# Patient Record
Sex: FEMALE | Race: WHITE | NOT HISPANIC OR LATINO | Employment: FULL TIME | URBAN - METROPOLITAN AREA
[De-identification: names, ages, dates, MRNs, and addresses within clinical notes are randomized per-mention and may not be internally consistent; named-entity substitution may affect disease eponyms.]

---

## 2017-01-26 ENCOUNTER — HOSPITAL ENCOUNTER (OUTPATIENT)
Dept: ORTHOPEDIC SURGERY | Facility: CLINIC | Age: 57
Discharge: HOME | End: 2017-01-26
Attending: ORTHOPAEDIC SURGERY | Admitting: ORTHOPAEDIC SURGERY

## 2017-02-23 ENCOUNTER — HOSPITAL ENCOUNTER (OUTPATIENT)
Dept: ORTHOPEDIC SURGERY | Facility: CLINIC | Age: 57
Discharge: HOME | End: 2017-02-23
Attending: PHYSICIAN ASSISTANT | Admitting: PHYSICIAN ASSISTANT

## 2017-03-23 ENCOUNTER — HOSPITAL ENCOUNTER (OUTPATIENT)
Dept: ORTHOPEDIC SURGERY | Facility: CLINIC | Age: 57
Discharge: HOME | End: 2017-03-23
Attending: PHYSICIAN ASSISTANT | Admitting: PHYSICIAN ASSISTANT

## 2017-06-29 ENCOUNTER — HOSPITAL ENCOUNTER (OUTPATIENT)
Dept: LAB | Facility: HOSPITAL | Age: 57
Discharge: HOME | End: 2017-06-29
Attending: NURSE PRACTITIONER | Admitting: NURSE PRACTITIONER

## 2017-06-29 LAB
ALBUMIN SERPL-MCNC: 3.9 G/DL (ref 3.5–4.8)
ALBUMIN/GLOB SERPL: 0.9 {RATIO} (ref 1–1.7)
ALP SERPL-CCNC: 85 IU/L (ref 32–91)
ALT SERPL-CCNC: 15 IU/L (ref 14–54)
ANION GAP SERPL CALC-SCNC: 14.3 MMOL/L (ref 10–20)
AST SERPL-CCNC: 21 IU/L (ref 15–41)
BASOPHILS # BLD AUTO: 0.1 10*3/UL (ref 0–0.2)
BASOPHILS NFR BLD AUTO: 1 % (ref 0–2)
BILIRUB SERPL-MCNC: 0.7 MG/DL (ref 0.3–1.2)
BUN SERPL-MCNC: 13 MG/DL (ref 8–20)
BUN/CREAT SERPL: 14.4 (ref 5.4–26.2)
CALCIUM SERPL-MCNC: 9.5 MG/DL (ref 8.9–10.3)
CHLORIDE SERPL-SCNC: 100 MMOL/L (ref 101–111)
CONV CO2: 29 MMOL/L (ref 22–32)
CONV TOTAL PROTEIN: 8.3 G/DL (ref 6.1–7.9)
CREAT UR-MCNC: 0.9 MG/DL (ref 0.4–1)
CRP SERPL-MCNC: 0.55 MG/DL (ref 0–0.7)
DIFFERENTIAL METHOD BLD: (no result)
EOSINOPHIL # BLD AUTO: 0.1 10*3/UL (ref 0–0.3)
EOSINOPHIL # BLD AUTO: 3 % (ref 0–3)
ERYTHROCYTE [DISTWIDTH] IN BLOOD BY AUTOMATED COUNT: 16 % (ref 11.5–14.5)
ERYTHROCYTE [SEDIMENTATION RATE] IN BLOOD BY WESTERGREN METHOD: 36 MM/HR (ref 0–30)
GLOBULIN UR ELPH-MCNC: 4.4 G/DL (ref 2.5–3.8)
GLUCOSE SERPL-MCNC: 103 MG/DL (ref 65–99)
HCT VFR BLD AUTO: 42.2 % (ref 35–49)
HGB BLD-MCNC: 13.6 G/DL (ref 12–15)
LYMPHOCYTES # BLD AUTO: 1.6 10*3/UL (ref 0.8–4.8)
LYMPHOCYTES NFR BLD AUTO: 36 % (ref 18–42)
MCH RBC QN AUTO: 27.4 PG (ref 26–32)
MCHC RBC AUTO-ENTMCNC: 32.3 G/DL (ref 32–36)
MCV RBC AUTO: 85.1 FL (ref 80–94)
MONOCYTES # BLD AUTO: 0.4 10*3/UL (ref 0.1–1.3)
MONOCYTES NFR BLD AUTO: 9 % (ref 2–11)
NEUTROPHILS # BLD AUTO: 2.3 10*3/UL (ref 2.3–8.6)
NEUTROPHILS NFR BLD AUTO: 51 % (ref 50–75)
NRBC BLD AUTO-RTO: 0 /100{WBCS}
NRBC/RBC NFR BLD MANUAL: 0 10*3/UL
PLATELET # BLD AUTO: 243 10*3/UL (ref 150–450)
PMV BLD AUTO: 8.7 FL (ref 7.4–10.4)
POTASSIUM SERPL-SCNC: 4.3 MMOL/L (ref 3.6–5.1)
RBC # BLD AUTO: 4.96 10*6/UL (ref 4–5.4)
SODIUM SERPL-SCNC: 139 MMOL/L (ref 136–144)
WBC # BLD AUTO: 4.6 10*3/UL (ref 4.5–11.5)

## 2017-06-30 LAB — CHROMATIN AB SERPL-ACNC: <20 [IU]/ML (ref 0–20)

## 2017-10-27 ENCOUNTER — HOSPITAL ENCOUNTER (OUTPATIENT)
Dept: RHEUMATOLOGY | Facility: CLINIC | Age: 57
Discharge: HOME | End: 2017-10-27
Attending: INTERNAL MEDICINE | Admitting: INTERNAL MEDICINE

## 2017-11-14 ENCOUNTER — HOSPITAL ENCOUNTER (OUTPATIENT)
Dept: OTHER | Facility: HOSPITAL | Age: 57
Discharge: HOME | End: 2017-11-14
Attending: GENERAL PRACTICE | Admitting: GENERAL PRACTICE

## 2017-11-14 LAB
ALBUMIN SERPL-MCNC: 3.7 G/DL (ref 3.5–4.8)
ALBUMIN/GLOB SERPL: 0.9 {RATIO} (ref 1–1.7)
ALP SERPL-CCNC: 67 IU/L (ref 32–91)
ALT SERPL-CCNC: 15 IU/L (ref 14–54)
ANION GAP SERPL CALC-SCNC: 11 MMOL/L (ref 10–20)
AST SERPL-CCNC: 20 IU/L (ref 15–41)
BASOPHILS # BLD AUTO: 0.1 10*3/UL (ref 0–0.2)
BASOPHILS NFR BLD AUTO: 1 % (ref 0–2)
BILIRUB SERPL-MCNC: 0.8 MG/DL (ref 0.3–1.2)
BUN SERPL-MCNC: 11 MG/DL (ref 8–20)
BUN/CREAT SERPL: 11 (ref 5.4–26.2)
CALCIUM SERPL-MCNC: 9.2 MG/DL (ref 8.9–10.3)
CHLORIDE SERPL-SCNC: 98 MMOL/L (ref 101–111)
CHOLEST SERPL-MCNC: 171 MG/DL
CHOLEST/HDLC SERPL: 2.4 {RATIO}
CONV CO2: 32 MMOL/L (ref 22–32)
CONV LDL CHOLESTEROL DIRECT: 84 MG/DL (ref 0–100)
CONV TOTAL PROTEIN: 8 G/DL (ref 6.1–7.9)
CREAT UR-MCNC: 1 MG/DL (ref 0.4–1)
DIFFERENTIAL METHOD BLD: (no result)
EOSINOPHIL # BLD AUTO: 0.1 10*3/UL (ref 0–0.3)
EOSINOPHIL # BLD AUTO: 2 % (ref 0–3)
ERYTHROCYTE [DISTWIDTH] IN BLOOD BY AUTOMATED COUNT: 14.5 % (ref 11.5–14.5)
GLOBULIN UR ELPH-MCNC: 4.3 G/DL (ref 2.5–3.8)
GLUCOSE SERPL-MCNC: 103 MG/DL (ref 65–99)
HCT VFR BLD AUTO: 44.4 % (ref 35–49)
HDLC SERPL-MCNC: 73 MG/DL
HGB BLD-MCNC: 14.7 G/DL (ref 12–15)
LDLC/HDLC SERPL: 1.2 {RATIO}
LIPID INTERPRETATION: NORMAL
LYMPHOCYTES # BLD AUTO: 1.9 10*3/UL (ref 0.8–4.8)
LYMPHOCYTES NFR BLD AUTO: 27 % (ref 18–42)
MCH RBC QN AUTO: 28.2 PG (ref 26–32)
MCHC RBC AUTO-ENTMCNC: 33.2 G/DL (ref 32–36)
MCV RBC AUTO: 85.1 FL (ref 80–94)
MONOCYTES # BLD AUTO: 0.6 10*3/UL (ref 0.1–1.3)
MONOCYTES NFR BLD AUTO: 9 % (ref 2–11)
NEUTROPHILS # BLD AUTO: 4.2 10*3/UL (ref 2.3–8.6)
NEUTROPHILS NFR BLD AUTO: 61 % (ref 50–75)
NRBC BLD AUTO-RTO: 0 /100{WBCS}
NRBC/RBC NFR BLD MANUAL: 0 10*3/UL
PLATELET # BLD AUTO: 236 10*3/UL (ref 150–450)
PMV BLD AUTO: 8.4 FL (ref 7.4–10.4)
POTASSIUM SERPL-SCNC: 3 MMOL/L (ref 3.6–5.1)
RBC # BLD AUTO: 5.21 10*6/UL (ref 4–5.4)
SODIUM SERPL-SCNC: 138 MMOL/L (ref 136–144)
TRIGL SERPL-MCNC: 74 MG/DL
TSH SERPL-ACNC: 1.9 UIU/ML (ref 0.34–5.6)
VLDLC SERPL CALC-MCNC: 14.1 MG/DL
WBC # BLD AUTO: 6.9 10*3/UL (ref 4.5–11.5)

## 2018-02-19 ENCOUNTER — HOSPITAL ENCOUNTER (OUTPATIENT)
Dept: LAB | Facility: HOSPITAL | Age: 58
Discharge: HOME | End: 2018-02-19
Attending: NURSE PRACTITIONER | Admitting: NURSE PRACTITIONER

## 2018-02-19 LAB
ALBUMIN SERPL-MCNC: 3.6 G/DL (ref 3.5–4.8)
ALBUMIN/GLOB SERPL: 1 {RATIO} (ref 1–1.7)
ALP SERPL-CCNC: 65 IU/L (ref 32–91)
ALT SERPL-CCNC: 16 IU/L (ref 14–54)
ANION GAP SERPL CALC-SCNC: 10.9 MMOL/L (ref 10–20)
AST SERPL-CCNC: 22 IU/L (ref 15–41)
BILIRUB SERPL-MCNC: 0.7 MG/DL (ref 0.3–1.2)
BUN SERPL-MCNC: 17 MG/DL (ref 8–20)
BUN/CREAT SERPL: 17 (ref 5.4–26.2)
CALCIUM SERPL-MCNC: 8.9 MG/DL (ref 8.9–10.3)
CHLORIDE SERPL-SCNC: 101 MMOL/L (ref 101–111)
CONV CO2: 29 MMOL/L (ref 22–32)
CONV TOTAL PROTEIN: 7.2 G/DL (ref 6.1–7.9)
CREAT UR-MCNC: 1 MG/DL (ref 0.4–1)
CRP SERPL-MCNC: 0.5 MG/DL (ref 0–0.7)
ERYTHROCYTE [DISTWIDTH] IN BLOOD BY AUTOMATED COUNT: 14 % (ref 11.5–14.5)
ERYTHROCYTE [SEDIMENTATION RATE] IN BLOOD BY WESTERGREN METHOD: 30 MM/HR (ref 0–30)
GLOBULIN UR ELPH-MCNC: 3.6 G/DL (ref 2.5–3.8)
GLUCOSE SERPL-MCNC: 104 MG/DL (ref 65–99)
HCT VFR BLD AUTO: 43 % (ref 35–49)
HGB BLD-MCNC: 14.3 G/DL (ref 12–15)
MCH RBC QN AUTO: 28.1 PG (ref 26–32)
MCHC RBC AUTO-ENTMCNC: 33.1 G/DL (ref 32–36)
MCV RBC AUTO: 84.9 FL (ref 80–94)
PLATELET # BLD AUTO: 226 10*3/UL (ref 150–450)
PMV BLD AUTO: 8.4 FL (ref 7.4–10.4)
POTASSIUM SERPL-SCNC: 3.9 MMOL/L (ref 3.6–5.1)
RBC # BLD AUTO: 5.07 10*6/UL (ref 4–5.4)
SODIUM SERPL-SCNC: 137 MMOL/L (ref 136–144)
WBC # BLD AUTO: 4.1 10*3/UL (ref 4.5–11.5)

## 2018-11-21 ENCOUNTER — HOSPITAL ENCOUNTER (OUTPATIENT)
Dept: RHEUMATOLOGY | Facility: CLINIC | Age: 58
Discharge: HOME | End: 2018-11-21
Attending: NURSE PRACTITIONER | Admitting: NURSE PRACTITIONER

## 2019-01-05 ENCOUNTER — HOSPITAL ENCOUNTER (OUTPATIENT)
Dept: LAB | Facility: HOSPITAL | Age: 59
Discharge: HOME | End: 2019-01-05
Attending: NURSE PRACTITIONER | Admitting: NURSE PRACTITIONER

## 2019-01-05 LAB
ALBUMIN SERPL-MCNC: 3.3 G/DL (ref 3.5–4.8)
ALBUMIN/GLOB SERPL: 1 {RATIO} (ref 1–1.7)
ALP SERPL-CCNC: 67 IU/L (ref 32–91)
ALT SERPL-CCNC: 16 IU/L (ref 14–54)
ANION GAP SERPL CALC-SCNC: 10.4 MMOL/L (ref 10–20)
AST SERPL-CCNC: 20 IU/L (ref 15–41)
BILIRUB SERPL-MCNC: 0.4 MG/DL (ref 0.3–1.2)
BUN SERPL-MCNC: 21 MG/DL (ref 8–20)
BUN/CREAT SERPL: 26.3 (ref 5.4–26.2)
CALCIUM SERPL-MCNC: 8.6 MG/DL (ref 8.9–10.3)
CHLORIDE SERPL-SCNC: 104 MMOL/L (ref 101–111)
CONV CO2: 27 MMOL/L (ref 22–32)
CONV TOTAL PROTEIN: 6.6 G/DL (ref 6.1–7.9)
CREAT UR-MCNC: 0.8 MG/DL (ref 0.4–1)
CRP SERPL-MCNC: 0.44 MG/DL (ref 0–0.7)
ERYTHROCYTE [DISTWIDTH] IN BLOOD BY AUTOMATED COUNT: 15.2 % (ref 11.5–14.5)
GLOBULIN UR ELPH-MCNC: 3.3 G/DL (ref 2.5–3.8)
GLUCOSE SERPL-MCNC: 88 MG/DL (ref 65–99)
HCT VFR BLD AUTO: 40.8 % (ref 35–49)
HGB BLD-MCNC: 13.4 G/DL (ref 12–15)
MCH RBC QN AUTO: 29.1 PG (ref 26–32)
MCHC RBC AUTO-ENTMCNC: 32.8 G/DL (ref 32–36)
MCV RBC AUTO: 88.6 FL (ref 80–94)
PLATELET # BLD AUTO: 221 10*3/UL (ref 150–450)
PMV BLD AUTO: 8.1 FL (ref 7.4–10.4)
POTASSIUM SERPL-SCNC: 4.4 MMOL/L (ref 3.6–5.1)
RBC # BLD AUTO: 4.6 10*6/UL (ref 4–5.4)
SODIUM SERPL-SCNC: 137 MMOL/L (ref 136–144)
WBC # BLD AUTO: 5.5 10*3/UL (ref 4.5–11.5)

## 2019-05-08 ENCOUNTER — HOSPITAL ENCOUNTER (OUTPATIENT)
Dept: LAB | Facility: HOSPITAL | Age: 59
Discharge: HOME | End: 2019-05-08
Attending: NURSE PRACTITIONER | Admitting: NURSE PRACTITIONER

## 2019-05-08 LAB
ALBUMIN SERPL-MCNC: 3.7 G/DL (ref 3.5–4.8)
ALBUMIN/GLOB SERPL: 1 {RATIO} (ref 1–1.7)
ALP SERPL-CCNC: 60 IU/L (ref 32–91)
ALT SERPL-CCNC: 19 IU/L (ref 14–54)
ANION GAP SERPL CALC-SCNC: 16 MMOL/L (ref 10–20)
AST SERPL-CCNC: 19 IU/L (ref 15–41)
BASOPHILS # BLD AUTO: 0.1 10*3/UL (ref 0–0.2)
BASOPHILS NFR BLD AUTO: 1 % (ref 0–2)
BILIRUB SERPL-MCNC: 0.7 MG/DL (ref 0.3–1.2)
BUN SERPL-MCNC: 13 MG/DL (ref 8–20)
BUN/CREAT SERPL: 14.4 (ref 5.4–26.2)
CALCIUM SERPL-MCNC: 9.1 MG/DL (ref 8.9–10.3)
CHLORIDE SERPL-SCNC: 103 MMOL/L (ref 101–111)
CONV CO2: 25 MMOL/L (ref 22–32)
CONV TOTAL PROTEIN: 7.3 G/DL (ref 6.1–7.9)
CREAT UR-MCNC: 0.9 MG/DL (ref 0.4–1)
CRP SERPL-MCNC: 0.45 MG/DL (ref 0–0.7)
DIFFERENTIAL METHOD BLD: (no result)
EOSINOPHIL # BLD AUTO: 0.2 10*3/UL (ref 0–0.3)
EOSINOPHIL # BLD AUTO: 4 % (ref 0–3)
ERYTHROCYTE [DISTWIDTH] IN BLOOD BY AUTOMATED COUNT: 14.9 % (ref 11.5–14.5)
GLOBULIN UR ELPH-MCNC: 3.6 G/DL (ref 2.5–3.8)
GLUCOSE SERPL-MCNC: 107 MG/DL (ref 65–99)
HCT VFR BLD AUTO: 41.6 % (ref 35–49)
HGB BLD-MCNC: 13.6 G/DL (ref 12–15)
LYMPHOCYTES # BLD AUTO: 1.6 10*3/UL (ref 0.8–4.8)
LYMPHOCYTES NFR BLD AUTO: 32 % (ref 18–42)
MCH RBC QN AUTO: 29.6 PG (ref 26–32)
MCHC RBC AUTO-ENTMCNC: 32.6 G/DL (ref 32–36)
MCV RBC AUTO: 90.6 FL (ref 80–94)
MONOCYTES # BLD AUTO: 0.5 10*3/UL (ref 0.1–1.3)
MONOCYTES NFR BLD AUTO: 10 % (ref 2–11)
NEUTROPHILS # BLD AUTO: 2.6 10*3/UL (ref 2.3–8.6)
NEUTROPHILS NFR BLD AUTO: 53 % (ref 50–75)
NRBC BLD AUTO-RTO: 0 /100{WBCS}
NRBC/RBC NFR BLD MANUAL: 0 10*3/UL
PLATELET # BLD AUTO: 218 10*3/UL (ref 150–450)
PMV BLD AUTO: 9 FL (ref 7.4–10.4)
POTASSIUM SERPL-SCNC: 4 MMOL/L (ref 3.6–5.1)
RBC # BLD AUTO: 4.59 10*6/UL (ref 4–5.4)
SODIUM SERPL-SCNC: 140 MMOL/L (ref 136–144)
WBC # BLD AUTO: 4.9 10*3/UL (ref 4.5–11.5)

## 2019-05-09 LAB — 25(OH)D3 SERPL-MCNC: 30 NG/ML (ref 30–100)

## 2019-06-18 RX ORDER — HYDROXYCHLOROQUINE SULFATE 200 MG/1
TABLET, FILM COATED ORAL
Qty: 60 TABLET | Refills: 2 | Status: SHIPPED | OUTPATIENT
Start: 2019-06-18 | End: 2019-09-15 | Stop reason: SDUPTHER

## 2019-08-13 ENCOUNTER — CONVERSION ENCOUNTER (OUTPATIENT)
Dept: RHEUMATOLOGY | Facility: CLINIC | Age: 59
End: 2019-08-13

## 2019-08-13 LAB
ALBUMIN SERPL-MCNC: 3.8 G/DL (ref 3.6–5.1)
ALBUMIN/GLOB SERPL: ABNORMAL {RATIO} (ref 1–2.5)
ALP SERPL-CCNC: 77 UNITS/L (ref 33–130)
ALT SERPL-CCNC: 14 UNITS/L (ref 6–29)
AST SERPL-CCNC: 16 UNITS/L (ref 10–35)
BASOPHILS # BLD AUTO: ABNORMAL 10*3/MM3 (ref 0–200)
BASOPHILS NFR BLD AUTO: 1.3 %
BILIRUB SERPL-MCNC: 0.5 MG/DL (ref 0.2–1.2)
BUN SERPL-MCNC: 24 MG/DL (ref 7–25)
BUN/CREAT SERPL: ABNORMAL (ref 6–22)
CALCIUM SERPL-MCNC: 9 MG/DL (ref 8.6–10.4)
CHLORIDE SERPL-SCNC: 106 MMOL/L (ref 98–110)
CO2 CONTENT VENOUS: 28 MMOL/L (ref 20–32)
CONV NEUTROPHILS/100 LEUKOCYTES IN BODY FLUID BY MANUAL COUNT: 61.3 %
CONV RF TITER: 19
CONV TOTAL PROTEIN: 6.9 G/DL (ref 6.1–8.1)
CREAT UR-MCNC: 0.83 MG/DL (ref 0.5–0.99)
CRP SERPL-MCNC: 0.61 MG/DL
CYCLIC CITRULLINATED PEPTIDE ANTIBODY: ABNORMAL
EOSINOPHIL # BLD AUTO: 4.5 %
EOSINOPHIL # BLD AUTO: ABNORMAL 10*3/MM3 (ref 15–500)
ERYTHROCYTE [DISTWIDTH] IN BLOOD BY AUTOMATED COUNT: 16.2 % (ref 11–15)
ERYTHROCYTE [SEDIMENTATION RATE] IN BLOOD BY WESTERGREN METHOD: 14 MM/HR
GLOBULIN UR ELPH-MCNC: ABNORMAL G/DL (ref 1.9–3.7)
GLUCOSE SERPL-MCNC: 102 MG/DL (ref 65–99)
HCT VFR BLD AUTO: 40.4 % (ref 35–45)
HGB BLD-MCNC: 13.1 G/DL (ref 11.7–15.5)
LYMPHOCYTES # BLD AUTO: ABNORMAL 10*3/MM3 (ref 850–3900)
LYMPHOCYTES NFR BLD AUTO: 24.8 %
MCH RBC QN AUTO: 28.6 PG (ref 27–33)
MCHC RBC AUTO-ENTMCNC: ABNORMAL % (ref 32–36)
MCV RBC AUTO: 88.2 FL (ref 80–100)
MONOCYTES # BLD AUTO: ABNORMAL 10*3/MICROLITER (ref 200–950)
MONOCYTES NFR BLD AUTO: 8.1 %
NEUTROPHILS # BLD AUTO: ABNORMAL 10*3/MM3 (ref 1500–7800)
PLATELET # BLD AUTO: ABNORMAL 10*3/MM3 (ref 140–400)
PMV BLD AUTO: 10 FL (ref 7.5–12.5)
POTASSIUM SERPL-SCNC: 4.9 MMOL/L (ref 3.5–5.3)
RBC # BLD AUTO: ABNORMAL 10*6/MM3 (ref 3.8–5.1)
SODIUM SERPL-SCNC: 140 MMOL/L (ref 135–146)
WBC # BLD AUTO: ABNORMAL K/UL (ref 3.8–10.8)

## 2019-08-21 ENCOUNTER — OFFICE VISIT (OUTPATIENT)
Dept: RHEUMATOLOGY | Facility: CLINIC | Age: 59
End: 2019-08-21

## 2019-08-21 VITALS
WEIGHT: 236 LBS | HEIGHT: 65 IN | SYSTOLIC BLOOD PRESSURE: 130 MMHG | BODY MASS INDEX: 39.32 KG/M2 | HEART RATE: 61 BPM | DIASTOLIC BLOOD PRESSURE: 76 MMHG

## 2019-08-21 DIAGNOSIS — M19.90 INFLAMMATORY ARTHRITIS: ICD-10-CM

## 2019-08-21 DIAGNOSIS — M35.01 SJOGREN'S SYNDROME WITH KERATOCONJUNCTIVITIS SICCA (HCC): Primary | ICD-10-CM

## 2019-08-21 DIAGNOSIS — M17.0 PRIMARY OSTEOARTHRITIS OF BOTH KNEES: ICD-10-CM

## 2019-08-21 PROBLEM — M81.0 OSTEOPOROSIS: Status: ACTIVE | Noted: 2019-08-21

## 2019-08-21 PROBLEM — M79.643 PAIN OF HAND: Status: ACTIVE | Noted: 2017-10-27

## 2019-08-21 PROBLEM — M85.80 OSTEOPENIA: Status: ACTIVE | Noted: 2018-06-20

## 2019-08-21 PROBLEM — Z98.890 HISTORY OF OPERATIVE PROCEDURE ON HIP: Status: ACTIVE | Noted: 2017-01-26

## 2019-08-21 PROBLEM — Z82.3 FAMILY HISTORY OF STROKE: Status: ACTIVE | Noted: 2019-08-21

## 2019-08-21 PROBLEM — E07.9 THYROID DISORDER: Status: ACTIVE | Noted: 2019-08-21

## 2019-08-21 PROBLEM — M17.9 OSTEOARTHRITIS OF KNEE: Status: ACTIVE | Noted: 2017-06-29

## 2019-08-21 PROCEDURE — 99214 OFFICE O/P EST MOD 30 MIN: CPT | Performed by: NURSE PRACTITIONER

## 2019-08-21 PROCEDURE — 20610 DRAIN/INJ JOINT/BURSA W/O US: CPT | Performed by: NURSE PRACTITIONER

## 2019-08-21 RX ORDER — FOLIC ACID 1 MG/1
TABLET ORAL
Refills: 0 | COMMUNITY
Start: 2019-08-19 | End: 2019-09-15 | Stop reason: SDUPTHER

## 2019-08-21 RX ORDER — NICOTINE POLACRILEX 4 MG/1
GUM, CHEWING ORAL
COMMUNITY
Start: 2016-10-12 | End: 2020-07-23

## 2019-08-21 RX ORDER — DICLOFENAC SODIUM 75 MG/1
75 TABLET, DELAYED RELEASE ORAL 2 TIMES DAILY PRN
Qty: 60 TABLET | Refills: 1 | Status: SHIPPED | OUTPATIENT
Start: 2019-08-21 | End: 2019-10-14 | Stop reason: SDUPTHER

## 2019-08-21 RX ORDER — ALENDRONATE SODIUM 70 MG/1
TABLET ORAL
COMMUNITY
Start: 2018-06-20 | End: 2020-05-19 | Stop reason: SDUPTHER

## 2019-08-21 RX ORDER — LEVOTHYROXINE SODIUM 0.1 MG/1
TABLET ORAL
Refills: 0 | COMMUNITY
Start: 2019-06-20 | End: 2020-07-23 | Stop reason: SDUPTHER

## 2019-08-21 RX ORDER — METHYLPREDNISOLONE ACETATE 80 MG/ML
80 INJECTION, SUSPENSION INTRA-ARTICULAR; INTRALESIONAL; INTRAMUSCULAR; SOFT TISSUE ONCE
Status: COMPLETED | OUTPATIENT
Start: 2019-08-21 | End: 2019-08-21

## 2019-08-21 RX ORDER — MELOXICAM 15 MG/1
15 TABLET ORAL DAILY
Refills: 0 | COMMUNITY
Start: 2019-05-15 | End: 2019-08-21

## 2019-08-21 RX ADMIN — METHYLPREDNISOLONE ACETATE 80 MG: 80 INJECTION, SUSPENSION INTRA-ARTICULAR; INTRALESIONAL; INTRAMUSCULAR; SOFT TISSUE at 11:09

## 2019-08-21 NOTE — PROGRESS NOTES
Subjective   Lucy Mahan is a 58 y.o. female.     History of Present Illness       Pt is a 58 y.o. female w/ history of rheumatoid arthritis (seropositive) and OA of the antione knees. She was last seen in May 2019. She takes plaquenil 200mg 2/d, MTX 4 tabs/week (on Saturday), folic acid 1mg/d, meloxicam 15 mg/d, alendronate 70mg/week. Compliant w/ meds, denies any side effects. Up to date on her eye examination for plaquenil- it was last done in November 2018 & was ok for plaquenil.      AM sitffness may last 5-10 minutes, her knees and hands will ache intermittently but overall is much better than it was prior to starting MTX.  Right knee is really bothering her. She uses conservative measures for her knees such as support, ice...does not seem to be helping. Hurts w/ ambulation and stairs bother her. Ankles will swell, worse at the end of the day.  Feels like joint pain is worse in the evenings. Been swimming more lately and joint pain seems exacerbated by this.     She is trying to drink more water & elevated her LEs when she can and this does help. Right knee will swell at times.    Labs on 5-8-19 were unremarkable with normal inflammatory markers and normal vitamin D level.     ROS:Denies fever/chillls. No nasal or oral lesions.Dry eyes and uses gtts. No CP or SOA. NO QUETA, jaw pain or blurred vision. No skin rashes. Energy is up and down. A lot of stress at times  The review of systems reviewed and are (-). PCP manages hypothyroidism.     She had dexa scan June 19th, 2018 that showed osteopenia.  Strong fam hx of osteoporosis. She takes the alendronate well. No falls or fractures. takes daily calcium and vitamin D    After risks and benefits explained to the patient, consent obtained. The right knee  was cleaned and prepped in sterile fashion with betadine and alcohol. The area was numbed with topical lidocaine spray and then injected with 80 mg of depo medrol and 1 cc of lidocaine. Pt tolerated well. Band aid  applied. Injection given at 1106 by FORTUNATO Warren        The following portions of the patient's history were reviewed and updated as appropriate: allergies, current medications, past family history, past medical history, past social history, past surgical history and problem list.    Review of Systems   Constitutional:        See HPI       Objective   Physical Exam   Constitutional: She is oriented to person, place, and time. She appears well-developed and well-nourished.   HENT:   Head: Normocephalic and atraumatic.   Eyes: EOM are normal. Pupils are equal, round, and reactive to light.   Cardiovascular: Normal rate and regular rhythm.   Pulmonary/Chest: Effort normal and breath sounds normal.   Musculoskeletal:   Mild swelling is noted over antione knees  Tenderness is noted over right knee w/palpation  Decreased ROM antione shoulders, bilateral knees, pain with ROM.   Neurological: She is alert and oriented to person, place, and time.   Skin: Skin is warm and dry.   Psychiatric: She has a normal mood and affect.         Assessment/Plan   Lucy was seen today for rheumatoid arthritis.    Diagnoses and all orders for this visit:    Sjogren's syndrome with keratoconjunctivitis sicca (CMS/HCC)  -     Cancel: CBC With Manual Differential; Future  -     Cancel: Comprehensive Metabolic Panel; Future  -     Cancel: C-reactive Protein; Future  -     Cancel: Sedimentation Rate; Future  -     Cancel: Cyclic Citrul Peptide Antibody, IgG / IgA; Future  -     Cancel: Vitamin D 25 Hydroxy; Future  -     Cancel: Uric Acid; Future  -     CBC With Manual Differential; Future  -     Comprehensive Metabolic Panel; Future  -     C-reactive Protein; Future  -     Vitamin D 25 Hydroxy; Future    Inflammatory arthritis  -     Cancel: CBC With Manual Differential; Future  -     Cancel: Comprehensive Metabolic Panel; Future  -     Cancel: C-reactive Protein; Future  -     Cancel: Sedimentation Rate; Future  -     Cancel: Cyclic Citrul  Peptide Antibody, IgG / IgA; Future  -     Cancel: Vitamin D 25 Hydroxy; Future  -     Cancel: Uric Acid; Future  -     CBC With Manual Differential; Future  -     Comprehensive Metabolic Panel; Future  -     C-reactive Protein; Future  -     Vitamin D 25 Hydroxy; Future    Primary osteoarthritis of both knees  -     Cancel: CBC With Manual Differential; Future  -     Cancel: Comprehensive Metabolic Panel; Future  -     Cancel: C-reactive Protein; Future  -     Cancel: Sedimentation Rate; Future  -     Cancel: Cyclic Citrul Peptide Antibody, IgG / IgA; Future  -     Cancel: Vitamin D 25 Hydroxy; Future  -     Cancel: Uric Acid; Future  -     methylPREDNISolone acetate (DEPO-medrol) injection 80 mg    Other orders  -     diclofenac (VOLTAREN) 75 MG EC tablet; Take 1 tablet by mouth 2 (Two) Times a Day As Needed (Pain).  -     methotrexate 2.5 MG tablet; Take 5 tablets by mouth 1 (One) Time Per Week.        Patient Instructions   increasd pain Increase MTX 5 tab/week  Stop meloxcam  Start diclofeanc 75 mg po BID  Labs reviewed & future orders given  Right knee inejction given

## 2019-08-21 NOTE — PATIENT INSTRUCTIONS
increasd pain Increase MTX 5 tab/week  Stop meloxcam  Start diclofeanc 75 mg po BID  Labs reviewed & future orders given  Right knee injection given---> see note.

## 2019-09-17 RX ORDER — HYDROXYCHLOROQUINE SULFATE 200 MG/1
TABLET, FILM COATED ORAL
Qty: 60 TABLET | Refills: 2 | Status: SHIPPED | OUTPATIENT
Start: 2019-09-17 | End: 2020-03-30

## 2019-09-17 RX ORDER — FOLIC ACID 1 MG/1
TABLET ORAL
Qty: 30 TABLET | Refills: 3 | Status: SHIPPED | OUTPATIENT
Start: 2019-09-17 | End: 2020-01-10

## 2019-10-14 RX ORDER — DICLOFENAC SODIUM 75 MG/1
TABLET, DELAYED RELEASE ORAL
Qty: 60 TABLET | Refills: 1 | Status: SHIPPED | OUTPATIENT
Start: 2019-10-14 | End: 2019-12-10 | Stop reason: SDUPTHER

## 2019-12-11 ENCOUNTER — TELEPHONE (OUTPATIENT)
Dept: RHEUMATOLOGY | Facility: CLINIC | Age: 59
End: 2019-12-11

## 2019-12-11 RX ORDER — DICLOFENAC SODIUM 75 MG/1
TABLET, DELAYED RELEASE ORAL
Qty: 60 TABLET | Refills: 1 | Status: SHIPPED | OUTPATIENT
Start: 2019-12-11 | End: 2020-01-29

## 2019-12-12 NOTE — TELEPHONE ENCOUNTER
Left patient a message to call so we can get her in sooner. It looks like she no showed 11/25/19 and I sent her a letter asking her to call so we can reschedule.

## 2019-12-16 RX ORDER — HYDROXYCHLOROQUINE SULFATE 200 MG/1
TABLET, FILM COATED ORAL
Qty: 180 TABLET | Refills: 0 | OUTPATIENT
Start: 2019-12-16

## 2020-01-10 RX ORDER — FOLIC ACID 1 MG/1
TABLET ORAL
Qty: 90 TABLET | Refills: 1 | Status: SHIPPED | OUTPATIENT
Start: 2020-01-10 | End: 2022-06-22

## 2020-01-21 ENCOUNTER — CONVERSION ENCOUNTER (OUTPATIENT)
Dept: RHEUMATOLOGY | Facility: CLINIC | Age: 60
End: 2020-01-21

## 2020-01-21 LAB
ALBUMIN SERPL-MCNC: 3.9 G/DL (ref 3.6–5.1)
ALBUMIN/GLOB SERPL: ABNORMAL {RATIO} (ref 1–2.5)
ALP SERPL-CCNC: 88 UNITS/L (ref 33–130)
ALT SERPL-CCNC: 13 UNITS/L (ref 6–29)
AST SERPL-CCNC: 15 UNITS/L (ref 10–35)
BASOPHILS # BLD AUTO: ABNORMAL 10*3/MM3 (ref 0–200)
BASOPHILS NFR BLD AUTO: 1.6 %
BILIRUB SERPL-MCNC: 0.4 MG/DL (ref 0.2–1.2)
BUN SERPL-MCNC: 22 MG/DL (ref 7–25)
BUN/CREAT SERPL: ABNORMAL (ref 6–22)
CALCIUM SERPL-MCNC: 8.8 MG/DL (ref 8.6–10.4)
CHLORIDE SERPL-SCNC: 105 MMOL/L (ref 98–110)
CO2 CONTENT VENOUS: 28 MMOL/L (ref 20–32)
CONV NEUTROPHILS/100 LEUKOCYTES IN BODY FLUID BY MANUAL COUNT: 64.4 %
CONV TOTAL PROTEIN: 7.2 G/DL (ref 6.1–8.1)
CREAT UR-MCNC: 1.31 MG/DL (ref 0.5–0.99)
CRP SERPL-MCNC: 0.71 MG/DL
EOSINOPHIL # BLD AUTO: 3.5 %
EOSINOPHIL # BLD AUTO: ABNORMAL 10*3/MM3 (ref 15–500)
ERYTHROCYTE [DISTWIDTH] IN BLOOD BY AUTOMATED COUNT: 14 % (ref 11–15)
GLOBULIN UR ELPH-MCNC: ABNORMAL G/DL (ref 1.9–3.7)
GLUCOSE SERPL-MCNC: 92 MG/DL (ref 65–99)
HCT VFR BLD AUTO: 38.7 % (ref 35–45)
HGB BLD-MCNC: 13.8 G/DL (ref 11.7–15.5)
LYMPHOCYTES # BLD AUTO: ABNORMAL 10*3/MM3 (ref 850–3900)
LYMPHOCYTES NFR BLD AUTO: 22.4 %
MCH RBC QN AUTO: 31.2 PG (ref 27–33)
MCHC RBC AUTO-ENTMCNC: ABNORMAL % (ref 32–36)
MCV RBC AUTO: 87.6 FL (ref 80–100)
MONOCYTES # BLD AUTO: ABNORMAL 10*3/MICROLITER (ref 200–950)
MONOCYTES NFR BLD AUTO: 8.1 %
NEUTROPHILS # BLD AUTO: ABNORMAL 10*3/MM3 (ref 1500–7800)
PLATELET # BLD AUTO: ABNORMAL 10*3/MM3 (ref 140–400)
PMV BLD AUTO: 10.6 FL (ref 7.5–12.5)
POTASSIUM SERPL-SCNC: 4.2 MMOL/L (ref 3.5–5.3)
RBC # BLD AUTO: ABNORMAL 10*6/MM3 (ref 3.8–5.1)
SODIUM SERPL-SCNC: 141 MMOL/L (ref 135–146)
WBC # BLD AUTO: ABNORMAL K/UL (ref 3.8–10.8)

## 2020-01-29 ENCOUNTER — OFFICE VISIT (OUTPATIENT)
Dept: RHEUMATOLOGY | Facility: CLINIC | Age: 60
End: 2020-01-29

## 2020-01-29 VITALS
DIASTOLIC BLOOD PRESSURE: 84 MMHG | BODY MASS INDEX: 39.49 KG/M2 | HEART RATE: 63 BPM | WEIGHT: 237 LBS | HEIGHT: 65 IN | SYSTOLIC BLOOD PRESSURE: 120 MMHG

## 2020-01-29 DIAGNOSIS — M05.79 RHEUMATOID ARTHRITIS INVOLVING MULTIPLE SITES WITH POSITIVE RHEUMATOID FACTOR (HCC): Primary | ICD-10-CM

## 2020-01-29 DIAGNOSIS — M35.01 SJOGREN'S SYNDROME WITH KERATOCONJUNCTIVITIS SICCA (HCC): ICD-10-CM

## 2020-01-29 DIAGNOSIS — M17.0 PRIMARY OSTEOARTHRITIS OF BOTH KNEES: ICD-10-CM

## 2020-01-29 PROCEDURE — 99213 OFFICE O/P EST LOW 20 MIN: CPT | Performed by: NURSE PRACTITIONER

## 2020-01-29 RX ORDER — PREDNISONE 1 MG/1
TABLET ORAL
Qty: 26 TABLET | Refills: 0 | Status: SHIPPED | OUTPATIENT
Start: 2020-01-29 | End: 2020-05-01

## 2020-01-30 NOTE — PROGRESS NOTES
Subjective   Lucy Mahan is a 59 y.o. female.     History of Present Illness     Pt is a 59 y.o. Female here for the follow up on the management of her rheumatoid arthritis (seropositive) and OA of the antione knees. She was last seen in September 2019. She did have labs done last week- labs w/ [facility]. At the time of her last office visit she had an injection to her Rt knee. Injection did help.    She takes plaquenil 200mg 2/d, MTX 4 tabs/week (on Saturday), folic acid 1mg/d, meloxicam 15 mg/d, alendronate 70mg/week. Compliant w/ meds, denies any side effects. Up to date on her eye examination for plaquenil- it was last done in November 2018 & was ok for plaquenil.        AM sitffness may last 10-15 minutes, her knees and hands will ache intermittently but overall is much better than it was prior to starting MTX.  She was feeling good up until recently and Sunday started to feel like she was having a flare.  was in the hospital last week. Has been very stressed. Denies joint swelling. Just feel still and achy. +fatigued.        ROS:Denies fever/chillls. No nasal or oral lesions.Dry eyes and uses gtts. No CP or SOA. NO QUETA, jaw pain or blurred vision. No skin rashes. Low energy. A lot of stress latelyThe review of systems reviewed and are (-). PCP manages hypothyroidism.      She had dexa scan June 19th, 2018 that showed osteopenia.  Strong fam hx of osteoporosis. She takes the alendronate well. No falls or fractures. takes daily calcium and vitamin D      Lucy Mahan reports a pain score of 5.  Given her pain assessment as noted, treatment options were discussed and the following options were decided upon as a follow-up plan to address the patient's pain: continuation of current treatment plan for pain.      The following portions of the patient's history were reviewed and updated as appropriate: allergies, current medications, past family history, past medical history, past social history, past surgical  history and problem list.    Review of Systems  See HPI    Objective   Physical Exam   Constitutional: She is oriented to person, place, and time. She appears well-developed and well-nourished.   HENT:   Head: Normocephalic.   Eyes: Pupils are equal, round, and reactive to light. Conjunctivae are normal.   Cardiovascular: Normal rate and regular rhythm.   Pulmonary/Chest: Effort normal and breath sounds normal.   Musculoskeletal:   She has mild decreased ROM over the lumbar spine  She has tenderness over the right knee, rt shoulder, rt wrist on examination. There is no swelling or synovitis on exam today.   Neurological: She is alert and oriented to person, place, and time.   Skin: Skin is warm and dry.   Psychiatric: She has a normal mood and affect.   Vitals reviewed.        Assessment/Plan   Lucy was seen today for joint pain.    Diagnoses and all orders for this visit:    Rheumatoid arthritis involving multiple sites with positive rheumatoid factor (CMS/[facility])  Comments:  Increased joint pain; no swelling on examination.  Will obtain labs.   Orders:  -     CBC & Differential; Future  -     Comprehensive Metabolic Panel; Future  -     C-reactive Protein; Future  -     Rheumatoid Factor; Future  -     Vitamin D 25 Hydroxy; Future  -     Sedimentation Rate; Future  -     Cyclic Citrul Peptide Antibody, IgG / IgA; Future    Sjogren's syndrome with keratoconjunctivitis sicca (CMS/[facility])  Comments:  May continue to use eye gtts      Primary osteoarthritis of both knees  Comments:  Improved after injection; Stable at this time.    Other orders  -     methotrexate 2.5 MG tablet; Take 4 tablets by mouth 1 (One) Time Per Week.  -     predniSONE (DELTASONE) 5 MG tablet; Take 3 po qd x4 days, then 2 po qd x4 days, 1 po qd x4 days 1/2 po qd x4 days        Patient Instructions   Get labs from [facility]  Continue therapy  Prednisone taper  RTO 3 months or sooner if needed

## 2020-02-10 RX ORDER — DICLOFENAC SODIUM 75 MG/1
TABLET, DELAYED RELEASE ORAL
Qty: 60 TABLET | Refills: 1 | OUTPATIENT
Start: 2020-02-10

## 2020-03-30 RX ORDER — HYDROXYCHLOROQUINE SULFATE 200 MG/1
TABLET, FILM COATED ORAL
Qty: 180 TABLET | Refills: 0 | Status: SHIPPED | OUTPATIENT
Start: 2020-03-30 | End: 2022-06-22

## 2020-05-04 ENCOUNTER — OFFICE VISIT (OUTPATIENT)
Dept: RHEUMATOLOGY | Facility: CLINIC | Age: 60
End: 2020-05-04

## 2020-05-04 DIAGNOSIS — M17.4 OTHER SECONDARY OSTEOARTHRITIS OF BOTH KNEES: ICD-10-CM

## 2020-05-04 DIAGNOSIS — M85.80 OSTEOPENIA, UNSPECIFIED LOCATION: ICD-10-CM

## 2020-05-04 DIAGNOSIS — M05.79 RHEUMATOID ARTHRITIS INVOLVING MULTIPLE SITES WITH POSITIVE RHEUMATOID FACTOR (HCC): Primary | ICD-10-CM

## 2020-05-04 PROCEDURE — 99442 PR PHYS/QHP TELEPHONE EVALUATION 11-20 MIN: CPT | Performed by: NURSE PRACTITIONER

## 2020-05-04 NOTE — PATIENT INSTRUCTIONS
Labs due- pt has orders and will have them done.  Continue therapy.  Follow up in 3 months with Rheumatologist

## 2020-05-05 NOTE — PROGRESS NOTES
Subjective   Lucy Mahan is a 59 y.o. female.     History of Present Illness     TELEPHONE VISIT    This visit has been rescheduled as a phone visit to comply with patient safety concerns in accordance with CDC recommendations. Total time of discussion was 15 minutes.      You have chosen to receive care through a telephone visit. Do you consent to use a telephone visit for your medical care today? YES    Pt is a 59 y.o. female here for the follow up on the management of her rheumatoid arthritis (seropositive) and OA of the antione knees. She was last seen in January 2020. She had labs in January: creatnine was elevated at 1.31 (0.5-0.99), normal CRP, CBC was unremarkable.     She takes plaquenil 200mg 2/d, MTX 4 tabs/week (on Saturday), folic acid 1mg/d,alendronate 70mg/week. Compliant w/ meds, denies any side effects. Up to date on her eye examination for plaquenil.         AM sitffness may last 15-20 minutes, her knees and hands will ache intermittently but overall is much better than it was prior to starting MTX.  Rt shoulder & rt knee will ache w/ activity. She has been doing a lot lately and feels like the increased activity exacerbated her pain. Denies joint swelling.  Received injection in the Rt knee in the past and tolerated this well. Injection did help. Doesn't feel like she needs an injection at this point in time.        ROS:Denies fever/chillls. No nasal or oral lesions.Dry eyes and uses gtts. No CP or SOA. NO QUETA, jaw pain or blurred vision. No skin rashes. Low energy. A lot of stress latelyThe review of systems reviewed and are (-). PCP manages hypothyroidism.      She had dexa scan 6-19- 2018 that showed osteopenia.  Strong fam hx of osteoporosis. She takes the alendronate well. No falls or fractures. takes daily calcium and vitamin D              Review of Systems  See HPI    Objective   Physical Exam   Constitutional:   Telephone visit         Assessment/Plan   Lucy was seen today for  rheumatoid arthritis.    Diagnoses and all orders for this visit:    Rheumatoid arthritis involving multiple sites with positive rheumatoid factor (CMS/[facility])  Comments:  Pt has lab orders and plans to have them done.  Continue therapy.    Other secondary osteoarthritis of both knees  Comments:  Discussed measures to help manage OA: PADMINIILAKEISHA. Pt has taken an injection in the past & doesn't feel she needs  one at this time.     Osteopenia, unspecified location  Comments:  Continue alendronate 70 mg/wk, calcium & vitamin D  Due for dexa scan in June 2020.        Patient Instructions   Labs due- pt has orders and will have them done.  Continue therapy.  Follow up in 3 months with Rheumatologist

## 2020-05-06 RX ORDER — DICLOFENAC SODIUM 75 MG/1
TABLET, DELAYED RELEASE ORAL
Qty: 60 TABLET | OUTPATIENT
Start: 2020-05-06

## 2020-05-13 RX ORDER — DICLOFENAC SODIUM 75 MG/1
TABLET, DELAYED RELEASE ORAL
Qty: 60 TABLET | OUTPATIENT
Start: 2020-05-13

## 2020-05-19 RX ORDER — ALENDRONATE SODIUM 70 MG/1
70 TABLET ORAL
Qty: 12 TABLET | Refills: 0 | Status: SHIPPED | OUTPATIENT
Start: 2020-05-19 | End: 2021-10-19

## 2020-07-23 ENCOUNTER — OFFICE VISIT (OUTPATIENT)
Dept: FAMILY MEDICINE CLINIC | Facility: CLINIC | Age: 60
End: 2020-07-23

## 2020-07-23 VITALS
WEIGHT: 233.2 LBS | HEART RATE: 56 BPM | DIASTOLIC BLOOD PRESSURE: 86 MMHG | TEMPERATURE: 97.7 F | HEIGHT: 65 IN | OXYGEN SATURATION: 99 % | SYSTOLIC BLOOD PRESSURE: 176 MMHG | BODY MASS INDEX: 38.85 KG/M2

## 2020-07-23 DIAGNOSIS — M81.0 AGE-RELATED OSTEOPOROSIS WITHOUT CURRENT PATHOLOGICAL FRACTURE: ICD-10-CM

## 2020-07-23 DIAGNOSIS — Z12.31 BREAST CANCER SCREENING BY MAMMOGRAM: ICD-10-CM

## 2020-07-23 DIAGNOSIS — M05.79 RHEUMATOID ARTHRITIS INVOLVING MULTIPLE SITES WITH POSITIVE RHEUMATOID FACTOR (HCC): ICD-10-CM

## 2020-07-23 DIAGNOSIS — E03.9 ACQUIRED HYPOTHYROIDISM: ICD-10-CM

## 2020-07-23 DIAGNOSIS — I10 ESSENTIAL HYPERTENSION: Primary | ICD-10-CM

## 2020-07-23 DIAGNOSIS — Z12.11 COLON CANCER SCREENING: ICD-10-CM

## 2020-07-23 PROCEDURE — 99204 OFFICE O/P NEW MOD 45 MIN: CPT | Performed by: NURSE PRACTITIONER

## 2020-07-23 RX ORDER — LISINOPRIL 10 MG/1
10 TABLET ORAL DAILY
Qty: 30 TABLET | Refills: 2 | Status: SHIPPED | OUTPATIENT
Start: 2020-07-23 | End: 2020-08-17

## 2020-07-23 RX ORDER — LEVOTHYROXINE SODIUM 0.1 MG/1
100 TABLET ORAL DAILY
Qty: 30 TABLET | Refills: 2 | Status: SHIPPED | OUTPATIENT
Start: 2020-07-23 | End: 2020-10-14

## 2020-07-23 NOTE — PROGRESS NOTES
Chief Complaint   Patient presents with   • Annual Exam   • Hypertension     states her bp has been high.   • Hypothyroidism       HPI     New patient presents today to establish care, for annual exam and to follow-up on osteoporosis, rheumatoid arthritis, HTN and hypothyroidism, needs medication refills. The patient denies dysuria, constipation, GERD, chest pain, shortness of air, palpitations and swelling of the extremities    HTN, stable on meds and takes as directed, denies chest pain, headache, shortness of air, palpitations and swelling of extremities.     Hypothyroidism, stable on medication, however, she ran denies symptoms of constipation, weight gain/loss, hot or cold intolerance, hair loss, abnormal heart rate and fatigue. Ran out of meds and had labs at [facility] recently.       The following portions of the patient's history were reviewed and updated as appropriate: allergies, current medications, past family history, past medical history, past social history, past surgical history and problem list.    History reviewed. No pertinent past medical history.  Past Surgical History:   Procedure Laterality Date   •  SECTION      x2   • HIP SURGERY Left    • THYROIDECTOMY, PARTIAL  2017     Family History   Problem Relation Age of Onset   • Dementia Mother    • Heart disease Father    • Hypertension Father      Social History     Tobacco Use   • Smoking status: Never Smoker   • Smokeless tobacco: Never Used   Substance Use Topics   • Alcohol use: Yes         Current Outpatient Medications:   •  alendronate (FOSAMAX) 70 MG tablet, Take 1 tablet by mouth Every 7 (Seven) Days., Disp: 12 tablet, Rfl: 0  •  Calcium Carbonate-Vitamin D 600-400 MG-UNIT chewable tablet, CALTRATE 600+D PLUS MINERALS CHEW, Disp: , Rfl:   •  folic acid (FOLVITE) 1 MG tablet, take 1 tablet by mouth once daily, Disp: 90 tablet, Rfl: 1  •  hydroxychloroquine (PLAQUENIL) 200 MG tablet, TAKE 1 TABLET BY MOUTH TWICE A DAY, Disp: 180  "tablet, Rfl: 0  •  methotrexate 2.5 MG tablet, TAKE 4 TABLETS BY MOUTH EVERY WEEK, Disp: 52 tablet, Rfl: 0  •  levothyroxine (SYNTHROID, LEVOTHROID) 100 MCG tablet, Take 1 tablet by mouth Daily., Disp: 30 tablet, Rfl: 2  •  lisinopril (PRINIVIL,ZESTRIL) 10 MG tablet, Take 1 tablet by mouth Daily., Disp: 30 tablet, Rfl: 2      Review of Systems     A full 12 point review of systems has been obtained as mentioned in HPI, otherwise negative      Vitals:    07/23/20 1124   BP: 176/86   BP Location: Left arm   Patient Position: Sitting   Cuff Size: Large Adult   Pulse: 56   Temp: 97.7 °F (36.5 °C)   TempSrc: Skin   SpO2: 99%   Weight: 106 kg (233 lb 3.2 oz)   Height: 165.1 cm (65\")     Body mass index is 38.81 kg/m².    Physical Exam   Constitutional: She is oriented to person, place, and time. She appears well-developed and well-nourished. No distress.   Eyes: Pupils are equal, round, and reactive to light.   Neck: Normal range of motion. Neck supple. No JVD present. No thyromegaly present.   Cardiovascular: Normal rate, regular rhythm, normal heart sounds and intact distal pulses.   No murmur heard.  Pulmonary/Chest: Effort normal and breath sounds normal. No respiratory distress.   Abdominal: Soft. Bowel sounds are normal. She exhibits no distension. There is no tenderness.   Musculoskeletal: Normal range of motion. She exhibits no tenderness.   Neurological: She is alert and oriented to person, place, and time. No sensory deficit.   Skin: Skin is warm and dry. She is not diaphoretic.   Psychiatric: She has a normal mood and affect. Her behavior is normal. Judgment and thought content normal.   Nursing note and vitals reviewed.      No visits with results within 7 Day(s) from this visit.   Latest known visit with results is:   Conversion Encounter on 01/21/2020   Component Date Value Ref Range Status   • Albumin 01/21/2020 3.9  3.6 - 5.1 g/dL Final   • Alkaline Phosphatase 01/21/2020 88  33 - 130 units/L Final   • " Basophils Absolute 01/21/2020 91 CELLS/UL  0 - 200 10*3/mm3 Final   • Basophil Rel % 01/21/2020 1.6  % Final   • Total Bilirubin 01/21/2020 0.4  0.2 - 1.2 mg/dL Final   • BUN 01/21/2020 22  7 - 25 mg/dL Final   • BUN/Creatinine Ratio 01/21/2020 17 (calc)  6 - 22 Final   • Calcium 01/21/2020 8.8  8.6 - 10.4 mg/dL Final   • Chloride 01/21/2020 105  98 - 110 mmol/L Final   • CO2 CONTENT  01/21/2020 28  20 - 32 mmol/L Final   • Creatinine 01/21/2020 1.31* 0.50 - 0.99 mg/dL Final   • C-Reactive Protein 01/21/2020 0.71  Units converted. See lab report for original value. mg/dL Final   • eGFR African Am 01/21/2020 44* > OR = 60 mL/min/1.73m2 Final   • eGFR Non African Am 01/21/2020 51* > OR = 60 mL/min/1.73m2 Final   • Eosinophils Absolute 01/21/2020 200 CELLS/UL  15 - 500 10*3/mm3 Final   • Eosinophil Rel % 01/21/2020 3.5  % Final   • Globulin 01/21/2020 3.3 G/DL (CALC)  1.9 - 3.7 g/dL Final   • Glucose 01/21/2020 92  65 - 99 mg/dL Final   • Hematocrit 01/21/2020 38.7  35.0 - 45.0 % Final   • Hemoglobin 01/21/2020 13.8  11.7 - 15.5 g/dL Final   • Lymphocytes Absolute 01/21/2020 1277 CELLS/UL  850 - 3900 10*3/mm3 Final   • Lymphocyte Rel % 01/21/2020 22.4  % Final   • MCH 01/21/2020 31.2  27.0 - 33.0 pg Final   • MCHC 01/21/2020 35.7 G/DL  32.0 - 36.0 % Final   • MCV 01/21/2020 87.6  80.0 - 100.0 fL Final   • Monocyte Rel % 01/21/2020 8.1  % Final   • Monocytes Absolute 01/21/2020 462 CELLS/UL  200 - 950 10*3/microliter Final   • MPV 01/21/2020 10.6  7.5 - 12.5 fL Final   • Neutrophils Absolute 01/21/2020 3671 CELLS/UL  1500 - 7800 10*3/mm3 Final   • Platelets 01/21/2020 230 THOUSAND/UL  140 - 400 10*3/mm3 Final   • Neutrophils, Fluid 01/21/2020 64.4  % Final   • Potassium 01/21/2020 4.2  3.5 - 5.3 mmol/L Final   • Total Protein 01/21/2020 7.2  6.1 - 8.1 g/dL Final   • RBC 01/21/2020 4.42 MILLION/UL  3.80 - 5.10 10*6/mm3 Final   • RDW 01/21/2020 14.0  11.0 - 15.0 % Final   • AST (SGOT) 01/21/2020 15  10 - 35 units/L Final    • ALT (SGPT) 01/21/2020 13  6 - 29 units/L Final   • Sodium 01/21/2020 141  135 - 146 mmol/L Final   • WBC 01/21/2020 5.7 THOUSAND/UL  3.8 - 10.8 K/uL Final   • A/G Ratio 01/21/2020 1.2 (calc)  1.0 - 2.5 Final       Diagnoses and all orders for this visit:    1. Essential hypertension (Primary)    2. Acquired hypothyroidism    3. Breast cancer screening by mammogram  -     Mammo Screening Digital Tomosynthesis Bilateral With CAD; Future    4. Colon cancer screening  -     Cologuard - Stool, Per Rectum; Future    5. Rheumatoid arthritis involving multiple sites with positive rheumatoid factor (CMS/HCC)    6. Age-related osteoporosis without current pathological fracture    Other orders  -     levothyroxine (SYNTHROID, LEVOTHROID) 100 MCG tablet; Take 1 tablet by mouth Daily.  Dispense: 30 tablet; Refill: 2  -     lisinopril (PRINIVIL,ZESTRIL) 10 MG tablet; Take 1 tablet by mouth Daily.  Dispense: 30 tablet; Refill: 2    bp elev, start lisinopril daily, keep log, call for sbp >150 consistently  Restart levothyroxine, check TSH in 6 weeks, will add cmp, cbc, lipids if not done recently per rheum  Ordered mammo, cologuard  rtc in 3mo for pap and f/u, earlier prn.

## 2020-07-24 ENCOUNTER — RESULTS ENCOUNTER (OUTPATIENT)
Dept: FAMILY MEDICINE CLINIC | Facility: CLINIC | Age: 60
End: 2020-07-24

## 2020-07-24 DIAGNOSIS — Z12.11 COLON CANCER SCREENING: ICD-10-CM

## 2020-08-17 RX ORDER — LISINOPRIL 10 MG/1
10 TABLET ORAL DAILY
Qty: 90 TABLET | Refills: 0 | Status: SHIPPED | OUTPATIENT
Start: 2020-08-17 | End: 2020-11-02 | Stop reason: SDUPTHER

## 2020-09-17 ENCOUNTER — LAB (OUTPATIENT)
Dept: FAMILY MEDICINE CLINIC | Facility: CLINIC | Age: 60
End: 2020-09-17

## 2020-09-17 DIAGNOSIS — I10 ESSENTIAL HYPERTENSION: ICD-10-CM

## 2020-09-17 DIAGNOSIS — E03.9 ACQUIRED HYPOTHYROIDISM: Primary | ICD-10-CM

## 2020-09-17 PROCEDURE — 80061 LIPID PANEL: CPT | Performed by: NURSE PRACTITIONER

## 2020-09-17 PROCEDURE — 36415 COLL VENOUS BLD VENIPUNCTURE: CPT | Performed by: NURSE PRACTITIONER

## 2020-09-17 PROCEDURE — 84443 ASSAY THYROID STIM HORMONE: CPT | Performed by: NURSE PRACTITIONER

## 2020-09-17 PROCEDURE — 85027 COMPLETE CBC AUTOMATED: CPT | Performed by: NURSE PRACTITIONER

## 2020-09-17 PROCEDURE — 80053 COMPREHEN METABOLIC PANEL: CPT | Performed by: NURSE PRACTITIONER

## 2020-09-18 LAB
ALBUMIN SERPL-MCNC: 3.9 G/DL (ref 3.5–5.2)
ALBUMIN/GLOB SERPL: 1.1 G/DL
ALP SERPL-CCNC: 74 U/L (ref 39–117)
ALT SERPL W P-5'-P-CCNC: 14 U/L (ref 1–33)
ANION GAP SERPL CALCULATED.3IONS-SCNC: 9 MMOL/L (ref 5–15)
AST SERPL-CCNC: 16 U/L (ref 1–32)
BILIRUB SERPL-MCNC: 0.6 MG/DL (ref 0–1.2)
BUN SERPL-MCNC: 14 MG/DL (ref 8–23)
BUN/CREAT SERPL: 14.7 (ref 7–25)
CALCIUM SPEC-SCNC: 9.3 MG/DL (ref 8.6–10.5)
CHLORIDE SERPL-SCNC: 101 MMOL/L (ref 98–107)
CHOLEST SERPL-MCNC: 155 MG/DL (ref 0–200)
CO2 SERPL-SCNC: 27 MMOL/L (ref 22–29)
CREAT SERPL-MCNC: 0.95 MG/DL (ref 0.57–1)
DEPRECATED RDW RBC AUTO: 46.4 FL (ref 37–54)
ERYTHROCYTE [DISTWIDTH] IN BLOOD BY AUTOMATED COUNT: 14.4 % (ref 12.3–15.4)
GFR SERPL CREATININE-BSD FRML MDRD: 60 ML/MIN/1.73
GLOBULIN UR ELPH-MCNC: 3.4 GM/DL
GLUCOSE SERPL-MCNC: 90 MG/DL (ref 65–99)
HCT VFR BLD AUTO: 40.4 % (ref 34–46.6)
HDLC SERPL-MCNC: 60 MG/DL (ref 40–60)
HGB BLD-MCNC: 13.5 G/DL (ref 12–15.9)
LDLC SERPL CALC-MCNC: 73 MG/DL (ref 0–100)
LDLC/HDLC SERPL: 1.22 {RATIO}
MCH RBC QN AUTO: 29.6 PG (ref 26.6–33)
MCHC RBC AUTO-ENTMCNC: 33.4 G/DL (ref 31.5–35.7)
MCV RBC AUTO: 88.6 FL (ref 79–97)
PLATELET # BLD AUTO: 225 10*3/MM3 (ref 140–450)
PMV BLD AUTO: 11.6 FL (ref 6–12)
POTASSIUM SERPL-SCNC: 4.2 MMOL/L (ref 3.5–5.2)
PROT SERPL-MCNC: 7.3 G/DL (ref 6–8.5)
RBC # BLD AUTO: 4.56 10*6/MM3 (ref 3.77–5.28)
SODIUM SERPL-SCNC: 137 MMOL/L (ref 136–145)
TRIGL SERPL-MCNC: 110 MG/DL (ref 0–150)
TSH SERPL DL<=0.05 MIU/L-ACNC: 2.51 UIU/ML (ref 0.27–4.2)
VLDLC SERPL-MCNC: 22 MG/DL (ref 5–40)
WBC # BLD AUTO: 6.91 10*3/MM3 (ref 3.4–10.8)

## 2020-10-02 ENCOUNTER — TELEPHONE (OUTPATIENT)
Dept: FAMILY MEDICINE CLINIC | Facility: CLINIC | Age: 60
End: 2020-10-02

## 2020-10-02 NOTE — TELEPHONE ENCOUNTER
[facility] called to ask for an alternative number for the patient regarding their sample.  Information is missing from her sample label and they cannot process the sample until they receive the information.  I tried to contact the patient with no answer, so I left  asking that she return 's call as there is not an alternative phone number on file for her.

## 2020-10-14 RX ORDER — LEVOTHYROXINE SODIUM 0.1 MG/1
100 TABLET ORAL DAILY
Qty: 30 TABLET | Refills: 2 | Status: SHIPPED | OUTPATIENT
Start: 2020-10-14 | End: 2020-11-02 | Stop reason: SDUPTHER

## 2020-11-02 ENCOUNTER — OFFICE VISIT (OUTPATIENT)
Dept: FAMILY MEDICINE CLINIC | Facility: CLINIC | Age: 60
End: 2020-11-02

## 2020-11-02 VITALS
WEIGHT: 233 LBS | SYSTOLIC BLOOD PRESSURE: 186 MMHG | DIASTOLIC BLOOD PRESSURE: 101 MMHG | HEIGHT: 65 IN | HEART RATE: 65 BPM | TEMPERATURE: 97.3 F | BODY MASS INDEX: 38.82 KG/M2 | OXYGEN SATURATION: 99 %

## 2020-11-02 DIAGNOSIS — I10 ESSENTIAL HYPERTENSION: ICD-10-CM

## 2020-11-02 DIAGNOSIS — G47.09 OTHER INSOMNIA: ICD-10-CM

## 2020-11-02 DIAGNOSIS — F41.9 ANXIETY: ICD-10-CM

## 2020-11-02 DIAGNOSIS — Z01.419 ENCOUNTER FOR GYNECOLOGICAL EXAMINATION WITH PAPANICOLAOU SMEAR OF CERVIX: Primary | ICD-10-CM

## 2020-11-02 DIAGNOSIS — Z12.31 BREAST CANCER SCREENING BY MAMMOGRAM: ICD-10-CM

## 2020-11-02 DIAGNOSIS — Z23 INFLUENZA VACCINE ADMINISTERED: ICD-10-CM

## 2020-11-02 DIAGNOSIS — E03.9 ACQUIRED HYPOTHYROIDISM: ICD-10-CM

## 2020-11-02 PROCEDURE — 90471 IMMUNIZATION ADMIN: CPT | Performed by: NURSE PRACTITIONER

## 2020-11-02 PROCEDURE — 99396 PREV VISIT EST AGE 40-64: CPT | Performed by: NURSE PRACTITIONER

## 2020-11-02 PROCEDURE — 90686 IIV4 VACC NO PRSV 0.5 ML IM: CPT | Performed by: NURSE PRACTITIONER

## 2020-11-02 RX ORDER — LEVOTHYROXINE SODIUM 0.1 MG/1
100 TABLET ORAL DAILY
Qty: 90 TABLET | Refills: 1 | Status: SHIPPED | OUTPATIENT
Start: 2020-11-02 | End: 2021-02-12 | Stop reason: SDUPTHER

## 2020-11-02 RX ORDER — LISINOPRIL 10 MG/1
10 TABLET ORAL DAILY
Qty: 90 TABLET | Refills: 1 | Status: SHIPPED | OUTPATIENT
Start: 2020-11-02 | End: 2020-11-02

## 2020-11-02 RX ORDER — HYDROXYZINE HYDROCHLORIDE 10 MG/1
10 TABLET, FILM COATED ORAL 2 TIMES DAILY PRN
Qty: 40 TABLET | Refills: 0 | Status: SHIPPED | OUTPATIENT
Start: 2020-11-02 | End: 2021-01-18

## 2020-11-02 RX ORDER — LISINOPRIL 20 MG/1
20 TABLET ORAL DAILY
Qty: 90 TABLET | Refills: 1 | Status: SHIPPED | OUTPATIENT
Start: 2020-11-02 | End: 2020-12-03

## 2020-11-05 LAB
AGE GDLN ACOG TESTING: NORMAL
CYTOLOGIST CVX/VAG CYTO: NORMAL
CYTOLOGY CVX/VAG DOC CYTO: NORMAL
CYTOLOGY CVX/VAG DOC THIN PREP: NORMAL
DX ICD CODE: NORMAL
HIV 1 & 2 AB SER-IMP: NORMAL
HPV I/H RISK 4 DNA CVX QL PROBE+SIG AMP: NEGATIVE
OTHER STN SPEC: NORMAL
STAT OF ADQ CVX/VAG CYTO-IMP: NORMAL

## 2020-12-01 DIAGNOSIS — R92.8 ABNORMAL MAMMOGRAM OF LEFT BREAST: Primary | ICD-10-CM

## 2020-12-03 ENCOUNTER — OFFICE VISIT (OUTPATIENT)
Dept: FAMILY MEDICINE CLINIC | Facility: CLINIC | Age: 60
End: 2020-12-03

## 2020-12-03 VITALS
WEIGHT: 232.8 LBS | HEIGHT: 65 IN | OXYGEN SATURATION: 98 % | BODY MASS INDEX: 38.79 KG/M2 | SYSTOLIC BLOOD PRESSURE: 180 MMHG | HEART RATE: 60 BPM | DIASTOLIC BLOOD PRESSURE: 91 MMHG | TEMPERATURE: 97.1 F

## 2020-12-03 DIAGNOSIS — R92.8 ABNORMAL MAMMOGRAM OF LEFT BREAST: ICD-10-CM

## 2020-12-03 DIAGNOSIS — I10 ESSENTIAL HYPERTENSION: Primary | ICD-10-CM

## 2020-12-03 DIAGNOSIS — G47.09 OTHER INSOMNIA: ICD-10-CM

## 2020-12-03 DIAGNOSIS — F41.9 ANXIETY: ICD-10-CM

## 2020-12-03 PROCEDURE — 99213 OFFICE O/P EST LOW 20 MIN: CPT | Performed by: NURSE PRACTITIONER

## 2020-12-03 RX ORDER — LISINOPRIL 40 MG/1
40 TABLET ORAL DAILY
Qty: 90 TABLET | Refills: 1 | Status: SHIPPED | OUTPATIENT
Start: 2020-12-03 | End: 2021-04-12 | Stop reason: SDUPTHER

## 2020-12-03 RX ORDER — HYDROCHLOROTHIAZIDE 25 MG/1
25 TABLET ORAL DAILY
Qty: 90 TABLET | Refills: 1 | Status: SHIPPED | OUTPATIENT
Start: 2020-12-03 | End: 2021-04-12 | Stop reason: SDUPTHER

## 2020-12-03 NOTE — PROGRESS NOTES
Chief Complaint   Patient presents with   • Anxiety   • Insomnia   • Follow-up     1 mo       HPI    Pt presents to follow up on anxiety/insomnia, hypertension.      HTN, BP elevated, lisinopril increased 1mo ago to 20mg, taking on a daily basis as directed, patient reports bp has been running 173-190 systolic outside of the office, denies chest pain, headache, shortness of air, palpitations and swelling of extremities.      Insomnia/anxiety, stable on medications. The patient denies abdominal pain, chills, coughing, fever, nausea, neck pain, rash, sore throat, vomiting and weakness, pt reports daytime fatigue.  Patient reports insomnia symptoms are aggravated by stress and anxiety.       The following portions of the patient's history were reviewed and updated as appropriate: allergies, current medications, past family history, past medical history, past social history, past surgical history and problem list.    History reviewed. No pertinent past medical history.  Past Surgical History:   Procedure Laterality Date   •  SECTION      x2   • HIP SURGERY Left    • THYROIDECTOMY, PARTIAL  2017     Family History   Problem Relation Age of Onset   • Dementia Mother    • Heart disease Father    • Hypertension Father      Social History     Tobacco Use   • Smoking status: Never Smoker   • Smokeless tobacco: Never Used   Substance Use Topics   • Alcohol use: Yes         Current Outpatient Medications:   •  alendronate (FOSAMAX) 70 MG tablet, Take 1 tablet by mouth Every 7 (Seven) Days., Disp: 12 tablet, Rfl: 0  •  Calcium Carbonate-Vitamin D 600-400 MG-UNIT chewable tablet, CALTRATE 600+D PLUS MINERALS CHEW, Disp: , Rfl:   •  folic acid (FOLVITE) 1 MG tablet, take 1 tablet by mouth once daily, Disp: 90 tablet, Rfl: 1  •  hydroxychloroquine (PLAQUENIL) 200 MG tablet, TAKE 1 TABLET BY MOUTH TWICE A DAY, Disp: 180 tablet, Rfl: 0  •  hydrOXYzine (ATARAX) 10 MG tablet, Take 1 tablet by mouth 2 (Two) Times a Day As Needed  "for Anxiety., Disp: 40 tablet, Rfl: 0  •  levothyroxine (SYNTHROID, LEVOTHROID) 100 MCG tablet, Take 1 tablet by mouth Daily., Disp: 90 tablet, Rfl: 1  •  methotrexate 2.5 MG tablet, TAKE 4 TABLETS BY MOUTH EVERY WEEK, Disp: 52 tablet, Rfl: 0  •  hydroCHLOROthiazide (HYDRODIURIL) 25 MG tablet, Take 1 tablet by mouth Daily., Disp: 90 tablet, Rfl: 1  •  lisinopril (PRINIVIL,ZESTRIL) 40 MG tablet, Take 1 tablet by mouth Daily., Disp: 90 tablet, Rfl: 1      Review of Systems       Obtained as mentioned in HPI, otherwise negative.       Vitals:    12/03/20 0943   BP: 180/91   BP Location: Left arm   Patient Position: Sitting   Cuff Size: Adult   Pulse: 60   Temp: 97.1 °F (36.2 °C)   TempSrc: Skin   SpO2: 98%   Weight: 106 kg (232 lb 12.8 oz)   Height: 165.1 cm (65\")     Body mass index is 38.74 kg/m².    Physical Exam  Vitals signs and nursing note reviewed.   Constitutional:       General: She is not in acute distress.     Appearance: She is well-developed. She is not diaphoretic.   Eyes:      Pupils: Pupils are equal, round, and reactive to light.   Neck:      Musculoskeletal: Normal range of motion and neck supple.      Thyroid: No thyromegaly.      Vascular: No JVD.   Cardiovascular:      Rate and Rhythm: Normal rate and regular rhythm.      Heart sounds: Normal heart sounds. No murmur.   Pulmonary:      Effort: Pulmonary effort is normal. No respiratory distress.      Breath sounds: Normal breath sounds.   Abdominal:      General: Bowel sounds are normal. There is no distension.      Palpations: Abdomen is soft.      Tenderness: There is no abdominal tenderness.   Musculoskeletal: Normal range of motion.         General: No tenderness.   Skin:     General: Skin is warm and dry.   Neurological:      Mental Status: She is alert and oriented to person, place, and time.      Sensory: No sensory deficit.   Psychiatric:         Behavior: Behavior normal.         Thought Content: Thought content normal.         Judgment: " Judgment normal.         No visits with results within 7 Day(s) from this visit.   Latest known visit with results is:   Office Visit on 11/02/2020   Component Date Value Ref Range Status   • Age Gdln ACOG Testing 11/02/2020 30-65   Final   • Diagnosis 11/02/2020 Comment   Final    NEGATIVE FOR INTRAEPITHELIAL LESION OR MALIGNANCY.   • Specimen adequacy: 11/02/2020 Comment   Final    Satisfactory for evaluation.  Endocervical and/or squamous metaplastic  cells (endocervical component) are present.   • Clinician Provided ICD-10: 11/02/2020 Comment   Final    Z01.419   • Performed by: 11/02/2020 Comment   Final    Apple Niño, Cytotechnologist   • . 11/02/2020 .   Final   • Note: 11/02/2020 Comment   Final    The Pap smear is a screening test designed to aid in the detection of  premalignant and malignant conditions of the uterine cervix.  It is not a  diagnostic procedure and should not be used as the sole means of detecting  cervical cancer.  Both false-positive and false-negative reports do occur.   • Method: 11/02/2020 Comment   Final    This liquid based ThinPrep(R) pap test was screened with the  use of an image guided system.   • HPV Aptima 11/02/2020 Negative  Negative Final    This nucleic acid amplification test detects fourteen high-risk  HPV types (16,18,31,33,35,39,45,51,52,56,58,59,66,68) without  differentiation.       Diagnoses and all orders for this visit:    1. Essential hypertension (Primary)  Comments:  Elevation likely stress/anxiety related, increase lisinopril to 40mg and add HCTZ. d/w pt to notify me if consistently >150 sbp outside of the office.     2. Abnormal mammogram of left breast  Comments:  Discussed abnormal mammogram with patient, diagnostic mammogram and ultrasound have been ordered.  Patient to contact [facility] to schedule    3. Anxiety  Comments:  Stable, improved using hydroxyzine as needed    4. Other insomnia  Comments:  Stable    Other orders  -     lisinopril  (PRINIVIL,ZESTRIL) 40 MG tablet; Take 1 tablet by mouth Daily.  Dispense: 90 tablet; Refill: 1  -     hydroCHLOROthiazide (HYDRODIURIL) 25 MG tablet; Take 1 tablet by mouth Daily.  Dispense: 90 tablet; Refill: 1       Return in about 4 weeks (around 12/31/2020).  For BP check, once stable will resume every 6 month visits

## 2020-12-04 RX ORDER — CLONIDINE HYDROCHLORIDE 0.1 MG/1
0.1 TABLET ORAL EVERY 8 HOURS PRN
Qty: 30 TABLET | Refills: 0 | Status: SHIPPED | OUTPATIENT
Start: 2020-12-04 | End: 2021-11-05

## 2021-01-18 RX ORDER — HYDROXYZINE HYDROCHLORIDE 10 MG/1
TABLET, FILM COATED ORAL
Qty: 40 TABLET | Refills: 0 | Status: SHIPPED | OUTPATIENT
Start: 2021-01-18 | End: 2021-03-08

## 2021-02-12 RX ORDER — LEVOTHYROXINE SODIUM 0.1 MG/1
100 TABLET ORAL DAILY
Qty: 90 TABLET | Refills: 0 | Status: SHIPPED | OUTPATIENT
Start: 2021-02-12 | End: 2021-04-12 | Stop reason: SDUPTHER

## 2021-03-08 RX ORDER — HYDROXYZINE HYDROCHLORIDE 10 MG/1
TABLET, FILM COATED ORAL
Qty: 40 TABLET | Refills: 0 | Status: SHIPPED | OUTPATIENT
Start: 2021-03-08 | End: 2021-04-12 | Stop reason: SDUPTHER

## 2021-04-12 ENCOUNTER — OFFICE VISIT (OUTPATIENT)
Dept: FAMILY MEDICINE CLINIC | Facility: CLINIC | Age: 61
End: 2021-04-12

## 2021-04-12 VITALS
SYSTOLIC BLOOD PRESSURE: 143 MMHG | DIASTOLIC BLOOD PRESSURE: 76 MMHG | OXYGEN SATURATION: 100 % | TEMPERATURE: 97.7 F | WEIGHT: 228 LBS | HEIGHT: 65 IN | HEART RATE: 59 BPM | BODY MASS INDEX: 37.99 KG/M2

## 2021-04-12 DIAGNOSIS — R92.8 ABNORMAL MAMMOGRAM OF LEFT BREAST: ICD-10-CM

## 2021-04-12 DIAGNOSIS — F41.9 ANXIETY: ICD-10-CM

## 2021-04-12 DIAGNOSIS — M05.79 RHEUMATOID ARTHRITIS INVOLVING MULTIPLE SITES WITH POSITIVE RHEUMATOID FACTOR (HCC): ICD-10-CM

## 2021-04-12 DIAGNOSIS — E03.9 ACQUIRED HYPOTHYROIDISM: ICD-10-CM

## 2021-04-12 DIAGNOSIS — M81.0 AGE-RELATED OSTEOPOROSIS WITHOUT CURRENT PATHOLOGICAL FRACTURE: ICD-10-CM

## 2021-04-12 DIAGNOSIS — I10 ESSENTIAL HYPERTENSION: Primary | ICD-10-CM

## 2021-04-12 DIAGNOSIS — G47.09 OTHER INSOMNIA: ICD-10-CM

## 2021-04-12 PROCEDURE — 99214 OFFICE O/P EST MOD 30 MIN: CPT | Performed by: NURSE PRACTITIONER

## 2021-04-12 RX ORDER — HYDROCHLOROTHIAZIDE 25 MG/1
25 TABLET ORAL DAILY
Qty: 90 TABLET | Refills: 1 | Status: SHIPPED | OUTPATIENT
Start: 2021-04-12 | End: 2021-08-16

## 2021-04-12 RX ORDER — LISINOPRIL 40 MG/1
40 TABLET ORAL DAILY
Qty: 90 TABLET | Refills: 1 | Status: SHIPPED | OUTPATIENT
Start: 2021-04-12 | End: 2021-06-01

## 2021-04-12 RX ORDER — LEVOTHYROXINE SODIUM 0.1 MG/1
100 TABLET ORAL DAILY
Qty: 90 TABLET | Refills: 1 | Status: SHIPPED | OUTPATIENT
Start: 2021-04-12 | End: 2021-10-13 | Stop reason: SDUPTHER

## 2021-04-12 RX ORDER — HYDROXYZINE HYDROCHLORIDE 10 MG/1
10 TABLET, FILM COATED ORAL 2 TIMES DAILY PRN
Qty: 40 TABLET | Refills: 2 | Status: SHIPPED | OUTPATIENT
Start: 2021-04-12 | End: 2021-09-10

## 2021-04-12 NOTE — PROGRESS NOTES
"Chief Complaint  Follow-up (6 month follow up HTN)    Subjective          Lucy Mahan presents to [facility]  History of Present Illness     HTN, BP has improved on lisinopril 40 mg and HCTZ, she is taking as directed, she checks her blood pressure frequently at home mostly 120-150 systolic.  Denies chest pain, headache, shortness of air, palpitations and swelling of extremities.     Insomnia, This is a chronic problem, started more than 1 year ago, and intermittent.  Anxiety aggravates the symptoms. Treatments tried: OTC sleep aids ineffective, hydroxyzine as needed has been effective for staying asleep and provides significant relief    Anxiety, patient denies significant weight loss/gain, hypersomnia, psychomotor agitation, psychomotor retardation, fatigue (loss of energy), feelings of worthlessness (guilt), impaired concentration (indecisiveness), thoughts of death or suicide.       Rheumatoid arthritis/Osteoporosis, Follows with Rheumatology, appears DEXA is overdue but managed by rheumatology, she is on fosamax      Objective   Vital Signs:   /76 (BP Location: Left arm, Patient Position: Sitting, Cuff Size: Adult)   Pulse 59   Temp 97.7 °F (36.5 °C) (Skin)   Ht 165.1 cm (65\")   Wt 103 kg (228 lb)   SpO2 100%   BMI 37.94 kg/m²       Physical Exam  Vitals and nursing note reviewed.   Constitutional:       General: She is not in acute distress.     Appearance: She is well-developed. She is not diaphoretic.   Eyes:      Pupils: Pupils are equal, round, and reactive to light.   Neck:      Thyroid: No thyromegaly.      Vascular: No JVD.   Cardiovascular:      Rate and Rhythm: Normal rate and regular rhythm.      Heart sounds: Normal heart sounds. No murmur heard.     Pulmonary:      Effort: Pulmonary effort is normal. No respiratory distress.      Breath sounds: Normal breath sounds.   Abdominal:      General: Bowel sounds are normal. There is no distension.      " Palpations: Abdomen is soft.      Tenderness: There is no abdominal tenderness.   Musculoskeletal:         General: No tenderness. Normal range of motion.      Cervical back: Normal range of motion and neck supple.   Skin:     General: Skin is warm and dry.   Neurological:      Mental Status: She is alert and oriented to person, place, and time.      Sensory: No sensory deficit.   Psychiatric:         Behavior: Behavior normal.         Thought Content: Thought content normal.         Judgment: Judgment normal.          Result Review :                 Assessment and Plan    Diagnoses and all orders for this visit:    1. Essential hypertension (Primary)  Comments:  BP is much improved, continue lisinopril and HCTZ, continue checking at home.  Notify if consistently greater than 160/90  Orders:  -     hydroCHLOROthiazide (HYDRODIURIL) 25 MG tablet; Take 1 tablet by mouth Daily.  Dispense: 90 tablet; Refill: 1  -     lisinopril (PRINIVIL,ZESTRIL) 40 MG tablet; Take 1 tablet by mouth Daily.  Dispense: 90 tablet; Refill: 1    2. Other insomnia  Comments:  Much improved and using hydroxyzine only as needed for anxiety induced insomnia    3. Anxiety  Comments:  Stable with hydroxyzine as needed  Orders:  -     hydrOXYzine (ATARAX) 10 MG tablet; Take 1 tablet by mouth 2 (Two) Times a Day As Needed for Anxiety. for anxiety  Dispense: 40 tablet; Refill: 2    4. Abnormal mammogram of left breast  Comments:  Reordered left 6-month follow-up diagnostic mammogram and ultrasound for [facility], will notify results  Orders:  -     Mammo Diagnostic Digital Tomosynthesis Left With CAD; Future  -     US Breast Left Limited; Future    5. Rheumatoid arthritis involving multiple sites with positive rheumatoid factor (CMS/[facility])  Comments:  Continue following with sanjay Byrd currently stable    6. Acquired hypothyroidism  Comments:  Stable, refill levothyroxine, will plan to recheck labs including TSH prior to the follow-up  visit in 6 months  Orders:  -     levothyroxine (SYNTHROID, LEVOTHROID) 100 MCG tablet; Take 1 tablet by mouth Daily.  Dispense: 90 tablet; Refill: 1    7. Age-related osteoporosis without current pathological fracture  Comments:  Follows with rheumatology on Fosamax, DEXA scan is due.  Patient will discuss with Yoko De La Garza at visit next month      I spent 25 minutes caring for Lucy on this date of service. This time includes time spent by me in the following activities:preparing for the visit, reviewing tests, performing a medically appropriate examination and/or evaluation , counseling and educating the patient/family/caregiver, ordering medications, tests, or procedures and documenting information in the medical record   Follow Up   Return in about 6 months (around 10/12/2021) for HTN, lipids, insomnia. HTN panel and hep c ab 1 week prior to appt, Annual physical.  Patient was given instructions and counseling regarding her condition or for health maintenance advice. Please see specific information pulled into the AVS if appropriate.

## 2021-05-24 DIAGNOSIS — I10 ESSENTIAL HYPERTENSION: ICD-10-CM

## 2021-05-24 RX ORDER — HYDROCHLOROTHIAZIDE 25 MG/1
25 TABLET ORAL DAILY
Qty: 90 TABLET | Refills: 1 | OUTPATIENT
Start: 2021-05-24

## 2021-06-01 DIAGNOSIS — I10 ESSENTIAL HYPERTENSION: ICD-10-CM

## 2021-06-01 RX ORDER — LISINOPRIL 40 MG/1
40 TABLET ORAL DAILY
Qty: 90 TABLET | Refills: 1 | Status: SHIPPED | OUTPATIENT
Start: 2021-06-01 | End: 2022-01-27 | Stop reason: SDUPTHER

## 2021-08-14 DIAGNOSIS — I10 ESSENTIAL HYPERTENSION: ICD-10-CM

## 2021-08-16 RX ORDER — HYDROCHLOROTHIAZIDE 25 MG/1
25 TABLET ORAL DAILY
Qty: 90 TABLET | Refills: 1 | Status: SHIPPED | OUTPATIENT
Start: 2021-08-16 | End: 2021-12-16

## 2021-09-10 DIAGNOSIS — F41.9 ANXIETY: ICD-10-CM

## 2021-09-10 RX ORDER — HYDROXYZINE HYDROCHLORIDE 10 MG/1
TABLET, FILM COATED ORAL
Qty: 40 TABLET | Refills: 2 | Status: SHIPPED | OUTPATIENT
Start: 2021-09-10 | End: 2022-01-19

## 2021-10-06 ENCOUNTER — LAB (OUTPATIENT)
Dept: FAMILY MEDICINE CLINIC | Facility: CLINIC | Age: 61
End: 2021-10-06

## 2021-10-06 DIAGNOSIS — I10 PRIMARY HYPERTENSION: Primary | ICD-10-CM

## 2021-10-06 DIAGNOSIS — E07.9 THYROID DISORDER: ICD-10-CM

## 2021-10-06 DIAGNOSIS — Z11.59 NEED FOR HEPATITIS C SCREENING TEST: ICD-10-CM

## 2021-10-06 PROCEDURE — 84443 ASSAY THYROID STIM HORMONE: CPT | Performed by: NURSE PRACTITIONER

## 2021-10-06 PROCEDURE — 36415 COLL VENOUS BLD VENIPUNCTURE: CPT | Performed by: NURSE PRACTITIONER

## 2021-10-06 PROCEDURE — 80053 COMPREHEN METABOLIC PANEL: CPT | Performed by: NURSE PRACTITIONER

## 2021-10-06 PROCEDURE — 86803 HEPATITIS C AB TEST: CPT | Performed by: NURSE PRACTITIONER

## 2021-10-06 PROCEDURE — 80061 LIPID PANEL: CPT | Performed by: NURSE PRACTITIONER

## 2021-10-06 PROCEDURE — 85027 COMPLETE CBC AUTOMATED: CPT | Performed by: NURSE PRACTITIONER

## 2021-10-07 LAB
ALBUMIN SERPL-MCNC: 4 G/DL (ref 3.5–5.2)
ALBUMIN/GLOB SERPL: 1.3 G/DL
ALP SERPL-CCNC: 58 U/L (ref 39–117)
ALT SERPL W P-5'-P-CCNC: 6 U/L (ref 1–33)
ANION GAP SERPL CALCULATED.3IONS-SCNC: 10.2 MMOL/L (ref 5–15)
AST SERPL-CCNC: 13 U/L (ref 1–32)
BILIRUB SERPL-MCNC: 0.4 MG/DL (ref 0–1.2)
BUN SERPL-MCNC: 23 MG/DL (ref 8–23)
BUN/CREAT SERPL: 13.4 (ref 7–25)
CALCIUM SPEC-SCNC: 8.9 MG/DL (ref 8.6–10.5)
CHLORIDE SERPL-SCNC: 101 MMOL/L (ref 98–107)
CHOLEST SERPL-MCNC: 159 MG/DL (ref 0–200)
CO2 SERPL-SCNC: 25.8 MMOL/L (ref 22–29)
CREAT SERPL-MCNC: 1.72 MG/DL (ref 0.57–1)
DEPRECATED RDW RBC AUTO: 39.9 FL (ref 37–54)
ERYTHROCYTE [DISTWIDTH] IN BLOOD BY AUTOMATED COUNT: 12.2 % (ref 12.3–15.4)
GFR SERPL CREATININE-BSD FRML MDRD: 30 ML/MIN/1.73
GLOBULIN UR ELPH-MCNC: 3.2 GM/DL
GLUCOSE SERPL-MCNC: 83 MG/DL (ref 65–99)
HCT VFR BLD AUTO: 39.3 % (ref 34–46.6)
HCV AB SER DONR QL: NORMAL
HDLC SERPL-MCNC: 58 MG/DL (ref 40–60)
HGB BLD-MCNC: 12.6 G/DL (ref 12–15.9)
LDLC SERPL CALC-MCNC: 88 MG/DL (ref 0–100)
LDLC/HDLC SERPL: 1.51 {RATIO}
MCH RBC QN AUTO: 28.6 PG (ref 26.6–33)
MCHC RBC AUTO-ENTMCNC: 32.1 G/DL (ref 31.5–35.7)
MCV RBC AUTO: 89.3 FL (ref 79–97)
PLATELET # BLD AUTO: 293 10*3/MM3 (ref 140–450)
PMV BLD AUTO: 10.9 FL (ref 6–12)
POTASSIUM SERPL-SCNC: 4.3 MMOL/L (ref 3.5–5.2)
PROT SERPL-MCNC: 7.2 G/DL (ref 6–8.5)
RBC # BLD AUTO: 4.4 10*6/MM3 (ref 3.77–5.28)
SODIUM SERPL-SCNC: 137 MMOL/L (ref 136–145)
TRIGL SERPL-MCNC: 68 MG/DL (ref 0–150)
TSH SERPL DL<=0.05 MIU/L-ACNC: 11.4 UIU/ML (ref 0.27–4.2)
VLDLC SERPL-MCNC: 13 MG/DL (ref 5–40)
WBC # BLD AUTO: 5.63 10*3/MM3 (ref 3.4–10.8)

## 2021-10-13 ENCOUNTER — HOSPITAL ENCOUNTER (OUTPATIENT)
Dept: ULTRASOUND IMAGING | Facility: HOSPITAL | Age: 61
Discharge: HOME | End: 2021-10-13

## 2021-10-13 ENCOUNTER — HOSPITAL ENCOUNTER (OUTPATIENT)
Dept: GENERAL RADIOLOGY | Facility: HOSPITAL | Age: 61
Discharge: HOME | End: 2021-10-13

## 2021-10-13 ENCOUNTER — OFFICE VISIT (OUTPATIENT)
Dept: FAMILY MEDICINE CLINIC | Facility: CLINIC | Age: 61
End: 2021-10-13

## 2021-10-13 VITALS
WEIGHT: 209.6 LBS | OXYGEN SATURATION: 100 % | HEART RATE: 62 BPM | TEMPERATURE: 98.2 F | BODY MASS INDEX: 34.92 KG/M2 | DIASTOLIC BLOOD PRESSURE: 67 MMHG | SYSTOLIC BLOOD PRESSURE: 105 MMHG | HEIGHT: 65 IN

## 2021-10-13 DIAGNOSIS — K80.21 CALCULUS OF GALLBLADDER WITH BILIARY OBSTRUCTION BUT WITHOUT CHOLECYSTITIS: Primary | ICD-10-CM

## 2021-10-13 DIAGNOSIS — R10.84 GENERALIZED ABDOMINAL PAIN: ICD-10-CM

## 2021-10-13 DIAGNOSIS — R14.0 ABDOMINAL BLOATING: ICD-10-CM

## 2021-10-13 DIAGNOSIS — E03.9 ACQUIRED HYPOTHYROIDISM: ICD-10-CM

## 2021-10-13 DIAGNOSIS — F41.9 ANXIETY: ICD-10-CM

## 2021-10-13 DIAGNOSIS — N28.9 ABNORMAL RENAL FUNCTION: Primary | ICD-10-CM

## 2021-10-13 DIAGNOSIS — Z23 NEED FOR INFLUENZA VACCINATION: ICD-10-CM

## 2021-10-13 DIAGNOSIS — I10 PRIMARY HYPERTENSION: ICD-10-CM

## 2021-10-13 DIAGNOSIS — K59.04 CHRONIC IDIOPATHIC CONSTIPATION: ICD-10-CM

## 2021-10-13 PROCEDURE — 90686 IIV4 VACC NO PRSV 0.5 ML IM: CPT | Performed by: NURSE PRACTITIONER

## 2021-10-13 PROCEDURE — 76705 ECHO EXAM OF ABDOMEN: CPT

## 2021-10-13 PROCEDURE — 99214 OFFICE O/P EST MOD 30 MIN: CPT | Performed by: NURSE PRACTITIONER

## 2021-10-13 PROCEDURE — 74018 RADEX ABDOMEN 1 VIEW: CPT

## 2021-10-13 PROCEDURE — 90471 IMMUNIZATION ADMIN: CPT | Performed by: NURSE PRACTITIONER

## 2021-10-13 RX ORDER — DICYCLOMINE HYDROCHLORIDE 10 MG/1
10 CAPSULE ORAL 3 TIMES DAILY
Qty: 90 CAPSULE | Refills: 0 | Status: SHIPPED | OUTPATIENT
Start: 2021-10-13 | End: 2021-11-05

## 2021-10-13 RX ORDER — LEVOTHYROXINE SODIUM 0.15 MG/1
150 TABLET ORAL DAILY
Qty: 90 TABLET | Refills: 0 | Status: SHIPPED | OUTPATIENT
Start: 2021-10-13 | End: 2021-11-17 | Stop reason: SDUPTHER

## 2021-10-13 NOTE — PROGRESS NOTES
Chief Complaint  Hypertension, Hyperlipidemia, Results (10/6), Follow-up (6 mo), Abdominal Pain (extends to back, expc cramps, started about 2 weeks ago.  pt has been taking a probiotic that isn't helping much.  pt reports when she tries to have a bm, just pushes but nothing comes out.  pt reports that the last few BMs she has had has been soft.  pt reports that it bounces back from constipation and diarrhea.  pt reports lower back pain with stomach pain as well as right side pain d/t a tight feeling. ), Shortness of Breath, and Stress    Subjective          Lucy Mahan presents to [facility] for   History of Present Illness       HTN, BP stable on lisinopril 40 mg and HCTZ, she is taking as directed, she checks her blood pressure frequently at home mostly one 110-140 systolic.  Denies chest pain, headache, shortness of air, palpitations and swelling of extremities.      Insomnia, This is a chronic problem, started more than 1 year ago, and intermittent.  Anxiety aggravates the symptoms. Treatments tried: OTC sleep aids ineffective.she reports hydroxyzine is effective for staying asleep and provides significant relief      Rheumatoid arthritis/Osteoporosis, Follows with Rheumatology, DEXA managed by rheumatology, she is on fosamax    Hypothyroidism, on levothyroxine and takes daily as directed, she reports symptoms of alternating constipation/diarrhea, denies weight gain/loss, hot or cold intolerance, hair loss, abnormal heart rate and fatigue.     Generalized abdominal pain worse in the right upper quadrant and midepigastric, associated with bloating/cramping, belching and also as mentioned above in CC, had very small soft BM this am, has been having alternating constipation/loose stool.  Patient is on OTC omeprazole daily    Anxiety, worse lately with ill . Patient denies significant weight loss/gain, hypersomnia, psychomotor agitation, psychomotor retardation, fatigue  (loss of energy), feelings of worthlessness (guilt), impaired concentration (indecisiveness), thoughts of death or suicide.       Patient is here to review labs      The following portions of the patient's history were reviewed and updated as appropriate: allergies, current medications, past family history, past medical history, past social history, past surgical history and problem list.    History reviewed. No pertinent past medical history.  Past Surgical History:   Procedure Laterality Date   •  SECTION      x2   • HIP SURGERY Left    • THYROIDECTOMY, PARTIAL  2017     Family History   Problem Relation Age of Onset   • Dementia Mother    • Heart disease Father    • Hypertension Father      Social History     Tobacco Use   • Smoking status: Never Smoker   • Smokeless tobacco: Never Used   Substance Use Topics   • Alcohol use: Yes       Current Outpatient Medications:   •  alendronate (FOSAMAX) 70 MG tablet, Take 1 tablet by mouth Every 7 (Seven) Days., Disp: 12 tablet, Rfl: 0  •  Calcium Carbonate-Vitamin D 600-400 MG-UNIT chewable tablet, CALTRATE 600+D PLUS MINERALS CHEW, Disp: , Rfl:   •  cloNIDine (Catapres) 0.1 MG tablet, Take 1 tablet by mouth Every 8 (Eight) Hours As Needed for High Blood Pressure (systolic >160)., Disp: 30 tablet, Rfl: 0  •  folic acid (FOLVITE) 1 MG tablet, take 1 tablet by mouth once daily, Disp: 90 tablet, Rfl: 1  •  hydroCHLOROthiazide (HYDRODIURIL) 25 MG tablet, TAKE 1 TABLET BY MOUTH DAILY, Disp: 90 tablet, Rfl: 1  •  hydroxychloroquine (PLAQUENIL) 200 MG tablet, TAKE 1 TABLET BY MOUTH TWICE A DAY, Disp: 180 tablet, Rfl: 0  •  hydrOXYzine (ATARAX) 10 MG tablet, TAKE 1 TABLET BY MOUTH TWICE DAILY AS NEEDED FOR ANXIETY, Disp: 40 tablet, Rfl: 2  •  levothyroxine (SYNTHROID, LEVOTHROID) 150 MCG tablet, Take 1 tablet by mouth Daily., Disp: 90 tablet, Rfl: 0  •  lisinopril (PRINIVIL,ZESTRIL) 40 MG tablet, TAKE 1 TABLET BY MOUTH DAILY, Disp: 90 tablet, Rfl: 1  •  methotrexate 2.5  "MG tablet, TAKE 4 TABLETS BY MOUTH EVERY WEEK, Disp: 52 tablet, Rfl: 0  •  dicyclomine (Bentyl) 10 MG capsule, Take 1 capsule by mouth 3 (Three) Times a Day., Disp: 90 capsule, Rfl: 0    Objective   Vital Signs:   /67 (BP Location: Left arm, Patient Position: Sitting, Cuff Size: Large Adult)   Pulse 62   Temp 98.2 °F (36.8 °C) (Infrared)   Ht 165.1 cm (65\")   Wt 95.1 kg (209 lb 9.6 oz)   SpO2 100%   BMI 34.88 kg/m²       Physical Exam  Vitals and nursing note reviewed.   Constitutional:       General: She is not in acute distress.     Appearance: She is well-developed. She is not diaphoretic.   Eyes:      Pupils: Pupils are equal, round, and reactive to light.   Neck:      Thyroid: No thyromegaly.      Vascular: No JVD.   Cardiovascular:      Rate and Rhythm: Normal rate and regular rhythm.      Heart sounds: Normal heart sounds. No murmur heard.      Pulmonary:      Effort: Pulmonary effort is normal. No respiratory distress.      Breath sounds: Normal breath sounds.   Abdominal:      General: Bowel sounds are normal. There is distension.      Palpations: Abdomen is soft.      Tenderness: There is abdominal tenderness (generalized, worse mid and RUQ with firmness to palpation). There is guarding.   Musculoskeletal:         General: No tenderness. Normal range of motion.      Cervical back: Normal range of motion and neck supple.   Skin:     General: Skin is warm and dry.   Neurological:      Mental Status: She is alert and oriented to person, place, and time.      Sensory: No sensory deficit.   Psychiatric:         Behavior: Behavior normal.         Thought Content: Thought content normal.         Judgment: Judgment normal.          Result Review :     No visits with results within 7 Day(s) from this visit.   Latest known visit with results is:   Lab on 10/06/2021   Component Date Value Ref Range Status   • WBC 10/06/2021 5.63  3.40 - 10.80 10*3/mm3 Final   • RBC 10/06/2021 4.40  3.77 - 5.28 10*6/mm3 " Final   • Hemoglobin 10/06/2021 12.6  12.0 - 15.9 g/dL Final   • Hematocrit 10/06/2021 39.3  34.0 - 46.6 % Final   • MCV 10/06/2021 89.3  79.0 - 97.0 fL Final   • MCH 10/06/2021 28.6  26.6 - 33.0 pg Final   • MCHC 10/06/2021 32.1  31.5 - 35.7 g/dL Final   • RDW 10/06/2021 12.2* 12.3 - 15.4 % Final   • RDW-SD 10/06/2021 39.9  37.0 - 54.0 fl Final   • MPV 10/06/2021 10.9  6.0 - 12.0 fL Final   • Platelets 10/06/2021 293  140 - 450 10*3/mm3 Final   • Glucose 10/06/2021 83  65 - 99 mg/dL Final   • BUN 10/06/2021 23  8 - 23 mg/dL Final   • Creatinine 10/06/2021 1.72* 0.57 - 1.00 mg/dL Final   • Sodium 10/06/2021 137  136 - 145 mmol/L Final   • Potassium 10/06/2021 4.3  3.5 - 5.2 mmol/L Final   • Chloride 10/06/2021 101  98 - 107 mmol/L Final   • CO2 10/06/2021 25.8  22.0 - 29.0 mmol/L Final   • Calcium 10/06/2021 8.9  8.6 - 10.5 mg/dL Final   • Total Protein 10/06/2021 7.2  6.0 - 8.5 g/dL Final   • Albumin 10/06/2021 4.00  3.50 - 5.20 g/dL Final   • ALT (SGPT) 10/06/2021 6  1 - 33 U/L Final   • AST (SGOT) 10/06/2021 13  1 - 32 U/L Final   • Alkaline Phosphatase 10/06/2021 58  39 - 117 U/L Final   • Total Bilirubin 10/06/2021 0.4  0.0 - 1.2 mg/dL Final   • eGFR Non African Amer 10/06/2021 30* >60 mL/min/1.73 Final   • Globulin 10/06/2021 3.2  gm/dL Final   • A/G Ratio 10/06/2021 1.3  g/dL Final   • BUN/Creatinine Ratio 10/06/2021 13.4  7.0 - 25.0 Final   • Anion Gap 10/06/2021 10.2  5.0 - 15.0 mmol/L Final   • Total Cholesterol 10/06/2021 159  0 - 200 mg/dL Final   • Triglycerides 10/06/2021 68  0 - 150 mg/dL Final   • HDL Cholesterol 10/06/2021 58  40 - 60 mg/dL Final   • LDL Cholesterol  10/06/2021 88  0 - 100 mg/dL Final   • VLDL Cholesterol 10/06/2021 13  5 - 40 mg/dL Final   • LDL/HDL Ratio 10/06/2021 1.51   Final   • TSH 10/06/2021 11.400* 0.270 - 4.200 uIU/mL Final   • Hepatitis C Ab 10/06/2021 Non-Reactive  Non-Reactive Final                       Assessment and Plan    Diagnoses and all orders for this  visit:    1. Abnormal renal function (Primary)  Comments:  GFR down, rec cont no nsaids and cont primarily water and bp control. recheck in 4 weeks.     2. Acquired hypothyroidism  Comments:  TSH hypo, inc levo to 150, recheck in 4 weeks.   Orders:  -     levothyroxine (SYNTHROID, LEVOTHROID) 150 MCG tablet; Take 1 tablet by mouth Daily.  Dispense: 90 tablet; Refill: 0    3. Primary hypertension    4. Generalized abdominal pain  -     XR Abdomen KUB; Future  -     US Abdomen Limited; Future    5. Abdominal bloating  -     XR Abdomen KUB; Future  -     US Abdomen Limited; Future    6. Need for influenza vaccination  -     FluLaval/Fluarix/Fluzone >6 Months    Other orders  -     dicyclomine (Bentyl) 10 MG capsule; Take 1 capsule by mouth 3 (Three) Times a Day.  Dispense: 90 capsule; Refill: 0        -Will check KUB and abdominal ultrasound stat today, consider CT abdomen pelvis with oral contrast if indicated.  Will notify patient results  -Try Bentyl three times daily before meals  -If KUB shows increased stool burden, consider Linzess  -Reviewed labs all essentially stable except TSH and abnormal renal function  -Patient does not use ibuprofen, recommend increase water intake to 2 L/day, will recheck BMP and TSH in 4 weeks  -Flu shot today  -Keep follow-ups with rheumatology  -BP stable, continue medications, no refill needed today  -continue hydroxyzine prn and for sleep  -Follow-up in the office to reevaluate in 4 weeks        I spent 35 minutes caring for Lucy Mahan on this date of service. This time includes time spent by me in the following activities: preparing for the visit, reviewing tests, performing a medically appropriate examination and/or evaluation , counseling and educating the patient/family/caregiver, ordering medications, tests, or procedures and documenting information in the medical record        Follow Up     Return in about 4 weeks (around 11/10/2021), or if symptoms worsen or fail to  improve, for abdominal pain, will need TSH and BMP at this appt.  Patient was given instructions and counseling regarding her condition or for health maintenance advice. Please see specific information pulled into the AVS if appropriate.      EMR Dragon transcription disclaimer:  Some of this encounter note is an electronic transcription translation of spoken language to printed text. The electronic translation of spoken language may permit erroneous, or at times, nonsensical words or phrases to be inadvertently transcribed; Although I have reviewed the note for such errors some may still exist.

## 2021-10-19 ENCOUNTER — OFFICE VISIT (OUTPATIENT)
Dept: SURGERY | Facility: CLINIC | Age: 61
End: 2021-10-19

## 2021-10-19 VITALS
BODY MASS INDEX: 34.69 KG/M2 | TEMPERATURE: 98.4 F | HEIGHT: 65 IN | RESPIRATION RATE: 18 BRPM | OXYGEN SATURATION: 100 % | DIASTOLIC BLOOD PRESSURE: 66 MMHG | WEIGHT: 208.2 LBS | HEART RATE: 57 BPM | SYSTOLIC BLOOD PRESSURE: 105 MMHG

## 2021-10-19 DIAGNOSIS — K80.20 SYMPTOMATIC CHOLELITHIASIS: Primary | ICD-10-CM

## 2021-10-19 PROCEDURE — 99204 OFFICE O/P NEW MOD 45 MIN: CPT | Performed by: SURGERY

## 2021-10-19 RX ORDER — SODIUM CHLORIDE 9 MG/ML
100 INJECTION, SOLUTION INTRAVENOUS CONTINUOUS
Status: CANCELLED | OUTPATIENT
Start: 2021-10-19

## 2021-10-19 NOTE — PROGRESS NOTES
"Subjective   Lucy Mahan is a 60 y.o. female.   Chief Complaint   Patient presents with   • New Patient     Ref Argentina Avalos NP   • Abdominal Pain     Gallbladder     /66 (BP Location: Right arm, Patient Position: Sitting, Cuff Size: Adult)   Pulse 57   Temp 98.4 °F (36.9 °C) (Infrared)   Resp 18   Ht 165.1 cm (65\")   Wt 94.4 kg (208 lb 3.2 oz)   SpO2 100%   BMI 34.65 kg/m²     HISTORY OF PRESENT ILLNESS:  60-year-old lady referred to me from primary care for evaluation of abdominal pain.  She says over the last several weeks she has had abdominal discomfort.  It has been a bandlike pain around her abdomen starting around her umbilicus.  It has been associated with some chills some nausea tingling discomfort and occasionally some right upper quadrant abdominal pain that wraps around to her back.  He had some imaging and was found to be constipated and started on Linzess which did help some of her symptoms but not all.  She had a right upper quadrant ultrasound which showed a large gallstone within the neck of the gallbladder without signs of Sharee cystitis.  Her LFTs were also normal.  She is referred to me for consideration for cholecystectomy.      Outpatient Encounter Medications as of 10/19/2021   Medication Sig Dispense Refill   • Calcium Carbonate-Vitamin D 600-400 MG-UNIT chewable tablet CALTRATE 600+D PLUS MINERALS CHEW     • cloNIDine (Catapres) 0.1 MG tablet Take 1 tablet by mouth Every 8 (Eight) Hours As Needed for High Blood Pressure (systolic >160). 30 tablet 0   • dicyclomine (Bentyl) 10 MG capsule Take 1 capsule by mouth 3 (Three) Times a Day. 90 capsule 0   • folic acid (FOLVITE) 1 MG tablet take 1 tablet by mouth once daily 90 tablet 1   • hydroCHLOROthiazide (HYDRODIURIL) 25 MG tablet TAKE 1 TABLET BY MOUTH DAILY 90 tablet 1   • hydroxychloroquine (PLAQUENIL) 200 MG tablet TAKE 1 TABLET BY MOUTH TWICE A  tablet 0   • hydrOXYzine (ATARAX) 10 MG tablet TAKE 1 TABLET BY MOUTH " TWICE DAILY AS NEEDED FOR ANXIETY 40 tablet 2   • levothyroxine (SYNTHROID, LEVOTHROID) 150 MCG tablet Take 1 tablet by mouth Daily. 90 tablet 0   • linaclotide (Linzess) 72 MCG capsule capsule Take 1 capsule by mouth Every Morning Before Breakfast. 30 capsule 2   • lisinopril (PRINIVIL,ZESTRIL) 40 MG tablet TAKE 1 TABLET BY MOUTH DAILY 90 tablet 1   • [DISCONTINUED] alendronate (FOSAMAX) 70 MG tablet Take 1 tablet by mouth Every 7 (Seven) Days. 12 tablet 0   • [DISCONTINUED] methotrexate 2.5 MG tablet TAKE 4 TABLETS BY MOUTH EVERY WEEK 52 tablet 0     No facility-administered encounter medications on file as of 10/19/2021.         The following portions of the patient's history were reviewed and updated as appropriate: allergies, current medications, past family history, past medical history, past social history, past surgical history and problem list.    Review of Systems  A complete review of systems was obtained positive for activity change appetite change abdominal pain constipation diarrhea back pain the urgency to urinate and the remainder is negative  Objective   Physical Exam  Constitutional:       Appearance: Normal appearance. She is well-developed.   HENT:      Head: Normocephalic and atraumatic.   Eyes:      General: No scleral icterus.     Conjunctiva/sclera: Conjunctivae normal.   Neck:      Trachea: No tracheal deviation.   Cardiovascular:      Rate and Rhythm: Normal rate.      Pulses: Normal pulses.   Pulmonary:      Effort: Pulmonary effort is normal. No respiratory distress.   Abdominal:      General: There is no distension.      Palpations: Abdomen is soft.      Comments: Mild tenderness to palpation diffusely   Musculoskeletal:         General: No swelling or tenderness.      Cervical back: Neck supple. No tenderness. No muscular tenderness.   Skin:     General: Skin is warm and dry.   Neurological:      General: No focal deficit present.      Mental Status: She is alert. Mental status is at  baseline.   Psychiatric:         Mood and Affect: Mood normal.         Behavior: Behavior normal.           Assessment/Plan   Diagnoses and all orders for this visit:    1. Symptomatic cholelithiasis (Primary)  -     Case Request; Standing  -     COVID PRE-OP / PRE-PROCEDURE SCREENING ORDER (NO ISOLATION) - Swab, Nasopharynx; Future  -     Case Request    Other orders  -     Follow Anesthesia Guidelines / Standing Orders; Future  -     Provide NPO Instructions to Patient; Future  -     Chlorhexidine Skin Prep; Future    I counseled her about the anticipated diagnosis of symptomatic cholelithiasis versus cholecystitis.  We talked about the natural history of gallstones the treatment options surgical and nonsurgical.  Talked with the steps of the operation anticipated recovery the possible complications taking time to discuss the risk of bile duct injury and the possibility for open surgery.  After the risk and benefits have been discussed she understands and wishes to proceed with laparoscopic cholecystectomy.    This is a new problem with this is an acute illness with some systemic signs of illness including the chills and counseling about major abdominal surgery without significant risk factors for morbidity.    Krunal Badillo MD  10/19/2021  9:56 AM EDT    This note was created using Dragon Voice Recognition software.

## 2021-11-02 ENCOUNTER — LAB (OUTPATIENT)
Dept: LAB | Facility: HOSPITAL | Age: 61
End: 2021-11-02

## 2021-11-02 ENCOUNTER — HOSPITAL ENCOUNTER (OUTPATIENT)
Dept: CARDIOLOGY | Facility: HOSPITAL | Age: 61
Discharge: HOME | End: 2021-11-02

## 2021-11-02 ENCOUNTER — TRANSCRIBE ORDERS (OUTPATIENT)
Dept: ADMINISTRATIVE | Facility: HOSPITAL | Age: 61
End: 2021-11-02

## 2021-11-02 DIAGNOSIS — Z79.899 ENCOUNTER FOR LONG-TERM (CURRENT) USE OF OTHER MEDICATIONS: ICD-10-CM

## 2021-11-02 DIAGNOSIS — M06.9 RHEUMATOID ARTHRITIS, INVOLVING UNSPECIFIED SITE, UNSPECIFIED WHETHER RHEUMATOID FACTOR PRESENT (HCC): Primary | ICD-10-CM

## 2021-11-02 DIAGNOSIS — M06.9 RHEUMATOID ARTHRITIS, INVOLVING UNSPECIFIED SITE, UNSPECIFIED WHETHER RHEUMATOID FACTOR PRESENT (HCC): ICD-10-CM

## 2021-11-02 LAB
ALBUMIN SERPL-MCNC: 4 G/DL (ref 3.5–5.2)
ALBUMIN/GLOB SERPL: 1.2 G/DL
ALP SERPL-CCNC: 68 U/L (ref 39–117)
ALT SERPL W P-5'-P-CCNC: 6 U/L (ref 1–33)
ANION GAP SERPL CALCULATED.3IONS-SCNC: 9.2 MMOL/L (ref 5–15)
AST SERPL-CCNC: 14 U/L (ref 1–32)
BILIRUB SERPL-MCNC: 0.4 MG/DL (ref 0–1.2)
BUN SERPL-MCNC: 28 MG/DL (ref 8–23)
BUN/CREAT SERPL: 18.1 (ref 7–25)
CALCIUM SPEC-SCNC: 9.4 MG/DL (ref 8.6–10.5)
CHLORIDE SERPL-SCNC: 99 MMOL/L (ref 98–107)
CO2 SERPL-SCNC: 24.8 MMOL/L (ref 22–29)
CREAT SERPL-MCNC: 1.55 MG/DL (ref 0.57–1)
DEPRECATED RDW RBC AUTO: 39 FL (ref 37–54)
ERYTHROCYTE [DISTWIDTH] IN BLOOD BY AUTOMATED COUNT: 12.2 % (ref 12.3–15.4)
GFR SERPL CREATININE-BSD FRML MDRD: 34 ML/MIN/1.73
GLOBULIN UR ELPH-MCNC: 3.4 GM/DL
GLUCOSE SERPL-MCNC: 99 MG/DL (ref 65–99)
HCT VFR BLD AUTO: 39.6 % (ref 34–46.6)
HGB BLD-MCNC: 12.5 G/DL (ref 12–15.9)
MCH RBC QN AUTO: 27.4 PG (ref 26.6–33)
MCHC RBC AUTO-ENTMCNC: 31.6 G/DL (ref 31.5–35.7)
MCV RBC AUTO: 86.8 FL (ref 79–97)
PLATELET # BLD AUTO: 280 10*3/MM3 (ref 140–450)
PMV BLD AUTO: 10.3 FL (ref 6–12)
POTASSIUM SERPL-SCNC: 5 MMOL/L (ref 3.5–5.2)
PROT SERPL-MCNC: 7.4 G/DL (ref 6–8.5)
QT INTERVAL: 354 MS
RBC # BLD AUTO: 4.56 10*6/MM3 (ref 3.77–5.28)
SODIUM SERPL-SCNC: 133 MMOL/L (ref 136–145)
WBC # BLD AUTO: 4.8 10*3/MM3 (ref 3.4–10.8)

## 2021-11-02 PROCEDURE — 93010 ELECTROCARDIOGRAM REPORT: CPT | Performed by: INTERNAL MEDICINE

## 2021-11-02 PROCEDURE — 80053 COMPREHEN METABOLIC PANEL: CPT

## 2021-11-02 PROCEDURE — 93005 ELECTROCARDIOGRAM TRACING: CPT | Performed by: SURGERY

## 2021-11-02 PROCEDURE — 85027 COMPLETE CBC AUTOMATED: CPT

## 2021-11-02 PROCEDURE — 36415 COLL VENOUS BLD VENIPUNCTURE: CPT

## 2021-11-03 DIAGNOSIS — Z01.818 PRE-OP TESTING: Primary | ICD-10-CM

## 2021-11-05 ENCOUNTER — OFFICE VISIT (OUTPATIENT)
Dept: CARDIOLOGY | Facility: CLINIC | Age: 61
End: 2021-11-05

## 2021-11-05 VITALS
HEIGHT: 65 IN | SYSTOLIC BLOOD PRESSURE: 108 MMHG | RESPIRATION RATE: 18 BRPM | HEART RATE: 66 BPM | BODY MASS INDEX: 34.09 KG/M2 | DIASTOLIC BLOOD PRESSURE: 68 MMHG | OXYGEN SATURATION: 96 % | WEIGHT: 204.6 LBS

## 2021-11-05 DIAGNOSIS — Z01.810 PREOP CARDIOVASCULAR EXAM: Primary | ICD-10-CM

## 2021-11-05 DIAGNOSIS — R06.09 DOE (DYSPNEA ON EXERTION): ICD-10-CM

## 2021-11-05 DIAGNOSIS — K80.20 SYMPTOMATIC CHOLELITHIASIS: ICD-10-CM

## 2021-11-05 DIAGNOSIS — R94.31 ABNORMAL EKG: ICD-10-CM

## 2021-11-05 PROCEDURE — 99204 OFFICE O/P NEW MOD 45 MIN: CPT | Performed by: NURSE PRACTITIONER

## 2021-11-05 NOTE — PROGRESS NOTES
Cardiology Office New Patient Visit      Primary Care Provider:  Argentina Avalos APRN    Reason for Visit:     Preoperative cardiovascular risk assessment requested  Cholelithiasis  Dyspnea with exertion  Abnormal preop EKG      Subjective     CC: Complains of recent abdominal discomfort and change in bowel movements.  Also complains of dyspnea with exertion at times    History of Present Illness       Lucy Mahan is a 60 y.o. female.  Patient comes in today requesting preoperative cardiovascular risk assessment prior to upcoming Sharee cystectomy that is scheduled for November 10, 2021.  Preoperative EKG was obtained which showed anterior septal Q waves.  There were no ST or T wave segment abnormalities detected.    Patient denies any prior known history of CAD, MI, heart failure.  She is not diabetic.  She has a past medical history of hypertension.  She denies dyslipidemia.  She has a family history of CAD in her father.    The patient denies any exertional chest pain but does complain of exertional dyspnea at times with walking or heavier exertion.    She has had no previous cardiovascular testing.        Past Medical History:   Diagnosis Date   • Arthritis    • Gall stones    • Hypertension    • Rheumatoid aortitis    • Thyroid disease        Past Surgical History:   Procedure Laterality Date   •  SECTION      x2   • COLONOSCOPY     • HIP SURGERY Left    • THYROIDECTOMY, PARTIAL  2017         Current Outpatient Medications:   •  Calcium Carbonate-Vitamin D 600-400 MG-UNIT chewable tablet, Daily., Disp: , Rfl:   •  folic acid (FOLVITE) 1 MG tablet, take 1 tablet by mouth once daily, Disp: 90 tablet, Rfl: 1  •  hydroCHLOROthiazide (HYDRODIURIL) 25 MG tablet, TAKE 1 TABLET BY MOUTH DAILY, Disp: 90 tablet, Rfl: 1  •  hydroxychloroquine (PLAQUENIL) 200 MG tablet, TAKE 1 TABLET BY MOUTH TWICE A DAY, Disp: 180 tablet, Rfl: 0  •  hydrOXYzine (ATARAX) 10 MG tablet, TAKE 1 TABLET BY MOUTH TWICE DAILY AS  NEEDED FOR ANXIETY, Disp: 40 tablet, Rfl: 2  •  levothyroxine (SYNTHROID, LEVOTHROID) 150 MCG tablet, Take 1 tablet by mouth Daily., Disp: 90 tablet, Rfl: 0  •  linaclotide (Linzess) 72 MCG capsule capsule, Take 1 capsule by mouth Every Morning Before Breakfast., Disp: 30 capsule, Rfl: 2  •  lisinopril (PRINIVIL,ZESTRIL) 40 MG tablet, TAKE 1 TABLET BY MOUTH DAILY, Disp: 90 tablet, Rfl: 1    Social History     Socioeconomic History   • Marital status:    Tobacco Use   • Smoking status: Never Smoker   • Smokeless tobacco: Never Used   Vaping Use   • Vaping Use: Never used   Substance and Sexual Activity   • Alcohol use: Yes     Comment: less than monthly   • Drug use: No   • Sexual activity: Defer       Family History   Problem Relation Age of Onset   • Dementia Mother    • Heart disease Father    • Hypertension Father        The following portions of the patient's history were reviewed and updated as appropriate: allergies, current medications, past family history, past medical history, past social history, past surgical history and problem list.    Review of Systems   Constitutional: Negative for decreased appetite and diaphoresis.   HENT: Negative for congestion, hearing loss and nosebleeds.    Cardiovascular: Positive for dyspnea on exertion. Negative for chest pain, claudication, irregular heartbeat, leg swelling, near-syncope, orthopnea, palpitations, paroxysmal nocturnal dyspnea and syncope.   Respiratory: Negative for cough, shortness of breath and sleep disturbances due to breathing.    Endocrine: Negative for polyuria.   Hematologic/Lymphatic: Does not bruise/bleed easily.   Skin: Negative for itching and rash.   Musculoskeletal: Negative for back pain, muscle weakness and myalgias.   Gastrointestinal: Positive for abdominal pain. Negative for change in bowel habit and nausea.   Genitourinary: Negative for dysuria, flank pain, frequency and hesitancy.   Neurological: Negative for dizziness, tremors  "and weakness.   Psychiatric/Behavioral: Negative for altered mental status. The patient does not have insomnia.        /68 (BP Location: Left arm, Patient Position: Sitting)   Pulse 66   Resp 18   Ht 165.1 cm (65\")   Wt 92.8 kg (204 lb 9.6 oz)   SpO2 96%   BMI 34.05 kg/m² .    Body mass index is 34.05 kg/m².    Objective     Vitals reviewed.   Constitutional:       General: Not in acute distress.     Appearance: Normal appearance. Well-developed.   Eyes:      Pupils: Pupils are equal, round, and reactive to light.   HENT:      Head: Normocephalic and atraumatic.   Neck:      Vascular: No JVD.   Pulmonary:      Effort: Pulmonary effort is normal.      Breath sounds: Normal breath sounds.   Cardiovascular:      Normal rate. Regular rhythm.   Pulses:     Intact distal pulses.   Edema:     Peripheral edema absent.   Abdominal:      General: There is no distension.      Palpations: Abdomen is soft.      Tenderness: There is no abdominal tenderness.   Musculoskeletal: Normal range of motion.      Cervical back: Normal range of motion and neck supple. Skin:     General: Skin is warm and dry.   Neurological:      Mental Status: Alert and oriented to person, place, and time.           Procedures          Diagnoses and all orders for this visit:    1. Preop cardiovascular exam (Primary)  Comments:  Preoperative cardiovascular risk assessment requested prior to upcoming cholecystectomy scheduled 11/10/2021  Orders:  -     Stress Test With Myocardial Perfusion (1 Day); Future    2. Symptomatic cholelithiasis    3. TORREZ (dyspnea on exertion)  Comments:  Patient reports some episodes of dyspnea with exertion with walking or heavier exertion    4. Abnormal EKG  Comments:  Preoperative EKG is obtained anterior septal Q waves cannot be ruled out no ST or T wave segment abnormalities              MEDICAL DECISION MAKING:          Patient is here today requesting preoperative cardiovascular risk assessment prior to upcoming " cholecystectomy.    Surgery is scheduled on Wednesday, November 10.    Preoperative EKG showed anterior septal Q waves.  Patient has had some complaints of dyspnea with exertion.  She is not known to have CAD, MI or heart failure.    Cardiovascular risk factors include hypertension and family history of CAD.    I would like to proceed with a Lexiscan Myoview to rule out ischemic burden before clearing for surgery.    The patient has history of previous hip replacement and previous back surgery and has limiting factors to exercise from both of those as well as rheumatoid arthritis.    We will get this lexiscan Myoview scheduled on Monday to try to facilitate her surgery on Wednesday.  The patient is in agreement to this plan

## 2021-11-07 DIAGNOSIS — E03.9 ACQUIRED HYPOTHYROIDISM: ICD-10-CM

## 2021-11-08 ENCOUNTER — HOSPITAL ENCOUNTER (OUTPATIENT)
Dept: NUCLEAR MEDICINE | Facility: HOSPITAL | Age: 61
Discharge: HOME | End: 2021-11-08

## 2021-11-08 ENCOUNTER — LAB (OUTPATIENT)
Dept: LAB | Facility: HOSPITAL | Age: 61
End: 2021-11-08

## 2021-11-08 DIAGNOSIS — K80.20 SYMPTOMATIC CHOLELITHIASIS: ICD-10-CM

## 2021-11-08 DIAGNOSIS — Z01.810 PREOP CARDIOVASCULAR EXAM: ICD-10-CM

## 2021-11-08 LAB — SARS-COV-2 ORF1AB RESP QL NAA+PROBE: NOT DETECTED

## 2021-11-08 PROCEDURE — 93016 CV STRESS TEST SUPVJ ONLY: CPT | Performed by: NURSE PRACTITIONER

## 2021-11-08 PROCEDURE — 93018 CV STRESS TEST I&R ONLY: CPT | Performed by: INTERNAL MEDICINE

## 2021-11-08 PROCEDURE — U0004 COV-19 TEST NON-CDC HGH THRU: HCPCS

## 2021-11-08 PROCEDURE — 0 TECHNETIUM SESTAMIBI: Performed by: NURSE PRACTITIONER

## 2021-11-08 PROCEDURE — 78452 HT MUSCLE IMAGE SPECT MULT: CPT

## 2021-11-08 PROCEDURE — A9500 TC99M SESTAMIBI: HCPCS | Performed by: NURSE PRACTITIONER

## 2021-11-08 PROCEDURE — 93017 CV STRESS TEST TRACING ONLY: CPT

## 2021-11-08 PROCEDURE — C9803 HOPD COVID-19 SPEC COLLECT: HCPCS

## 2021-11-08 PROCEDURE — 25010000002 REGADENOSON 0.4 MG/5ML SOLUTION: Performed by: NURSE PRACTITIONER

## 2021-11-08 PROCEDURE — 78452 HT MUSCLE IMAGE SPECT MULT: CPT | Performed by: INTERNAL MEDICINE

## 2021-11-08 RX ORDER — LEVOTHYROXINE SODIUM 0.1 MG/1
100 TABLET ORAL DAILY
Qty: 90 TABLET | Refills: 1 | OUTPATIENT
Start: 2021-11-08

## 2021-11-08 RX ADMIN — TECHNETIUM TC 99M SESTAMIBI 1 DOSE: 1 INJECTION INTRAVENOUS at 09:00

## 2021-11-08 RX ADMIN — TECHNETIUM TC 99M SESTAMIBI 1 DOSE: 1 INJECTION INTRAVENOUS at 11:07

## 2021-11-08 RX ADMIN — REGADENOSON 0.4 MG: 0.08 INJECTION, SOLUTION INTRAVENOUS at 11:07

## 2021-11-09 ENCOUNTER — PATIENT ROUNDING (BHMG ONLY) (OUTPATIENT)
Dept: CARDIOLOGY | Facility: CLINIC | Age: 61
End: 2021-11-09

## 2021-11-09 ENCOUNTER — ANESTHESIA EVENT (OUTPATIENT)
Dept: PERIOP | Facility: HOSPITAL | Age: 61
End: 2021-11-09

## 2021-11-09 LAB
BH CV REST NUCLEAR ISOTOPE DOSE: 7 MCI
BH CV STRESS BP STAGE 1: NORMAL
BH CV STRESS COMMENTS STAGE 1: NORMAL
BH CV STRESS DOSE REGADENOSON STAGE 1: 0.4
BH CV STRESS DURATION MIN STAGE 1: 0
BH CV STRESS DURATION SEC STAGE 1: 10
BH CV STRESS HR STAGE 1: 92
BH CV STRESS NUCLEAR ISOTOPE DOSE: 21.2 MCI
BH CV STRESS PROTOCOL 1: NORMAL
BH CV STRESS STAGE 1: 1
LV EF NUC BP: 73 %
MAXIMAL PREDICTED HEART RATE: 158 BPM
STRESS BASELINE BP: NORMAL MMHG
STRESS BASELINE HR: 68 BPM
STRESS TARGET HR: 134 BPM

## 2021-11-09 NOTE — PROGRESS NOTES
November 9, 2021    Hello, may I speak with Lucy Mahan?    My name is Tomy.     I am  with [facility]  [address] [place] [address].    Before we get started may I verify your date of birth? 1960    I am calling to officially welcome you to our practice and ask about your recent visit. Is this a good time to talk? yes    Tell me about your visit with us. What things went well?  everything was wonderful.        We're always looking for ways to make our patients' experiences even better. Do you have recommendations on ways we may improve? no at all everyone was excellent    Overall were you satisfied with your first visit to our practice? yes       I appreciate you taking the time to speak with me today. Is there anything else I can do for you? no      Thank you, and have a great day.

## 2021-11-10 ENCOUNTER — HOSPITAL ENCOUNTER (OUTPATIENT)
Facility: HOSPITAL | Age: 61
Setting detail: HOSPITAL OUTPATIENT SURGERY
Discharge: HOME | End: 2021-11-10
Attending: SURGERY | Admitting: SURGERY

## 2021-11-10 ENCOUNTER — ANESTHESIA (OUTPATIENT)
Dept: PERIOP | Facility: HOSPITAL | Age: 61
End: 2021-11-10

## 2021-11-10 VITALS
TEMPERATURE: 96.6 F | SYSTOLIC BLOOD PRESSURE: 124 MMHG | WEIGHT: 203 LBS | BODY MASS INDEX: 33.82 KG/M2 | RESPIRATION RATE: 62 BRPM | OXYGEN SATURATION: 100 % | DIASTOLIC BLOOD PRESSURE: 57 MMHG | HEART RATE: 62 BPM | HEIGHT: 65 IN

## 2021-11-10 DIAGNOSIS — C78.6 PERITONEAL CARCINOMATOSIS (HCC): Primary | ICD-10-CM

## 2021-11-10 DIAGNOSIS — K80.20 SYMPTOMATIC CHOLELITHIASIS: ICD-10-CM

## 2021-11-10 PROCEDURE — 88360 TUMOR IMMUNOHISTOCHEM/MANUAL: CPT | Performed by: SURGERY

## 2021-11-10 PROCEDURE — 88305 TISSUE EXAM BY PATHOLOGIST: CPT | Performed by: SURGERY

## 2021-11-10 PROCEDURE — 88342 IMHCHEM/IMCYTCHM 1ST ANTB: CPT

## 2021-11-10 PROCEDURE — 49321 LAPAROSCOPY BIOPSY: CPT | Performed by: SURGERY

## 2021-11-10 PROCEDURE — 88341 IMHCHEM/IMCYTCHM EA ADD ANTB: CPT | Performed by: SURGERY

## 2021-11-10 PROCEDURE — 25010000002 ONDANSETRON PER 1 MG: Performed by: ANESTHESIOLOGIST ASSISTANT

## 2021-11-10 PROCEDURE — 88341 IMHCHEM/IMCYTCHM EA ADD ANTB: CPT

## 2021-11-10 PROCEDURE — 25010000002 FENTANYL CITRATE (PF) 50 MCG/ML SOLUTION: Performed by: ANESTHESIOLOGY

## 2021-11-10 PROCEDURE — 88342 IMHCHEM/IMCYTCHM 1ST ANTB: CPT | Performed by: SURGERY

## 2021-11-10 PROCEDURE — 88331 PATH CONSLTJ SURG 1 BLK 1SPC: CPT | Performed by: SURGERY

## 2021-11-10 PROCEDURE — 25010000002 NEOSTIGMINE 5 MG/5ML SOLUTION: Performed by: ANESTHESIOLOGIST ASSISTANT

## 2021-11-10 PROCEDURE — 25010000002 PROPOFOL 10 MG/ML EMULSION: Performed by: ANESTHESIOLOGIST ASSISTANT

## 2021-11-10 PROCEDURE — 25010000002 DEXAMETHASONE PER 1 MG: Performed by: ANESTHESIOLOGIST ASSISTANT

## 2021-11-10 RX ORDER — FLUMAZENIL 0.1 MG/ML
0.2 INJECTION INTRAVENOUS AS NEEDED
Status: DISCONTINUED | OUTPATIENT
Start: 2021-11-10 | End: 2021-11-10 | Stop reason: HOSPADM

## 2021-11-10 RX ORDER — FENTANYL CITRATE 50 UG/ML
50 INJECTION, SOLUTION INTRAMUSCULAR; INTRAVENOUS
Status: DISCONTINUED | OUTPATIENT
Start: 2021-11-10 | End: 2021-11-10 | Stop reason: HOSPADM

## 2021-11-10 RX ORDER — GLYCOPYRROLATE 1 MG/5 ML
SYRINGE (ML) INTRAVENOUS AS NEEDED
Status: DISCONTINUED | OUTPATIENT
Start: 2021-11-10 | End: 2021-11-10 | Stop reason: SURG

## 2021-11-10 RX ORDER — NEOSTIGMINE METHYLSULFATE 5 MG/5 ML
SYRINGE (ML) INTRAVENOUS AS NEEDED
Status: DISCONTINUED | OUTPATIENT
Start: 2021-11-10 | End: 2021-11-10 | Stop reason: SURG

## 2021-11-10 RX ORDER — SODIUM CHLORIDE 9 MG/ML
100 INJECTION, SOLUTION INTRAVENOUS CONTINUOUS
Status: DISCONTINUED | OUTPATIENT
Start: 2021-11-10 | End: 2021-11-10 | Stop reason: HOSPADM

## 2021-11-10 RX ORDER — FENTANYL CITRATE 50 UG/ML
INJECTION, SOLUTION INTRAMUSCULAR; INTRAVENOUS AS NEEDED
Status: DISCONTINUED | OUTPATIENT
Start: 2021-11-10 | End: 2021-11-10 | Stop reason: SURG

## 2021-11-10 RX ORDER — ONDANSETRON 2 MG/ML
INJECTION INTRAMUSCULAR; INTRAVENOUS AS NEEDED
Status: DISCONTINUED | OUTPATIENT
Start: 2021-11-10 | End: 2021-11-10 | Stop reason: SURG

## 2021-11-10 RX ORDER — MEPERIDINE HYDROCHLORIDE 25 MG/ML
12.5 INJECTION INTRAMUSCULAR; INTRAVENOUS; SUBCUTANEOUS
Status: DISCONTINUED | OUTPATIENT
Start: 2021-11-10 | End: 2021-11-10 | Stop reason: HOSPADM

## 2021-11-10 RX ORDER — BUPIVACAINE HYDROCHLORIDE 2.5 MG/ML
INJECTION, SOLUTION EPIDURAL; INFILTRATION; INTRACAUDAL AS NEEDED
Status: DISCONTINUED | OUTPATIENT
Start: 2021-11-10 | End: 2021-11-10 | Stop reason: HOSPADM

## 2021-11-10 RX ORDER — NALOXONE HCL 0.4 MG/ML
0.4 VIAL (ML) INJECTION AS NEEDED
Status: DISCONTINUED | OUTPATIENT
Start: 2021-11-10 | End: 2021-11-10 | Stop reason: HOSPADM

## 2021-11-10 RX ORDER — DEXAMETHASONE SODIUM PHOSPHATE 4 MG/ML
INJECTION, SOLUTION INTRA-ARTICULAR; INTRALESIONAL; INTRAMUSCULAR; INTRAVENOUS; SOFT TISSUE AS NEEDED
Status: DISCONTINUED | OUTPATIENT
Start: 2021-11-10 | End: 2021-11-10 | Stop reason: SURG

## 2021-11-10 RX ORDER — HYDROCODONE BITARTRATE AND ACETAMINOPHEN 5; 325 MG/1; MG/1
1 TABLET ORAL ONCE
Status: COMPLETED | OUTPATIENT
Start: 2021-11-10 | End: 2021-11-10

## 2021-11-10 RX ORDER — HYDROMORPHONE HCL 110MG/55ML
0.5 PATIENT CONTROLLED ANALGESIA SYRINGE INTRAVENOUS
Status: DISCONTINUED | OUTPATIENT
Start: 2021-11-10 | End: 2021-11-10 | Stop reason: HOSPADM

## 2021-11-10 RX ORDER — HYDROCODONE BITARTRATE AND ACETAMINOPHEN 5; 325 MG/1; MG/1
1 TABLET ORAL EVERY 4 HOURS PRN
Qty: 10 TABLET | Refills: 0 | Status: SHIPPED | OUTPATIENT
Start: 2021-11-10 | End: 2021-11-29

## 2021-11-10 RX ORDER — PHENYLEPHRINE HCL IN 0.9% NACL 1 MG/10 ML
SYRINGE (ML) INTRAVENOUS AS NEEDED
Status: DISCONTINUED | OUTPATIENT
Start: 2021-11-10 | End: 2021-11-10 | Stop reason: SURG

## 2021-11-10 RX ORDER — ONDANSETRON 2 MG/ML
4 INJECTION INTRAMUSCULAR; INTRAVENOUS ONCE AS NEEDED
Status: DISCONTINUED | OUTPATIENT
Start: 2021-11-10 | End: 2021-11-10 | Stop reason: HOSPADM

## 2021-11-10 RX ORDER — DIPHENHYDRAMINE HYDROCHLORIDE 50 MG/ML
12.5 INJECTION INTRAMUSCULAR; INTRAVENOUS
Status: DISCONTINUED | OUTPATIENT
Start: 2021-11-10 | End: 2021-11-10 | Stop reason: HOSPADM

## 2021-11-10 RX ORDER — PROPOFOL 10 MG/ML
VIAL (ML) INTRAVENOUS AS NEEDED
Status: DISCONTINUED | OUTPATIENT
Start: 2021-11-10 | End: 2021-11-10 | Stop reason: SURG

## 2021-11-10 RX ORDER — ROCURONIUM BROMIDE 10 MG/ML
INJECTION, SOLUTION INTRAVENOUS AS NEEDED
Status: DISCONTINUED | OUTPATIENT
Start: 2021-11-10 | End: 2021-11-10 | Stop reason: SURG

## 2021-11-10 RX ADMIN — FENTANYL CITRATE 50 MCG: 50 INJECTION, SOLUTION INTRAMUSCULAR; INTRAVENOUS at 09:11

## 2021-11-10 RX ADMIN — Medication 0.7 MG: at 09:06

## 2021-11-10 RX ADMIN — SODIUM CHLORIDE 100 ML/HR: 9 INJECTION, SOLUTION INTRAVENOUS at 06:56

## 2021-11-10 RX ADMIN — FENTANYL CITRATE 100 MCG: 50 INJECTION, SOLUTION INTRAMUSCULAR; INTRAVENOUS at 08:24

## 2021-11-10 RX ADMIN — HYDROCODONE BITARTRATE AND ACETAMINOPHEN 1 TABLET: 5; 325 TABLET ORAL at 10:33

## 2021-11-10 RX ADMIN — CEFAZOLIN SODIUM 2 G: 1 INJECTION, POWDER, FOR SOLUTION INTRAMUSCULAR; INTRAVENOUS at 08:32

## 2021-11-10 RX ADMIN — ONDANSETRON 4 MG: 2 INJECTION INTRAMUSCULAR; INTRAVENOUS at 09:00

## 2021-11-10 RX ADMIN — ROCURONIUM BROMIDE 40 MG: 10 INJECTION INTRAVENOUS at 08:27

## 2021-11-10 RX ADMIN — DEXAMETHASONE SODIUM PHOSPHATE 4 MG: 4 INJECTION, SOLUTION INTRAMUSCULAR; INTRAVENOUS at 08:51

## 2021-11-10 RX ADMIN — PROPOFOL 200 MG: 10 INJECTION, EMULSION INTRAVENOUS at 08:27

## 2021-11-10 RX ADMIN — Medication 3.5 MG: at 09:06

## 2021-11-10 RX ADMIN — Medication 100 MCG: at 08:35

## 2021-11-10 NOTE — ANESTHESIA POSTPROCEDURE EVALUATION
Patient: Lucy Mahan    Procedure Summary     Date: 11/10/21 Room / Location: [facility] OR 10 / [facility] MAIN OR    Anesthesia Start: 0822 Anesthesia Stop: 0930    Procedure: diagnostic laparoscopy and peritoneal biopsy (N/A Abdomen) Diagnosis:       Peritoneal carcinomatosis (HCC)      (Symptomatic cholelithiasis [K80.20])    Surgeons: Krunal Badillo MD Provider: Mesfin Nicole MD    Anesthesia Type: general ASA Status: 3          Anesthesia Type: general    Vitals  Vitals Value Taken Time   /38 11/10/21 1015   Temp 97.7 °F (36.5 °C) 11/10/21 0928   Pulse 70 11/10/21 1018   Resp 25 11/10/21 1013   SpO2 99 % 11/10/21 1018   Vitals shown include unvalidated device data.        Post Anesthesia Care and Evaluation    Patient location during evaluation: PACU  Patient participation: complete - patient participated  Level of consciousness: awake  Pain scale: See nurse's notes for pain score.  Pain management: adequate  Airway patency: patent  Anesthetic complications: No anesthetic complications  PONV Status: none  Cardiovascular status: acceptable  Respiratory status: acceptable  Hydration status: acceptable    Comments: Patient seen and examined postoperatively; vital signs stable; SpO2 greater than or equal to 90%; cardiopulmonary status stable; nausea/vomiting adequately controlled; pain adequately controlled; no apparent anesthesia complications; patient discharged from anesthesia care when discharge criteria were met

## 2021-11-10 NOTE — ANESTHESIA POSTPROCEDURE EVALUATION
Patient: Lucy Mahan    Procedure Summary     Date: 11/10/21 Room / Location: [facility] OR 10 / [facility] MAIN OR    Anesthesia Start: 0822 Anesthesia Stop: 0930    Procedure: diagnostic laparoscopy and peritoneal biopsy (N/A Abdomen) Diagnosis:       Peritoneal carcinomatosis (HCC)      (Symptomatic cholelithiasis [K80.20])    Surgeons: Krunal Badillo MD Provider: Mesfin Nicole MD    Anesthesia Type: general ASA Status: 3          Anesthesia Type: general    Vitals  Vitals Value Taken Time   /60 11/10/21 0930   Temp 97.7 °F (36.5 °C) 11/10/21 0928   Pulse 68 11/10/21 0934   Resp 27 11/10/21 0928   SpO2 99 % 11/10/21 0934   Vitals shown include unvalidated device data.        Post Anesthesia Care and Evaluation    Patient location during evaluation: PACU  Patient participation: complete - patient participated  Level of consciousness: awake  Pain management: adequate  Airway patency: patent  Anesthetic complications: No anesthetic complications  PONV Status: none  Cardiovascular status: acceptable  Respiratory status: acceptable  Hydration status: acceptable    Comments: Patient seen and examined postoperatively; vital signs stable; SpO2 greater than or equal to 90%; cardiopulmonary status stable; nausea/vomiting adequately controlled; pain adequately controlled; no apparent anesthesia complications; patient discharged from anesthesia care when discharge criteria were met

## 2021-11-10 NOTE — ANESTHESIA PROCEDURE NOTES
Airway  Urgency: elective    Date/Time: 11/10/2021 8:30 AM  Airway not difficult    General Information and Staff    Patient location during procedure: OR    Indications and Patient Condition  Indications for airway management: airway protection    Preoxygenated: yes  MILS maintained throughout  Mask difficulty assessment: 2 - vent by mask + OA or adjuvant +/- NMBA    Final Airway Details  Final airway type: endotracheal airway      Successful airway: ETT  Cuffed: yes   Successful intubation technique: direct laryngoscopy  Endotracheal tube insertion site: oral  Blade: Sabrina  Blade size: 3  ETT size (mm): 7.0  Cormack-Lehane Classification: grade I - full view of glottis  Placement verified by: chest auscultation and capnometry   Measured from: lips  Number of attempts at approach: 1  Assessment: lips, teeth, and gum same as pre-op and atraumatic intubation

## 2021-11-10 NOTE — OP NOTE
Operative Report:    Patient Name:  Lucy Mahan  YOB: 1960    Date of Surgery:  11/10/2021     Indications: 60-year-old lady referred to me for evaluation of right upper quadrant abdominal pain.  Preoperative work-up showed cholelithiasis without cholecystitis.  Counseled about the risk and benefits of cholecystectomy she understood and wished to proceed.    Pre-op Diagnosis:   Symptomatic cholelithiasis [K80.20]       Post-Op Diagnosis Codes:     * Peritoneal carcinomatosis (HCC) [C78.6]    Procedure/CPT® Codes:      Procedure(s):  diagnostic laparoscopy and peritoneal biopsy    Staff:  Surgeon(s):  Krunal Badillo MD    Circulator: Geno Chavira RN  Scrub Person: Parul Rodrigez  Assistant: Gillian Chavira RN  was responsible for performing the following activities: Closing and Held/Positioned Camera and their skilled assistance was necessary for the success of this case.        Anesthesia: General    Estimated Blood Loss: minimal    Implants:    Nothing was implanted during the procedure    Specimen:          Specimens     ID Source Type Tests Collected By Collected At Frozen?    A Peritoneum Tissue · TISSUE PATHOLOGY EXAM   Krunal Badillo MD 11/10/21 0858 Yes    Description: peritoneal biopsy. ext [id number]    B Omentum Tissue · TISSUE PATHOLOGY EXAM   Krunal Badillo MD 11/10/21 0859     Description: omentum biopsy    This specimen was not marked as sent.              Findings: About 1 L of bloody ascites.  Peritoneal studding throughout the abdominal cavity.  Frozen section sent insistent with malignancy.    Complications: None    Description of Procedure: Patient was taken the operating placed on the operating table in the supine position under general anesthesia.  Her abdomen was prepped draped normal sterile fashion.  Timeout was performed identifying correct patient procedure site of operation.  I began the operation by filtration the infraumbilical skin and subtenons tissue  with local anesthetic a curvilinear incision was made with 11 blade scalpel the umbilical stalk was grasped elevated the peritoneum was entered sharply.  With a finger sweep there was some adhesions in the supraumbilical location and in the lateral and inferior peritoneal surfaces there was notable studding.  I went ahead and placed my Liu port and inspected the entry site did not see any evidence of injury.  I then inspected the abdominal cavity there was moderate bloody ascites.  Diffuse peritoneal studding consistent with malignancy.  There were intra-abdominal adhesions related to the studding which made  complete inspection of the abdominal viscera risky.  I placed a epigastric 5 mm port and inspected the gallbladder.  It was fairly tense but was quite adhesed to the omentum.  In the right upper quadrant there was a high density of the peritoneal studding.  I went ahead and sampled the right upper quadrant peritoneum and sent for frozen.  This resulted as suspicious for malignancy.  I sent a another biopsy off of the omentum as a permanent for better evaluation.  I then evacuated ascites using suction  approximately 500 mL were removed.  I then decided to go ahead and close my umbilical incision using 0 Vicryl suture the skin with 4-0 Vicryl suture skin affix was placed on the incisions with a plan for a interval work-up prior to any further surgical intervention.  She tolerated anesthesia well was woken and transferred to recovery in satisfactory condition.      Krunal Badillo MD     Date: 11/10/2021  Time: 09:29 EST    This note was created using Dragon Voice Recognition software.

## 2021-11-10 NOTE — ANESTHESIA PREPROCEDURE EVALUATION
Anesthesia Evaluation     Patient summary reviewed and Nursing notes reviewed   NPO Solid Status: > 8 hours  NPO Liquid Status: > 8 hours           Airway   Mallampati: I  TM distance: >3 FB  Neck ROM: full  No difficulty expected  Dental - normal exam     Pulmonary - normal exam   (+) shortness of breath,   Cardiovascular - normal exam    (+) hypertension, TORREZ,       Neuro/Psych  (+) numbness,     GI/Hepatic/Renal/Endo    (+)   renal disease CRI,     Musculoskeletal (-) negative ROS    Abdominal  - normal exam    Bowel sounds: normal.   Substance History - negative use     OB/GYN negative ob/gyn ROS         Other            Phys Exam Other: Missing #7, 10. Chips poor condition none loose per patient              Anesthesia Plan    ASA 3     general     intravenous induction     Anesthetic plan, all risks, benefits, and alternatives have been provided, discussed and informed consent has been obtained with: patient.    Plan discussed with [facility].

## 2021-11-11 ENCOUNTER — TELEPHONE (OUTPATIENT)
Dept: SURGERY | Facility: CLINIC | Age: 61
End: 2021-11-11

## 2021-11-11 NOTE — TELEPHONE ENCOUNTER
PO CARLOS Call- spoke with pt. Informed pt Dr. Badillo wanted her to get CTs done before her appt on 11/16/21. Informed her F will be calling to get her gary. Asked how she was and she stated doing well but having cramps thinking poss gas pain. Informed her to walk and even try gas x to help. Stated understood. Already had 1 wk po appt gary. If any issues will call.

## 2021-11-15 ENCOUNTER — TELEPHONE (OUTPATIENT)
Dept: FAMILY MEDICINE CLINIC | Facility: CLINIC | Age: 61
End: 2021-11-15

## 2021-11-15 ENCOUNTER — OFFICE VISIT (OUTPATIENT)
Dept: FAMILY MEDICINE CLINIC | Facility: CLINIC | Age: 61
End: 2021-11-15

## 2021-11-15 VITALS
OXYGEN SATURATION: 100 % | BODY MASS INDEX: 33.82 KG/M2 | WEIGHT: 203 LBS | DIASTOLIC BLOOD PRESSURE: 76 MMHG | HEIGHT: 65 IN | SYSTOLIC BLOOD PRESSURE: 122 MMHG | HEART RATE: 77 BPM

## 2021-11-15 DIAGNOSIS — E03.9 ACQUIRED HYPOTHYROIDISM: ICD-10-CM

## 2021-11-15 DIAGNOSIS — C23 CARCINOMA OF GALLBLADDER (HCC): Primary | ICD-10-CM

## 2021-11-15 DIAGNOSIS — N28.9 ABNORMAL RENAL FUNCTION: ICD-10-CM

## 2021-11-15 DIAGNOSIS — K59.04 CHRONIC IDIOPATHIC CONSTIPATION: ICD-10-CM

## 2021-11-15 DIAGNOSIS — M54.50 LOW BACK PAIN WITHOUT SCIATICA, UNSPECIFIED BACK PAIN LATERALITY, UNSPECIFIED CHRONICITY: ICD-10-CM

## 2021-11-15 DIAGNOSIS — R92.8 ABNORMAL MAMMOGRAM OF LEFT BREAST: ICD-10-CM

## 2021-11-15 DIAGNOSIS — I10 ESSENTIAL HYPERTENSION: ICD-10-CM

## 2021-11-15 PROCEDURE — 99214 OFFICE O/P EST MOD 30 MIN: CPT | Performed by: NURSE PRACTITIONER

## 2021-11-15 NOTE — TELEPHONE ENCOUNTER
I faxed the orders and notified patient.  She said that Dr. Badillo's office has been working on the PA today, but she hasn't heard back about scheduling yet.  She was told Dr. Badillo's office would be in touch with her.

## 2021-11-15 NOTE — PROGRESS NOTES
Chief Complaint  Abdominal Pain (pt reports that she was told the gallbladder looks cancerous, has appt tomorrow.) and Follow-up (4 wks)    Subjective          Lucy Mahan presents to [facility] for   History of Present Illness     Pt underwent elective lap cholecystectomy on 11/10/2021 per Dr. Badillo. However she only underwent the diagnostic laparoscopy and biopsies.  The cholecystectomy was aborted due to intra-abdominal adhesions and the new findings. The pathology returned as metastatic carcinoma.  Patient has follow-up appointment with Dr. Badillo tomorrow.  CT chest abdomen pelvis have been ordered but not completed yet.  She is here today for follow-up    Abnormal mammogram December 2020, recommended for 6-month repeat left breast diagnostic mammo and ultrasound, which was ordered for June however not completed.    Hypothyroidism, stable on medication, denies symptoms of weight gain/loss, hot or cold intolerance, hair loss, abnormal heart rate and fatigue.  She does report constipation and minimal improvement in abdominal discomfort, bloating since cholecystectomy.     Abnormal renal function BUN/creatinine had improved during her hospitalization.  However GFR at 34 on 11/2/1    HTN, stable on meds and takes as directed, denies chest pain, headache, shortness of air, palpitations and swelling of extremities.     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  Denies radiation of pain or numbness and tingling into the lower extremities      The following portions of the patient's history were reviewed and updated as appropriate: allergies, current medications, past family history, past medical history, past social history, past surgical history and problem list.    Past Medical History:   Diagnosis Date   • Arthritis    • Gall stones    • Hypertension    • Rheumatoid aortitis    • Thyroid disease      Past Surgical History:  "  Procedure Laterality Date   •  SECTION      x2   • CHOLECYSTECTOMY N/A 11/10/2021    Procedure: diagnostic laparoscopy and peritoneal biopsy;  Surgeon: Krunal Badillo MD;  Location: [facility] MAIN OR;  Service: General;  Laterality: N/A;   • COLONOSCOPY     • HIP SURGERY Left    • THYROIDECTOMY, PARTIAL  2017     Family History   Problem Relation Age of Onset   • Dementia Mother    • Arthritis Mother    • Heart disease Father    • Hypertension Father      Social History     Tobacco Use   • Smoking status: Never Smoker   • Smokeless tobacco: Never Used   Substance Use Topics   • Alcohol use: Yes     Alcohol/week: 1.0 standard drink     Types: 1 Glasses of wine per week     Comment: less than monthly       Current Outpatient Medications:   •  Calcium Carbonate-Vitamin D 600-400 MG-UNIT chewable tablet, Daily., Disp: , Rfl:   •  folic acid (FOLVITE) 1 MG tablet, take 1 tablet by mouth once daily, Disp: 90 tablet, Rfl: 1  •  hydroCHLOROthiazide (HYDRODIURIL) 25 MG tablet, TAKE 1 TABLET BY MOUTH DAILY, Disp: 90 tablet, Rfl: 1  •  HYDROcodone-acetaminophen (Norco) 5-325 MG per tablet, Take 1 tablet by mouth Every 4 (Four) Hours As Needed for Moderate Pain  for up to 10 doses., Disp: 10 tablet, Rfl: 0  •  hydroxychloroquine (PLAQUENIL) 200 MG tablet, TAKE 1 TABLET BY MOUTH TWICE A DAY, Disp: 180 tablet, Rfl: 0  •  hydrOXYzine (ATARAX) 10 MG tablet, TAKE 1 TABLET BY MOUTH TWICE DAILY AS NEEDED FOR ANXIETY, Disp: 40 tablet, Rfl: 2  •  levothyroxine (SYNTHROID, LEVOTHROID) 150 MCG tablet, Take 1 tablet by mouth Daily., Disp: 90 tablet, Rfl: 0  •  linaclotide (Linzess) 72 MCG capsule capsule, Take 1 capsule by mouth Every Morning Before Breakfast., Disp: 30 capsule, Rfl: 2  •  lisinopril (PRINIVIL,ZESTRIL) 40 MG tablet, TAKE 1 TABLET BY MOUTH DAILY, Disp: 90 tablet, Rfl: 1    Objective   Vital Signs:   /76 (BP Location: Left arm, Patient Position: Sitting, Cuff Size: Adult)   Pulse 77   Ht 165.1 cm (65\")   Wt " 92.1 kg (203 lb)   SpO2 100%   BMI 33.78 kg/m²       Physical Exam  Vitals and nursing note reviewed.   Constitutional:       General: She is not in acute distress.     Appearance: She is well-developed. She is not diaphoretic.   Eyes:      Pupils: Pupils are equal, round, and reactive to light.   Neck:      Thyroid: No thyromegaly.      Vascular: No JVD.   Cardiovascular:      Rate and Rhythm: Normal rate and regular rhythm.      Heart sounds: Normal heart sounds. No murmur heard.      Pulmonary:      Effort: Pulmonary effort is normal. No respiratory distress.      Breath sounds: Normal breath sounds.   Abdominal:      General: Bowel sounds are normal. There is no distension.      Palpations: Abdomen is soft.      Tenderness: There is no abdominal tenderness.   Musculoskeletal:         General: Tenderness (mild l-spine ttp, mild coley rom) present. Normal range of motion.      Cervical back: Normal range of motion and neck supple.   Skin:     General: Skin is warm and dry.      Comments: X2 laparoscopic abdominal incisions above and below the umbilicus approximated, healing well, without erythema or drainage   Neurological:      Mental Status: She is alert and oriented to person, place, and time.      Sensory: No sensory deficit.   Psychiatric:         Attention and Perception: Attention normal.         Mood and Affect: Mood is anxious. Affect is tearful.         Speech: Speech normal.         Behavior: Behavior normal.         Thought Content: Thought content normal.         Judgment: Judgment normal.          Result Review :     Admission on 11/10/2021, Discharged on 11/10/2021   Component Date Value Ref Range Status   • Case Report 11/10/2021    Preliminary                    Value:Surgical Pathology Report                         Case: [id number]                                  Authorizing Provider:  Krunal Badillo MD        Collected:           11/10/2021 08:58 AM          Ordering Location:     [facility] MAIN  Received:            11/10/2021 09:05 AM                                 OR                                                                           Pathologist:           Georgi Garcia MD                                                             Intraop:               Georgi Garcia MD                                                             Specimen:    Peritoneum, peritoneal biopsy. ext [id number]                                                   • Intraoperative Consultation 11/10/2021    Preliminary                    Value:This result contains rich text formatting which cannot be displayed here.                       Assessment and Plan    Diagnoses and all orders for this visit:    1. Carcinoma of gallbladder (HCC) (Primary)  -     Ambulatory Referral to Oncology    2. Acquired hypothyroidism  -     TSH; Future    3. Abnormal renal function  -     Comprehensive Metabolic Panel; Future    4. Essential hypertension    5. Abnormal mammogram of left breast    6. Chronic idiopathic constipation    7. Low back pain without sciatica, unspecified back pain laterality, unspecified chronicity        1. I reviewed pathology + metastatic carcinoma with patient. CT abd/pelvis/chest ordered per Dr. Badillo but not completed yet.  We have contacted Dr. Badillo's office and they will be completing PA for patient to hopefully have done prior to her appointment with Dr. Badillo tomorrow.  2.  Referral to oncology  3.  I did order CMP for patient to have done prior to IV contrast at the hospital  4.  Recheck TSH, patient has been consistently taking medication without missing doses.  5.  BP stable  6.  Patient to also contact [facility] to reschedule diagnostic mammogram and ultrasound which has previously been ordered.   7. rec inc exercise, stretching for lbp, tylenol prn, heat/ice   8. Questions/concerns answered to the best of my ability.  Patient will follow up in 4 weeks for next recheck      ADDENDUM:    After further reviewing Dr. Badillo's office note I realized She only underwent the diagnostic laparoscopy and biopsies.  The cholecystectomy was aborted due to intra-abdominal adhesions and the new findings.       I spent 30 minutes caring for Lucy Mahan on this date of service. This time includes time spent by me in the following activities: preparing for the visit, reviewing tests, performing a medically appropriate examination and/or evaluation , counseling and educating the patient/family/caregiver, ordering medications, tests, or procedures and documenting information in the medical record      Follow Up     Return in about 4 weeks (around 12/13/2021) for cancer, HTN, thyroid. .  Patient was given instructions and counseling regarding her condition or for health maintenance advice. Please see specific information pulled into the AVS if appropriate.      EMR Dragon transcription disclaimer:  Some of this encounter note is an electronic transcription translation of spoken language to printed text. The electronic translation of spoken language may permit erroneous, or at times, nonsensical words or phrases to be inadvertently transcribed; Although I have reviewed the note for such errors some may still exist.

## 2021-11-15 NOTE — TELEPHONE ENCOUNTER
Caller: Lucy Mahan    Relationship: Self    Best call back number: [phone]    What orders are you requesting (i.e. lab or imaging): MAMMOGRAM  LIMITED/ DIAGNOSTIC   ORDER: [id number]  ORDER: [id number]    In what timeframe would the patient need to come in: ASAP    Where will you receive your lab/imaging services: [facility]    Additional notes: PATIENT SAID THAT THEY ARE MISSING THE ORDERS FOR THE MAMMOGRAM. SHE ASKED IF THEY CAN BE FAXED OVER SO SHE CAN SCHEDULE. PLEASE CALL PATIENT AND ADVISE

## 2021-11-16 ENCOUNTER — OFFICE VISIT (OUTPATIENT)
Dept: SURGERY | Facility: CLINIC | Age: 61
End: 2021-11-16

## 2021-11-16 ENCOUNTER — LAB (OUTPATIENT)
Dept: LAB | Facility: HOSPITAL | Age: 61
End: 2021-11-16

## 2021-11-16 ENCOUNTER — HOSPITAL ENCOUNTER (OUTPATIENT)
Dept: CT IMAGING | Facility: HOSPITAL | Age: 61
Discharge: HOME | End: 2021-11-16

## 2021-11-16 VITALS
BODY MASS INDEX: 33.76 KG/M2 | SYSTOLIC BLOOD PRESSURE: 114 MMHG | HEIGHT: 65 IN | WEIGHT: 202.6 LBS | HEART RATE: 83 BPM | DIASTOLIC BLOOD PRESSURE: 77 MMHG | TEMPERATURE: 98.2 F | OXYGEN SATURATION: 100 % | RESPIRATION RATE: 18 BRPM

## 2021-11-16 DIAGNOSIS — C78.6 PERITONEAL CARCINOMATOSIS (HCC): Primary | ICD-10-CM

## 2021-11-16 DIAGNOSIS — E03.9 ACQUIRED HYPOTHYROIDISM: ICD-10-CM

## 2021-11-16 DIAGNOSIS — C78.6 PERITONEAL CARCINOMATOSIS (HCC): ICD-10-CM

## 2021-11-16 DIAGNOSIS — K80.20 SYMPTOMATIC CHOLELITHIASIS: ICD-10-CM

## 2021-11-16 DIAGNOSIS — N28.9 ABNORMAL RENAL FUNCTION: ICD-10-CM

## 2021-11-16 LAB
ALBUMIN SERPL-MCNC: 3.7 G/DL (ref 3.5–5.2)
ALBUMIN/GLOB SERPL: 1 G/DL
ALP SERPL-CCNC: 73 U/L (ref 39–117)
ALT SERPL W P-5'-P-CCNC: 6 U/L (ref 1–33)
ANION GAP SERPL CALCULATED.3IONS-SCNC: 10.1 MMOL/L (ref 5–15)
AST SERPL-CCNC: 18 U/L (ref 1–32)
BILIRUB SERPL-MCNC: 0.4 MG/DL (ref 0–1.2)
BUN SERPL-MCNC: 20 MG/DL (ref 8–23)
BUN/CREAT SERPL: 15.3 (ref 7–25)
CALCIUM SPEC-SCNC: 9.7 MG/DL (ref 8.6–10.5)
CHLORIDE SERPL-SCNC: 101 MMOL/L (ref 98–107)
CO2 SERPL-SCNC: 23.9 MMOL/L (ref 22–29)
CREAT SERPL-MCNC: 1.31 MG/DL (ref 0.57–1)
GFR SERPL CREATININE-BSD FRML MDRD: 41 ML/MIN/1.73
GLOBULIN UR ELPH-MCNC: 3.8 GM/DL
GLUCOSE SERPL-MCNC: 94 MG/DL (ref 65–99)
POTASSIUM SERPL-SCNC: 5.1 MMOL/L (ref 3.5–5.2)
PROT SERPL-MCNC: 7.5 G/DL (ref 6–8.5)
SODIUM SERPL-SCNC: 135 MMOL/L (ref 136–145)
TSH SERPL DL<=0.05 MIU/L-ACNC: 0.07 UIU/ML (ref 0.27–4.2)

## 2021-11-16 PROCEDURE — 80053 COMPREHEN METABOLIC PANEL: CPT

## 2021-11-16 PROCEDURE — 99024 POSTOP FOLLOW-UP VISIT: CPT | Performed by: SURGERY

## 2021-11-16 PROCEDURE — 71260 CT THORAX DX C+: CPT

## 2021-11-16 PROCEDURE — 0 IOPAMIDOL PER 1 ML: Performed by: SURGERY

## 2021-11-16 PROCEDURE — 36415 COLL VENOUS BLD VENIPUNCTURE: CPT

## 2021-11-16 PROCEDURE — 74177 CT ABD & PELVIS W/CONTRAST: CPT

## 2021-11-16 PROCEDURE — 84443 ASSAY THYROID STIM HORMONE: CPT

## 2021-11-16 RX ADMIN — IOPAMIDOL 100 ML: 755 INJECTION, SOLUTION INTRAVENOUS at 15:53

## 2021-11-16 NOTE — PROGRESS NOTES
"Subjective   Lucy Mahan is a 60 y.o. female.   60-year-old lady who was scheduled for elective cholecystectomy for symptomatic cholelithiasis who was found to have peritoneal carcinomatosis who is here for postop follow-up.  She only underwent the diagnostic laparoscopy and biopsies.  The cholecystectomy was aborted due to intra-abdominal adhesions and the new findings.  In general she is having some right upper quadrant abdominal discomfort.  Tolerating a diet having some bowel function.  She has not yet been able to get her staging CT scans.  She has oncology follow-up next week.    The pathology results did come back I believe yesterday showing metastatic ovarian serous carcinoma.    Objective   /77 (BP Location: Right arm, Patient Position: Sitting, Cuff Size: Adult)   Pulse 83   Temp 98.2 °F (36.8 °C) (Infrared)   Resp 18   Ht 165.1 cm (65\")   Wt 91.9 kg (202 lb 9.6 oz)   SpO2 100%   BMI 33.71 kg/m²   Physical Exam  On exam she is no distress her abdomen is soft nondistended mild appropriate tenderness to palpation incisions are healing well without any significant erythema or drainage.  Assessment/Plan   Diagnoses and all orders for this visit:    1. Peritoneal carcinomatosis (HCC) (Primary)    2. Symptomatic cholelithiasis    Intra-abdominal carcinomatosis secondary to ovarian serous carcinoma.  She will have oncology follow-up and likely be referred to gynecologic oncology.    I do think that she has symptomatic cholelithiasis and if during the work-up she has severe exacerbation of pain will most likely proceed with a percutaneous cholecystostomy tube.  This would be used as a bridge towards a potential at an abdominal surgery directed at her ovarian cancer and could eventually have a cholecystectomy at the same time.  She understands the decision making about the gallbladder.  For now I will not have her follow-up with me at any time scheduled but will be available if the gallbladder " symptoms become worse.    If she does end up having a gynecologic oncology evaluation in [place] she will need to take some additional steps to try to coordinate cholecystectomy by surgeons there.    Krunal Badillo MD  11/16/2021  9:08 AM EST    This note was created using Dragon Voice Recognition software.

## 2021-11-17 DIAGNOSIS — E03.9 ACQUIRED HYPOTHYROIDISM: ICD-10-CM

## 2021-11-17 RX ORDER — LEVOTHYROXINE SODIUM 137 UG/1
137 TABLET ORAL DAILY
Qty: 90 TABLET | Refills: 0 | Status: SHIPPED | OUTPATIENT
Start: 2021-11-17 | End: 2022-01-27 | Stop reason: SDUPTHER

## 2021-11-18 ENCOUNTER — TELEPHONE (OUTPATIENT)
Dept: FAMILY MEDICINE CLINIC | Facility: CLINIC | Age: 61
End: 2021-11-18

## 2021-11-18 DIAGNOSIS — R92.8 ABNORMAL MAMMOGRAM OF LEFT BREAST: Primary | ICD-10-CM

## 2021-11-22 ENCOUNTER — TELEPHONE (OUTPATIENT)
Dept: ONCOLOGY | Facility: CLINIC | Age: 61
End: 2021-11-22

## 2021-11-22 NOTE — PROGRESS NOTES
Hematology/Oncology Outpatient Consultation    Patient name: Lucy Mahan  : 1960  MRN: [id number]  Primary Care Physician: Argentina Avalos APRN  Referring Physician: Argentina Avalos APRN  Reason For Consult:     Chief Complaint   Patient presents with   • Follow-up     carcinoma of gallbladder       History of Present Illness:      This is a 60-year-old female who developed abdominal pain in 2021.  Patient has also lost approximately 35 pounds during that..  She has had loss of appetite.  Due to the abdominal pain,  she had an ultrasound of the abdomen done on 10/13/2021.  This basically revealed no liver lesions.  Common bile duct is normal at 3 mm.  There is a nonmobile 3 cm shadowing gallstone within the gallbladder neck.  No gallbladder thickening or pericholecystic fluid collection.  Patient was then referred to general surgery and was seen by Dr. Badillo.  Who took her to surgery on 11/10/2021 for diagnostic laparoscopy and peritoneal biopsy.  Intra-Op , she was noted to have peritoneal studding with malignancy throughout the abdominal cavity and biopsies were completed. 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  The tumor was focally positive for progesterone receptor, positive for CK7, estrogen receptor, CA-125 and PAX 8..  The staining pattern is consistent with ovarian serous carcinoma.     She had CT scan of the chest, abdomen and pelvis and the chest is a 2 mm noncalcified nodule within the left lower lobe, noncalcified nodule in the left upper lobe measuring 4 mm and 3 mm.  In the abdomen there is a 5.1 x 5.3 cm enhancing soft tissue mass in the pelvis to the right of midline associated with the adnexal region possibly representing a primary ovarian malignancy.  No right or left ovarian tissue was seen.  There is abnormal omental thickening and nodularity consistent with carcinomatosis greatest bulk in the left mid abdomen measuring up to 10.8 cm x 2.5 cm.   There is nodular peritoneal thickening also present within the abdomen and pelvis consistent with peritoneal carcinomatosis.  The small quantity of ascitic fluid in the abdomen and pelvis.  Carcinomatosis nodular studding is seen along the inferior right hepatic lobe.    She  has been referred to us for further evaluation and management of her newly diagnosed ovarian carcinoma.    Patient is accompanied today by her daughter for this appointment.  There is  family history of precancerous cells of the  uterusin her sister.      She does not smoke and does not drink alcohol.  Patient is  and has 2 daughters.  She works for the [employer].    Past Medical History:   Diagnosis Date   • Arthritis    • Gall stones    • Hypertension    • Rheumatoid aortitis    • Thyroid disease        Past Surgical History:   Procedure Laterality Date   •  SECTION      x2   • CHOLECYSTECTOMY N/A 11/10/2021    Procedure: diagnostic laparoscopy and peritoneal biopsy;  Surgeon: Krunal Badillo MD;  Location: [facility] [place] OR;  Service: General;  Laterality: N/A;   • COLONOSCOPY     • HIP SURGERY Left    • THYROIDECTOMY, PARTIAL           Current Outpatient Medications:   •  Calcium Carbonate-Vitamin D 600-400 MG-UNIT chewable tablet, Daily., Disp: , Rfl:   •  folic acid (FOLVITE) 1 MG tablet, take 1 tablet by mouth once daily, Disp: 90 tablet, Rfl: 1  •  hydroCHLOROthiazide (HYDRODIURIL) 25 MG tablet, TAKE 1 TABLET BY MOUTH DAILY, Disp: 90 tablet, Rfl: 1  •  HYDROcodone-acetaminophen (Norco) 5-325 MG per tablet, Take 1 tablet by mouth Every 4 (Four) Hours As Needed for Moderate Pain  for up to 10 doses., Disp: 10 tablet, Rfl: 0  •  hydroxychloroquine (PLAQUENIL) 200 MG tablet, TAKE 1 TABLET BY MOUTH TWICE A DAY, Disp: 180 tablet, Rfl: 0  •  hydrOXYzine (ATARAX) 10 MG tablet, TAKE 1 TABLET BY MOUTH TWICE DAILY AS NEEDED FOR ANXIETY, Disp: 40 tablet, Rfl: 2  •  levothyroxine (SYNTHROID, LEVOTHROID) 137 MCG tablet,  Take 1 tablet by mouth Daily., Disp: 90 tablet, Rfl: 0  •  linaclotide (Linzess) 72 MCG capsule capsule, Take 1 capsule by mouth Every Morning Before Breakfast., Disp: 30 capsule, Rfl: 2  •  lisinopril (PRINIVIL,ZESTRIL) 40 MG tablet, TAKE 1 TABLET BY MOUTH DAILY, Disp: 90 tablet, Rfl: 1    No Known Allergies    Immunization History   Administered Date(s) Administered   • COVID-19 (PFIZER) 03/06/2021, 04/07/2021   • FluLaval/Fluarix/Fluzone >6 11/02/2020, 10/13/2021   • Zostavax 02/24/2016       Family History   Problem Relation Age of Onset   • Dementia Mother    • Arthritis Mother    • Heart disease Father    • Hypertension Father        Cancer-related family history is not on file.    Social History     Tobacco Use   • Smoking status: Never Smoker   • Smokeless tobacco: Never Used   Vaping Use   • Vaping Use: Never used   Substance Use Topics   • Alcohol use: Yes     Alcohol/week: 1.0 standard drink     Types: 1 Glasses of wine per week     Comment: less than monthly   • Drug use: No       ROS:    Review of Systems   Constitutional: Positive for fatigue. Negative for chills and fever.   HENT: Negative for ear pain, mouth sores, nosebleeds and sore throat.    Eyes: Negative for photophobia and visual disturbance.   Respiratory: Negative for wheezing and stridor.    Cardiovascular: Negative for chest pain and palpitations.   Gastrointestinal: Negative for abdominal pain, diarrhea, nausea and vomiting.   Endocrine: Negative for cold intolerance and heat intolerance.   Genitourinary: Negative for dysuria and hematuria.   Musculoskeletal: Negative for joint swelling and neck stiffness.   Skin: Negative for color change and rash.   Neurological: Positive for weakness. Negative for seizures and syncope.   Hematological: Negative for adenopathy.        No obvious bleeding   Psychiatric/Behavioral: Negative for agitation, confusion and hallucinations.       Objective:    Vitals:    11/23/21 0837   BP: 140/71   Pulse: 79  "  Resp: 18   Temp: 96.9 °F (36.1 °C)   TempSrc: Infrared   SpO2: 100%   Weight: 91.6 kg (202 lb)   Height: 165.1 cm (65\")   PainSc:   4   PainLoc: Back     Body mass index is 33.61 kg/m².    ECOG  (1) Restricted in physically strenuous activity, ambulatory and able to do work of light nature    Physical Exam:  Physical Exam  Vitals and nursing note reviewed.   Constitutional:       General: She is not in acute distress.     Appearance: She is not diaphoretic.   HENT:      Head: Normocephalic and atraumatic.   Eyes:      General: No scleral icterus.        Right eye: No discharge.         Left eye: No discharge.      Conjunctiva/sclera: Conjunctivae normal.   Neck:      Thyroid: No thyromegaly.   Cardiovascular:      Rate and Rhythm: Normal rate and regular rhythm.      Heart sounds: Normal heart sounds. No friction rub. No gallop.    Pulmonary:      Effort: Pulmonary effort is normal. No respiratory distress.      Breath sounds: No stridor. No wheezing.   Abdominal:      General: Bowel sounds are normal.      Palpations: Abdomen is soft. There is no mass.      Tenderness: There is no abdominal tenderness. There is no guarding or rebound.      Comments: Nodularity noted around the periumbilical area and also in the middle of the abdomen   Musculoskeletal:         General: No tenderness. Normal range of motion.      Cervical back: Normal range of motion and neck supple.   Lymphadenopathy:      Cervical: No cervical adenopathy.   Skin:     General: Skin is warm.      Findings: No erythema or rash.   Neurological:      Mental Status: She is alert and oriented to person, place, and time.      Motor: No abnormal muscle tone.   Psychiatric:         Behavior: Behavior normal.         RECENT LABS  WBC   Date Value Ref Range Status   11/02/2021 4.80 3.40 - 10.80 10*3/mm3 Final     RBC   Date Value Ref Range Status   11/02/2021 4.56 3.77 - 5.28 10*6/mm3 Final     Hemoglobin   Date Value Ref Range Status   11/02/2021 12.5 12.0 " - 15.9 g/dL Final     Hematocrit   Date Value Ref Range Status   11/02/2021 39.6 34.0 - 46.6 % Final     MCV   Date Value Ref Range Status   11/02/2021 86.8 79.0 - 97.0 fL Final     MCH   Date Value Ref Range Status   11/02/2021 27.4 26.6 - 33.0 pg Final     MCHC   Date Value Ref Range Status   11/02/2021 31.6 31.5 - 35.7 g/dL Final     RDW   Date Value Ref Range Status   11/02/2021 12.2 (L) 12.3 - 15.4 % Final     RDW-SD   Date Value Ref Range Status   11/02/2021 39.0 37.0 - 54.0 fl Final     MPV   Date Value Ref Range Status   11/02/2021 10.3 6.0 - 12.0 fL Final     Platelets   Date Value Ref Range Status   11/02/2021 280 140 - 450 10*3/mm3 Final     Neutrophil Rel %   Date Value Ref Range Status   05/08/2019 53 50 - 75 % Final     Lymphocyte Rel %   Date Value Ref Range Status   01/21/2020 22.4 % Final     Monocyte Rel %   Date Value Ref Range Status   01/21/2020 8.1 % Final     Eosinophil Rel %   Date Value Ref Range Status   01/21/2020 3.5 % Final     Basophil Rel %   Date Value Ref Range Status   01/21/2020 1.6 % Final     Neutrophils Absolute   Date Value Ref Range Status   01/21/2020 3671 CELLS/UL 1500 - 7800 10*3/mm3 Final     Lymphocytes Absolute   Date Value Ref Range Status   01/21/2020 1277 CELLS/ - 3900 10*3/mm3 Final     Monocytes Absolute   Date Value Ref Range Status   01/21/2020 462 CELLS/ - 950 10*3/microliter Final     Eosinophils Absolute   Date Value Ref Range Status   01/21/2020 200 CELLS/UL 15 - 500 10*3/mm3 Final     Basophils Absolute   Date Value Ref Range Status   01/21/2020 91 CELLS/UL 0 - 200 10*3/mm3 Final     nRBC   Date Value Ref Range Status   05/08/2019 0 0 /100[WBCs] Final       Lab Results   Component Value Date    GLUCOSE 94 11/16/2021    BUN 20 11/16/2021    CREATININE 1.31 (H) 11/16/2021    EGFRIFNONA 41 (L) 11/16/2021    EGFRIFAFRI 44 (L) 01/21/2020    BCR 15.3 11/16/2021    K 5.1 11/16/2021    CO2 23.9 11/16/2021    CALCIUM 9.7 11/16/2021    ALBUMIN 3.70  11/16/2021    LABIL2 1.2 (calc) 01/21/2020    AST 18 11/16/2021    ALT 6 11/16/2021         Assessment/Plan   There are no diagnoses linked to this encounter.    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 Pulmonary nodules of unclear etiology too small for biopsy or PET resolution  3. Family history of precancerous uterine cells in her sister.  Patient will benefit from genetic testing given her personal diagnosis.      Discussion    I have reviewed her imaging studies, pathology reports.  She has metastatic serous carcinoma of the ovary.  She has liver involvement.  She also has pulmonary nodules of unclear etiology.  These are too small for biopsy or PET resolution therefore they will be followed over time.  We discussed that ovarian cancer is a surgical disease.  We will have her seen by GYN oncologist Dr. Castro to weigh in on her surgical options.  We discussed that she may benefit from neoadjuvant chemotherapy but will await Dr. Dubois's final recommendations.    I also discussed chemotherapy regimen for ovarian cancer with patient.  I have given her information to review on carboplatinum and Taxol.    She will benefit from genetic testing as this could have some therapeutic implications for her.  Would also like to send the ovarian specimen for foundation 1 testing as well.          Plans:    · Refer to Dr. Castro with GYN oncology  · CA-125, CBC and CMP today  · Patient was given information on cancer genetics  · Follow-up with me next week to finalize her plans.  · [facility] 1 testing  · All questions answered  · We will have  see patient as well for counseling    Patient verbalized understanding and is in agreement of the above plan.          I spent 60 total minutes, face-to-face, caring for Lucy today.  80% of this time involved counseling and/or coordination of care as documented within this note.

## 2021-11-23 ENCOUNTER — CONSULT (OUTPATIENT)
Dept: ONCOLOGY | Facility: CLINIC | Age: 61
End: 2021-11-23

## 2021-11-23 ENCOUNTER — RESEARCH ENCOUNTER (OUTPATIENT)
Dept: ONCOLOGY | Facility: CLINIC | Age: 61
End: 2021-11-23

## 2021-11-23 ENCOUNTER — APPOINTMENT (OUTPATIENT)
Dept: LAB | Facility: HOSPITAL | Age: 61
End: 2021-11-23

## 2021-11-23 VITALS
HEART RATE: 79 BPM | DIASTOLIC BLOOD PRESSURE: 71 MMHG | TEMPERATURE: 96.9 F | WEIGHT: 202 LBS | OXYGEN SATURATION: 100 % | HEIGHT: 65 IN | SYSTOLIC BLOOD PRESSURE: 140 MMHG | RESPIRATION RATE: 18 BRPM | BODY MASS INDEX: 33.66 KG/M2

## 2021-11-23 DIAGNOSIS — C56.9 MALIGNANT NEOPLASM OF OVARY, UNSPECIFIED LATERALITY (HCC): Primary | ICD-10-CM

## 2021-11-23 LAB
ALBUMIN SERPL-MCNC: 3.8 G/DL (ref 3.5–5.2)
ALBUMIN/GLOB SERPL: 1.1 G/DL
ALP SERPL-CCNC: 71 U/L (ref 39–117)
ALT SERPL W P-5'-P-CCNC: 7 U/L (ref 1–33)
ANION GAP SERPL CALCULATED.3IONS-SCNC: 14 MMOL/L (ref 5–15)
AST SERPL-CCNC: 12 U/L (ref 1–32)
BASOPHILS # BLD AUTO: 0.04 10*3/MM3 (ref 0–0.2)
BASOPHILS NFR BLD AUTO: 0.6 % (ref 0–1.5)
BILIRUB SERPL-MCNC: 0.2 MG/DL (ref 0–1.2)
BUN SERPL-MCNC: 21 MG/DL (ref 8–23)
BUN/CREAT SERPL: 14 (ref 7–25)
CALCIUM SPEC-SCNC: 9 MG/DL (ref 8.6–10.5)
CANCER AG125 SERPL QL: 754.3 U/ML (ref 0–38.1)
CHLORIDE SERPL-SCNC: 102 MMOL/L (ref 98–107)
CO2 SERPL-SCNC: 25 MMOL/L (ref 22–29)
CREAT SERPL-MCNC: 1.5 MG/DL (ref 0.57–1)
DEPRECATED RDW RBC AUTO: 42.8 FL (ref 37–54)
EOSINOPHIL # BLD AUTO: 0.09 10*3/MM3 (ref 0–0.4)
EOSINOPHIL NFR BLD AUTO: 1.4 % (ref 0.3–6.2)
ERYTHROCYTE [DISTWIDTH] IN BLOOD BY AUTOMATED COUNT: 13.5 % (ref 12.3–15.4)
GFR SERPL CREATININE-BSD FRML MDRD: 35 ML/MIN/1.73
GLOBULIN UR ELPH-MCNC: 3.4 GM/DL
GLUCOSE SERPL-MCNC: 103 MG/DL (ref 65–99)
HCT VFR BLD AUTO: 36.9 % (ref 34–46.6)
HGB BLD-MCNC: 11.6 G/DL (ref 12–15.9)
LYMPHOCYTES # BLD AUTO: 1.08 10*3/MM3 (ref 0.7–3.1)
LYMPHOCYTES NFR BLD AUTO: 17 % (ref 19.6–45.3)
MCH RBC QN AUTO: 28.3 PG (ref 26.6–33)
MCHC RBC AUTO-ENTMCNC: 31.4 G/DL (ref 31.5–35.7)
MCV RBC AUTO: 90 FL (ref 79–97)
MONOCYTES # BLD AUTO: 0.61 10*3/MM3 (ref 0.1–0.9)
MONOCYTES NFR BLD AUTO: 9.6 % (ref 5–12)
NEUTROPHILS NFR BLD AUTO: 4.55 10*3/MM3 (ref 1.7–7)
NEUTROPHILS NFR BLD AUTO: 71.4 % (ref 42.7–76)
PLATELET # BLD AUTO: 319 10*3/MM3 (ref 140–450)
PMV BLD AUTO: 9.3 FL (ref 6–12)
POTASSIUM SERPL-SCNC: 4.4 MMOL/L (ref 3.5–5.2)
PROT SERPL-MCNC: 7.2 G/DL (ref 6–8.5)
RBC # BLD AUTO: 4.1 10*6/MM3 (ref 3.77–5.28)
SODIUM SERPL-SCNC: 141 MMOL/L (ref 136–145)
WBC NRBC COR # BLD: 6.37 10*3/MM3 (ref 3.4–10.8)

## 2021-11-23 PROCEDURE — 85025 COMPLETE CBC W/AUTO DIFF WBC: CPT | Performed by: INTERNAL MEDICINE

## 2021-11-23 PROCEDURE — 80053 COMPREHEN METABOLIC PANEL: CPT | Performed by: INTERNAL MEDICINE

## 2021-11-23 PROCEDURE — 86304 IMMUNOASSAY TUMOR CA 125: CPT | Performed by: INTERNAL MEDICINE

## 2021-11-23 PROCEDURE — 36415 COLL VENOUS BLD VENIPUNCTURE: CPT | Performed by: INTERNAL MEDICINE

## 2021-11-23 PROCEDURE — 99205 OFFICE O/P NEW HI 60 MIN: CPT | Performed by: INTERNAL MEDICINE

## 2021-11-23 NOTE — RESEARCH
Reviewed chart to determine eligibility for Ascend II trial. Patient appears to be eligible at this time. Dr. Hernandez would like to wait until patient's f/u visit on 12/3/21 to discuss trial with her.

## 2021-11-24 ENCOUNTER — TELEPHONE (OUTPATIENT)
Dept: ONCOLOGY | Facility: HOSPITAL | Age: 61
End: 2021-11-24

## 2021-11-24 DIAGNOSIS — C56.9 MALIGNANT NEOPLASM OF OVARY, UNSPECIFIED LATERALITY (HCC): Primary | ICD-10-CM

## 2021-11-24 NOTE — TELEPHONE ENCOUNTER
Distress Screening   Patient Name: Lucy Mahan  YOB: 1960  MRN #: [id number]    Patient completed distress screenin21  Distress Level: 10/10  Problems indicated:treatment decisions, family health problems, depression, fear, nervousness, sadness, worry, loss of interest / activities, physical complaints     OSW called Lucy who prefers to be called Ritu. She is pleasant, alert and oriented to person, place and time. She spoke about being overwhelmed when given her diagnosis, especially since she felt she was healthy. She said it has been difficult to take in. Supportive care provided. She feels she has good support with family and friends. Spoke with her about Dr. Hernandez's referral for counseling and the benefits this can provide for her. She is open to discussing this in the future.     She lives with her  who has diabetes and who has had a recent leg amputated. They are working to get him a prosthetic and they have been dealing with this too. She works full time, from home and will be able to continue to do so. She has [facility], [employer] disability, a hospital indemnity policy and critical illness policy. Luis, financial counselor aware of this. She carries medical insurance for both of them. Basic needs are met at this time.     She was told of OSW and financial counselor role and contact information given.     Referrals: Supportive care provided. Mailed welcome letter, community, transportation, oncology resources and financial assistance application.     Electronically signed by:   Bhavana Linares LCSW, OSMICHAEL-C  21, 09:43 EST

## 2021-11-29 ENCOUNTER — OFFICE VISIT (OUTPATIENT)
Dept: GYNECOLOGIC ONCOLOGY | Facility: CLINIC | Age: 61
End: 2021-11-29

## 2021-11-29 VITALS
BODY MASS INDEX: 32.38 KG/M2 | TEMPERATURE: 97.5 F | WEIGHT: 201.5 LBS | DIASTOLIC BLOOD PRESSURE: 75 MMHG | HEIGHT: 66 IN | SYSTOLIC BLOOD PRESSURE: 117 MMHG | OXYGEN SATURATION: 98 % | RESPIRATION RATE: 18 BRPM | HEART RATE: 82 BPM

## 2021-11-29 DIAGNOSIS — C56.9 MALIGNANT NEOPLASM OF OVARY, UNSPECIFIED LATERALITY (HCC): Primary | ICD-10-CM

## 2021-11-29 PROCEDURE — 99204 OFFICE O/P NEW MOD 45 MIN: CPT | Performed by: OBSTETRICS & GYNECOLOGY

## 2021-11-29 NOTE — PROGRESS NOTES
Age: 60 y.o.  Sex: female  :  1960  MRN: [id number]       REFERRING PHYSICIAN: Inga Hernandez, *  DATE OF VISIT: 2021     Lucy Mahan presents to [facility] for evaluation and management of newly diagnosed high grade serous carcinoma ovarian carcinoma via omental bx on 11/10/21. She began with bloating and early satiety. CT scan shows lesions in the right pelvis 4.3cm, omental cake with 10.8cm at left mid abdomen, carcinomatosis, loculated fluid at subcapsular margin, inferior right hepatic lobe, small INDIRA 4mm lesion. She is overall in good health and feels well. Good performance status. Has had some anxiety and shock regarding diagnosis.       Oncology/Hematology History Overview Note   Lucy Mahan is a 60 y.o. 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     • 11/10/21: Peritoneum bx-metastatic ovarian serous carcinoma, ER +  • 11/10/21: Omentum bx-metastatic ovarian serous carcinoma, ER +  • 21: CT CAP-A 2 mm noncalcified nodule is located within the left lower lobe. Noncalcified nodules in the left upper lobe measure 4 mm and 3 mm. No right lung nodules are identified. Benign calcified granuloma is incidentally noted in the left lower lobeExtensive omental and peritoneal carcinomatosis. 5.3 cm enhancing soft tissue mass in the pelvis to the right of midline. Small quantity abdominal pelvic ascites, likely malignant ascites. Indeterminate 1.7 cm left adrenal nodule. Adrenal metastasis cannot be excluded.          Date Value Ref Range Status  2021 754.3 (H) 0.0 - 38.1 U/mL         Past Medical History:  Past Medical History:   Diagnosis Date   • Arthritis    • Gall stones    • Hypertension    • Rheumatoid aortitis    • Thyroid disease        Past Surgical History:  Past Surgical History:   Procedure Laterality Date   •  SECTION      x2   • CHOLECYSTECTOMY N/A 11/10/2021    Procedure:  diagnostic laparoscopy and peritoneal biopsy;  Surgeon: Krunal Badillo MD;  Location: [facility] MAIN OR;  Service: General;  Laterality: N/A;   • COLONOSCOPY     • HIP SURGERY Left    • THYROIDECTOMY, PARTIAL  2017        MEDICATIONS:    Current Outpatient Medications:   •  Calcium Carbonate-Vitamin D 600-400 MG-UNIT chewable tablet, Daily., Disp: , Rfl:   •  folic acid (FOLVITE) 1 MG tablet, take 1 tablet by mouth once daily, Disp: 90 tablet, Rfl: 1  •  hydroCHLOROthiazide (HYDRODIURIL) 25 MG tablet, TAKE 1 TABLET BY MOUTH DAILY, Disp: 90 tablet, Rfl: 1  •  hydroxychloroquine (PLAQUENIL) 200 MG tablet, TAKE 1 TABLET BY MOUTH TWICE A DAY, Disp: 180 tablet, Rfl: 0  •  hydrOXYzine (ATARAX) 10 MG tablet, TAKE 1 TABLET BY MOUTH TWICE DAILY AS NEEDED FOR ANXIETY, Disp: 40 tablet, Rfl: 2  •  levothyroxine (SYNTHROID, LEVOTHROID) 137 MCG tablet, Take 1 tablet by mouth Daily., Disp: 90 tablet, Rfl: 0  •  linaclotide (Linzess) 72 MCG capsule capsule, Take 1 capsule by mouth Every Morning Before Breakfast., Disp: 30 capsule, Rfl: 2  •  lisinopril (PRINIVIL,ZESTRIL) 40 MG tablet, TAKE 1 TABLET BY MOUTH DAILY, Disp: 90 tablet, Rfl: 1    ALLERGIES:  No Known Allergies      ROS:  CONSTITUTIONAL:  Denies fever or chills.   NEUROLOGIC:  Denies headache, focal weakness or sensory changes.   EYES:  Denies change in visual acuity.  HEENT:  Denies nasal congestion or sore throat.   RESPIRATORY:  Denies cough or shortness of breath.   CARDIOVASCULAR:  Denies chest pain or edema.   GI:  Denies abdominal pain, nausea, vomiting, bloody stools or diarrhea.   :  Denies dysuria, leaking or incontinence.  MUSCULOSKELETAL:  Denies back pain or joint pain.   INTEGUMENT:  Denies rash.   ENDOCRINE:  Denies polyuria or polydipsia.   LYMPHATIC:  Denies swollen glands or lymphedema.   PSYCHIATRIC:  Denies depression or anxiety.      PHYSICAL EXAM:  Vitals:    11/29/21 0918   BP: 117/75   Pulse: 82   Resp: 18   Temp: 97.5 °F (36.4 °C)   TempSrc: Oral  "  SpO2: 98%   Weight: 91.4 kg (201 lb 8 oz)   Height: 167 cm (65.75\")  Comment: new patient height   PainSc:   4   PainLoc: Abdomen     Body mass index is 32.77 kg/m².  Current Status 11/29/2021   ECOG score 0     PHQ-9 Total Score:         GEN: alert and oriented x 3, normal affect, well nourished and hydrated.  CARDIO: regular rate and rhythm.  PULM: Lungs CTA b.l, no RRW   ABD: Soft, nontender, nondistended.   GYN: defer   EXT: No petechiae, bruising, rash, candida. No CCE.       Result Review :  The pertinent labs, images, and/or pathology as noted in the oncology history were reviewed independently and discussed with the patient.   Maggie Castro,    11/29/2021    [facility] LABS:   WBC   Date Value Ref Range Status   11/23/2021 6.37 3.40 - 10.80 10*3/mm3 Final     RBC   Date Value Ref Range Status   11/23/2021 4.10 3.77 - 5.28 10*6/mm3 Final     Hemoglobin   Date Value Ref Range Status   11/23/2021 11.6 (L) 12.0 - 15.9 g/dL Final     Hematocrit   Date Value Ref Range Status   11/23/2021 36.9 34.0 - 46.6 % Final     Platelets   Date Value Ref Range Status   11/23/2021 319 140 - 450 10*3/mm3 Final        Date Value Ref Range Status   11/23/2021 754.3 (H) 0.0 - 38.1 U/mL Final       [facility] IMAGING:  CT Chest With Contrast Diagnostic    Result Date: 11/16/2021  1. Extensive omental and peritoneal carcinomatosis. 2. 5.3 cm enhancing soft tissue mass in the pelvis to the right of midline. If the patient has not undergone oophorectomy, this could represent ovarian primary malignancy. If the patient has undergone oophorectomy, this may simply represent an aggregate carcinomatous metastatic deposit. 3. The gallbladder wall appears mildly thickened which could be related to extrinsic carcinomatous studding. Cholecystitis not excluded. 2.6 and a gallstone is seen. 4. Small quantity abdominal pelvic ascites, likely malignant ascites. 5. No focal bowel mass is identified. 6. No convincing evidence of osseous metastatic " disease. 7. Small noncalcified nodules are present in the left lung as described above. These are suspicious for early pulmonary metastases. Short-term CT chest surveillance imaging is advised. These are too small to resolve with PET technique and are too small for percutaneous biopsy. 8. Indeterminate 1.7 cm left adrenal nodule. Adrenal metastasis cannot be excluded.  Electronically Signed By-Ana Grullon MD On:11/16/2021 4:35 PM This report was finalized on [id number] by  Ana Grullon MD.    CT Abdomen Pelvis With Contrast    Result Date: 11/16/2021  1. Extensive omental and peritoneal carcinomatosis. 2. 5.3 cm enhancing soft tissue mass in the pelvis to the right of midline. If the patient has not undergone oophorectomy, this could represent ovarian primary malignancy. If the patient has undergone oophorectomy, this may simply represent an aggregate carcinomatous metastatic deposit. 3. The gallbladder wall appears mildly thickened which could be related to extrinsic carcinomatous studding. Cholecystitis not excluded. 2.6 and a gallstone is seen. 4. Small quantity abdominal pelvic ascites, likely malignant ascites. 5. No focal bowel mass is identified. 6. No convincing evidence of osseous metastatic disease. 7. Small noncalcified nodules are present in the left lung as described above. These are suspicious for early pulmonary metastases. Short-term CT chest surveillance imaging is advised. These are too small to resolve with PET technique and are too small for percutaneous biopsy. 8. Indeterminate 1.7 cm left adrenal nodule. Adrenal metastasis cannot be excluded.  Electronically Signed By-Ana Grullon MD On:11/16/2021 4:35 PM This report was finalized on [id number] by  Ana Grullon MD.    US Abdomen Limited    Result Date: 10/13/2021   1. Large nonmobile gallstone within the gallbladder neck. No pericholecystic fluid, gallbladder wall thickening, or sonographic Maldonado sign, to suggest acute  cholecystitis.  Electronically Signed By-Uriel Dean MD On:10/13/2021 11:27 AM This report was finalized on [id number] by  Uriel Dean MD.    XR Abdomen KUB    Result Date: 10/13/2021  1.Nonspecific bowel gas pattern without definite small bowel dilatation. 2.Moderate amount of formed stool, primarily in the right colon.  Electronically Signed By-Benson Iyer MD On:10/13/2021 11:19 AM This report was finalized on [id number] by  Benson Iyer MD.            ASSESSMENT :  • Advanced stage high grade serous ovarian caricnoma   -  754  - Lesions in the right pelvis 4.3cm, omental cake with 10.8cm at left mid abdomen, carcinomatosis, loculated fluid at subcapsular margin, 9mm lesion in right hepatic lobe, small INDIRA 4mm lesion.         PLAN :  • Had a long discussion with pt and her  regarding extent of disease. Likely stage IVB due to intrahepatic lesion, Discussed proceeding with NAC for 2-3 cycles and then interval cytoreduction to make the surgery less radical if she responds well to chemo. We discussed how chemotherapy will work beginning with the port placement, the specific cytotoxic agents, the mechanism of action, the side effects, the schedule, and monitoring parameters of the cytotoxic agents.   • Plan for Carboplatin AUC6, Taxol 175mg/m2, Avastin 15mg/kg q 28 day ([facility] trial- HOLD avastin for first few cycles due to bowel risk.   • She would like to have the port placed and the infusions with Dr Hernandez, I advised that we will work together.            Maggie Castro D.O  11/29/2021    Gynecologic Oncology   [address] [place] [address]  [phone] office

## 2021-11-30 ENCOUNTER — TELEPHONE (OUTPATIENT)
Dept: ONCOLOGY | Facility: CLINIC | Age: 61
End: 2021-11-30

## 2021-11-30 NOTE — TELEPHONE ENCOUNTER
Received a call from the pt stating that she is planning to get the COVID booster this week and that she wanted to verify that that was ok since she has an appointment with Dr. Hernandez on Friday. Patient does not have chemotherapy scheduled at this time so I told her that it should be fine for her to go ahead and get the booster. Pt verbalized understanding.

## 2021-12-01 ENCOUNTER — TELEPHONE (OUTPATIENT)
Dept: ONCOLOGY | Facility: CLINIC | Age: 61
End: 2021-12-01

## 2021-12-02 ENCOUNTER — TELEPHONE (OUTPATIENT)
Dept: ONCOLOGY | Facility: CLINIC | Age: 61
End: 2021-12-02

## 2021-12-02 ENCOUNTER — PATIENT ROUNDING (BHMG ONLY) (OUTPATIENT)
Dept: GYNECOLOGIC ONCOLOGY | Facility: CLINIC | Age: 61
End: 2021-12-02

## 2021-12-02 NOTE — PROGRESS NOTES
December 2, 2021    Hello, may I speak with Lucy Kalli?    My name is LINDSEY GALLEGOS    I am  with [facility]  [address] [address] [phone].    Before we get started may I verify your date of birth? 1960    I am calling to officially welcome you to our practice and ask about your recent visit. Is this a good time to talk? YES    Tell me about your visit with us. What things went well?  EVERYTHING WENT WELL. VERY HAPPY WITH EVERYTHING WE TALKED ABOUT AND EVERYTHING WE ARE GOING TO DO.        We're always looking for ways to make our patients' experiences even better. Do you have recommendations on ways we may improve?  NOT REALLY. EVERYONE IN THE OFFICE IS KIND AND HELPFUL. YOU ALL ARE DOING GREAT AND KEEP IT UP    Overall were you satisfied with your first visit to our practice? YES       I appreciate you taking the time to speak with me today. Is there anything else I can do for you? NO MA'AM      Thank you, and have a great day.

## 2021-12-02 NOTE — PROGRESS NOTES
Hematology/Oncology Outpatient Follow Up    PATIENT NAME:Lucy Mahan  :1960  MRN: [id number]  PRIMARY CARE PHYSICIAN: [place] Avalos APRN  REFERRING PHYSICIAN: [place] Avalos APRN    Chief Complaint   Patient presents with   • Follow-up     Ovarian Cancer        HISTORY OF PRESENT ILLNESS:     This is a 60-year-old female who developed abdominal pain in 2021.  Patient has also lost approximately 35 pounds during that..  She has had loss of appetite.  Due to the abdominal pain,  she had an ultrasound of the abdomen done on 10/13/2021.  This basically revealed no liver lesions.  Common bile duct is normal at 3 mm.  There is a nonmobile 3 cm shadowing gallstone within the gallbladder neck.  No gallbladder thickening or pericholecystic fluid collection.  Patient was then referred to general surgery and was seen by Dr. Badillo.  Who took her to surgery on 11/10/2021 for diagnostic laparoscopy and peritoneal biopsy.  Intra-Op , she was noted to have peritoneal studding with malignancy throughout the abdominal cavity and biopsies were completed. 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  The tumor was focally positive for progesterone receptor, positive for CK7, estrogen receptor, CA-125 and PAX 8..  The staining pattern is consistent with ovarian serous carcinoma.      She had CT scan of the chest, abdomen and pelvis and the chest is a 2 mm noncalcified nodule within the left lower lobe, noncalcified nodule in the left upper lobe measuring 4 mm and 3 mm.  In the abdomen there is a 5.1 x 5.3 cm enhancing soft tissue mass in the pelvis to the right of midline associated with the adnexal region possibly representing a primary ovarian malignancy.  No right or left ovarian tissue was seen.  There is abnormal omental thickening and nodularity consistent with carcinomatosis greatest bulk in the left mid abdomen measuring up to 10.8 cm x 2.5 cm.  There is nodular peritoneal  thickening also present within the abdomen and pelvis consistent with peritoneal carcinomatosis.  The small quantity of ascitic fluid in the abdomen and pelvis.  Carcinomatosis nodular studding is seen along the inferior right hepatic lobe.     She  has been referred to us for further evaluation and management of her newly diagnosed ovarian carcinoma.     Patient is accompanied today by her daughter for this appointment.  There is  family history of precancerous cells of the  uterusin her sister.        She does not smoke and does not drink alcohol.  Patient is  and has 2 daughters.  She works for the [employer].    · 2021 patient was seen by Dr. Dubois who has recommended neoadjuvant chemotherapy for 2-3 cycles and then interval cytoreduction.  She has recommended carboplatinum AUC 6, Taxol 1 7 5 mg per metered squared, Avastin 15 mg/kg as was done in the oceans trial but hold Avastin for the first few cycles due to bowel risk.  · Prechemo CA-125 was 754      Past Medical History:   Diagnosis Date   • Arthritis    • Cancer (HCC)     ovarian   • Gall stones    • Hypertension    • Rheumatoid aortitis    • Thyroid disease        Past Surgical History:   Procedure Laterality Date   •  SECTION      x2   • CHOLECYSTECTOMY N/A 11/10/2021    Procedure: diagnostic laparoscopy and peritoneal biopsy;  Surgeon: Krunal Badillo MD;  Location: [facility] MAIN OR;  Service: General;  Laterality: N/A;   • COLONOSCOPY     • HIP SURGERY Left    • THYROIDECTOMY, PARTIAL  2017         Current Outpatient Medications:   •  Calcium Carbonate-Vitamin D 600-400 MG-UNIT chewable tablet, Chew 1 tablet Daily., Disp: , Rfl:   •  folic acid (FOLVITE) 1 MG tablet, take 1 tablet by mouth once daily (Patient taking differently: Take 1 mg by mouth Daily.), Disp: 90 tablet, Rfl: 1  •  hydroCHLOROthiazide (HYDRODIURIL) 25 MG tablet, TAKE 1 TABLET BY MOUTH DAILY (Patient taking differently: Take 25 mg by mouth Daily.  Hold DOS), Disp: 90 tablet, Rfl: 1  •  hydroxychloroquine (PLAQUENIL) 200 MG tablet, TAKE 1 TABLET BY MOUTH TWICE A DAY (Patient taking differently: Take 200 mg by mouth 2 (Two) Times a Day. Takes in afternoon and evening), Disp: 180 tablet, Rfl: 0  •  hydrOXYzine (ATARAX) 10 MG tablet, TAKE 1 TABLET BY MOUTH TWICE DAILY AS NEEDED FOR ANXIETY (Patient taking differently: Take 10 mg by mouth Every 6 (Six) Hours As Needed.), Disp: 40 tablet, Rfl: 2  •  levothyroxine (SYNTHROID, LEVOTHROID) 137 MCG tablet, Take 1 tablet by mouth Daily. (Patient taking differently: Take 137 mcg by mouth Daily. Take DOS), Disp: 90 tablet, Rfl: 0  •  lidocaine-prilocaine (EMLA) 2.5-2.5 % cream, Apply 1 application topically to the appropriate area as directed Take As Directed. Apply to port 1 hour prior to port access, Disp: 30 g, Rfl: 1  •  linaclotide (Linzess) 72 MCG capsule capsule, Take 1 capsule by mouth Every Morning Before Breakfast. (Patient taking differently: Take 72 mcg by mouth Every Morning Before Breakfast. Take DOS), Disp: 30 capsule, Rfl: 2  •  lisinopril (PRINIVIL,ZESTRIL) 40 MG tablet, TAKE 1 TABLET BY MOUTH DAILY (Patient taking differently: Take 40 mg by mouth Every Evening. 12/5LD), Disp: 90 tablet, Rfl: 1  •  oxyCODONE-acetaminophen (PERCOCET) 5-325 MG per tablet, Take 1 tablet by mouth Take As Directed. Every 4-6 hours prn pain, Disp: 40 tablet, Rfl: 0  •  pantoprazole (Protonix) 40 MG EC tablet, Take 1 tablet by mouth Daily., Disp: 30 tablet, Rfl: 6    No Known Allergies    Family History   Problem Relation Age of Onset   • Dementia Mother    • Arthritis Mother    • Heart disease Father    • Hypertension Father        Cancer-related family history is not on file.    Social History     Tobacco Use   • Smoking status: Never Smoker   • Smokeless tobacco: Never Used   Vaping Use   • Vaping Use: Never used   Substance Use Topics   • Alcohol use: Yes     Alcohol/week: 1.0 standard drink     Types: 1 Glasses of wine per  "week     Comment: less than monthly   • Drug use: No       HPI, ROS and PFSH have been reviewed and confirmed on 12/3/2021.     SUBJECTIVE:  Complains of abdominal discomfort.  She is accompanied today by her daughter for this appointment.        REVIEW OF SYSTEMS:  Review of Systems  Abdominal discomfort  OBJECTIVE:    Vitals:    12/03/21 0926   BP: 116/69   Pulse: 77   Resp: 20   Temp: 97.4 °F (36.3 °C)   TempSrc: Infrared   Weight: 90.3 kg (199 lb)   Height: 165.1 cm (65\")   PainSc:   4   PainLoc: Abdomen     Body mass index is 33.12 kg/m².    ECOG  (1) Restricted in physically strenuous activity, ambulatory and able to do work of light nature    Physical Exam  Unchanged  RECENT LABS  WBC   Date Value Ref Range Status   12/03/2021 5.69 3.40 - 10.80 10*3/mm3 Final     RBC   Date Value Ref Range Status   12/03/2021 4.10 3.77 - 5.28 10*6/mm3 Final     Hemoglobin   Date Value Ref Range Status   12/03/2021 11.6 (L) 12.0 - 15.9 g/dL Final     Hematocrit   Date Value Ref Range Status   12/03/2021 36.8 34.0 - 46.6 % Final     MCV   Date Value Ref Range Status   12/03/2021 89.8 79.0 - 97.0 fL Final     MCH   Date Value Ref Range Status   12/03/2021 28.3 26.6 - 33.0 pg Final     MCHC   Date Value Ref Range Status   12/03/2021 31.5 31.5 - 35.7 g/dL Final     RDW   Date Value Ref Range Status   12/03/2021 13.6 12.3 - 15.4 % Final     RDW-SD   Date Value Ref Range Status   12/03/2021 43.6 37.0 - 54.0 fl Final     MPV   Date Value Ref Range Status   12/03/2021 9.3 6.0 - 12.0 fL Final     Platelets   Date Value Ref Range Status   12/03/2021 310 140 - 450 10*3/mm3 Final     Neutrophil %   Date Value Ref Range Status   12/03/2021 61.6 42.7 - 76.0 % Final     Lymphocyte %   Date Value Ref Range Status   12/03/2021 19.7 19.6 - 45.3 % Final     Monocyte %   Date Value Ref Range Status   12/03/2021 15.3 (H) 5.0 - 12.0 % Final     Eosinophil %   Date Value Ref Range Status   12/03/2021 2.5 0.3 - 6.2 % Final     Basophil %   Date Value " Ref Range Status   12/03/2021 0.9 0.0 - 1.5 % Final     Neutrophils, Absolute   Date Value Ref Range Status   12/03/2021 3.51 1.70 - 7.00 10*3/mm3 Final     Lymphocytes, Absolute   Date Value Ref Range Status   12/03/2021 1.12 0.70 - 3.10 10*3/mm3 Final     Monocytes, Absolute   Date Value Ref Range Status   12/03/2021 0.87 0.10 - 0.90 10*3/mm3 Final     Eosinophils, Absolute   Date Value Ref Range Status   12/03/2021 0.14 0.00 - 0.40 10*3/mm3 Final     Basophils, Absolute   Date Value Ref Range Status   12/03/2021 0.05 0.00 - 0.20 10*3/mm3 Final     nRBC   Date Value Ref Range Status   05/08/2019 0 0 /100[WBCs] Final       Lab Results   Component Value Date    GLUCOSE 103 (H) 11/23/2021    BUN 21 11/23/2021    CREATININE 1.50 (H) 11/23/2021    EGFRIFNONA 35 (L) 11/23/2021    EGFRIFAFRI 44 (L) 01/21/2020    BCR 14.0 11/23/2021    K 4.4 11/23/2021    CO2 25.0 11/23/2021    CALCIUM 9.0 11/23/2021    ALBUMIN 3.80 11/23/2021    LABIL2 1.2 (calc) 01/21/2020    AST 12 11/23/2021    ALT 7 11/23/2021         Assessment/Plan     Malignant neoplasm of ovary, unspecified laterality (HCC)  - CBC & Differential  - Ambulatory Referral to General Surgery  - oxyCODONE-acetaminophen (PERCOCET) 5-325 MG per tablet      ASSESSMENT:      1. 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  CA-125 754 November 2021  2. For neoadjuvant chemotherapy with carboplatinum AUC 6, Taxol 175 mg per metered squared, with Avastin added post operatively.  Plan to administer 2-3 cycles keith adjuvantly.  Monitor serial CA-125's  3. Pulmonary nodules of unclear etiology too small for biopsy or PET resolution  4. Family history of precancerous uterine cells in her sister.  Patient will benefit from genetic testing given her personal diagnosis.  5. Abdominal discomfort secondary to malignancy        Discussion     I have reviewed her imaging studies, pathology reports.  She has metastatic serous carcinoma of the ovary.  She has liver  involvement.  She also has pulmonary nodules of unclear etiology.  These are too small for biopsy or PET resolution therefore they will be followed over time.  We discussed that ovarian cancer is a surgical disease.  We will have her seen by GYN oncologist Dr. Castro to weigh in on her surgical options.  We discussed that she may benefit from neoadjuvant chemotherapy but will await Dr. Castro's final recommendations.     Was seen by Dr. Castro recommendation for her to have neoadjuvant chemotherapy with carboplatinum and Taxol      Cussed side effects of chemotherapy to include but not limited to:    Chemotherapy side effects include, but not limited to, nausea, vomiting, bone marrow suppression, which can result in blood, platelet transfusion. There is also risk of permanent bone marrow destruction, which can cause myelodysplastic syndrome or leukemia years down the line. There is risk of infection which can result in hospitalization and even death. There is also risk of fatigue, asthenia, alopecia which could become permanent. Chemo will help to reduce risk of relapse of cancer, but does not eliminate risk completely.    Discussed that Taxol chemotherapy can lead to hypersensitivity reaction fluid retention, peripheral neuropathy, myalgias, small risk of permanent alopecia    Carboplatinum can lead to myelosuppression socially thrombocytopenia      Avastin will be added postoperatively       She will benefit from genetic testing as this could have some therapeutic implications for her.  Would also like to send the ovarian specimen for foundation 1 testing as well.              Plans:     · Referred to general surgery for port placement  · Ordered combination chemotherapy with carboplatinum and Taxol along with Neulasta.  Order in [facility].  Plan to begin chemo next week  · CA-125 with each cycle of chemotherapy  · EMLA cream  · Percocet 5 mg p.o. every 4-6 hours as needed for pain  · Protonix 40 mg p.o.  daily  · Patient was given information on cancer genetics  · Follow-up with me next week to finalize her plans.  · Foundation 1 testing  · All questions answered  · We will have  see patient as well for counseling  · Follow-up with me 1 week post chemotherapy  · Schedule chemotherapy education with nurse practitioner     Patient verbalized understanding and is in agreement of the above plan.              I spent 40 total minutes, face-to-face, caring for Lucy today.  90% of this time involved counseling and/or coordination of care as documented within this note..

## 2021-12-03 ENCOUNTER — LAB (OUTPATIENT)
Dept: LAB | Facility: HOSPITAL | Age: 61
End: 2021-12-03

## 2021-12-03 ENCOUNTER — PREP FOR SURGERY (OUTPATIENT)
Dept: OTHER | Facility: HOSPITAL | Age: 61
End: 2021-12-03

## 2021-12-03 ENCOUNTER — TELEPHONE (OUTPATIENT)
Dept: ONCOLOGY | Facility: CLINIC | Age: 61
End: 2021-12-03

## 2021-12-03 ENCOUNTER — ANESTHESIA EVENT (OUTPATIENT)
Dept: PERIOP | Facility: HOSPITAL | Age: 61
End: 2021-12-03

## 2021-12-03 ENCOUNTER — OFFICE VISIT (OUTPATIENT)
Dept: ONCOLOGY | Facility: CLINIC | Age: 61
End: 2021-12-03

## 2021-12-03 VITALS
HEIGHT: 65 IN | WEIGHT: 199 LBS | HEART RATE: 77 BPM | BODY MASS INDEX: 33.15 KG/M2 | RESPIRATION RATE: 20 BRPM | TEMPERATURE: 97.4 F | SYSTOLIC BLOOD PRESSURE: 116 MMHG | DIASTOLIC BLOOD PRESSURE: 69 MMHG

## 2021-12-03 DIAGNOSIS — C56.9 MALIGNANT NEOPLASM OF OVARY, UNSPECIFIED LATERALITY (HCC): Primary | ICD-10-CM

## 2021-12-03 DIAGNOSIS — C56.9 MALIGNANT NEOPLASM OF OVARY, UNSPECIFIED LATERALITY (HCC): ICD-10-CM

## 2021-12-03 DIAGNOSIS — Z79.899 ENCOUNTER FOR LONG-TERM (CURRENT) USE OF OTHER MEDICATIONS: Primary | ICD-10-CM

## 2021-12-03 LAB
BASOPHILS # BLD AUTO: 0.05 10*3/MM3 (ref 0–0.2)
BASOPHILS NFR BLD AUTO: 0.9 % (ref 0–1.5)
DEPRECATED RDW RBC AUTO: 43.6 FL (ref 37–54)
EOSINOPHIL # BLD AUTO: 0.14 10*3/MM3 (ref 0–0.4)
EOSINOPHIL NFR BLD AUTO: 2.5 % (ref 0.3–6.2)
ERYTHROCYTE [DISTWIDTH] IN BLOOD BY AUTOMATED COUNT: 13.6 % (ref 12.3–15.4)
HCT VFR BLD AUTO: 36.8 % (ref 34–46.6)
HGB BLD-MCNC: 11.6 G/DL (ref 12–15.9)
HOLD SPECIMEN: NORMAL
HOLD SPECIMEN: NORMAL
LYMPHOCYTES # BLD AUTO: 1.12 10*3/MM3 (ref 0.7–3.1)
LYMPHOCYTES NFR BLD AUTO: 19.7 % (ref 19.6–45.3)
MCH RBC QN AUTO: 28.3 PG (ref 26.6–33)
MCHC RBC AUTO-ENTMCNC: 31.5 G/DL (ref 31.5–35.7)
MCV RBC AUTO: 89.8 FL (ref 79–97)
MONOCYTES # BLD AUTO: 0.87 10*3/MM3 (ref 0.1–0.9)
MONOCYTES NFR BLD AUTO: 15.3 % (ref 5–12)
NEUTROPHILS NFR BLD AUTO: 3.51 10*3/MM3 (ref 1.7–7)
NEUTROPHILS NFR BLD AUTO: 61.6 % (ref 42.7–76)
PLATELET # BLD AUTO: 310 10*3/MM3 (ref 140–450)
PMV BLD AUTO: 9.3 FL (ref 6–12)
RBC # BLD AUTO: 4.1 10*6/MM3 (ref 3.77–5.28)
SARS-COV-2 ORF1AB RESP QL NAA+PROBE: NOT DETECTED
WBC NRBC COR # BLD: 5.69 10*3/MM3 (ref 3.4–10.8)

## 2021-12-03 PROCEDURE — 85025 COMPLETE CBC W/AUTO DIFF WBC: CPT

## 2021-12-03 PROCEDURE — 99215 OFFICE O/P EST HI 40 MIN: CPT | Performed by: INTERNAL MEDICINE

## 2021-12-03 PROCEDURE — 36415 COLL VENOUS BLD VENIPUNCTURE: CPT

## 2021-12-03 PROCEDURE — C9803 HOPD COVID-19 SPEC COLLECT: HCPCS

## 2021-12-03 PROCEDURE — U0004 COV-19 TEST NON-CDC HGH THRU: HCPCS

## 2021-12-03 RX ORDER — SODIUM CHLORIDE 0.9 % (FLUSH) 0.9 %
3-10 SYRINGE (ML) INJECTION AS NEEDED
Status: CANCELLED | OUTPATIENT
Start: 2021-12-03

## 2021-12-03 RX ORDER — OXYCODONE HYDROCHLORIDE AND ACETAMINOPHEN 5; 325 MG/1; MG/1
1 TABLET ORAL TAKE AS DIRECTED
Qty: 40 TABLET | Refills: 0 | Status: SHIPPED | OUTPATIENT
Start: 2021-12-03 | End: 2021-12-20 | Stop reason: SDUPTHER

## 2021-12-03 RX ORDER — SODIUM CHLORIDE 0.9 % (FLUSH) 0.9 %
3 SYRINGE (ML) INJECTION EVERY 12 HOURS SCHEDULED
Status: CANCELLED | OUTPATIENT
Start: 2021-12-03

## 2021-12-03 RX ORDER — PANTOPRAZOLE SODIUM 40 MG/1
40 TABLET, DELAYED RELEASE ORAL DAILY
Qty: 30 TABLET | Refills: 6 | Status: SHIPPED | OUTPATIENT
Start: 2021-12-03 | End: 2022-07-28

## 2021-12-03 RX ORDER — SODIUM CHLORIDE 9 MG/ML
100 INJECTION, SOLUTION INTRAVENOUS CONTINUOUS
Status: CANCELLED | OUTPATIENT
Start: 2021-12-03

## 2021-12-03 RX ORDER — LIDOCAINE AND PRILOCAINE 25; 25 MG/G; MG/G
1 CREAM TOPICAL TAKE AS DIRECTED
Qty: 30 G | Refills: 1 | Status: SHIPPED | OUTPATIENT
Start: 2021-12-03 | End: 2022-06-09

## 2021-12-03 NOTE — TELEPHONE ENCOUNTER
Caller: Lucy Mahan    Relationship to patient: Self    Best call back number: [phone]    Chief complaint: PATIENT TO RESCHED 12/6 APPOINTMENTS DUE TO PORT PLACEMENT SCHEDULING    Type of visit: SOCIAL WORK, FU, DIABETES EDUCATION    Requested date: NEXT AVAILABLE NOT TUESDAY

## 2021-12-06 ENCOUNTER — HOSPITAL ENCOUNTER (OUTPATIENT)
Facility: HOSPITAL | Age: 61
Setting detail: HOSPITAL OUTPATIENT SURGERY
Discharge: HOME | End: 2021-12-06
Attending: SURGERY | Admitting: SURGERY

## 2021-12-06 ENCOUNTER — TELEPHONE (OUTPATIENT)
Dept: ONCOLOGY | Facility: CLINIC | Age: 61
End: 2021-12-06

## 2021-12-06 ENCOUNTER — ANESTHESIA (OUTPATIENT)
Dept: PERIOP | Facility: HOSPITAL | Age: 61
End: 2021-12-06

## 2021-12-06 ENCOUNTER — APPOINTMENT (OUTPATIENT)
Dept: GENERAL RADIOLOGY | Facility: HOSPITAL | Age: 61
End: 2021-12-06

## 2021-12-06 VITALS
WEIGHT: 197.4 LBS | SYSTOLIC BLOOD PRESSURE: 116 MMHG | DIASTOLIC BLOOD PRESSURE: 50 MMHG | BODY MASS INDEX: 32.89 KG/M2 | RESPIRATION RATE: 14 BRPM | HEIGHT: 65 IN | OXYGEN SATURATION: 96 % | HEART RATE: 69 BPM | TEMPERATURE: 96.9 F

## 2021-12-06 DIAGNOSIS — C56.9 MALIGNANT NEOPLASM OF OVARY, UNSPECIFIED LATERALITY (HCC): ICD-10-CM

## 2021-12-06 PROCEDURE — 25010000002 PROPOFOL 200 MG/20ML EMULSION

## 2021-12-06 PROCEDURE — 25010000002 HEPARIN (PORCINE) PER 1000 UNITS: Performed by: SURGERY

## 2021-12-06 PROCEDURE — 25010000002 PROPOFOL 10 MG/ML EMULSION

## 2021-12-06 PROCEDURE — 36561 INSERT TUNNELED CV CATH: CPT | Performed by: SURGERY

## 2021-12-06 PROCEDURE — 25010000002 ONDANSETRON PER 1 MG

## 2021-12-06 PROCEDURE — 77001 FLUOROGUIDE FOR VEIN DEVICE: CPT | Performed by: SURGERY

## 2021-12-06 PROCEDURE — 76000 FLUOROSCOPY <1 HR PHYS/QHP: CPT

## 2021-12-06 PROCEDURE — C1788 PORT, INDWELLING, IMP: HCPCS | Performed by: SURGERY

## 2021-12-06 PROCEDURE — 25010000002 FENTANYL CITRATE (PF) 100 MCG/2ML SOLUTION

## 2021-12-06 DEVICE — PRT INTRO VASC/INTERV VORTEX FILL/HL DETACH/POLYURET/CATH 8F: Type: IMPLANTABLE DEVICE | Site: SUBCLAVIAN | Status: FUNCTIONAL

## 2021-12-06 RX ORDER — LIDOCAINE HYDROCHLORIDE 10 MG/ML
0.5 INJECTION, SOLUTION EPIDURAL; INFILTRATION; INTRACAUDAL; PERINEURAL ONCE AS NEEDED
Status: DISCONTINUED | OUTPATIENT
Start: 2021-12-06 | End: 2021-12-06 | Stop reason: HOSPADM

## 2021-12-06 RX ORDER — PROPOFOL 10 MG/ML
INJECTION, EMULSION INTRAVENOUS AS NEEDED
Status: DISCONTINUED | OUTPATIENT
Start: 2021-12-06 | End: 2021-12-06 | Stop reason: SURG

## 2021-12-06 RX ORDER — SODIUM CHLORIDE, SODIUM LACTATE, POTASSIUM CHLORIDE, CALCIUM CHLORIDE 600; 310; 30; 20 MG/100ML; MG/100ML; MG/100ML; MG/100ML
9 INJECTION, SOLUTION INTRAVENOUS CONTINUOUS PRN
Status: DISCONTINUED | OUTPATIENT
Start: 2021-12-06 | End: 2021-12-06 | Stop reason: HOSPADM

## 2021-12-06 RX ORDER — FENTANYL CITRATE 50 UG/ML
25 INJECTION, SOLUTION INTRAMUSCULAR; INTRAVENOUS
Status: DISCONTINUED | OUTPATIENT
Start: 2021-12-06 | End: 2021-12-06 | Stop reason: HOSPADM

## 2021-12-06 RX ORDER — HYDROMORPHONE HCL 110MG/55ML
0.5 PATIENT CONTROLLED ANALGESIA SYRINGE INTRAVENOUS
Status: DISCONTINUED | OUTPATIENT
Start: 2021-12-06 | End: 2021-12-06 | Stop reason: HOSPADM

## 2021-12-06 RX ORDER — SODIUM CHLORIDE 0.9 % (FLUSH) 0.9 %
10 SYRINGE (ML) INJECTION EVERY 12 HOURS SCHEDULED
Status: DISCONTINUED | OUTPATIENT
Start: 2021-12-06 | End: 2021-12-06 | Stop reason: HOSPADM

## 2021-12-06 RX ORDER — FENTANYL CITRATE 50 UG/ML
50 INJECTION, SOLUTION INTRAMUSCULAR; INTRAVENOUS
Status: DISCONTINUED | OUTPATIENT
Start: 2021-12-06 | End: 2021-12-06 | Stop reason: HOSPADM

## 2021-12-06 RX ORDER — OXYCODONE HYDROCHLORIDE 5 MG/1
10 TABLET ORAL EVERY 4 HOURS PRN
Status: DISCONTINUED | OUTPATIENT
Start: 2021-12-06 | End: 2021-12-06 | Stop reason: HOSPADM

## 2021-12-06 RX ORDER — HYDROMORPHONE HCL 110MG/55ML
1 PATIENT CONTROLLED ANALGESIA SYRINGE INTRAVENOUS
Status: DISCONTINUED | OUTPATIENT
Start: 2021-12-06 | End: 2021-12-06 | Stop reason: HOSPADM

## 2021-12-06 RX ORDER — LIDOCAINE HYDROCHLORIDE AND EPINEPHRINE 10; 10 MG/ML; UG/ML
INJECTION, SOLUTION INFILTRATION; PERINEURAL AS NEEDED
Status: DISCONTINUED | OUTPATIENT
Start: 2021-12-06 | End: 2021-12-06 | Stop reason: HOSPADM

## 2021-12-06 RX ORDER — SODIUM CHLORIDE 0.9 % (FLUSH) 0.9 %
3 SYRINGE (ML) INJECTION EVERY 12 HOURS SCHEDULED
Status: DISCONTINUED | OUTPATIENT
Start: 2021-12-06 | End: 2021-12-06 | Stop reason: HOSPADM

## 2021-12-06 RX ORDER — ONDANSETRON 2 MG/ML
INJECTION INTRAMUSCULAR; INTRAVENOUS AS NEEDED
Status: DISCONTINUED | OUTPATIENT
Start: 2021-12-06 | End: 2021-12-06 | Stop reason: SURG

## 2021-12-06 RX ORDER — ACETAMINOPHEN 650 MG/1
650 SUPPOSITORY RECTAL ONCE AS NEEDED
Status: DISCONTINUED | OUTPATIENT
Start: 2021-12-06 | End: 2021-12-06 | Stop reason: HOSPADM

## 2021-12-06 RX ORDER — SODIUM CHLORIDE 0.9 % (FLUSH) 0.9 %
3-10 SYRINGE (ML) INJECTION AS NEEDED
Status: DISCONTINUED | OUTPATIENT
Start: 2021-12-06 | End: 2021-12-06 | Stop reason: HOSPADM

## 2021-12-06 RX ORDER — NALOXONE HCL 0.4 MG/ML
0.4 VIAL (ML) INJECTION AS NEEDED
Status: DISCONTINUED | OUTPATIENT
Start: 2021-12-06 | End: 2021-12-06 | Stop reason: HOSPADM

## 2021-12-06 RX ORDER — ALBUTEROL SULFATE 2.5 MG/3ML
2.5 SOLUTION RESPIRATORY (INHALATION) ONCE AS NEEDED
Status: DISCONTINUED | OUTPATIENT
Start: 2021-12-06 | End: 2021-12-06 | Stop reason: HOSPADM

## 2021-12-06 RX ORDER — FENTANYL CITRATE 50 UG/ML
INJECTION, SOLUTION INTRAMUSCULAR; INTRAVENOUS AS NEEDED
Status: DISCONTINUED | OUTPATIENT
Start: 2021-12-06 | End: 2021-12-06 | Stop reason: SURG

## 2021-12-06 RX ORDER — ONDANSETRON 2 MG/ML
4 INJECTION INTRAMUSCULAR; INTRAVENOUS ONCE AS NEEDED
Status: DISCONTINUED | OUTPATIENT
Start: 2021-12-06 | End: 2021-12-06 | Stop reason: HOSPADM

## 2021-12-06 RX ORDER — HYDROMORPHONE HCL 110MG/55ML
0.25 PATIENT CONTROLLED ANALGESIA SYRINGE INTRAVENOUS
Status: DISCONTINUED | OUTPATIENT
Start: 2021-12-06 | End: 2021-12-06 | Stop reason: HOSPADM

## 2021-12-06 RX ORDER — SODIUM CHLORIDE 9 MG/ML
100 INJECTION, SOLUTION INTRAVENOUS CONTINUOUS
Status: DISCONTINUED | OUTPATIENT
Start: 2021-12-06 | End: 2021-12-06 | Stop reason: HOSPADM

## 2021-12-06 RX ORDER — OXYCODONE HYDROCHLORIDE 5 MG/1
7.5 TABLET ORAL ONCE AS NEEDED
Status: DISCONTINUED | OUTPATIENT
Start: 2021-12-06 | End: 2021-12-06 | Stop reason: HOSPADM

## 2021-12-06 RX ORDER — SODIUM CHLORIDE 0.9 % (FLUSH) 0.9 %
10 SYRINGE (ML) INJECTION AS NEEDED
Status: DISCONTINUED | OUTPATIENT
Start: 2021-12-06 | End: 2021-12-06 | Stop reason: HOSPADM

## 2021-12-06 RX ORDER — ACETAMINOPHEN 325 MG/1
650 TABLET ORAL ONCE AS NEEDED
Status: DISCONTINUED | OUTPATIENT
Start: 2021-12-06 | End: 2021-12-06 | Stop reason: HOSPADM

## 2021-12-06 RX ADMIN — SODIUM CHLORIDE, POTASSIUM CHLORIDE, SODIUM LACTATE AND CALCIUM CHLORIDE 9 ML/HR: 600; 310; 30; 20 INJECTION, SOLUTION INTRAVENOUS at 06:36

## 2021-12-06 RX ADMIN — PROPOFOL 75 MCG/KG/MIN: 10 INJECTION, EMULSION INTRAVENOUS at 07:14

## 2021-12-06 RX ADMIN — CEFAZOLIN SODIUM 2 G: 1 INJECTION, POWDER, FOR SOLUTION INTRAMUSCULAR; INTRAVENOUS at 07:16

## 2021-12-06 RX ADMIN — PROPOFOL 50 MG: 10 INJECTION, EMULSION INTRAVENOUS at 07:12

## 2021-12-06 RX ADMIN — ONDANSETRON 4 MG: 2 INJECTION INTRAMUSCULAR; INTRAVENOUS at 07:46

## 2021-12-06 RX ADMIN — FENTANYL CITRATE 50 MCG: 50 INJECTION, SOLUTION INTRAMUSCULAR; INTRAVENOUS at 07:28

## 2021-12-06 NOTE — TELEPHONE ENCOUNTER
Disability form filled out and faxed back to [employer] on 12/6/21.  Form sent to scanning to scan into chart.

## 2021-12-06 NOTE — ANESTHESIA POSTPROCEDURE EVALUATION
Patient: Lucy Mahan    Procedure Summary     Date: 12/06/21 Room / [facility] OR 06 [facility]    Anesthesia Start: 0708 Anesthesia Stop: 0754    Procedure: INSERTION VENOUS ACCESS DEVICE (Left ) Diagnosis:       Malignant neoplasm of ovary, unspecified laterality (HCC)      (Malignant neoplasm of ovary, unspecified laterality (HCC) [C56.9])    Surgeons: Krunal Badillo MD Provider: Elias Barrow MD    Anesthesia Type: MAC ASA Status: 3          Anesthesia Type: MAC    Vitals  Vitals Value Taken Time   /50 12/06/21 0821   Temp 98.8 °F (37.1 °C) 12/06/21 0751   Pulse 69 12/06/21 0824   Resp 27 12/06/21 0821   SpO2 94 % 12/06/21 0824   Vitals shown include unvalidated device data.        Post Anesthesia Care and Evaluation    Patient location during evaluation: PACU  Patient participation: complete - patient participated  Level of consciousness: awake  Pain score: 0 (See nurse's notes for pain score)  Pain management: adequate  Airway patency: patent  Anesthetic complications: No anesthetic complications  PONV Status: none  Cardiovascular status: acceptable  Respiratory status: acceptable  Hydration status: acceptable    Comments: Patient seen and examined postoperatively; vital signs stable; SpO2 greater than or equal to 90%; cardiopulmonary status stable; nausea/vomiting adequately controlled; pain adequately controlled; no apparent anesthesia complications; patient discharged from anesthesia care when discharge criteria were met

## 2021-12-06 NOTE — OP NOTE
Operative Report:    Patient Name:  Lucy Mahan  YOB: 1960    Date of Surgery:  12/6/2021     Indications: 60-year-old lady who I recently met for concern for symptomatic cholelithiasis was found to have peritoneal carcinomatosis suggesting ovarian neoplasm.  She was seen by oncology and GYN up is going to be having neoadjuvant chemotherapy now is asked to put in her [id number].  After counseling her about the risk and benefits of port placement she understood and wished to proceed.    Pre-op Diagnosis:   Malignant neoplasm of ovary, unspecified laterality (HCC) [C56.9]       Post-Op Diagnosis Codes:     * Malignant neoplasm of ovary, unspecified laterality (HCC) [C56.9]    Procedure/CPT® Codes:      Procedure(s):  INSERTION VENOUS ACCESS DEVICE  Use of fluoroscopy for port placement    Staff:  Surgeon(s):  Krunal Badillo MD    Circulator: Dione Elena RN  Radiology Technologist: Danial Healy  Scrub Person: Lopez Munoz        Anesthesia: Choice    Estimated Blood Loss: minimal    Implants:    Implant Name Type Inv. Item Serial No.  Lot No. LRB No. Used Action   PRT INTRO VASC/INTERV VORTEX FILL/HL DETACH/POLYURET/CATH 8F - [id number] Implant PRT INTRO VASC/INTERV VORTEX FILL/HL DETACH/POLYURET/CATH 8F  [employer] [id number] N/A 1 Implanted       Specimen:          None        Findings: Catheter cut at 22 cm    Complications: None    Description of Procedure: Patient was taken to the operating placed on the operating table in the Trendelenburg supine position under monitored sedation.  Her left chest was prepped and draped normal sterile fashion.  Timeout performed identifying correct patient receiving site of operation.  Began the operation by a trained skin subtenons tissue with local anesthetic.  I then made a stab incision in the skin with a 15 blade scalpel and used the introducer needle to access the left subclavian vein on the second needle passed.  There was dark  red nonpulsatile backbleeding.  The wire was placed through the needle and fluoroscopy was used to confirm the tip of the wire at the right atrium.  The port pocket was then created using the Bovie.  The dilator and sheath were placed over the wire and it was advanced under fluoroscopic guidance.  The wire and the dilator were removed.  The catheter was then placed through the sheath the sheath was then removed.  The catheter was pulled back under fluoroscopy to what appeared to be the cavoatrial junction which was 22 cm.  At this location it was cut and attached to the port hub.  The port was then placed in the port pocket and attached to the chest wall using a 3-0 Prolene suture.  The port was then accessed with a Goss needle it marina blood well was flushed with 4 mL of heparinized saline.  I then closed the skin over the port using interrupted 3-0 Vicryl suture and skin affix.  She tolerated procedure well was transferred to recovery in satisfactory condition.      Krunal Badillo MD     Date: 12/6/2021  Time: 08:03 EST    This note was created using Dragon Voice Recognition software.

## 2021-12-06 NOTE — INTERVAL H&P NOTE
Back Pain: Care Instructions  Your Care Instructions    Back pain has many possible causes. It is often related to problems with muscles and ligaments of the back. It may also be related to problems with the nerves, discs, or bones of the back. Moving, lifting, standing, sitting, or sleeping in an awkward way can strain the back. Sometimes you don't notice the injury until later. Arthritis is another common cause of back pain. Although it may hurt a lot, back pain usually improves on its own within several weeks. Most people recover in 12 weeks or less. Using good home treatment and being careful not to stress your back can help you feel better sooner. Follow-up care is a key part of your treatment and safety. Be sure to make and go to all appointments, and call your doctor if you are having problems. It's also a good idea to know your test results and keep a list of the medicines you take. How can you care for yourself at home? · Sit or lie in positions that are most comfortable and reduce your pain. Try one of these positions when you lie down:  ¨ Lie on your back with your knees bent and supported by large pillows. ¨ Lie on the floor with your legs on the seat of a sofa or chair. Champ Susanna on your side with your knees and hips bent and a pillow between your legs. ¨ Lie on your stomach if it does not make pain worse. · Do not sit up in bed, and avoid soft couches and twisted positions. Bed rest can help relieve pain at first, but it delays healing. Avoid bed rest after the first day of back pain. · Change positions every 30 minutes. If you must sit for long periods of time, take breaks from sitting. Get up and walk around, or lie in a comfortable position. · Try using a heating pad on a low or medium setting for 15 to 20 minutes every 2 or 3 hours. Try a warm shower in place of one session with the heating pad. · You can also try an ice pack for 10 to 15 minutes every 2 to 3 hours.  Put a thin cloth She has now seen oncology and gynecology and is going to be undergoing chemotherapy beginning tomorrow.  The cancer center reached out to me on Friday and requested that I place an [id number].  I went ahead and schedule this operation and talk to her about the risk and benefits of the surgery this morning.  We talked about the port indications the risks of placement the benefits of placement and anticipated recovery.  She understands the risks and the alternatives and wishes to proceed with port placement.    She denies any new fevers chills chest pain shortness of breath severe nausea or vomiting since I last saw her in the office    On exam she is in no distress she is resting comfortably on room air  Her skin is normal in the left upper chest as well as the right.  No significant upper extremity swelling or edema  Alert and at baseline mentation  Normal mood and affect    We will plan to proceed to the operating room for left possible right [id number] placement   between the ice pack and your skin. · Take pain medicines exactly as directed. ¨ If the doctor gave you a prescription medicine for pain, take it as prescribed. ¨ If you are not taking a prescription pain medicine, ask your doctor if you can take an over-the-counter medicine. · Take short walks several times a day. You can start with 5 to 10 minutes, 3 or 4 times a day, and work up to longer walks. Walk on level surfaces and avoid hills and stairs until your back is better. · Return to work and other activities as soon as you can. Continued rest without activity is usually not good for your back. · To prevent future back pain, do exercises to stretch and strengthen your back and stomach. Learn how to use good posture, safe lifting techniques, and proper body mechanics. When should you call for help? Call your doctor now or seek immediate medical care if:  ? · You have new or worsening numbness in your legs. ? · You have new or worsening weakness in your legs. (This could make it hard to stand up.)   ? · You lose control of your bladder or bowels. ? Watch closely for changes in your health, and be sure to contact your doctor if:  ? · Your pain gets worse. ? · You are not getting better after 2 weeks. Where can you learn more? Go to http://wilma-elisabeth.info/. Enter E761 in the search box to learn more about \"Back Pain: Care Instructions. \"  Current as of: March 21, 2017  Content Version: 11.4  © 1386-1438 [employer]. Care instructions adapted under license by [employer] (which disclaims liability or warranty for this information). If you have questions about a medical condition or this instruction, always ask your healthcare professional. [place] [address] any warranty or liability for your use of this information. Learning About Relief for Back Pain  What is back tension and strain?     Back strain happens when you overstretch, or pull, a muscle in your back. You may hurt your back in an accident or when you exercise or lift something. Most back pain will get better with rest and time. You can take care of yourself at home to help your back heal.  What can you do first to relieve back pain? When you first feel back pain, try these steps:  · Walk. Take a short walk (10 to 20 minutes) on a level surface (no slopes, hills, or stairs) every 2 to 3 hours. Walk only distances you can manage without pain, especially leg pain. · Relax. Find a comfortable position for rest. Some people are comfortable on the floor or a medium-firm bed with a small pillow under their head and another under their knees. Some people prefer to lie on their side with a pillow between their knees. Don't stay in one position for too long. · Try heat or ice. Try using a heating pad on a low or medium setting, or take a warm shower, for 15 to 20 minutes every 2 to 3 hours. Or you can buy single-use heat wraps that last up to 8 hours. You can also try an ice pack for 10 to 15 minutes every 2 to 3 hours. You can use an ice pack or a bag of frozen vegetables wrapped in a thin towel. There is not strong evidence that either heat or ice will help, but you can try them to see if they help. You may also want to try switching between heat and cold. · Take pain medicine exactly as directed. ¨ If the doctor gave you a prescription medicine for pain, take it as prescribed. ¨ If you are not taking a prescription pain medicine, ask your doctor if you can take an over-the-counter medicine. What else can you do? · Stretch and exercise. Exercises that increase flexibility may relieve your pain and make it easier for your muscles to keep your spine in a good, neutral position. And don't forget to keep walking. · Do self-massage. You can use self-massage to unwind after work or school or to energize yourself in the morning. You can easily massage your feet, hands, or neck. Self-massage works best if you are in comfortable clothes and are sitting or lying in a comfortable position. Use oil or lotion to massage bare skin. · Reduce stress. Back pain can lead to a vicious [place]: Distress about the pain tenses the muscles in your back, which in turn causes more pain. Learn how to relax your mind and your muscles to lower your stress. Where can you learn more? Go to http://wimla-elisabeth.info/. Enter C879 in the search box to learn more about \"Learning About Relief for Back Pain. \"  Current as of: March 21, 2017  Content Version: 11.4  © 8414-6804 [employer]. Care instructions adapted under license by [employer] (which disclaims liability or warranty for this information). If you have questions about a medical condition or this instruction, always ask your healthcare professional. [address] any warranty or liability for your use of this information.

## 2021-12-06 NOTE — TELEPHONE ENCOUNTER
Hub is instructed to read the documentation below to patient  RECEIVED MESSAGE FROM PATIENT STATING SHE IS GETTING HER PORT PLACED TODAY AND NEEDS TO RESCHEDULE TREATMENT PLANNING.  ATTEMPTED TO CONTACT PATIENT.  NO ANSWER.  LMV ASKING PATIENT TO CALL BACK.

## 2021-12-06 NOTE — ANESTHESIA PREPROCEDURE EVALUATION
Anesthesia Evaluation     Patient summary reviewed and Nursing notes reviewed   no history of anesthetic complications:  NPO Solid Status: > 8 hours  NPO Liquid Status: > 2 hours           Airway   Mallampati: II  TM distance: >3 FB  Neck ROM: full  No difficulty expected  Comment: Grade 1 in Nov for jesse  Dental    (+) poor dentition    Pulmonary - normal exam   (+) shortness of breath,   Cardiovascular - normal exam    ECG reviewed    (+) hypertension, TORREZ,     ROS comment: Interpretation Summary  ·Left ventricular ejection fraction is hyperdynamic (Calculated EF > 70%). .  ·Myocardial perfusion imaging indicates a normal myocardial perfusion study with no evidence of ischemia.  ·Impressions are consistent with a low risk study.  ·There is no prior study available for comparison.  ·Findings consistent with a normal ECG stress test.  Low risk stress  No ischemic changes on ECG      Neuro/Psych  (+) numbness,     GI/Hepatic/Renal/Endo    (+) obesity,   renal disease,     Musculoskeletal     Abdominal  - normal exam   Substance History      OB/GYN negative ob/gyn ROS         Other   arthritis,    history of cancer                    Anesthesia Plan    ASA 3     MAC   total IV anesthesia  intravenous induction     Anesthetic plan, all risks, benefits, and alternatives have been provided, discussed and informed consent has been obtained with: patient.  Use of blood products discussed with patient  Consented to blood products.   Plan discussed with CAA.

## 2021-12-07 ENCOUNTER — HOSPITAL ENCOUNTER (OUTPATIENT)
Dept: ONCOLOGY | Facility: HOSPITAL | Age: 61
Setting detail: INFUSION SERIES
Discharge: HOME | End: 2021-12-07

## 2021-12-07 ENCOUNTER — OFFICE VISIT (OUTPATIENT)
Dept: ONCOLOGY | Facility: CLINIC | Age: 61
End: 2021-12-07

## 2021-12-07 ENCOUNTER — DOCUMENTATION (OUTPATIENT)
Dept: ONCOLOGY | Facility: HOSPITAL | Age: 61
End: 2021-12-07

## 2021-12-07 ENCOUNTER — TELEPHONE (OUTPATIENT)
Dept: SURGERY | Facility: CLINIC | Age: 61
End: 2021-12-07

## 2021-12-07 VITALS
DIASTOLIC BLOOD PRESSURE: 72 MMHG | SYSTOLIC BLOOD PRESSURE: 105 MMHG | BODY MASS INDEX: 33.32 KG/M2 | TEMPERATURE: 97.3 F | WEIGHT: 200.2 LBS | HEART RATE: 69 BPM

## 2021-12-07 DIAGNOSIS — C56.9 MALIGNANT NEOPLASM OF OVARY, UNSPECIFIED LATERALITY (HCC): Primary | ICD-10-CM

## 2021-12-07 DIAGNOSIS — Z71.9 ENCOUNTER FOR EDUCATION: Primary | ICD-10-CM

## 2021-12-07 LAB
ALBUMIN SERPL-MCNC: 3.3 G/DL (ref 3.5–5.2)
ALBUMIN/GLOB SERPL: 0.9 G/DL
ALP SERPL-CCNC: 63 U/L (ref 39–117)
ALT SERPL W P-5'-P-CCNC: <5 U/L (ref 1–33)
ANION GAP SERPL CALCULATED.3IONS-SCNC: 13 MMOL/L (ref 5–15)
AST SERPL-CCNC: 12 U/L (ref 1–32)
BASOPHILS # BLD AUTO: 0.03 10*3/MM3 (ref 0–0.2)
BASOPHILS NFR BLD AUTO: 0.6 % (ref 0–1.5)
BILIRUB SERPL-MCNC: 0.3 MG/DL (ref 0–1.2)
BUN SERPL-MCNC: 22 MG/DL (ref 8–23)
BUN/CREAT SERPL: 15.3 (ref 7–25)
CALCIUM SPEC-SCNC: 8.6 MG/DL (ref 8.6–10.5)
CANCER AG125 SERPL QL: 738.2 U/ML (ref 0–38.1)
CHLORIDE SERPL-SCNC: 105 MMOL/L (ref 98–107)
CO2 SERPL-SCNC: 21 MMOL/L (ref 22–29)
CREAT BLDA-MCNC: 1.7 MG/DL (ref 0.6–1.3)
CREAT SERPL-MCNC: 1.44 MG/DL (ref 0.57–1)
DEPRECATED RDW RBC AUTO: 43.4 FL (ref 37–54)
EOSINOPHIL # BLD AUTO: 0.19 10*3/MM3 (ref 0–0.4)
EOSINOPHIL NFR BLD AUTO: 3.6 % (ref 0.3–6.2)
ERYTHROCYTE [DISTWIDTH] IN BLOOD BY AUTOMATED COUNT: 13.7 % (ref 12.3–15.4)
GFR SERPL CREATININE-BSD FRML MDRD: 37 ML/MIN/1.73
GLOBULIN UR ELPH-MCNC: 3.6 GM/DL
GLUCOSE SERPL-MCNC: 100 MG/DL (ref 65–99)
HCT VFR BLD AUTO: 34.3 % (ref 34–46.6)
HGB BLD-MCNC: 10.7 G/DL (ref 12–15.9)
LYMPHOCYTES # BLD AUTO: 0.89 10*3/MM3 (ref 0.7–3.1)
LYMPHOCYTES NFR BLD AUTO: 17.1 % (ref 19.6–45.3)
MCH RBC QN AUTO: 27.8 PG (ref 26.6–33)
MCHC RBC AUTO-ENTMCNC: 31.2 G/DL (ref 31.5–35.7)
MCV RBC AUTO: 89.1 FL (ref 79–97)
MONOCYTES # BLD AUTO: 0.65 10*3/MM3 (ref 0.1–0.9)
MONOCYTES NFR BLD AUTO: 12.5 % (ref 5–12)
NEUTROPHILS NFR BLD AUTO: 3.45 10*3/MM3 (ref 1.7–7)
NEUTROPHILS NFR BLD AUTO: 66.2 % (ref 42.7–76)
PLATELET # BLD AUTO: 262 10*3/MM3 (ref 140–450)
PMV BLD AUTO: 10 FL (ref 6–12)
POTASSIUM SERPL-SCNC: 4.1 MMOL/L (ref 3.5–5.2)
PROT SERPL-MCNC: 6.9 G/DL (ref 6–8.5)
RBC # BLD AUTO: 3.85 10*6/MM3 (ref 3.77–5.28)
SODIUM SERPL-SCNC: 139 MMOL/L (ref 136–145)
WBC NRBC COR # BLD: 5.21 10*3/MM3 (ref 3.4–10.8)

## 2021-12-07 PROCEDURE — 25010000002 FOSAPREPITANT PER 1 MG: Performed by: INTERNAL MEDICINE

## 2021-12-07 PROCEDURE — 25010000002 PACLITAXEL PER 1 MG: Performed by: INTERNAL MEDICINE

## 2021-12-07 PROCEDURE — 99215 OFFICE O/P EST HI 40 MIN: CPT | Performed by: NURSE PRACTITIONER

## 2021-12-07 PROCEDURE — 85025 COMPLETE CBC W/AUTO DIFF WBC: CPT | Performed by: INTERNAL MEDICINE

## 2021-12-07 PROCEDURE — 96377 APPLICATON ON-BODY INJECTOR: CPT

## 2021-12-07 PROCEDURE — 96367 TX/PROPH/DG ADDL SEQ IV INF: CPT

## 2021-12-07 PROCEDURE — 96417 CHEMO IV INFUS EACH ADDL SEQ: CPT

## 2021-12-07 PROCEDURE — 25010000002 DEXAMETHASONE SODIUM PHOSPHATE 120 MG/30ML SOLUTION: Performed by: INTERNAL MEDICINE

## 2021-12-07 PROCEDURE — 36591 DRAW BLOOD OFF VENOUS DEVICE: CPT

## 2021-12-07 PROCEDURE — 96375 TX/PRO/DX INJ NEW DRUG ADDON: CPT

## 2021-12-07 PROCEDURE — 82565 ASSAY OF CREATININE: CPT

## 2021-12-07 PROCEDURE — 96413 CHEMO IV INFUSION 1 HR: CPT

## 2021-12-07 PROCEDURE — 80053 COMPREHEN METABOLIC PANEL: CPT | Performed by: INTERNAL MEDICINE

## 2021-12-07 PROCEDURE — 86304 IMMUNOASSAY TUMOR CA 125: CPT | Performed by: INTERNAL MEDICINE

## 2021-12-07 PROCEDURE — 25010000002 CARBOPLATIN PER 50 MG: Performed by: INTERNAL MEDICINE

## 2021-12-07 PROCEDURE — 96415 CHEMO IV INFUSION ADDL HR: CPT

## 2021-12-07 PROCEDURE — 25010000002 PEGFILGRASTIM 6 MG/0.6ML PREFILLED SYRINGE KIT: Performed by: INTERNAL MEDICINE

## 2021-12-07 PROCEDURE — 25010000002 HEPARIN LOCK FLUSH PER 10 UNITS: Performed by: INTERNAL MEDICINE

## 2021-12-07 PROCEDURE — 25010000002 DIPHENHYDRAMINE PER 50 MG: Performed by: INTERNAL MEDICINE

## 2021-12-07 PROCEDURE — 25010000002 PALONOSETRON 0.25 MG/5ML SOLUTION PREFILLED SYRINGE: Performed by: INTERNAL MEDICINE

## 2021-12-07 RX ORDER — SODIUM CHLORIDE 0.9 % (FLUSH) 0.9 %
10 SYRINGE (ML) INJECTION AS NEEDED
Status: CANCELLED | OUTPATIENT
Start: 2021-12-07

## 2021-12-07 RX ORDER — LORATADINE 10 MG/1
TABLET ORAL
Qty: 2 TABLET | Refills: 5 | Status: SHIPPED | OUTPATIENT
Start: 2021-12-07 | End: 2022-07-29

## 2021-12-07 RX ORDER — PALONOSETRON 0.05 MG/ML
0.25 INJECTION, SOLUTION INTRAVENOUS ONCE
Status: CANCELLED | OUTPATIENT
Start: 2021-12-07

## 2021-12-07 RX ORDER — HEPARIN SODIUM (PORCINE) LOCK FLUSH IV SOLN 100 UNIT/ML 100 UNIT/ML
500 SOLUTION INTRAVENOUS AS NEEDED
Status: DISCONTINUED | OUTPATIENT
Start: 2021-12-07 | End: 2021-12-08 | Stop reason: HOSPADM

## 2021-12-07 RX ORDER — SODIUM CHLORIDE 0.9 % (FLUSH) 0.9 %
10 SYRINGE (ML) INJECTION AS NEEDED
Status: DISCONTINUED | OUTPATIENT
Start: 2021-12-07 | End: 2021-12-08 | Stop reason: HOSPADM

## 2021-12-07 RX ORDER — FAMOTIDINE 10 MG/ML
20 INJECTION, SOLUTION INTRAVENOUS ONCE
Status: CANCELLED | OUTPATIENT
Start: 2021-12-07

## 2021-12-07 RX ORDER — FAMOTIDINE 10 MG/ML
20 INJECTION, SOLUTION INTRAVENOUS AS NEEDED
Status: CANCELLED | OUTPATIENT
Start: 2021-12-07

## 2021-12-07 RX ORDER — HEPARIN SODIUM (PORCINE) LOCK FLUSH IV SOLN 100 UNIT/ML 100 UNIT/ML
500 SOLUTION INTRAVENOUS AS NEEDED
Status: CANCELLED | OUTPATIENT
Start: 2021-12-07

## 2021-12-07 RX ORDER — PALONOSETRON 0.05 MG/ML
0.25 INJECTION, SOLUTION INTRAVENOUS ONCE
Status: COMPLETED | OUTPATIENT
Start: 2021-12-07 | End: 2021-12-07

## 2021-12-07 RX ORDER — DIPHENHYDRAMINE HYDROCHLORIDE 50 MG/ML
50 INJECTION INTRAMUSCULAR; INTRAVENOUS AS NEEDED
Status: CANCELLED | OUTPATIENT
Start: 2021-12-07

## 2021-12-07 RX ORDER — SODIUM CHLORIDE 9 MG/ML
250 INJECTION, SOLUTION INTRAVENOUS ONCE
Status: CANCELLED | OUTPATIENT
Start: 2021-12-07

## 2021-12-07 RX ORDER — SODIUM CHLORIDE 9 MG/ML
250 INJECTION, SOLUTION INTRAVENOUS ONCE
Status: COMPLETED | OUTPATIENT
Start: 2021-12-07 | End: 2021-12-07

## 2021-12-07 RX ORDER — OLANZAPINE 5 MG/1
5 TABLET ORAL NIGHTLY
Qty: 3 TABLET | Refills: 5 | Status: SHIPPED | OUTPATIENT
Start: 2021-12-07 | End: 2022-03-16

## 2021-12-07 RX ORDER — OLANZAPINE 5 MG/1
5 TABLET ORAL ONCE
Status: COMPLETED | OUTPATIENT
Start: 2021-12-07 | End: 2021-12-07

## 2021-12-07 RX ORDER — FAMOTIDINE 10 MG/ML
20 INJECTION, SOLUTION INTRAVENOUS ONCE
Status: COMPLETED | OUTPATIENT
Start: 2021-12-07 | End: 2021-12-07

## 2021-12-07 RX ORDER — DIPHENHYDRAMINE HYDROCHLORIDE 50 MG/ML
50 INJECTION INTRAMUSCULAR; INTRAVENOUS AS NEEDED
Status: DISCONTINUED | OUTPATIENT
Start: 2021-12-07 | End: 2021-12-08 | Stop reason: HOSPADM

## 2021-12-07 RX ORDER — FAMOTIDINE 10 MG/ML
20 INJECTION, SOLUTION INTRAVENOUS AS NEEDED
Status: DISCONTINUED | OUTPATIENT
Start: 2021-12-07 | End: 2021-12-08 | Stop reason: HOSPADM

## 2021-12-07 RX ORDER — ONDANSETRON HYDROCHLORIDE 8 MG/1
8 TABLET, FILM COATED ORAL 3 TIMES DAILY PRN
Qty: 30 TABLET | Refills: 5 | Status: SHIPPED | OUTPATIENT
Start: 2021-12-07 | End: 2022-01-18 | Stop reason: SDUPTHER

## 2021-12-07 RX ORDER — CETIRIZINE HYDROCHLORIDE 10 MG/1
10 TABLET ORAL DAILY
Status: COMPLETED | OUTPATIENT
Start: 2021-12-07 | End: 2021-12-07

## 2021-12-07 RX ORDER — OLANZAPINE 5 MG/1
5 TABLET ORAL ONCE
Status: CANCELLED | OUTPATIENT
Start: 2021-12-07 | End: 2021-12-07

## 2021-12-07 RX ORDER — DEXAMETHASONE 4 MG/1
TABLET ORAL
Qty: 5 TABLET | Refills: 5 | Status: SHIPPED | OUTPATIENT
Start: 2021-12-07 | End: 2022-03-09

## 2021-12-07 RX ADMIN — HEPARIN 500 UNITS: 100 SYRINGE at 14:00

## 2021-12-07 RX ADMIN — DEXAMETHASONE SODIUM PHOSPHATE 20 MG: 4 INJECTION, SOLUTION INTRA-ARTICULAR; INTRALESIONAL; INTRAMUSCULAR; INTRAVENOUS; SOFT TISSUE at 08:58

## 2021-12-07 RX ADMIN — CETIRIZINE HYDROCHLORIDE 10 MG: 10 TABLET, FILM COATED ORAL at 09:43

## 2021-12-07 RX ADMIN — PALONOSETRON 0.25 MG: 0.25 INJECTION, SOLUTION INTRAVENOUS at 09:44

## 2021-12-07 RX ADMIN — DIPHENHYDRAMINE HYDROCHLORIDE 50 MG: 50 INJECTION, SOLUTION INTRAMUSCULAR; INTRAVENOUS at 09:23

## 2021-12-07 RX ADMIN — Medication 10 ML: at 14:00

## 2021-12-07 RX ADMIN — PACLITAXEL 345 MG: 6 INJECTION, SOLUTION INTRAVENOUS at 10:17

## 2021-12-07 RX ADMIN — FAMOTIDINE 20 MG: 10 INJECTION, SOLUTION INTRAVENOUS at 09:20

## 2021-12-07 RX ADMIN — OLANZAPINE 5 MG: 5 TABLET, FILM COATED ORAL at 09:43

## 2021-12-07 RX ADMIN — CARBOPLATIN 450 MG: 10 INJECTION, SOLUTION INTRAVENOUS at 13:21

## 2021-12-07 RX ADMIN — SODIUM CHLORIDE 100 ML: 900 INJECTION, SOLUTION INTRAVENOUS at 09:46

## 2021-12-07 RX ADMIN — PEGFILGRASTIM 6 MG: KIT SUBCUTANEOUS at 13:59

## 2021-12-07 RX ADMIN — SODIUM CHLORIDE 250 ML: 9 INJECTION, SOLUTION INTRAVENOUS at 09:19

## 2021-12-07 NOTE — PROGRESS NOTES
Case Management/ Note    Patient Name: Lucy Mahan  YOB: 1960  MRN #: [id number]    OSW met with Lucy and her daughter. Lucy is alert and oriented to person, place and time. She is pleasant, mood and affect are congruent. She spoke about her distress and we discussed options for counseling and speaking with OSW to help with this. Supportive care provided.     She lives with her  who has health problems of his own. He is a recent amputee and may lose his other leg as well. She has two daughters who are supported and involved in both parents healthcare. Basic needs are met at this time. She was told of financial assistance application and this was given to her. We talked briefly about completing advanced directives and these forms were given to her. She asked about transportation and we talked about options for this.     She was given a blanket from [employer], port pillow, welcome letter, community, transportation and oncology resources, advanced directives and financial assistance application. She was made aware of financial counselor, dietician, massage therapy and chaplaincy services. OSW will remain available.      Electronically signed by:   Bhavana Linares LCSW, OSW-C  12/07/21, 09:55 EST

## 2021-12-07 NOTE — TELEPHONE ENCOUNTER
PO FU Call- Left message on machine to call back and let us know how she is doing after surgery. No po appt needed unless issues.

## 2021-12-07 NOTE — PROGRESS NOTES
[facility] Medical Oncology     Education for Administration of Chemotherapy and/or Biotherapy     12/07/21    Lucy Mahan  [id number]    Ms.Kathleen Mahan is here today for education on their upcoming Chemotherapy and/or Biotherapy.     I will be going over their treatment options, obtain signed consent and answer any questions that they may have in regards to the administration of Taxol, Carboplatin.     Lucy Mahan has already consulted with  for the treatment of Ovarian cancer. The provider has gone over the same treatment options with the patient and answered their question prior to today's visit.     The goal of the treatment is to:    [x] Cure my cancer - means treatment that kills cancer cells to the point my doctor                                     cannot find them in my body and they will not grow back.    [x] Control my cancer - means treatment that keeps cancer from spreading or growing.    [x] Relieve my cancer symptoms - means treatment that helps problems such as pain or pressure.     This treatment has been explained to Lucy Mahan. Alternative methods of treatment, if any, have been explained to Lucy Mahan as have the benefits and risks of each. Based on the physician's explanation of the benefits and risks of this treatment and any alternatives available, The patient agrees that the potential benefit's out weighs the risks involved. I have explained to the patient the most likely complications that might occur from this treatment. The patient understands that along with the treatment additional medications may be necessary to lesson the side effects. Possible side effect may include but are not limited to, any of the following, or a combination of the following:          [x]  Abdominal pain  [x]  Fatigue [x]  Insomnia [x]  Petechiae   [x]  Allergic Reaction [x]  Fertility effects  [x]  Itching [x]  Photosensitivity    [x]  Anemia [x]  Fevers [x]  Joint pain []   Pleural effusion    [x]  Anxiety []  Fistula formation [x] Kidney damage/toxicity []  Proteinuria    [x]  Back pain []  Flu-like symptoms [x]  LFT imbalances []  Rash   [x]  Blood clots (DVT/PE) [x]  Fluid retention [x]  Liver damage [x]  Secondary malignancies   [x]  Bleeding [x]  Forgetfulness [x]  Loss of appetite []  Sexual side effects    [x]  Bone pain [x]  Gastrointestinal perforation [x]  Low blood pressure [x]  Shortness of breath   [x]  Bruising []  Hand foot syndrome [x]  Lung damage [x]  Skin changes   [x]  Cardiovascular events  [x]  Hair loss/discoloration []  Menopausal symptoms [x]  Sore throat   [x]  Central neurotoxicity [x]  Headaches []  Menstrual irregularities [x]  Swelling   [x]  Chest pain [x] Hearing loss/change []  Mood changes [x]  Taste changes   [x]  Chills [x]  Heart damage [x]  Mouth sores [x]  Temperature sensitivity   [x]  Confusion [x]  Hematuria [x]  Muscle aches  [x]    Thrombocytopenia   [x]  Congestive heart failure [x]  Hemorrhage [x]  Nephrotic syndrome []  Thyroid changes   [x]  Constipation []  Hypertension [x]  Nail changes [x]  Tinnitus   [x]  Cough []  Hypertensive crisis [x]  Nausea  [x]  Upper respiratory tract infection    []  Depression []  Hypertriglyceridemia  []  Neck pain  [x]  Visual changes   [x]  Diarrhea [x]  Hypoglycemia [x]  Neutropenia [x]  Vomiting   [x]  Dizziness []  Immune-mediated reaction [x]  Nosebleeds [x]  Watery eyes   [x]  Dry skin [x]  Infection  [x]  Pain in arms/legs [x]  Weakness   [x]  Electrolyte imbalances [x]  Infusion reaction  []  Pericardial effusion  [x]  Weight changes   [x]  Elevated LDH [x]  Injection site reaction  [x]  Peripheral neuropathy [x]  Wound healing complication   [x]  Eye irritation [x]  Insomnia       While receiving treatment, it has been explained to the patient with regards to their blood counts. This may include but not limited to CBC, Neutropenia ,Anemia, Thrombocytopenia. This handout has been explained and  given to the patient.     It was explained to the patient about nutrition and how important it is while undergoing Chemotherapy and/or Biotherapy. Certain medications will be prescribed during the treatment which may change the way foods taste or smell. These changes may cause poor or no appetite. Food is fuel for your body, and if it does not get the fuel it needs, your body may become mal-nourished, which can lead to sever fatigue.   Information was given to Lucy Mahan in regards to some good protein sources to help maintain muscle mass and help to prevent malnutrition   We also discussed with the patient how important it is to drink/eat every 2-3 hours while awake regardless of hunger. We discussed fluid intake of at least 64 ounces liquids per day to stay hydrated.     It has been discussed with the patient the risks of becoming pregnant while receiving Chemotherapy and/or Biotherapy. We also discussed the importance of using reliable barrier methods while participating in intimate activities as this may expose their partners to a potentially harmful drug.     Further home instructions were given to the patient in regards to symptoms, treatment and how to handle those situations as well as when to contact the treatment team or the providers office.     Lucy Mahan was given handouts on:  1. Home Instructions: Instructed to flush toilet twice with each use, wash soiled linens and close separately in the washer  2. Tips from your chemotherapy nurse: Tips given on nausea control, hydration, mouth care, hair prosthesis, nutrition  3. Nutrition during cancer therapy: Advised to keep up proteins in diet for tissue rebuilding; drink boost or ensure for nutrition if not eating well  4. Cancer related fatigue: Advised to stay active with rest periods  5. Fluids/Dehydration: Drink 8-10, ounce glasses of fluids without caffeine daily  6. Hair and Prosthesis solutions: Contacts for prosthesis shops  given  7. Mouth care: Salt and Baking Soda rinse recipe given: BID for prevention and 5-6 times per day for treatment of mouth sores  8. Nausea/Appetite: Zofran TID prn for nausea; Zyprexa days 2, 3, 4 after chemotherapy  9. Constipation and Diarrhea: Use Senekot and stool softeners for constipation; Use imodium for diarrhea  10. Understanding your blood: Discussed function of WBC, HGB, Platelets in body, symptoms of low counts, when and how to call for symptoms of low counts, infections, clots  11. American Cancer Society Chemotherapy Safety at Home printed, discussed and sent with patient for reference  12. American Cancer Society Watching for and Preventing Infections printed, discussed and sent with patient for reference at home  13. Taxol, Carboplatin information from [employer] printed, discussed, and sent with patient for reference at home    I have discussed and gone over the full consent with the patient and answered all their questions regarding the medication they are to receive.  Written information has been provided and reviewed with Lucy Mahan. The patient and their family had a chance to ask any questions about the treatment medications and are satisfied with the information that was provided to them.     The patient has read and completed the consent form. They understand the possible risks and benefits of the recommended treatment plan and voluntarily agree to undergo the planned treatment. Should they change their mind and decide to stop treatment at any time, they will notify the providers office.       Ceci Mcdermott, APRN  12/07/2021  07:39 EST    I spent 60 minutes teaching about Taxol and Carboplatin chemotherapy, symptom management, hydration, nutrition, safety for body fluids exposure, preventing infections, recognizing blood clots and PE's, how and when to call the clinic and on call providers, how to monitor for infections, treatment of mouth sores with salt and baking soda  rinses, and charting.     Electronically signed by FLORENCE Francisco, 12/07/21, 2:00 PM EST.

## 2021-12-07 NOTE — PROGRESS NOTES
Pt to the clinic for C1D1 of Taxol/carbo.  Pt denies having any complaints. Port accessed and flushed with good blood return noted. 10cc of blood wasted prior to specimen collection. Blood specimen obtained and sent to lab for processing per protocol. Pt tolerated treatment well. Port flushed with saline and heparin prior to needle removal. AVS given and tolerated well.

## 2021-12-08 ENCOUNTER — TELEPHONE (OUTPATIENT)
Dept: ONCOLOGY | Facility: CLINIC | Age: 61
End: 2021-12-08

## 2021-12-08 NOTE — TELEPHONE ENCOUNTER
Discuss a [employer] Neulasta copay card with the patient.  Patient gave permission to enroll her into the copay card program.  A copay card was activated for the patient today.

## 2021-12-14 ENCOUNTER — LAB (OUTPATIENT)
Dept: LAB | Facility: HOSPITAL | Age: 61
End: 2021-12-14

## 2021-12-14 DIAGNOSIS — C56.9 MALIGNANT NEOPLASM OF OVARY, UNSPECIFIED LATERALITY (HCC): ICD-10-CM

## 2021-12-14 LAB
ALBUMIN SERPL-MCNC: 3 G/DL (ref 3.5–5.2)
ALBUMIN/GLOB SERPL: 0.9 G/DL
ALP SERPL-CCNC: 69 U/L (ref 39–117)
ALT SERPL W P-5'-P-CCNC: 7 U/L (ref 1–33)
ANION GAP SERPL CALCULATED.3IONS-SCNC: 10 MMOL/L (ref 5–15)
AST SERPL-CCNC: 16 U/L (ref 1–32)
BASOPHILS # BLD AUTO: 0.02 10*3/MM3 (ref 0–0.2)
BASOPHILS NFR BLD AUTO: 1 % (ref 0–1.5)
BILIRUB SERPL-MCNC: 0.3 MG/DL (ref 0–1.2)
BUN SERPL-MCNC: 16 MG/DL (ref 8–23)
BUN/CREAT SERPL: 16 (ref 7–25)
CALCIUM SPEC-SCNC: 8.6 MG/DL (ref 8.6–10.5)
CHLORIDE SERPL-SCNC: 104 MMOL/L (ref 98–107)
CO2 SERPL-SCNC: 23 MMOL/L (ref 22–29)
CREAT SERPL-MCNC: 1 MG/DL (ref 0.57–1)
DEPRECATED RDW RBC AUTO: 46.9 FL (ref 37–54)
EOSINOPHIL # BLD AUTO: 0.04 10*3/MM3 (ref 0–0.4)
EOSINOPHIL NFR BLD AUTO: 1.9 % (ref 0.3–6.2)
ERYTHROCYTE [DISTWIDTH] IN BLOOD BY AUTOMATED COUNT: 14.5 % (ref 12.3–15.4)
GFR SERPL CREATININE-BSD FRML MDRD: 56 ML/MIN/1.73
GLOBULIN UR ELPH-MCNC: 3.4 GM/DL
GLUCOSE SERPL-MCNC: 102 MG/DL (ref 65–99)
HCT VFR BLD AUTO: 34 % (ref 34–46.6)
HGB BLD-MCNC: 10.3 G/DL (ref 12–15.9)
LYMPHOCYTES # BLD AUTO: 0.58 10*3/MM3 (ref 0.7–3.1)
LYMPHOCYTES NFR BLD AUTO: 28.2 % (ref 19.6–45.3)
MCH RBC QN AUTO: 28 PG (ref 26.6–33)
MCHC RBC AUTO-ENTMCNC: 30.3 G/DL (ref 31.5–35.7)
MCV RBC AUTO: 92.4 FL (ref 79–97)
MONOCYTES # BLD AUTO: 0.37 10*3/MM3 (ref 0.1–0.9)
MONOCYTES NFR BLD AUTO: 18 % (ref 5–12)
NEUTROPHILS NFR BLD AUTO: 1.05 10*3/MM3 (ref 1.7–7)
NEUTROPHILS NFR BLD AUTO: 50.9 % (ref 42.7–76)
PLATELET # BLD AUTO: 105 10*3/MM3 (ref 140–450)
PMV BLD AUTO: 11.5 FL (ref 6–12)
POTASSIUM SERPL-SCNC: 4.3 MMOL/L (ref 3.5–5.2)
PROT SERPL-MCNC: 6.4 G/DL (ref 6–8.5)
RBC # BLD AUTO: 3.68 10*6/MM3 (ref 3.77–5.28)
SODIUM SERPL-SCNC: 137 MMOL/L (ref 136–145)
WBC NRBC COR # BLD: 2.06 10*3/MM3 (ref 3.4–10.8)

## 2021-12-14 PROCEDURE — 85025 COMPLETE CBC W/AUTO DIFF WBC: CPT

## 2021-12-14 PROCEDURE — 80053 COMPREHEN METABOLIC PANEL: CPT

## 2021-12-14 PROCEDURE — 36415 COLL VENOUS BLD VENIPUNCTURE: CPT

## 2021-12-16 ENCOUNTER — OFFICE VISIT (OUTPATIENT)
Dept: FAMILY MEDICINE CLINIC | Facility: CLINIC | Age: 61
End: 2021-12-16

## 2021-12-16 VITALS
DIASTOLIC BLOOD PRESSURE: 79 MMHG | HEART RATE: 86 BPM | BODY MASS INDEX: 34.19 KG/M2 | OXYGEN SATURATION: 100 % | HEIGHT: 65 IN | SYSTOLIC BLOOD PRESSURE: 122 MMHG | WEIGHT: 205.2 LBS

## 2021-12-16 DIAGNOSIS — E03.9 ACQUIRED HYPOTHYROIDISM: ICD-10-CM

## 2021-12-16 DIAGNOSIS — I10 ESSENTIAL HYPERTENSION: ICD-10-CM

## 2021-12-16 DIAGNOSIS — N28.9 ABNORMAL RENAL FUNCTION: ICD-10-CM

## 2021-12-16 DIAGNOSIS — C56.9 MALIGNANT NEOPLASM OF OVARY, UNSPECIFIED LATERALITY (HCC): Primary | ICD-10-CM

## 2021-12-16 DIAGNOSIS — R11.0 NAUSEA: ICD-10-CM

## 2021-12-16 DIAGNOSIS — K80.21 CALCULUS OF GALLBLADDER WITH BILIARY OBSTRUCTION BUT WITHOUT CHOLECYSTITIS: ICD-10-CM

## 2021-12-16 DIAGNOSIS — E88.09 HYPOALBUMINEMIA: ICD-10-CM

## 2021-12-16 DIAGNOSIS — R10.84 GENERALIZED ABDOMINAL PAIN: ICD-10-CM

## 2021-12-16 PROCEDURE — 99214 OFFICE O/P EST MOD 30 MIN: CPT | Performed by: NURSE PRACTITIONER

## 2021-12-16 NOTE — PROGRESS NOTES
Chief Complaint  Cancer, Hypertension (pt asked if she should cont hctz, she hasn't taken it in a few weeks.  reports bp has been elevated but wondering if r/t stress/pain.), Hypothyroidism, Follow-up (4 wk), and Abdominal Pain (with loose stool, denies blood or mucus)    Subjective          Lucy Mahan presents to [facility] for   History of Present Illness     Ovarian carcinoma origin found in peritoneum, gallbladder still intact with cholelithiasis.  Patient reports has completed her first chemo and now with increased loose stool, abd pain, on senokot now due to not having regular BM's. She started pantop, zofran. She reports excessive fatigue, has to nap daily. Feels burning sensation of legs/feet 4 days after 1st chemo.   - venous access placed  -first chemo   -labs  plt 105,000, h/h 10/34.0  -mammo cancelled due to having port in place    HTN, bp has been labile, stopped HCTZ approximately 2 weeks ago.  Has been taking bp prior to meds 140-145 systolic, she is taking lisinopril daily.  Denies chest pain, headache, shortness of air, palpitations and swelling of extremities.     Hypothyroidism, stable on medication, denies symptoms of constipation, weight gain/loss, hot or cold intolerance, hair loss, abnormal heart rate and fatigue.       The following portions of the patient's history were reviewed and updated as appropriate: allergies, current medications, past family history, past medical history, past social history, past surgical history and problem list.    Past Medical History:   Diagnosis Date   • Arthritis    • Cancer (HCC)    • Gall stones    • Hypertension    • Irritable bowel syndrome    • Rheumatoid aortitis    • Thyroid disease      Past Surgical History:   Procedure Laterality Date   •  SECTION      x2   • CHOLECYSTECTOMY N/A 11/10/2021    Procedure: diagnostic laparoscopy and peritoneal biopsy;  Surgeon: Krunal Badillo MD;  Location:   Needs follow up appt to address pain management and work excuse. Ok for virtual visit as she was seen in clinic 2 days ago. appt should be made with provider doing virtual visits.  Renetta Morrow, CNP     [facility];  Service: General;  Laterality: N/A;   • COLONOSCOPY     • HIP SURGERY Left    • THYROIDECTOMY, PARTIAL  2017   • VENOUS ACCESS DEVICE (PORT) INSERTION Left 12/6/2021    Procedure: INSERTION VENOUS ACCESS DEVICE;  Surgeon: Krunal Badillo MD;  Location: [facility];  Service: General;  Laterality: Left;     Family History   Problem Relation Age of Onset   • Dementia Mother    • Arthritis Mother    • Heart disease Father    • Hypertension Father      Social History     Tobacco Use   • Smoking status: Never Smoker   • Smokeless tobacco: Never Used   Substance Use Topics   • Alcohol use: Yes     Alcohol/week: 1.0 standard drink     Types: 1 Glasses of wine per week     Comment: less than monthly       Current Outpatient Medications:   •  Calcium Carbonate-Vitamin D 600-400 MG-UNIT chewable tablet, Chew 1 tablet Daily., Disp: , Rfl:   •  dexamethasone (DECADRON) 4 MG tablet, Take 5 tablets the night before chemotherapy.  Take with food., Disp: 5 tablet, Rfl: 5  •  folic acid (FOLVITE) 1 MG tablet, take 1 tablet by mouth once daily (Patient taking differently: Take 1 mg by mouth Daily.), Disp: 90 tablet, Rfl: 1  •  hydroxychloroquine (PLAQUENIL) 200 MG tablet, TAKE 1 TABLET BY MOUTH TWICE A DAY (Patient taking differently: Take 200 mg by mouth 2 (Two) Times a Day. Takes in afternoon and evening), Disp: 180 tablet, Rfl: 0  •  hydrOXYzine (ATARAX) 10 MG tablet, TAKE 1 TABLET BY MOUTH TWICE DAILY AS NEEDED FOR ANXIETY (Patient taking differently: Take 10 mg by mouth Every 6 (Six) Hours As Needed.), Disp: 40 tablet, Rfl: 2  •  levothyroxine (SYNTHROID, LEVOTHROID) 137 MCG tablet, Take 1 tablet by mouth Daily., Disp: 90 tablet, Rfl: 0  •  lidocaine-prilocaine (EMLA) 2.5-2.5 % cream, Apply 1 application topically to the appropriate area as directed Take As Directed. Apply to port 1 hour prior to port access, Disp: 30 g, Rfl: 1  •  linaclotide (Linzess) 72 MCG capsule capsule, Take 1 capsule by mouth Every  "Morning Before Breakfast., Disp: 30 capsule, Rfl: 2  •  lisinopril (PRINIVIL,ZESTRIL) 40 MG tablet, TAKE 1 TABLET BY MOUTH DAILY (Patient taking differently: Take 40 mg by mouth Every Evening. 12/5LD), Disp: 90 tablet, Rfl: 1  •  loratadine (Claritin) 10 MG tablet, Take 1 tablet night before and 1 tablet morning of chemotherapy., Disp: 2 tablet, Rfl: 5  •  OLANZapine (ZyPREXA) 5 MG tablet, Take 1 tablet by mouth Every Night. Take on days 2, 3 and 4 after chemotherapy., Disp: 3 tablet, Rfl: 5  •  ondansetron (ZOFRAN) 8 MG tablet, Take 1 tablet by mouth 3 (Three) Times a Day As Needed for Nausea or Vomiting., Disp: 30 tablet, Rfl: 5  •  oxyCODONE-acetaminophen (PERCOCET) 5-325 MG per tablet, Take 1 tablet by mouth Take As Directed. Every 4-6 hours prn pain, Disp: 40 tablet, Rfl: 0  •  pantoprazole (Protonix) 40 MG EC tablet, Take 1 tablet by mouth Daily., Disp: 30 tablet, Rfl: 6    Objective   Vital Signs:   /79 (BP Location: Left arm, Patient Position: Sitting, Cuff Size: Adult)   Pulse 86   Ht 165.1 cm (65\")   Wt 93.1 kg (205 lb 3.2 oz)   SpO2 100%   BMI 34.15 kg/m²       Physical Exam  Vitals and nursing note reviewed.   Constitutional:       General: She is not in acute distress.     Appearance: She is well-developed. She is ill-appearing. She is not diaphoretic.      Comments: Fatigue   Eyes:      Pupils: Pupils are equal, round, and reactive to light.   Neck:      Thyroid: No thyromegaly.      Vascular: No JVD.   Pulmonary:      Effort: Pulmonary effort is normal. No respiratory distress.   Abdominal:      General: Bowel sounds are normal. There is no distension.      Palpations: Abdomen is soft.      Tenderness: There is abdominal tenderness (Generalized).   Musculoskeletal:         General: No swelling or tenderness. Normal range of motion.      Cervical back: Normal range of motion and neck supple.   Skin:     General: Skin is warm and dry.   Neurological:      Mental Status: She is alert and " oriented to person, place, and time.      Sensory: No sensory deficit.   Psychiatric:         Behavior: Behavior normal.         Thought Content: Thought content normal.         Judgment: Judgment normal.          Result Review :     Lab on 12/14/2021   Component Date Value Ref Range Status   • Glucose 12/14/2021 102* 65 - 99 mg/dL Final   • BUN 12/14/2021 16  8 - 23 mg/dL Final   • Creatinine 12/14/2021 1.00  0.57 - 1.00 mg/dL Final   • Sodium 12/14/2021 137  136 - 145 mmol/L Final   • Potassium 12/14/2021 4.3  3.5 - 5.2 mmol/L Final   • Chloride 12/14/2021 104  98 - 107 mmol/L Final   • CO2 12/14/2021 23.0  22.0 - 29.0 mmol/L Final   • Calcium 12/14/2021 8.6  8.6 - 10.5 mg/dL Final   • Total Protein 12/14/2021 6.4  6.0 - 8.5 g/dL Final   • Albumin 12/14/2021 3.00* 3.50 - 5.20 g/dL Final   • ALT (SGPT) 12/14/2021 7  1 - 33 U/L Final   • AST (SGOT) 12/14/2021 16  1 - 32 U/L Final   • Alkaline Phosphatase 12/14/2021 69  39 - 117 U/L Final   • Total Bilirubin 12/14/2021 0.3  0.0 - 1.2 mg/dL Final   • eGFR Non  Amer 12/14/2021 56* >60 mL/min/1.73 Final   • Globulin 12/14/2021 3.4  gm/dL Final   • A/G Ratio 12/14/2021 0.9  g/dL Final   • BUN/Creatinine Ratio 12/14/2021 16.0  7.0 - 25.0 Final   • Anion Gap 12/14/2021 10.0  5.0 - 15.0 mmol/L Final   • WBC 12/14/2021 2.06* 3.40 - 10.80 10*3/mm3 Final   • RBC 12/14/2021 3.68* 3.77 - 5.28 10*6/mm3 Final   • Hemoglobin 12/14/2021 10.3* 12.0 - 15.9 g/dL Final   • Hematocrit 12/14/2021 34.0  34.0 - 46.6 % Final   • MCV 12/14/2021 92.4  79.0 - 97.0 fL Final   • MCH 12/14/2021 28.0  26.6 - 33.0 pg Final   • MCHC 12/14/2021 30.3* 31.5 - 35.7 g/dL Final   • RDW 12/14/2021 14.5  12.3 - 15.4 % Final   • RDW-SD 12/14/2021 46.9  37.0 - 54.0 fl Final   • MPV 12/14/2021 11.5  6.0 - 12.0 fL Final   • Platelets 12/14/2021 105* 140 - 450 10*3/mm3 Final   • Neutrophil % 12/14/2021 50.9  42.7 - 76.0 % Final   • Lymphocyte % 12/14/2021 28.2  19.6 - 45.3 % Final   • Monocyte % 12/14/2021  18.0* 5.0 - 12.0 % Final   • Eosinophil % 12/14/2021 1.9  0.3 - 6.2 % Final   • Basophil % 12/14/2021 1.0  0.0 - 1.5 % Final   • Neutrophils, Absolute 12/14/2021 1.05* 1.70 - 7.00 10*3/mm3 Final   • Lymphocytes, Absolute 12/14/2021 0.58* 0.70 - 3.10 10*3/mm3 Final   • Monocytes, Absolute 12/14/2021 0.37  0.10 - 0.90 10*3/mm3 Final   • Eosinophils, Absolute 12/14/2021 0.04  0.00 - 0.40 10*3/mm3 Final   • Basophils, Absolute 12/14/2021 0.02  0.00 - 0.20 10*3/mm3 Final                       Assessment and Plan    Diagnoses and all orders for this visit:    1. Malignant neoplasm of ovary, unspecified laterality (HCC) (Primary)    2. Abnormal renal function    3. Acquired hypothyroidism    4. Essential hypertension    5. Generalized abdominal pain    6. Calculus of gallbladder with biliary obstruction but without cholecystitis    7. Nausea    8. Hypoalbuminemia    -Continue chemotherapy per oncology  -Follow-up with gynecologic oncologist as directed  -Notify for worsening of abdominal pain/n/v or diarrhea.  -Continue pantoprazole and Zofran  -Continue all other medications as directed  -Recommend continue checking BP prior to medications and if less than 120 systolic hold lisinopril, recheck again in 2 hours.   -May need to consider decreasing lisinopril to 20 mg  -Mammogram on hold due to having venous port in place  -Patient received Covid booster  -Discussed trial of stopping instant [place] breakfast, may be exacerbating abdominal pain.  Try yogurt and/or probiotic mixed in smoothie with protein powder      I spent 30 minutes caring for Lucy Mahan on this date of service. This time includes time spent by me in the following activities: preparing for the visit, reviewing tests, performing a medically appropriate examination and/or evaluation , counseling and educating the patient/family/caregiver, ordering medications, tests, or procedures and documenting information in the medical record        Follow Up      Return in about 4 weeks (around 1/13/2022) for cancer, HTN.  Patient was given instructions and counseling regarding her condition or for health maintenance advice. Please see specific information pulled into the AVS if appropriate.      EMR Dragon transcription disclaimer:  Some of this encounter note is an electronic transcription translation of spoken language to printed text. The electronic translation of spoken language may permit erroneous, or at times, nonsensical words or phrases to be inadvertently transcribed; Although I have reviewed the note for such errors some may still exist.

## 2021-12-20 DIAGNOSIS — C56.9 MALIGNANT NEOPLASM OF OVARY, UNSPECIFIED LATERALITY (HCC): ICD-10-CM

## 2021-12-20 RX ORDER — OXYCODONE HYDROCHLORIDE AND ACETAMINOPHEN 5; 325 MG/1; MG/1
1 TABLET ORAL TAKE AS DIRECTED
Qty: 40 TABLET | Refills: 0 | Status: SHIPPED | OUTPATIENT
Start: 2021-12-20 | End: 2022-01-07 | Stop reason: SDUPTHER

## 2021-12-20 NOTE — TELEPHONE ENCOUNTER
Rx Refill Note  Requested Prescriptions     Pending Prescriptions Disp Refills   • oxyCODONE-acetaminophen (PERCOCET) 5-325 MG per tablet 40 tablet 0     Sig: Take 1 tablet by mouth Take As Directed. Every 4-6 hours prn pain      Last office visit with prescribing clinician: 12/3/2021      Next office visit with prescribing clinician: Visit date not found            Mayda Harris RN  12/20/21, 10:22 EST

## 2021-12-21 ENCOUNTER — LAB (OUTPATIENT)
Dept: LAB | Facility: HOSPITAL | Age: 61
End: 2021-12-21

## 2021-12-21 DIAGNOSIS — C56.9 MALIGNANT NEOPLASM OF OVARY, UNSPECIFIED LATERALITY (HCC): ICD-10-CM

## 2021-12-21 DIAGNOSIS — C56.9 MALIGNANT NEOPLASM OF OVARY, UNSPECIFIED LATERALITY (HCC): Primary | ICD-10-CM

## 2021-12-21 LAB
ALBUMIN SERPL-MCNC: 3.1 G/DL (ref 3.5–5.2)
ALBUMIN/GLOB SERPL: 0.9 G/DL
ALP SERPL-CCNC: 70 U/L (ref 39–117)
ALT SERPL W P-5'-P-CCNC: 5 U/L (ref 1–33)
ANION GAP SERPL CALCULATED.3IONS-SCNC: 13 MMOL/L (ref 5–15)
AST SERPL-CCNC: 12 U/L (ref 1–32)
BASOPHILS # BLD AUTO: 0.02 10*3/MM3 (ref 0–0.2)
BASOPHILS NFR BLD AUTO: 0.4 % (ref 0–1.5)
BILIRUB SERPL-MCNC: 0.3 MG/DL (ref 0–1.2)
BUN SERPL-MCNC: 13 MG/DL (ref 8–23)
BUN/CREAT SERPL: 11.7 (ref 7–25)
CALCIUM SPEC-SCNC: 8.9 MG/DL (ref 8.6–10.5)
CHLORIDE SERPL-SCNC: 106 MMOL/L (ref 98–107)
CO2 SERPL-SCNC: 23 MMOL/L (ref 22–29)
CREAT SERPL-MCNC: 1.11 MG/DL (ref 0.57–1)
DEPRECATED RDW RBC AUTO: 43.9 FL (ref 37–54)
EOSINOPHIL # BLD AUTO: 0.01 10*3/MM3 (ref 0–0.4)
EOSINOPHIL NFR BLD AUTO: 0.2 % (ref 0.3–6.2)
ERYTHROCYTE [DISTWIDTH] IN BLOOD BY AUTOMATED COUNT: 14.5 % (ref 12.3–15.4)
GFR SERPL CREATININE-BSD FRML MDRD: 50 ML/MIN/1.73
GLOBULIN UR ELPH-MCNC: 3.3 GM/DL
GLUCOSE SERPL-MCNC: 97 MG/DL (ref 65–99)
HCT VFR BLD AUTO: 32.1 % (ref 34–46.6)
HGB BLD-MCNC: 10 G/DL (ref 12–15.9)
LYMPHOCYTES # BLD AUTO: 1.04 10*3/MM3 (ref 0.7–3.1)
LYMPHOCYTES NFR BLD AUTO: 20 % (ref 19.6–45.3)
MCH RBC QN AUTO: 27.9 PG (ref 26.6–33)
MCHC RBC AUTO-ENTMCNC: 31.2 G/DL (ref 31.5–35.7)
MCV RBC AUTO: 89.7 FL (ref 79–97)
MONOCYTES # BLD AUTO: 0.56 10*3/MM3 (ref 0.1–0.9)
MONOCYTES NFR BLD AUTO: 10.8 % (ref 5–12)
NEUTROPHILS NFR BLD AUTO: 3.57 10*3/MM3 (ref 1.7–7)
NEUTROPHILS NFR BLD AUTO: 68.6 % (ref 42.7–76)
PLATELET # BLD AUTO: 111 10*3/MM3 (ref 140–450)
PMV BLD AUTO: 9.2 FL (ref 6–12)
POTASSIUM SERPL-SCNC: 4.2 MMOL/L (ref 3.5–5.2)
PROT SERPL-MCNC: 6.4 G/DL (ref 6–8.5)
RBC # BLD AUTO: 3.58 10*6/MM3 (ref 3.77–5.28)
SODIUM SERPL-SCNC: 142 MMOL/L (ref 136–145)
WBC NRBC COR # BLD: 5.2 10*3/MM3 (ref 3.4–10.8)

## 2021-12-21 PROCEDURE — 80053 COMPREHEN METABOLIC PANEL: CPT

## 2021-12-21 PROCEDURE — 36415 COLL VENOUS BLD VENIPUNCTURE: CPT

## 2021-12-21 PROCEDURE — 85025 COMPLETE CBC W/AUTO DIFF WBC: CPT

## 2021-12-22 DIAGNOSIS — C56.9 MALIGNANT NEOPLASM OF OVARY, UNSPECIFIED LATERALITY (HCC): ICD-10-CM

## 2021-12-22 RX ORDER — OXYCODONE HYDROCHLORIDE AND ACETAMINOPHEN 5; 325 MG/1; MG/1
1 TABLET ORAL TAKE AS DIRECTED
Qty: 40 TABLET | Refills: 0 | OUTPATIENT
Start: 2021-12-22

## 2021-12-22 NOTE — TELEPHONE ENCOUNTER
Caller:  LUCY MONTENEGRO    Relationship: SELF    Best call back number: [phone]    Requested Prescriptions:   Requested Prescriptions     Pending Prescriptions Disp Refills   • oxyCODONE-acetaminophen (PERCOCET) 5-325 MG per tablet 40 tablet 0     Sig: Take 1 tablet by mouth Take As Directed. Every 4-6 hours prn pain        Pharmacy where request should be sent:    [place] [id number], [facility]. [facility] - [phone]  - [phone]   [phone]      Does the patient have less than a 3 day supply:  [x] Yes  [] No    Lucy Alcantar   12/22/21 09:50 EST

## 2021-12-23 ENCOUNTER — TELEPHONE (OUTPATIENT)
Dept: ONCOLOGY | Facility: CLINIC | Age: 61
End: 2021-12-23

## 2021-12-23 DIAGNOSIS — C56.9 MALIGNANT NEOPLASM OF OVARY, UNSPECIFIED LATERALITY (HCC): Primary | ICD-10-CM

## 2021-12-23 RX ORDER — SODIUM CHLORIDE 9 MG/ML
250 INJECTION, SOLUTION INTRAVENOUS ONCE
Status: CANCELLED | OUTPATIENT
Start: 2021-12-28

## 2021-12-23 RX ORDER — FAMOTIDINE 10 MG/ML
20 INJECTION, SOLUTION INTRAVENOUS AS NEEDED
Status: CANCELLED | OUTPATIENT
Start: 2021-12-28

## 2021-12-23 RX ORDER — OLANZAPINE 5 MG/1
5 TABLET ORAL ONCE
Status: CANCELLED | OUTPATIENT
Start: 2021-12-28 | End: 2021-12-28

## 2021-12-23 RX ORDER — PALONOSETRON 0.05 MG/ML
0.25 INJECTION, SOLUTION INTRAVENOUS ONCE
Status: CANCELLED | OUTPATIENT
Start: 2021-12-28

## 2021-12-23 RX ORDER — FAMOTIDINE 10 MG/ML
20 INJECTION, SOLUTION INTRAVENOUS ONCE
Status: CANCELLED | OUTPATIENT
Start: 2021-12-28

## 2021-12-23 RX ORDER — DIPHENHYDRAMINE HYDROCHLORIDE 50 MG/ML
50 INJECTION INTRAMUSCULAR; INTRAVENOUS AS NEEDED
Status: CANCELLED | OUTPATIENT
Start: 2021-12-28

## 2021-12-28 ENCOUNTER — DOCUMENTATION (OUTPATIENT)
Dept: ONCOLOGY | Facility: HOSPITAL | Age: 61
End: 2021-12-28

## 2021-12-28 ENCOUNTER — HOSPITAL ENCOUNTER (OUTPATIENT)
Dept: ONCOLOGY | Facility: HOSPITAL | Age: 61
Setting detail: INFUSION SERIES
Discharge: HOME | End: 2021-12-28

## 2021-12-28 VITALS
BODY MASS INDEX: 33.73 KG/M2 | HEART RATE: 67 BPM | SYSTOLIC BLOOD PRESSURE: 122 MMHG | OXYGEN SATURATION: 96 % | RESPIRATION RATE: 20 BRPM | TEMPERATURE: 96.9 F | WEIGHT: 202.7 LBS | DIASTOLIC BLOOD PRESSURE: 75 MMHG

## 2021-12-28 DIAGNOSIS — C56.9 MALIGNANT NEOPLASM OF OVARY, UNSPECIFIED LATERALITY (HCC): Primary | ICD-10-CM

## 2021-12-28 LAB
ALBUMIN SERPL-MCNC: 3.3 G/DL (ref 3.5–5.2)
ALBUMIN/GLOB SERPL: 1 G/DL
ALP SERPL-CCNC: 63 U/L (ref 39–117)
ALT SERPL W P-5'-P-CCNC: 5 U/L (ref 1–33)
ANION GAP SERPL CALCULATED.3IONS-SCNC: 12 MMOL/L (ref 5–15)
AST SERPL-CCNC: 11 U/L (ref 1–32)
BASOPHILS # BLD AUTO: 0.02 10*3/MM3 (ref 0–0.2)
BASOPHILS NFR BLD AUTO: 0.2 % (ref 0–1.5)
BILIRUB SERPL-MCNC: 0.2 MG/DL (ref 0–1.2)
BUN SERPL-MCNC: 15 MG/DL (ref 8–23)
BUN/CREAT SERPL: 16.1 (ref 7–25)
CALCIUM SPEC-SCNC: 9.1 MG/DL (ref 8.6–10.5)
CANCER AG125 SERPL QL: 635.2 U/ML (ref 0–38.1)
CHLORIDE SERPL-SCNC: 104 MMOL/L (ref 98–107)
CO2 SERPL-SCNC: 23 MMOL/L (ref 22–29)
CREAT BLDA-MCNC: 1 MG/DL (ref 0.6–1.3)
CREAT SERPL-MCNC: 0.93 MG/DL (ref 0.57–1)
DEPRECATED RDW RBC AUTO: 45.7 FL (ref 37–54)
EOSINOPHIL # BLD AUTO: 0 10*3/MM3 (ref 0–0.4)
EOSINOPHIL NFR BLD AUTO: 0 % (ref 0.3–6.2)
ERYTHROCYTE [DISTWIDTH] IN BLOOD BY AUTOMATED COUNT: 15.5 % (ref 12.3–15.4)
GFR SERPL CREATININE-BSD FRML MDRD: 61 ML/MIN/1.73
GLOBULIN UR ELPH-MCNC: 3.4 GM/DL
GLUCOSE SERPL-MCNC: 153 MG/DL (ref 65–99)
HCT VFR BLD AUTO: 30.8 % (ref 34–46.6)
HGB BLD-MCNC: 9.7 G/DL (ref 12–15.9)
LYMPHOCYTES # BLD AUTO: 0.47 10*3/MM3 (ref 0.7–3.1)
LYMPHOCYTES NFR BLD AUTO: 5.7 % (ref 19.6–45.3)
MCH RBC QN AUTO: 28.4 PG (ref 26.6–33)
MCHC RBC AUTO-ENTMCNC: 31.5 G/DL (ref 31.5–35.7)
MCV RBC AUTO: 90.1 FL (ref 79–97)
MONOCYTES # BLD AUTO: 0.14 10*3/MM3 (ref 0.1–0.9)
MONOCYTES NFR BLD AUTO: 1.7 % (ref 5–12)
NEUTROPHILS NFR BLD AUTO: 7.59 10*3/MM3 (ref 1.7–7)
NEUTROPHILS NFR BLD AUTO: 92.4 % (ref 42.7–76)
PLATELET # BLD AUTO: 408 10*3/MM3 (ref 140–450)
PMV BLD AUTO: 8.6 FL (ref 6–12)
POTASSIUM SERPL-SCNC: 4 MMOL/L (ref 3.5–5.2)
PROT SERPL-MCNC: 6.7 G/DL (ref 6–8.5)
RBC # BLD AUTO: 3.42 10*6/MM3 (ref 3.77–5.28)
SODIUM SERPL-SCNC: 139 MMOL/L (ref 136–145)
WBC NRBC COR # BLD: 8.22 10*3/MM3 (ref 3.4–10.8)

## 2021-12-28 PROCEDURE — 96367 TX/PROPH/DG ADDL SEQ IV INF: CPT

## 2021-12-28 PROCEDURE — 25010000002 DEXAMETHASONE SODIUM PHOSPHATE 120 MG/30ML SOLUTION: Performed by: INTERNAL MEDICINE

## 2021-12-28 PROCEDURE — 25010000002 DIPHENHYDRAMINE PER 50 MG: Performed by: INTERNAL MEDICINE

## 2021-12-28 PROCEDURE — 85025 COMPLETE CBC W/AUTO DIFF WBC: CPT | Performed by: INTERNAL MEDICINE

## 2021-12-28 PROCEDURE — 25010000002 PALONOSETRON PER 25 MCG: Performed by: INTERNAL MEDICINE

## 2021-12-28 PROCEDURE — 96417 CHEMO IV INFUS EACH ADDL SEQ: CPT

## 2021-12-28 PROCEDURE — 25010000002 HEPARIN LOCK FLUSH PER 10 UNITS: Performed by: INTERNAL MEDICINE

## 2021-12-28 PROCEDURE — 96375 TX/PRO/DX INJ NEW DRUG ADDON: CPT

## 2021-12-28 PROCEDURE — 96415 CHEMO IV INFUSION ADDL HR: CPT

## 2021-12-28 PROCEDURE — 25010000002 FOSAPREPITANT PER 1 MG: Performed by: INTERNAL MEDICINE

## 2021-12-28 PROCEDURE — 82565 ASSAY OF CREATININE: CPT

## 2021-12-28 PROCEDURE — 25010000002 CARBOPLATIN PER 50 MG: Performed by: INTERNAL MEDICINE

## 2021-12-28 PROCEDURE — 36591 DRAW BLOOD OFF VENOUS DEVICE: CPT

## 2021-12-28 PROCEDURE — 86304 IMMUNOASSAY TUMOR CA 125: CPT | Performed by: INTERNAL MEDICINE

## 2021-12-28 PROCEDURE — 80053 COMPREHEN METABOLIC PANEL: CPT | Performed by: INTERNAL MEDICINE

## 2021-12-28 PROCEDURE — 96377 APPLICATON ON-BODY INJECTOR: CPT

## 2021-12-28 PROCEDURE — 96413 CHEMO IV INFUSION 1 HR: CPT

## 2021-12-28 PROCEDURE — 25010000002 PEGFILGRASTIM 6 MG/0.6ML PREFILLED SYRINGE KIT: Performed by: INTERNAL MEDICINE

## 2021-12-28 PROCEDURE — 25010000002 PACLITAXEL PER 1 MG: Performed by: INTERNAL MEDICINE

## 2021-12-28 RX ORDER — OLANZAPINE 5 MG/1
5 TABLET ORAL ONCE
Status: COMPLETED | OUTPATIENT
Start: 2021-12-28 | End: 2021-12-28

## 2021-12-28 RX ORDER — SODIUM CHLORIDE 9 MG/ML
250 INJECTION, SOLUTION INTRAVENOUS ONCE
Status: COMPLETED | OUTPATIENT
Start: 2021-12-28 | End: 2021-12-28

## 2021-12-28 RX ORDER — SODIUM CHLORIDE 0.9 % (FLUSH) 0.9 %
10 SYRINGE (ML) INJECTION AS NEEDED
Status: CANCELLED | OUTPATIENT
Start: 2021-12-28

## 2021-12-28 RX ORDER — HEPARIN SODIUM (PORCINE) LOCK FLUSH IV SOLN 100 UNIT/ML 100 UNIT/ML
500 SOLUTION INTRAVENOUS AS NEEDED
Status: CANCELLED | OUTPATIENT
Start: 2021-12-28

## 2021-12-28 RX ORDER — FAMOTIDINE 10 MG/ML
20 INJECTION, SOLUTION INTRAVENOUS ONCE
Status: COMPLETED | OUTPATIENT
Start: 2021-12-28 | End: 2021-12-28

## 2021-12-28 RX ORDER — PALONOSETRON 0.05 MG/ML
0.25 INJECTION, SOLUTION INTRAVENOUS ONCE
Status: COMPLETED | OUTPATIENT
Start: 2021-12-28 | End: 2021-12-28

## 2021-12-28 RX ORDER — HEPARIN SODIUM (PORCINE) LOCK FLUSH IV SOLN 100 UNIT/ML 100 UNIT/ML
500 SOLUTION INTRAVENOUS AS NEEDED
Status: DISCONTINUED | OUTPATIENT
Start: 2021-12-28 | End: 2021-12-29 | Stop reason: HOSPADM

## 2021-12-28 RX ORDER — SODIUM CHLORIDE 0.9 % (FLUSH) 0.9 %
10 SYRINGE (ML) INJECTION AS NEEDED
Status: DISCONTINUED | OUTPATIENT
Start: 2021-12-28 | End: 2021-12-29 | Stop reason: HOSPADM

## 2021-12-28 RX ADMIN — HEPARIN 500 UNITS: 100 SYRINGE at 13:55

## 2021-12-28 RX ADMIN — PEGFILGRASTIM 6 MG: KIT SUBCUTANEOUS at 13:58

## 2021-12-28 RX ADMIN — PACLITAXEL 345 MG: 6 INJECTION, SOLUTION INTRAVENOUS at 09:56

## 2021-12-28 RX ADMIN — SODIUM CHLORIDE 100 ML: 9 INJECTION, SOLUTION INTRAVENOUS at 09:18

## 2021-12-28 RX ADMIN — Medication 10 ML: at 13:55

## 2021-12-28 RX ADMIN — OLANZAPINE 5 MG: 5 TABLET, FILM COATED ORAL at 08:20

## 2021-12-28 RX ADMIN — PALONOSETRON HYDROCHLORIDE 0.25 MG: 0.25 INJECTION, SOLUTION INTRAVENOUS at 08:22

## 2021-12-28 RX ADMIN — SODIUM CHLORIDE 250 ML: 9 INJECTION, SOLUTION INTRAVENOUS at 08:20

## 2021-12-28 RX ADMIN — FAMOTIDINE 20 MG: 10 INJECTION, SOLUTION INTRAVENOUS at 08:25

## 2021-12-28 RX ADMIN — DIPHENHYDRAMINE HYDROCHLORIDE 50 MG: 50 INJECTION, SOLUTION INTRAMUSCULAR; INTRAVENOUS at 08:54

## 2021-12-28 RX ADMIN — CARBOPLATIN 650 MG: 10 INJECTION, SOLUTION INTRAVENOUS at 13:16

## 2021-12-28 RX ADMIN — DEXAMETHASONE SODIUM PHOSPHATE 20 MG: 4 INJECTION, SOLUTION INTRA-ARTICULAR; INTRALESIONAL; INTRAMUSCULAR; INTRAVENOUS; SOFT TISSUE at 08:30

## 2021-12-28 NOTE — PROGRESS NOTES
Pt. Here at clinic for C2 Carboplatin, Taxol,   Pt. Has no complaints today and all lab results within parameters for treatment.   Treatment given as ordered and pt. Tolerated treatment well.   Pt. Discharged from clinic with no complaints and AVS was given.

## 2021-12-28 NOTE — PROGRESS NOTES
Case Management/ Note    Patient Name: Lucy Mahan  YOB: 1960  MRN #: [id number]    OSW met with Lucy and her daughter Leni. She is pleasant, alert and oriented to person, place and time. Mood and affect are congruent.  She spoke about being sad and of her emotions being up and down as she is adjusting to having treatments, having side effects from treatments and trying different things to adjust to this. She said she is able to work between 4-6 hours per day. She has decided to do a couple of things to care for herself or her home each day dependent upon how she feels. She feels this will help her mood. She denies having a sad mood most of the day, or more days than not in the last two weeks. Her appetite has changed since treatment. She is able to maintain a positive attitude. She tires easily but rests when she feels she needs rest. She spoke about the dislike of the winter months as she is not able to sit outside and enjoy the sun. She admits that she has a sad mood more in the winter than in the other seasons. Suggested the use of a light therapy box used in conjunction with doing things she enjoys as a time of self-care. She gave v/u. Her positive attitude and willingness to do something for herself each day were validated and encouraged her to continue this.     She asked for a disability placard; paperwork was complete and given to SANTINO Ohara for Dr. Hernandez's signature and then to return to the patient. Her daughter, Leni was made aware of this.     Electronically signed by:   Bhavana Linares LCSW, OSW-C  12/28/21, 12:20 EST

## 2021-12-29 NOTE — PROGRESS NOTES
Hematology/Oncology Outpatient Follow Up    PATIENT NAME:Lucy Mahan  :1960  MRN: [id number]  PRIMARY CARE PHYSICIAN: [place] Avalos APRN  REFERRING PHYSICIAN: [place] Avalos APRN    Chief Complaint   Patient presents with   • Follow-up     Malignant neoplasm of ovary, unspecified laterality         HISTORY OF PRESENT ILLNESS:     This is a 60-year-old female who developed abdominal pain in 2021.  Patient has also lost approximately 35 pounds during that..  She has had loss of appetite.  Due to the abdominal pain,  she had an ultrasound of the abdomen done on 10/13/2021.  This basically revealed no liver lesions.  Common bile duct is normal at 3 mm.  There is a nonmobile 3 cm shadowing gallstone within the gallbladder neck.  No gallbladder thickening or pericholecystic fluid collection.  Patient was then referred to general surgery and was seen by Dr. Badillo.  Who took her to surgery on 11/10/2021 for diagnostic laparoscopy and peritoneal biopsy.  Intra-Op , she was noted to have peritoneal studding with malignancy throughout the abdominal cavity and biopsies were completed. 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  The tumor was focally positive for progesterone receptor, positive for CK7, estrogen receptor, CA-125 and PAX 8..  The staining pattern is consistent with ovarian serous carcinoma.      She had CT scan of the chest, abdomen and pelvis and the chest is a 2 mm noncalcified nodule within the left lower lobe, noncalcified nodule in the left upper lobe measuring 4 mm and 3 mm.  In the abdomen there is a 5.1 x 5.3 cm enhancing soft tissue mass in the pelvis to the right of midline associated with the adnexal region possibly representing a primary ovarian malignancy.  No right or left ovarian tissue was seen.  There is abnormal omental thickening and nodularity consistent with carcinomatosis greatest bulk in the left mid abdomen measuring up to 10.8 cm x 2.5 cm.   There is nodular peritoneal thickening also present within the abdomen and pelvis consistent with peritoneal carcinomatosis.  The small quantity of ascitic fluid in the abdomen and pelvis.  Carcinomatosis nodular studding is seen along the inferior right hepatic lobe.     She  has been referred to us for further evaluation and management of her newly diagnosed ovarian carcinoma.     Patient is accompanied today by her daughter for this appointment.  There is  family history of precancerous cells of the  uterusin her sister.        She does not smoke and does not drink alcohol.  Patient is  and has 2 daughters.  She works for the [email or web address].    · 2021 patient was seen by Dr. Dubois who has recommended neoadjuvant chemotherapy for 2-3 cycles and then interval cytoreduction.  She has recommended carboplatinum AUC 6, Taxol 1 7 5 mg per metered squared, Avastin 15 mg/kg as was done in the oceans trial but hold Avastin for the first few cycles due to bowel risk.  · Prechemo CA-125 was 754  · 2021 patient received first cycle of chemotherapy with carboplatinum and Taxol with Neulasta  · 2021 patient received cycle 2 of combination chemotherapy with carboplatinum and Taxol with Neulasta  · 2021 CA-125 was down to 635      Past Medical History:   Diagnosis Date   • Arthritis    • Cancer (HCC)     ovarian   • Gall stones    • Hypertension    • Irritable bowel syndrome    • Rheumatoid aortitis    • Thyroid disease        Past Surgical History:   Procedure Laterality Date   •  SECTION      x2   • CHOLECYSTECTOMY N/A 11/10/2021    Procedure: diagnostic laparoscopy and peritoneal biopsy;  Surgeon: Krunal Badillo MD;  Location: [facility] MAIN OR;  Service: General;  Laterality: N/A;   • COLONOSCOPY     • HIP SURGERY Left    • THYROIDECTOMY, PARTIAL  2017   • VENOUS ACCESS DEVICE (PORT) INSERTION Left 2021    Procedure: INSERTION VENOUS ACCESS DEVICE;  Surgeon: Krunal Badillo  MD LLUVIA;  Location: [facility] MAIN OR;  Service: General;  Laterality: Left;         Current Outpatient Medications:   •  Calcium Carbonate-Vitamin D 600-400 MG-UNIT chewable tablet, Chew 1 tablet Daily., Disp: , Rfl:   •  dexamethasone (DECADRON) 4 MG tablet, Take 5 tablets the night before chemotherapy.  Take with food., Disp: 5 tablet, Rfl: 5  •  folic acid (FOLVITE) 1 MG tablet, take 1 tablet by mouth once daily (Patient taking differently: Take 1 mg by mouth Daily.), Disp: 90 tablet, Rfl: 1  •  hydroxychloroquine (PLAQUENIL) 200 MG tablet, TAKE 1 TABLET BY MOUTH TWICE A DAY (Patient taking differently: Take 200 mg by mouth 2 (Two) Times a Day. Takes in afternoon and evening), Disp: 180 tablet, Rfl: 0  •  hydrOXYzine (ATARAX) 10 MG tablet, TAKE 1 TABLET BY MOUTH TWICE DAILY AS NEEDED FOR ANXIETY (Patient taking differently: Take 10 mg by mouth Every 6 (Six) Hours As Needed.), Disp: 40 tablet, Rfl: 2  •  levothyroxine (SYNTHROID, LEVOTHROID) 137 MCG tablet, Take 1 tablet by mouth Daily., Disp: 90 tablet, Rfl: 0  •  lidocaine-prilocaine (EMLA) 2.5-2.5 % cream, Apply 1 application topically to the appropriate area as directed Take As Directed. Apply to port 1 hour prior to port access, Disp: 30 g, Rfl: 1  •  lisinopril (PRINIVIL,ZESTRIL) 40 MG tablet, TAKE 1 TABLET BY MOUTH DAILY (Patient taking differently: Take 40 mg by mouth Every Evening. 12/5LD), Disp: 90 tablet, Rfl: 1  •  loratadine (Claritin) 10 MG tablet, Take 1 tablet night before and 1 tablet morning of chemotherapy., Disp: 2 tablet, Rfl: 5  •  OLANZapine (ZyPREXA) 5 MG tablet, Take 1 tablet by mouth Every Night. Take on days 2, 3 and 4 after chemotherapy., Disp: 3 tablet, Rfl: 5  •  ondansetron (ZOFRAN) 8 MG tablet, Take 1 tablet by mouth 3 (Three) Times a Day As Needed for Nausea or Vomiting., Disp: 30 tablet, Rfl: 5  •  oxyCODONE-acetaminophen (PERCOCET) 5-325 MG per tablet, Take 1 tablet by mouth Take As Directed. Every 4-6 hours prn pain, Disp: 40  tablet, Rfl: 0  •  pantoprazole (Protonix) 40 MG EC tablet, Take 1 tablet by mouth Daily., Disp: 30 tablet, Rfl: 6  •  omeprazole (priLOSEC) 40 MG capsule, Take 1 capsule by mouth Daily., Disp: 30 capsule, Rfl: 6  No current facility-administered medications for this visit.    Facility-Administered Medications Ordered in Other Visits:   •  heparin injection 500 Units, 500 Units, Intravenous, PRNDavid Ifeoma Roseline, MD, 500 Units at 12/30/21 1347  •  sodium chloride 0.9 % flush 10 mL, 10 mL, Intravenous, PRN, Inga Hernandez MD, 10 mL at 12/30/21 1346    No Known Allergies    Family History   Problem Relation Age of Onset   • Dementia Mother    • Arthritis Mother    • Heart disease Father    • Hypertension Father        Cancer-related family history is not on file.    Social History     Tobacco Use   • Smoking status: Never Smoker   • Smokeless tobacco: Never Used   Vaping Use   • Vaping Use: Never used   Substance Use Topics   • Alcohol use: Yes     Alcohol/week: 1.0 standard drink     Types: 1 Glasses of wine per week     Comment: less than monthly   • Drug use: No       HPI, ROS and PFSH have been reviewed and confirmed on 12/30/2021.     SUBJECTIVE:    Complains of abdominal discomfort.  She is accompanied today by her daughter for this appointment.    Has fatigue with her chemotherapy and some nausea and bone aches and pains        REVIEW OF SYSTEMS:  Review of Systems   Constitutional: Positive for fatigue. Negative for chills and fever.   HENT: Negative for ear pain, mouth sores, nosebleeds and sore throat.    Eyes: Negative for photophobia and visual disturbance.   Respiratory: Negative for wheezing and stridor.    Cardiovascular: Negative for chest pain and palpitations.   Gastrointestinal: Negative for abdominal pain, diarrhea, nausea and vomiting.   Endocrine: Negative for cold intolerance and heat intolerance.   Genitourinary: Negative for dysuria and hematuria.   Musculoskeletal: Negative  "for joint swelling and neck stiffness.   Skin: Negative for color change and rash.   Neurological: Positive for weakness. Negative for seizures and syncope.   Hematological: Negative for adenopathy.   Psychiatric/Behavioral: Negative for agitation, confusion and hallucinations.     Abdominal discomfort  OBJECTIVE:    Vitals:    12/30/21 1004   BP: 122/72   Pulse: 76   Resp: 18   Temp: 97.3 °F (36.3 °C)   Weight: 91.6 kg (202 lb)   Height: 165.1 cm (65\")   PainSc:   4   PainLoc: Abdomen     Body mass index is 33.61 kg/m².    ECOG  (1) Restricted in physically strenuous activity, ambulatory and able to do work of light nature    Physical Exam  Vitals and nursing note reviewed.   Constitutional:       General: She is not in acute distress.     Appearance: She is not diaphoretic.   HENT:      Head: Normocephalic and atraumatic.      Mouth/Throat:      Mouth: Mucous membranes are dry.   Eyes:      General: No scleral icterus.        Right eye: No discharge.         Left eye: No discharge.      Conjunctiva/sclera: Conjunctivae normal.   Neck:      Thyroid: No thyromegaly.   Cardiovascular:      Rate and Rhythm: Normal rate and regular rhythm.      Heart sounds: Normal heart sounds. No friction rub. No gallop.    Pulmonary:      Effort: Pulmonary effort is normal. No respiratory distress.      Breath sounds: No stridor. No wheezing.   Abdominal:      General: Bowel sounds are normal.      Palpations: Abdomen is soft. There is no mass.      Tenderness: There is no abdominal tenderness. There is no guarding or rebound.   Musculoskeletal:         General: No tenderness. Normal range of motion.      Cervical back: Normal range of motion and neck supple.   Lymphadenopathy:      Cervical: No cervical adenopathy.   Skin:     General: Skin is warm.      Findings: No erythema or rash.   Neurological:      Mental Status: She is alert and oriented to person, place, and time.      Motor: No abnormal muscle tone.   Psychiatric:         " Behavior: Behavior normal.       I have reexamined the patient and the results are consistent with the previously documented exam. Inga Hernandez MD   RECENT LABS  WBC   Date Value Ref Range Status   12/28/2021 8.22 3.40 - 10.80 10*3/mm3 Final     RBC   Date Value Ref Range Status   12/28/2021 3.42 (L) 3.77 - 5.28 10*6/mm3 Final     Hemoglobin   Date Value Ref Range Status   12/28/2021 9.7 (L) 12.0 - 15.9 g/dL Final     Hematocrit   Date Value Ref Range Status   12/28/2021 30.8 (L) 34.0 - 46.6 % Final     MCV   Date Value Ref Range Status   12/28/2021 90.1 79.0 - 97.0 fL Final     MCH   Date Value Ref Range Status   12/28/2021 28.4 26.6 - 33.0 pg Final     MCHC   Date Value Ref Range Status   12/28/2021 31.5 31.5 - 35.7 g/dL Final     RDW   Date Value Ref Range Status   12/28/2021 15.5 (H) 12.3 - 15.4 % Final     RDW-SD   Date Value Ref Range Status   12/28/2021 45.7 37.0 - 54.0 fl Final     MPV   Date Value Ref Range Status   12/28/2021 8.6 6.0 - 12.0 fL Final     Platelets   Date Value Ref Range Status   12/28/2021 408 140 - 450 10*3/mm3 Final     Neutrophil %   Date Value Ref Range Status   12/28/2021 92.4 (H) 42.7 - 76.0 % Final     Lymphocyte %   Date Value Ref Range Status   12/28/2021 5.7 (L) 19.6 - 45.3 % Final     Monocyte %   Date Value Ref Range Status   12/28/2021 1.7 (L) 5.0 - 12.0 % Final     Eosinophil %   Date Value Ref Range Status   12/28/2021 0.0 (L) 0.3 - 6.2 % Final     Basophil %   Date Value Ref Range Status   12/28/2021 0.2 0.0 - 1.5 % Final     Neutrophils, Absolute   Date Value Ref Range Status   12/28/2021 7.59 (H) 1.70 - 7.00 10*3/mm3 Final     Lymphocytes, Absolute   Date Value Ref Range Status   12/28/2021 0.47 (L) 0.70 - 3.10 10*3/mm3 Final     Monocytes, Absolute   Date Value Ref Range Status   12/28/2021 0.14 0.10 - 0.90 10*3/mm3 Final     Eosinophils, Absolute   Date Value Ref Range Status   12/28/2021 0.00 0.00 - 0.40 10*3/mm3 Final     Basophils, Absolute   Date Value Ref  Range Status   12/28/2021 0.02 0.00 - 0.20 10*3/mm3 Final     nRBC   Date Value Ref Range Status   05/08/2019 0 0 /100[WBCs] Final       Lab Results   Component Value Date    GLUCOSE 153 (H) 12/28/2021    BUN 15 12/28/2021    CREATININE 1.00 12/28/2021    EGFRIFNONA 61 12/28/2021    EGFRIFAFRI 44 (L) 01/21/2020    BCR 16.1 12/28/2021    K 4.0 12/28/2021    CO2 23.0 12/28/2021    CALCIUM 9.1 12/28/2021    ALBUMIN 3.30 (L) 12/28/2021    LABIL2 1.2 (calc) 01/21/2020    AST 11 12/28/2021    ALT 5 12/28/2021         Assessment/Plan     Malignant neoplasm of ovary, unspecified laterality (HCC)  - CBC & Differential  - Genetic Testing - Blood,  - Basic Metabolic Panel    Family history of cancer  - Genetic Testing - Blood,      ASSESSMENT:      1. 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  CA-125 754 November 2021  2. On neoadjuvant chemotherapy with carboplatinum AUC 6, Taxol 175 mg per metered squared, with Avastin added post operatively.  Plan to administer 2-3 cycles keith adjuvantly.  Monitor serial CA-125's  3. Pulmonary nodules of unclear etiology too small for biopsy or PET resolution  4. Chemotherapy induced nausea  5. Chemotherapy induced fatigue  6. 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  No other actionable mutations identified  7. Dehydration  8. Family history of precancerous uterine cells in her sister.  Patient will benefit from genetic testing given her personal diagnosis.  9. Abdominal discomfort secondary to malignancy  10. Assessment has been reviewed and updated        Discussion     I have reviewed her imaging studies, pathology reports.  She has metastatic serous carcinoma of the ovary.  She has liver involvement.  She also has pulmonary nodules of unclear etiology.  These are too small for biopsy or PET resolution therefore they will be followed over time.  We  discussed that ovarian cancer is a surgical disease.  We will have her seen by GYN oncologist Dr. Castro to weigh in on her surgical options.  We discussed that she may benefit from neoadjuvant chemotherapy but will await Dr. Castro's final recommendations.     She was seen by Dr. Castro recommendation for her to have neoadjuvant chemotherapy with carboplatinum and Taxol      Discussed side effects of chemotherapy to include but not limited to:    Chemotherapy side effects include, but not limited to, nausea, vomiting, bone marrow suppression, which can result in blood, platelet transfusion. There is also risk of permanent bone marrow destruction, which can cause myelodysplastic syndrome or leukemia years down the line. There is risk of infection which can result in hospitalization and even death. There is also risk of fatigue, asthenia, alopecia which could become permanent. Chemo will help to reduce risk of relapse of cancer, but does not eliminate risk completely.    Discussed that Taxol chemotherapy can lead to hypersensitivity reaction fluid retention, peripheral neuropathy, myalgias, small risk of permanent alopecia    Carboplatinum can lead to myelosuppression socially thrombocytopenia      Avastin will be added postoperatively       She will benefit from genetic testing as this could have some therapeutic implications for her.    Genetics      Today's risk assessment is based on the history the patient has provided.  We discussed that the best people to screen are the ones who have been affected by malignancy as their result is more informative.  We reviewed all the hallmarks of hereditary cancer syndrome including multiple relatives on the same side of the family being affected by malignancy, cancer diagnosis in young individuals, different cancers in one individual.      We discussed the implications of a positive deleterious mutation which can result in recommendations for prophylactic surgeries,  chemoprevention, lifestyle modifications and aggressive screening with mammogram and MRI, and also increased breast awareness and breast self exams.  Patient understands if she screens positive, then at-risk family members will need to be screened as well.  Each offspring has a 50% chance of inheriting a deleterious mutation if positive in a parent.      A negative deleterious mutation will make hereditary cancer syndrome much less likely, but does not rule out the possibility of a gene mutation that cannot be detected with the available technology.  She also understands that a negative gene test result might indicate that the cancers that occurred in her family were most likely sporadic, or even if there is a mutation the patient did not inherit it.     We discussed variant of unknown significance.  Patient understands that no medical decisions will be made based on a variant of unknown significance, but I did stress the importance of maintaining contact information with us should this be the case.      We discussed the financial implications of the above testing.  Patient understands when the right clinical criteria are met that most insurance companies will cover the cost.      We also discussed the various insurability issues with a deleterious mutation result.     Patient has give us consent to proceed with comprehensive genetic analysis.            Plans:     · Comprehensive gene analysis with cancer next technology today  · IV fluids today  · Continue combination chemotherapy with carboplatinum and Taxol along with Neulasta.  Order in beacon.  Plan to begin chemo next week  · CA-125 with each cycle of chemotherapy  · Continue EMLA cream  · Continue Percocet 5 mg p.o. every 4-6 hours as needed for pain  · Prilosec 40 mg p.o. daily  · Continue weekly labs.  BMP next week  · Follow-up with me next week to finalize her plans.  · Foundation 1 testing results reviewed  · All questions answered  · Follow-up 3  weeks  · CT scans after third cycle of chemotherapy  · Schedule chemotherapy education with nurse practitioner     Patient verbalized understanding and is in agreement of the above plan.              I spent 40 total minutes, face-to-face, caring for Lucy today.  90% of this time involved counseling and/or coordination of care as documented within this note.

## 2021-12-30 ENCOUNTER — HOSPITAL ENCOUNTER (OUTPATIENT)
Dept: ONCOLOGY | Facility: HOSPITAL | Age: 61
Setting detail: INFUSION SERIES
Discharge: HOME | End: 2021-12-30

## 2021-12-30 ENCOUNTER — OFFICE VISIT (OUTPATIENT)
Dept: ONCOLOGY | Facility: CLINIC | Age: 61
End: 2021-12-30

## 2021-12-30 ENCOUNTER — APPOINTMENT (OUTPATIENT)
Dept: LAB | Facility: HOSPITAL | Age: 61
End: 2021-12-30

## 2021-12-30 VITALS
WEIGHT: 202 LBS | RESPIRATION RATE: 18 BRPM | HEIGHT: 65 IN | DIASTOLIC BLOOD PRESSURE: 72 MMHG | HEART RATE: 76 BPM | BODY MASS INDEX: 33.66 KG/M2 | TEMPERATURE: 97.3 F | SYSTOLIC BLOOD PRESSURE: 122 MMHG

## 2021-12-30 DIAGNOSIS — Z80.9 FAMILY HISTORY OF CANCER: ICD-10-CM

## 2021-12-30 DIAGNOSIS — E86.0 DEHYDRATION: ICD-10-CM

## 2021-12-30 DIAGNOSIS — C56.9 MALIGNANT NEOPLASM OF OVARY, UNSPECIFIED LATERALITY (HCC): Primary | ICD-10-CM

## 2021-12-30 PROCEDURE — 25010000002 HEPARIN LOCK FLUSH PER 10 UNITS: Performed by: INTERNAL MEDICINE

## 2021-12-30 PROCEDURE — 36591 DRAW BLOOD OFF VENOUS DEVICE: CPT

## 2021-12-30 PROCEDURE — 99215 OFFICE O/P EST HI 40 MIN: CPT | Performed by: INTERNAL MEDICINE

## 2021-12-30 PROCEDURE — 96360 HYDRATION IV INFUSION INIT: CPT

## 2021-12-30 PROCEDURE — 96361 HYDRATE IV INFUSION ADD-ON: CPT

## 2021-12-30 RX ORDER — OMEPRAZOLE 40 MG/1
40 CAPSULE, DELAYED RELEASE ORAL DAILY
Qty: 30 CAPSULE | Refills: 6 | Status: SHIPPED | OUTPATIENT
Start: 2021-12-30 | End: 2022-08-01

## 2021-12-30 RX ORDER — HEPARIN SODIUM (PORCINE) LOCK FLUSH IV SOLN 100 UNIT/ML 100 UNIT/ML
500 SOLUTION INTRAVENOUS AS NEEDED
Status: CANCELLED | OUTPATIENT
Start: 2021-12-30

## 2021-12-30 RX ORDER — SODIUM CHLORIDE 0.9 % (FLUSH) 0.9 %
10 SYRINGE (ML) INJECTION AS NEEDED
Status: DISCONTINUED | OUTPATIENT
Start: 2021-12-30 | End: 2021-12-31 | Stop reason: HOSPADM

## 2021-12-30 RX ORDER — SODIUM CHLORIDE 0.9 % (FLUSH) 0.9 %
10 SYRINGE (ML) INJECTION AS NEEDED
Status: CANCELLED | OUTPATIENT
Start: 2021-12-30

## 2021-12-30 RX ORDER — HEPARIN SODIUM (PORCINE) LOCK FLUSH IV SOLN 100 UNIT/ML 100 UNIT/ML
500 SOLUTION INTRAVENOUS AS NEEDED
Status: DISCONTINUED | OUTPATIENT
Start: 2021-12-30 | End: 2021-12-31 | Stop reason: HOSPADM

## 2021-12-30 RX ADMIN — Medication 10 ML: at 13:46

## 2021-12-30 RX ADMIN — SODIUM CHLORIDE 1500 ML: 9 INJECTION, SOLUTION INTRAVENOUS at 11:26

## 2021-12-30 RX ADMIN — HEPARIN 500 UNITS: 100 SYRINGE at 13:47

## 2021-12-30 NOTE — PROGRESS NOTES
Pt brought back from MD side for IVF's.  Pt's port accessed and blood drawn for genetic testing.  Blood drawn from port.  10 cc blood wasted prior to specimen collection.  Specimens sent to lab for processing.  Pt given NS 1.5 liters over 2 hours per Dr Hernandez's order.

## 2022-01-01 ENCOUNTER — TELEPHONE (OUTPATIENT)
Dept: ONCOLOGY | Facility: CLINIC | Age: 62
End: 2022-01-01

## 2022-01-01 ENCOUNTER — TELEPHONE (OUTPATIENT)
Dept: FAMILY MEDICINE CLINIC | Facility: CLINIC | Age: 62
End: 2022-01-01

## 2022-01-04 ENCOUNTER — DOCUMENTATION (OUTPATIENT)
Dept: ONCOLOGY | Facility: CLINIC | Age: 62
End: 2022-01-04

## 2022-01-04 ENCOUNTER — TELEPHONE (OUTPATIENT)
Dept: ONCOLOGY | Facility: HOSPITAL | Age: 62
End: 2022-01-04

## 2022-01-04 ENCOUNTER — LAB (OUTPATIENT)
Dept: LAB | Facility: HOSPITAL | Age: 62
End: 2022-01-04

## 2022-01-04 DIAGNOSIS — C56.9 MALIGNANT NEOPLASM OF OVARY, UNSPECIFIED LATERALITY: ICD-10-CM

## 2022-01-04 LAB
ALBUMIN SERPL-MCNC: 3.6 G/DL (ref 3.5–5.2)
ALBUMIN/GLOB SERPL: 1.1 G/DL
ALP SERPL-CCNC: 71 U/L (ref 39–117)
ALT SERPL W P-5'-P-CCNC: 8 U/L (ref 1–33)
ANION GAP SERPL CALCULATED.3IONS-SCNC: 15 MMOL/L (ref 5–15)
AST SERPL-CCNC: 19 U/L (ref 1–32)
BASOPHILS # BLD AUTO: 0.02 10*3/MM3 (ref 0–0.2)
BASOPHILS NFR BLD AUTO: 1.3 % (ref 0–1.5)
BILIRUB SERPL-MCNC: 0.5 MG/DL (ref 0–1.2)
BUN SERPL-MCNC: 16 MG/DL (ref 8–23)
BUN/CREAT SERPL: 10.9 (ref 7–25)
CALCIUM SPEC-SCNC: 9.4 MG/DL (ref 8.6–10.5)
CHLORIDE SERPL-SCNC: 97 MMOL/L (ref 98–107)
CO2 SERPL-SCNC: 26 MMOL/L (ref 22–29)
CREAT SERPL-MCNC: 1.47 MG/DL (ref 0.57–1)
DEPRECATED RDW RBC AUTO: 49.3 FL (ref 37–54)
EOSINOPHIL # BLD AUTO: 0 10*3/MM3 (ref 0–0.4)
EOSINOPHIL NFR BLD AUTO: 0 % (ref 0.3–6.2)
ERYTHROCYTE [DISTWIDTH] IN BLOOD BY AUTOMATED COUNT: 15.6 % (ref 12.3–15.4)
GFR SERPL CREATININE-BSD FRML MDRD: 36 ML/MIN/1.73
GLOBULIN UR ELPH-MCNC: 3.3 GM/DL
GLUCOSE SERPL-MCNC: 112 MG/DL (ref 65–99)
HCT VFR BLD AUTO: 31.2 % (ref 34–46.6)
HGB BLD-MCNC: 9.8 G/DL (ref 12–15.9)
LYMPHOCYTES # BLD AUTO: 0.71 10*3/MM3 (ref 0.7–3.1)
LYMPHOCYTES NFR BLD AUTO: 44.7 % (ref 19.6–45.3)
MCH RBC QN AUTO: 28.3 PG (ref 26.6–33)
MCHC RBC AUTO-ENTMCNC: 31.4 G/DL (ref 31.5–35.7)
MCV RBC AUTO: 90.2 FL (ref 79–97)
MONOCYTES # BLD AUTO: 0.53 10*3/MM3 (ref 0.1–0.9)
MONOCYTES NFR BLD AUTO: 33.3 % (ref 5–12)
NEUTROPHILS NFR BLD AUTO: 0.33 10*3/MM3 (ref 1.7–7)
NEUTROPHILS NFR BLD AUTO: 20.7 % (ref 42.7–76)
PLATELET # BLD AUTO: 123 10*3/MM3 (ref 140–450)
PMV BLD AUTO: 10.9 FL (ref 6–12)
POTASSIUM SERPL-SCNC: 3 MMOL/L (ref 3.5–5.2)
PROT SERPL-MCNC: 6.9 G/DL (ref 6–8.5)
RBC # BLD AUTO: 3.46 10*6/MM3 (ref 3.77–5.28)
SODIUM SERPL-SCNC: 138 MMOL/L (ref 136–145)
WBC NRBC COR # BLD: 1.59 10*3/MM3 (ref 3.4–10.8)

## 2022-01-04 PROCEDURE — 80053 COMPREHEN METABOLIC PANEL: CPT

## 2022-01-04 PROCEDURE — 36415 COLL VENOUS BLD VENIPUNCTURE: CPT

## 2022-01-04 PROCEDURE — 85025 COMPLETE CBC W/AUTO DIFF WBC: CPT

## 2022-01-04 RX ORDER — LEVOFLOXACIN 750 MG/1
750 TABLET ORAL DAILY
Qty: 7 TABLET | Refills: 0 | Status: SHIPPED | OUTPATIENT
Start: 2022-01-04 | End: 2022-01-27

## 2022-01-04 NOTE — PROGRESS NOTES
/severe neutropenia      Patient presents today for lab work and found to have ANC of 330.  Patient was counseled to report fevers and maintain neutropenic precautions.    Levaquin 750 mg p.o. daily x7 days called to patient's pharmacy for prophylaxis.    Patient will report any fevers or signs of infection.    Electronically signed by FLORENCE Francisco, 01/04/22, 9:39 AM EST.

## 2022-01-04 NOTE — TELEPHONE ENCOUNTER
Patient came in for labs today and Noelle stated patient left, ANC 0.33 WBC 1.5 called Ceci and she is calling in antibiotic, also called and LVM for patient explaining neutropenic precautions and to call with any fevers.

## 2022-01-05 ENCOUNTER — TELEPHONE (OUTPATIENT)
Dept: ONCOLOGY | Facility: CLINIC | Age: 62
End: 2022-01-05

## 2022-01-05 RX ORDER — POTASSIUM CHLORIDE 20 MEQ/1
40 TABLET, EXTENDED RELEASE ORAL ONCE
Qty: 2 TABLET | Refills: 0 | Status: SHIPPED | OUTPATIENT
Start: 2022-01-05 | End: 2022-01-05

## 2022-01-05 NOTE — TELEPHONE ENCOUNTER
Called pt to let her know that Dr. Hernandez has ordered a one time dose of potassium. I told her that Dr. Hernandez also wanted her to take antibiotics for 7 days due to neutropenia. These were already sent to her pharmacy and she has started taking them. I also discussed the need for IV fluids. Pt unable to come in for fluids until Friday. I told her I would talk to the infusion department and see what time they can fit her in on Friday. I told her she will receive a call to schedule. She verbalized understanding.

## 2022-01-05 NOTE — TELEPHONE ENCOUNTER
----- Message from Inga Hernandez MD sent at 1/5/2022 12:00 PM EST -----  KCL 40meq po x 1 dose.    She has neutropenia therefore begin levaquin 250mg po daily for 7 days.    Schedule IVFs today, Normal Saline 1 litre IV over one hour.

## 2022-01-06 ENCOUNTER — TELEPHONE (OUTPATIENT)
Dept: ONCOLOGY | Facility: CLINIC | Age: 62
End: 2022-01-06

## 2022-01-06 ENCOUNTER — APPOINTMENT (OUTPATIENT)
Dept: ONCOLOGY | Facility: HOSPITAL | Age: 62
End: 2022-01-06

## 2022-01-06 NOTE — TELEPHONE ENCOUNTER
Called pt to schedule IV fluids for tomorrow. Appt scheduled for 1100. Pt verbalized understanding.

## 2022-01-07 ENCOUNTER — HOSPITAL ENCOUNTER (OUTPATIENT)
Dept: ONCOLOGY | Facility: HOSPITAL | Age: 62
Setting detail: INFUSION SERIES
Discharge: HOME | End: 2022-01-07

## 2022-01-07 VITALS
HEIGHT: 65 IN | BODY MASS INDEX: 32.82 KG/M2 | WEIGHT: 197 LBS | SYSTOLIC BLOOD PRESSURE: 112 MMHG | DIASTOLIC BLOOD PRESSURE: 66 MMHG | TEMPERATURE: 96.8 F | HEART RATE: 88 BPM | RESPIRATION RATE: 18 BRPM | OXYGEN SATURATION: 99 %

## 2022-01-07 DIAGNOSIS — E86.0 DEHYDRATION: Primary | ICD-10-CM

## 2022-01-07 DIAGNOSIS — C56.9 MALIGNANT NEOPLASM OF OVARY, UNSPECIFIED LATERALITY: ICD-10-CM

## 2022-01-07 LAB
BACTERIA UR QL AUTO: ABNORMAL /HPF
BILIRUB UR QL STRIP: NEGATIVE
CLARITY UR: CLEAR
COLOR UR: YELLOW
GLUCOSE UR STRIP-MCNC: NEGATIVE MG/DL
HGB UR QL STRIP.AUTO: NEGATIVE
HYALINE CASTS UR QL AUTO: ABNORMAL /LPF
KETONES UR QL STRIP: NEGATIVE
LEUKOCYTE ESTERASE UR QL STRIP.AUTO: NEGATIVE
NITRITE UR QL STRIP: NEGATIVE
PH UR STRIP.AUTO: 5.5 [PH] (ref 5–8)
PROT UR QL STRIP: ABNORMAL
RBC # UR STRIP: ABNORMAL /HPF
REF LAB TEST METHOD: ABNORMAL
RENAL EPI CELLS #/AREA URNS HPF: ABNORMAL /HPF
SP GR UR STRIP: >=1.03 (ref 1–1.03)
SQUAMOUS #/AREA URNS HPF: ABNORMAL /HPF
UROBILINOGEN UR QL STRIP: ABNORMAL
WBC # UR STRIP: ABNORMAL /HPF
YEAST URNS QL MICRO: ABNORMAL /HPF

## 2022-01-07 PROCEDURE — 81001 URINALYSIS AUTO W/SCOPE: CPT | Performed by: NURSE PRACTITIONER

## 2022-01-07 PROCEDURE — 96360 HYDRATION IV INFUSION INIT: CPT

## 2022-01-07 PROCEDURE — 96361 HYDRATE IV INFUSION ADD-ON: CPT

## 2022-01-07 PROCEDURE — 25010000002 HEPARIN LOCK FLUSH PER 10 UNITS: Performed by: INTERNAL MEDICINE

## 2022-01-07 PROCEDURE — 96523 IRRIG DRUG DELIVERY DEVICE: CPT

## 2022-01-07 RX ORDER — HEPARIN SODIUM (PORCINE) LOCK FLUSH IV SOLN 100 UNIT/ML 100 UNIT/ML
500 SOLUTION INTRAVENOUS AS NEEDED
Status: CANCELLED | OUTPATIENT
Start: 2022-01-07

## 2022-01-07 RX ORDER — SODIUM CHLORIDE 0.9 % (FLUSH) 0.9 %
10 SYRINGE (ML) INJECTION AS NEEDED
Status: DISCONTINUED | OUTPATIENT
Start: 2022-01-07 | End: 2022-01-08 | Stop reason: HOSPADM

## 2022-01-07 RX ORDER — SODIUM CHLORIDE 0.9 % (FLUSH) 0.9 %
10 SYRINGE (ML) INJECTION AS NEEDED
Status: CANCELLED | OUTPATIENT
Start: 2022-01-07

## 2022-01-07 RX ORDER — HEPARIN SODIUM (PORCINE) LOCK FLUSH IV SOLN 100 UNIT/ML 100 UNIT/ML
500 SOLUTION INTRAVENOUS AS NEEDED
Status: DISCONTINUED | OUTPATIENT
Start: 2022-01-07 | End: 2022-01-08 | Stop reason: HOSPADM

## 2022-01-07 RX ADMIN — HEPARIN 500 UNITS: 100 SYRINGE at 12:58

## 2022-01-07 RX ADMIN — SODIUM CHLORIDE 1000 ML: 9 INJECTION, SOLUTION INTRAVENOUS at 10:56

## 2022-01-07 RX ADMIN — Medication 10 ML: at 12:59

## 2022-01-07 NOTE — PROGRESS NOTES
Patient came in for IVFs. Denies new complaints other than burning with urination. I spoke to CAMRYN Ornelas and let her know. UA sent in, patient is currently on prophylactic levaquin. Patient denies further needs today. Port flushed with 10cc normal saline, blood return noted, hep locked.

## 2022-01-10 ENCOUNTER — TELEPHONE (OUTPATIENT)
Dept: ONCOLOGY | Facility: HOSPITAL | Age: 62
End: 2022-01-10

## 2022-01-10 ENCOUNTER — TELEPHONE (OUTPATIENT)
Dept: ONCOLOGY | Facility: CLINIC | Age: 62
End: 2022-01-10

## 2022-01-10 NOTE — TELEPHONE ENCOUNTER
Pt called to say she's not feeling well today. She feels dizzy and states she feels terrible. She has ringing in her ears and every time she blows her nose, it bleeds. Pt feels bloated but is passing gas. No fevers. She states she took her last antibiotic this morning. For the past 3-4 days, pt reports having a burning sensation in her abdomen. She hasn't been eating well and hasn't had a good bm since Thursday. She states that she has burning when she voids.    I asked her if she wanted to come in today for an assessment and possible IV fluids. Pt states she doesn't have a ride today and the fluids she received last time actually made her feel worse. She does have an appointment scheduled for labs tomorrow morning that her daughter is going to bring her to.  Discussed pt's symptoms with Ceci COWAN. Pt to be seen by Ceci tomorrow, after her lab appointment. Advised pt to push fluids today and she verbalized understanding.

## 2022-01-11 ENCOUNTER — OFFICE VISIT (OUTPATIENT)
Dept: ONCOLOGY | Facility: CLINIC | Age: 62
End: 2022-01-11

## 2022-01-11 ENCOUNTER — TELEPHONE (OUTPATIENT)
Dept: ONCOLOGY | Facility: HOSPITAL | Age: 62
End: 2022-01-11

## 2022-01-11 ENCOUNTER — HOSPITAL ENCOUNTER (OUTPATIENT)
Dept: INFUSION THERAPY | Facility: HOSPITAL | Age: 62
Setting detail: INFUSION SERIES
Discharge: HOME | End: 2022-01-11

## 2022-01-11 ENCOUNTER — LAB (OUTPATIENT)
Dept: LAB | Facility: HOSPITAL | Age: 62
End: 2022-01-11

## 2022-01-11 VITALS
TEMPERATURE: 97.1 F | OXYGEN SATURATION: 100 % | HEART RATE: 70 BPM | SYSTOLIC BLOOD PRESSURE: 112 MMHG | WEIGHT: 194 LBS | RESPIRATION RATE: 17 BRPM | HEIGHT: 65 IN | BODY MASS INDEX: 32.32 KG/M2 | DIASTOLIC BLOOD PRESSURE: 66 MMHG

## 2022-01-11 VITALS
BODY MASS INDEX: 32.46 KG/M2 | RESPIRATION RATE: 18 BRPM | WEIGHT: 194.8 LBS | HEIGHT: 65 IN | DIASTOLIC BLOOD PRESSURE: 77 MMHG | HEART RATE: 75 BPM | SYSTOLIC BLOOD PRESSURE: 138 MMHG | TEMPERATURE: 97.5 F

## 2022-01-11 DIAGNOSIS — E86.0 DEHYDRATION: ICD-10-CM

## 2022-01-11 DIAGNOSIS — R30.0 DYSURIA: ICD-10-CM

## 2022-01-11 DIAGNOSIS — E87.6 HYPOKALEMIA: ICD-10-CM

## 2022-01-11 DIAGNOSIS — C56.9 MALIGNANT NEOPLASM OF OVARY, UNSPECIFIED LATERALITY: Primary | ICD-10-CM

## 2022-01-11 DIAGNOSIS — D69.6 THROMBOCYTOPENIA: Primary | ICD-10-CM

## 2022-01-11 DIAGNOSIS — D69.6 THROMBOCYTOPENIA: ICD-10-CM

## 2022-01-11 DIAGNOSIS — R10.9 ABDOMINAL PAIN, UNSPECIFIED ABDOMINAL LOCATION: ICD-10-CM

## 2022-01-11 DIAGNOSIS — R52 PAIN: Primary | ICD-10-CM

## 2022-01-11 PROBLEM — R11.0 NAUSEA: Status: ACTIVE | Noted: 2022-01-11

## 2022-01-11 LAB
ABO GROUP BLD: NORMAL
ALBUMIN SERPL-MCNC: 3.5 G/DL (ref 3.5–5.2)
ALBUMIN/GLOB SERPL: 1.2 G/DL
ALP SERPL-CCNC: 57 U/L (ref 39–117)
ALT SERPL W P-5'-P-CCNC: 6 U/L (ref 1–33)
ANION GAP SERPL CALCULATED.3IONS-SCNC: 13 MMOL/L (ref 5–15)
AST SERPL-CCNC: 14 U/L (ref 1–32)
BASOPHILS # BLD AUTO: 0 10*3/MM3 (ref 0–0.2)
BASOPHILS # BLD AUTO: 0.01 10*3/MM3 (ref 0–0.2)
BASOPHILS NFR BLD AUTO: 0.3 % (ref 0–1.5)
BASOPHILS NFR BLD AUTO: 0.7 % (ref 0–1.5)
BILIRUB SERPL-MCNC: 0.3 MG/DL (ref 0–1.2)
BILIRUB UR QL STRIP: ABNORMAL
BUN SERPL-MCNC: 13 MG/DL (ref 8–23)
BUN/CREAT SERPL: 10.4 (ref 7–25)
CALCIUM SPEC-SCNC: 8.1 MG/DL (ref 8.6–10.5)
CHLORIDE SERPL-SCNC: 100 MMOL/L (ref 98–107)
CLARITY UR: ABNORMAL
CO2 SERPL-SCNC: 28 MMOL/L (ref 22–29)
COLOR UR: YELLOW
CREAT SERPL-MCNC: 1.25 MG/DL (ref 0.57–1)
DEPRECATED RDW RBC AUTO: 46.4 FL (ref 37–54)
DEPRECATED RDW RBC AUTO: 48.1 FL (ref 37–54)
EOSINOPHIL # BLD AUTO: 0 10*3/MM3 (ref 0–0.4)
EOSINOPHIL # BLD AUTO: 0.01 10*3/MM3 (ref 0–0.4)
EOSINOPHIL NFR BLD AUTO: 0 % (ref 0.3–6.2)
EOSINOPHIL NFR BLD AUTO: 0.3 % (ref 0.3–6.2)
ERYTHROCYTE [DISTWIDTH] IN BLOOD BY AUTOMATED COUNT: 15.2 % (ref 12.3–15.4)
ERYTHROCYTE [DISTWIDTH] IN BLOOD BY AUTOMATED COUNT: 15.9 % (ref 12.3–15.4)
GFR SERPL CREATININE-BSD FRML MDRD: 43 ML/MIN/1.73
GLOBULIN UR ELPH-MCNC: 2.9 GM/DL
GLUCOSE SERPL-MCNC: 108 MG/DL (ref 65–99)
GLUCOSE UR STRIP-MCNC: NEGATIVE MG/DL
HCT VFR BLD AUTO: 25 % (ref 34–46.6)
HCT VFR BLD AUTO: 27.7 % (ref 34–46.6)
HGB BLD-MCNC: 8.6 G/DL (ref 12–15.9)
HGB BLD-MCNC: 8.8 G/DL (ref 12–15.9)
HGB UR QL STRIP.AUTO: ABNORMAL
HOLD SPECIMEN: NORMAL
KETONES UR QL STRIP: ABNORMAL
LEUKOCYTE ESTERASE UR QL STRIP.AUTO: NEGATIVE
LYMPHOCYTES # BLD AUTO: 0.5 10*3/MM3 (ref 0.7–3.1)
LYMPHOCYTES # BLD AUTO: 0.63 10*3/MM3 (ref 0.7–3.1)
LYMPHOCYTES NFR BLD AUTO: 13.6 % (ref 19.6–45.3)
LYMPHOCYTES NFR BLD AUTO: 18.6 % (ref 19.6–45.3)
MCH RBC QN AUTO: 28.2 PG (ref 26.6–33)
MCH RBC QN AUTO: 29.1 PG (ref 26.6–33)
MCHC RBC AUTO-ENTMCNC: 31.8 G/DL (ref 31.5–35.7)
MCHC RBC AUTO-ENTMCNC: 34.5 G/DL (ref 31.5–35.7)
MCV RBC AUTO: 84.6 FL (ref 79–97)
MCV RBC AUTO: 88.8 FL (ref 79–97)
MONOCYTES # BLD AUTO: 0.4 10*3/MM3 (ref 0.1–0.9)
MONOCYTES # BLD AUTO: 0.4 10*3/MM3 (ref 0.1–0.9)
MONOCYTES NFR BLD AUTO: 11.8 % (ref 5–12)
MONOCYTES NFR BLD AUTO: 9 % (ref 5–12)
NEUTROPHILS NFR BLD AUTO: 2.34 10*3/MM3 (ref 1.7–7)
NEUTROPHILS NFR BLD AUTO: 3 10*3/MM3 (ref 1.7–7)
NEUTROPHILS NFR BLD AUTO: 69 % (ref 42.7–76)
NEUTROPHILS NFR BLD AUTO: 76.7 % (ref 42.7–76)
NITRITE UR QL STRIP: NEGATIVE
NRBC BLD AUTO-RTO: 0.1 /100 WBC (ref 0–0.2)
PATHOLOGY REVIEW: YES
PH UR STRIP.AUTO: 5.5 [PH] (ref 5–8)
PLATELET # BLD AUTO: 11 10*3/MM3 (ref 140–450)
PLATELET # BLD AUTO: 9 10*3/MM3 (ref 140–450)
PMV BLD AUTO: 11.3 FL (ref 6–12)
PMV BLD AUTO: 8.2 FL (ref 6–12)
POTASSIUM SERPL-SCNC: 2.6 MMOL/L (ref 3.5–5.2)
PROT SERPL-MCNC: 6.4 G/DL (ref 6–8.5)
PROT UR QL STRIP: ABNORMAL
RBC # BLD AUTO: 2.95 10*6/MM3 (ref 3.77–5.28)
RBC # BLD AUTO: 3.12 10*6/MM3 (ref 3.77–5.28)
RBC MORPH BLD: NORMAL
RH BLD: POSITIVE
SMALL PLATELETS BLD QL SMEAR: NORMAL
SODIUM SERPL-SCNC: 141 MMOL/L (ref 136–145)
SP GR UR STRIP: >=1.03 (ref 1–1.03)
UROBILINOGEN UR QL STRIP: ABNORMAL
WBC MORPH BLD: NORMAL
WBC NRBC COR # BLD: 3.39 10*3/MM3 (ref 3.4–10.8)
WBC NRBC COR # BLD: 4 10*3/MM3 (ref 3.4–10.8)

## 2022-01-11 PROCEDURE — 96366 THER/PROPH/DIAG IV INF ADDON: CPT

## 2022-01-11 PROCEDURE — 86901 BLOOD TYPING SEROLOGIC RH(D): CPT | Performed by: NURSE PRACTITIONER

## 2022-01-11 PROCEDURE — 87086 URINE CULTURE/COLONY COUNT: CPT | Performed by: NURSE PRACTITIONER

## 2022-01-11 PROCEDURE — P9100 PATHOGEN TEST FOR PLATELETS: HCPCS

## 2022-01-11 PROCEDURE — 0 POTASSIUM CHLORIDE PER 2 MEQ: Performed by: NURSE PRACTITIONER

## 2022-01-11 PROCEDURE — 80053 COMPREHEN METABOLIC PANEL: CPT

## 2022-01-11 PROCEDURE — 85025 COMPLETE CBC W/AUTO DIFF WBC: CPT

## 2022-01-11 PROCEDURE — 81001 URINALYSIS AUTO W/SCOPE: CPT | Performed by: NURSE PRACTITIONER

## 2022-01-11 PROCEDURE — 36415 COLL VENOUS BLD VENIPUNCTURE: CPT

## 2022-01-11 PROCEDURE — 96365 THER/PROPH/DIAG IV INF INIT: CPT

## 2022-01-11 PROCEDURE — 86900 BLOOD TYPING SEROLOGIC ABO: CPT | Performed by: NURSE PRACTITIONER

## 2022-01-11 PROCEDURE — 85007 BL SMEAR W/DIFF WBC COUNT: CPT | Performed by: NURSE PRACTITIONER

## 2022-01-11 PROCEDURE — 99215 OFFICE O/P EST HI 40 MIN: CPT | Performed by: NURSE PRACTITIONER

## 2022-01-11 PROCEDURE — 36430 TRANSFUSION BLD/BLD COMPNT: CPT

## 2022-01-11 PROCEDURE — 85025 COMPLETE CBC W/AUTO DIFF WBC: CPT | Performed by: NURSE PRACTITIONER

## 2022-01-11 PROCEDURE — P9035 PLATELET PHERES LEUKOREDUCED: HCPCS

## 2022-01-11 RX ORDER — SODIUM CHLORIDE 9 MG/ML
250 INJECTION, SOLUTION INTRAVENOUS AS NEEDED
Status: DISCONTINUED | OUTPATIENT
Start: 2022-01-11 | End: 2022-01-13 | Stop reason: HOSPADM

## 2022-01-11 RX ORDER — SODIUM CHLORIDE 9 MG/ML
1000 INJECTION, SOLUTION INTRAVENOUS ONCE
Status: CANCELLED
Start: 2022-01-12 | End: 2022-01-12

## 2022-01-11 RX ORDER — POTASSIUM CHLORIDE 20 MEQ/1
20 TABLET, EXTENDED RELEASE ORAL DAILY
Qty: 30 TABLET | Refills: 3 | Status: SHIPPED | OUTPATIENT
Start: 2022-01-11 | End: 2022-01-18

## 2022-01-11 RX ORDER — ONDANSETRON 2 MG/ML
8 INJECTION INTRAMUSCULAR; INTRAVENOUS ONCE
Status: CANCELLED
Start: 2022-01-12 | End: 2022-01-12

## 2022-01-11 RX ORDER — POTASSIUM CHLORIDE 29.8 MG/ML
20 INJECTION INTRAVENOUS ONCE
Status: COMPLETED | OUTPATIENT
Start: 2022-01-11 | End: 2022-01-11

## 2022-01-11 RX ORDER — PROMETHAZINE HYDROCHLORIDE 12.5 MG/1
12.5 TABLET ORAL EVERY 6 HOURS PRN
Qty: 20 TABLET | Refills: 2 | Status: SHIPPED | OUTPATIENT
Start: 2022-01-11 | End: 2022-01-18 | Stop reason: SDUPTHER

## 2022-01-11 RX ORDER — OXYCODONE HYDROCHLORIDE AND ACETAMINOPHEN 5; 325 MG/1; MG/1
1 TABLET ORAL TAKE AS DIRECTED
Qty: 40 TABLET | Refills: 0 | Status: SHIPPED | OUTPATIENT
Start: 2022-01-11 | End: 2022-01-18 | Stop reason: SDUPTHER

## 2022-01-11 RX ORDER — SODIUM CHLORIDE 9 MG/ML
250 INJECTION, SOLUTION INTRAVENOUS AS NEEDED
Status: CANCELLED | OUTPATIENT
Start: 2022-02-23

## 2022-01-11 RX ORDER — SODIUM CHLORIDE 9 MG/ML
250 INJECTION, SOLUTION INTRAVENOUS AS NEEDED
Status: CANCELLED | OUTPATIENT
Start: 2022-01-11

## 2022-01-11 RX ORDER — DEXAMETHASONE SODIUM PHOSPHATE 4 MG/ML
8 INJECTION, SOLUTION INTRA-ARTICULAR; INTRALESIONAL; INTRAMUSCULAR; INTRAVENOUS; SOFT TISSUE ONCE
Status: CANCELLED
Start: 2022-01-12 | End: 2022-01-12

## 2022-01-11 RX ORDER — POTASSIUM CHLORIDE 29.8 MG/ML
20 INJECTION INTRAVENOUS ONCE
Status: CANCELLED
Start: 2022-01-11

## 2022-01-11 RX ADMIN — POTASSIUM CHLORIDE 20 MEQ: 29.8 INJECTION, SOLUTION INTRAVENOUS at 13:15

## 2022-01-11 NOTE — TELEPHONE ENCOUNTER
Received a call from [facility] Lab department.   Pt's potassium 2.6 today.   NP notified of pt's lab results.   Pt. Is over at [facility] getting platelets at this time.   Orders given for pt. To receive potassium 20meq IV today at [facility] per Ceci HENRIQUEZ.   I called [facility] and spoke with Ritu about pt. Receiving IV potassium.   Pt. Is scheduled for IVF's and possible potassium here at the infusion center tomorrow 1/12 per Ceci HENRIQUEZ

## 2022-01-11 NOTE — PROGRESS NOTES
Hematology/Oncology Outpatient Follow Up    PATIENT NAME:Lucy Mahan  :1960  MRN: [id number]  PRIMARY CARE PHYSICIAN: [place] Avalos APRN  REFERRING PHYSICIAN: [place] Avalos APRN    Chief Complaint   Patient presents with   • Appointment     Pain upper to mid abdomen, Nausea, Dizziness, Thrombocytopenia, Malignant neoplasm of ovary        HISTORY OF PRESENT ILLNESS:     This is a 60-year-old female who developed abdominal pain in 2021.  Patient has also lost approximately 35 pounds during that..  She has had loss of appetite.  Due to the abdominal pain,  she had an ultrasound of the abdomen done on 10/13/2021.  This basically revealed no liver lesions.  Common bile duct is normal at 3 mm.  There is a nonmobile 3 cm shadowing gallstone within the gallbladder neck.  No gallbladder thickening or pericholecystic fluid collection.  Patient was then referred to general surgery and was seen by Dr. Badillo.  Who took her to surgery on 11/10/2021 for diagnostic laparoscopy and peritoneal biopsy.  Intra-Op , she was noted to have peritoneal studding with malignancy throughout the abdominal cavity and biopsies were completed. 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  The tumor was focally positive for progesterone receptor, positive for CK7, estrogen receptor, CA-125 and PAX 8..  The staining pattern is consistent with ovarian serous carcinoma.      She had CT scan of the chest, abdomen and pelvis and the chest is a 2 mm noncalcified nodule within the left lower lobe, noncalcified nodule in the left upper lobe measuring 4 mm and 3 mm.  In the abdomen there is a 5.1 x 5.3 cm enhancing soft tissue mass in the pelvis to the right of midline associated with the adnexal region possibly representing a primary ovarian malignancy.  No right or left ovarian tissue was seen.  There is abnormal omental thickening and nodularity consistent with carcinomatosis greatest bulk in the left mid  abdomen measuring up to 10.8 cm x 2.5 cm.  There is nodular peritoneal thickening also present within the abdomen and pelvis consistent with peritoneal carcinomatosis.  The small quantity of ascitic fluid in the abdomen and pelvis.  Carcinomatosis nodular studding is seen along the inferior right hepatic lobe.     She  has been referred to us for further evaluation and management of her newly diagnosed ovarian carcinoma.     Patient is accompanied today by her daughter for this appointment.  There is  family history of precancerous cells of the  uterusin her sister.        She does not smoke and does not drink alcohol.  Patient is  and has 2 daughters.  She works for the [employer].    · 2021 patient was seen by Dr. Dubois who has recommended neoadjuvant chemotherapy for 2-3 cycles and then interval cytoreduction.  She has recommended carboplatinum AUC 6, Taxol 1 7 5 mg per metered squared, Avastin 15 mg/kg as was done in the [facility] trial but hold Avastin for the first few cycles due to bowel risk.  · Prechemo CA-125 was 754  · 2021 patient received first cycle of chemotherapy with carboplatinum and Taxol with Neulasta  · 2021 patient received cycle 2 of combination chemotherapy with carboplatinum and Taxol with Neulasta  · 2021 CA-125 was down to 635  · 2022 add-on appointment for nausea, dizziness, thrombocytopenia, pain in upper to mid abdomen 8/10, new onset in the last 4 to 5 days    History of present illness was reviewed and is unchanged from the previous visit. 22        Past Medical History:   Diagnosis Date   • Arthritis    • Cancer (HCC)     ovarian   • Gall stones    • Hypertension    • Irritable bowel syndrome    • Rheumatoid aortitis    • Thyroid disease        Past Surgical History:   Procedure Laterality Date   •  SECTION      x2   • CHOLECYSTECTOMY N/A 11/10/2021    Procedure: diagnostic laparoscopy and peritoneal biopsy;  Surgeon: Justino  Krunal TEIXEIRA MD;  Location: [facility] MAIN OR;  Service: General;  Laterality: N/A;   • COLONOSCOPY     • HIP SURGERY Left    • THYROIDECTOMY, PARTIAL  2017   • VENOUS ACCESS DEVICE (PORT) INSERTION Left 12/6/2021    Procedure: INSERTION VENOUS ACCESS DEVICE;  Surgeon: Krunal Badillo MD;  Location: [facility] MAIN OR;  Service: General;  Laterality: Left;         Current Outpatient Medications:   •  Calcium Carbonate-Vitamin D 600-400 MG-UNIT chewable tablet, Chew 1 tablet Daily., Disp: , Rfl:   •  dexamethasone (DECADRON) 4 MG tablet, Take 5 tablets the night before chemotherapy.  Take with food., Disp: 5 tablet, Rfl: 5  •  folic acid (FOLVITE) 1 MG tablet, take 1 tablet by mouth once daily (Patient taking differently: Take 1 mg by mouth Daily.), Disp: 90 tablet, Rfl: 1  •  hydroxychloroquine (PLAQUENIL) 200 MG tablet, TAKE 1 TABLET BY MOUTH TWICE A DAY (Patient taking differently: Take 200 mg by mouth 2 (Two) Times a Day. Takes in afternoon and evening), Disp: 180 tablet, Rfl: 0  •  hydrOXYzine (ATARAX) 10 MG tablet, TAKE 1 TABLET BY MOUTH TWICE DAILY AS NEEDED FOR ANXIETY (Patient taking differently: Take 10 mg by mouth Every 6 (Six) Hours As Needed.), Disp: 40 tablet, Rfl: 2  •  levoFLOXacin (Levaquin) 750 MG tablet, Take 1 tablet by mouth Daily., Disp: 7 tablet, Rfl: 0  •  levothyroxine (SYNTHROID, LEVOTHROID) 137 MCG tablet, Take 1 tablet by mouth Daily., Disp: 90 tablet, Rfl: 0  •  lidocaine-prilocaine (EMLA) 2.5-2.5 % cream, Apply 1 application topically to the appropriate area as directed Take As Directed. Apply to port 1 hour prior to port access, Disp: 30 g, Rfl: 1  •  lisinopril (PRINIVIL,ZESTRIL) 40 MG tablet, TAKE 1 TABLET BY MOUTH DAILY (Patient taking differently: Take 40 mg by mouth Every Evening. 12/5LD), Disp: 90 tablet, Rfl: 1  •  loratadine (Claritin) 10 MG tablet, Take 1 tablet night before and 1 tablet morning of chemotherapy., Disp: 2 tablet, Rfl: 5  •  OLANZapine (ZyPREXA) 5 MG tablet, Take 1 tablet  by mouth Every Night. Take on days 2, 3 and 4 after chemotherapy., Disp: 3 tablet, Rfl: 5  •  omeprazole (priLOSEC) 40 MG capsule, Take 1 capsule by mouth Daily., Disp: 30 capsule, Rfl: 6  •  ondansetron (ZOFRAN) 8 MG tablet, Take 1 tablet by mouth 3 (Three) Times a Day As Needed for Nausea or Vomiting., Disp: 30 tablet, Rfl: 5  •  pantoprazole (Protonix) 40 MG EC tablet, Take 1 tablet by mouth Daily., Disp: 30 tablet, Rfl: 6  •  oxyCODONE-acetaminophen (PERCOCET) 5-325 MG per tablet, Take 1 tablet by mouth Take As Directed. Every 4-6 hours prn pain, Disp: 40 tablet, Rfl: 0  •  potassium chloride (K-DUR,KLOR-CON) 20 MEQ CR tablet, Take 1 tablet by mouth Daily., Disp: 30 tablet, Rfl: 3  •  promethazine (PHENERGAN) 12.5 MG tablet, Take 1 tablet by mouth Every 6 (Six) Hours As Needed for Nausea or Vomiting., Disp: 20 tablet, Rfl: 2  No current facility-administered medications for this visit.    Facility-Administered Medications Ordered in Other Visits:   •  sodium chloride 0.9 % infusion 250 mL, 250 mL, Intravenous, PRN, Baldemar, Ceci K, APRN    No Known Allergies    Family History   Problem Relation Age of Onset   • Dementia Mother    • Arthritis Mother    • Heart disease Father    • Hypertension Father        Cancer-related family history is not on file.    Social History     Tobacco Use   • Smoking status: Never Smoker   • Smokeless tobacco: Never Used   Vaping Use   • Vaping Use: Never used   Substance Use Topics   • Alcohol use: Yes     Alcohol/week: 1.0 standard drink     Types: 1 Glasses of wine per week     Comment: less than monthly   • Drug use: No       HPI, ROS and PFSH have been reviewed and confirmed on 1/11/2022.     SUBJECTIVE:  Patient comes in complaining of nausea that is unrelieved with Zofran.  Phenergan sent to patient's pharmacy and explained how to alternate with Phenergan for nausea relief.  Patient also complains of new onset of 4 to 5 days of upper to mid abdominal pain that is 8/10 on  pain scale and unrelieved with pain medication.  The patient still has same complaints of pelvic pain and that pain has not changed.  The patient also complains of headache, dizziness, blurry vision.  She states that she knows she is not drinking enough and she is not eating due to lack of appetite.  Patient is in agreement to have CT abdomen pelvis done to examine upper abdomen pain.  Patient is in agreement to receive IV fluids tomorrow with Zofran and Decadron for nausea control, whereas she is going to the hospital today to receive 2 units of platelets for a platelet level of 9.  The patient denies any bleeding in stools, urine, nosebleeds, or around gums.  Instructed to go to the hospital if he begins bleeding and cannot get bleeding controlled quickly.  Complains of abdominal discomfort.  She is accompanied today by her daughter for this appointment.  Patient also has complaints of excessive fatigue.  Explained that her disease process causes fatigue, dehydration causes fatigue, chemotherapy causes fatigue.            REVIEW OF SYSTEMS:  Review of Systems   Constitutional: Positive for fatigue. Negative for chills and fever.   HENT: Negative for ear pain, mouth sores, nosebleeds and sore throat.    Eyes: Negative for photophobia and visual disturbance.   Respiratory: Negative for wheezing and stridor.    Cardiovascular: Negative for chest pain and palpitations.   Gastrointestinal: Positive for abdominal pain and nausea. Negative for diarrhea and vomiting.   Endocrine: Negative for cold intolerance and heat intolerance.   Genitourinary: Negative for dysuria and hematuria.        Decreased urine output   Musculoskeletal: Negative for joint swelling and neck stiffness.   Skin: Negative for color change and rash.   Neurological: Positive for dizziness, weakness and headaches. Negative for seizures and syncope.   Hematological: Negative for adenopathy.   Psychiatric/Behavioral: Negative for agitation, confusion and  "hallucinations.     Abdominal discomfort  OBJECTIVE:    Vitals:    01/11/22 1029   BP: 138/77   Pulse: 75   Resp: 18   Temp: 97.5 °F (36.4 °C)   Weight: 88.4 kg (194 lb 12.8 oz)   Height: 165.1 cm (65\")   PainSc: 10-Worst pain ever   PainLoc: Abdomen     Body mass index is 32.42 kg/m².    ECOG  (1) Restricted in physically strenuous activity, ambulatory and able to do work of light nature    Physical Exam  Vitals and nursing note reviewed.   Constitutional:       General: She is not in acute distress.     Appearance: She is not diaphoretic.   HENT:      Head: Normocephalic and atraumatic.      Mouth/Throat:      Mouth: Mucous membranes are dry.   Eyes:      General: No scleral icterus.        Right eye: No discharge.         Left eye: No discharge.      Conjunctiva/sclera: Conjunctivae normal.   Neck:      Thyroid: No thyromegaly.   Cardiovascular:      Rate and Rhythm: Normal rate and regular rhythm.      Heart sounds: Normal heart sounds. No friction rub. No gallop.    Pulmonary:      Effort: Pulmonary effort is normal. No respiratory distress.      Breath sounds: No stridor. No wheezing.   Abdominal:      General: Bowel sounds are normal.      Palpations: Abdomen is soft. There is no mass.      Tenderness: There is no abdominal tenderness. There is no guarding or rebound.   Musculoskeletal:         General: No tenderness. Normal range of motion.      Cervical back: Normal range of motion and neck supple.   Lymphadenopathy:      Cervical: No cervical adenopathy.   Skin:     General: Skin is warm.      Findings: No erythema or rash.      Comments: Tenting skin turgor   Neurological:      Mental Status: She is alert and oriented to person, place, and time.      Motor: No abnormal muscle tone.   Psychiatric:         Behavior: Behavior normal.       I have reexamined the patient and the results are consistent with the previously documented exam. FLORENCE Francisco   RECENT LABS  WBC   Date Value Ref Range Status "   01/11/2022 4.00 3.40 - 10.80 10*3/mm3 Final     RBC   Date Value Ref Range Status   01/11/2022 2.95 (L) 3.77 - 5.28 10*6/mm3 Final     Hemoglobin   Date Value Ref Range Status   01/11/2022 8.6 (L) 12.0 - 15.9 g/dL Final     Hematocrit   Date Value Ref Range Status   01/11/2022 25.0 (L) 34.0 - 46.6 % Final     MCV   Date Value Ref Range Status   01/11/2022 84.6 79.0 - 97.0 fL Final     MCH   Date Value Ref Range Status   01/11/2022 29.1 26.6 - 33.0 pg Final     MCHC   Date Value Ref Range Status   01/11/2022 34.5 31.5 - 35.7 g/dL Final     Comment:     Result checked      RDW   Date Value Ref Range Status   01/11/2022 15.9 (H) 12.3 - 15.4 % Final     RDW-SD   Date Value Ref Range Status   01/11/2022 46.4 37.0 - 54.0 fl Final     MPV   Date Value Ref Range Status   01/11/2022 8.2 6.0 - 12.0 fL Final     Platelets   Date Value Ref Range Status   01/11/2022 11 (C) 140 - 450 10*3/mm3 Final     Neutrophil %   Date Value Ref Range Status   01/11/2022 76.7 (H) 42.7 - 76.0 % Final     Lymphocyte %   Date Value Ref Range Status   01/11/2022 13.6 (L) 19.6 - 45.3 % Final     Monocyte %   Date Value Ref Range Status   01/11/2022 9.0 5.0 - 12.0 % Final     Eosinophil %   Date Value Ref Range Status   01/11/2022 0.0 (L) 0.3 - 6.2 % Final     Basophil %   Date Value Ref Range Status   01/11/2022 0.7 0.0 - 1.5 % Final     Neutrophils, Absolute   Date Value Ref Range Status   01/11/2022 3.00 1.70 - 7.00 10*3/mm3 Final     Lymphocytes, Absolute   Date Value Ref Range Status   01/11/2022 0.50 (L) 0.70 - 3.10 10*3/mm3 Final     Monocytes, Absolute   Date Value Ref Range Status   01/11/2022 0.40 0.10 - 0.90 10*3/mm3 Final     Eosinophils, Absolute   Date Value Ref Range Status   01/11/2022 0.00 0.00 - 0.40 10*3/mm3 Final     Basophils, Absolute   Date Value Ref Range Status   01/11/2022 0.00 0.00 - 0.20 10*3/mm3 Final     nRBC   Date Value Ref Range Status   01/11/2022 0.1 0.0 - 0.2 /100 WBC Final       Lab Results   Component Value  Date    GLUCOSE 108 (H) 01/11/2022    BUN 13 01/11/2022    CREATININE 1.25 (H) 01/11/2022    EGFRIFNONA 43 (L) 01/11/2022    EGFRIFAFRI 44 (L) 01/21/2020    BCR 10.4 01/11/2022    K 2.6 (C) 01/11/2022    CO2 28.0 01/11/2022    CALCIUM 8.1 (L) 01/11/2022    ALBUMIN 3.50 01/11/2022    LABIL2 1.2 (calc) 01/21/2020    AST 14 01/11/2022    ALT 6 01/11/2022         Assessment/Plan     Thrombocytopenia (HCC)  - CBC & Differential  - Ambulatory Referral to [facility] For Infusion Treatment    Dysuria  - Urinalysis With Culture If Indicated - Urine, Clean Catch  - Urinalysis, Microscopic Only - Urine, Clean Catch    Pain    Dehydration    Hypokalemia    Abdominal pain, unspecified abdominal location  - CT Abdomen Pelvis With Contrast      ASSESSMENT:      1. 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  CA-125 754 November 2021  2. On neoadjuvant chemotherapy with carboplatinum AUC 6, Taxol 175 mg per metered squared, with Avastin added post operatively.  Plan to administer 2-3 cycles keith adjuvantly.  Monitor serial CA-125's  3. Pulmonary nodules of unclear etiology too small for biopsy or PET resolution  4. Chemotherapy induced nausea  5. Chemotherapy induced fatigue  6. 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  No other actionable mutations identified  7. Dehydration  8. Family history of precancerous uterine cells in her sister.  Patient will benefit from genetic testing given her personal diagnosis.  9. Abdominal discomfort secondary to malignancy  10. Upper to Mid abdominal pain: New onset, 4-5 days.  8/10 pain scale  11. Nausea: Uncontrolled with Zofran  12. Dehydration: Dizziness, Decreased oral intake, tenting skin turgor, dry mucous membranes  13. Dysuria: Decreased urine output with dysuria  14. Thrombocytopenia: Platelet level of 9.  2 units a pheresis of platelets given  1/11/2022        Discussion     I have reviewed her imaging studies, pathology reports.  She has metastatic serous carcinoma of the ovary.  She has liver involvement.  She also has pulmonary nodules of unclear etiology.  These are too small for biopsy or PET resolution therefore they will be followed over time.  We discussed that ovarian cancer is a surgical disease.  We will have her seen by GYN oncologist Dr. Castro to weigh in on her surgical options.  We discussed that she may benefit from neoadjuvant chemotherapy but will await Dr. Castro's final recommendations.     She was seen by Dr. Castro recommendation for her to have neoadjuvant chemotherapy with carboplatinum and Taxol      Discussed side effects of chemotherapy to include but not limited to:    Chemotherapy side effects include, but not limited to, nausea, vomiting, bone marrow suppression, which can result in blood, platelet transfusion. There is also risk of permanent bone marrow destruction, which can cause myelodysplastic syndrome or leukemia years down the line. There is risk of infection which can result in hospitalization and even death. There is also risk of fatigue, asthenia, alopecia which could become permanent. Chemo will help to reduce risk of relapse of cancer, but does not eliminate risk completely.    Discussed that Taxol chemotherapy can lead to hypersensitivity reaction fluid retention, peripheral neuropathy, myalgias, small risk of permanent alopecia    Carboplatinum can lead to myelosuppression socially thrombocytopenia      Avastin will be added postoperatively       She will benefit from genetic testing as this could have some therapeutic implications for her.    Genetics      Today's risk assessment is based on the history the patient has provided.  We discussed that the best people to screen are the ones who have been affected by malignancy as their result is more informative.  We reviewed all the hallmarks of hereditary  cancer syndrome including multiple relatives on the same side of the family being affected by malignancy, cancer diagnosis in young individuals, different cancers in one individual.      We discussed the implications of a positive deleterious mutation which can result in recommendations for prophylactic surgeries, chemoprevention, lifestyle modifications and aggressive screening with mammogram and MRI, and also increased breast awareness and breast self exams.  Patient understands if she screens positive, then at-risk family members will need to be screened as well.  Each offspring has a 50% chance of inheriting a deleterious mutation if positive in a parent.      A negative deleterious mutation will make hereditary cancer syndrome much less likely, but does not rule out the possibility of a gene mutation that cannot be detected with the available technology.  She also understands that a negative gene test result might indicate that the cancers that occurred in her family were most likely sporadic, or even if there is a mutation the patient did not inherit it.     We discussed variant of unknown significance.  Patient understands that no medical decisions will be made based on a variant of unknown significance, but I did stress the importance of maintaining contact information with us should this be the case.      We discussed the financial implications of the above testing.  Patient understands when the right clinical criteria are met that most insurance companies will cover the cost.      We also discussed the various insurability issues with a deleterious mutation result.     Patient has give us consent to proceed with comprehensive genetic analysis.            Plans:     · Comprehensive gene analysis with cancer next technology today  · IV fluids today  · Continue combination chemotherapy with carboplatinum and Taxol along with Neulasta.  Order in [facility].  Plan to begin chemo next week  · CA-125 with each cycle  of chemotherapy  · Continue EMLA cream  · Continue Percocet 5 mg p.o. every 4-6 hours as needed for pain  · Prilosec 40 mg p.o. daily  · Continue weekly labs.  BMP next week  · Follow-up with me next week to finalize her plans.  · Foundation 1 testing results reviewed  · All questions answered  · Follow-up 3 weeks  · CT scans after third cycle of chemotherapy  · Schedule chemotherapy education with nurse practitioner    · Platelets 2 units today for platelet of 9  · UA today  · CBC tomorrow  · 1 liter normal saline over 2 hours tomorrow  · Zofran 8 mg IV tomorrow  · Decadron 8 mg IV tomorrow  · CT of abdomen pelvis tomorrow for upper and mid abdominal pain new onset of 4-5 days  · Phenergan to alternate with Zofran for unrelieved nausea  · Stat CMP tomorrow    Discussion:  Patient found to have potassium of 2.6 on CMP drawn after visit.  Orders placed for 20 mEq of potassium IV in outpatient setting after receiving platelets.  Also, potassium 20 mill equivalent p.o. daily called to patient's pharmacy.  Patient will return tomorrow for IV fluids for hydration, Zofran and Decadron for nausea, a stat CMP to check potassium level, and a CBC to check platelet level.  Patient verbalized understanding and is in agreement of the above plan.     I spent 45 minutes in physical assessment, diagnostic evaluation, ordering, future planning, documentation.    Electronically signed by FLORENCE Francisco, 01/11/22, 6:02 PM EST.  Dictated using Dragon software

## 2022-01-12 ENCOUNTER — HOSPITAL ENCOUNTER (OUTPATIENT)
Dept: ONCOLOGY | Facility: HOSPITAL | Age: 62
Setting detail: INFUSION SERIES
Discharge: HOME | End: 2022-01-12

## 2022-01-12 ENCOUNTER — DOCUMENTATION (OUTPATIENT)
Dept: ONCOLOGY | Facility: HOSPITAL | Age: 62
End: 2022-01-12

## 2022-01-12 ENCOUNTER — HOSPITAL ENCOUNTER (OUTPATIENT)
Dept: PET IMAGING | Facility: HOSPITAL | Age: 62
Discharge: HOME | End: 2022-01-12
Admitting: NURSE PRACTITIONER

## 2022-01-12 VITALS
RESPIRATION RATE: 18 BRPM | SYSTOLIC BLOOD PRESSURE: 110 MMHG | DIASTOLIC BLOOD PRESSURE: 71 MMHG | HEART RATE: 82 BPM | TEMPERATURE: 97.3 F | WEIGHT: 197 LBS | HEIGHT: 65 IN | OXYGEN SATURATION: 97 % | BODY MASS INDEX: 32.82 KG/M2

## 2022-01-12 DIAGNOSIS — C56.9 MALIGNANT NEOPLASM OF OVARY, UNSPECIFIED LATERALITY: Primary | ICD-10-CM

## 2022-01-12 DIAGNOSIS — R10.9 ABDOMINAL PAIN, UNSPECIFIED ABDOMINAL LOCATION: ICD-10-CM

## 2022-01-12 DIAGNOSIS — E86.0 DEHYDRATION: ICD-10-CM

## 2022-01-12 DIAGNOSIS — N30.00 ACUTE CYSTITIS WITHOUT HEMATURIA: Primary | ICD-10-CM

## 2022-01-12 DIAGNOSIS — R11.0 NAUSEA: ICD-10-CM

## 2022-01-12 LAB
ALBUMIN SERPL-MCNC: 3.2 G/DL (ref 3.5–5.2)
ALBUMIN/GLOB SERPL: 1 G/DL
ALP SERPL-CCNC: 55 U/L (ref 39–117)
ALT SERPL W P-5'-P-CCNC: 7 U/L (ref 1–33)
ANION GAP SERPL CALCULATED.3IONS-SCNC: 14 MMOL/L (ref 5–15)
AST SERPL-CCNC: 15 U/L (ref 1–32)
BACTERIA UR QL AUTO: ABNORMAL /HPF
BASOPHILS # BLD AUTO: 0.01 10*3/MM3 (ref 0–0.2)
BASOPHILS NFR BLD AUTO: 0.3 % (ref 0–1.5)
BILIRUB SERPL-MCNC: 0.3 MG/DL (ref 0–1.2)
BUN SERPL-MCNC: 14 MG/DL (ref 8–23)
BUN/CREAT SERPL: 10.9 (ref 7–25)
CALCIUM SPEC-SCNC: 7.7 MG/DL (ref 8.6–10.5)
CHLORIDE SERPL-SCNC: 98 MMOL/L (ref 98–107)
CO2 SERPL-SCNC: 27 MMOL/L (ref 22–29)
CREAT SERPL-MCNC: 1.29 MG/DL (ref 0.57–1)
DEPRECATED RDW RBC AUTO: 48 FL (ref 37–54)
EOSINOPHIL # BLD AUTO: 0 10*3/MM3 (ref 0–0.4)
EOSINOPHIL NFR BLD AUTO: 0 % (ref 0.3–6.2)
ERYTHROCYTE [DISTWIDTH] IN BLOOD BY AUTOMATED COUNT: 15.4 % (ref 12.3–15.4)
GFR SERPL CREATININE-BSD FRML MDRD: 42 ML/MIN/1.73
GLOBULIN UR ELPH-MCNC: 3.2 GM/DL
GLUCOSE SERPL-MCNC: 111 MG/DL (ref 65–99)
HCT VFR BLD AUTO: 25 % (ref 34–46.6)
HGB BLD-MCNC: 8 G/DL (ref 12–15.9)
HYALINE CASTS UR QL AUTO: ABNORMAL /LPF
LAB AP CASE REPORT: NORMAL
LYMPHOCYTES # BLD AUTO: 0.79 10*3/MM3 (ref 0.7–3.1)
LYMPHOCYTES NFR BLD AUTO: 23.6 % (ref 19.6–45.3)
MCH RBC QN AUTO: 28.4 PG (ref 26.6–33)
MCHC RBC AUTO-ENTMCNC: 32 G/DL (ref 31.5–35.7)
MCV RBC AUTO: 88.7 FL (ref 79–97)
MONOCYTES # BLD AUTO: 0.37 10*3/MM3 (ref 0.1–0.9)
MONOCYTES NFR BLD AUTO: 11 % (ref 5–12)
NEUTROPHILS NFR BLD AUTO: 2.18 10*3/MM3 (ref 1.7–7)
NEUTROPHILS NFR BLD AUTO: 65.1 % (ref 42.7–76)
PATH REPORT.FINAL DX SPEC: NORMAL
PLATELET # BLD AUTO: 35 10*3/MM3 (ref 140–450)
PMV BLD AUTO: 8.5 FL (ref 6–12)
POTASSIUM SERPL-SCNC: 2.9 MMOL/L (ref 3.5–5.2)
PROT SERPL-MCNC: 6.4 G/DL (ref 6–8.5)
RBC # BLD AUTO: 2.82 10*6/MM3 (ref 3.77–5.28)
RBC # UR STRIP: ABNORMAL /HPF
REF LAB TEST METHOD: ABNORMAL
SODIUM SERPL-SCNC: 139 MMOL/L (ref 136–145)
SQUAMOUS #/AREA URNS HPF: ABNORMAL /HPF
WBC # UR STRIP: ABNORMAL /HPF
WBC NRBC COR # BLD: 3.35 10*3/MM3 (ref 3.4–10.8)

## 2022-01-12 PROCEDURE — 0 POTASSIUM CHLORIDE 10 MEQ/100ML SOLUTION: Performed by: NURSE PRACTITIONER

## 2022-01-12 PROCEDURE — 0 IOPAMIDOL PER 1 ML: Performed by: NURSE PRACTITIONER

## 2022-01-12 PROCEDURE — 85025 COMPLETE CBC W/AUTO DIFF WBC: CPT | Performed by: INTERNAL MEDICINE

## 2022-01-12 PROCEDURE — 25010000002 DEXAMETHASONE PER 1 MG: Performed by: NURSE PRACTITIONER

## 2022-01-12 PROCEDURE — 96366 THER/PROPH/DIAG IV INF ADDON: CPT

## 2022-01-12 PROCEDURE — 80053 COMPREHEN METABOLIC PANEL: CPT | Performed by: INTERNAL MEDICINE

## 2022-01-12 PROCEDURE — 96365 THER/PROPH/DIAG IV INF INIT: CPT

## 2022-01-12 PROCEDURE — 96375 TX/PRO/DX INJ NEW DRUG ADDON: CPT

## 2022-01-12 PROCEDURE — 74177 CT ABD & PELVIS W/CONTRAST: CPT

## 2022-01-12 PROCEDURE — 96360 HYDRATION IV INFUSION INIT: CPT

## 2022-01-12 PROCEDURE — 25010000002 HEPARIN LOCK FLUSH PER 10 UNITS: Performed by: INTERNAL MEDICINE

## 2022-01-12 PROCEDURE — 96361 HYDRATE IV INFUSION ADD-ON: CPT

## 2022-01-12 RX ORDER — HEPARIN SODIUM (PORCINE) LOCK FLUSH IV SOLN 100 UNIT/ML 100 UNIT/ML
500 SOLUTION INTRAVENOUS AS NEEDED
Status: CANCELLED | OUTPATIENT
Start: 2022-01-12

## 2022-01-12 RX ORDER — NITROFURANTOIN 25; 75 MG/1; MG/1
100 CAPSULE ORAL 2 TIMES DAILY
Qty: 10 CAPSULE | Refills: 0 | Status: SHIPPED | OUTPATIENT
Start: 2022-01-12 | End: 2022-01-26

## 2022-01-12 RX ORDER — SODIUM CHLORIDE 0.9 % (FLUSH) 0.9 %
10 SYRINGE (ML) INJECTION AS NEEDED
Status: DISCONTINUED | OUTPATIENT
Start: 2022-01-12 | End: 2022-01-13 | Stop reason: HOSPADM

## 2022-01-12 RX ORDER — HEPARIN SODIUM (PORCINE) LOCK FLUSH IV SOLN 100 UNIT/ML 100 UNIT/ML
500 SOLUTION INTRAVENOUS AS NEEDED
Status: DISCONTINUED | OUTPATIENT
Start: 2022-01-12 | End: 2022-01-13 | Stop reason: HOSPADM

## 2022-01-12 RX ORDER — POTASSIUM CHLORIDE 7.45 MG/ML
10 INJECTION INTRAVENOUS ONCE
Status: COMPLETED | OUTPATIENT
Start: 2022-01-12 | End: 2022-01-12

## 2022-01-12 RX ORDER — ONDANSETRON 2 MG/ML
8 INJECTION INTRAMUSCULAR; INTRAVENOUS ONCE
Status: DISCONTINUED | OUTPATIENT
Start: 2022-01-12 | End: 2022-01-13 | Stop reason: HOSPADM

## 2022-01-12 RX ORDER — DEXAMETHASONE SODIUM PHOSPHATE 4 MG/ML
8 INJECTION, SOLUTION INTRA-ARTICULAR; INTRALESIONAL; INTRAMUSCULAR; INTRAVENOUS; SOFT TISSUE ONCE
Status: COMPLETED | OUTPATIENT
Start: 2022-01-12 | End: 2022-01-12

## 2022-01-12 RX ORDER — SODIUM CHLORIDE 9 MG/ML
1000 INJECTION, SOLUTION INTRAVENOUS ONCE
Status: COMPLETED | OUTPATIENT
Start: 2022-01-12 | End: 2022-01-12

## 2022-01-12 RX ORDER — SODIUM CHLORIDE 0.9 % (FLUSH) 0.9 %
10 SYRINGE (ML) INJECTION AS NEEDED
Status: CANCELLED | OUTPATIENT
Start: 2022-01-12

## 2022-01-12 RX ADMIN — Medication 10 ML: at 12:40

## 2022-01-12 RX ADMIN — POTASSIUM CHLORIDE 10 MEQ: 7.46 INJECTION, SOLUTION INTRAVENOUS at 10:33

## 2022-01-12 RX ADMIN — IOPAMIDOL 100 ML: 755 INJECTION, SOLUTION INTRAVENOUS at 13:09

## 2022-01-12 RX ADMIN — SODIUM CHLORIDE 1000 ML: 9 INJECTION, SOLUTION INTRAVENOUS at 08:48

## 2022-01-12 RX ADMIN — POTASSIUM CHLORIDE 10 MEQ: 7.46 INJECTION, SOLUTION INTRAVENOUS at 11:37

## 2022-01-12 RX ADMIN — DEXAMETHASONE SODIUM PHOSPHATE 8 MG: 4 INJECTION, SOLUTION INTRAMUSCULAR; INTRAVENOUS at 08:48

## 2022-01-12 RX ADMIN — HEPARIN 500 UNITS: 100 SYRINGE at 12:40

## 2022-01-12 NOTE — PROGRESS NOTES
UTI    Patient shows WBCs in urine with 4+ bacteria.  Patient placed on Macrobid twice daily x5 days.      Patient informed of WBCs and 4+ bacteria in urine sample.  Informed to start Macrobid twice daily x5 days and have repeat UA done after antibiotic is finished.  Patient verbalized understanding and agreement with plan.    Will recheck UA on 1/20/2022.    Order placed for Macrobid twice daily x5 days to patient pharmacy.  Order placed for repeat UA on 1/20/2022.    Electronically signed by FLORENCE Francisco, 01/12/22, 10:53 AM EST.

## 2022-01-12 NOTE — PROGRESS NOTES
Hematology/Oncology Outpatient Follow Up    PATIENT NAME:Lucy Mahan  :1960  MRN: [id number]  PRIMARY CARE PHYSICIAN: [place] Avalos APRN  REFERRING PHYSICIAN: [place] Avalos APRN    Chief Complaint   Patient presents with   • Follow-up     Malignant neoplasm of ovary        HISTORY OF PRESENT ILLNESS:     This is a 60-year-old female who developed abdominal pain in 2021.  Patient has also lost approximately 35 pounds during that..  She has had loss of appetite.  Due to the abdominal pain,  she had an ultrasound of the abdomen done on 10/13/2021.  This basically revealed no liver lesions.  Common bile duct is normal at 3 mm.  There is a nonmobile 3 cm shadowing gallstone within the gallbladder neck.  No gallbladder thickening or pericholecystic fluid collection.  Patient was then referred to general surgery and was seen by Dr. Badillo.  Who took her to surgery on 11/10/2021 for diagnostic laparoscopy and peritoneal biopsy.  Intra-Op , she was noted to have peritoneal studding with malignancy throughout the abdominal cavity and biopsies were completed. 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  The tumor was focally positive for progesterone receptor, positive for CK7, estrogen receptor, CA-125 and PAX 8..  The staining pattern is consistent with ovarian serous carcinoma.      She had CT scan of the chest, abdomen and pelvis and the chest is a 2 mm noncalcified nodule within the left lower lobe, noncalcified nodule in the left upper lobe measuring 4 mm and 3 mm.  In the abdomen there is a 5.1 x 5.3 cm enhancing soft tissue mass in the pelvis to the right of midline associated with the adnexal region possibly representing a primary ovarian malignancy.  No right or left ovarian tissue was seen.  There is abnormal omental thickening and nodularity consistent with carcinomatosis greatest bulk in the left mid abdomen measuring up to 10.8 cm x 2.5 cm.  There is nodular  peritoneal thickening also present within the abdomen and pelvis consistent with peritoneal carcinomatosis.  The small quantity of ascitic fluid in the abdomen and pelvis.  Carcinomatosis nodular studding is seen along the inferior right hepatic lobe.     She  has been referred to us for further evaluation and management of her newly diagnosed ovarian carcinoma.     Patient is accompanied today by her daughter for this appointment.  There is  family history of precancerous cells of the  uterusin her sister.        She does not smoke and does not drink alcohol.  Patient is  and has 2 daughters.  She works for the [employer].    · 11/29/2021 patient was seen by Dr. Dubois who has recommended neoadjuvant chemotherapy for 2-3 cycles and then interval cytoreduction.  She has recommended carboplatinum AUC 6, Taxol 1 7 5 mg per metered squared, Avastin 15 mg/kg as was done in the oceans trial but hold Avastin for the first few cycles due to bowel risk.  · Prechemo CA-125 was 754  · 12/7/2021 patient received first cycle of chemotherapy with carboplatinum and Taxol with Neulasta  · 12/28/2021 patient received cycle 2 of combination chemotherapy with carboplatinum and Taxol with Neulasta  · 12/28/2021 CA-125 was down to 635  · 1/11/2022 add-on appointment for nausea, dizziness, thrombocytopenia, pain in upper to mid abdomen 8/10, new onset in the last 4 to 5 days  · January 12, 2022: Due to acute GI symptoms patient had a CT scan of the abdomen and pelvis which basically revealed increasing effusion on the left lung mild pleural effusion on the right lung decreased in amount of omental caking but no interval change in the right adnexal area moderate abdominal and pelvic ascites which has increased.  She denies any significant pulmonary symptoms.  Her O2 sat today was 100% at rest and 99% with ambulation      History of present illness was reviewed and is unchanged from the previous visit.  22        Past Medical History:   Diagnosis Date   • Arthritis    • Cancer (HCC)     ovarian   • Gall stones    • Hypertension    • Irritable bowel syndrome    • Rheumatoid aortitis    • Thyroid disease        Past Surgical History:   Procedure Laterality Date   •  SECTION      x2   • CHOLECYSTECTOMY N/A 11/10/2021    Procedure: diagnostic laparoscopy and peritoneal biopsy;  Surgeon: Krunal Badillo MD;  Location: [facility] MAIN OR;  Service: General;  Laterality: N/A;   • COLONOSCOPY     • HIP SURGERY Left    • THYROIDECTOMY, PARTIAL  2017   • VENOUS ACCESS DEVICE (PORT) INSERTION Left 2021    Procedure: INSERTION VENOUS ACCESS DEVICE;  Surgeon: Krunal Badillo MD;  Location: [facility] MAIN OR;  Service: General;  Laterality: Left;         Current Outpatient Medications:   •  Calcium Carbonate-Vitamin D 600-400 MG-UNIT chewable tablet, Chew 1 tablet Daily., Disp: , Rfl:   •  dexamethasone (DECADRON) 4 MG tablet, Take 5 tablets the night before chemotherapy.  Take with food., Disp: 5 tablet, Rfl: 5  •  folic acid (FOLVITE) 1 MG tablet, take 1 tablet by mouth once daily (Patient taking differently: Take 1 mg by mouth Daily.), Disp: 90 tablet, Rfl: 1  •  hydroxychloroquine (PLAQUENIL) 200 MG tablet, TAKE 1 TABLET BY MOUTH TWICE A DAY (Patient taking differently: Take 200 mg by mouth 2 (Two) Times a Day. Takes in afternoon and evening), Disp: 180 tablet, Rfl: 0  •  hydrOXYzine (ATARAX) 10 MG tablet, TAKE 1 TABLET BY MOUTH TWICE DAILY AS NEEDED FOR ANXIETY (Patient taking differently: Take 10 mg by mouth Every 6 (Six) Hours As Needed.), Disp: 40 tablet, Rfl: 2  •  levoFLOXacin (Levaquin) 750 MG tablet, Take 1 tablet by mouth Daily., Disp: 7 tablet, Rfl: 0  •  levothyroxine (SYNTHROID, LEVOTHROID) 137 MCG tablet, Take 1 tablet by mouth Daily., Disp: 90 tablet, Rfl: 0  •  lidocaine-prilocaine (EMLA) 2.5-2.5 % cream, Apply 1 application topically to the appropriate area as directed Take As Directed. Apply to  port 1 hour prior to port access, Disp: 30 g, Rfl: 1  •  lisinopril (PRINIVIL,ZESTRIL) 40 MG tablet, TAKE 1 TABLET BY MOUTH DAILY (Patient taking differently: Take 40 mg by mouth Every Evening. 12/5LD), Disp: 90 tablet, Rfl: 1  •  loratadine (Claritin) 10 MG tablet, Take 1 tablet night before and 1 tablet morning of chemotherapy., Disp: 2 tablet, Rfl: 5  •  nitrofurantoin, macrocrystal-monohydrate, (Macrobid) 100 MG capsule, Take 1 capsule by mouth 2 (Two) Times a Day., Disp: 10 capsule, Rfl: 0  •  OLANZapine (ZyPREXA) 5 MG tablet, Take 1 tablet by mouth Every Night. Take on days 2, 3 and 4 after chemotherapy., Disp: 3 tablet, Rfl: 5  •  omeprazole (priLOSEC) 40 MG capsule, Take 1 capsule by mouth Daily., Disp: 30 capsule, Rfl: 6  •  ondansetron (ZOFRAN) 8 MG tablet, Take 1 tablet by mouth 3 (Three) Times a Day As Needed for Nausea or Vomiting., Disp: 30 tablet, Rfl: 5  •  oxyCODONE-acetaminophen (PERCOCET) 5-325 MG per tablet, Take 1 tablet by mouth Take As Directed. Every 4-6 hours prn pain, Disp: 40 tablet, Rfl: 0  •  pantoprazole (Protonix) 40 MG EC tablet, Take 1 tablet by mouth Daily., Disp: 30 tablet, Rfl: 6  •  potassium chloride (K-DUR,KLOR-CON) 20 MEQ CR tablet, Take 1 tablet by mouth Daily., Disp: 30 tablet, Rfl: 3  •  promethazine (PHENERGAN) 12.5 MG tablet, Take 1 tablet by mouth Every 6 (Six) Hours As Needed for Nausea or Vomiting., Disp: 20 tablet, Rfl: 2  No current facility-administered medications for this visit.    No Known Allergies    Family History   Problem Relation Age of Onset   • Dementia Mother    • Arthritis Mother    • Heart disease Father    • Hypertension Father        Cancer-related family history is not on file.    Social History     Tobacco Use   • Smoking status: Never Smoker   • Smokeless tobacco: Never Used   Vaping Use   • Vaping Use: Never used   Substance Use Topics   • Alcohol use: Yes     Alcohol/week: 1.0 standard drink     Types: 1 Glasses of wine per week     Comment:  "less than monthly   • Drug use: No       HPI, ROS and PFSH have been reviewed and confirmed on 1/13/2022.     SUBJECTIVE:    She states that most of her symptoms have resolved.  She required platelet transfusion            REVIEW OF SYSTEMS:  Review of Systems   Constitutional: Positive for fatigue. Negative for chills and fever.   HENT: Negative for ear pain, mouth sores, nosebleeds and sore throat.    Eyes: Negative for photophobia and visual disturbance.   Respiratory: Negative for wheezing and stridor.    Cardiovascular: Negative for chest pain and palpitations.   Gastrointestinal: Positive for abdominal pain and nausea. Negative for diarrhea and vomiting.   Endocrine: Negative for cold intolerance and heat intolerance.   Genitourinary: Negative for dysuria and hematuria.        Decreased urine output   Musculoskeletal: Negative for joint swelling and neck stiffness.   Skin: Negative for color change and rash.   Neurological: Positive for dizziness, weakness and headaches. Negative for seizures and syncope.   Hematological: Negative for adenopathy.   Psychiatric/Behavioral: Negative for agitation, confusion and hallucinations.     Abdominal discomfort  OBJECTIVE:    Vitals:    01/13/22 1149 01/13/22 1250   BP: 106/71    Pulse: 73    Resp: 20    Temp: 97.8 °F (36.6 °C)    TempSrc: Infrared    SpO2: 100%  Comment: at rest 99%  Comment: after walking   Weight: 90.3 kg (199 lb)    Height: 165.1 cm (65\")    PainSc:   4    PainLoc: Abdomen      Body mass index is 33.12 kg/m².    ECOG  (1) Restricted in physically strenuous activity, ambulatory and able to do work of light nature    Physical Exam  Vitals and nursing note reviewed.   Constitutional:       General: She is not in acute distress.     Appearance: She is not diaphoretic.   HENT:      Head: Normocephalic and atraumatic.      Mouth/Throat:      Mouth: Mucous membranes are dry.   Eyes:      General: No scleral icterus.        Right eye: No discharge.         " Left eye: No discharge.      Conjunctiva/sclera: Conjunctivae normal.   Neck:      Thyroid: No thyromegaly.   Cardiovascular:      Rate and Rhythm: Normal rate and regular rhythm.      Heart sounds: Normal heart sounds. No friction rub. No gallop.    Pulmonary:      Effort: Pulmonary effort is normal. No respiratory distress.      Breath sounds: No stridor. No wheezing.   Abdominal:      General: Bowel sounds are normal.      Palpations: Abdomen is soft. There is no mass.      Tenderness: There is no abdominal tenderness. There is no guarding or rebound.   Musculoskeletal:         General: No tenderness. Normal range of motion.      Cervical back: Normal range of motion and neck supple.   Lymphadenopathy:      Cervical: No cervical adenopathy.   Skin:     General: Skin is warm.      Findings: No erythema or rash.      Comments: Tenting skin turgor   Neurological:      Mental Status: She is alert and oriented to person, place, and time.      Motor: No abnormal muscle tone.   Psychiatric:         Behavior: Behavior normal.       I have reexamined the patient and the results are consistent with the previously documented exam. Inga Cori Hernandez MD   RECENT LABS  WBC   Date Value Ref Range Status   01/13/2022 4.98 3.40 - 10.80 10*3/mm3 Final     RBC   Date Value Ref Range Status   01/13/2022 2.91 (L) 3.77 - 5.28 10*6/mm3 Final     Hemoglobin   Date Value Ref Range Status   01/13/2022 8.3 (L) 12.0 - 15.9 g/dL Final     Hematocrit   Date Value Ref Range Status   01/13/2022 25.6 (L) 34.0 - 46.6 % Final     MCV   Date Value Ref Range Status   01/13/2022 88.0 79.0 - 97.0 fL Final     MCH   Date Value Ref Range Status   01/13/2022 28.5 26.6 - 33.0 pg Final     MCHC   Date Value Ref Range Status   01/13/2022 32.4 31.5 - 35.7 g/dL Final     RDW   Date Value Ref Range Status   01/13/2022 16.0 (H) 12.3 - 15.4 % Final     RDW-SD   Date Value Ref Range Status   01/13/2022 49.6 37.0 - 54.0 fl Final     MPV   Date Value Ref Range  Status   01/13/2022 8.4 6.0 - 12.0 fL Final     Platelets   Date Value Ref Range Status   01/13/2022 29 (C) 140 - 450 10*3/mm3 Final     Neutrophil %   Date Value Ref Range Status   01/13/2022 60.3 42.7 - 76.0 % Final     Lymphocyte %   Date Value Ref Range Status   01/13/2022 29.3 19.6 - 45.3 % Final     Monocyte %   Date Value Ref Range Status   01/13/2022 10.4 5.0 - 12.0 % Final     Eosinophil %   Date Value Ref Range Status   01/13/2022 0.0 (L) 0.3 - 6.2 % Final     Basophil %   Date Value Ref Range Status   01/13/2022 0.0 0.0 - 1.5 % Final     Neutrophils, Absolute   Date Value Ref Range Status   01/13/2022 3.00 1.70 - 7.00 10*3/mm3 Final     Lymphocytes, Absolute   Date Value Ref Range Status   01/13/2022 1.46 0.70 - 3.10 10*3/mm3 Final     Monocytes, Absolute   Date Value Ref Range Status   01/13/2022 0.52 0.10 - 0.90 10*3/mm3 Final     Eosinophils, Absolute   Date Value Ref Range Status   01/13/2022 0.00 0.00 - 0.40 10*3/mm3 Final     Basophils, Absolute   Date Value Ref Range Status   01/13/2022 0.00 0.00 - 0.20 10*3/mm3 Final     nRBC   Date Value Ref Range Status   01/11/2022 0.1 0.0 - 0.2 /100 WBC Final       Lab Results   Component Value Date    GLUCOSE 108 (H) 01/13/2022    BUN 13 01/13/2022    CREATININE 1.33 (H) 01/13/2022    EGFRIFNONA 40 (L) 01/13/2022    EGFRIFAFRI 44 (L) 01/21/2020    BCR 9.8 01/13/2022    K 3.1 (L) 01/13/2022    CO2 26.0 01/13/2022    CALCIUM 8.0 (L) 01/13/2022    ALBUMIN 3.50 01/13/2022    LABIL2 1.2 (calc) 01/21/2020    AST 14 01/13/2022    ALT 6 01/13/2022         ASSESSMENT:      1. 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  CA-125 754 November 2021  2. On neoadjuvant chemotherapy with carboplatinum AUC 6, Taxol 175 mg per metered squared, with Avastin added post operatively.  Plan to administer 2-3 cycles keith adjuvantly.  Monitor serial CA-125's  3. Pulmonary nodules of unclear etiology too small for biopsy or PET resolution  4. Chemotherapy induced  nausea  5. Chemotherapy induced fatigue  6. Severe thrombocytopenia secondary to chemotherapy most notably carboplatinum  7. 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  No other actionable mutations identified  8. Dehydration: Resolved  9. Family history of precancerous uterine cells in her sister.  Patient will benefit from genetic testing given her personal diagnosis.  10. Abdominal discomfort secondary to malignancy  11. Assessment has reviewed and updated     Discussion     I have reviewed her imaging studies, pathology reports.  She has metastatic serous carcinoma of the ovary.  She has liver involvement.  She also has pulmonary nodules of unclear etiology.  These are too small for biopsy or PET resolution therefore they will be followed over time.  We discussed that ovarian cancer is a surgical disease.  We will have her seen by GYN oncologist Dr. Castro to weigh in on her surgical options.  We discussed that she may benefit from neoadjuvant chemotherapy but will await Dr. Castro's final recommendations.     She was seen by Dr. Castro recommendation for her to have neoadjuvant chemotherapy with carboplatinum and Taxol      Discussed side effects of chemotherapy to include but not limited to:    Chemotherapy side effects include, but not limited to, nausea, vomiting, bone marrow suppression, which can result in blood, platelet transfusion. There is also risk of permanent bone marrow destruction, which can cause myelodysplastic syndrome or leukemia years down the line. There is risk of infection which can result in hospitalization and even death. There is also risk of fatigue, asthenia, alopecia which could become permanent. Chemo will help to reduce risk of relapse of cancer, but does not eliminate risk completely.    Discussed that Taxol chemotherapy can lead to hypersensitivity reaction  fluid retention, peripheral neuropathy, myalgias, small risk of permanent alopecia    Carboplatinum can lead to myelosuppression socially thrombocytopenia      Avastin will be added postoperatively       She will benefit from genetic testing as this could have some therapeutic implications for her.    Genetics      Today's risk assessment is based on the history the patient has provided.  We discussed that the best people to screen are the ones who have been affected by malignancy as their result is more informative.  We reviewed all the hallmarks of hereditary cancer syndrome including multiple relatives on the same side of the family being affected by malignancy, cancer diagnosis in young individuals, different cancers in one individual.      We discussed the implications of a positive deleterious mutation which can result in recommendations for prophylactic surgeries, chemoprevention, lifestyle modifications and aggressive screening with mammogram and MRI, and also increased breast awareness and breast self exams.  Patient understands if she screens positive, then at-risk family members will need to be screened as well.  Each offspring has a 50% chance of inheriting a deleterious mutation if positive in a parent.      A negative deleterious mutation will make hereditary cancer syndrome much less likely, but does not rule out the possibility of a gene mutation that cannot be detected with the available technology.  She also understands that a negative gene test result might indicate that the cancers that occurred in her family were most likely sporadic, or even if there is a mutation the patient did not inherit it.     We discussed variant of unknown significance.  Patient understands that no medical decisions will be made based on a variant of unknown significance, but I did stress the importance of maintaining contact information with us should this be the case.      We discussed the financial implications of  the above testing.  Patient understands when the right clinical criteria are met that most insurance companies will cover the cost.      We also discussed the various insurability issues with a deleterious mutation result.     Patient has give us consent to proceed with comprehensive genetic analysis.            Plans:     · Comprehensive gene analysis with cancer next technology is pending  · Dose reduce chemotherapy carboplatinum by 25% due to significant toxicity  ·  today  · CMP today   · Repeat CBC in 48 hours  · See NP next week  · Continue combination chemotherapy with carboplatinum and Taxol along with Neulasta.   · CA-125 with each cycle of chemotherapy  · Continue EMLA cream  · Continue Percocet 5 mg p.o. every 4-6 hours as needed for pain  · Prilosec 40 mg p.o. daily  · Continue weekly labs.  BMP next week  · Follow-up with me next week to finalize her plans.  · Foundation 1 testing results reviewed  · All questions answered  · Follow-up with me in 2 weeks  · CT scans after third cycle of chemotherapy  · Schedule chemotherapy education with nurse practitioner      I spent 45 total minutes, face-to-face, caring for Lucy garner.  90% of this time involved counseling and/or coordination of care as documented within this note.

## 2022-01-13 ENCOUNTER — OFFICE VISIT (OUTPATIENT)
Dept: ONCOLOGY | Facility: CLINIC | Age: 62
End: 2022-01-13

## 2022-01-13 ENCOUNTER — LAB (OUTPATIENT)
Dept: LAB | Facility: HOSPITAL | Age: 62
End: 2022-01-13

## 2022-01-13 ENCOUNTER — TELEPHONE (OUTPATIENT)
Dept: ONCOLOGY | Facility: CLINIC | Age: 62
End: 2022-01-13

## 2022-01-13 VITALS
WEIGHT: 199 LBS | RESPIRATION RATE: 20 BRPM | DIASTOLIC BLOOD PRESSURE: 71 MMHG | BODY MASS INDEX: 33.15 KG/M2 | TEMPERATURE: 97.8 F | OXYGEN SATURATION: 99 % | SYSTOLIC BLOOD PRESSURE: 106 MMHG | HEART RATE: 73 BPM | HEIGHT: 65 IN

## 2022-01-13 DIAGNOSIS — C56.9 MALIGNANT NEOPLASM OF OVARY, UNSPECIFIED LATERALITY: ICD-10-CM

## 2022-01-13 DIAGNOSIS — C56.9 MALIGNANT NEOPLASM OF OVARY, UNSPECIFIED LATERALITY: Primary | ICD-10-CM

## 2022-01-13 LAB
ALBUMIN SERPL-MCNC: 3.5 G/DL (ref 3.5–5.2)
ALBUMIN/GLOB SERPL: 1.1 G/DL
ALP SERPL-CCNC: 55 U/L (ref 39–117)
ALT SERPL W P-5'-P-CCNC: 6 U/L (ref 1–33)
ANION GAP SERPL CALCULATED.3IONS-SCNC: 14 MMOL/L (ref 5–15)
AST SERPL-CCNC: 14 U/L (ref 1–32)
BACTERIA SPEC AEROBE CULT: NO GROWTH
BASOPHILS # BLD AUTO: 0 10*3/MM3 (ref 0–0.2)
BASOPHILS NFR BLD AUTO: 0 % (ref 0–1.5)
BH BB BLOOD EXPIRATION DATE: NORMAL
BH BB BLOOD TYPE BARCODE: 6200
BH BB DISPENSE STATUS: NORMAL
BH BB PRODUCT CODE: NORMAL
BH BB UNIT NUMBER: NORMAL
BILIRUB SERPL-MCNC: 0.3 MG/DL (ref 0–1.2)
BUN SERPL-MCNC: 13 MG/DL (ref 8–23)
BUN/CREAT SERPL: 9.8 (ref 7–25)
CALCIUM SPEC-SCNC: 8 MG/DL (ref 8.6–10.5)
CANCER AG125 SERPL QL: 303 U/ML (ref 0–38.1)
CHLORIDE SERPL-SCNC: 101 MMOL/L (ref 98–107)
CO2 SERPL-SCNC: 26 MMOL/L (ref 22–29)
CREAT SERPL-MCNC: 1.33 MG/DL (ref 0.57–1)
DEPRECATED RDW RBC AUTO: 49.6 FL (ref 37–54)
EOSINOPHIL # BLD AUTO: 0 10*3/MM3 (ref 0–0.4)
EOSINOPHIL NFR BLD AUTO: 0 % (ref 0.3–6.2)
ERYTHROCYTE [DISTWIDTH] IN BLOOD BY AUTOMATED COUNT: 16 % (ref 12.3–15.4)
GFR SERPL CREATININE-BSD FRML MDRD: 40 ML/MIN/1.73
GLOBULIN UR ELPH-MCNC: 3.1 GM/DL
GLUCOSE SERPL-MCNC: 108 MG/DL (ref 65–99)
HCT VFR BLD AUTO: 25.6 % (ref 34–46.6)
HGB BLD-MCNC: 8.3 G/DL (ref 12–15.9)
HOLD SPECIMEN: NORMAL
HOLD SPECIMEN: NORMAL
LYMPHOCYTES # BLD AUTO: 1.46 10*3/MM3 (ref 0.7–3.1)
LYMPHOCYTES NFR BLD AUTO: 29.3 % (ref 19.6–45.3)
MCH RBC QN AUTO: 28.5 PG (ref 26.6–33)
MCHC RBC AUTO-ENTMCNC: 32.4 G/DL (ref 31.5–35.7)
MCV RBC AUTO: 88 FL (ref 79–97)
MONOCYTES # BLD AUTO: 0.52 10*3/MM3 (ref 0.1–0.9)
MONOCYTES NFR BLD AUTO: 10.4 % (ref 5–12)
NEUTROPHILS NFR BLD AUTO: 3 10*3/MM3 (ref 1.7–7)
NEUTROPHILS NFR BLD AUTO: 60.3 % (ref 42.7–76)
PLATELET # BLD AUTO: 29 10*3/MM3 (ref 140–450)
PMV BLD AUTO: 8.4 FL (ref 6–12)
POTASSIUM SERPL-SCNC: 3.1 MMOL/L (ref 3.5–5.2)
PROT SERPL-MCNC: 6.6 G/DL (ref 6–8.5)
RBC # BLD AUTO: 2.91 10*6/MM3 (ref 3.77–5.28)
SODIUM SERPL-SCNC: 141 MMOL/L (ref 136–145)
UNIT  ABO: NORMAL
UNIT  RH: NORMAL
WBC NRBC COR # BLD: 4.98 10*3/MM3 (ref 3.4–10.8)

## 2022-01-13 PROCEDURE — 86304 IMMUNOASSAY TUMOR CA 125: CPT | Performed by: INTERNAL MEDICINE

## 2022-01-13 PROCEDURE — 36415 COLL VENOUS BLD VENIPUNCTURE: CPT

## 2022-01-13 PROCEDURE — 99215 OFFICE O/P EST HI 40 MIN: CPT | Performed by: INTERNAL MEDICINE

## 2022-01-13 PROCEDURE — 85025 COMPLETE CBC W/AUTO DIFF WBC: CPT

## 2022-01-13 PROCEDURE — 80053 COMPREHEN METABOLIC PANEL: CPT | Performed by: INTERNAL MEDICINE

## 2022-01-13 NOTE — TELEPHONE ENCOUNTER
Caller: Lucy Mahan    Relationship: Self    Best call back number: [phone]    What is the best time to reach you: ANYTIME, CAN LEAVE VM IF NO ANSWER    Who are you requesting to speak with (clinical staff, provider,  specific staff member): DIETICIAN    What was the call regarding: PT WOULD LIKE TO BE SCHEDULED TO MEET WITH THE DIETICIAN.     Do you require a callback: YES

## 2022-01-14 ENCOUNTER — TELEPHONE (OUTPATIENT)
Dept: ONCOLOGY | Facility: CLINIC | Age: 62
End: 2022-01-14

## 2022-01-14 ENCOUNTER — APPOINTMENT (OUTPATIENT)
Dept: LAB | Facility: HOSPITAL | Age: 62
End: 2022-01-14

## 2022-01-14 DIAGNOSIS — E87.6 HYPOKALEMIA: Primary | ICD-10-CM

## 2022-01-14 NOTE — TELEPHONE ENCOUNTER
Called pt to review labs. I told her that her  is decreasing. I also advised her to increase her potassium until she is seen next week. Pt verbalized understanding. No questions or needs at this time.

## 2022-01-14 NOTE — TELEPHONE ENCOUNTER
----- Message from Inga Hernandez MD sent at 1/14/2022  7:48 AM EST -----  Let patient know that the  is decreasing, which is a good signShe can increase potassium to 40 mEq until she is seen next week.Repeat BMP when she comes in to see NP on Tuesday

## 2022-01-15 ENCOUNTER — HOSPITAL ENCOUNTER (OUTPATIENT)
Dept: INFUSION THERAPY | Facility: HOSPITAL | Age: 62
Setting detail: INFUSION SERIES
Discharge: HOME | End: 2022-01-15

## 2022-01-15 ENCOUNTER — TELEPHONE (OUTPATIENT)
Dept: ONCOLOGY | Facility: HOSPITAL | Age: 62
End: 2022-01-15

## 2022-01-15 DIAGNOSIS — C56.9 MALIGNANT NEOPLASM OF OVARY, UNSPECIFIED LATERALITY: Primary | ICD-10-CM

## 2022-01-15 LAB
BASOPHILS # BLD AUTO: 0 10*3/MM3 (ref 0–0.2)
BASOPHILS NFR BLD AUTO: 0.2 % (ref 0–1.5)
DEPRECATED RDW RBC AUTO: 49 FL (ref 37–54)
EOSINOPHIL # BLD AUTO: 0 10*3/MM3 (ref 0–0.4)
EOSINOPHIL NFR BLD AUTO: 0 % (ref 0.3–6.2)
ERYTHROCYTE [DISTWIDTH] IN BLOOD BY AUTOMATED COUNT: 16.7 % (ref 12.3–15.4)
HCT VFR BLD AUTO: 22.4 % (ref 34–46.6)
HGB BLD-MCNC: 7.6 G/DL (ref 12–15.9)
LYMPHOCYTES # BLD AUTO: 1.1 10*3/MM3 (ref 0.7–3.1)
LYMPHOCYTES NFR BLD AUTO: 34.7 % (ref 19.6–45.3)
MCH RBC QN AUTO: 29.1 PG (ref 26.6–33)
MCHC RBC AUTO-ENTMCNC: 33.9 G/DL (ref 31.5–35.7)
MCV RBC AUTO: 85.8 FL (ref 79–97)
MONOCYTES # BLD AUTO: 0.3 10*3/MM3 (ref 0.1–0.9)
MONOCYTES NFR BLD AUTO: 10.5 % (ref 5–12)
NEUTROPHILS NFR BLD AUTO: 1.7 10*3/MM3 (ref 1.7–7)
NEUTROPHILS NFR BLD AUTO: 54.6 % (ref 42.7–76)
NRBC BLD AUTO-RTO: 0 /100 WBC (ref 0–0.2)
PLATELET # BLD AUTO: 25 10*3/MM3 (ref 140–450)
PMV BLD AUTO: 8.1 FL (ref 6–12)
RBC # BLD AUTO: 2.61 10*6/MM3 (ref 3.77–5.28)
WBC NRBC COR # BLD: 3.1 10*3/MM3 (ref 3.4–10.8)

## 2022-01-15 PROCEDURE — 85025 COMPLETE CBC W/AUTO DIFF WBC: CPT | Performed by: INTERNAL MEDICINE

## 2022-01-15 PROCEDURE — 25010000002 HEPARIN LOCK FLUSH PER 10 UNITS: Performed by: INTERNAL MEDICINE

## 2022-01-15 PROCEDURE — 36591 DRAW BLOOD OFF VENOUS DEVICE: CPT

## 2022-01-15 PROCEDURE — 86901 BLOOD TYPING SEROLOGIC RH(D): CPT | Performed by: INTERNAL MEDICINE

## 2022-01-15 PROCEDURE — 86850 RBC ANTIBODY SCREEN: CPT | Performed by: INTERNAL MEDICINE

## 2022-01-15 PROCEDURE — 86900 BLOOD TYPING SEROLOGIC ABO: CPT | Performed by: INTERNAL MEDICINE

## 2022-01-15 RX ORDER — SODIUM CHLORIDE 0.9 % (FLUSH) 0.9 %
10 SYRINGE (ML) INJECTION AS NEEDED
Status: CANCELLED | OUTPATIENT
Start: 2022-01-15

## 2022-01-15 RX ORDER — SODIUM CHLORIDE 0.9 % (FLUSH) 0.9 %
10 SYRINGE (ML) INJECTION AS NEEDED
Status: DISCONTINUED | OUTPATIENT
Start: 2022-01-15 | End: 2022-01-17 | Stop reason: HOSPADM

## 2022-01-15 RX ORDER — HEPARIN SODIUM (PORCINE) LOCK FLUSH IV SOLN 100 UNIT/ML 100 UNIT/ML
500 SOLUTION INTRAVENOUS AS NEEDED
Status: DISCONTINUED | OUTPATIENT
Start: 2022-01-15 | End: 2022-01-17 | Stop reason: HOSPADM

## 2022-01-15 RX ORDER — HEPARIN SODIUM (PORCINE) LOCK FLUSH IV SOLN 100 UNIT/ML 100 UNIT/ML
500 SOLUTION INTRAVENOUS AS NEEDED
Status: CANCELLED | OUTPATIENT
Start: 2022-01-15

## 2022-01-15 RX ADMIN — HEPARIN 500 UNITS: 100 SYRINGE at 09:55

## 2022-01-15 NOTE — TELEPHONE ENCOUNTER
Elisa Gonzalez RN, with [facility] care called to report plt 25 and hgb 7.6 today. Informed Dr Day of results. No new orders today. Pt to return to center for CBC on Monday, 1/17/22. Appointment scheduled and pt verbalized understanding.

## 2022-01-15 NOTE — PROGRESS NOTES
Pt here for port labs. Draw cbc and call MD with results. hgb 7.6, platelets 25. No transfusions today. Have pt return to cancer care on Monday for repeat cbc. Per Mayda Ball/Dr Day. Pt instructed on orders and verbalized understanding.

## 2022-01-17 ENCOUNTER — TELEPHONE (OUTPATIENT)
Dept: ONCOLOGY | Facility: CLINIC | Age: 62
End: 2022-01-17

## 2022-01-17 ENCOUNTER — TELEPHONE (OUTPATIENT)
Dept: ONCOLOGY | Facility: HOSPITAL | Age: 62
End: 2022-01-17

## 2022-01-17 ENCOUNTER — LAB (OUTPATIENT)
Dept: LAB | Facility: HOSPITAL | Age: 62
End: 2022-01-17

## 2022-01-17 DIAGNOSIS — E87.6 HYPOKALEMIA: ICD-10-CM

## 2022-01-17 DIAGNOSIS — C56.9 MALIGNANT NEOPLASM OF OVARY, UNSPECIFIED LATERALITY: Primary | ICD-10-CM

## 2022-01-17 DIAGNOSIS — C56.9 MALIGNANT NEOPLASM OF OVARY, UNSPECIFIED LATERALITY: ICD-10-CM

## 2022-01-17 DIAGNOSIS — D64.9 ANEMIA, UNSPECIFIED TYPE: Primary | ICD-10-CM

## 2022-01-17 LAB
ALBUMIN SERPL-MCNC: 3.4 G/DL (ref 3.5–5.2)
ALBUMIN/GLOB SERPL: 1.1 G/DL
ALP SERPL-CCNC: 59 U/L (ref 39–117)
ALT SERPL W P-5'-P-CCNC: 5 U/L (ref 1–33)
ANION GAP SERPL CALCULATED.3IONS-SCNC: 11 MMOL/L (ref 5–15)
AST SERPL-CCNC: 13 U/L (ref 1–32)
BASOPHILS # BLD AUTO: 0.01 10*3/MM3 (ref 0–0.2)
BASOPHILS NFR BLD AUTO: 0.2 % (ref 0–1.5)
BILIRUB SERPL-MCNC: 0.2 MG/DL (ref 0–1.2)
BUN SERPL-MCNC: 13 MG/DL (ref 8–23)
BUN/CREAT SERPL: 11.2 (ref 7–25)
CALCIUM SPEC-SCNC: 8.4 MG/DL (ref 8.6–10.5)
CHLORIDE SERPL-SCNC: 105 MMOL/L (ref 98–107)
CO2 SERPL-SCNC: 26 MMOL/L (ref 22–29)
CREAT SERPL-MCNC: 1.16 MG/DL (ref 0.57–1)
DEPRECATED RDW RBC AUTO: 52.3 FL (ref 37–54)
EOSINOPHIL # BLD AUTO: 0.01 10*3/MM3 (ref 0–0.4)
EOSINOPHIL NFR BLD AUTO: 0.2 % (ref 0.3–6.2)
ERYTHROCYTE [DISTWIDTH] IN BLOOD BY AUTOMATED COUNT: 16.7 % (ref 12.3–15.4)
GFR SERPL CREATININE-BSD FRML MDRD: 47 ML/MIN/1.73
GLOBULIN UR ELPH-MCNC: 3.2 GM/DL
GLUCOSE SERPL-MCNC: 100 MG/DL (ref 65–99)
HCT VFR BLD AUTO: 24.6 % (ref 34–46.6)
HGB BLD-MCNC: 7.7 G/DL (ref 12–15.9)
LYMPHOCYTES # BLD AUTO: 1.53 10*3/MM3 (ref 0.7–3.1)
LYMPHOCYTES NFR BLD AUTO: 32.6 % (ref 19.6–45.3)
MCH RBC QN AUTO: 28.6 PG (ref 26.6–33)
MCHC RBC AUTO-ENTMCNC: 31.3 G/DL (ref 31.5–35.7)
MCV RBC AUTO: 91.4 FL (ref 79–97)
MONOCYTES # BLD AUTO: 0.57 10*3/MM3 (ref 0.1–0.9)
MONOCYTES NFR BLD AUTO: 12.1 % (ref 5–12)
NEUTROPHILS NFR BLD AUTO: 2.58 10*3/MM3 (ref 1.7–7)
NEUTROPHILS NFR BLD AUTO: 54.9 % (ref 42.7–76)
PLATELET # BLD AUTO: 50 10*3/MM3 (ref 140–450)
PMV BLD AUTO: 9.5 FL (ref 6–12)
POTASSIUM SERPL-SCNC: 4.2 MMOL/L (ref 3.5–5.2)
PROT SERPL-MCNC: 6.6 G/DL (ref 6–8.5)
RBC # BLD AUTO: 2.69 10*6/MM3 (ref 3.77–5.28)
REF LAB TEST METHOD: NORMAL
SODIUM SERPL-SCNC: 142 MMOL/L (ref 136–145)
WBC NRBC COR # BLD: 4.7 10*3/MM3 (ref 3.4–10.8)
WHOLE BLOOD SORT: NORMAL

## 2022-01-17 PROCEDURE — 80053 COMPREHEN METABOLIC PANEL: CPT

## 2022-01-17 PROCEDURE — 36415 COLL VENOUS BLD VENIPUNCTURE: CPT

## 2022-01-17 PROCEDURE — 85025 COMPLETE CBC W/AUTO DIFF WBC: CPT

## 2022-01-17 NOTE — TELEPHONE ENCOUNTER
Called pt to let her know that she will need to get blood at [facility] tomorrow. Pt verbalized understanding. Called [facility] and they said they would call the pt to schedule.

## 2022-01-17 NOTE — TELEPHONE ENCOUNTER
Called patient to let her know that Dr. Hernandez would like to hold chemo for one week as her hgb is low and platelets are low. Rescheduled for next Monday 1/24 at 0830 patient confirmed. I let her know that Dr. Hernandez would still like her to follow up with the NP in the morning. Patient verbalized understanding.

## 2022-01-18 ENCOUNTER — OFFICE VISIT (OUTPATIENT)
Dept: ONCOLOGY | Facility: CLINIC | Age: 62
End: 2022-01-18

## 2022-01-18 ENCOUNTER — APPOINTMENT (OUTPATIENT)
Dept: LAB | Facility: HOSPITAL | Age: 62
End: 2022-01-18

## 2022-01-18 ENCOUNTER — HOSPITAL ENCOUNTER (OUTPATIENT)
Dept: INFUSION THERAPY | Facility: HOSPITAL | Age: 62
Setting detail: INFUSION SERIES
Discharge: HOME | End: 2022-01-18

## 2022-01-18 ENCOUNTER — HOSPITAL ENCOUNTER (OUTPATIENT)
Dept: ONCOLOGY | Facility: HOSPITAL | Age: 62
Setting detail: INFUSION SERIES
End: 2022-01-18

## 2022-01-18 VITALS
OXYGEN SATURATION: 98 % | RESPIRATION RATE: 18 BRPM | HEIGHT: 65 IN | TEMPERATURE: 97.3 F | BODY MASS INDEX: 33.15 KG/M2 | WEIGHT: 199 LBS | HEART RATE: 84 BPM | SYSTOLIC BLOOD PRESSURE: 131 MMHG | DIASTOLIC BLOOD PRESSURE: 95 MMHG

## 2022-01-18 VITALS
DIASTOLIC BLOOD PRESSURE: 70 MMHG | TEMPERATURE: 97.3 F | SYSTOLIC BLOOD PRESSURE: 134 MMHG | RESPIRATION RATE: 16 BRPM | OXYGEN SATURATION: 98 % | HEART RATE: 76 BPM

## 2022-01-18 DIAGNOSIS — D70.1 CHEMOTHERAPY INDUCED NEUTROPENIA: ICD-10-CM

## 2022-01-18 DIAGNOSIS — T45.1X5A CHEMOTHERAPY INDUCED NEUTROPENIA: ICD-10-CM

## 2022-01-18 DIAGNOSIS — C56.9 MALIGNANT NEOPLASM OF OVARY, UNSPECIFIED LATERALITY: ICD-10-CM

## 2022-01-18 DIAGNOSIS — D61.818 PANCYTOPENIA: ICD-10-CM

## 2022-01-18 DIAGNOSIS — C56.9 MALIGNANT NEOPLASM OF OVARY, UNSPECIFIED LATERALITY: Primary | ICD-10-CM

## 2022-01-18 DIAGNOSIS — D64.9 ANEMIA, UNSPECIFIED TYPE: Primary | ICD-10-CM

## 2022-01-18 DIAGNOSIS — D64.81 ANTINEOPLASTIC CHEMOTHERAPY INDUCED ANEMIA: ICD-10-CM

## 2022-01-18 DIAGNOSIS — T45.1X5A ANTINEOPLASTIC CHEMOTHERAPY INDUCED ANEMIA: ICD-10-CM

## 2022-01-18 LAB
ABO GROUP BLD: NORMAL
BASOPHILS # BLD AUTO: 0 10*3/MM3 (ref 0–0.2)
BASOPHILS NFR BLD AUTO: 0.4 % (ref 0–1.5)
BLD GP AB SCN SERPL QL: NEGATIVE
DEPRECATED RDW RBC AUTO: 49.9 FL (ref 37–54)
EOSINOPHIL # BLD AUTO: 0 10*3/MM3 (ref 0–0.4)
EOSINOPHIL NFR BLD AUTO: 0.1 % (ref 0.3–6.2)
ERYTHROCYTE [DISTWIDTH] IN BLOOD BY AUTOMATED COUNT: 17.1 % (ref 12.3–15.4)
HCT VFR BLD AUTO: 22.5 % (ref 34–46.6)
HGB BLD-MCNC: 7.7 G/DL (ref 12–15.9)
LYMPHOCYTES # BLD AUTO: 1 10*3/MM3 (ref 0.7–3.1)
LYMPHOCYTES NFR BLD AUTO: 24 % (ref 19.6–45.3)
MCH RBC QN AUTO: 29.5 PG (ref 26.6–33)
MCHC RBC AUTO-ENTMCNC: 34.3 G/DL (ref 31.5–35.7)
MCV RBC AUTO: 86.1 FL (ref 79–97)
MONOCYTES # BLD AUTO: 0.5 10*3/MM3 (ref 0.1–0.9)
MONOCYTES NFR BLD AUTO: 12.3 % (ref 5–12)
NEUTROPHILS NFR BLD AUTO: 2.5 10*3/MM3 (ref 1.7–7)
NEUTROPHILS NFR BLD AUTO: 63.2 % (ref 42.7–76)
NRBC BLD AUTO-RTO: 0 /100 WBC (ref 0–0.2)
PLATELET # BLD AUTO: 82 10*3/MM3 (ref 140–450)
PMV BLD AUTO: 8.2 FL (ref 6–12)
RBC # BLD AUTO: 2.61 10*6/MM3 (ref 3.77–5.28)
RH BLD: POSITIVE
T&S EXPIRATION DATE: NORMAL
WBC NRBC COR # BLD: 4 10*3/MM3 (ref 3.4–10.8)

## 2022-01-18 PROCEDURE — P9016 RBC LEUKOCYTES REDUCED: HCPCS

## 2022-01-18 PROCEDURE — 36430 TRANSFUSION BLD/BLD COMPNT: CPT

## 2022-01-18 PROCEDURE — 86923 COMPATIBILITY TEST ELECTRIC: CPT

## 2022-01-18 PROCEDURE — 99214 OFFICE O/P EST MOD 30 MIN: CPT | Performed by: INTERNAL MEDICINE

## 2022-01-18 PROCEDURE — 25010000002 HEPARIN LOCK FLUSH PER 10 UNITS: Performed by: INTERNAL MEDICINE

## 2022-01-18 PROCEDURE — 86900 BLOOD TYPING SEROLOGIC ABO: CPT

## 2022-01-18 PROCEDURE — 85025 COMPLETE CBC W/AUTO DIFF WBC: CPT | Performed by: INTERNAL MEDICINE

## 2022-01-18 RX ORDER — ONDANSETRON HYDROCHLORIDE 8 MG/1
8 TABLET, FILM COATED ORAL 3 TIMES DAILY PRN
Qty: 90 TABLET | Refills: 2 | Status: SHIPPED | OUTPATIENT
Start: 2022-01-18 | End: 2022-07-11 | Stop reason: SDUPTHER

## 2022-01-18 RX ORDER — HEPARIN SODIUM (PORCINE) LOCK FLUSH IV SOLN 100 UNIT/ML 100 UNIT/ML
500 SOLUTION INTRAVENOUS AS NEEDED
Status: DISCONTINUED | OUTPATIENT
Start: 2022-01-18 | End: 2022-01-20 | Stop reason: HOSPADM

## 2022-01-18 RX ORDER — SODIUM CHLORIDE 0.9 % (FLUSH) 0.9 %
10 SYRINGE (ML) INJECTION AS NEEDED
Status: DISCONTINUED | OUTPATIENT
Start: 2022-01-18 | End: 2022-01-20 | Stop reason: HOSPADM

## 2022-01-18 RX ORDER — PROMETHAZINE HYDROCHLORIDE 25 MG/1
25 TABLET ORAL EVERY 6 HOURS PRN
Qty: 90 TABLET | Refills: 1 | Status: SHIPPED | OUTPATIENT
Start: 2022-01-18

## 2022-01-18 RX ORDER — HEPARIN SODIUM (PORCINE) LOCK FLUSH IV SOLN 100 UNIT/ML 100 UNIT/ML
500 SOLUTION INTRAVENOUS AS NEEDED
Status: CANCELLED | OUTPATIENT
Start: 2022-01-18

## 2022-01-18 RX ORDER — OXYCODONE HYDROCHLORIDE AND ACETAMINOPHEN 5; 325 MG/1; MG/1
1 TABLET ORAL EVERY 6 HOURS PRN
Qty: 90 TABLET | Refills: 0 | Status: SHIPPED | OUTPATIENT
Start: 2022-01-18 | End: 2022-02-22 | Stop reason: SDUPTHER

## 2022-01-18 RX ORDER — SODIUM CHLORIDE 0.9 % (FLUSH) 0.9 %
10 SYRINGE (ML) INJECTION AS NEEDED
Status: CANCELLED | OUTPATIENT
Start: 2022-01-18

## 2022-01-18 RX ADMIN — HEPARIN 500 UNITS: 100 SYRINGE at 12:54

## 2022-01-18 NOTE — PROGRESS NOTES
Hematology/Oncology Outpatient Follow Up    PATIENT NAME:Lucy Mahan  :1960  MRN: [id number]  PRIMARY CARE PHYSICIAN: [place] Avalos APRN  REFERRING PHYSICIAN: [place] Avalos APRN    Chief Complaint   Patient presents with   • Follow-up     Malignant neoplasm of ovary, unspecified laterality (HCC)   • Follow-up     chemo-induced anemia        HISTORY OF PRESENT ILLNESS:     This is a 60-year-old female who developed abdominal pain in 2021.  Patient has also lost approximately 35 pounds during that..  She has had loss of appetite.  Due to the abdominal pain,  she had an ultrasound of the abdomen done on 10/13/2021.  This basically revealed no liver lesions.  Common bile duct is normal at 3 mm.  There is a nonmobile 3 cm shadowing gallstone within the gallbladder neck.  No gallbladder thickening or pericholecystic fluid collection.  Patient was then referred to general surgery and was seen by Dr. Badillo.  Who took her to surgery on 11/10/2021 for diagnostic laparoscopy and peritoneal biopsy.  Intra-Op , she was noted to have peritoneal studding with malignancy throughout the abdominal cavity and biopsies were completed. 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  The tumor was focally positive for progesterone receptor, positive for CK7, estrogen receptor, CA-125 and PAX 8..  The staining pattern is consistent with ovarian serous carcinoma.      She had CT scan of the chest, abdomen and pelvis and the chest is a 2 mm noncalcified nodule within the left lower lobe, noncalcified nodule in the left upper lobe measuring 4 mm and 3 mm.  In the abdomen there is a 5.1 x 5.3 cm enhancing soft tissue mass in the pelvis to the right of midline associated with the adnexal region possibly representing a primary ovarian malignancy.  No right or left ovarian tissue was seen.  There is abnormal omental thickening and nodularity consistent with carcinomatosis greatest bulk in the left  mid abdomen measuring up to 10.8 cm x 2.5 cm.  There is nodular peritoneal thickening also present within the abdomen and pelvis consistent with peritoneal carcinomatosis.  The small quantity of ascitic fluid in the abdomen and pelvis.  Carcinomatosis nodular studding is seen along the inferior right hepatic lobe.     She  has been referred to us for further evaluation and management of her newly diagnosed ovarian carcinoma.     Patient is accompanied today by her daughter for this appointment.  There is  family history of precancerous cells of the  uterusin her sister.        She does not smoke and does not drink alcohol.  Patient is  and has 2 daughters.  She works for the [employer].    · 11/29/2021 patient was seen by Dr. Dubois who has recommended neoadjuvant chemotherapy for 2-3 cycles and then interval cytoreduction.  She has recommended carboplatinum AUC 6, Taxol 1 7 5 mg per metered squared, Avastin 15 mg/kg as was done in the oceans trial but hold Avastin for the first few cycles due to bowel risk.  · Prechemo CA-125 was 754  · 12/7/2021 patient received first cycle of chemotherapy with carboplatinum and Taxol with Neulasta  · 12/28/2021 patient received cycle 2 of combination chemotherapy with carboplatinum and Taxol with Neulasta  · 12/28/2021 CA-125 was down to 635  · 1/11/2022 add-on appointment for nausea, dizziness, thrombocytopenia, pain in upper to mid abdomen 8/10, new onset in the last 4 to 5 days  · January 12, 2022: Due to acute GI symptoms patient had a CT scan of the abdomen and pelvis which basically revealed increasing effusion on the left lung mild pleural effusion on the right lung decreased in amount of omental caking but no interval change in the right adnexal area moderate abdominal and pelvic ascites which has increased.  She denies any significant pulmonary symptoms.  Her O2 sat today was 100% at rest and 99% with ambulation  · January 18.2022:  Cycle 3  chemotherapy was held due to PLT 50, Hemoglobin 7.7.  Patient will receive 1 unit of PRBC today at [facility].        History of present illness was reviewed and is unchanged from the previous visit. 22        Past Medical History:   Diagnosis Date   • Arthritis    • Cancer (HCC)     ovarian   • Gall stones    • Hypertension    • Irritable bowel syndrome    • Rheumatoid aortitis    • Thyroid disease        Past Surgical History:   Procedure Laterality Date   •  SECTION      x2   • CHOLECYSTECTOMY N/A 11/10/2021    Procedure: diagnostic laparoscopy and peritoneal biopsy;  Surgeon: Krunal Badillo MD;  Location: [facility] MAIN OR;  Service: General;  Laterality: N/A;   • COLONOSCOPY     • HIP SURGERY Left    • THYROIDECTOMY, PARTIAL  2017   • VENOUS ACCESS DEVICE (PORT) INSERTION Left 2021    Procedure: INSERTION VENOUS ACCESS DEVICE;  Surgeon: Krunal Badillo MD;  Location: [facility] MAIN OR;  Service: General;  Laterality: Left;         Current Outpatient Medications:   •  Calcium Carbonate-Vitamin D 600-400 MG-UNIT chewable tablet, Chew 1 tablet Daily., Disp: , Rfl:   •  dexamethasone (DECADRON) 4 MG tablet, Take 5 tablets the night before chemotherapy.  Take with food., Disp: 5 tablet, Rfl: 5  •  folic acid (FOLVITE) 1 MG tablet, take 1 tablet by mouth once daily (Patient taking differently: Take 1 mg by mouth Daily.), Disp: 90 tablet, Rfl: 1  •  hydroxychloroquine (PLAQUENIL) 200 MG tablet, TAKE 1 TABLET BY MOUTH TWICE A DAY (Patient taking differently: Take 200 mg by mouth 2 (Two) Times a Day. Takes in afternoon and evening), Disp: 180 tablet, Rfl: 0  •  hydrOXYzine (ATARAX) 10 MG tablet, TAKE 1 TABLET BY MOUTH TWICE DAILY AS NEEDED FOR ANXIETY (Patient taking differently: Take 10 mg by mouth Every 6 (Six) Hours As Needed.), Disp: 40 tablet, Rfl: 2  •  levoFLOXacin (Levaquin) 750 MG tablet, Take 1 tablet by mouth Daily., Disp: 7 tablet, Rfl: 0  •  levothyroxine (SYNTHROID, LEVOTHROID) 137 MCG tablet,  Take 1 tablet by mouth Daily., Disp: 90 tablet, Rfl: 0  •  lidocaine-prilocaine (EMLA) 2.5-2.5 % cream, Apply 1 application topically to the appropriate area as directed Take As Directed. Apply to port 1 hour prior to port access, Disp: 30 g, Rfl: 1  •  lisinopril (PRINIVIL,ZESTRIL) 40 MG tablet, TAKE 1 TABLET BY MOUTH DAILY (Patient taking differently: Take 40 mg by mouth Every Evening. 12/5LD), Disp: 90 tablet, Rfl: 1  •  loratadine (Claritin) 10 MG tablet, Take 1 tablet night before and 1 tablet morning of chemotherapy., Disp: 2 tablet, Rfl: 5  •  OLANZapine (ZyPREXA) 5 MG tablet, Take 1 tablet by mouth Every Night. Take on days 2, 3 and 4 after chemotherapy., Disp: 3 tablet, Rfl: 5  •  omeprazole (priLOSEC) 40 MG capsule, Take 1 capsule by mouth Daily., Disp: 30 capsule, Rfl: 6  •  pantoprazole (Protonix) 40 MG EC tablet, Take 1 tablet by mouth Daily., Disp: 30 tablet, Rfl: 6  •  nitrofurantoin, macrocrystal-monohydrate, (Macrobid) 100 MG capsule, Take 1 capsule by mouth 2 (Two) Times a Day., Disp: 10 capsule, Rfl: 0  •  ondansetron (ZOFRAN) 8 MG tablet, Take 1 tablet by mouth 3 (Three) Times a Day As Needed for Nausea or Vomiting., Disp: 90 tablet, Rfl: 2  •  oxyCODONE-acetaminophen (PERCOCET) 5-325 MG per tablet, Take 1 tablet by mouth Every 6 (Six) Hours As Needed for Moderate Pain . Every 4-6 hours prn pain, Disp: 90 tablet, Rfl: 0  •  promethazine (PHENERGAN) 25 MG tablet, Take 1 tablet by mouth Every 6 (Six) Hours As Needed for Nausea or Vomiting., Disp: 90 tablet, Rfl: 1    No Known Allergies    Family History   Problem Relation Age of Onset   • Dementia Mother    • Arthritis Mother    • Heart disease Father    • Hypertension Father        Cancer-related family history is not on file.    Social History     Tobacco Use   • Smoking status: Never Smoker   • Smokeless tobacco: Never Used   Vaping Use   • Vaping Use: Never used   Substance Use Topics   • Alcohol use: Yes     Alcohol/week: 1.0 standard drink      Types: 1 Glasses of wine per week     Comment: less than monthly   • Drug use: No       HPI, ROS and PFSH have been reviewed and confirmed on 1/18/2022.     SUBJECTIVE:    Patient here prior to having blood transfusion, Having shortness of breath upon exertion, weakness and abdominal pain 6/10.  Reporting constipation.  Has been taking senokot every now and then but not daily.  Encouraged to take daily since she is on pain medication.              REVIEW OF SYSTEMS:  Review of Systems   Constitutional: Positive for fatigue. Negative for activity change, appetite change, chills, diaphoresis, fever and unexpected weight change.   HENT: Positive for rhinorrhea. Negative for ear pain, mouth sores, nosebleeds, sinus pressure, sore throat and trouble swallowing.    Eyes: Negative for photophobia and visual disturbance.   Respiratory: Positive for shortness of breath. Negative for cough, choking, chest tightness, wheezing and stridor.    Cardiovascular: Positive for leg swelling. Negative for chest pain and palpitations.        Bilateral ankles- when up    Gastrointestinal: Positive for abdominal pain, constipation and nausea. Negative for diarrhea and vomiting.        Lower abdominal pain below belly button- 6/10- pain med effective   Endocrine: Negative for cold intolerance and heat intolerance.   Genitourinary: Negative for dyspareunia, dysuria, hematuria, vaginal bleeding, vaginal discharge and vaginal pain.        Decreased urine output   Musculoskeletal: Negative for joint swelling and neck stiffness.   Skin: Negative for color change and rash.   Neurological: Positive for dizziness, weakness and headaches. Negative for seizures, syncope and numbness.   Hematological: Negative for adenopathy.   Psychiatric/Behavioral: Negative for agitation, confusion and hallucinations. The patient is nervous/anxious.      Abdominal discomfort  OBJECTIVE:    Vitals:    01/18/22 0906   BP: 131/95   Pulse: 84   Resp: 18   Temp: 97.3  "°F (36.3 °C)   TempSrc: Oral   SpO2: 98%   Weight: 90.3 kg (199 lb)   Height: 165.1 cm (65\")   PainSc:   6   PainLoc: Abdomen     Body mass index is 33.12 kg/m².    ECOG  (1) Restricted in physically strenuous activity, ambulatory and able to do work of light nature    Physical Exam  Vitals and nursing note reviewed.   Constitutional:       General: She is not in acute distress.     Appearance: Normal appearance. She is not diaphoretic.   HENT:      Head: Normocephalic and atraumatic.      Right Ear: Tympanic membrane, ear canal and external ear normal. There is no impacted cerumen.      Left Ear: Tympanic membrane, ear canal and external ear normal. There is no impacted cerumen.      Nose: Nose normal. No congestion or rhinorrhea.      Mouth/Throat:      Mouth: Mucous membranes are moist.      Pharynx: Oropharynx is clear.   Eyes:      General: No scleral icterus.        Right eye: No discharge.         Left eye: No discharge.      Extraocular Movements: Extraocular movements intact.      Conjunctiva/sclera: Conjunctivae normal.      Pupils: Pupils are equal, round, and reactive to light.   Neck:      Thyroid: No thyromegaly.   Cardiovascular:      Rate and Rhythm: Normal rate and regular rhythm.      Heart sounds: Normal heart sounds. No friction rub. No gallop.    Pulmonary:      Effort: Pulmonary effort is normal. No respiratory distress.      Breath sounds: No stridor. No wheezing.   Abdominal:      General: Bowel sounds are normal.      Palpations: Abdomen is soft. There is no mass.      Tenderness: There is no abdominal tenderness. There is no guarding or rebound.   Genitourinary:     Comments: deferred  Musculoskeletal:         General: No tenderness. Normal range of motion.      Cervical back: Normal range of motion and neck supple.      Right lower leg: Edema present.      Left lower leg: Edema present.   Lymphadenopathy:      Cervical: No cervical adenopathy.   Skin:     General: Skin is warm and dry.     "  Capillary Refill: Capillary refill takes less than 2 seconds.      Coloration: Skin is pale.      Findings: No erythema or rash.   Neurological:      General: No focal deficit present.      Mental Status: She is alert and oriented to person, place, and time.      Motor: Weakness present. No abnormal muscle tone.   Psychiatric:         Mood and Affect: Mood normal.         Behavior: Behavior normal.         Thought Content: Thought content normal.         Judgment: Judgment normal.       I have reexamined the patient and the results are consistent with the previously documented exam. FLORENCE Cuadra   RECENT LABS  WBC   Date Value Ref Range Status   01/18/2022 4.00 3.40 - 10.80 10*3/mm3 Final     RBC   Date Value Ref Range Status   01/18/2022 2.61 (L) 3.77 - 5.28 10*6/mm3 Final     Hemoglobin   Date Value Ref Range Status   01/18/2022 7.7 (L) 12.0 - 15.9 g/dL Final     Hematocrit   Date Value Ref Range Status   01/18/2022 22.5 (L) 34.0 - 46.6 % Final     MCV   Date Value Ref Range Status   01/18/2022 86.1 79.0 - 97.0 fL Final     Comment:     Result checked     MCH   Date Value Ref Range Status   01/18/2022 29.5 26.6 - 33.0 pg Final     MCHC   Date Value Ref Range Status   01/18/2022 34.3 31.5 - 35.7 g/dL Final     Comment:     Result checked     RDW   Date Value Ref Range Status   01/18/2022 17.1 (H) 12.3 - 15.4 % Final     RDW-SD   Date Value Ref Range Status   01/18/2022 49.9 37.0 - 54.0 fl Final     MPV   Date Value Ref Range Status   01/18/2022 8.2 6.0 - 12.0 fL Final     Platelets   Date Value Ref Range Status   01/18/2022 82 (L) 140 - 450 10*3/mm3 Final     Comment:     Result checked     Neutrophil %   Date Value Ref Range Status   01/18/2022 63.2 42.7 - 76.0 % Final     Lymphocyte %   Date Value Ref Range Status   01/18/2022 24.0 19.6 - 45.3 % Final     Monocyte %   Date Value Ref Range Status   01/18/2022 12.3 (H) 5.0 - 12.0 % Final     Eosinophil %   Date Value Ref Range Status   01/18/2022 0.1 (L) 0.3  - 6.2 % Final     Basophil %   Date Value Ref Range Status   01/18/2022 0.4 0.0 - 1.5 % Final     Neutrophils, Absolute   Date Value Ref Range Status   01/18/2022 2.50 1.70 - 7.00 10*3/mm3 Final     Lymphocytes, Absolute   Date Value Ref Range Status   01/18/2022 1.00 0.70 - 3.10 10*3/mm3 Final     Monocytes, Absolute   Date Value Ref Range Status   01/18/2022 0.50 0.10 - 0.90 10*3/mm3 Final     Eosinophils, Absolute   Date Value Ref Range Status   01/18/2022 0.00 0.00 - 0.40 10*3/mm3 Final     Basophils, Absolute   Date Value Ref Range Status   01/18/2022 0.00 0.00 - 0.20 10*3/mm3 Final     nRBC   Date Value Ref Range Status   01/18/2022 0.0 0.0 - 0.2 /100 WBC Final       Lab Results   Component Value Date    GLUCOSE 100 (H) 01/17/2022    BUN 13 01/17/2022    CREATININE 1.16 (H) 01/17/2022    EGFRIFNONA 47 (L) 01/17/2022    EGFRIFAFRI 44 (L) 01/21/2020    BCR 11.2 01/17/2022    K 4.2 01/17/2022    CO2 26.0 01/17/2022    CALCIUM 8.4 (L) 01/17/2022    ALBUMIN 3.40 (L) 01/17/2022    LABIL2 1.2 (calc) 01/21/2020    AST 13 01/17/2022    ALT 5 01/17/2022     ASSESSMENT:      1. 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  CA-125 754 November 2021  2. On neoadjuvant chemotherapy with carboplatinum AUC 6, Taxol 175 mg per metered squared, with Avastin added post operatively.  Plan to administer 2-3 cycles keith adjuvantly.  Monitor serial CA-125's  3. Pulmonary nodules of unclear etiology too small for biopsy or PET resolution  4. Chemotherapy induced nausea  5. Chemotherapy induced fatigue  6. Chemo-induced anemia: Hemoglobin 7.7  7. Severe thrombocytopenia secondary to chemotherapy most notably carboplatinum- PLT 50k. Educated regarding increased for bleeding.   8. 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  No other actionable mutations identified  9. Dehydration: Resolved  10. Family history  of precancerous uterine cells in her sister.  Patient will benefit from genetic testing given her personal diagnosis.  11. Abdominal discomfort secondary to malignancy  12. Assessment has reviewed and updated- 1/18/2022        Discussion: 1/18/2022:    Patient here prior to having blood transfusion, Accompanied by daughter. Patient is having shortness of breath upon exertion- no shortness of breath when sitting, weakness and abdominal pain 6/10.States that if she does not take her pain med that her pain is unbearable.  Patient states that she runs out before the month due to only having 40 tablets.  Increased the amount of pain medication given and sent to pharmacy. Patient states that she is having constipation. Patient has been taking senokot every now and then but not daily.  Encouraged to take daily since she is on pain medication. If her stools are too lose then take Senokot every other day. Patient's  Cycle 3 chemotherapy held today due to hemoglobin 7.7, PLT 50.  ANC  2580.  Informed patient regarding neutropenic precautions, increased risk for bleeding due to low platelet count.  Patient verbalized understanding.  Patient to receive 1 unit of PRBC's today.  Patient has trace edema in bilateral ankles. States she always has edema when she is up on her feet.  Patient to call this NP if she is still having shortness of breath upon exertion on Thursday so we can recheck her labs.  If she is not symptomatic, patient wishes to wait until Monday when she is already scheduled to return for treatment.     Discussion  I have reviewed her imaging studies, pathology reports.  She has metastatic serous carcinoma of the ovary.  She has liver involvement.  She also has pulmonary nodules of unclear etiology.  These are too small for biopsy or PET resolution therefore they will be followed over time.  We discussed that ovarian cancer is a surgical disease.  We will have her seen by GYN oncologist Dr. Castro to weigh in on  her surgical options.  We discussed that she may benefit from neoadjuvant chemotherapy but will await Dr. Castro's final recommendations.     She was seen by Dr. Castro recommendation for her to have neoadjuvant chemotherapy with carboplatinum and Taxol      Discussed side effects of chemotherapy to include but not limited to:    Chemotherapy side effects include, but not limited to, nausea, vomiting, bone marrow suppression, which can result in blood, platelet transfusion. There is also risk of permanent bone marrow destruction, which can cause myelodysplastic syndrome or leukemia years down the line. There is risk of infection which can result in hospitalization and even death. There is also risk of fatigue, asthenia, alopecia which could become permanent. Chemo will help to reduce risk of relapse of cancer, but does not eliminate risk completely.    Discussed that Taxol chemotherapy can lead to hypersensitivity reaction fluid retention, peripheral neuropathy, myalgias, small risk of permanent alopecia    Carboplatinum can lead to myelosuppression socially thrombocytopenia      Avastin will be added postoperatively       She will benefit from genetic testing as this could have some therapeutic implications for her.    Genetics      Today's risk assessment is based on the history the patient has provided.  We discussed that the best people to screen are the ones who have been affected by malignancy as their result is more informative.  We reviewed all the hallmarks of hereditary cancer syndrome including multiple relatives on the same side of the family being affected by malignancy, cancer diagnosis in young individuals, different cancers in one individual.      We discussed the implications of a positive deleterious mutation which can result in recommendations for prophylactic surgeries, chemoprevention, lifestyle modifications and aggressive screening with mammogram and MRI, and also increased breast  awareness and breast self exams.  Patient understands if she screens positive, then at-risk family members will need to be screened as well.  Each offspring has a 50% chance of inheriting a deleterious mutation if positive in a parent.      A negative deleterious mutation will make hereditary cancer syndrome much less likely, but does not rule out the possibility of a gene mutation that cannot be detected with the available technology.  She also understands that a negative gene test result might indicate that the cancers that occurred in her family were most likely sporadic, or even if there is a mutation the patient did not inherit it.     We discussed variant of unknown significance.  Patient understands that no medical decisions will be made based on a variant of unknown significance, but I did stress the importance of maintaining contact information with us should this be the case.      We discussed the financial implications of the above testing.  Patient understands when the right clinical criteria are met that most insurance companies will cover the cost.      We also discussed the various insurability issues with a deleterious mutation result.     Patient has give us consent to proceed with comprehensive genetic analysis.            Plans: 1/18/2022   1.  CBC/diff today Hemoglobin 7.7, PLT 50  2.  Hold cycle 3 chemotherapy due to pancytopenia  3.  Transfuse 1 unit of PRBC's today at [facility]  4.   Take OTC senokot one tablet daily as needed for constipation  5.  Elevate bilateral feet above heart when sitting or lying to decrease bilateral ankle edema   6.  Oxycodone 5mg/Acetaminophen 325mg one tablet every 6 hours PRN moderate pain #90 reordered  7. Continue Prilosec 40mg po daily  8. All questions answered, patient verbalized understanding and is agreeable to above plan  9. Patient to go to ED if symptoms worsen  10. Patient scheduled on Monday, 1/24/22 for treatment and lab draw.   11. Repeat CBC/diff in 48  hours (patient to call- if she does not feel symptomatic she wants to wait until Monday)  12.  I have spent 30 minutes with patient and daughter face to face: performing physical examination, reviewing labs, imaging, medications, history and physical, counseling and/or coordinating care as documented within this note.     Electronically signed by FLORENCE Cuadra, 01/18/22, 10:08 AM EST.           · Comprehensive gene analysis with cancer next technology is pending  · Dose reduce chemotherapy carboplatinum by 25% due to significant toxicity  ·  today  · CMP today   · Repeat CBC in 48 hours  · See NP next week  · Continue combination chemotherapy with carboplatinum and Taxol along with Neulasta.   · CA-125 with each cycle of chemotherapy  · Continue EMLA cream  · Continue Percocet 5 mg p.o. every 4-6 hours as needed for pain  · Prilosec 40 mg p.o. daily  · Continue weekly labs.  BMP next week  · Follow-up with me next week to finalize her plans.  · Foundation 1 testing results reviewed  · All questions answered  · Follow-up with me in 2 weeks  · CT scans after third cycle of chemotherapy  · Schedule chemotherapy education with nurse practitioner      I spent 45 total minutes, face-to-face, caring for Lucy today.  90% of this time involved counseling and/or coordination of care as documented within this note.

## 2022-01-19 DIAGNOSIS — F41.9 ANXIETY: ICD-10-CM

## 2022-01-19 LAB
BH BB BLOOD EXPIRATION DATE: NORMAL
BH BB BLOOD TYPE BARCODE: 7300
BH BB DISPENSE STATUS: NORMAL
BH BB PRODUCT CODE: NORMAL
BH BB UNIT NUMBER: NORMAL
CROSSMATCH INTERPRETATION: NORMAL
UNIT  ABO: NORMAL
UNIT  RH: NORMAL

## 2022-01-19 RX ORDER — HYDROXYZINE HYDROCHLORIDE 10 MG/1
10 TABLET, FILM COATED ORAL 2 TIMES DAILY PRN
Qty: 40 TABLET | Refills: 0 | Status: SHIPPED | OUTPATIENT
Start: 2022-01-19 | End: 2022-03-09

## 2022-01-21 ENCOUNTER — RESEARCH ENCOUNTER (OUTPATIENT)
Dept: ONCOLOGY | Facility: CLINIC | Age: 62
End: 2022-01-21

## 2022-01-24 ENCOUNTER — HOSPITAL ENCOUNTER (OUTPATIENT)
Dept: ONCOLOGY | Facility: HOSPITAL | Age: 62
Setting detail: INFUSION SERIES
Discharge: HOME | End: 2022-01-24

## 2022-01-24 VITALS
WEIGHT: 200 LBS | HEART RATE: 97 BPM | TEMPERATURE: 97.2 F | OXYGEN SATURATION: 97 % | BODY MASS INDEX: 33.32 KG/M2 | HEIGHT: 65 IN | RESPIRATION RATE: 16 BRPM | DIASTOLIC BLOOD PRESSURE: 67 MMHG | SYSTOLIC BLOOD PRESSURE: 155 MMHG

## 2022-01-24 DIAGNOSIS — C56.9 MALIGNANT NEOPLASM OF OVARY, UNSPECIFIED LATERALITY: Primary | ICD-10-CM

## 2022-01-24 LAB
ALBUMIN SERPL-MCNC: 3.4 G/DL (ref 3.5–5.2)
ALBUMIN/GLOB SERPL: 1 G/DL
ALP SERPL-CCNC: 55 U/L (ref 39–117)
ALT SERPL W P-5'-P-CCNC: <5 U/L (ref 1–33)
ANION GAP SERPL CALCULATED.3IONS-SCNC: 12 MMOL/L (ref 5–15)
AST SERPL-CCNC: 12 U/L (ref 1–32)
BASOPHILS # BLD AUTO: 0.01 10*3/MM3 (ref 0–0.2)
BASOPHILS NFR BLD AUTO: 0.3 % (ref 0–1.5)
BILIRUB SERPL-MCNC: 0.3 MG/DL (ref 0–1.2)
BUN SERPL-MCNC: 15 MG/DL (ref 8–23)
BUN/CREAT SERPL: 14.9 (ref 7–25)
CALCIUM SPEC-SCNC: 8.6 MG/DL (ref 8.6–10.5)
CANCER AG125 SERPL QL: 365.2 U/ML (ref 0–38.1)
CHLORIDE SERPL-SCNC: 105 MMOL/L (ref 98–107)
CO2 SERPL-SCNC: 23 MMOL/L (ref 22–29)
CREAT BLDA-MCNC: 1.1 MG/DL (ref 0.6–1.3)
CREAT SERPL-MCNC: 1.01 MG/DL (ref 0.57–1)
DEPRECATED RDW RBC AUTO: 59.5 FL (ref 37–54)
EOSINOPHIL # BLD AUTO: 0 10*3/MM3 (ref 0–0.4)
EOSINOPHIL NFR BLD AUTO: 0 % (ref 0.3–6.2)
ERYTHROCYTE [DISTWIDTH] IN BLOOD BY AUTOMATED COUNT: 18.9 % (ref 12.3–15.4)
GFR SERPL CREATININE-BSD FRML MDRD: 55 ML/MIN/1.73
GLOBULIN UR ELPH-MCNC: 3.4 GM/DL
GLUCOSE SERPL-MCNC: 167 MG/DL (ref 65–99)
HCT VFR BLD AUTO: 28.3 % (ref 34–46.6)
HGB BLD-MCNC: 8.8 G/DL (ref 12–15.9)
LYMPHOCYTES # BLD AUTO: 0.26 10*3/MM3 (ref 0.7–3.1)
LYMPHOCYTES NFR BLD AUTO: 8.3 % (ref 19.6–45.3)
MCH RBC QN AUTO: 28.7 PG (ref 26.6–33)
MCHC RBC AUTO-ENTMCNC: 31.1 G/DL (ref 31.5–35.7)
MCV RBC AUTO: 92.2 FL (ref 79–97)
MONOCYTES # BLD AUTO: 0.02 10*3/MM3 (ref 0.1–0.9)
MONOCYTES NFR BLD AUTO: 0.6 % (ref 5–12)
NEUTROPHILS NFR BLD AUTO: 2.83 10*3/MM3 (ref 1.7–7)
NEUTROPHILS NFR BLD AUTO: 90.8 % (ref 42.7–76)
PLATELET # BLD AUTO: 331 10*3/MM3 (ref 140–450)
PMV BLD AUTO: 9.2 FL (ref 6–12)
POTASSIUM SERPL-SCNC: 3.7 MMOL/L (ref 3.5–5.2)
PROT SERPL-MCNC: 6.8 G/DL (ref 6–8.5)
RBC # BLD AUTO: 3.07 10*6/MM3 (ref 3.77–5.28)
SODIUM SERPL-SCNC: 140 MMOL/L (ref 136–145)
WBC NRBC COR # BLD: 3.12 10*3/MM3 (ref 3.4–10.8)

## 2022-01-24 PROCEDURE — 25010000002 FOSAPREPITANT PER 1 MG: Performed by: INTERNAL MEDICINE

## 2022-01-24 PROCEDURE — 25010000002 PACLITAXEL PER 1 MG: Performed by: INTERNAL MEDICINE

## 2022-01-24 PROCEDURE — 25010000002 PEGFILGRASTIM 6 MG/0.6ML PREFILLED SYRINGE KIT: Performed by: INTERNAL MEDICINE

## 2022-01-24 PROCEDURE — 96377 APPLICATON ON-BODY INJECTOR: CPT

## 2022-01-24 PROCEDURE — 80053 COMPREHEN METABOLIC PANEL: CPT | Performed by: INTERNAL MEDICINE

## 2022-01-24 PROCEDURE — 96413 CHEMO IV INFUSION 1 HR: CPT

## 2022-01-24 PROCEDURE — 96375 TX/PRO/DX INJ NEW DRUG ADDON: CPT

## 2022-01-24 PROCEDURE — 36591 DRAW BLOOD OFF VENOUS DEVICE: CPT

## 2022-01-24 PROCEDURE — 25010000002 HEPARIN LOCK FLUSH PER 10 UNITS: Performed by: INTERNAL MEDICINE

## 2022-01-24 PROCEDURE — 85025 COMPLETE CBC W/AUTO DIFF WBC: CPT | Performed by: INTERNAL MEDICINE

## 2022-01-24 PROCEDURE — 86304 IMMUNOASSAY TUMOR CA 125: CPT | Performed by: INTERNAL MEDICINE

## 2022-01-24 PROCEDURE — 25010000002 PALONOSETRON PER 25 MCG: Performed by: INTERNAL MEDICINE

## 2022-01-24 PROCEDURE — 25010000002 DIPHENHYDRAMINE PER 50 MG: Performed by: INTERNAL MEDICINE

## 2022-01-24 PROCEDURE — 96367 TX/PROPH/DG ADDL SEQ IV INF: CPT

## 2022-01-24 PROCEDURE — 96415 CHEMO IV INFUSION ADDL HR: CPT

## 2022-01-24 PROCEDURE — 25010000002 DEXAMETHASONE SODIUM PHOSPHATE 120 MG/30ML SOLUTION: Performed by: INTERNAL MEDICINE

## 2022-01-24 PROCEDURE — 96417 CHEMO IV INFUS EACH ADDL SEQ: CPT

## 2022-01-24 PROCEDURE — 25010000002 CARBOPLATIN PER 50 MG: Performed by: INTERNAL MEDICINE

## 2022-01-24 PROCEDURE — 82565 ASSAY OF CREATININE: CPT

## 2022-01-24 RX ORDER — HEPARIN SODIUM (PORCINE) LOCK FLUSH IV SOLN 100 UNIT/ML 100 UNIT/ML
500 SOLUTION INTRAVENOUS AS NEEDED
Status: CANCELLED | OUTPATIENT
Start: 2022-01-24

## 2022-01-24 RX ORDER — SODIUM CHLORIDE 0.9 % (FLUSH) 0.9 %
10 SYRINGE (ML) INJECTION AS NEEDED
Status: DISCONTINUED | OUTPATIENT
Start: 2022-01-24 | End: 2022-01-25 | Stop reason: HOSPADM

## 2022-01-24 RX ORDER — FAMOTIDINE 10 MG/ML
20 INJECTION, SOLUTION INTRAVENOUS AS NEEDED
Status: CANCELLED | OUTPATIENT
Start: 2022-01-24

## 2022-01-24 RX ORDER — SODIUM CHLORIDE 9 MG/ML
250 INJECTION, SOLUTION INTRAVENOUS ONCE
Status: COMPLETED | OUTPATIENT
Start: 2022-01-24 | End: 2022-01-24

## 2022-01-24 RX ORDER — OLANZAPINE 5 MG/1
5 TABLET ORAL ONCE
Status: COMPLETED | OUTPATIENT
Start: 2022-01-24 | End: 2022-01-24

## 2022-01-24 RX ORDER — SODIUM CHLORIDE 0.9 % (FLUSH) 0.9 %
10 SYRINGE (ML) INJECTION AS NEEDED
Status: CANCELLED | OUTPATIENT
Start: 2022-01-24

## 2022-01-24 RX ORDER — PALONOSETRON 0.05 MG/ML
0.25 INJECTION, SOLUTION INTRAVENOUS ONCE
Status: COMPLETED | OUTPATIENT
Start: 2022-01-24 | End: 2022-01-24

## 2022-01-24 RX ORDER — HEPARIN SODIUM (PORCINE) LOCK FLUSH IV SOLN 100 UNIT/ML 100 UNIT/ML
500 SOLUTION INTRAVENOUS AS NEEDED
Status: DISCONTINUED | OUTPATIENT
Start: 2022-01-24 | End: 2022-01-25 | Stop reason: HOSPADM

## 2022-01-24 RX ORDER — FAMOTIDINE 10 MG/ML
20 INJECTION, SOLUTION INTRAVENOUS ONCE
Status: COMPLETED | OUTPATIENT
Start: 2022-01-24 | End: 2022-01-24

## 2022-01-24 RX ORDER — DIPHENHYDRAMINE HYDROCHLORIDE 50 MG/ML
50 INJECTION INTRAMUSCULAR; INTRAVENOUS AS NEEDED
Status: CANCELLED | OUTPATIENT
Start: 2022-01-24

## 2022-01-24 RX ADMIN — CARBOPLATIN 450 MG: 10 INJECTION, SOLUTION INTRAVENOUS at 14:20

## 2022-01-24 RX ADMIN — Medication 10 ML: at 14:57

## 2022-01-24 RX ADMIN — PEGFILGRASTIM 6 MG: KIT SUBCUTANEOUS at 14:56

## 2022-01-24 RX ADMIN — PALONOSETRON HYDROCHLORIDE 0.25 MG: 0.25 INJECTION, SOLUTION INTRAVENOUS at 09:26

## 2022-01-24 RX ADMIN — PACLITAXEL 345 MG: 6 INJECTION, SOLUTION, CONCENTRATE INTRAVENOUS at 10:57

## 2022-01-24 RX ADMIN — SODIUM CHLORIDE 250 ML: 9 INJECTION, SOLUTION INTRAVENOUS at 09:22

## 2022-01-24 RX ADMIN — SODIUM CHLORIDE 100 ML: 900 INJECTION, SOLUTION INTRAVENOUS at 10:23

## 2022-01-24 RX ADMIN — FAMOTIDINE 20 MG: 10 INJECTION INTRAVENOUS at 09:31

## 2022-01-24 RX ADMIN — DEXAMETHASONE SODIUM PHOSPHATE 20 MG: 4 INJECTION, SOLUTION INTRA-ARTICULAR; INTRALESIONAL; INTRAMUSCULAR; INTRAVENOUS; SOFT TISSUE at 09:35

## 2022-01-24 RX ADMIN — DIPHENHYDRAMINE HYDROCHLORIDE 50 MG: 50 INJECTION, SOLUTION INTRAMUSCULAR; INTRAVENOUS at 10:00

## 2022-01-24 RX ADMIN — OLANZAPINE 5 MG: 5 TABLET, FILM COATED ORAL at 09:22

## 2022-01-24 RX ADMIN — HEPARIN 500 UNITS: 100 SYRINGE at 14:58

## 2022-01-24 NOTE — PROGRESS NOTES
Pt. Was here at clinic for C3 Carboplatin, Taxol, deferred from last week.   Pt. Has no complaints today and all lab results within parameters for treatment. (pt's HGB 8.8, lab results reviewed with MD)  Treatment given as ordered and pt. Tolerated treatment well.   Pt. Discharged from clinic with no complaints and AVS was given.

## 2022-01-26 ENCOUNTER — APPOINTMENT (OUTPATIENT)
Dept: LAB | Facility: HOSPITAL | Age: 62
End: 2022-01-26

## 2022-01-26 ENCOUNTER — OFFICE VISIT (OUTPATIENT)
Dept: ONCOLOGY | Facility: CLINIC | Age: 62
End: 2022-01-26

## 2022-01-26 ENCOUNTER — LAB (OUTPATIENT)
Dept: LAB | Facility: HOSPITAL | Age: 62
End: 2022-01-26

## 2022-01-26 VITALS
DIASTOLIC BLOOD PRESSURE: 75 MMHG | WEIGHT: 199.2 LBS | HEIGHT: 65 IN | OXYGEN SATURATION: 99 % | RESPIRATION RATE: 18 BRPM | HEART RATE: 68 BPM | SYSTOLIC BLOOD PRESSURE: 154 MMHG | BODY MASS INDEX: 33.19 KG/M2 | TEMPERATURE: 97.5 F

## 2022-01-26 DIAGNOSIS — C56.9 MALIGNANT NEOPLASM OF OVARY, UNSPECIFIED LATERALITY: Primary | ICD-10-CM

## 2022-01-26 LAB
ALBUMIN SERPL-MCNC: 3.7 G/DL (ref 3.5–5.2)
ALBUMIN/GLOB SERPL: 1.1 G/DL
ALP SERPL-CCNC: 57 U/L (ref 39–117)
ALT SERPL W P-5'-P-CCNC: 6 U/L (ref 1–33)
ANION GAP SERPL CALCULATED.3IONS-SCNC: 13 MMOL/L (ref 5–15)
AST SERPL-CCNC: 18 U/L (ref 1–32)
BASOPHILS # BLD AUTO: 0.01 10*3/MM3 (ref 0–0.2)
BASOPHILS NFR BLD AUTO: 0.1 % (ref 0–1.5)
BILIRUB SERPL-MCNC: 0.4 MG/DL (ref 0–1.2)
BUN SERPL-MCNC: 21 MG/DL (ref 8–23)
BUN/CREAT SERPL: 23.1 (ref 7–25)
CALCIUM SPEC-SCNC: 8.4 MG/DL (ref 8.6–10.5)
CHLORIDE SERPL-SCNC: 104 MMOL/L (ref 98–107)
CO2 SERPL-SCNC: 24 MMOL/L (ref 22–29)
CREAT SERPL-MCNC: 0.91 MG/DL (ref 0.57–1)
DEPRECATED RDW RBC AUTO: 66.9 FL (ref 37–54)
EOSINOPHIL # BLD AUTO: 0.01 10*3/MM3 (ref 0–0.4)
EOSINOPHIL NFR BLD AUTO: 0.1 % (ref 0.3–6.2)
ERYTHROCYTE [DISTWIDTH] IN BLOOD BY AUTOMATED COUNT: 20.8 % (ref 12.3–15.4)
GFR SERPL CREATININE-BSD FRML MDRD: 62 ML/MIN/1.73
GLOBULIN UR ELPH-MCNC: 3.3 GM/DL
GLUCOSE SERPL-MCNC: 88 MG/DL (ref 65–99)
HCT VFR BLD AUTO: 27.3 % (ref 34–46.6)
HGB BLD-MCNC: 8.5 G/DL (ref 12–15.9)
HOLD SPECIMEN: NORMAL
LYMPHOCYTES # BLD AUTO: 0.84 10*3/MM3 (ref 0.7–3.1)
LYMPHOCYTES NFR BLD AUTO: 4.2 % (ref 19.6–45.3)
MCH RBC QN AUTO: 29.1 PG (ref 26.6–33)
MCHC RBC AUTO-ENTMCNC: 31.1 G/DL (ref 31.5–35.7)
MCV RBC AUTO: 93.5 FL (ref 79–97)
MONOCYTES # BLD AUTO: 0.17 10*3/MM3 (ref 0.1–0.9)
MONOCYTES NFR BLD AUTO: 0.9 % (ref 5–12)
NEUTROPHILS NFR BLD AUTO: 18.97 10*3/MM3 (ref 1.7–7)
NEUTROPHILS NFR BLD AUTO: 94.7 % (ref 42.7–76)
PLATELET # BLD AUTO: 304 10*3/MM3 (ref 140–450)
PMV BLD AUTO: 9.9 FL (ref 6–12)
POTASSIUM SERPL-SCNC: 3.4 MMOL/L (ref 3.5–5.2)
PROT SERPL-MCNC: 7 G/DL (ref 6–8.5)
RBC # BLD AUTO: 2.92 10*6/MM3 (ref 3.77–5.28)
SODIUM SERPL-SCNC: 141 MMOL/L (ref 136–145)
WBC NRBC COR # BLD: 20 10*3/MM3 (ref 3.4–10.8)

## 2022-01-26 PROCEDURE — 80050 GENERAL HEALTH PANEL: CPT

## 2022-01-26 PROCEDURE — 36415 COLL VENOUS BLD VENIPUNCTURE: CPT

## 2022-01-26 PROCEDURE — 99215 OFFICE O/P EST HI 40 MIN: CPT | Performed by: INTERNAL MEDICINE

## 2022-01-26 NOTE — PROGRESS NOTES
Hematology/Oncology Outpatient Follow Up    PATIENT NAME:Lucy Mahan  :1960  MRN: [id number]  PRIMARY CARE PHYSICIAN: [place] Avalos APRN  REFERRING PHYSICIAN: [place] Avalos APRN    Chief Complaint   Patient presents with   • Follow-up     Malignant neoplasm of ovary, unspecified laterality (HCC)        HISTORY OF PRESENT ILLNESS:     This is a 60-year-old female who developed abdominal pain in 2021.  Patient has also lost approximately 35 pounds during that..  She has had loss of appetite.  Due to the abdominal pain,  she had an ultrasound of the abdomen done on 10/13/2021.  This basically revealed no liver lesions.  Common bile duct is normal at 3 mm.  There is a nonmobile 3 cm shadowing gallstone within the gallbladder neck.  No gallbladder thickening or pericholecystic fluid collection.  Patient was then referred to general surgery and was seen by Dr. Badillo.  Who took her to surgery on 11/10/2021 for diagnostic laparoscopy and peritoneal biopsy.  Intra-Op , she was noted to have peritoneal studding with malignancy throughout the abdominal cavity and biopsies were completed. 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  The tumor was focally positive for progesterone receptor, positive for CK7, estrogen receptor, CA-125 and PAX 8..  The staining pattern is consistent with ovarian serous carcinoma.      She had CT scan of the chest, abdomen and pelvis and the chest is a 2 mm noncalcified nodule within the left lower lobe, noncalcified nodule in the left upper lobe measuring 4 mm and 3 mm.  In the abdomen there is a 5.1 x 5.3 cm enhancing soft tissue mass in the pelvis to the right of midline associated with the adnexal region possibly representing a primary ovarian malignancy.  No right or left ovarian tissue was seen.  There is abnormal omental thickening and nodularity consistent with carcinomatosis greatest bulk in the left mid abdomen measuring up to 10.8 cm x  2.5 cm.  There is nodular peritoneal thickening also present within the abdomen and pelvis consistent with peritoneal carcinomatosis.  The small quantity of ascitic fluid in the abdomen and pelvis.  Carcinomatosis nodular studding is seen along the inferior right hepatic lobe.     She  has been referred to us for further evaluation and management of her newly diagnosed ovarian carcinoma.     Patient is accompanied today by her daughter for this appointment.  There is  family history of precancerous cells of the  uterusin her sister.        She does not smoke and does not drink alcohol.  Patient is  and has 2 daughters.  She works for the [employer].    · 11/29/2021 patient was seen by Dr. Dubois who has recommended neoadjuvant chemotherapy for 2-3 cycles and then interval cytoreduction.  She has recommended carboplatinum AUC 6, Taxol 1 7 5 mg per metered squared, Avastin 15 mg/kg as was done in the oceans trial but hold Avastin for the first few cycles due to bowel risk.  · Prechemo CA-125 was 754  · 12/7/2021 patient received first cycle of chemotherapy with carboplatinum and Taxol with Neulasta  · 12/28/2021 patient received cycle 2 of combination chemotherapy with carboplatinum and Taxol with Neulasta  · 12/28/2021 CA-125 was down to 635  · 1/11/2022 add-on appointment for nausea, dizziness, thrombocytopenia, pain in upper to mid abdomen 8/10, new onset in the last 4 to 5 days  · January 12, 2022: Due to acute GI symptoms patient had a CT scan of the abdomen and pelvis which basically revealed increasing effusion on the left lung mild pleural effusion on the right lung decreased in amount of omental caking but no interval change in the right adnexal area moderate abdominal and pelvic ascites which has increased.  She denies any significant pulmonary symptoms.  Her O2 sat today was 100% at rest and 99% with ambulation  · 1/24/2022 patient received cycle 3 of carboplatinum with Taxol with dose  reduction due to significant toxicities      History of present illness was reviewed and is unchanged from the previous visit. 22        Past Medical History:   Diagnosis Date   • Arthritis    • Cancer (HCC)     ovarian   • Gall stones    • Hypertension    • Irritable bowel syndrome    • Rheumatoid aortitis    • Thyroid disease        Past Surgical History:   Procedure Laterality Date   •  SECTION      x2   • CHOLECYSTECTOMY N/A 11/10/2021    Procedure: diagnostic laparoscopy and peritoneal biopsy;  Surgeon: Krunal Badillo MD;  Location: [facility] MAIN OR;  Service: General;  Laterality: N/A;   • COLONOSCOPY     • HIP SURGERY Left    • THYROIDECTOMY, PARTIAL  2017   • VENOUS ACCESS DEVICE (PORT) INSERTION Left 2021    Procedure: INSERTION VENOUS ACCESS DEVICE;  Surgeon: Krunal Badillo MD;  Location: [facility] MAIN OR;  Service: General;  Laterality: Left;         Current Outpatient Medications:   •  Calcium Carbonate-Vitamin D 600-400 MG-UNIT chewable tablet, Chew 1 tablet Daily., Disp: , Rfl:   •  dexamethasone (DECADRON) 4 MG tablet, Take 5 tablets the night before chemotherapy.  Take with food., Disp: 5 tablet, Rfl: 5  •  folic acid (FOLVITE) 1 MG tablet, take 1 tablet by mouth once daily (Patient taking differently: Take 1 mg by mouth Daily.), Disp: 90 tablet, Rfl: 1  •  hydroxychloroquine (PLAQUENIL) 200 MG tablet, TAKE 1 TABLET BY MOUTH TWICE A DAY (Patient taking differently: Take 200 mg by mouth 2 (Two) Times a Day. Takes in afternoon and evening), Disp: 180 tablet, Rfl: 0  •  hydrOXYzine (ATARAX) 10 MG tablet, Take 1 tablet by mouth 2 (Two) Times a Day As Needed for Anxiety., Disp: 40 tablet, Rfl: 0  •  levoFLOXacin (Levaquin) 750 MG tablet, Take 1 tablet by mouth Daily., Disp: 7 tablet, Rfl: 0  •  levothyroxine (SYNTHROID, LEVOTHROID) 137 MCG tablet, Take 1 tablet by mouth Daily., Disp: 90 tablet, Rfl: 0  •  lidocaine-prilocaine (EMLA) 2.5-2.5 % cream, Apply 1 application topically to the  appropriate area as directed Take As Directed. Apply to port 1 hour prior to port access, Disp: 30 g, Rfl: 1  •  lisinopril (PRINIVIL,ZESTRIL) 40 MG tablet, TAKE 1 TABLET BY MOUTH DAILY (Patient taking differently: Take 40 mg by mouth Every Evening. 12/5LD), Disp: 90 tablet, Rfl: 1  •  loratadine (Claritin) 10 MG tablet, Take 1 tablet night before and 1 tablet morning of chemotherapy., Disp: 2 tablet, Rfl: 5  •  OLANZapine (ZyPREXA) 5 MG tablet, Take 1 tablet by mouth Every Night. Take on days 2, 3 and 4 after chemotherapy., Disp: 3 tablet, Rfl: 5  •  omeprazole (priLOSEC) 40 MG capsule, Take 1 capsule by mouth Daily., Disp: 30 capsule, Rfl: 6  •  ondansetron (ZOFRAN) 8 MG tablet, Take 1 tablet by mouth 3 (Three) Times a Day As Needed for Nausea or Vomiting., Disp: 90 tablet, Rfl: 2  •  oxyCODONE-acetaminophen (PERCOCET) 5-325 MG per tablet, Take 1 tablet by mouth Every 6 (Six) Hours As Needed for Moderate Pain . Every 4-6 hours prn pain, Disp: 90 tablet, Rfl: 0  •  pantoprazole (Protonix) 40 MG EC tablet, Take 1 tablet by mouth Daily., Disp: 30 tablet, Rfl: 6  •  promethazine (PHENERGAN) 25 MG tablet, Take 1 tablet by mouth Every 6 (Six) Hours As Needed for Nausea or Vomiting., Disp: 90 tablet, Rfl: 1    No Known Allergies    Family History   Problem Relation Age of Onset   • Dementia Mother    • Arthritis Mother    • Heart disease Father    • Hypertension Father        Cancer-related family history is not on file.    Social History     Tobacco Use   • Smoking status: Never Smoker   • Smokeless tobacco: Never Used   Vaping Use   • Vaping Use: Never used   Substance Use Topics   • Alcohol use: Yes     Alcohol/week: 1.0 standard drink     Types: 1 Glasses of wine per week     Comment: less than monthly   • Drug use: No       HPI, ROS and PFSH have been reviewed and confirmed on 1/26/2022.     SUBJECTIVE:    She is here today for follow-up.  She does not have any specific complaints.  Her abdominal symptoms have  "improved.  She is having regular bowel movements.            REVIEW OF SYSTEMS:  Review of Systems   Constitutional: Positive for fatigue. Negative for chills and fever.   HENT: Negative for ear pain, mouth sores, nosebleeds and sore throat.    Eyes: Negative for photophobia and visual disturbance.   Respiratory: Negative for wheezing and stridor.    Cardiovascular: Negative for chest pain and palpitations.   Gastrointestinal: Positive for abdominal pain and nausea. Negative for diarrhea and vomiting.   Endocrine: Negative for cold intolerance and heat intolerance.   Genitourinary: Negative for dysuria and hematuria.        Decreased urine output   Musculoskeletal: Negative for joint swelling and neck stiffness.   Skin: Negative for color change and rash.   Neurological: Positive for dizziness, weakness and headaches. Negative for seizures and syncope.   Hematological: Negative for adenopathy.   Psychiatric/Behavioral: Negative for agitation, confusion and hallucinations.     Abdominal discomfort  OBJECTIVE:    Vitals:    01/26/22 1509   BP: 154/75   Pulse: 68   Resp: 18   Temp: 97.5 °F (36.4 °C)   SpO2: 99%   Weight: 90.4 kg (199 lb 3.2 oz)   Height: 165.1 cm (65\")   PainSc:   4   PainLoc: Abdomen     Body mass index is 33.15 kg/m².    ECOG  (1) Restricted in physically strenuous activity, ambulatory and able to do work of light nature    Physical Exam  Vitals and nursing note reviewed.   Constitutional:       General: She is not in acute distress.     Appearance: She is not diaphoretic.   HENT:      Head: Normocephalic and atraumatic.      Mouth/Throat:      Mouth: Mucous membranes are dry.   Eyes:      General: No scleral icterus.        Right eye: No discharge.         Left eye: No discharge.      Conjunctiva/sclera: Conjunctivae normal.   Neck:      Thyroid: No thyromegaly.   Cardiovascular:      Rate and Rhythm: Normal rate and regular rhythm.      Heart sounds: Normal heart sounds. No friction rub. No gallop. "    Pulmonary:      Effort: Pulmonary effort is normal. No respiratory distress.      Breath sounds: No stridor. No wheezing.   Abdominal:      General: Bowel sounds are normal.      Palpations: Abdomen is soft. There is no mass.      Tenderness: There is no abdominal tenderness. There is no guarding or rebound.   Musculoskeletal:         General: No tenderness. Normal range of motion.      Cervical back: Normal range of motion and neck supple.   Lymphadenopathy:      Cervical: No cervical adenopathy.   Skin:     General: Skin is warm.      Findings: No erythema or rash.      Comments: Tenting skin turgor   Neurological:      Mental Status: She is alert and oriented to person, place, and time.      Motor: No abnormal muscle tone.   Psychiatric:         Behavior: Behavior normal.       I have reexamined the patient and the results are consistent with the previously documented exam. Inga Hernandez MD   RECENT LABS  WBC   Date Value Ref Range Status   01/26/2022 20.00 (H) 3.40 - 10.80 10*3/mm3 Final     RBC   Date Value Ref Range Status   01/26/2022 2.92 (L) 3.77 - 5.28 10*6/mm3 Final     Hemoglobin   Date Value Ref Range Status   01/26/2022 8.5 (L) 12.0 - 15.9 g/dL Final     Hematocrit   Date Value Ref Range Status   01/26/2022 27.3 (L) 34.0 - 46.6 % Final     MCV   Date Value Ref Range Status   01/26/2022 93.5 79.0 - 97.0 fL Final     MCH   Date Value Ref Range Status   01/26/2022 29.1 26.6 - 33.0 pg Final     MCHC   Date Value Ref Range Status   01/26/2022 31.1 (L) 31.5 - 35.7 g/dL Final     RDW   Date Value Ref Range Status   01/26/2022 20.8 (H) 12.3 - 15.4 % Final     RDW-SD   Date Value Ref Range Status   01/26/2022 66.9 (H) 37.0 - 54.0 fl Final     MPV   Date Value Ref Range Status   01/26/2022 9.9 6.0 - 12.0 fL Final     Platelets   Date Value Ref Range Status   01/26/2022 304 140 - 450 10*3/mm3 Final     Neutrophil %   Date Value Ref Range Status   01/26/2022 94.7 (H) 42.7 - 76.0 % Final     Lymphocyte  %   Date Value Ref Range Status   01/26/2022 4.2 (L) 19.6 - 45.3 % Final     Monocyte %   Date Value Ref Range Status   01/26/2022 0.9 (L) 5.0 - 12.0 % Final     Eosinophil %   Date Value Ref Range Status   01/26/2022 0.1 (L) 0.3 - 6.2 % Final     Basophil %   Date Value Ref Range Status   01/26/2022 0.1 0.0 - 1.5 % Final     Neutrophils, Absolute   Date Value Ref Range Status   01/26/2022 18.97 (H) 1.70 - 7.00 10*3/mm3 Final     Lymphocytes, Absolute   Date Value Ref Range Status   01/26/2022 0.84 0.70 - 3.10 10*3/mm3 Final     Monocytes, Absolute   Date Value Ref Range Status   01/26/2022 0.17 0.10 - 0.90 10*3/mm3 Final     Eosinophils, Absolute   Date Value Ref Range Status   01/26/2022 0.01 0.00 - 0.40 10*3/mm3 Final     Basophils, Absolute   Date Value Ref Range Status   01/26/2022 0.01 0.00 - 0.20 10*3/mm3 Final     nRBC   Date Value Ref Range Status   01/18/2022 0.0 0.0 - 0.2 /100 WBC Final       Lab Results   Component Value Date    GLUCOSE 88 01/26/2022    BUN 21 01/26/2022    CREATININE 0.91 01/26/2022    EGFRIFNONA 62 01/26/2022    EGFRIFAFRI 44 (L) 01/21/2020    BCR 23.1 01/26/2022    K 3.4 (L) 01/26/2022    CO2 24.0 01/26/2022    CALCIUM 8.4 (L) 01/26/2022    ALBUMIN 3.70 01/26/2022    LABIL2 1.2 (calc) 01/21/2020    AST 18 01/26/2022    ALT 6 01/26/2022         ASSESSMENT:      1. 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  CA-125 754 November 2021  2. On neoadjuvant chemotherapy with carboplatinum AUC 6, Taxol 175 mg per metered squared, with Avastin added post operatively.  Plan to administer 2-3 cycles keith adjuvantly.  Monitor serial CA-125's  3. Pulmonary nodules of unclear etiology too small for biopsy or PET resolution  4. Chemotherapy induced nausea  5. Chemotherapy induced fatigue  6. Severe thrombocytopenia secondary to chemotherapy most notably carboplatinum  7. Comprehensive gene analysis with cancer next technology was negative for any significant mutation in all 77 genes  analyzed  8. 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  No other actionable mutations identified  9. Dehydration: Resolved  10. Family history of precancerous uterine cells in her sister.  Patient will benefit from genetic testing given her personal diagnosis.  11. Abdominal discomfort secondary to malignancy  12. Assessment has been reviewed and updated     Discussion     I have reviewed her imaging studies, pathology reports.  She has metastatic serous carcinoma of the ovary.  She has liver involvement.  She also has pulmonary nodules of unclear etiology.  These are too small for biopsy or PET resolution therefore they will be followed over time.  We discussed that ovarian cancer is a surgical disease.  We will have her seen by GYN oncologist Dr. Castro to weigh in on her surgical options.  We discussed that she may benefit from neoadjuvant chemotherapy but will await Dr. Castro's final recommendations.     She was seen by Dr. Castro recommendation for her to have neoadjuvant chemotherapy with carboplatinum and Taxol      Discussed side effects of chemotherapy to include but not limited to:    Chemotherapy side effects include, but not limited to, nausea, vomiting, bone marrow suppression, which can result in blood, platelet transfusion. There is also risk of permanent bone marrow destruction, which can cause myelodysplastic syndrome or leukemia years down the line. There is risk of infection which can result in hospitalization and even death. There is also risk of fatigue, asthenia, alopecia which could become permanent. Chemo will help to reduce risk of relapse of cancer, but does not eliminate risk completely.    Discussed that Taxol chemotherapy can lead to hypersensitivity reaction fluid retention, peripheral neuropathy, myalgias, small risk of permanent alopecia    Carboplatinum can lead  to myelosuppression socially thrombocytopenia      Avastin will be added postoperatively       She will benefit from genetic testing as this could have some therapeutic implications for her.    Genetics      Today's risk assessment is based on the history the patient has provided.  We discussed that the best people to screen are the ones who have been affected by malignancy as their result is more informative.  We reviewed all the hallmarks of hereditary cancer syndrome including multiple relatives on the same side of the family being affected by malignancy, cancer diagnosis in young individuals, different cancers in one individual.      Comprehensive gene analysis with cancer next technology was negative for any mutation in all 77 genes analyzed.  Discussed that her malignancy was most likely sporadic.  We discussed lifetime risk of developing ovarian cancer estimated at 2 to 3%.          Plans:     · Comprehensive gene analysis with cancer next technology results reviewed with patient and copies of her results was also given to her for her own records keeping  · Dose reduce chemotherapy carboplatinum by 25% due to significant toxicity third cycle of chemotherapy  ·  reviewed  · Obtain CT scan of the chest for restaging.  Patient had CT of the abdomen in January 2022  · Schedule follow-up with Dr. Castro to discuss surgical treatment  · Continue weekly CBCs  · Hold chemotherapy for now   · CA-125 with each cycle of chemotherapy  · Continue EMLA cream  · Continue Percocet 5 mg p.o. every 4-6 hours as needed for pain  · Prilosec 40 mg p.o. daily  · If she undergoes surgery, patient will schedule follow-up to see me 2 weeks postop.  If no surgery then she will call to see me sooner  · Foundation 1 testing results reviewed  · All questions answered      I spent 40 total minutes, face-to-face, caring for Lucy today.  90% of this time involved counseling and/or coordination of care as documented within this  note.

## 2022-01-27 ENCOUNTER — OFFICE VISIT (OUTPATIENT)
Dept: FAMILY MEDICINE CLINIC | Facility: CLINIC | Age: 62
End: 2022-01-27

## 2022-01-27 ENCOUNTER — TELEPHONE (OUTPATIENT)
Dept: ONCOLOGY | Facility: CLINIC | Age: 62
End: 2022-01-27

## 2022-01-27 VITALS
WEIGHT: 196.6 LBS | BODY MASS INDEX: 32.76 KG/M2 | HEIGHT: 65 IN | OXYGEN SATURATION: 99 % | SYSTOLIC BLOOD PRESSURE: 163 MMHG | DIASTOLIC BLOOD PRESSURE: 79 MMHG | HEART RATE: 72 BPM

## 2022-01-27 DIAGNOSIS — I10 ESSENTIAL HYPERTENSION: ICD-10-CM

## 2022-01-27 DIAGNOSIS — E03.9 ACQUIRED HYPOTHYROIDISM: ICD-10-CM

## 2022-01-27 DIAGNOSIS — D72.829 LEUKOCYTOSIS, UNSPECIFIED TYPE: ICD-10-CM

## 2022-01-27 DIAGNOSIS — C56.9 MALIGNANT NEOPLASM OF OVARY, UNSPECIFIED LATERALITY: Primary | ICD-10-CM

## 2022-01-27 LAB — TSH SERPL DL<=0.05 MIU/L-ACNC: 1.16 UIU/ML (ref 0.27–4.2)

## 2022-01-27 PROCEDURE — 99213 OFFICE O/P EST LOW 20 MIN: CPT | Performed by: NURSE PRACTITIONER

## 2022-01-27 RX ORDER — HYDROCHLOROTHIAZIDE 12.5 MG/1
12.5 TABLET ORAL DAILY PRN
Qty: 90 TABLET | Refills: 0 | Status: SHIPPED | OUTPATIENT
Start: 2022-01-27 | End: 2022-07-05

## 2022-01-27 RX ORDER — LEVOTHYROXINE SODIUM 137 UG/1
137 TABLET ORAL DAILY
Qty: 90 TABLET | Refills: 1 | Status: SHIPPED | OUTPATIENT
Start: 2022-01-27 | End: 2022-08-08

## 2022-01-27 RX ORDER — LISINOPRIL 40 MG/1
40 TABLET ORAL DAILY
Qty: 90 TABLET | Refills: 1 | Status: SHIPPED | OUTPATIENT
Start: 2022-01-27 | End: 2022-06-22

## 2022-01-27 NOTE — PROGRESS NOTES
Chief Complaint  Malignant neoplasm of ovary, Hypertension, and Follow-up (4 wk)    Subjective          Lucy Mahan presents to [facility] for   History of Present Illness     Ovarian carcinoma origin found in peritoneum, with liver involvement, lung nodules, gallbladder still intact with cholelithiasis. She is following with Dr. Hernandez, tolerating chemo. She reports nausea, loose stool, abd pain resolved and now having regular BMs. She continues pantoprazole and Zofran. She reports mid day fatigue but has improved, has to nap daily. Neuropathy/burning sensation of legs/feet improved with oxycodone prn.    - venous access placed  -first chemo   -labs  plt 105,000, h/h 10/34.0  -mammo cancelled due to having port in place  -referred to GYN onc Dr. Castro for surgical consult, appt next week  CT Abdomen Pelvis With Contrast (2022 13:11)  -visit with Dr. Hernandez  pt ordered for kcl 40meq x1 for K3.4, wbc 20 had decadron 3 days prior  -CT chest has been ordered for restaging  -planning ca125 with each chemo  -taking decadron night before chemo, last on        HTN, bp has been stable, HCTZ was stopped prior to last visit.  She reports swelling of BLE last 2 weeks. She is taking lisinopril daily.  Denies chest pain, headache, shortness of air, palpitations.           The following portions of the patient's history were reviewed and updated as appropriate: allergies, current medications, past family history, past medical history, past social history, past surgical history and problem list.    Past Medical History:   Diagnosis Date   • Arthritis    • Cancer (HCC)    • Gall stones    • Hypertension    • Irritable bowel syndrome    • Rheumatoid aortitis    • Thyroid disease      Past Surgical History:   Procedure Laterality Date   •  SECTION      x2   • CHOLECYSTECTOMY N/A 11/10/2021    Procedure: diagnostic laparoscopy and peritoneal biopsy;  Surgeon:  Krunal Badillo MD;  Location: [facility] MAIN OR;  Service: General;  Laterality: N/A;   • COLONOSCOPY     • HIP SURGERY Left    • THYROIDECTOMY, PARTIAL  2017   • VENOUS ACCESS DEVICE (PORT) INSERTION Left 12/6/2021    Procedure: INSERTION VENOUS ACCESS DEVICE;  Surgeon: Krunal Badillo MD;  Location: [facility] MAIN OR;  Service: General;  Laterality: Left;     Family History   Problem Relation Age of Onset   • Dementia Mother    • Arthritis Mother    • Heart disease Father    • Hypertension Father      Social History     Tobacco Use   • Smoking status: Never Smoker   • Smokeless tobacco: Never Used   Substance Use Topics   • Alcohol use: Yes     Alcohol/week: 1.0 standard drink     Types: 1 Glasses of wine per week     Comment: less than monthly       Current Outpatient Medications:   •  Calcium Carbonate-Vitamin D 600-400 MG-UNIT chewable tablet, Chew 1 tablet Daily., Disp: , Rfl:   •  dexamethasone (DECADRON) 4 MG tablet, Take 5 tablets the night before chemotherapy.  Take with food., Disp: 5 tablet, Rfl: 5  •  folic acid (FOLVITE) 1 MG tablet, take 1 tablet by mouth once daily (Patient taking differently: Take 1 mg by mouth Daily.), Disp: 90 tablet, Rfl: 1  •  hydroxychloroquine (PLAQUENIL) 200 MG tablet, TAKE 1 TABLET BY MOUTH TWICE A DAY (Patient taking differently: Take 200 mg by mouth 2 (Two) Times a Day. Takes in afternoon and evening), Disp: 180 tablet, Rfl: 0  •  hydrOXYzine (ATARAX) 10 MG tablet, Take 1 tablet by mouth 2 (Two) Times a Day As Needed for Anxiety., Disp: 40 tablet, Rfl: 0  •  levothyroxine (SYNTHROID, LEVOTHROID) 137 MCG tablet, Take 1 tablet by mouth Daily., Disp: 90 tablet, Rfl: 0  •  lidocaine-prilocaine (EMLA) 2.5-2.5 % cream, Apply 1 application topically to the appropriate area as directed Take As Directed. Apply to port 1 hour prior to port access, Disp: 30 g, Rfl: 1  •  lisinopril (PRINIVIL,ZESTRIL) 40 MG tablet, Take 1 tablet by mouth Daily., Disp: 90 tablet, Rfl: 1  •  loratadine  "(Claritin) 10 MG tablet, Take 1 tablet night before and 1 tablet morning of chemotherapy., Disp: 2 tablet, Rfl: 5  •  OLANZapine (ZyPREXA) 5 MG tablet, Take 1 tablet by mouth Every Night. Take on days 2, 3 and 4 after chemotherapy., Disp: 3 tablet, Rfl: 5  •  omeprazole (priLOSEC) 40 MG capsule, Take 1 capsule by mouth Daily., Disp: 30 capsule, Rfl: 6  •  ondansetron (ZOFRAN) 8 MG tablet, Take 1 tablet by mouth 3 (Three) Times a Day As Needed for Nausea or Vomiting., Disp: 90 tablet, Rfl: 2  •  oxyCODONE-acetaminophen (PERCOCET) 5-325 MG per tablet, Take 1 tablet by mouth Every 6 (Six) Hours As Needed for Moderate Pain . Every 4-6 hours prn pain, Disp: 90 tablet, Rfl: 0  •  pantoprazole (Protonix) 40 MG EC tablet, Take 1 tablet by mouth Daily., Disp: 30 tablet, Rfl: 6  •  promethazine (PHENERGAN) 25 MG tablet, Take 1 tablet by mouth Every 6 (Six) Hours As Needed for Nausea or Vomiting., Disp: 90 tablet, Rfl: 1  •  hydroCHLOROthiazide (HYDRODIURIL) 12.5 MG tablet, Take 1 tablet by mouth Daily As Needed (swelling)., Disp: 90 tablet, Rfl: 0    Objective   Vital Signs:   /79 (BP Location: Left arm, Patient Position: Sitting, Cuff Size: Large Adult)   Pulse 72   Ht 165.1 cm (65\")   Wt 89.2 kg (196 lb 9.6 oz)   SpO2 99%   BMI 32.72 kg/m²       Physical Exam  Vitals and nursing note reviewed.   Constitutional:       General: She is not in acute distress.     Appearance: She is well-developed. She is not ill-appearing or diaphoretic.   Eyes:      Pupils: Pupils are equal, round, and reactive to light.   Neck:      Thyroid: No thyromegaly.      Vascular: No JVD.   Pulmonary:      Effort: Pulmonary effort is normal. No respiratory distress.   Abdominal:      General: Bowel sounds are normal. There is no distension.      Palpations: Abdomen is soft.      Tenderness: There is no abdominal tenderness.   Musculoskeletal:         General: Swelling ( +1 pitting BLE) present. No tenderness. Normal range of motion.      " Cervical back: Normal range of motion and neck supple.   Skin:     General: Skin is warm and dry.   Neurological:      Mental Status: She is alert and oriented to person, place, and time.      Sensory: No sensory deficit.   Psychiatric:         Behavior: Behavior normal.         Thought Content: Thought content normal.         Judgment: Judgment normal.          Result Review :     Lab on 01/26/2022   Component Date Value Ref Range Status   • Glucose 01/26/2022 88  65 - 99 mg/dL Final   • BUN 01/26/2022 21  8 - 23 mg/dL Final   • Creatinine 01/26/2022 0.91  0.57 - 1.00 mg/dL Final   • Sodium 01/26/2022 141  136 - 145 mmol/L Final   • Potassium 01/26/2022 3.4* 3.5 - 5.2 mmol/L Final   • Chloride 01/26/2022 104  98 - 107 mmol/L Final   • CO2 01/26/2022 24.0  22.0 - 29.0 mmol/L Final   • Calcium 01/26/2022 8.4* 8.6 - 10.5 mg/dL Final   • Total Protein 01/26/2022 7.0  6.0 - 8.5 g/dL Final   • Albumin 01/26/2022 3.70  3.50 - 5.20 g/dL Final   • ALT (SGPT) 01/26/2022 6  1 - 33 U/L Final   • AST (SGOT) 01/26/2022 18  1 - 32 U/L Final   • Alkaline Phosphatase 01/26/2022 57  39 - 117 U/L Final   • Total Bilirubin 01/26/2022 0.4  0.0 - 1.2 mg/dL Final   • eGFR Non  Amer 01/26/2022 62  >60 mL/min/1.73 Final   • Globulin 01/26/2022 3.3  gm/dL Final   • A/G Ratio 01/26/2022 1.1  g/dL Final   • BUN/Creatinine Ratio 01/26/2022 23.1  7.0 - 25.0 Final   • Anion Gap 01/26/2022 13.0  5.0 - 15.0 mmol/L Final   • WBC 01/26/2022 20.00* 3.40 - 10.80 10*3/mm3 Final   • RBC 01/26/2022 2.92* 3.77 - 5.28 10*6/mm3 Final   • Hemoglobin 01/26/2022 8.5* 12.0 - 15.9 g/dL Final   • Hematocrit 01/26/2022 27.3* 34.0 - 46.6 % Final   • MCV 01/26/2022 93.5  79.0 - 97.0 fL Final   • MCH 01/26/2022 29.1  26.6 - 33.0 pg Final   • MCHC 01/26/2022 31.1* 31.5 - 35.7 g/dL Final   • RDW 01/26/2022 20.8* 12.3 - 15.4 % Final   • RDW-SD 01/26/2022 66.9* 37.0 - 54.0 fl Final   • MPV 01/26/2022 9.9  6.0 - 12.0 fL Final   • Platelets 01/26/2022 304  140 - 450  10*3/mm3 Final   • Neutrophil % 01/26/2022 94.7* 42.7 - 76.0 % Final   • Lymphocyte % 01/26/2022 4.2* 19.6 - 45.3 % Final   • Monocyte % 01/26/2022 0.9* 5.0 - 12.0 % Final   • Eosinophil % 01/26/2022 0.1* 0.3 - 6.2 % Final   • Basophil % 01/26/2022 0.1  0.0 - 1.5 % Final   • Neutrophils, Absolute 01/26/2022 18.97* 1.70 - 7.00 10*3/mm3 Final   • Lymphocytes, Absolute 01/26/2022 0.84  0.70 - 3.10 10*3/mm3 Final   • Monocytes, Absolute 01/26/2022 0.17  0.10 - 0.90 10*3/mm3 Final   • Eosinophils, Absolute 01/26/2022 0.01  0.00 - 0.40 10*3/mm3 Final   • Basophils, Absolute 01/26/2022 0.01  0.00 - 0.20 10*3/mm3 Final   • Extra Tube 01/26/2022 Hold for add-ons.   Final    Auto resulted.   Hospital Outpatient Visit on 01/24/2022   Component Date Value Ref Range Status   • Glucose 01/24/2022 167* 65 - 99 mg/dL Final   • BUN 01/24/2022 15  8 - 23 mg/dL Final   • Creatinine 01/24/2022 1.01* 0.57 - 1.00 mg/dL Final   • Sodium 01/24/2022 140  136 - 145 mmol/L Final   • Potassium 01/24/2022 3.7  3.5 - 5.2 mmol/L Final   • Chloride 01/24/2022 105  98 - 107 mmol/L Final   • CO2 01/24/2022 23.0  22.0 - 29.0 mmol/L Final   • Calcium 01/24/2022 8.6  8.6 - 10.5 mg/dL Final   • Total Protein 01/24/2022 6.8  6.0 - 8.5 g/dL Final   • Albumin 01/24/2022 3.40* 3.50 - 5.20 g/dL Final   • ALT (SGPT) 01/24/2022 <5  1 - 33 U/L Final   • AST (SGOT) 01/24/2022 12  1 - 32 U/L Final   • Alkaline Phosphatase 01/24/2022 55  39 - 117 U/L Final   • Total Bilirubin 01/24/2022 0.3  0.0 - 1.2 mg/dL Final   • eGFR Non African Amer 01/24/2022 55* >60 mL/min/1.73 Final   • Globulin 01/24/2022 3.4  gm/dL Final   • A/G Ratio 01/24/2022 1.0  g/dL Final   • BUN/Creatinine Ratio 01/24/2022 14.9  7.0 - 25.0 Final   • Anion Gap 01/24/2022 12.0  5.0 - 15.0 mmol/L Final   •  01/24/2022 365.2* 0.0 - 38.1 U/mL Final   • WBC 01/24/2022 3.12* 3.40 - 10.80 10*3/mm3 Final   • RBC 01/24/2022 3.07* 3.77 - 5.28 10*6/mm3 Final   • Hemoglobin 01/24/2022 8.8* 12.0 - 15.9  g/dL Final   • Hematocrit 01/24/2022 28.3* 34.0 - 46.6 % Final   • MCV 01/24/2022 92.2  79.0 - 97.0 fL Final   • MCH 01/24/2022 28.7  26.6 - 33.0 pg Final   • MCHC 01/24/2022 31.1* 31.5 - 35.7 g/dL Final   • RDW 01/24/2022 18.9* 12.3 - 15.4 % Final   • RDW-SD 01/24/2022 59.5* 37.0 - 54.0 fl Final   • MPV 01/24/2022 9.2  6.0 - 12.0 fL Final   • Platelets 01/24/2022 331  140 - 450 10*3/mm3 Final   • Neutrophil % 01/24/2022 90.8* 42.7 - 76.0 % Final   • Lymphocyte % 01/24/2022 8.3* 19.6 - 45.3 % Final   • Monocyte % 01/24/2022 0.6* 5.0 - 12.0 % Final   • Eosinophil % 01/24/2022 0.0* 0.3 - 6.2 % Final   • Basophil % 01/24/2022 0.3  0.0 - 1.5 % Final   • Neutrophils, Absolute 01/24/2022 2.83  1.70 - 7.00 10*3/mm3 Final   • Lymphocytes, Absolute 01/24/2022 0.26* 0.70 - 3.10 10*3/mm3 Final   • Monocytes, Absolute 01/24/2022 0.02* 0.10 - 0.90 10*3/mm3 Final   • Eosinophils, Absolute 01/24/2022 0.00  0.00 - 0.40 10*3/mm3 Final   • Basophils, Absolute 01/24/2022 0.01  0.00 - 0.20 10*3/mm3 Final   • Creatinine 01/24/2022 1.10  0.60 - 1.30 mg/dL Final    Serial Number: [id number]:  [id number]   • GFR MDRD Non  01/24/2022 50  0 - 60 mL/min/1.73 sq.M Final                       Assessment and Plan    Diagnoses and all orders for this visit:    1. Malignant neoplasm of ovary, unspecified laterality (HCC) (Primary)  Comments:  Condition improved, tolerating chemo. cont as directed per oncology, will see GYN onc for surgical consult next week    2. Essential hypertension  Comments:  BP elevated with swelling, continue lisinopril restart HCTZ prn, continue checking bp at home.  Notify if consistently greater than 150/90  Orders:  -     lisinopril (PRINIVIL,ZESTRIL) 40 MG tablet; Take 1 tablet by mouth Daily.  Dispense: 90 tablet; Refill: 1    3. Acquired hypothyroidism  Comments:  TSH added to labs yesterday. will rf levo when reviewed  Orders:  -     TSH    4. Leukocytosis, unspecified type  Comments:  Likely induced  by Decadron prior to chemo, pt w/o acute illness    Other orders  -     hydroCHLOROthiazide (HYDRODIURIL) 12.5 MG tablet; Take 1 tablet by mouth Daily As Needed (swelling).  Dispense: 90 tablet; Refill: 0        I spent 20 minutes caring for Lucy Mahan on this date of service. This time includes time spent by me in the following activities: preparing for the visit, reviewing tests, performing a medically appropriate examination and/or evaluation , counseling and educating the patient/family/caregiver, ordering medications, tests, or procedures and documenting information in the medical record        Follow Up     Return in about 3 months (around 4/27/2022) for Recheck.  Patient was given instructions and counseling regarding her condition or for health maintenance advice. Please see specific information pulled into the AVS if appropriate.      EMR Dragon transcription disclaimer:  Some of this encounter note is an electronic transcription translation of spoken language to printed text. The electronic translation of spoken language may permit erroneous, or at times, nonsensical words or phrases to be inadvertently transcribed; Although I have reviewed the note for such errors some may still exist.

## 2022-01-27 NOTE — TELEPHONE ENCOUNTER
Called pt to let her know that her potassium was slightly low. She stated that she has potassium pills at home and will take an extra dose. No questions or other needs at this time.

## 2022-01-31 ENCOUNTER — TELEPHONE (OUTPATIENT)
Dept: FAMILY MEDICINE CLINIC | Facility: CLINIC | Age: 62
End: 2022-01-31

## 2022-01-31 ENCOUNTER — LAB (OUTPATIENT)
Dept: LAB | Facility: HOSPITAL | Age: 62
End: 2022-01-31

## 2022-01-31 DIAGNOSIS — C56.9 MALIGNANT NEOPLASM OF OVARY, UNSPECIFIED LATERALITY: ICD-10-CM

## 2022-01-31 DIAGNOSIS — D64.9 ANEMIA, UNSPECIFIED TYPE: ICD-10-CM

## 2022-01-31 LAB
ALBUMIN SERPL-MCNC: 3.5 G/DL (ref 3.5–5.2)
ALBUMIN/GLOB SERPL: 1 G/DL
ALP SERPL-CCNC: 65 U/L (ref 39–117)
ALT SERPL W P-5'-P-CCNC: 8 U/L (ref 1–33)
ANION GAP SERPL CALCULATED.3IONS-SCNC: 11 MMOL/L (ref 5–15)
AST SERPL-CCNC: 16 U/L (ref 1–32)
BASOPHILS # BLD AUTO: 0.02 10*3/MM3 (ref 0–0.2)
BASOPHILS NFR BLD AUTO: 0.6 % (ref 0–1.5)
BILIRUB SERPL-MCNC: 0.2 MG/DL (ref 0–1.2)
BUN SERPL-MCNC: 9 MG/DL (ref 8–23)
BUN/CREAT SERPL: 8.4 (ref 7–25)
CALCIUM SPEC-SCNC: 8.9 MG/DL (ref 8.6–10.5)
CHLORIDE SERPL-SCNC: 101 MMOL/L (ref 98–107)
CO2 SERPL-SCNC: 25 MMOL/L (ref 22–29)
CREAT SERPL-MCNC: 1.07 MG/DL (ref 0.57–1)
DEPRECATED RDW RBC AUTO: 62.5 FL (ref 37–54)
EOSINOPHIL # BLD AUTO: 0.01 10*3/MM3 (ref 0–0.4)
EOSINOPHIL NFR BLD AUTO: 0.3 % (ref 0.3–6.2)
ERYTHROCYTE [DISTWIDTH] IN BLOOD BY AUTOMATED COUNT: 19.5 % (ref 12.3–15.4)
GFR SERPL CREATININE-BSD FRML MDRD: 52 ML/MIN/1.73
GLOBULIN UR ELPH-MCNC: 3.4 GM/DL
GLUCOSE SERPL-MCNC: 103 MG/DL (ref 65–99)
HCT VFR BLD AUTO: 29.4 % (ref 34–46.6)
HGB BLD-MCNC: 9.2 G/DL (ref 12–15.9)
LYMPHOCYTES # BLD AUTO: 0.93 10*3/MM3 (ref 0.7–3.1)
LYMPHOCYTES NFR BLD AUTO: 29.2 % (ref 19.6–45.3)
MCH RBC QN AUTO: 29.2 PG (ref 26.6–33)
MCHC RBC AUTO-ENTMCNC: 31.3 G/DL (ref 31.5–35.7)
MCV RBC AUTO: 93.3 FL (ref 79–97)
MONOCYTES # BLD AUTO: 0.68 10*3/MM3 (ref 0.1–0.9)
MONOCYTES NFR BLD AUTO: 21.4 % (ref 5–12)
NEUTROPHILS NFR BLD AUTO: 1.54 10*3/MM3 (ref 1.7–7)
NEUTROPHILS NFR BLD AUTO: 48.5 % (ref 42.7–76)
PLATELET # BLD AUTO: 128 10*3/MM3 (ref 140–450)
PMV BLD AUTO: 10 FL (ref 6–12)
POTASSIUM SERPL-SCNC: 4.2 MMOL/L (ref 3.5–5.2)
PROT SERPL-MCNC: 6.9 G/DL (ref 6–8.5)
RBC # BLD AUTO: 3.15 10*6/MM3 (ref 3.77–5.28)
SODIUM SERPL-SCNC: 137 MMOL/L (ref 136–145)
WBC NRBC COR # BLD: 3.18 10*3/MM3 (ref 3.4–10.8)

## 2022-01-31 PROCEDURE — 80053 COMPREHEN METABOLIC PANEL: CPT

## 2022-01-31 PROCEDURE — 36415 COLL VENOUS BLD VENIPUNCTURE: CPT

## 2022-01-31 PROCEDURE — 85025 COMPLETE CBC W/AUTO DIFF WBC: CPT

## 2022-02-04 ENCOUNTER — OFFICE VISIT (OUTPATIENT)
Dept: GYNECOLOGIC ONCOLOGY | Facility: CLINIC | Age: 62
End: 2022-02-04

## 2022-02-04 DIAGNOSIS — R18.8 OTHER ASCITES: ICD-10-CM

## 2022-02-04 DIAGNOSIS — C56.9 MALIGNANT NEOPLASM OF OVARY, UNSPECIFIED LATERALITY: ICD-10-CM

## 2022-02-04 DIAGNOSIS — J90 PLEURAL EFFUSION: Primary | ICD-10-CM

## 2022-02-04 PROCEDURE — 99443 PR PHYS/QHP TELEPHONE EVALUATION 21-30 MIN: CPT | Performed by: OBSTETRICS & GYNECOLOGY

## 2022-02-04 RX ORDER — AMOXICILLIN AND CLAVULANATE POTASSIUM 500; 125 MG/1; MG/1
2 TABLET, FILM COATED ORAL 2 TIMES DAILY
Qty: 20 TABLET | Refills: 0 | Status: SHIPPED | OUTPATIENT
Start: 2022-02-04 | End: 2022-02-09

## 2022-02-04 RX ORDER — AZITHROMYCIN 250 MG/1
500 TABLET, FILM COATED ORAL DAILY
Qty: 6 TABLET | Refills: 0 | Status: SHIPPED | OUTPATIENT
Start: 2022-02-04 | End: 2022-02-07

## 2022-02-04 RX ORDER — LETROZOLE 2.5 MG/1
2.5 TABLET, FILM COATED ORAL DAILY
Qty: 30 TABLET | Refills: 3 | Status: SHIPPED | OUTPATIENT
Start: 2022-02-04 | End: 2022-06-15

## 2022-02-04 NOTE — H&P (VIEW-ONLY)
*TELEHEALTH VISIT *     Patient consented to telephone visit for medical care today.   Total time spent reviewing images, labs, discussion and plan was 35 minutes.       Maggie Castro D.O  2022        Age: 61 y.o.  Sex: female  :  1960  MRN: [id number]       REFERRING PHYSICIAN: No ref. provider found  DATE OF VISIT: 2022     Lucy Mahan televisit today for CT Scan review. She was diagnsoed with advanved HG serous ocarian carcinoma bu peritoneal bx in 2021. She received 3 cycles of carboplatin/taxol as neoadjuvant therapy. CT shows decrease in omental cake by 2cm (maybe 30% reduction) but there is increase in ascites, now pleural effusions, possible PNA and also carcinomatosis has remained largeley the same. Her  was decreasing initially, but has not increased over the last month.        Oncology/Hematology History Overview Note   Lucy Mahan is a 60 y.o. 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     • 11/10/21: Peritoneum bx-metastatic ovarian serous carcinoma, ER +  • 11/10/21: Omentum bx-metastatic ovarian serous carcinoma, ER +  • 21: CT CAP-A 2 mm noncalcified nodule is located within the left lower lobe. Noncalcified nodules in the left upper lobe measure 4 mm and 3 mm. No right lung nodules are identified. Benign calcified granuloma is incidentally noted in the left lower lobeExtensive omental and peritoneal carcinomatosis. 5.3 cm enhancing soft tissue mass in the pelvis to the right of midline. Small quantity abdominal pelvic ascites, likely malignant ascites. Indeterminate 1.7 cm left adrenal nodule. Adrenal metastasis cannot be excluded.  • 22: CT AP-Interval development of a trace right and small to moderate left pleural effusion. There is compressive atelectasis on the left lower lobe with questionable superimposed pneumonia. There is mild compressive atelectasis in the right lower lobe. Moderate  abdominal and pelvic ascites which has increased. There are nodular peritoneal implants consistent with peritoneal carcinomatosis similar to before. No interval change in the right adnexal region heterogeneous enhancing mass representing either a large peritoneal implant versus ovarian carcinoma. Interval decrease in size of the area of omental caking in the left lower abdomen. Questionable peritoneal carcinomatosis along the dome of the urinary bladder. This area is difficult to evaluate due to scatter artifact from the patient's left hip prosthesis. Stable left adrenal nodular thickening felt to represent a benign adenoma.    CURRENT TREATMENT (Dr. Hernandez)  • C1:21-Carbo AUC 6, Taxol 175 mg/m2  • C2: 21-Carbo AUC 6, Taxol 175 mg/m2  • C3: 22-Carbo AUC 4.5, Taxol 175 mg/m2        Date Value Ref Range Status  2022 365.2 (H) 0.0 - 38.1 U/mL Final  2022 303.0 (H) 0.0 - 38.1 U/mL Final  2021 635.2 (H) 0.0 - 38.1 U/mL Final  2021 738.2 (H) 0.0 - 38.1 U/mL Final  2021 754.3 (H) 0.0 - 38.1 U/mL Final         Past Medical History:  Past Medical History:   Diagnosis Date   • Arthritis    • Cancer (HCC)     ovarian   • Gall stones    • Hypertension    • Irritable bowel syndrome    • Rheumatoid aortitis    • Thyroid disease        Past Surgical History:  Past Surgical History:   Procedure Laterality Date   •  SECTION      x2   • CHOLECYSTECTOMY N/A 11/10/2021    Procedure: diagnostic laparoscopy and peritoneal biopsy;  Surgeon: Krunal Badillo MD;  Location: [facility] MAIN OR;  Service: General;  Laterality: N/A;   • COLONOSCOPY     • HIP SURGERY Left    • THYROIDECTOMY, PARTIAL  2017   • VENOUS ACCESS DEVICE (PORT) INSERTION Left 2021    Procedure: INSERTION VENOUS ACCESS DEVICE;  Surgeon: Krunal Badillo MD;  Location: [facility] MAIN OR;  Service: General;  Laterality: Left;        MEDICATIONS:    Current Outpatient Medications:   •  Calcium Carbonate-Vitamin D  600-400 MG-UNIT chewable tablet, Chew 1 tablet Daily., Disp: , Rfl:   •  dexamethasone (DECADRON) 4 MG tablet, Take 5 tablets the night before chemotherapy.  Take with food., Disp: 5 tablet, Rfl: 5  •  folic acid (FOLVITE) 1 MG tablet, take 1 tablet by mouth once daily (Patient taking differently: Take 1 mg by mouth Daily.), Disp: 90 tablet, Rfl: 1  •  hydroCHLOROthiazide (HYDRODIURIL) 12.5 MG tablet, Take 1 tablet by mouth Daily As Needed (swelling)., Disp: 90 tablet, Rfl: 0  •  hydroxychloroquine (PLAQUENIL) 200 MG tablet, TAKE 1 TABLET BY MOUTH TWICE A DAY (Patient taking differently: Take 200 mg by mouth 2 (Two) Times a Day. Takes in afternoon and evening), Disp: 180 tablet, Rfl: 0  •  hydrOXYzine (ATARAX) 10 MG tablet, Take 1 tablet by mouth 2 (Two) Times a Day As Needed for Anxiety., Disp: 40 tablet, Rfl: 0  •  levothyroxine (SYNTHROID, LEVOTHROID) 137 MCG tablet, Take 1 tablet by mouth Daily., Disp: 90 tablet, Rfl: 1  •  lidocaine-prilocaine (EMLA) 2.5-2.5 % cream, Apply 1 application topically to the appropriate area as directed Take As Directed. Apply to port 1 hour prior to port access, Disp: 30 g, Rfl: 1  •  lisinopril (PRINIVIL,ZESTRIL) 40 MG tablet, Take 1 tablet by mouth Daily., Disp: 90 tablet, Rfl: 1  •  loratadine (Claritin) 10 MG tablet, Take 1 tablet night before and 1 tablet morning of chemotherapy., Disp: 2 tablet, Rfl: 5  •  OLANZapine (ZyPREXA) 5 MG tablet, Take 1 tablet by mouth Every Night. Take on days 2, 3 and 4 after chemotherapy., Disp: 3 tablet, Rfl: 5  •  omeprazole (priLOSEC) 40 MG capsule, Take 1 capsule by mouth Daily., Disp: 30 capsule, Rfl: 6  •  ondansetron (ZOFRAN) 8 MG tablet, Take 1 tablet by mouth 3 (Three) Times a Day As Needed for Nausea or Vomiting., Disp: 90 tablet, Rfl: 2  •  oxyCODONE-acetaminophen (PERCOCET) 5-325 MG per tablet, Take 1 tablet by mouth Every 6 (Six) Hours As Needed for Moderate Pain . Every 4-6 hours prn pain, Disp: 90 tablet, Rfl: 0  •  pantoprazole  (Protonix) 40 MG EC tablet, Take 1 tablet by mouth Daily., Disp: 30 tablet, Rfl: 6  •  promethazine (PHENERGAN) 25 MG tablet, Take 1 tablet by mouth Every 6 (Six) Hours As Needed for Nausea or Vomiting., Disp: 90 tablet, Rfl: 1    ALLERGIES:  No Known Allergies      ROS:  CONSTITUTIONAL:  Denies fever or chills.   NEUROLOGIC:  Denies headache, focal weakness or sensory changes.   EYES:  Denies change in visual acuity.  HEENT:  Denies nasal congestion or sore throat.   RESPIRATORY:  Denies cough or shortness of breath.   CARDIOVASCULAR:  Denies chest pain or edema.   GI:  Denies abdominal pain, nausea, vomiting, bloody stools or diarrhea.   :  Denies dysuria, leaking or incontinence.  MUSCULOSKELETAL:  Denies back pain or joint pain.   INTEGUMENT:  Denies rash.   ENDOCRINE:  Denies polyuria or polydipsia.   LYMPHATIC:  Denies swollen glands or lymphedema.   PSYCHIATRIC:  Denies depression or anxiety.      PHYSICAL EXAM:  There were no vitals filed for this visit.  There is no height or weight on file to calculate BMI.  Current Status 1/26/2022   ECOG score 1     PHQ-9 Total Score:         Result Review :  The pertinent labs, images, and/or pathology as noted in the oncology history were reviewed independently and discussed with the patient.   Maggie Castro DO   11/29/2021    [facility] [place] [facility]:   WBC   Date Value Ref Range Status   01/31/2022 3.18 (L) 3.40 - 10.80 10*3/mm3 Final     RBC   Date Value Ref Range Status   01/31/2022 3.15 (L) 3.77 - 5.28 10*6/mm3 Final     Hemoglobin   Date Value Ref Range Status   01/31/2022 9.2 (L) 12.0 - 15.9 g/dL Final     Hematocrit   Date Value Ref Range Status   01/31/2022 29.4 (L) 34.0 - 46.6 % Final     Platelets   Date Value Ref Range Status   01/31/2022 128 (L) 140 - 450 10*3/mm3 Final        Date Value Ref Range Status   01/24/2022 365.2 (H) 0.0 - 38.1 U/mL Final       [facility] [place] [facility]:  CT Chest With Contrast Diagnostic    Result Date: 11/16/2021  1. Extensive omental and  peritoneal carcinomatosis. 2. 5.3 cm enhancing soft tissue mass in the pelvis to the right of midline. If the patient has not undergone oophorectomy, this could represent ovarian primary malignancy. If the patient has undergone oophorectomy, this may simply represent an aggregate carcinomatous metastatic deposit. 3. The gallbladder wall appears mildly thickened which could be related to extrinsic carcinomatous studding. Cholecystitis not excluded. 2.6 and a gallstone is seen. 4. Small quantity abdominal pelvic ascites, likely malignant ascites. 5. No focal bowel mass is identified. 6. No convincing evidence of osseous metastatic disease. 7. Small noncalcified nodules are present in the left lung as described above. These are suspicious for early pulmonary metastases. Short-term CT chest surveillance imaging is advised. These are too small to resolve with PET technique and are too small for percutaneous biopsy. 8. Indeterminate 1.7 cm left adrenal nodule. Adrenal metastasis cannot be excluded.  Electronically Signed By-Ana Grullon MD On:11/16/2021 4:35 PM This report was finalized on [id number] by  Ana Grullon MD.    CT Abdomen Pelvis With Contrast    Result Date: 1/12/2022   1. Interval development of a trace right and small to moderate left pleural effusion. There is compressive atelectasis on the left lower lobe with questionable superimposed pneumonia. There is mild compressive atelectasis in the right lower lobe. 2. Moderate abdominal and pelvic ascites which has increased. There are nodular peritoneal implants consistent with peritoneal carcinomatosis similar to before. 3. No interval change in the right adnexal region heterogeneous enhancing mass representing either a large peritoneal implant versus ovarian carcinoma. 4. Interval decrease in size of the area of omental caking in the left lower abdomen. 5. Questionable peritoneal carcinomatosis along the dome of the urinary bladder. This area is  difficult to evaluate due to scatter artifact from the patient's left hip prosthesis. 6. Stable left adrenal nodular thickening felt to represent a benign adenoma.  Electronically Signed By-Luis Briscoe MD On:1/12/2022 1:45 PM This report was finalized on [id number] by  Luis Briscoe MD.    CT Abdomen Pelvis With Contrast    Result Date: 11/16/2021  1. Extensive omental and peritoneal carcinomatosis. 2. 5.3 cm enhancing soft tissue mass in the pelvis to the right of midline. If the patient has not undergone oophorectomy, this could represent ovarian primary malignancy. If the patient has undergone oophorectomy, this may simply represent an aggregate carcinomatous metastatic deposit. 3. The gallbladder wall appears mildly thickened which could be related to extrinsic carcinomatous studding. Cholecystitis not excluded. 2.6 and a gallstone is seen. 4. Small quantity abdominal pelvic ascites, likely malignant ascites. 5. No focal bowel mass is identified. 6. No convincing evidence of osseous metastatic disease. 7. Small noncalcified nodules are present in the left lung as described above. These are suspicious for early pulmonary metastases. Short-term CT chest surveillance imaging is advised. These are too small to resolve with PET technique and are too small for percutaneous biopsy. 8. Indeterminate 1.7 cm left adrenal nodule. Adrenal metastasis cannot be excluded.  Electronically Signed By-Ana Grullon MD On:11/16/2021 4:35 PM This report was finalized on [id number] by  Ana Grullon MD.      ASSESSMENT :  • Advanced stage high grade serous ovarian caricnoma   - Diagnosed by omental biopsy 11/2021 -  754  - Lesions in the right pelvis 4.3cm, omental cake with 10.8cm at left mid abdomen, carcinomatosis, loculated fluid at subcapsular margin, 9mm lesion in right hepatic lobe, small INDIRA 4mm lesion.   - Post 3 C carboplatin/Taxol   - Interval imaging with marginal change in carcinomatosis, now with pleural  effusions, increasing ascites and minimal omental reduction.         PLAN :  • Had a long discussion with pt. I conveyed that I was hoping for a greater tumor volume reduction at this juncture. She is not a candidate for surgery at this time. I expressed that I am concerned that her tumor may be exhibiting signs of platinum resistance. I discussed that I would like to send her tumor for Caris analysis. In addition I would like to add Avastin - lowest dose in more frequent fashion q 2 weeks. Will discuss this with Dr Hernandez and see if she is amenable to this. Will also send in Rx for letrozole. We discussed the specific cytotoxic agents, the mechanism of action of these agents, the side effects for the specific agents and how to manage each side effect, the chemotherapy infusion schedule, and monitoring parameters of the cytotoxic agents. All questions were answered and she was in agreement to proceed as outlined.   • Rx for amox-clav and azithromycin for CAP   • rx for letrozole - this is ER + tumor   • Will touch base with Dr Hernandez regarding beginning avastin   • Standing order for thora/para placed.   • Will see pt back with CT C/A/P after the next 3 cycles.             Maggie Castro D.O  2/4/2022    Gynecologic Oncology   [address] [place] [address]  [phone] office

## 2022-02-04 NOTE — PROGRESS NOTES
*TELEHEALTH VISIT *     Patient consented to telephone visit for medical care today.   Total time spent reviewing images, labs, discussion and plan was 35 minutes.       Maggie Castro D.O  2022        Age: 61 y.o.  Sex: female  :  1960  MRN: [id number]       REFERRING PHYSICIAN: No ref. provider found  DATE OF VISIT: 2022     Lucy Mahan televisit today for CT Scan review. She was diagnsoed with advanved HG serous ocarian carcinoma bu peritoneal bx in 2021. She received 3 cycles of carboplatin/taxol as neoadjuvant therapy. CT shows decrease in omental cake by 2cm (maybe 30% reduction) but there is increase in ascites, now pleural effusions, possible PNA and also carcinomatosis has remained largeley the same. Her  was decreasing initially, but has not increased over the last month.        Oncology/Hematology History Overview Note   Lucy Mahan is a 60 y.o. 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     • 11/10/21: Peritoneum bx-metastatic ovarian serous carcinoma, ER +  • 11/10/21: Omentum bx-metastatic ovarian serous carcinoma, ER +  • 21: CT CAP-A 2 mm noncalcified nodule is located within the left lower lobe. Noncalcified nodules in the left upper lobe measure 4 mm and 3 mm. No right lung nodules are identified. Benign calcified granuloma is incidentally noted in the left lower lobeExtensive omental and peritoneal carcinomatosis. 5.3 cm enhancing soft tissue mass in the pelvis to the right of midline. Small quantity abdominal pelvic ascites, likely malignant ascites. Indeterminate 1.7 cm left adrenal nodule. Adrenal metastasis cannot be excluded.  • 22: CT AP-Interval development of a trace right and small to moderate left pleural effusion. There is compressive atelectasis on the left lower lobe with questionable superimposed pneumonia. There is mild compressive atelectasis in the right lower lobe. Moderate  abdominal and pelvic ascites which has increased. There are nodular peritoneal implants consistent with peritoneal carcinomatosis similar to before. No interval change in the right adnexal region heterogeneous enhancing mass representing either a large peritoneal implant versus ovarian carcinoma. Interval decrease in size of the area of omental caking in the left lower abdomen. Questionable peritoneal carcinomatosis along the dome of the urinary bladder. This area is difficult to evaluate due to scatter artifact from the patient's left hip prosthesis. Stable left adrenal nodular thickening felt to represent a benign adenoma.    CURRENT TREATMENT (Dr. Hernandez)  • C1:21-Carbo AUC 6, Taxol 175 mg/m2  • C2: 21-Carbo AUC 6, Taxol 175 mg/m2  • C3: 22-Carbo AUC 4.5, Taxol 175 mg/m2        Date Value Ref Range Status  2022 365.2 (H) 0.0 - 38.1 U/mL Final  2022 303.0 (H) 0.0 - 38.1 U/mL Final  2021 635.2 (H) 0.0 - 38.1 U/mL Final  2021 738.2 (H) 0.0 - 38.1 U/mL Final  2021 754.3 (H) 0.0 - 38.1 U/mL Final         Past Medical History:  Past Medical History:   Diagnosis Date   • Arthritis    • Cancer (HCC)     ovarian   • Gall stones    • Hypertension    • Irritable bowel syndrome    • Rheumatoid aortitis    • Thyroid disease        Past Surgical History:  Past Surgical History:   Procedure Laterality Date   •  SECTION      x2   • CHOLECYSTECTOMY N/A 11/10/2021    Procedure: diagnostic laparoscopy and peritoneal biopsy;  Surgeon: Krunal Badillo MD;  Location: [facility] MAIN OR;  Service: General;  Laterality: N/A;   • COLONOSCOPY     • HIP SURGERY Left    • THYROIDECTOMY, PARTIAL  2017   • VENOUS ACCESS DEVICE (PORT) INSERTION Left 2021    Procedure: INSERTION VENOUS ACCESS DEVICE;  Surgeon: Krunal Badillo MD;  Location: [facility] MAIN OR;  Service: General;  Laterality: Left;        MEDICATIONS:    Current Outpatient Medications:   •  Calcium Carbonate-Vitamin D  600-400 MG-UNIT chewable tablet, Chew 1 tablet Daily., Disp: , Rfl:   •  dexamethasone (DECADRON) 4 MG tablet, Take 5 tablets the night before chemotherapy.  Take with food., Disp: 5 tablet, Rfl: 5  •  folic acid (FOLVITE) 1 MG tablet, take 1 tablet by mouth once daily (Patient taking differently: Take 1 mg by mouth Daily.), Disp: 90 tablet, Rfl: 1  •  hydroCHLOROthiazide (HYDRODIURIL) 12.5 MG tablet, Take 1 tablet by mouth Daily As Needed (swelling)., Disp: 90 tablet, Rfl: 0  •  hydroxychloroquine (PLAQUENIL) 200 MG tablet, TAKE 1 TABLET BY MOUTH TWICE A DAY (Patient taking differently: Take 200 mg by mouth 2 (Two) Times a Day. Takes in afternoon and evening), Disp: 180 tablet, Rfl: 0  •  hydrOXYzine (ATARAX) 10 MG tablet, Take 1 tablet by mouth 2 (Two) Times a Day As Needed for Anxiety., Disp: 40 tablet, Rfl: 0  •  levothyroxine (SYNTHROID, LEVOTHROID) 137 MCG tablet, Take 1 tablet by mouth Daily., Disp: 90 tablet, Rfl: 1  •  lidocaine-prilocaine (EMLA) 2.5-2.5 % cream, Apply 1 application topically to the appropriate area as directed Take As Directed. Apply to port 1 hour prior to port access, Disp: 30 g, Rfl: 1  •  lisinopril (PRINIVIL,ZESTRIL) 40 MG tablet, Take 1 tablet by mouth Daily., Disp: 90 tablet, Rfl: 1  •  loratadine (Claritin) 10 MG tablet, Take 1 tablet night before and 1 tablet morning of chemotherapy., Disp: 2 tablet, Rfl: 5  •  OLANZapine (ZyPREXA) 5 MG tablet, Take 1 tablet by mouth Every Night. Take on days 2, 3 and 4 after chemotherapy., Disp: 3 tablet, Rfl: 5  •  omeprazole (priLOSEC) 40 MG capsule, Take 1 capsule by mouth Daily., Disp: 30 capsule, Rfl: 6  •  ondansetron (ZOFRAN) 8 MG tablet, Take 1 tablet by mouth 3 (Three) Times a Day As Needed for Nausea or Vomiting., Disp: 90 tablet, Rfl: 2  •  oxyCODONE-acetaminophen (PERCOCET) 5-325 MG per tablet, Take 1 tablet by mouth Every 6 (Six) Hours As Needed for Moderate Pain . Every 4-6 hours prn pain, Disp: 90 tablet, Rfl: 0  •  pantoprazole  (Protonix) 40 MG EC tablet, Take 1 tablet by mouth Daily., Disp: 30 tablet, Rfl: 6  •  promethazine (PHENERGAN) 25 MG tablet, Take 1 tablet by mouth Every 6 (Six) Hours As Needed for Nausea or Vomiting., Disp: 90 tablet, Rfl: 1    ALLERGIES:  No Known Allergies      ROS:  CONSTITUTIONAL:  Denies fever or chills.   NEUROLOGIC:  Denies headache, focal weakness or sensory changes.   EYES:  Denies change in visual acuity.  HEENT:  Denies nasal congestion or sore throat.   RESPIRATORY:  Denies cough or shortness of breath.   CARDIOVASCULAR:  Denies chest pain or edema.   GI:  Denies abdominal pain, nausea, vomiting, bloody stools or diarrhea.   :  Denies dysuria, leaking or incontinence.  MUSCULOSKELETAL:  Denies back pain or joint pain.   INTEGUMENT:  Denies rash.   ENDOCRINE:  Denies polyuria or polydipsia.   LYMPHATIC:  Denies swollen glands or lymphedema.   PSYCHIATRIC:  Denies depression or anxiety.      PHYSICAL EXAM:  There were no vitals filed for this visit.  There is no height or weight on file to calculate BMI.  Current Status 1/26/2022   ECOG score 1     PHQ-9 Total Score:         Result Review :  The pertinent labs, images, and/or pathology as noted in the oncology history were reviewed independently and discussed with the patient.   Maggie Castro DO   11/29/2021    [facility]:   WBC   Date Value Ref Range Status   01/31/2022 3.18 (L) 3.40 - 10.80 10*3/mm3 Final     RBC   Date Value Ref Range Status   01/31/2022 3.15 (L) 3.77 - 5.28 10*6/mm3 Final     Hemoglobin   Date Value Ref Range Status   01/31/2022 9.2 (L) 12.0 - 15.9 g/dL Final     Hematocrit   Date Value Ref Range Status   01/31/2022 29.4 (L) 34.0 - 46.6 % Final     Platelets   Date Value Ref Range Status   01/31/2022 128 (L) 140 - 450 10*3/mm3 Final        Date Value Ref Range Status   01/24/2022 365.2 (H) 0.0 - 38.1 U/mL Final       [facility]:  CT Chest With Contrast Diagnostic    Result Date: 11/16/2021  1. Extensive omental and  peritoneal carcinomatosis. 2. 5.3 cm enhancing soft tissue mass in the pelvis to the right of midline. If the patient has not undergone oophorectomy, this could represent ovarian primary malignancy. If the patient has undergone oophorectomy, this may simply represent an aggregate carcinomatous metastatic deposit. 3. The gallbladder wall appears mildly thickened which could be related to extrinsic carcinomatous studding. Cholecystitis not excluded. 2.6 and a gallstone is seen. 4. Small quantity abdominal pelvic ascites, likely malignant ascites. 5. No focal bowel mass is identified. 6. No convincing evidence of osseous metastatic disease. 7. Small noncalcified nodules are present in the left lung as described above. These are suspicious for early pulmonary metastases. Short-term CT chest surveillance imaging is advised. These are too small to resolve with PET technique and are too small for percutaneous biopsy. 8. Indeterminate 1.7 cm left adrenal nodule. Adrenal metastasis cannot be excluded.  Electronically Signed By-Ana Grullon MD On:11/16/2021 4:35 PM This report was finalized on [id number] by  Ana Grullon MD.    CT Abdomen Pelvis With Contrast    Result Date: 1/12/2022   1. Interval development of a trace right and small to moderate left pleural effusion. There is compressive atelectasis on the left lower lobe with questionable superimposed pneumonia. There is mild compressive atelectasis in the right lower lobe. 2. Moderate abdominal and pelvic ascites which has increased. There are nodular peritoneal implants consistent with peritoneal carcinomatosis similar to before. 3. No interval change in the right adnexal region heterogeneous enhancing mass representing either a large peritoneal implant versus ovarian carcinoma. 4. Interval decrease in size of the area of omental caking in the left lower abdomen. 5. Questionable peritoneal carcinomatosis along the dome of the urinary bladder. This area is  difficult to evaluate due to scatter artifact from the patient's left hip prosthesis. 6. Stable left adrenal nodular thickening felt to represent a benign adenoma.  Electronically Signed By-Luis Briscoe MD On:1/12/2022 1:45 PM This report was finalized on [id number] by  Luis Briscoe MD.    CT Abdomen Pelvis With Contrast    Result Date: 11/16/2021  1. Extensive omental and peritoneal carcinomatosis. 2. 5.3 cm enhancing soft tissue mass in the pelvis to the right of midline. If the patient has not undergone oophorectomy, this could represent ovarian primary malignancy. If the patient has undergone oophorectomy, this may simply represent an aggregate carcinomatous metastatic deposit. 3. The gallbladder wall appears mildly thickened which could be related to extrinsic carcinomatous studding. Cholecystitis not excluded. 2.6 and a gallstone is seen. 4. Small quantity abdominal pelvic ascites, likely malignant ascites. 5. No focal bowel mass is identified. 6. No convincing evidence of osseous metastatic disease. 7. Small noncalcified nodules are present in the left lung as described above. These are suspicious for early pulmonary metastases. Short-term CT chest surveillance imaging is advised. These are too small to resolve with PET technique and are too small for percutaneous biopsy. 8. Indeterminate 1.7 cm left adrenal nodule. Adrenal metastasis cannot be excluded.  Electronically Signed By-Ana Grullon MD On:11/16/2021 4:35 PM This report was finalized on [id number] by  Ana Grullon MD.      ASSESSMENT :  • Advanced stage high grade serous ovarian caricnoma   - Diagnosed by omental biopsy 11/2021 -  754  - Lesions in the right pelvis 4.3cm, omental cake with 10.8cm at left mid abdomen, carcinomatosis, loculated fluid at subcapsular margin, 9mm lesion in right hepatic lobe, small INDIRA 4mm lesion.   - Post 3 C carboplatin/Taxol   - Interval imaging with marginal change in carcinomatosis, now with pleural  effusions, increasing ascites and minimal omental reduction.         PLAN :  • Had a long discussion with pt. I conveyed that I was hoping for a greater tumor volume reduction at this juncture. She is not a candidate for surgery at this time. I expressed that I am concerned that her tumor may be exhibiting signs of platinum resistance. I discussed that I would like to send her tumor for Caris analysis. In addition I would like to add Avastin - lowest dose in more frequent fashion q 2 weeks. Will discuss this with Dr Hernandez and see if she is amenable to this. Will also send in Rx for letrozole. We discussed the specific cytotoxic agents, the mechanism of action of these agents, the side effects for the specific agents and how to manage each side effect, the chemotherapy infusion schedule, and monitoring parameters of the cytotoxic agents. All questions were answered and she was in agreement to proceed as outlined.   • Rx for amox-clav and azithromycin for CAP   • rx for letrozole - this is ER + tumor   • Will touch base with Dr Hernandez regarding beginning avastin   • Standing order for thora/para placed.   • Will see pt back with CT C/A/P after the next 3 cycles.             Maggie Castro D.O  2/4/2022    Gynecologic Oncology   [address] [place] [address]  [phone] office

## 2022-02-07 ENCOUNTER — LAB (OUTPATIENT)
Dept: LAB | Facility: HOSPITAL | Age: 62
End: 2022-02-07

## 2022-02-07 DIAGNOSIS — C56.9 MALIGNANT NEOPLASM OF OVARY, UNSPECIFIED LATERALITY: ICD-10-CM

## 2022-02-07 LAB
ALBUMIN SERPL-MCNC: 3.9 G/DL (ref 3.5–5.2)
ALBUMIN/GLOB SERPL: 1.2 G/DL
ALP SERPL-CCNC: 69 U/L (ref 39–117)
ALT SERPL W P-5'-P-CCNC: 6 U/L (ref 1–33)
ANION GAP SERPL CALCULATED.3IONS-SCNC: 13 MMOL/L (ref 5–15)
AST SERPL-CCNC: 14 U/L (ref 1–32)
BASOPHILS # BLD AUTO: 0.02 10*3/MM3 (ref 0–0.2)
BASOPHILS NFR BLD AUTO: 0.3 % (ref 0–1.5)
BILIRUB SERPL-MCNC: 0.3 MG/DL (ref 0–1.2)
BUN SERPL-MCNC: 13 MG/DL (ref 8–23)
BUN/CREAT SERPL: 12.3 (ref 7–25)
CALCIUM SPEC-SCNC: 7.9 MG/DL (ref 8.6–10.5)
CHLORIDE SERPL-SCNC: 100 MMOL/L (ref 98–107)
CO2 SERPL-SCNC: 26 MMOL/L (ref 22–29)
CREAT SERPL-MCNC: 1.06 MG/DL (ref 0.57–1)
DEPRECATED RDW RBC AUTO: 64.4 FL (ref 37–54)
EOSINOPHIL # BLD AUTO: 0 10*3/MM3 (ref 0–0.4)
EOSINOPHIL NFR BLD AUTO: 0 % (ref 0.3–6.2)
ERYTHROCYTE [DISTWIDTH] IN BLOOD BY AUTOMATED COUNT: 20.1 % (ref 12.3–15.4)
GFR SERPL CREATININE-BSD FRML MDRD: 52 ML/MIN/1.73
GLOBULIN UR ELPH-MCNC: 3.3 GM/DL
GLUCOSE SERPL-MCNC: 107 MG/DL (ref 65–99)
HCT VFR BLD AUTO: 29.2 % (ref 34–46.6)
HGB BLD-MCNC: 9.1 G/DL (ref 12–15.9)
LYMPHOCYTES # BLD AUTO: 1.18 10*3/MM3 (ref 0.7–3.1)
LYMPHOCYTES NFR BLD AUTO: 17.2 % (ref 19.6–45.3)
MCH RBC QN AUTO: 29.6 PG (ref 26.6–33)
MCHC RBC AUTO-ENTMCNC: 31.2 G/DL (ref 31.5–35.7)
MCV RBC AUTO: 95.1 FL (ref 79–97)
MONOCYTES # BLD AUTO: 0.76 10*3/MM3 (ref 0.1–0.9)
MONOCYTES NFR BLD AUTO: 11.1 % (ref 5–12)
NEUTROPHILS NFR BLD AUTO: 4.91 10*3/MM3 (ref 1.7–7)
NEUTROPHILS NFR BLD AUTO: 71.4 % (ref 42.7–76)
PLATELET # BLD AUTO: 71 10*3/MM3 (ref 140–450)
PMV BLD AUTO: 9.1 FL (ref 6–12)
POTASSIUM SERPL-SCNC: 3.6 MMOL/L (ref 3.5–5.2)
PROT SERPL-MCNC: 7.2 G/DL (ref 6–8.5)
RBC # BLD AUTO: 3.07 10*6/MM3 (ref 3.77–5.28)
SODIUM SERPL-SCNC: 139 MMOL/L (ref 136–145)
WBC NRBC COR # BLD: 6.87 10*3/MM3 (ref 3.4–10.8)

## 2022-02-07 PROCEDURE — 36415 COLL VENOUS BLD VENIPUNCTURE: CPT

## 2022-02-07 PROCEDURE — 85025 COMPLETE CBC W/AUTO DIFF WBC: CPT

## 2022-02-07 PROCEDURE — 80053 COMPREHEN METABOLIC PANEL: CPT

## 2022-02-08 ENCOUNTER — TELEPHONE (OUTPATIENT)
Dept: ONCOLOGY | Facility: CLINIC | Age: 62
End: 2022-02-08

## 2022-02-08 ENCOUNTER — DOCUMENTATION (OUTPATIENT)
Dept: ONCOLOGY | Facility: CLINIC | Age: 62
End: 2022-02-08

## 2022-02-08 DIAGNOSIS — E83.51 HYPOCALCEMIA: Primary | ICD-10-CM

## 2022-02-08 RX ORDER — SODIUM CHLORIDE 9 MG/ML
250 INJECTION, SOLUTION INTRAVENOUS ONCE
Status: CANCELLED | OUTPATIENT
Start: 2022-02-08

## 2022-02-08 NOTE — TELEPHONE ENCOUNTER
RECEIVED CALL FROM [facility] AUTH DEPT.  SHE STATES CT CHEST DENIED THROUGH [facility].  TRACKING #[id number].  [facility] PHONE NUMBER [phone].  [facility] # [phone].  INFORMATION GIVEN TO BETHANY MTAOS NP.

## 2022-02-08 NOTE — TELEPHONE ENCOUNTER
Called pt to see if she would be able to get IV calcium tomorrow. Pt was agreeable. Message left on ACU VM to see when she could be scheduled. PT also asked about her next cycle of chemotherapy. I explained that Dr. Hernandez is adding a medication to her regimen and I am working with the pharmacy to get that added. I told her we will call her to schedule within the next couple days. She verbalized understanding.

## 2022-02-08 NOTE — PROGRESS NOTES
Peer to Peer for CT chest    Called  And spoke to [employer] at [phone].    Tracking # [id number]        Gave patient information and that CT chest is needed for staging.  Representative states that the CT chest has been approved.    Approval dates: 2/7/22 to 3/9/22    Auth # [id number]          Electronically signed by FLORENCE Francisco, 02/08/22, 2:54 PM EST.

## 2022-02-09 ENCOUNTER — HOSPITAL ENCOUNTER (OUTPATIENT)
Dept: CT IMAGING | Facility: HOSPITAL | Age: 62
Discharge: HOME | End: 2022-02-09
Admitting: INTERNAL MEDICINE

## 2022-02-09 ENCOUNTER — HOSPITAL ENCOUNTER (OUTPATIENT)
Dept: INFUSION THERAPY | Facility: HOSPITAL | Age: 62
Setting detail: INFUSION SERIES
Discharge: HOME | End: 2022-02-09

## 2022-02-09 DIAGNOSIS — E83.51 HYPOCALCEMIA: Primary | ICD-10-CM

## 2022-02-09 DIAGNOSIS — C56.9 MALIGNANT NEOPLASM OF OVARY, UNSPECIFIED LATERALITY: ICD-10-CM

## 2022-02-09 PROCEDURE — 25010000002 CALCIUM GLUCONATE 2-0.675 GM/100ML-% SOLUTION: Performed by: INTERNAL MEDICINE

## 2022-02-09 PROCEDURE — 96366 THER/PROPH/DIAG IV INF ADDON: CPT

## 2022-02-09 PROCEDURE — 25010000002 HEPARIN LOCK FLUSH PER 10 UNITS: Performed by: INTERNAL MEDICINE

## 2022-02-09 PROCEDURE — 96365 THER/PROPH/DIAG IV INF INIT: CPT

## 2022-02-09 PROCEDURE — 0 IOPAMIDOL PER 1 ML: Performed by: INTERNAL MEDICINE

## 2022-02-09 PROCEDURE — 71260 CT THORAX DX C+: CPT

## 2022-02-09 RX ORDER — CALCIUM GLUCONATE 20 MG/ML
2 INJECTION, SOLUTION INTRAVENOUS ONCE
Status: CANCELLED
Start: 2022-02-09 | End: 2022-02-09

## 2022-02-09 RX ORDER — CALCIUM GLUCONATE 20 MG/ML
2 INJECTION, SOLUTION INTRAVENOUS ONCE
Status: COMPLETED | OUTPATIENT
Start: 2022-02-09 | End: 2022-02-09

## 2022-02-09 RX ORDER — HEPARIN SODIUM (PORCINE) LOCK FLUSH IV SOLN 100 UNIT/ML 100 UNIT/ML
500 SOLUTION INTRAVENOUS AS NEEDED
Status: CANCELLED | OUTPATIENT
Start: 2022-02-09

## 2022-02-09 RX ORDER — SODIUM CHLORIDE 0.9 % (FLUSH) 0.9 %
10 SYRINGE (ML) INJECTION AS NEEDED
Status: DISCONTINUED | OUTPATIENT
Start: 2022-02-09 | End: 2022-02-11 | Stop reason: HOSPADM

## 2022-02-09 RX ORDER — HEPARIN SODIUM (PORCINE) LOCK FLUSH IV SOLN 100 UNIT/ML 100 UNIT/ML
500 SOLUTION INTRAVENOUS AS NEEDED
Status: DISCONTINUED | OUTPATIENT
Start: 2022-02-09 | End: 2022-02-11 | Stop reason: HOSPADM

## 2022-02-09 RX ORDER — SODIUM CHLORIDE 0.9 % (FLUSH) 0.9 %
10 SYRINGE (ML) INJECTION AS NEEDED
Status: CANCELLED | OUTPATIENT
Start: 2022-02-09

## 2022-02-09 RX ORDER — SODIUM CHLORIDE 9 MG/ML
250 INJECTION, SOLUTION INTRAVENOUS ONCE
Status: CANCELLED | OUTPATIENT
Start: 2022-02-09

## 2022-02-09 RX ADMIN — Medication 20 ML: at 14:44

## 2022-02-09 RX ADMIN — CALCIUM GLUCONATE 2 G: 20 INJECTION, SOLUTION INTRAVENOUS at 12:09

## 2022-02-09 RX ADMIN — IOPAMIDOL 100 ML: 755 INJECTION, SOLUTION INTRAVENOUS at 14:52

## 2022-02-09 RX ADMIN — Medication 500 UNITS: at 14:45

## 2022-02-10 ENCOUNTER — TELEPHONE (OUTPATIENT)
Dept: ONCOLOGY | Facility: CLINIC | Age: 62
End: 2022-02-10

## 2022-02-10 DIAGNOSIS — C56.9 MALIGNANT NEOPLASM OF OVARY, UNSPECIFIED LATERALITY: Primary | ICD-10-CM

## 2022-02-10 RX ORDER — SODIUM CHLORIDE 9 MG/ML
250 INJECTION, SOLUTION INTRAVENOUS ONCE
Status: CANCELLED | OUTPATIENT
Start: 2022-02-14

## 2022-02-10 NOTE — TELEPHONE ENCOUNTER
CALLED PATIENT REGARDING CHEMO APPT.  APPT SCHEDULED FOR 2-14-22 AT 7:30AM.  INFORMED HER THAT NP WOULD DO TREATMENT PLANNING WITH REGARDING AVASTIN. SHE V/U.

## 2022-02-13 NOTE — PROGRESS NOTES
[facility] Medical Oncology     Education for Administration of Chemotherapy and/or Biotherapy for Avastin  02/13/22    Lucy Mahan  [id number]    Ms.Kathleen Mahan is here today for education on their upcoming Chemotherapy and/or Biotherapy.     I will be going over their treatment options, obtain signed consent and answer any questions that they may have in regards to the administration of Avastin   Lucy Mahan has already consulted with  for the treatment of Ovarian cancer. The provider has gone over the same treatment options with the patient and answered their question prior to today's visit.     The goal of the treatment is to:    [] Cure my cancer - means treatment that kills cancer cells to the point my doctor                                     cannot find them in my body and they will not grow back.    [x] Control my cancer - means treatment that keeps cancer from spreading or growing.    [] Relieve my cancer symptoms - means treatment that helps problems such as pain or pressure.     This treatment has been explained to Lucy Kelechiarya. Alternative methods of treatment, if any, have been explained to Lucy Mahan as have the benefits and risks of each. Based on the physician's explanation of the benefits and risks of this treatment and any alternatives available, The patient agrees that the potential benefit's out weighs the risks involved. I have explained to the patient the most likely complications that might occur from this treatment. The patient understands that along with the treatment additional medications may be necessary to lesson the side effects. Possible side effect may include but are not limited to, any of the following, or a combination of the following:          [x]  Abdominal pain  [x]  Fatigue [x]  Insomnia [x]  Petechiae   [x]  Allergic Reaction [x]  Fertility effects  [x]  Itching [x]  Photosensitivity    [x]  Anemia [x]  Fevers [x]  Joint pain []  Pleural  effusion    [x]  Anxiety []  Fistula formation [x] Kidney damage/toxicity []  Proteinuria    [x]  Back pain []  Flu-like symptoms [x]  LFT imbalances []  Rash   [x]  Blood clots (DVT/PE) [x]  Fluid retention [x]  Liver damage [x]  Secondary malignancies   [x]  Bleeding [x]  Forgetfulness [x]  Loss of appetite []  Sexual side effects    [x]  Bone pain [x]  Gastrointestinal perforation [x]  Low blood pressure [x]  Shortness of breath   [x]  Bruising []  Hand foot syndrome [x]  Lung damage [x]  Skin changes   [x]  Cardiovascular events  [x]  Hair loss/discoloration []  Menopausal symptoms [x]  Sore throat   [x]  Central neurotoxicity [x]  Headaches []  Menstrual irregularities [x]  Swelling   [x]  Chest pain [x] Hearing loss/change []  Mood changes [x]  Taste changes   [x]  Chills [x]  Heart damage [x]  Mouth sores [x]  Temperature sensitivity   [x]  Confusion [x]  Hematuria [x]  Muscle aches  [x]    Thrombocytopenia   [x]  Congestive heart failure [x]  Hemorrhage [x]  Nephrotic syndrome []  Thyroid changes   [x]  Constipation []  Hypertension [x]  Nail changes [x]  Tinnitus   [x]  Cough []  Hypertensive crisis [x]  Nausea  [x]  Upper respiratory tract infection    []  Depression []  Hypertriglyceridemia  []  Neck pain  [x]  Visual changes   [x]  Diarrhea [x]  Hypoglycemia [x]  Neutropenia [x]  Vomiting   [x]  Dizziness []  Immune-mediated reaction [x]  Nosebleeds [x]  Watery eyes   [x]  Dry skin [x]  Infection  [x]  Pain in arms/legs [x]  Weakness   [x]  Electrolyte imbalances [x]  Infusion reaction  []  Pericardial effusion  [x]  Weight changes   [x]  Elevated LDH [x]  Injection site reaction  [x]  Peripheral neuropathy [x]  Wound healing complication   [x]  Eye irritation [x]  Insomnia       While receiving treatment, it has been explained to the patient with regards to their blood counts. This may include but not limited to CBC, Neutropenia ,Anemia, Thrombocytopenia. This handout has been explained and given to the  patient.     It was explained to the patient about nutrition and how important it is while undergoing Chemotherapy and/or Biotherapy. Certain medications will be prescribed during the treatment which may change the way foods taste or smell. These changes may cause poor or no appetite. Food is fuel for your body, and if it does not get the fuel it needs, your body may become mal-nourished, which can lead to sever fatigue.   Information was given to Lucy Mahan in regards to some good protein sources to help maintain muscle mass and help to prevent malnutrition   We also discussed with the patient how important it is to drink/eat every 2-3 hours while awake regardless of hunger. We discussed fluid intake of at least 64 ounces liquids per day to stay hydrated.     It has been discussed with the patient the risks of becoming pregnant while receiving Chemotherapy and/or Biotherapy. We also discussed the importance of using reliable barrier methods while participating in intimate activities as this may expose their partners to a potentially harmful drug.     Further home instructions were given to the patient in regards to symptoms, treatment and how to handle those situations as well as when to contact the treatment team or the providers office.     Lucy Mahan was given handouts on:  1. Home Instructions: Instructed to flush toilet twice with each use, wash soiled linens and close separately in the washer  2. Tips from your chemotherapy nurse: Tips given on nausea control, hydration, mouth care, hair prosthesis, nutrition  3. Nutrition during cancer therapy: Advised to keep up proteins in diet for tissue rebuilding; drink boost or ensure for nutrition if not eating well  4. Cancer related fatigue: Advised to stay active with rest periods  5. Fluids/Dehydration: Drink 8-10, ounce glasses of fluids without caffeine daily  6. Hair and Prosthesis solutions: Contacts for prosthesis shops given  7. Mouth care: Salt  and Baking Soda rinse recipe given: BID for prevention and 5-6 times per day for treatment of mouth sores  8. Nausea/Appetite: Zofran TID prn for nausea; Zyprexa days 2, 3, 4 after chemotherapy  9. Constipation and Diarrhea: Use Senekot and stool softeners for constipation; Use imodium for diarrhea  10. Understanding your blood: Discussed function of WBC, HGB, Platelets in body, symptoms of low counts, when and how to call for symptoms of low counts, infections, clots  11. American Cancer Society Chemotherapy Safety at Home printed, discussed and sent with patient for reference  12. American Cancer Society Watching for and Preventing Infections printed, discussed and sent with patient for reference at home  13. Taxol, Carboplatin information from [employer] printed, discussed, and sent with patient for reference at home    I have discussed and gone over the full consent with the patient and answered all their questions regarding the medication they are to receive.  Written information has been provided and reviewed with Lucy Mahan. The patient and their family had a chance to ask any questions about the treatment medications and are satisfied with the information that was provided to them.     The patient has read and completed the consent form. They understand the possible risks and benefits of the recommended treatment plan and voluntarily agree to undergo the planned treatment. Should they change their mind and decide to stop treatment at any time, they will notify the providers office.       FLORENCE Cuadra      I spent 30 minutes teaching about Avastin chemotherapy, symptom management, hydration, nutrition, safety for body fluids exposure, preventing infections, recognizing blood clots and PE's, how and when to call the clinic and on call providers, how to monitor for infections, treatment of mouth sores with salt and baking soda rinses, and charting.  Electronically signed by FLORENCE Cuadra,  02/14/22, 8:18 AM EST.

## 2022-02-14 ENCOUNTER — APPOINTMENT (OUTPATIENT)
Dept: ONCOLOGY | Facility: HOSPITAL | Age: 62
End: 2022-02-14

## 2022-02-14 ENCOUNTER — TELEPHONE (OUTPATIENT)
Dept: GYNECOLOGIC ONCOLOGY | Age: 62
End: 2022-02-14

## 2022-02-14 ENCOUNTER — HOSPITAL ENCOUNTER (OUTPATIENT)
Dept: ONCOLOGY | Facility: HOSPITAL | Age: 62
Setting detail: INFUSION SERIES
Discharge: HOME | End: 2022-02-14

## 2022-02-14 ENCOUNTER — OFFICE VISIT (OUTPATIENT)
Dept: ONCOLOGY | Facility: CLINIC | Age: 62
End: 2022-02-14

## 2022-02-14 VITALS
DIASTOLIC BLOOD PRESSURE: 70 MMHG | HEART RATE: 75 BPM | BODY MASS INDEX: 30.67 KG/M2 | TEMPERATURE: 96.9 F | SYSTOLIC BLOOD PRESSURE: 108 MMHG | WEIGHT: 184.3 LBS

## 2022-02-14 DIAGNOSIS — T45.1X5A CHEMOTHERAPY INDUCED NEUTROPENIA: ICD-10-CM

## 2022-02-14 DIAGNOSIS — D64.81 ANTINEOPLASTIC CHEMOTHERAPY INDUCED ANEMIA: ICD-10-CM

## 2022-02-14 DIAGNOSIS — E83.51 HYPOCALCEMIA: ICD-10-CM

## 2022-02-14 DIAGNOSIS — T45.1X5A ANTINEOPLASTIC CHEMOTHERAPY INDUCED ANEMIA: ICD-10-CM

## 2022-02-14 DIAGNOSIS — D61.818 PANCYTOPENIA: ICD-10-CM

## 2022-02-14 DIAGNOSIS — D70.1 CHEMOTHERAPY INDUCED NEUTROPENIA: ICD-10-CM

## 2022-02-14 DIAGNOSIS — C56.9 MALIGNANT NEOPLASM OF OVARY, UNSPECIFIED LATERALITY: Primary | ICD-10-CM

## 2022-02-14 LAB
ALBUMIN SERPL-MCNC: 3.5 G/DL (ref 3.5–5.2)
ALBUMIN/GLOB SERPL: 0.9 G/DL
ALP SERPL-CCNC: 129 U/L (ref 39–117)
ALT SERPL W P-5'-P-CCNC: 42 U/L (ref 1–33)
ANION GAP SERPL CALCULATED.3IONS-SCNC: 13 MMOL/L (ref 5–15)
AST SERPL-CCNC: 56 U/L (ref 1–32)
BASOPHILS # BLD AUTO: 0.01 10*3/MM3 (ref 0–0.2)
BASOPHILS NFR BLD AUTO: 0.3 % (ref 0–1.5)
BILIRUB SERPL-MCNC: 0.3 MG/DL (ref 0–1.2)
BILIRUB UR QL STRIP: ABNORMAL
BUN SERPL-MCNC: 15 MG/DL (ref 8–23)
BUN/CREAT SERPL: 12.1 (ref 7–25)
CALCIUM SPEC-SCNC: 8.6 MG/DL (ref 8.6–10.5)
CANCER AG125 SERPL QL: 275.9 U/ML (ref 0–38.1)
CHLORIDE SERPL-SCNC: 101 MMOL/L (ref 98–107)
CLARITY UR: CLEAR
CO2 SERPL-SCNC: 23 MMOL/L (ref 22–29)
COLOR UR: YELLOW
CREAT BLDA-MCNC: 1.2 MG/DL (ref 0.6–1.3)
CREAT SERPL-MCNC: 1.24 MG/DL (ref 0.57–1)
DEPRECATED RDW RBC AUTO: 66.9 FL (ref 37–54)
EOSINOPHIL # BLD AUTO: 0 10*3/MM3 (ref 0–0.4)
EOSINOPHIL NFR BLD AUTO: 0 % (ref 0.3–6.2)
ERYTHROCYTE [DISTWIDTH] IN BLOOD BY AUTOMATED COUNT: 20.2 % (ref 12.3–15.4)
GFR SERPL CREATININE-BSD FRML MDRD: 44 ML/MIN/1.73
GLOBULIN UR ELPH-MCNC: 3.7 GM/DL
GLUCOSE SERPL-MCNC: 168 MG/DL (ref 65–99)
GLUCOSE UR STRIP-MCNC: NEGATIVE MG/DL
HCT VFR BLD AUTO: 27.4 % (ref 34–46.6)
HGB BLD-MCNC: 8.8 G/DL (ref 12–15.9)
HGB UR QL STRIP.AUTO: NEGATIVE
KETONES UR QL STRIP: ABNORMAL
LEUKOCYTE ESTERASE UR QL STRIP.AUTO: NEGATIVE
LYMPHOCYTES # BLD AUTO: 0.29 10*3/MM3 (ref 0.7–3.1)
LYMPHOCYTES NFR BLD AUTO: 7.3 % (ref 19.6–45.3)
MCH RBC QN AUTO: 30.3 PG (ref 26.6–33)
MCHC RBC AUTO-ENTMCNC: 32.1 G/DL (ref 31.5–35.7)
MCV RBC AUTO: 94.5 FL (ref 79–97)
MONOCYTES # BLD AUTO: 0.05 10*3/MM3 (ref 0.1–0.9)
MONOCYTES NFR BLD AUTO: 1.3 % (ref 5–12)
NEUTROPHILS NFR BLD AUTO: 3.65 10*3/MM3 (ref 1.7–7)
NEUTROPHILS NFR BLD AUTO: 91.1 % (ref 42.7–76)
NITRITE UR QL STRIP: NEGATIVE
PH UR STRIP.AUTO: 5.5 [PH] (ref 5–8)
PLATELET # BLD AUTO: 92 10*3/MM3 (ref 140–450)
PMV BLD AUTO: 10.3 FL (ref 6–12)
POTASSIUM SERPL-SCNC: 3.9 MMOL/L (ref 3.5–5.2)
PROT SERPL-MCNC: 7.2 G/DL (ref 6–8.5)
PROT UR QL STRIP: ABNORMAL
RBC # BLD AUTO: 2.9 10*6/MM3 (ref 3.77–5.28)
SODIUM SERPL-SCNC: 137 MMOL/L (ref 136–145)
SP GR UR STRIP: >=1.03 (ref 1–1.03)
UROBILINOGEN UR QL STRIP: ABNORMAL
WBC NRBC COR # BLD: 4 10*3/MM3 (ref 3.4–10.8)

## 2022-02-14 PROCEDURE — 96413 CHEMO IV INFUSION 1 HR: CPT

## 2022-02-14 PROCEDURE — 25010000002 CARBOPLATIN PER 50 MG: Performed by: INTERNAL MEDICINE

## 2022-02-14 PROCEDURE — 85025 COMPLETE CBC W/AUTO DIFF WBC: CPT | Performed by: INTERNAL MEDICINE

## 2022-02-14 PROCEDURE — 25010000002 BEVACIZUMAB-BVZR 400 MG/16ML SOLUTION 16 ML VIAL: Performed by: INTERNAL MEDICINE

## 2022-02-14 PROCEDURE — 25010000002 DIPHENHYDRAMINE PER 50 MG: Performed by: INTERNAL MEDICINE

## 2022-02-14 PROCEDURE — 81003 URINALYSIS AUTO W/O SCOPE: CPT | Performed by: INTERNAL MEDICINE

## 2022-02-14 PROCEDURE — 25010000002 PALONOSETRON 0.25 MG/5ML SOLUTION PREFILLED SYRINGE: Performed by: INTERNAL MEDICINE

## 2022-02-14 PROCEDURE — 25010000002 PACLITAXEL PER 1 MG: Performed by: INTERNAL MEDICINE

## 2022-02-14 PROCEDURE — 96417 CHEMO IV INFUS EACH ADDL SEQ: CPT

## 2022-02-14 PROCEDURE — 25010000002 FOSAPREPITANT PER 1 MG: Performed by: INTERNAL MEDICINE

## 2022-02-14 PROCEDURE — 96377 APPLICATON ON-BODY INJECTOR: CPT

## 2022-02-14 PROCEDURE — 86304 IMMUNOASSAY TUMOR CA 125: CPT | Performed by: INTERNAL MEDICINE

## 2022-02-14 PROCEDURE — 82565 ASSAY OF CREATININE: CPT

## 2022-02-14 PROCEDURE — 80053 COMPREHEN METABOLIC PANEL: CPT | Performed by: INTERNAL MEDICINE

## 2022-02-14 PROCEDURE — 25010000002 HEPARIN LOCK FLUSH PER 10 UNITS: Performed by: INTERNAL MEDICINE

## 2022-02-14 PROCEDURE — 96367 TX/PROPH/DG ADDL SEQ IV INF: CPT

## 2022-02-14 PROCEDURE — 96360 HYDRATION IV INFUSION INIT: CPT

## 2022-02-14 PROCEDURE — 99214 OFFICE O/P EST MOD 30 MIN: CPT | Performed by: INTERNAL MEDICINE

## 2022-02-14 PROCEDURE — 96361 HYDRATE IV INFUSION ADD-ON: CPT

## 2022-02-14 PROCEDURE — 25010000002 PEGFILGRASTIM 6 MG/0.6ML PREFILLED SYRINGE KIT: Performed by: INTERNAL MEDICINE

## 2022-02-14 PROCEDURE — 96415 CHEMO IV INFUSION ADDL HR: CPT

## 2022-02-14 PROCEDURE — 96375 TX/PRO/DX INJ NEW DRUG ADDON: CPT

## 2022-02-14 PROCEDURE — 25010000002 BEVACIZUMAB-BVZR 100 MG/4ML SOLUTION 4 ML VIAL: Performed by: INTERNAL MEDICINE

## 2022-02-14 PROCEDURE — 25010000002 DEXAMETHASONE SODIUM PHOSPHATE 120 MG/30ML SOLUTION: Performed by: INTERNAL MEDICINE

## 2022-02-14 RX ORDER — DIPHENHYDRAMINE HYDROCHLORIDE 50 MG/ML
50 INJECTION INTRAMUSCULAR; INTRAVENOUS AS NEEDED
Status: CANCELLED | OUTPATIENT
Start: 2022-02-14

## 2022-02-14 RX ORDER — FAMOTIDINE 10 MG/ML
20 INJECTION, SOLUTION INTRAVENOUS ONCE
Status: CANCELLED | OUTPATIENT
Start: 2022-02-14

## 2022-02-14 RX ORDER — HEPARIN SODIUM (PORCINE) LOCK FLUSH IV SOLN 100 UNIT/ML 100 UNIT/ML
500 SOLUTION INTRAVENOUS AS NEEDED
Status: DISCONTINUED | OUTPATIENT
Start: 2022-02-14 | End: 2022-02-15 | Stop reason: HOSPADM

## 2022-02-14 RX ORDER — ONDANSETRON HYDROCHLORIDE 8 MG/1
8 TABLET, FILM COATED ORAL 3 TIMES DAILY PRN
Qty: 30 TABLET | Refills: 5 | Status: SHIPPED | OUTPATIENT
Start: 2022-02-14 | End: 2022-03-17

## 2022-02-14 RX ORDER — FAMOTIDINE 10 MG/ML
20 INJECTION, SOLUTION INTRAVENOUS ONCE
Status: COMPLETED | OUTPATIENT
Start: 2022-02-14 | End: 2022-02-14

## 2022-02-14 RX ORDER — PALONOSETRON 0.05 MG/ML
0.25 INJECTION, SOLUTION INTRAVENOUS ONCE
Status: COMPLETED | OUTPATIENT
Start: 2022-02-14 | End: 2022-02-14

## 2022-02-14 RX ORDER — PALONOSETRON 0.05 MG/ML
0.25 INJECTION, SOLUTION INTRAVENOUS ONCE
Status: CANCELLED | OUTPATIENT
Start: 2022-02-14

## 2022-02-14 RX ORDER — DIPHENHYDRAMINE HYDROCHLORIDE 50 MG/ML
50 INJECTION INTRAMUSCULAR; INTRAVENOUS AS NEEDED
Status: DISCONTINUED | OUTPATIENT
Start: 2022-02-14 | End: 2022-02-15 | Stop reason: HOSPADM

## 2022-02-14 RX ORDER — SODIUM CHLORIDE 9 MG/ML
250 INJECTION, SOLUTION INTRAVENOUS ONCE
Status: DISCONTINUED | OUTPATIENT
Start: 2022-02-14 | End: 2022-02-15 | Stop reason: HOSPADM

## 2022-02-14 RX ORDER — FAMOTIDINE 10 MG/ML
20 INJECTION, SOLUTION INTRAVENOUS AS NEEDED
Status: DISCONTINUED | OUTPATIENT
Start: 2022-02-14 | End: 2022-02-15 | Stop reason: HOSPADM

## 2022-02-14 RX ORDER — SODIUM CHLORIDE 0.9 % (FLUSH) 0.9 %
10 SYRINGE (ML) INJECTION AS NEEDED
Status: CANCELLED | OUTPATIENT
Start: 2022-02-14

## 2022-02-14 RX ORDER — SODIUM CHLORIDE 9 MG/ML
250 INJECTION, SOLUTION INTRAVENOUS ONCE
Status: COMPLETED | OUTPATIENT
Start: 2022-02-14 | End: 2022-02-14

## 2022-02-14 RX ORDER — HEPARIN SODIUM (PORCINE) LOCK FLUSH IV SOLN 100 UNIT/ML 100 UNIT/ML
500 SOLUTION INTRAVENOUS AS NEEDED
Status: CANCELLED | OUTPATIENT
Start: 2022-02-14

## 2022-02-14 RX ORDER — OLANZAPINE 5 MG/1
5 TABLET ORAL ONCE
Status: CANCELLED | OUTPATIENT
Start: 2022-02-14 | End: 2022-02-14

## 2022-02-14 RX ORDER — OLANZAPINE 5 MG/1
5 TABLET ORAL ONCE
Status: COMPLETED | OUTPATIENT
Start: 2022-02-14 | End: 2022-02-14

## 2022-02-14 RX ORDER — SODIUM CHLORIDE 9 MG/ML
250 INJECTION, SOLUTION INTRAVENOUS ONCE
Status: CANCELLED | OUTPATIENT
Start: 2022-02-14

## 2022-02-14 RX ORDER — FAMOTIDINE 10 MG/ML
20 INJECTION, SOLUTION INTRAVENOUS AS NEEDED
Status: CANCELLED | OUTPATIENT
Start: 2022-02-14

## 2022-02-14 RX ORDER — SODIUM CHLORIDE 0.9 % (FLUSH) 0.9 %
10 SYRINGE (ML) INJECTION AS NEEDED
Status: DISCONTINUED | OUTPATIENT
Start: 2022-02-14 | End: 2022-02-15 | Stop reason: HOSPADM

## 2022-02-14 RX ADMIN — SODIUM CHLORIDE 840 MG: 900 INJECTION, SOLUTION INTRAVENOUS at 10:31

## 2022-02-14 RX ADMIN — SODIUM CHLORIDE, PRESERVATIVE FREE 10 ML: 5 INJECTION INTRAVENOUS at 14:58

## 2022-02-14 RX ADMIN — SODIUM CHLORIDE 100 ML: 900 INJECTION, SOLUTION INTRAVENOUS at 09:58

## 2022-02-14 RX ADMIN — SODIUM CHLORIDE 250 ML: 9 INJECTION, SOLUTION INTRAVENOUS at 09:21

## 2022-02-14 RX ADMIN — DIPHENHYDRAMINE HYDROCHLORIDE 50 MG: 50 INJECTION, SOLUTION INTRAMUSCULAR; INTRAVENOUS at 09:41

## 2022-02-14 RX ADMIN — FAMOTIDINE 20 MG: 10 INJECTION INTRAVENOUS at 09:21

## 2022-02-14 RX ADMIN — DEXAMETHASONE SODIUM PHOSPHATE 20 MG: 4 INJECTION, SOLUTION INTRA-ARTICULAR; INTRALESIONAL; INTRAMUSCULAR; INTRAVENOUS; SOFT TISSUE at 09:22

## 2022-02-14 RX ADMIN — PEGFILGRASTIM 6 MG: KIT SUBCUTANEOUS at 14:58

## 2022-02-14 RX ADMIN — PACLITAXEL 345 MG: 6 INJECTION, SOLUTION INTRAVENOUS at 11:08

## 2022-02-14 RX ADMIN — OLANZAPINE 5 MG: 5 TABLET, FILM COATED ORAL at 09:58

## 2022-02-14 RX ADMIN — HEPARIN 500 UNITS: 100 SYRINGE at 14:58

## 2022-02-14 RX ADMIN — CARBOPLATIN 400 MG: 10 INJECTION, SOLUTION INTRAVENOUS at 14:23

## 2022-02-14 RX ADMIN — PALONOSETRON 0.25 MG: 0.25 INJECTION, SOLUTION INTRAVENOUS at 09:58

## 2022-02-14 NOTE — TELEPHONE ENCOUNTER
Caller: VIKA - ADITYA    Relationship: Network    Best call back number: [phone]    What is the best time to reach you: ASAP    Who are you requesting to speak with (clinical staff, provider,  specific staff member): CLINICAL STAFF    Do you know the name of the person who called: VIKA    What was the call regarding: TRYING TO VERIFY IF YOU WANT PATIENT DENISE. AT [place] OR [facility] FOR HER US.    Do you require a callback: YES, PLEASE

## 2022-02-15 DIAGNOSIS — R18.8 OTHER ASCITES: ICD-10-CM

## 2022-02-15 DIAGNOSIS — Z01.818 PRE-OP TESTING: Primary | ICD-10-CM

## 2022-02-17 NOTE — PROGRESS NOTES
02/23/22 0002   Pre-Procedure Phone Call   Procedure Time Verified Yes   Arrival Time 1030   Procedure Location Verified Yes   Medical History Reviewed No   NPO Status Reinforced Yes   Ride and Caregiver Arranged Yes   Patient Knows to Bring Current Medications   (No changes in medications since last reviewed)   Bring Outside Films Requested   (Patient to have Procedure x2(Para & Thora))

## 2022-02-21 ENCOUNTER — LAB (OUTPATIENT)
Dept: LAB | Facility: HOSPITAL | Age: 62
End: 2022-02-21

## 2022-02-21 DIAGNOSIS — C56.9 MALIGNANT NEOPLASM OF OVARY, UNSPECIFIED LATERALITY: Primary | ICD-10-CM

## 2022-02-21 DIAGNOSIS — C56.9 MALIGNANT NEOPLASM OF OVARY, UNSPECIFIED LATERALITY: ICD-10-CM

## 2022-02-21 LAB
ALBUMIN SERPL-MCNC: 3.9 G/DL (ref 3.5–5.2)
ALBUMIN/GLOB SERPL: 1.2 G/DL
ALP SERPL-CCNC: 91 U/L (ref 39–117)
ALT SERPL W P-5'-P-CCNC: 15 U/L (ref 1–33)
ANION GAP SERPL CALCULATED.3IONS-SCNC: 14 MMOL/L (ref 5–15)
AST SERPL-CCNC: 18 U/L (ref 1–32)
BASOPHILS # BLD AUTO: 0.01 10*3/MM3 (ref 0–0.2)
BASOPHILS NFR BLD AUTO: 0.4 % (ref 0–1.5)
BILIRUB SERPL-MCNC: 0.5 MG/DL (ref 0–1.2)
BUN SERPL-MCNC: 17 MG/DL (ref 8–23)
BUN/CREAT SERPL: 14.7 (ref 7–25)
CALCIUM SPEC-SCNC: 9.8 MG/DL (ref 8.6–10.5)
CHLORIDE SERPL-SCNC: 97 MMOL/L (ref 98–107)
CO2 SERPL-SCNC: 24 MMOL/L (ref 22–29)
COLLECT DURATION TIME UR: 24 HRS
CREAT SERPL-MCNC: 1.16 MG/DL (ref 0.57–1)
DEPRECATED RDW RBC AUTO: 67.8 FL (ref 37–54)
EOSINOPHIL # BLD AUTO: 0.01 10*3/MM3 (ref 0–0.4)
EOSINOPHIL NFR BLD AUTO: 0.4 % (ref 0.3–6.2)
ERYTHROCYTE [DISTWIDTH] IN BLOOD BY AUTOMATED COUNT: 19.6 % (ref 12.3–15.4)
GFR SERPL CREATININE-BSD FRML MDRD: 47 ML/MIN/1.73
GLOBULIN UR ELPH-MCNC: 3.3 GM/DL
GLUCOSE SERPL-MCNC: 116 MG/DL (ref 65–99)
HCT VFR BLD AUTO: 26.9 % (ref 34–46.6)
HGB BLD-MCNC: 8.6 G/DL (ref 12–15.9)
LYMPHOCYTES # BLD AUTO: 0.88 10*3/MM3 (ref 0.7–3.1)
LYMPHOCYTES NFR BLD AUTO: 34.5 % (ref 19.6–45.3)
MCH RBC QN AUTO: 31.4 PG (ref 26.6–33)
MCHC RBC AUTO-ENTMCNC: 32 G/DL (ref 31.5–35.7)
MCV RBC AUTO: 98.2 FL (ref 79–97)
MONOCYTES # BLD AUTO: 0.67 10*3/MM3 (ref 0.1–0.9)
MONOCYTES NFR BLD AUTO: 26.3 % (ref 5–12)
NEUTROPHILS NFR BLD AUTO: 0.98 10*3/MM3 (ref 1.7–7)
NEUTROPHILS NFR BLD AUTO: 38.4 % (ref 42.7–76)
PLATELET # BLD AUTO: 55 10*3/MM3 (ref 140–450)
PMV BLD AUTO: 10.8 FL (ref 6–12)
POTASSIUM SERPL-SCNC: 3.8 MMOL/L (ref 3.5–5.2)
PROT 24H UR-MRATE: 89.7 MG/24HOURS (ref 0–150)
PROT SERPL-MCNC: 7.2 G/DL (ref 6–8.5)
RBC # BLD AUTO: 2.74 10*6/MM3 (ref 3.77–5.28)
SODIUM SERPL-SCNC: 135 MMOL/L (ref 136–145)
SPECIMEN VOL 24H UR: 650 ML
WBC NRBC COR # BLD: 2.55 10*3/MM3 (ref 3.4–10.8)

## 2022-02-21 PROCEDURE — 84156 ASSAY OF PROTEIN URINE: CPT

## 2022-02-21 PROCEDURE — 80053 COMPREHEN METABOLIC PANEL: CPT

## 2022-02-21 PROCEDURE — 81050 URINALYSIS VOLUME MEASURE: CPT

## 2022-02-21 PROCEDURE — 36415 COLL VENOUS BLD VENIPUNCTURE: CPT

## 2022-02-21 PROCEDURE — 85025 COMPLETE CBC W/AUTO DIFF WBC: CPT

## 2022-02-22 ENCOUNTER — LAB (OUTPATIENT)
Dept: LAB | Facility: HOSPITAL | Age: 62
End: 2022-02-22

## 2022-02-22 DIAGNOSIS — C56.9 MALIGNANT NEOPLASM OF OVARY, UNSPECIFIED LATERALITY: ICD-10-CM

## 2022-02-22 DIAGNOSIS — G89.3 CANCER RELATED PAIN: Primary | ICD-10-CM

## 2022-02-22 DIAGNOSIS — Z01.818 PRE-OP TESTING: ICD-10-CM

## 2022-02-22 LAB — SARS-COV-2 ORF1AB RESP QL NAA+PROBE: NOT DETECTED

## 2022-02-22 PROCEDURE — C9803 HOPD COVID-19 SPEC COLLECT: HCPCS

## 2022-02-22 PROCEDURE — U0004 COV-19 TEST NON-CDC HGH THRU: HCPCS

## 2022-02-22 RX ORDER — OXYCODONE HYDROCHLORIDE AND ACETAMINOPHEN 5; 325 MG/1; MG/1
1 TABLET ORAL EVERY 6 HOURS PRN
Qty: 120 TABLET | Refills: 0 | Status: SHIPPED | OUTPATIENT
Start: 2022-02-22 | End: 2022-03-21 | Stop reason: SDUPTHER

## 2022-02-23 ENCOUNTER — HOSPITAL ENCOUNTER (OUTPATIENT)
Dept: ULTRASOUND IMAGING | Facility: HOSPITAL | Age: 62
Discharge: HOME | End: 2022-02-23

## 2022-02-23 ENCOUNTER — APPOINTMENT (OUTPATIENT)
Dept: ULTRASOUND IMAGING | Facility: HOSPITAL | Age: 62
End: 2022-02-23

## 2022-02-23 VITALS
DIASTOLIC BLOOD PRESSURE: 69 MMHG | BODY MASS INDEX: 29.32 KG/M2 | HEART RATE: 85 BPM | TEMPERATURE: 98 F | HEIGHT: 65 IN | OXYGEN SATURATION: 100 % | SYSTOLIC BLOOD PRESSURE: 116 MMHG | WEIGHT: 176 LBS | RESPIRATION RATE: 18 BRPM

## 2022-02-23 DIAGNOSIS — R18.8 OTHER ASCITES: Primary | ICD-10-CM

## 2022-02-23 DIAGNOSIS — J90 PLEURAL EFFUSION: ICD-10-CM

## 2022-02-23 DIAGNOSIS — R18.8 OTHER ASCITES: ICD-10-CM

## 2022-02-23 PROCEDURE — 76942 ECHO GUIDE FOR BIOPSY: CPT

## 2022-02-23 RX ORDER — SODIUM CHLORIDE 0.9 % (FLUSH) 0.9 %
3 SYRINGE (ML) INJECTION EVERY 12 HOURS SCHEDULED
Status: CANCELLED | OUTPATIENT
Start: 2022-04-26

## 2022-02-23 RX ORDER — SODIUM CHLORIDE 0.9 % (FLUSH) 0.9 %
10 SYRINGE (ML) INJECTION AS NEEDED
Status: CANCELLED | OUTPATIENT
Start: 2022-04-26

## 2022-02-23 NOTE — NURSING NOTE
Patient did not have enough fluid for the US Paracentesis or Thoracentesis to remove safely. Patient is aware and assisted to the lobby to leave with her daughter. NAD noted.

## 2022-02-23 NOTE — NURSING NOTE
Pt in xray triage for US Para and Right Thora.  Pt wearing mask and RN wearing mask and eye protection for all pt interactions.

## 2022-02-23 NOTE — DISCHARGE INSTRUCTIONS
EDUCATION /DISCHARGE INSTRUCTIONS  Paracentesis:  A needle is inserted into the space between your abdominal organs and the membrane that surrounds them (peritoneal space).  It is done for the diagnosis and treatment of fluid that is resistant to other therapies.  It helps determine the cause of the fluid and at the relieves pressure created by the fluid.  A sample is obtained and sent to the laboratory for study.  During the procedure:  You will lie on a bed on your back with your legs drawn up.  Your abdomen will be exposed from the chest to the pelvis.  You will otherwise be covered to maintain comfort.  A physician will clean your abdomen with antiseptic soap, place a sterile towel around the site and administer a local anesthetic to numb the area.  The physician will insert a needle into your abdominal wall.  There may be a popping sound which signifies the needle has pierced the abdominal wall. Next, the physician will attach tubing to transfer a sample into a collection bottle.  After the fluid is obtained the needle will be removed.  A pressure dressing is applied to the site.  Risks of the procedure include but are not limited to:   *  Bleeding    *  Wound infection   *  Low blood pressure   *  Decreased urination   *  Low sodium if a large amount of fluid is removed   *  Puncture of abdominal organs by the needle    Benefits of the procedure:  Benefits include the removal of fluid from the abdomen, relief of abdominal pressure and facilitation of a diagnosis.  Alternatives to the procedure:  Possible alternatives are diuretic drug therapy or surgery to place a shunt to drain fluid.  Risks of diuretic drug therapy include possible dehydration and renal failure.  The benefit of drug therapy is that it can be done at home under physician supervision.  Risks of shunt placement include exposure to anesthesia, infection, excessive bleeding and injury to abdominal organs.  The benefit of a shunt is that it can be  used to drain fluid over a longer period of time.  THIS EDUCATION INFORMATION WAS REVIEWED PRIOR TO THE PROCEDURE AND CONSENT. Patient initials__________________Time____10:55 AM_____________    Post procedure: (Follow all the instructions below carefully)   *  Weigh yourself daily.   *  Follow your doctors dietary instructions.   *  Rest today (no pushing pulling, straining or heavy lifting).   *  Slowly increase activity tomorow.   *  If you received sedation do not drive for 24 hours.              * Skin affix  applied to puncture site. Do not try to remove, scratch or apply lotion   * Skin affix will fall off on it's own   *  You may shower tomorrow  Call your doctor if experiencing:   *  Signs of infection such as redness, swelling, excessive pain and / or foul       smelling drainage from the puncture site.   *  Chills or fever over 101 degrees (by mouth).   *  Fainting or any noted Rapid weight gain/loss   *  Unrelieved pain or any new or severe symptoms  Following the procedure:      Follow-up with the ordering physician as directed.   Continue to take other medications as directed by your physician unless    otherwise instructed.   If applicable, resume taking your blood thinners or Aspirin in 24 hours.              If you have any concerns please call the Radiology Nurses Desk at [phone]       EDUCATION /DISCHARGE INSTRUCTIONS Thoracentesis:  A thoracentesis is a procedure to remove fluid or air from the space between the lung and it's lining.  It is done to relieve lung compression and respiratory distress.  A sample can also be obtained to aid diagnosis.   Medications can be administered into the space.      During the procedure:  You will be seated comfortable leaning forward onto a pillow supported by a table.  The area will be cleaned with antiseptic soap and draped with a sterile towel.  The physician will administer a local antiseptic to numb the area.  Next, the physician will insert a needle  with tubing attached into the space between the lung and lining.  Fluid is removed and a sample sent to the laboratory.   When completed a pressure dressing is applied to the site.    Risks of the procedure include but are not limited to:   *  Bleeding    *  Low blood pressure *  Infection     *  Lung collapse possibly requiring insertion of a tube to re-inflate the lung.    Benefits of the procedure:  Relief of respiratory distress and facilitation of a diagnosis.  Alternatives to the procedure:  Drug therapy with diuretics to remove fluid.  Risks of diuretic drug therapy include possible dehydration and renal failure.  The benefit of drug therapy is that it can be done at home under physician supervision.  THIS EDUCATION INFORMATION WAS REVIEWED PRIOR TO THE PROCEDURE AND CONSENT. Patient initials___________________Time___10:55 AM_______________    Post Procedure:    *  Rest today (no pushing pulling, straining or heavy lifting).   *  Slowly increase activity tomorow.    *  If you received sedation do not drive for 24 hours.   *  Keep dressing clean and dry.   *  Leave dressing on puncture site for 24 hours.    *  You may shower when dressing removed.   *  Expect some coughing as the lung re-expands.    Call your doctor if experiencing:   *  If experiencing sudden / severe shortness of breath, chest pain or if coughing       up blood go to the nearest emergency room.   *  Signs of infection such as redness, swelling, excessive pain and / or foul       smelling drainage from the puncture site.   *  Chills or fever over 101 degrees (by mouth).   *  Change in sputum color (yellow, green, red).   *  Unrelieved pain.   *  Any new or severe symptoms.  Following the procedure:     Follow-up with the ordering physician as directed.    Continue to take other medications as directed by your physician unless    otherwise instructed.   If applicable, resume taking your blood thinners or Aspirin on ___________.    If you have  any concerns please call the Radiology Nurses Desk at [phone].  You are the most important factor in your recovery.  Follow the above instructions carefully.

## 2022-02-24 ENCOUNTER — TELEPHONE (OUTPATIENT)
Dept: INTERVENTIONAL RADIOLOGY/VASCULAR | Facility: HOSPITAL | Age: 62
End: 2022-02-24

## 2022-02-24 DIAGNOSIS — C56.9 MALIGNANT NEOPLASM OF OVARY, UNSPECIFIED LATERALITY: Primary | ICD-10-CM

## 2022-02-24 RX ORDER — SODIUM CHLORIDE 9 MG/ML
250 INJECTION, SOLUTION INTRAVENOUS ONCE
Status: CANCELLED | OUTPATIENT
Start: 2022-02-28

## 2022-02-28 ENCOUNTER — HOSPITAL ENCOUNTER (OUTPATIENT)
Dept: ONCOLOGY | Facility: HOSPITAL | Age: 62
Setting detail: INFUSION SERIES
Discharge: HOME | End: 2022-02-28

## 2022-02-28 VITALS
HEIGHT: 65 IN | SYSTOLIC BLOOD PRESSURE: 130 MMHG | DIASTOLIC BLOOD PRESSURE: 71 MMHG | WEIGHT: 180 LBS | OXYGEN SATURATION: 99 % | RESPIRATION RATE: 18 BRPM | TEMPERATURE: 97.1 F | HEART RATE: 92 BPM | BODY MASS INDEX: 29.99 KG/M2

## 2022-02-28 DIAGNOSIS — C56.9 MALIGNANT NEOPLASM OF OVARY, UNSPECIFIED LATERALITY: ICD-10-CM

## 2022-02-28 DIAGNOSIS — E83.51 HYPOCALCEMIA: Primary | ICD-10-CM

## 2022-02-28 LAB
ALBUMIN SERPL-MCNC: 3.8 G/DL (ref 3.5–5.2)
ALBUMIN/GLOB SERPL: 1.2 G/DL
ALP SERPL-CCNC: 75 U/L (ref 39–117)
ALT SERPL W P-5'-P-CCNC: 8 U/L (ref 1–33)
ANION GAP SERPL CALCULATED.3IONS-SCNC: 12 MMOL/L (ref 5–15)
AST SERPL-CCNC: 13 U/L (ref 1–32)
BASOPHILS # BLD AUTO: 0.01 10*3/MM3 (ref 0–0.2)
BASOPHILS NFR BLD AUTO: 0.1 % (ref 0–1.5)
BILIRUB SERPL-MCNC: 0.3 MG/DL (ref 0–1.2)
BILIRUB UR QL STRIP: ABNORMAL
BUN SERPL-MCNC: 24 MG/DL (ref 8–23)
BUN/CREAT SERPL: 23.1 (ref 7–25)
CALCIUM SPEC-SCNC: 9 MG/DL (ref 8.6–10.5)
CANCER AG125 SERPL QL: 191 U/ML (ref 0–38.1)
CHLORIDE SERPL-SCNC: 102 MMOL/L (ref 98–107)
CLARITY UR: CLEAR
CO2 SERPL-SCNC: 22 MMOL/L (ref 22–29)
COLOR UR: YELLOW
CREAT SERPL-MCNC: 1.04 MG/DL (ref 0.57–1)
DEPRECATED RDW RBC AUTO: 71.3 FL (ref 37–54)
EGFRCR SERPLBLD CKD-EPI 2021: 60.5 ML/MIN/1.73
EOSINOPHIL # BLD AUTO: 0.01 10*3/MM3 (ref 0–0.4)
EOSINOPHIL NFR BLD AUTO: 0.1 % (ref 0.3–6.2)
ERYTHROCYTE [DISTWIDTH] IN BLOOD BY AUTOMATED COUNT: 20.8 % (ref 12.3–15.4)
GLOBULIN UR ELPH-MCNC: 3.3 GM/DL
GLUCOSE SERPL-MCNC: 121 MG/DL (ref 65–99)
GLUCOSE UR STRIP-MCNC: NEGATIVE MG/DL
HCT VFR BLD AUTO: 24.5 % (ref 34–46.6)
HGB BLD-MCNC: 7.9 G/DL (ref 12–15.9)
HGB UR QL STRIP.AUTO: ABNORMAL
KETONES UR QL STRIP: ABNORMAL
LEUKOCYTE ESTERASE UR QL STRIP.AUTO: NEGATIVE
LYMPHOCYTES # BLD AUTO: 0.51 10*3/MM3 (ref 0.7–3.1)
LYMPHOCYTES NFR BLD AUTO: 6.4 % (ref 19.6–45.3)
MCH RBC QN AUTO: 32.2 PG (ref 26.6–33)
MCHC RBC AUTO-ENTMCNC: 32.2 G/DL (ref 31.5–35.7)
MCV RBC AUTO: 100 FL (ref 79–97)
MONOCYTES # BLD AUTO: 0.71 10*3/MM3 (ref 0.1–0.9)
MONOCYTES NFR BLD AUTO: 9 % (ref 5–12)
NEUTROPHILS NFR BLD AUTO: 6.67 10*3/MM3 (ref 1.7–7)
NEUTROPHILS NFR BLD AUTO: 84.4 % (ref 42.7–76)
NITRITE UR QL STRIP: NEGATIVE
PH UR STRIP.AUTO: 5.5 [PH] (ref 5–8)
PLATELET # BLD AUTO: 76 10*3/MM3 (ref 140–450)
PMV BLD AUTO: 10.5 FL (ref 6–12)
POTASSIUM SERPL-SCNC: 4.2 MMOL/L (ref 3.5–5.2)
PROT SERPL-MCNC: 7.1 G/DL (ref 6–8.5)
PROT UR QL STRIP: ABNORMAL
RBC # BLD AUTO: 2.45 10*6/MM3 (ref 3.77–5.28)
SODIUM SERPL-SCNC: 136 MMOL/L (ref 136–145)
SP GR UR STRIP: >=1.03 (ref 1–1.03)
UROBILINOGEN UR QL STRIP: ABNORMAL
WBC NRBC COR # BLD: 7.91 10*3/MM3 (ref 3.4–10.8)

## 2022-02-28 PROCEDURE — 25010000002 HEPARIN LOCK FLUSH PER 10 UNITS: Performed by: INTERNAL MEDICINE

## 2022-02-28 PROCEDURE — 86304 IMMUNOASSAY TUMOR CA 125: CPT | Performed by: INTERNAL MEDICINE

## 2022-02-28 PROCEDURE — 80053 COMPREHEN METABOLIC PANEL: CPT | Performed by: INTERNAL MEDICINE

## 2022-02-28 PROCEDURE — 25010000002 BEVACIZUMAB-BVZR 400 MG/16ML SOLUTION 16 ML VIAL: Performed by: INTERNAL MEDICINE

## 2022-02-28 PROCEDURE — 85025 COMPLETE CBC W/AUTO DIFF WBC: CPT | Performed by: INTERNAL MEDICINE

## 2022-02-28 PROCEDURE — 81003 URINALYSIS AUTO W/O SCOPE: CPT | Performed by: INTERNAL MEDICINE

## 2022-02-28 PROCEDURE — 96413 CHEMO IV INFUSION 1 HR: CPT

## 2022-02-28 PROCEDURE — 25010000002 BEVACIZUMAB-BVZR 100 MG/4ML SOLUTION 4 ML VIAL: Performed by: INTERNAL MEDICINE

## 2022-02-28 PROCEDURE — 36591 DRAW BLOOD OFF VENOUS DEVICE: CPT

## 2022-02-28 RX ORDER — SODIUM CHLORIDE 0.9 % (FLUSH) 0.9 %
10 SYRINGE (ML) INJECTION AS NEEDED
Status: DISCONTINUED | OUTPATIENT
Start: 2022-02-28 | End: 2022-03-01 | Stop reason: HOSPADM

## 2022-02-28 RX ORDER — HEPARIN SODIUM (PORCINE) LOCK FLUSH IV SOLN 100 UNIT/ML 100 UNIT/ML
500 SOLUTION INTRAVENOUS AS NEEDED
Status: CANCELLED | OUTPATIENT
Start: 2022-02-28

## 2022-02-28 RX ORDER — SODIUM CHLORIDE 0.9 % (FLUSH) 0.9 %
10 SYRINGE (ML) INJECTION AS NEEDED
Status: CANCELLED | OUTPATIENT
Start: 2022-02-28

## 2022-02-28 RX ORDER — SODIUM CHLORIDE 9 MG/ML
250 INJECTION, SOLUTION INTRAVENOUS ONCE
Status: DISCONTINUED | OUTPATIENT
Start: 2022-02-28 | End: 2022-03-01 | Stop reason: HOSPADM

## 2022-02-28 RX ORDER — HEPARIN SODIUM (PORCINE) LOCK FLUSH IV SOLN 100 UNIT/ML 100 UNIT/ML
500 SOLUTION INTRAVENOUS AS NEEDED
Status: DISCONTINUED | OUTPATIENT
Start: 2022-02-28 | End: 2022-03-01 | Stop reason: HOSPADM

## 2022-02-28 RX ADMIN — Medication 10 ML: at 11:57

## 2022-02-28 RX ADMIN — SODIUM CHLORIDE 800 MG: 9 INJECTION, SOLUTION INTRAVENOUS at 11:23

## 2022-02-28 RX ADMIN — Medication 500 UNITS: at 11:57

## 2022-03-02 DIAGNOSIS — C56.9 MALIGNANT NEOPLASM OF OVARY, UNSPECIFIED LATERALITY: ICD-10-CM

## 2022-03-07 ENCOUNTER — HOSPITAL ENCOUNTER (OUTPATIENT)
Dept: ONCOLOGY | Facility: HOSPITAL | Age: 62
Setting detail: INFUSION SERIES
Discharge: HOME | End: 2022-03-07

## 2022-03-07 VITALS
DIASTOLIC BLOOD PRESSURE: 75 MMHG | BODY MASS INDEX: 29.32 KG/M2 | HEART RATE: 117 BPM | SYSTOLIC BLOOD PRESSURE: 156 MMHG | RESPIRATION RATE: 18 BRPM | TEMPERATURE: 96.9 F | WEIGHT: 176 LBS | OXYGEN SATURATION: 96 % | HEIGHT: 65 IN

## 2022-03-07 DIAGNOSIS — C56.9 MALIGNANT NEOPLASM OF OVARY, UNSPECIFIED LATERALITY: Primary | ICD-10-CM

## 2022-03-07 DIAGNOSIS — E83.51 HYPOCALCEMIA: ICD-10-CM

## 2022-03-07 LAB
ALBUMIN SERPL-MCNC: 3.7 G/DL (ref 3.5–5.2)
ALBUMIN/GLOB SERPL: 1.1 G/DL
ALP SERPL-CCNC: 72 U/L (ref 39–117)
ALT SERPL W P-5'-P-CCNC: 9 U/L (ref 1–33)
ANION GAP SERPL CALCULATED.3IONS-SCNC: 13 MMOL/L (ref 5–15)
AST SERPL-CCNC: 16 U/L (ref 1–32)
BASOPHILS # BLD AUTO: 0.01 10*3/MM3 (ref 0–0.2)
BASOPHILS NFR BLD AUTO: 0.2 % (ref 0–1.5)
BILIRUB SERPL-MCNC: 0.4 MG/DL (ref 0–1.2)
BUN SERPL-MCNC: 26 MG/DL (ref 8–23)
BUN/CREAT SERPL: 22.8 (ref 7–25)
CALCIUM SPEC-SCNC: 9.4 MG/DL (ref 8.6–10.5)
CANCER AG125 SERPL QL: 174.2 U/ML (ref 0–38.1)
CHLORIDE SERPL-SCNC: 103 MMOL/L (ref 98–107)
CO2 SERPL-SCNC: 21 MMOL/L (ref 22–29)
CREAT BLDA-MCNC: 1 MG/DL (ref 0.6–1.3)
CREAT SERPL-MCNC: 1.14 MG/DL (ref 0.57–1)
DEPRECATED RDW RBC AUTO: 73.7 FL (ref 37–54)
EGFRCR SERPLBLD CKD-EPI 2021: 54.2 ML/MIN/1.73
EGFRCR SERPLBLD CKD-EPI 2021: 63.4 ML/MIN/1.73
EOSINOPHIL # BLD AUTO: 0.01 10*3/MM3 (ref 0–0.4)
EOSINOPHIL NFR BLD AUTO: 0.2 % (ref 0.3–6.2)
ERYTHROCYTE [DISTWIDTH] IN BLOOD BY AUTOMATED COUNT: 21 % (ref 12.3–15.4)
GLOBULIN UR ELPH-MCNC: 3.5 GM/DL
GLUCOSE SERPL-MCNC: 174 MG/DL (ref 65–99)
HCT VFR BLD AUTO: 27.4 % (ref 34–46.6)
HGB BLD-MCNC: 9.1 G/DL (ref 12–15.9)
LYMPHOCYTES # BLD AUTO: 0.3 10*3/MM3 (ref 0.7–3.1)
LYMPHOCYTES NFR BLD AUTO: 6 % (ref 19.6–45.3)
MCH RBC QN AUTO: 33.5 PG (ref 26.6–33)
MCHC RBC AUTO-ENTMCNC: 33.2 G/DL (ref 31.5–35.7)
MCV RBC AUTO: 100.7 FL (ref 79–97)
MONOCYTES # BLD AUTO: 0.09 10*3/MM3 (ref 0.1–0.9)
MONOCYTES NFR BLD AUTO: 1.8 % (ref 5–12)
NEUTROPHILS NFR BLD AUTO: 4.56 10*3/MM3 (ref 1.7–7)
NEUTROPHILS NFR BLD AUTO: 91.8 % (ref 42.7–76)
PLATELET # BLD AUTO: 133 10*3/MM3 (ref 140–450)
PMV BLD AUTO: 9.9 FL (ref 6–12)
POTASSIUM SERPL-SCNC: 4.3 MMOL/L (ref 3.5–5.2)
PROT SERPL-MCNC: 7.2 G/DL (ref 6–8.5)
RBC # BLD AUTO: 2.72 10*6/MM3 (ref 3.77–5.28)
SODIUM SERPL-SCNC: 137 MMOL/L (ref 136–145)
WBC NRBC COR # BLD: 4.97 10*3/MM3 (ref 3.4–10.8)

## 2022-03-07 PROCEDURE — 25010000002 PALONOSETRON PER 25 MCG: Performed by: INTERNAL MEDICINE

## 2022-03-07 PROCEDURE — 25010000002 FOSAPREPITANT PER 1 MG: Performed by: INTERNAL MEDICINE

## 2022-03-07 PROCEDURE — 96415 CHEMO IV INFUSION ADDL HR: CPT

## 2022-03-07 PROCEDURE — 85025 COMPLETE CBC W/AUTO DIFF WBC: CPT | Performed by: INTERNAL MEDICINE

## 2022-03-07 PROCEDURE — 96361 HYDRATE IV INFUSION ADD-ON: CPT

## 2022-03-07 PROCEDURE — 25010000002 CARBOPLATIN PER 50 MG: Performed by: INTERNAL MEDICINE

## 2022-03-07 PROCEDURE — 96367 TX/PROPH/DG ADDL SEQ IV INF: CPT

## 2022-03-07 PROCEDURE — 96413 CHEMO IV INFUSION 1 HR: CPT

## 2022-03-07 PROCEDURE — 96375 TX/PRO/DX INJ NEW DRUG ADDON: CPT

## 2022-03-07 PROCEDURE — 25010000002 PACLITAXEL PER 1 MG: Performed by: INTERNAL MEDICINE

## 2022-03-07 PROCEDURE — 96417 CHEMO IV INFUS EACH ADDL SEQ: CPT

## 2022-03-07 PROCEDURE — 25010000002 PEGFILGRASTIM 6 MG/0.6ML PREFILLED SYRINGE KIT: Performed by: INTERNAL MEDICINE

## 2022-03-07 PROCEDURE — 96377 APPLICATON ON-BODY INJECTOR: CPT

## 2022-03-07 PROCEDURE — 25010000002 HEPARIN LOCK FLUSH PER 10 UNITS: Performed by: INTERNAL MEDICINE

## 2022-03-07 PROCEDURE — 86304 IMMUNOASSAY TUMOR CA 125: CPT | Performed by: INTERNAL MEDICINE

## 2022-03-07 PROCEDURE — 80053 COMPREHEN METABOLIC PANEL: CPT | Performed by: INTERNAL MEDICINE

## 2022-03-07 PROCEDURE — 96360 HYDRATION IV INFUSION INIT: CPT

## 2022-03-07 PROCEDURE — 25010000002 DEXAMETHASONE SODIUM PHOSPHATE 120 MG/30ML SOLUTION: Performed by: INTERNAL MEDICINE

## 2022-03-07 PROCEDURE — 25010000002 DIPHENHYDRAMINE PER 50 MG: Performed by: INTERNAL MEDICINE

## 2022-03-07 PROCEDURE — 82565 ASSAY OF CREATININE: CPT

## 2022-03-07 RX ORDER — OLANZAPINE 5 MG/1
5 TABLET ORAL ONCE
Status: CANCELLED | OUTPATIENT
Start: 2022-03-07 | End: 2022-03-07

## 2022-03-07 RX ORDER — SODIUM CHLORIDE 9 MG/ML
250 INJECTION, SOLUTION INTRAVENOUS ONCE
Status: COMPLETED | OUTPATIENT
Start: 2022-03-07 | End: 2022-03-07

## 2022-03-07 RX ORDER — DIPHENHYDRAMINE HYDROCHLORIDE 50 MG/ML
50 INJECTION INTRAMUSCULAR; INTRAVENOUS AS NEEDED
Status: CANCELLED | OUTPATIENT
Start: 2022-03-07

## 2022-03-07 RX ORDER — HEPARIN SODIUM (PORCINE) LOCK FLUSH IV SOLN 100 UNIT/ML 100 UNIT/ML
500 SOLUTION INTRAVENOUS AS NEEDED
Status: DISCONTINUED | OUTPATIENT
Start: 2022-03-07 | End: 2022-03-08 | Stop reason: HOSPADM

## 2022-03-07 RX ORDER — SODIUM CHLORIDE 9 MG/ML
250 INJECTION, SOLUTION INTRAVENOUS ONCE
Status: CANCELLED | OUTPATIENT
Start: 2022-03-07

## 2022-03-07 RX ORDER — SODIUM CHLORIDE 0.9 % (FLUSH) 0.9 %
10 SYRINGE (ML) INJECTION AS NEEDED
Status: CANCELLED | OUTPATIENT
Start: 2022-03-07

## 2022-03-07 RX ORDER — HEPARIN SODIUM (PORCINE) LOCK FLUSH IV SOLN 100 UNIT/ML 100 UNIT/ML
500 SOLUTION INTRAVENOUS AS NEEDED
Status: CANCELLED | OUTPATIENT
Start: 2022-03-07

## 2022-03-07 RX ORDER — FAMOTIDINE 10 MG/ML
20 INJECTION, SOLUTION INTRAVENOUS ONCE
Status: COMPLETED | OUTPATIENT
Start: 2022-03-07 | End: 2022-03-07

## 2022-03-07 RX ORDER — PALONOSETRON 0.05 MG/ML
0.25 INJECTION, SOLUTION INTRAVENOUS ONCE
Status: CANCELLED | OUTPATIENT
Start: 2022-03-07

## 2022-03-07 RX ORDER — FAMOTIDINE 10 MG/ML
20 INJECTION, SOLUTION INTRAVENOUS AS NEEDED
Status: CANCELLED | OUTPATIENT
Start: 2022-03-07

## 2022-03-07 RX ORDER — OLANZAPINE 5 MG/1
5 TABLET ORAL ONCE
Status: COMPLETED | OUTPATIENT
Start: 2022-03-07 | End: 2022-03-07

## 2022-03-07 RX ORDER — PALONOSETRON 0.05 MG/ML
0.25 INJECTION, SOLUTION INTRAVENOUS ONCE
Status: COMPLETED | OUTPATIENT
Start: 2022-03-07 | End: 2022-03-07

## 2022-03-07 RX ORDER — SODIUM CHLORIDE 0.9 % (FLUSH) 0.9 %
10 SYRINGE (ML) INJECTION AS NEEDED
Status: DISCONTINUED | OUTPATIENT
Start: 2022-03-07 | End: 2022-03-08 | Stop reason: HOSPADM

## 2022-03-07 RX ORDER — FAMOTIDINE 10 MG/ML
20 INJECTION, SOLUTION INTRAVENOUS ONCE
Status: CANCELLED | OUTPATIENT
Start: 2022-03-07

## 2022-03-07 RX ADMIN — OLANZAPINE 5 MG: 5 TABLET, FILM COATED ORAL at 10:06

## 2022-03-07 RX ADMIN — PACLITAXEL 330 MG: 6 INJECTION, SOLUTION INTRAVENOUS at 11:04

## 2022-03-07 RX ADMIN — PEGFILGRASTIM 6 MG: KIT SUBCUTANEOUS at 15:00

## 2022-03-07 RX ADMIN — PALONOSETRON HYDROCHLORIDE 0.25 MG: 0.25 INJECTION, SOLUTION INTRAVENOUS at 10:25

## 2022-03-07 RX ADMIN — SODIUM CHLORIDE 100 ML: 9 INJECTION, SOLUTION INTRAVENOUS at 10:28

## 2022-03-07 RX ADMIN — Medication 10 ML: at 15:00

## 2022-03-07 RX ADMIN — FAMOTIDINE 20 MG: 10 INJECTION INTRAVENOUS at 09:46

## 2022-03-07 RX ADMIN — DIPHENHYDRAMINE HYDROCHLORIDE 50 MG: 50 INJECTION, SOLUTION INTRAMUSCULAR; INTRAVENOUS at 10:06

## 2022-03-07 RX ADMIN — HEPARIN 500 UNITS: 100 SYRINGE at 15:00

## 2022-03-07 RX ADMIN — CARBOPLATIN 440 MG: 10 INJECTION, SOLUTION INTRAVENOUS at 14:23

## 2022-03-07 RX ADMIN — DEXAMETHASONE SODIUM PHOSPHATE 20 MG: 4 INJECTION, SOLUTION INTRA-ARTICULAR; INTRALESIONAL; INTRAMUSCULAR; INTRAVENOUS; SOFT TISSUE at 09:46

## 2022-03-07 RX ADMIN — SODIUM CHLORIDE 250 ML: 9 INJECTION, SOLUTION INTRAVENOUS at 09:46

## 2022-03-08 ENCOUNTER — TELEPHONE (OUTPATIENT)
Dept: ONCOLOGY | Facility: CLINIC | Age: 62
End: 2022-03-08

## 2022-03-08 DIAGNOSIS — C56.9 MALIGNANT NEOPLASM OF OVARY, UNSPECIFIED LATERALITY: ICD-10-CM

## 2022-03-09 DIAGNOSIS — F41.9 ANXIETY: ICD-10-CM

## 2022-03-09 RX ORDER — DEXAMETHASONE 4 MG/1
TABLET ORAL
Qty: 5 TABLET | Refills: 5 | Status: SHIPPED | OUTPATIENT
Start: 2022-03-09 | End: 2022-07-01

## 2022-03-09 RX ORDER — HYDROXYZINE HYDROCHLORIDE 10 MG/1
TABLET, FILM COATED ORAL
Qty: 40 TABLET | Refills: 0 | Status: SHIPPED | OUTPATIENT
Start: 2022-03-09 | End: 2022-04-18

## 2022-03-14 ENCOUNTER — HOSPITAL ENCOUNTER (OUTPATIENT)
Dept: ONCOLOGY | Facility: HOSPITAL | Age: 62
Setting detail: INFUSION SERIES
Discharge: HOME | End: 2022-03-14

## 2022-03-14 VITALS
RESPIRATION RATE: 18 BRPM | OXYGEN SATURATION: 99 % | BODY MASS INDEX: 29.56 KG/M2 | TEMPERATURE: 97.4 F | SYSTOLIC BLOOD PRESSURE: 134 MMHG | DIASTOLIC BLOOD PRESSURE: 79 MMHG | HEART RATE: 102 BPM | HEIGHT: 65 IN | WEIGHT: 177.4 LBS

## 2022-03-14 DIAGNOSIS — C56.9 MALIGNANT NEOPLASM OF OVARY, UNSPECIFIED LATERALITY: Primary | ICD-10-CM

## 2022-03-14 DIAGNOSIS — E83.51 HYPOCALCEMIA: ICD-10-CM

## 2022-03-14 LAB
ALBUMIN SERPL-MCNC: 3.9 G/DL (ref 3.5–5.2)
ALBUMIN/GLOB SERPL: 1 G/DL
ALP SERPL-CCNC: 70 U/L (ref 39–117)
ALT SERPL W P-5'-P-CCNC: 7 U/L (ref 1–33)
ANION GAP SERPL CALCULATED.3IONS-SCNC: 14 MMOL/L (ref 5–15)
AST SERPL-CCNC: 13 U/L (ref 1–32)
BASOPHILS # BLD AUTO: 0 10*3/MM3 (ref 0–0.2)
BASOPHILS NFR BLD AUTO: 0 % (ref 0–1.5)
BILIRUB SERPL-MCNC: 0.3 MG/DL (ref 0–1.2)
BILIRUB UR QL STRIP: NEGATIVE
BUN SERPL-MCNC: 29 MG/DL (ref 8–23)
BUN/CREAT SERPL: 22.3 (ref 7–25)
CALCIUM SPEC-SCNC: 9.6 MG/DL (ref 8.6–10.5)
CANCER AG125 SERPL QL: 135.1 U/ML (ref 0–38.1)
CHLORIDE SERPL-SCNC: 101 MMOL/L (ref 98–107)
CLARITY UR: CLEAR
CO2 SERPL-SCNC: 20 MMOL/L (ref 22–29)
COLOR UR: YELLOW
CREAT SERPL-MCNC: 1.3 MG/DL (ref 0.57–1)
DEPRECATED RDW RBC AUTO: 66.1 FL (ref 37–54)
EGFRCR SERPLBLD CKD-EPI 2021: 46.3 ML/MIN/1.73
EOSINOPHIL # BLD AUTO: 0 10*3/MM3 (ref 0–0.4)
EOSINOPHIL NFR BLD AUTO: 0 % (ref 0.3–6.2)
ERYTHROCYTE [DISTWIDTH] IN BLOOD BY AUTOMATED COUNT: 17.4 % (ref 12.3–15.4)
GLOBULIN UR ELPH-MCNC: 3.8 GM/DL
GLUCOSE SERPL-MCNC: 193 MG/DL (ref 65–99)
GLUCOSE UR STRIP-MCNC: NEGATIVE MG/DL
HCT VFR BLD AUTO: 27 % (ref 34–46.6)
HGB BLD-MCNC: 8.7 G/DL (ref 12–15.9)
HGB UR QL STRIP.AUTO: NEGATIVE
KETONES UR QL STRIP: NEGATIVE
LAB AP CARIS, ADDENDUM: NORMAL
LAB AP CASE REPORT: NORMAL
LAB AP DIAGNOSIS COMMENT: NORMAL
LAB AP FOUNDATION ONE, ADDENDUM: NORMAL
LEUKOCYTE ESTERASE UR QL STRIP.AUTO: NEGATIVE
LYMPHOCYTES # BLD AUTO: 0.24 10*3/MM3 (ref 0.7–3.1)
LYMPHOCYTES NFR BLD AUTO: 19.5 % (ref 19.6–45.3)
Lab: NORMAL
MCH RBC QN AUTO: 34.9 PG (ref 26.6–33)
MCHC RBC AUTO-ENTMCNC: 32.2 G/DL (ref 31.5–35.7)
MCV RBC AUTO: 108.4 FL (ref 79–97)
MONOCYTES # BLD AUTO: 0.19 10*3/MM3 (ref 0.1–0.9)
MONOCYTES NFR BLD AUTO: 15.4 % (ref 5–12)
NEUTROPHILS NFR BLD AUTO: 0.8 10*3/MM3 (ref 1.7–7)
NEUTROPHILS NFR BLD AUTO: 65.1 % (ref 42.7–76)
NITRITE UR QL STRIP: NEGATIVE
PATH REPORT.FINAL DX SPEC: NORMAL
PATH REPORT.GROSS SPEC: NORMAL
PH UR STRIP.AUTO: <=5 [PH] (ref 5–8)
PLATELET # BLD AUTO: 59 10*3/MM3 (ref 140–450)
PMV BLD AUTO: 12.1 FL (ref 6–12)
POTASSIUM SERPL-SCNC: 4.2 MMOL/L (ref 3.5–5.2)
PROT SERPL-MCNC: 7.7 G/DL (ref 6–8.5)
PROT UR QL STRIP: ABNORMAL
RBC # BLD AUTO: 2.49 10*6/MM3 (ref 3.77–5.28)
SODIUM SERPL-SCNC: 135 MMOL/L (ref 136–145)
SP GR UR STRIP: >=1.03 (ref 1–1.03)
UROBILINOGEN UR QL STRIP: ABNORMAL
WBC NRBC COR # BLD: 1.23 10*3/MM3 (ref 3.4–10.8)

## 2022-03-14 PROCEDURE — 96413 CHEMO IV INFUSION 1 HR: CPT

## 2022-03-14 PROCEDURE — 81003 URINALYSIS AUTO W/O SCOPE: CPT | Performed by: INTERNAL MEDICINE

## 2022-03-14 PROCEDURE — 25010000002 BEVACIZUMAB-BVZR 400 MG/16ML SOLUTION 16 ML VIAL: Performed by: INTERNAL MEDICINE

## 2022-03-14 PROCEDURE — 25010000002 HEPARIN LOCK FLUSH PER 10 UNITS: Performed by: INTERNAL MEDICINE

## 2022-03-14 PROCEDURE — 80053 COMPREHEN METABOLIC PANEL: CPT | Performed by: INTERNAL MEDICINE

## 2022-03-14 PROCEDURE — 86304 IMMUNOASSAY TUMOR CA 125: CPT | Performed by: INTERNAL MEDICINE

## 2022-03-14 PROCEDURE — 85025 COMPLETE CBC W/AUTO DIFF WBC: CPT | Performed by: INTERNAL MEDICINE

## 2022-03-14 PROCEDURE — 36591 DRAW BLOOD OFF VENOUS DEVICE: CPT

## 2022-03-14 RX ORDER — SODIUM CHLORIDE 9 MG/ML
250 INJECTION, SOLUTION INTRAVENOUS ONCE
Status: COMPLETED | OUTPATIENT
Start: 2022-03-14 | End: 2022-03-14

## 2022-03-14 RX ORDER — HEPARIN SODIUM (PORCINE) LOCK FLUSH IV SOLN 100 UNIT/ML 100 UNIT/ML
500 SOLUTION INTRAVENOUS AS NEEDED
Status: DISCONTINUED | OUTPATIENT
Start: 2022-03-14 | End: 2022-03-15 | Stop reason: HOSPADM

## 2022-03-14 RX ORDER — HEPARIN SODIUM (PORCINE) LOCK FLUSH IV SOLN 100 UNIT/ML 100 UNIT/ML
500 SOLUTION INTRAVENOUS AS NEEDED
Status: CANCELLED | OUTPATIENT
Start: 2022-03-14

## 2022-03-14 RX ORDER — SODIUM CHLORIDE 0.9 % (FLUSH) 0.9 %
10 SYRINGE (ML) INJECTION AS NEEDED
Status: CANCELLED | OUTPATIENT
Start: 2022-03-14

## 2022-03-14 RX ORDER — SODIUM CHLORIDE 9 MG/ML
250 INJECTION, SOLUTION INTRAVENOUS ONCE
Status: CANCELLED | OUTPATIENT
Start: 2022-03-14

## 2022-03-14 RX ORDER — SODIUM CHLORIDE 0.9 % (FLUSH) 0.9 %
10 SYRINGE (ML) INJECTION AS NEEDED
Status: DISCONTINUED | OUTPATIENT
Start: 2022-03-14 | End: 2022-03-15 | Stop reason: HOSPADM

## 2022-03-14 RX ADMIN — Medication 10 ML: at 12:24

## 2022-03-14 RX ADMIN — HEPARIN 500 UNITS: 100 SYRINGE at 12:24

## 2022-03-14 RX ADMIN — SODIUM CHLORIDE 800 MG: 9 INJECTION, SOLUTION INTRAVENOUS at 11:50

## 2022-03-14 RX ADMIN — SODIUM CHLORIDE 250 ML: 9 INJECTION, SOLUTION INTRAVENOUS at 11:53

## 2022-03-16 RX ORDER — OLANZAPINE 5 MG/1
TABLET ORAL
Qty: 3 TABLET | Refills: 5 | Status: SHIPPED | OUTPATIENT
Start: 2022-03-16 | End: 2022-07-01

## 2022-03-16 NOTE — TELEPHONE ENCOUNTER
Rx Refill Note  Requested Prescriptions     Pending Prescriptions Disp Refills   • OLANZapine (zyPREXA) 5 MG tablet [Pharmacy Med Name: olanzapine 5 mg tablet] 3 tablet 5     Sig: TAKE ONE (1) TABLET BY MOUTH EVERY NIGHT AT BEDTIME. TAKE ON DAYS 2, 3, AND 4 AFTER chemotherapy      Last office visit with prescribing clinician: 1/26/2022      Next office visit with prescribing clinician: 3/8/2022            Mayda Harris RN  03/16/22, 10:37 EDT

## 2022-03-16 NOTE — PROGRESS NOTES
Hematology/Oncology Outpatient Follow Up    PATIENT NAME:Lucy Mahan  :1960  MRN: [id number]  PRIMARY CARE PHYSICIAN: [place] Avalos APRN  REFERRING PHYSICIAN: [place] Avalos APRN    Chief Complaint   Patient presents with   • Follow-up     Malignant neoplasm of ovary, unspecified laterality (HCC)        HISTORY OF PRESENT ILLNESS:     This is a 60-year-old female who developed abdominal pain in 2021.  Patient has also lost approximately 35 pounds during that..  She has had loss of appetite.  Due to the abdominal pain,  she had an ultrasound of the abdomen done on 10/13/2021.  This basically revealed no liver lesions.  Common bile duct is normal at 3 mm.  There is a nonmobile 3 cm shadowing gallstone within the gallbladder neck.  No gallbladder thickening or pericholecystic fluid collection.  Patient was then referred to general surgery and was seen by Dr. Badillo.  Who took her to surgery on 11/10/2021 for diagnostic laparoscopy and peritoneal biopsy.  Intra-Op , she was noted to have peritoneal studding with malignancy throughout the abdominal cavity and biopsies were completed. 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  The tumor was focally positive for progesterone receptor, positive for CK7, estrogen receptor, CA-125 and PAX 8..  The staining pattern is consistent with ovarian serous carcinoma.      She had CT scan of the chest, abdomen and pelvis and the chest is a 2 mm noncalcified nodule within the left lower lobe, noncalcified nodule in the left upper lobe measuring 4 mm and 3 mm.  In the abdomen there is a 5.1 x 5.3 cm enhancing soft tissue mass in the pelvis to the right of midline associated with the adnexal region possibly representing a primary ovarian malignancy.  No right or left ovarian tissue was seen.  There is abnormal omental thickening and nodularity consistent with carcinomatosis greatest bulk in the left mid abdomen measuring up to 10.8 cm x  2.5 cm.  There is nodular peritoneal thickening also present within the abdomen and pelvis consistent with peritoneal carcinomatosis.  The small quantity of ascitic fluid in the abdomen and pelvis.  Carcinomatosis nodular studding is seen along the inferior right hepatic lobe.     She  has been referred to us for further evaluation and management of her newly diagnosed ovarian carcinoma.     Patient is accompanied today by her daughter for this appointment.  There is  family history of precancerous cells of the  uterusin her sister.        She does not smoke and does not drink alcohol.  Patient is  and has 2 daughters.  She works for the [employer].    · 11/29/2021 patient was seen by Dr. Dubois who has recommended neoadjuvant chemotherapy for 2-3 cycles and then interval cytoreduction.  She has recommended carboplatinum AUC 6, Taxol 1 7 5 mg per metered squared, Avastin 15 mg/kg as was done in the oceans trial but hold Avastin for the first few cycles due to bowel risk.  · Prechemo CA-125 was 754  · 12/7/2021 patient received first cycle of chemotherapy with carboplatinum and Taxol with Neulasta  · 12/28/2021 patient received cycle 2 of combination chemotherapy with carboplatinum and Taxol with Neulasta  · 12/28/2021 CA-125 was down to 635  · 1/11/2022 add-on appointment for nausea, dizziness, thrombocytopenia, pain in upper to mid abdomen 8/10, new onset in the last 4 to 5 days  · January 12, 2022: Due to acute GI symptoms patient had a CT scan of the abdomen and pelvis which basically revealed increasing effusion on the left lung mild pleural effusion on the right lung decreased in amount of omental caking but no interval change in the right adnexal area moderate abdominal and pelvic ascites which has increased.  She denies any significant pulmonary symptoms.  Her O2 sat today was 100% at rest and 99% with ambulation  · 1/24/2022 patient received cycle 3 of carboplatinum with Taxol with dose  reduction due to significant toxicities  · 2022 patient received cycle 4-day 1 of chemo platinum Taxol and Avastin  · 3/7/22: Patient received cycle 5-day 1 of chemotherapy with carboplatinum Taxol and Avastin      History of present illness was reviewed and is unchanged from the previous visit. 22        Past Medical History:   Diagnosis Date   • Arthritis    • Cancer (HCC)     ovarian   • Gall stones    • Hypertension    • Irritable bowel syndrome    • Rheumatoid aortitis    • Thyroid disease        Past Surgical History:   Procedure Laterality Date   •  SECTION      x2   • CHOLECYSTECTOMY N/A 11/10/2021    Procedure: diagnostic laparoscopy and peritoneal biopsy;  Surgeon: Krunal Badillo MD;  Location: [facility] MAIN OR;  Service: General;  Laterality: N/A;   • COLONOSCOPY     • HIP SURGERY Left    • THYROIDECTOMY, PARTIAL  2017   • VENOUS ACCESS DEVICE (PORT) INSERTION Left 2021    Procedure: INSERTION VENOUS ACCESS DEVICE;  Surgeon: Krunal Badillo MD;  Location: [facility] MAIN OR;  Service: General;  Laterality: Left;         Current Outpatient Medications:   •  Calcium Carbonate-Vitamin D 600-400 MG-UNIT chewable tablet, Chew 1 tablet Daily., Disp: , Rfl:   •  dexamethasone (DECADRON) 4 MG tablet, TAKE FIVE (5) TABLETS WITH FOOD THE NIGHT BEFORE chemotherapy, Disp: 5 tablet, Rfl: 5  •  folic acid (FOLVITE) 1 MG tablet, take 1 tablet by mouth once daily (Patient taking differently: Take 1 mg by mouth Daily.), Disp: 90 tablet, Rfl: 1  •  hydroCHLOROthiazide (HYDRODIURIL) 12.5 MG tablet, Take 1 tablet by mouth Daily As Needed (swelling)., Disp: 90 tablet, Rfl: 0  •  hydroxychloroquine (PLAQUENIL) 200 MG tablet, TAKE 1 TABLET BY MOUTH TWICE A DAY (Patient taking differently: Take 200 mg by mouth 2 (Two) Times a Day. Takes in afternoon and evening), Disp: 180 tablet, Rfl: 0  •  hydrOXYzine (ATARAX) 10 MG tablet, TAKE ONE (1) TABLET BY MOUTH TWICE DAILY AS NEEDED FOR ANXIETY, Disp: 40 tablet,  Rfl: 0  •  letrozole (FEMARA) 2.5 MG tablet, Take 1 tablet by mouth Daily., Disp: 30 tablet, Rfl: 3  •  levothyroxine (SYNTHROID, LEVOTHROID) 137 MCG tablet, Take 1 tablet by mouth Daily., Disp: 90 tablet, Rfl: 1  •  lidocaine-prilocaine (EMLA) 2.5-2.5 % cream, Apply 1 application topically to the appropriate area as directed Take As Directed. Apply to port 1 hour prior to port access, Disp: 30 g, Rfl: 1  •  lisinopril (PRINIVIL,ZESTRIL) 40 MG tablet, Take 1 tablet by mouth Daily., Disp: 90 tablet, Rfl: 1  •  loratadine (Claritin) 10 MG tablet, Take 1 tablet night before and 1 tablet morning of chemotherapy., Disp: 2 tablet, Rfl: 5  •  OLANZapine (zyPREXA) 5 MG tablet, TAKE ONE (1) TABLET BY MOUTH EVERY NIGHT AT BEDTIME. TAKE ON DAYS 2, 3, AND 4 AFTER chemotherapy, Disp: 3 tablet, Rfl: 5  •  omeprazole (priLOSEC) 40 MG capsule, Take 1 capsule by mouth Daily., Disp: 30 capsule, Rfl: 6  •  ondansetron (ZOFRAN) 8 MG tablet, Take 1 tablet by mouth 3 (Three) Times a Day As Needed for Nausea or Vomiting., Disp: 90 tablet, Rfl: 2  •  oxyCODONE-acetaminophen (PERCOCET) 5-325 MG per tablet, Take 1 tablet by mouth Every 6 (Six) Hours As Needed for Moderate Pain ., Disp: 120 tablet, Rfl: 0  •  pantoprazole (Protonix) 40 MG EC tablet, Take 1 tablet by mouth Daily., Disp: 30 tablet, Rfl: 6  •  promethazine (PHENERGAN) 25 MG tablet, Take 1 tablet by mouth Every 6 (Six) Hours As Needed for Nausea or Vomiting., Disp: 90 tablet, Rfl: 1    No Known Allergies    Family History   Problem Relation Age of Onset   • Dementia Mother    • Arthritis Mother    • Heart disease Father    • Hypertension Father        Cancer-related family history is not on file.    Social History     Tobacco Use   • Smoking status: Never Smoker   • Smokeless tobacco: Never Used   Vaping Use   • Vaping Use: Never used   Substance Use Topics   • Alcohol use: Yes     Alcohol/week: 1.0 standard drink     Types: 1 Glasses of wine per week     Comment: less than  "monthly   • Drug use: No       HPI, ROS and PFSH have been reviewed and confirmed on 3/17/2022.     SUBJECTIVE:    Abdominal discomfort has improved.  She is scheduled for CT scans next week.            REVIEW OF SYSTEMS:  Review of Systems   Constitutional: Positive for fatigue. Negative for chills and fever.   HENT: Negative for ear pain, mouth sores, nosebleeds and sore throat.    Eyes: Negative for photophobia and visual disturbance.   Respiratory: Negative for wheezing and stridor.    Cardiovascular: Negative for chest pain and palpitations.   Gastrointestinal: Positive for abdominal pain and nausea. Negative for diarrhea and vomiting.   Endocrine: Negative for cold intolerance and heat intolerance.   Genitourinary: Negative for dysuria and hematuria.        Decreased urine output   Musculoskeletal: Negative for joint swelling and neck stiffness.   Skin: Negative for color change and rash.   Neurological: Positive for dizziness, weakness and headaches. Negative for seizures and syncope.   Hematological: Negative for adenopathy.   Psychiatric/Behavioral: Negative for agitation, confusion and hallucinations.     Abdominal discomfort  OBJECTIVE:    Vitals:    03/17/22 1423   BP: 126/76   Pulse: 107   Resp: 18   Temp: 97.8 °F (36.6 °C)   SpO2: 100%   Weight: 79.7 kg (175 lb 9.6 oz)   Height: 165.1 cm (65\")   PainSc:   5     Body mass index is 29.22 kg/m².    ECOG  (1) Restricted in physically strenuous activity, ambulatory and able to do work of light nature    Physical Exam  Vitals and nursing note reviewed.   Constitutional:       General: She is not in acute distress.     Appearance: She is not diaphoretic.   HENT:      Head: Normocephalic and atraumatic.      Mouth/Throat:      Mouth: Mucous membranes are dry.   Eyes:      General: No scleral icterus.        Right eye: No discharge.         Left eye: No discharge.      Conjunctiva/sclera: Conjunctivae normal.   Neck:      Thyroid: No thyromegaly. "   Cardiovascular:      Rate and Rhythm: Normal rate and regular rhythm.      Heart sounds: Normal heart sounds.     No friction rub. No gallop.   Pulmonary:      Effort: Pulmonary effort is normal. No respiratory distress.      Breath sounds: No stridor. No wheezing.   Abdominal:      General: Bowel sounds are normal.      Palpations: Abdomen is soft. There is no mass.      Tenderness: There is no abdominal tenderness. There is no guarding or rebound.   Musculoskeletal:         General: No tenderness. Normal range of motion.      Cervical back: Normal range of motion and neck supple.   Lymphadenopathy:      Cervical: No cervical adenopathy.   Skin:     General: Skin is warm.      Findings: No erythema or rash.      Comments: Tenting skin turgor   Neurological:      Mental Status: She is alert and oriented to person, place, and time.      Motor: No abnormal muscle tone.   Psychiatric:         Behavior: Behavior normal.       I have reexamined the patient and the results are consistent with the previously documented exam. Inga Hernandez MD   RECENT LABS  WBC   Date Value Ref Range Status   03/17/2022 5.86 3.40 - 10.80 10*3/mm3 Final     RBC   Date Value Ref Range Status   03/17/2022 2.27 (L) 3.77 - 5.28 10*6/mm3 Final     Hemoglobin   Date Value Ref Range Status   03/17/2022 7.9 (C) 12.0 - 15.9 g/dL Final     Hematocrit   Date Value Ref Range Status   03/17/2022 24.4 (L) 34.0 - 46.6 % Final     MCV   Date Value Ref Range Status   03/17/2022 107.5 (H) 79.0 - 97.0 fL Final     MCH   Date Value Ref Range Status   03/17/2022 34.8 (H) 26.6 - 33.0 pg Final     MCHC   Date Value Ref Range Status   03/17/2022 32.4 31.5 - 35.7 g/dL Final     RDW   Date Value Ref Range Status   03/17/2022 18.4 (H) 12.3 - 15.4 % Final     RDW-SD   Date Value Ref Range Status   03/17/2022 69.3 (H) 37.0 - 54.0 fl Final     MPV   Date Value Ref Range Status   03/17/2022 11.4 6.0 - 12.0 fL Final     Platelets   Date Value Ref Range Status    03/17/2022 63 (L) 140 - 450 10*3/mm3 Final     Neutrophil %   Date Value Ref Range Status   03/17/2022 62.4 42.7 - 76.0 % Final     Lymphocyte %   Date Value Ref Range Status   03/17/2022 26.6 19.6 - 45.3 % Final     Monocyte %   Date Value Ref Range Status   03/17/2022 10.6 5.0 - 12.0 % Final     Eosinophil %   Date Value Ref Range Status   03/17/2022 0.2 (L) 0.3 - 6.2 % Final     Basophil %   Date Value Ref Range Status   03/17/2022 0.2 0.0 - 1.5 % Final     Neutrophils, Absolute   Date Value Ref Range Status   03/17/2022 3.66 1.70 - 7.00 10*3/mm3 Final     Lymphocytes, Absolute   Date Value Ref Range Status   03/17/2022 1.56 0.70 - 3.10 10*3/mm3 Final     Monocytes, Absolute   Date Value Ref Range Status   03/17/2022 0.62 0.10 - 0.90 10*3/mm3 Final     Eosinophils, Absolute   Date Value Ref Range Status   03/17/2022 0.01 0.00 - 0.40 10*3/mm3 Final     Basophils, Absolute   Date Value Ref Range Status   03/17/2022 0.01 0.00 - 0.20 10*3/mm3 Final     nRBC   Date Value Ref Range Status   01/18/2022 0.0 0.0 - 0.2 /100 WBC Final       Lab Results   Component Value Date    GLUCOSE 193 (H) 03/14/2022    BUN 29 (H) 03/14/2022    CREATININE 1.30 (H) 03/14/2022    EGFRIFNONA 47 (L) 02/21/2022    EGFRIFAFRI 44 (L) 01/21/2020    BCR 22.3 03/14/2022    K 4.2 03/14/2022    CO2 20.0 (L) 03/14/2022    CALCIUM 9.6 03/14/2022    ALBUMIN 3.90 03/14/2022    LABIL2 1.2 (calc) 01/21/2020    AST 13 03/14/2022    ALT 7 03/14/2022         ASSESSMENT:      1. 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  CA-125 754 November 2021  2. On neoadjuvant chemotherapy with carboplatinum AUC 6, Taxol 175 mg per metered squared, with Avastin added due to lack of significant response with carboplatinum and Taxol.  Monitor serial CA-125's.  Down to 135 as of 3/14/2022  3. Pulmonary nodules of unclear etiology too small for biopsy or PET resolution  4. Chemotherapy induced nausea  5. Chemotherapy induced fatigue  6. Chemotherapy-induced  anemia  7. 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  Patient already had some 25% dose reduction on her chemo  8. Severe thrombocytopenia secondary to chemotherapy most notably carboplatinum  9. Comprehensive gene analysis with cancer next technology was negative for any significant mutation in all 77 genes analyzed  10. 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  No other actionable mutations identified  11. Dehydration: Resolved.  She has increase p.o. fluid intake.  12. Abdominal discomfort secondary to malignancy  13. Assessment has been reviewed and updated     Discussion     I have reviewed her imaging studies, pathology reports.  She has metastatic serous carcinoma of the ovary.  She has liver involvement.  She also has pulmonary nodules of unclear etiology.  These are too small for biopsy or PET resolution therefore they will be followed over time.  We discussed that ovarian cancer is a surgical disease.  We will have her seen by GYN oncologist Dr. Castro to weigh in on her surgical options.  We discussed that she may benefit from neoadjuvant chemotherapy but will await Dr. Castro's final recommendations.     She was seen by Dr. Castro recommendation for her to have neoadjuvant chemotherapy with carboplatinum and Taxol      Discussed side effects of chemotherapy to include but not limited to:    Chemotherapy side effects include, but not limited to, nausea, vomiting, bone marrow suppression, which can result in blood, platelet transfusion. There is also risk of permanent bone marrow destruction, which can cause myelodysplastic syndrome or leukemia years down the line. There is risk of infection which can result in hospitalization and even death. There is also risk of fatigue, asthenia, alopecia which could become permanent. Chemo will help to reduce risk of  relapse of cancer, but does not eliminate risk completely.    Discussed that Taxol chemotherapy can lead to hypersensitivity reaction fluid retention, peripheral neuropathy, myalgias, small risk of permanent alopecia    Carboplatinum can lead to myelosuppression socially thrombocytopenia      Avastin will be added postoperatively       She will benefit from genetic testing as this could have some therapeutic implications for her.    Genetics      Today's risk assessment is based on the history the patient has provided.  We discussed that the best people to screen are the ones who have been affected by malignancy as their result is more informative.  We reviewed all the hallmarks of hereditary cancer syndrome including multiple relatives on the same side of the family being affected by malignancy, cancer diagnosis in young individuals, different cancers in one individual.      Comprehensive gene analysis with cancer next technology was negative for any mutation in all 77 genes analyzed.  Discussed that her malignancy was most likely sporadic.  We discussed lifetime risk of developing ovarian cancer estimated at 2 to 3%.          Plans:     · Comprehensive gene analysis with cancer next technology results reviewed with patient and copies of her results was also given to her for her own records keeping  · Dose reduce chemotherapy carboplatinum by 25% due to significant toxicity with third cycle of chemotherapy  · Continue carboplatin Taxol and Avastin  ·  reviewed  · Staging CT scan scheduled next week  · Schedule follow-up with Dr. Castro.  She has an appointment scheduled shortly after her CT scans  · Continue weekly CBCs  · CA-125 with each cycle of chemotherapy  · Continue EMLA cream  · Continue Percocet 5 mg p.o. every 4-6 hours as needed for pain  · Prilosec 40 mg p.o. daily  · Begin Vitamin b6 100mg po q 12   · Neurontin 300mg po q hs  For neuropathy.  Discussed side effects  · Foundation 1 testing  results reviewed  · All questions answered      I spent 40 total minutes, face-to-face, caring for Lucy today.  90% of this time involved counseling and/or coordination of care as documented within this note.

## 2022-03-17 ENCOUNTER — LAB (OUTPATIENT)
Dept: LAB | Facility: HOSPITAL | Age: 62
End: 2022-03-17

## 2022-03-17 ENCOUNTER — OFFICE VISIT (OUTPATIENT)
Dept: ONCOLOGY | Facility: CLINIC | Age: 62
End: 2022-03-17

## 2022-03-17 ENCOUNTER — HOSPITAL ENCOUNTER (OUTPATIENT)
Dept: INFUSION THERAPY | Facility: HOSPITAL | Age: 62
Setting detail: INFUSION SERIES
Discharge: HOME | End: 2022-03-17

## 2022-03-17 VITALS
RESPIRATION RATE: 18 BRPM | WEIGHT: 175.6 LBS | BODY MASS INDEX: 29.26 KG/M2 | TEMPERATURE: 97.8 F | SYSTOLIC BLOOD PRESSURE: 126 MMHG | HEIGHT: 65 IN | HEART RATE: 107 BPM | OXYGEN SATURATION: 100 % | DIASTOLIC BLOOD PRESSURE: 76 MMHG

## 2022-03-17 DIAGNOSIS — C56.9 MALIGNANT NEOPLASM OF OVARY, UNSPECIFIED LATERALITY: Primary | ICD-10-CM

## 2022-03-17 DIAGNOSIS — C56.9 MALIGNANT NEOPLASM OF OVARY, UNSPECIFIED LATERALITY: ICD-10-CM

## 2022-03-17 DIAGNOSIS — D64.9 ANEMIA, UNSPECIFIED TYPE: Primary | ICD-10-CM

## 2022-03-17 LAB
ALBUMIN SERPL-MCNC: 3.9 G/DL (ref 3.5–5.2)
ALBUMIN/GLOB SERPL: 1.1 G/DL
ALP SERPL-CCNC: 75 U/L (ref 39–117)
ALT SERPL W P-5'-P-CCNC: 9 U/L (ref 1–33)
ANION GAP SERPL CALCULATED.3IONS-SCNC: 12 MMOL/L (ref 5–15)
AST SERPL-CCNC: 16 U/L (ref 1–32)
BASOPHILS # BLD AUTO: 0.01 10*3/MM3 (ref 0–0.2)
BASOPHILS NFR BLD AUTO: 0.2 % (ref 0–1.5)
BILIRUB SERPL-MCNC: 0.2 MG/DL (ref 0–1.2)
BUN SERPL-MCNC: 26 MG/DL (ref 8–23)
BUN/CREAT SERPL: 20.3 (ref 7–25)
CALCIUM SPEC-SCNC: 8.7 MG/DL (ref 8.6–10.5)
CHLORIDE SERPL-SCNC: 101 MMOL/L (ref 98–107)
CO2 SERPL-SCNC: 24 MMOL/L (ref 22–29)
CREAT SERPL-MCNC: 1.28 MG/DL (ref 0.57–1)
DEPRECATED RDW RBC AUTO: 69.3 FL (ref 37–54)
DEPRECATED RDW RBC AUTO: 75.7 FL (ref 37–54)
EGFRCR SERPLBLD CKD-EPI 2021: 47.2 ML/MIN/1.73
EOSINOPHIL # BLD AUTO: 0.01 10*3/MM3 (ref 0–0.4)
EOSINOPHIL NFR BLD AUTO: 0.2 % (ref 0.3–6.2)
ERYTHROCYTE [DISTWIDTH] IN BLOOD BY AUTOMATED COUNT: 18.4 % (ref 12.3–15.4)
ERYTHROCYTE [DISTWIDTH] IN BLOOD BY AUTOMATED COUNT: 21.5 % (ref 12.3–15.4)
GIANT PLATELETS: ABNORMAL
GLOBULIN UR ELPH-MCNC: 3.4 GM/DL
GLUCOSE SERPL-MCNC: 114 MG/DL (ref 65–99)
HCT VFR BLD AUTO: 24.3 % (ref 34–46.6)
HCT VFR BLD AUTO: 24.4 % (ref 34–46.6)
HGB BLD-MCNC: 7.9 G/DL (ref 12–15.9)
HGB BLD-MCNC: 8.4 G/DL (ref 12–15.9)
HOLD SPECIMEN: NORMAL
LYMPHOCYTES # BLD AUTO: 1.56 10*3/MM3 (ref 0.7–3.1)
LYMPHOCYTES # BLD MANUAL: 1.72 10*3/MM3 (ref 0.7–3.1)
LYMPHOCYTES NFR BLD AUTO: 26.6 % (ref 19.6–45.3)
LYMPHOCYTES NFR BLD MANUAL: 9 % (ref 5–12)
MACROCYTES BLD QL SMEAR: ABNORMAL
MCH RBC QN AUTO: 34.8 PG (ref 26.6–33)
MCH RBC QN AUTO: 35.8 PG (ref 26.6–33)
MCHC RBC AUTO-ENTMCNC: 32.4 G/DL (ref 31.5–35.7)
MCHC RBC AUTO-ENTMCNC: 34.4 G/DL (ref 31.5–35.7)
MCV RBC AUTO: 104.2 FL (ref 79–97)
MCV RBC AUTO: 107.5 FL (ref 79–97)
MONOCYTES # BLD AUTO: 0.62 10*3/MM3 (ref 0.1–0.9)
MONOCYTES # BLD: 0.59 10*3/MM3 (ref 0.1–0.9)
MONOCYTES NFR BLD AUTO: 10.6 % (ref 5–12)
NEUTROPHILS # BLD AUTO: 4.29 10*3/MM3 (ref 1.7–7)
NEUTROPHILS NFR BLD AUTO: 3.66 10*3/MM3 (ref 1.7–7)
NEUTROPHILS NFR BLD AUTO: 62.4 % (ref 42.7–76)
NEUTROPHILS NFR BLD MANUAL: 58 % (ref 42.7–76)
NEUTS BAND NFR BLD MANUAL: 7 % (ref 0–5)
PLATELET # BLD AUTO: 55 10*3/MM3 (ref 140–450)
PLATELET # BLD AUTO: 63 10*3/MM3 (ref 140–450)
PMV BLD AUTO: 10.1 FL (ref 6–12)
PMV BLD AUTO: 11.4 FL (ref 6–12)
POTASSIUM SERPL-SCNC: 3.8 MMOL/L (ref 3.5–5.2)
PROT SERPL-MCNC: 7.3 G/DL (ref 6–8.5)
RBC # BLD AUTO: 2.27 10*6/MM3 (ref 3.77–5.28)
RBC # BLD AUTO: 2.33 10*6/MM3 (ref 3.77–5.28)
SCAN SLIDE: NORMAL
SODIUM SERPL-SCNC: 137 MMOL/L (ref 136–145)
TOXIC GRANULATION: ABNORMAL
VARIANT LYMPHS NFR BLD MANUAL: 26 % (ref 19.6–45.3)
WBC NRBC COR # BLD: 5.86 10*3/MM3 (ref 3.4–10.8)
WBC NRBC COR # BLD: 6.6 10*3/MM3 (ref 3.4–10.8)
WHOLE BLOOD HOLD SPECIMEN: NORMAL

## 2022-03-17 PROCEDURE — 85007 BL SMEAR W/DIFF WBC COUNT: CPT | Performed by: INTERNAL MEDICINE

## 2022-03-17 PROCEDURE — 85025 COMPLETE CBC W/AUTO DIFF WBC: CPT | Performed by: INTERNAL MEDICINE

## 2022-03-17 PROCEDURE — 80053 COMPREHEN METABOLIC PANEL: CPT

## 2022-03-17 PROCEDURE — 85025 COMPLETE CBC W/AUTO DIFF WBC: CPT

## 2022-03-17 PROCEDURE — 99215 OFFICE O/P EST HI 40 MIN: CPT | Performed by: INTERNAL MEDICINE

## 2022-03-17 PROCEDURE — 36415 COLL VENOUS BLD VENIPUNCTURE: CPT

## 2022-03-17 RX ORDER — GABAPENTIN 100 MG/1
300 CAPSULE ORAL 3 TIMES DAILY
Qty: 30 CAPSULE | Refills: 3 | Status: SHIPPED | OUTPATIENT
Start: 2022-03-17 | End: 2022-03-17

## 2022-03-17 RX ORDER — GABAPENTIN 300 MG/1
300 CAPSULE ORAL NIGHTLY
Qty: 30 CAPSULE | Refills: 3 | Status: SHIPPED | OUTPATIENT
Start: 2022-03-17 | End: 2022-04-14

## 2022-03-17 RX ORDER — SODIUM CHLORIDE 9 MG/ML
250 INJECTION, SOLUTION INTRAVENOUS AS NEEDED
Status: CANCELLED | OUTPATIENT
Start: 2022-04-26

## 2022-03-18 ENCOUNTER — APPOINTMENT (OUTPATIENT)
Dept: INFUSION THERAPY | Facility: HOSPITAL | Age: 62
End: 2022-03-18

## 2022-03-21 ENCOUNTER — LAB (OUTPATIENT)
Dept: LAB | Facility: HOSPITAL | Age: 62
End: 2022-03-21

## 2022-03-21 DIAGNOSIS — G89.3 CANCER RELATED PAIN: ICD-10-CM

## 2022-03-21 DIAGNOSIS — C56.9 MALIGNANT NEOPLASM OF OVARY, UNSPECIFIED LATERALITY: ICD-10-CM

## 2022-03-21 LAB
ALBUMIN SERPL-MCNC: 3.8 G/DL (ref 3.5–5.2)
ALBUMIN/GLOB SERPL: 1.1 G/DL
ALP SERPL-CCNC: 76 U/L (ref 39–117)
ALT SERPL W P-5'-P-CCNC: 11 U/L (ref 1–33)
ANION GAP SERPL CALCULATED.3IONS-SCNC: 14 MMOL/L (ref 5–15)
AST SERPL-CCNC: 26 U/L (ref 1–32)
BASOPHILS # BLD AUTO: 0.01 10*3/MM3 (ref 0–0.2)
BASOPHILS NFR BLD AUTO: 0.2 % (ref 0–1.5)
BILIRUB SERPL-MCNC: 0.2 MG/DL (ref 0–1.2)
BUN SERPL-MCNC: 20 MG/DL (ref 8–23)
BUN/CREAT SERPL: 15.2 (ref 7–25)
CALCIUM SPEC-SCNC: 9 MG/DL (ref 8.6–10.5)
CHLORIDE SERPL-SCNC: 104 MMOL/L (ref 98–107)
CO2 SERPL-SCNC: 23 MMOL/L (ref 22–29)
CREAT SERPL-MCNC: 1.32 MG/DL (ref 0.57–1)
DEPRECATED RDW RBC AUTO: 69.2 FL (ref 37–54)
EGFRCR SERPLBLD CKD-EPI 2021: 45.5 ML/MIN/1.73
EOSINOPHIL # BLD AUTO: 0 10*3/MM3 (ref 0–0.4)
EOSINOPHIL NFR BLD AUTO: 0 % (ref 0.3–6.2)
ERYTHROCYTE [DISTWIDTH] IN BLOOD BY AUTOMATED COUNT: 18.6 % (ref 12.3–15.4)
GLOBULIN UR ELPH-MCNC: 3.5 GM/DL
GLUCOSE SERPL-MCNC: 96 MG/DL (ref 65–99)
HCT VFR BLD AUTO: 26.1 % (ref 34–46.6)
HGB BLD-MCNC: 8.5 G/DL (ref 12–15.9)
LYMPHOCYTES # BLD AUTO: 1.87 10*3/MM3 (ref 0.7–3.1)
LYMPHOCYTES NFR BLD AUTO: 35.8 % (ref 19.6–45.3)
MCH RBC QN AUTO: 35.6 PG (ref 26.6–33)
MCHC RBC AUTO-ENTMCNC: 32.6 G/DL (ref 31.5–35.7)
MCV RBC AUTO: 109.2 FL (ref 79–97)
MONOCYTES # BLD AUTO: 0.61 10*3/MM3 (ref 0.1–0.9)
MONOCYTES NFR BLD AUTO: 11.7 % (ref 5–12)
NEUTROPHILS NFR BLD AUTO: 2.74 10*3/MM3 (ref 1.7–7)
NEUTROPHILS NFR BLD AUTO: 52.3 % (ref 42.7–76)
PLATELET # BLD AUTO: 52 10*3/MM3 (ref 140–450)
PMV BLD AUTO: 10.7 FL (ref 6–12)
POTASSIUM SERPL-SCNC: 3.9 MMOL/L (ref 3.5–5.2)
PROT SERPL-MCNC: 7.3 G/DL (ref 6–8.5)
RBC # BLD AUTO: 2.39 10*6/MM3 (ref 3.77–5.28)
SODIUM SERPL-SCNC: 141 MMOL/L (ref 136–145)
WBC NRBC COR # BLD: 5.23 10*3/MM3 (ref 3.4–10.8)

## 2022-03-21 PROCEDURE — 36415 COLL VENOUS BLD VENIPUNCTURE: CPT

## 2022-03-21 PROCEDURE — 80053 COMPREHEN METABOLIC PANEL: CPT

## 2022-03-21 PROCEDURE — 85025 COMPLETE CBC W/AUTO DIFF WBC: CPT

## 2022-03-21 RX ORDER — OXYCODONE HYDROCHLORIDE AND ACETAMINOPHEN 5; 325 MG/1; MG/1
1 TABLET ORAL EVERY 6 HOURS PRN
Qty: 120 TABLET | Refills: 0 | Status: SHIPPED | OUTPATIENT
Start: 2022-03-21 | End: 2022-04-21 | Stop reason: SDUPTHER

## 2022-03-28 ENCOUNTER — HOSPITAL ENCOUNTER (OUTPATIENT)
Dept: ONCOLOGY | Facility: HOSPITAL | Age: 62
Setting detail: INFUSION SERIES
Discharge: HOME | End: 2022-03-28

## 2022-03-28 VITALS
HEART RATE: 58 BPM | TEMPERATURE: 96.6 F | OXYGEN SATURATION: 98 % | HEIGHT: 65 IN | SYSTOLIC BLOOD PRESSURE: 125 MMHG | DIASTOLIC BLOOD PRESSURE: 79 MMHG | WEIGHT: 182 LBS | BODY MASS INDEX: 30.32 KG/M2 | RESPIRATION RATE: 16 BRPM

## 2022-03-28 DIAGNOSIS — E83.51 HYPOCALCEMIA: ICD-10-CM

## 2022-03-28 DIAGNOSIS — C56.9 MALIGNANT NEOPLASM OF OVARY, UNSPECIFIED LATERALITY: Primary | ICD-10-CM

## 2022-03-28 LAB
ALBUMIN SERPL-MCNC: 3.5 G/DL (ref 3.5–5.2)
ALBUMIN/GLOB SERPL: 1 G/DL
ALP SERPL-CCNC: 60 U/L (ref 39–117)
ALT SERPL W P-5'-P-CCNC: 10 U/L (ref 1–33)
ANION GAP SERPL CALCULATED.3IONS-SCNC: 13 MMOL/L (ref 5–15)
AST SERPL-CCNC: 18 U/L (ref 1–32)
BASOPHILS # BLD AUTO: 0.01 10*3/MM3 (ref 0–0.2)
BASOPHILS NFR BLD AUTO: 0.3 % (ref 0–1.5)
BILIRUB SERPL-MCNC: 0.3 MG/DL (ref 0–1.2)
BILIRUB UR QL STRIP: NEGATIVE
BUN SERPL-MCNC: 16 MG/DL (ref 8–23)
BUN/CREAT SERPL: 14.8 (ref 7–25)
CALCIUM SPEC-SCNC: 9.3 MG/DL (ref 8.6–10.5)
CANCER AG125 SERPL QL: 132 U/ML (ref 0–38.1)
CHLORIDE SERPL-SCNC: 104 MMOL/L (ref 98–107)
CLARITY UR: CLEAR
CO2 SERPL-SCNC: 21 MMOL/L (ref 22–29)
COLOR UR: YELLOW
CREAT BLDA-MCNC: 1 MG/DL (ref 0.6–1.3)
CREAT SERPL-MCNC: 1.08 MG/DL (ref 0.57–1)
DEPRECATED RDW RBC AUTO: 70.4 FL (ref 37–54)
EGFRCR SERPLBLD CKD-EPI 2021: 57.8 ML/MIN/1.73
EGFRCR SERPLBLD CKD-EPI 2021: 63.4 ML/MIN/1.73
EOSINOPHIL # BLD AUTO: 0.01 10*3/MM3 (ref 0–0.4)
EOSINOPHIL NFR BLD AUTO: 0.3 % (ref 0.3–6.2)
ERYTHROCYTE [DISTWIDTH] IN BLOOD BY AUTOMATED COUNT: 18.4 % (ref 12.3–15.4)
GLOBULIN UR ELPH-MCNC: 3.6 GM/DL
GLUCOSE SERPL-MCNC: 177 MG/DL (ref 65–99)
GLUCOSE UR STRIP-MCNC: NEGATIVE MG/DL
HCT VFR BLD AUTO: 25.2 % (ref 34–46.6)
HGB BLD-MCNC: 8.5 G/DL (ref 12–15.9)
HGB UR QL STRIP.AUTO: NEGATIVE
KETONES UR QL STRIP: ABNORMAL
LEUKOCYTE ESTERASE UR QL STRIP.AUTO: NEGATIVE
LYMPHOCYTES # BLD AUTO: 0.32 10*3/MM3 (ref 0.7–3.1)
LYMPHOCYTES NFR BLD AUTO: 9.4 % (ref 19.6–45.3)
MCH RBC QN AUTO: 36.8 PG (ref 26.6–33)
MCHC RBC AUTO-ENTMCNC: 33.7 G/DL (ref 31.5–35.7)
MCV RBC AUTO: 109.1 FL (ref 79–97)
MONOCYTES # BLD AUTO: 0.05 10*3/MM3 (ref 0.1–0.9)
MONOCYTES NFR BLD AUTO: 1.5 % (ref 5–12)
NEUTROPHILS NFR BLD AUTO: 3 10*3/MM3 (ref 1.7–7)
NEUTROPHILS NFR BLD AUTO: 88.5 % (ref 42.7–76)
NITRITE UR QL STRIP: NEGATIVE
PH UR STRIP.AUTO: 5.5 [PH] (ref 5–8)
PLATELET # BLD AUTO: 75 10*3/MM3 (ref 140–450)
PMV BLD AUTO: 10.3 FL (ref 6–12)
POTASSIUM SERPL-SCNC: 3.9 MMOL/L (ref 3.5–5.2)
PROT SERPL-MCNC: 7.1 G/DL (ref 6–8.5)
PROT UR QL STRIP: ABNORMAL
RBC # BLD AUTO: 2.31 10*6/MM3 (ref 3.77–5.28)
SODIUM SERPL-SCNC: 138 MMOL/L (ref 136–145)
SP GR UR STRIP: >=1.03 (ref 1–1.03)
UROBILINOGEN UR QL STRIP: ABNORMAL
WBC NRBC COR # BLD: 3.39 10*3/MM3 (ref 3.4–10.8)

## 2022-03-28 PROCEDURE — 81003 URINALYSIS AUTO W/O SCOPE: CPT | Performed by: INTERNAL MEDICINE

## 2022-03-28 PROCEDURE — 86304 IMMUNOASSAY TUMOR CA 125: CPT | Performed by: INTERNAL MEDICINE

## 2022-03-28 PROCEDURE — 25010000002 BEVACIZUMAB-BVZR 400 MG/16ML SOLUTION 16 ML VIAL: Performed by: INTERNAL MEDICINE

## 2022-03-28 PROCEDURE — 85025 COMPLETE CBC W/AUTO DIFF WBC: CPT | Performed by: INTERNAL MEDICINE

## 2022-03-28 PROCEDURE — 82565 ASSAY OF CREATININE: CPT

## 2022-03-28 PROCEDURE — 80053 COMPREHEN METABOLIC PANEL: CPT | Performed by: INTERNAL MEDICINE

## 2022-03-28 PROCEDURE — 96413 CHEMO IV INFUSION 1 HR: CPT

## 2022-03-28 PROCEDURE — 36591 DRAW BLOOD OFF VENOUS DEVICE: CPT

## 2022-03-28 PROCEDURE — 25010000002 HEPARIN LOCK FLUSH PER 10 UNITS: Performed by: INTERNAL MEDICINE

## 2022-03-28 RX ORDER — FAMOTIDINE 10 MG/ML
20 INJECTION, SOLUTION INTRAVENOUS AS NEEDED
Status: CANCELLED | OUTPATIENT
Start: 2022-04-04

## 2022-03-28 RX ORDER — HEPARIN SODIUM (PORCINE) LOCK FLUSH IV SOLN 100 UNIT/ML 100 UNIT/ML
500 SOLUTION INTRAVENOUS AS NEEDED
Status: DISCONTINUED | OUTPATIENT
Start: 2022-03-28 | End: 2022-03-29 | Stop reason: HOSPADM

## 2022-03-28 RX ORDER — SODIUM CHLORIDE 9 MG/ML
250 INJECTION, SOLUTION INTRAVENOUS ONCE
Status: CANCELLED | OUTPATIENT
Start: 2022-03-28

## 2022-03-28 RX ORDER — SODIUM CHLORIDE 9 MG/ML
250 INJECTION, SOLUTION INTRAVENOUS ONCE
Status: DISCONTINUED | OUTPATIENT
Start: 2022-03-28 | End: 2022-03-29 | Stop reason: HOSPADM

## 2022-03-28 RX ORDER — SODIUM CHLORIDE 9 MG/ML
250 INJECTION, SOLUTION INTRAVENOUS ONCE
Status: CANCELLED | OUTPATIENT
Start: 2022-04-04

## 2022-03-28 RX ORDER — SODIUM CHLORIDE 0.9 % (FLUSH) 0.9 %
10 SYRINGE (ML) INJECTION AS NEEDED
Status: DISCONTINUED | OUTPATIENT
Start: 2022-03-28 | End: 2022-03-29 | Stop reason: HOSPADM

## 2022-03-28 RX ORDER — DIPHENHYDRAMINE HYDROCHLORIDE 50 MG/ML
50 INJECTION INTRAMUSCULAR; INTRAVENOUS AS NEEDED
Status: CANCELLED | OUTPATIENT
Start: 2022-04-04

## 2022-03-28 RX ORDER — OLANZAPINE 5 MG/1
5 TABLET ORAL ONCE
Status: CANCELLED | OUTPATIENT
Start: 2022-04-04 | End: 2022-03-28

## 2022-03-28 RX ORDER — PALONOSETRON 0.05 MG/ML
0.25 INJECTION, SOLUTION INTRAVENOUS ONCE
Status: CANCELLED | OUTPATIENT
Start: 2022-04-04

## 2022-03-28 RX ORDER — FAMOTIDINE 10 MG/ML
20 INJECTION, SOLUTION INTRAVENOUS ONCE
Status: CANCELLED | OUTPATIENT
Start: 2022-04-04

## 2022-03-28 RX ORDER — HEPARIN SODIUM (PORCINE) LOCK FLUSH IV SOLN 100 UNIT/ML 100 UNIT/ML
500 SOLUTION INTRAVENOUS AS NEEDED
Status: CANCELLED | OUTPATIENT
Start: 2022-03-28

## 2022-03-28 RX ORDER — SODIUM CHLORIDE 0.9 % (FLUSH) 0.9 %
10 SYRINGE (ML) INJECTION AS NEEDED
Status: CANCELLED | OUTPATIENT
Start: 2022-03-28

## 2022-03-28 RX ADMIN — SODIUM CHLORIDE 800 MG: 9 INJECTION, SOLUTION INTRAVENOUS at 10:17

## 2022-03-28 RX ADMIN — HEPARIN 500 UNITS: 100 SYRINGE at 10:56

## 2022-03-28 RX ADMIN — Medication 10 ML: at 10:56

## 2022-03-28 NOTE — PROGRESS NOTES
Patient here for Carbo. Taxol. And advastin, Dr. Hernandez wants to hold carbo and taxol for one week due to plts being 75. Ok to give Avastin today. Patient has no complaints at this time. Patient has AVS for next appt.

## 2022-03-30 DIAGNOSIS — C56.9 MALIGNANT NEOPLASM OF OVARY, UNSPECIFIED LATERALITY: Primary | ICD-10-CM

## 2022-03-30 RX ORDER — SODIUM CHLORIDE 9 MG/ML
250 INJECTION, SOLUTION INTRAVENOUS ONCE
Status: CANCELLED | OUTPATIENT
Start: 2022-04-18

## 2022-04-04 ENCOUNTER — HOSPITAL ENCOUNTER (OUTPATIENT)
Dept: ONCOLOGY | Facility: HOSPITAL | Age: 62
Setting detail: INFUSION SERIES
Discharge: HOME | End: 2022-04-04

## 2022-04-04 VITALS
WEIGHT: 183.7 LBS | HEIGHT: 65 IN | TEMPERATURE: 97.3 F | HEART RATE: 85 BPM | RESPIRATION RATE: 18 BRPM | SYSTOLIC BLOOD PRESSURE: 145 MMHG | BODY MASS INDEX: 30.6 KG/M2 | OXYGEN SATURATION: 97 % | DIASTOLIC BLOOD PRESSURE: 67 MMHG

## 2022-04-04 DIAGNOSIS — C56.9 MALIGNANT NEOPLASM OF OVARY, UNSPECIFIED LATERALITY: Primary | ICD-10-CM

## 2022-04-04 DIAGNOSIS — E83.51 HYPOCALCEMIA: ICD-10-CM

## 2022-04-04 LAB
ALBUMIN SERPL-MCNC: 3.5 G/DL (ref 3.5–5.2)
ALBUMIN/GLOB SERPL: 1.1 G/DL
ALP SERPL-CCNC: 52 U/L (ref 39–117)
ALT SERPL W P-5'-P-CCNC: 10 U/L (ref 1–33)
ANION GAP SERPL CALCULATED.3IONS-SCNC: 11 MMOL/L (ref 5–15)
AST SERPL-CCNC: 18 U/L (ref 1–32)
BASOPHILS # BLD AUTO: 0.01 10*3/MM3 (ref 0–0.2)
BASOPHILS NFR BLD AUTO: 0.2 % (ref 0–1.5)
BILIRUB SERPL-MCNC: 0.2 MG/DL (ref 0–1.2)
BUN SERPL-MCNC: 17 MG/DL (ref 8–23)
BUN/CREAT SERPL: 17 (ref 7–25)
CALCIUM SPEC-SCNC: 9 MG/DL (ref 8.6–10.5)
CANCER AG125 SERPL QL: 110.9 U/ML (ref 0–38.1)
CHLORIDE SERPL-SCNC: 104 MMOL/L (ref 98–107)
CO2 SERPL-SCNC: 24 MMOL/L (ref 22–29)
CREAT BLDA-MCNC: 1.2 MG/DL (ref 0.6–1.3)
CREAT SERPL-MCNC: 1 MG/DL (ref 0.57–1)
DEPRECATED RDW RBC AUTO: 69.4 FL (ref 37–54)
EGFRCR SERPLBLD CKD-EPI 2021: 51 ML/MIN/1.73
EGFRCR SERPLBLD CKD-EPI 2021: 63.4 ML/MIN/1.73
EOSINOPHIL # BLD AUTO: 0.08 10*3/MM3 (ref 0–0.4)
EOSINOPHIL NFR BLD AUTO: 1.9 % (ref 0.3–6.2)
ERYTHROCYTE [DISTWIDTH] IN BLOOD BY AUTOMATED COUNT: 17.6 % (ref 12.3–15.4)
GLOBULIN UR ELPH-MCNC: 3.2 GM/DL
GLUCOSE SERPL-MCNC: 100 MG/DL (ref 65–99)
HCT VFR BLD AUTO: 26.8 % (ref 34–46.6)
HGB BLD-MCNC: 8.6 G/DL (ref 12–15.9)
LYMPHOCYTES # BLD AUTO: 1.18 10*3/MM3 (ref 0.7–3.1)
LYMPHOCYTES NFR BLD AUTO: 28.5 % (ref 19.6–45.3)
MCH RBC QN AUTO: 36.8 PG (ref 26.6–33)
MCHC RBC AUTO-ENTMCNC: 32.1 G/DL (ref 31.5–35.7)
MCV RBC AUTO: 114.5 FL (ref 79–97)
MONOCYTES # BLD AUTO: 0.61 10*3/MM3 (ref 0.1–0.9)
MONOCYTES NFR BLD AUTO: 14.7 % (ref 5–12)
NEUTROPHILS NFR BLD AUTO: 2.26 10*3/MM3 (ref 1.7–7)
NEUTROPHILS NFR BLD AUTO: 54.7 % (ref 42.7–76)
PLATELET # BLD AUTO: 186 10*3/MM3 (ref 140–450)
PMV BLD AUTO: 9 FL (ref 6–12)
POTASSIUM SERPL-SCNC: 3.9 MMOL/L (ref 3.5–5.2)
PROT SERPL-MCNC: 6.7 G/DL (ref 6–8.5)
RBC # BLD AUTO: 2.34 10*6/MM3 (ref 3.77–5.28)
SODIUM SERPL-SCNC: 139 MMOL/L (ref 136–145)
WBC NRBC COR # BLD: 4.14 10*3/MM3 (ref 3.4–10.8)

## 2022-04-04 PROCEDURE — 96415 CHEMO IV INFUSION ADDL HR: CPT

## 2022-04-04 PROCEDURE — 86304 IMMUNOASSAY TUMOR CA 125: CPT | Performed by: INTERNAL MEDICINE

## 2022-04-04 PROCEDURE — 96377 APPLICATON ON-BODY INJECTOR: CPT

## 2022-04-04 PROCEDURE — 96375 TX/PRO/DX INJ NEW DRUG ADDON: CPT

## 2022-04-04 PROCEDURE — 96417 CHEMO IV INFUS EACH ADDL SEQ: CPT

## 2022-04-04 PROCEDURE — 25010000002 DEXAMETHASONE SODIUM PHOSPHATE 120 MG/30ML SOLUTION: Performed by: INTERNAL MEDICINE

## 2022-04-04 PROCEDURE — 25010000002 PACLITAXEL PER 1 MG: Performed by: INTERNAL MEDICINE

## 2022-04-04 PROCEDURE — 96367 TX/PROPH/DG ADDL SEQ IV INF: CPT

## 2022-04-04 PROCEDURE — 25010000002 FOSAPREPITANT PER 1 MG: Performed by: INTERNAL MEDICINE

## 2022-04-04 PROCEDURE — 25010000002 CARBOPLATIN PER 50 MG: Performed by: INTERNAL MEDICINE

## 2022-04-04 PROCEDURE — 85025 COMPLETE CBC W/AUTO DIFF WBC: CPT | Performed by: INTERNAL MEDICINE

## 2022-04-04 PROCEDURE — 25010000002 HEPARIN LOCK FLUSH PER 10 UNITS: Performed by: INTERNAL MEDICINE

## 2022-04-04 PROCEDURE — 25010000002 PALONOSETRON 0.25 MG/5ML SOLUTION PREFILLED SYRINGE: Performed by: INTERNAL MEDICINE

## 2022-04-04 PROCEDURE — 96413 CHEMO IV INFUSION 1 HR: CPT

## 2022-04-04 PROCEDURE — 25010000002 DIPHENHYDRAMINE PER 50 MG: Performed by: INTERNAL MEDICINE

## 2022-04-04 PROCEDURE — 82565 ASSAY OF CREATININE: CPT

## 2022-04-04 PROCEDURE — 25010000002 PEGFILGRASTIM 6 MG/0.6ML PREFILLED SYRINGE KIT: Performed by: INTERNAL MEDICINE

## 2022-04-04 PROCEDURE — 80053 COMPREHEN METABOLIC PANEL: CPT | Performed by: INTERNAL MEDICINE

## 2022-04-04 RX ORDER — SODIUM CHLORIDE 9 MG/ML
250 INJECTION, SOLUTION INTRAVENOUS ONCE
Status: COMPLETED | OUTPATIENT
Start: 2022-04-04 | End: 2022-04-04

## 2022-04-04 RX ORDER — HEPARIN SODIUM (PORCINE) LOCK FLUSH IV SOLN 100 UNIT/ML 100 UNIT/ML
500 SOLUTION INTRAVENOUS AS NEEDED
Status: DISCONTINUED | OUTPATIENT
Start: 2022-04-04 | End: 2022-04-05 | Stop reason: HOSPADM

## 2022-04-04 RX ORDER — SODIUM CHLORIDE 0.9 % (FLUSH) 0.9 %
10 SYRINGE (ML) INJECTION AS NEEDED
Status: CANCELLED | OUTPATIENT
Start: 2022-04-04

## 2022-04-04 RX ORDER — PALONOSETRON 0.05 MG/ML
0.25 INJECTION, SOLUTION INTRAVENOUS ONCE
Status: COMPLETED | OUTPATIENT
Start: 2022-04-04 | End: 2022-04-04

## 2022-04-04 RX ORDER — OLANZAPINE 5 MG/1
5 TABLET ORAL ONCE
Status: COMPLETED | OUTPATIENT
Start: 2022-04-04 | End: 2022-04-04

## 2022-04-04 RX ORDER — FAMOTIDINE 10 MG/ML
20 INJECTION, SOLUTION INTRAVENOUS ONCE
Status: COMPLETED | OUTPATIENT
Start: 2022-04-04 | End: 2022-04-04

## 2022-04-04 RX ORDER — SODIUM CHLORIDE 0.9 % (FLUSH) 0.9 %
10 SYRINGE (ML) INJECTION AS NEEDED
Status: DISCONTINUED | OUTPATIENT
Start: 2022-04-04 | End: 2022-04-05 | Stop reason: HOSPADM

## 2022-04-04 RX ORDER — HEPARIN SODIUM (PORCINE) LOCK FLUSH IV SOLN 100 UNIT/ML 100 UNIT/ML
500 SOLUTION INTRAVENOUS AS NEEDED
Status: CANCELLED | OUTPATIENT
Start: 2022-04-04

## 2022-04-04 RX ADMIN — DEXAMETHASONE SODIUM PHOSPHATE 20 MG: 4 INJECTION, SOLUTION INTRA-ARTICULAR; INTRALESIONAL; INTRAMUSCULAR; INTRAVENOUS; SOFT TISSUE at 09:17

## 2022-04-04 RX ADMIN — PALONOSETRON 0.25 MG: 0.25 INJECTION, SOLUTION INTRAVENOUS at 09:53

## 2022-04-04 RX ADMIN — OLANZAPINE 5 MG: 5 TABLET, FILM COATED ORAL at 09:53

## 2022-04-04 RX ADMIN — CARBOPLATIN 400 MG: 10 INJECTION, SOLUTION INTRAVENOUS at 13:40

## 2022-04-04 RX ADMIN — SODIUM CHLORIDE 100 ML: 9 INJECTION, SOLUTION INTRAVENOUS at 09:54

## 2022-04-04 RX ADMIN — PACLITAXEL 335 MG: 6 INJECTION, SOLUTION INTRAVENOUS at 10:28

## 2022-04-04 RX ADMIN — FAMOTIDINE 20 MG: 10 INJECTION INTRAVENOUS at 09:33

## 2022-04-04 RX ADMIN — HEPARIN 500 UNITS: 100 SYRINGE at 14:14

## 2022-04-04 RX ADMIN — SODIUM CHLORIDE 250 ML: 9 INJECTION, SOLUTION INTRAVENOUS at 09:17

## 2022-04-04 RX ADMIN — DIPHENHYDRAMINE HYDROCHLORIDE 50 MG: 50 INJECTION, SOLUTION INTRAMUSCULAR; INTRAVENOUS at 09:34

## 2022-04-04 RX ADMIN — Medication 10 ML: at 14:14

## 2022-04-04 RX ADMIN — PEGFILGRASTIM 6 MG: KIT SUBCUTANEOUS at 14:18

## 2022-04-04 NOTE — PROGRESS NOTES
Pt here today for C6 D1 taxol, carboplatin. Port accessed for blood collection using sterile technique. 10 ml blood wasted prior to blood for labs being collected. Pt states she feels good today. Treatment administered without difficulty. AVS given at discharge. Pt to return to center on 4/11/22.

## 2022-04-08 ENCOUNTER — HOSPITAL ENCOUNTER (OUTPATIENT)
Dept: CT IMAGING | Facility: HOSPITAL | Age: 62
Discharge: HOME | End: 2022-04-08
Admitting: OBSTETRICS & GYNECOLOGY

## 2022-04-08 DIAGNOSIS — R18.8 OTHER ASCITES: ICD-10-CM

## 2022-04-08 DIAGNOSIS — C56.9 MALIGNANT NEOPLASM OF OVARY, UNSPECIFIED LATERALITY: ICD-10-CM

## 2022-04-08 DIAGNOSIS — J90 PLEURAL EFFUSION: ICD-10-CM

## 2022-04-08 PROCEDURE — 25010000002 HEPARIN LOCK FLUSH PER 10 UNITS: Performed by: OBSTETRICS & GYNECOLOGY

## 2022-04-08 PROCEDURE — 0 IOPAMIDOL PER 1 ML: Performed by: OBSTETRICS & GYNECOLOGY

## 2022-04-08 PROCEDURE — 74177 CT ABD & PELVIS W/CONTRAST: CPT

## 2022-04-08 PROCEDURE — 71260 CT THORAX DX C+: CPT

## 2022-04-08 RX ORDER — HEPARIN SODIUM (PORCINE) LOCK FLUSH IV SOLN 100 UNIT/ML 100 UNIT/ML
500 SOLUTION INTRAVENOUS ONCE
Status: COMPLETED | OUTPATIENT
Start: 2022-04-08 | End: 2022-04-08

## 2022-04-08 RX ADMIN — Medication 500 UNITS: at 10:01

## 2022-04-08 RX ADMIN — IOPAMIDOL 100 ML: 755 INJECTION, SOLUTION INTRAVENOUS at 10:01

## 2022-04-11 ENCOUNTER — OFFICE VISIT (OUTPATIENT)
Dept: GYNECOLOGIC ONCOLOGY | Facility: CLINIC | Age: 62
End: 2022-04-11

## 2022-04-11 ENCOUNTER — HOSPITAL ENCOUNTER (OUTPATIENT)
Dept: ONCOLOGY | Facility: HOSPITAL | Age: 62
Setting detail: INFUSION SERIES
Discharge: HOME | End: 2022-04-11

## 2022-04-11 ENCOUNTER — PREP FOR SURGERY (OUTPATIENT)
Dept: OTHER | Facility: HOSPITAL | Age: 62
End: 2022-04-11

## 2022-04-11 VITALS
RESPIRATION RATE: 20 BRPM | HEIGHT: 65 IN | OXYGEN SATURATION: 96 % | BODY MASS INDEX: 30.46 KG/M2 | DIASTOLIC BLOOD PRESSURE: 60 MMHG | WEIGHT: 182.8 LBS | HEART RATE: 86 BPM | TEMPERATURE: 97.1 F | SYSTOLIC BLOOD PRESSURE: 157 MMHG

## 2022-04-11 DIAGNOSIS — E83.51 HYPOCALCEMIA: ICD-10-CM

## 2022-04-11 DIAGNOSIS — C56.9 MALIGNANT NEOPLASM OF OVARY, UNSPECIFIED LATERALITY: Primary | ICD-10-CM

## 2022-04-11 LAB
ALBUMIN SERPL-MCNC: 3.6 G/DL (ref 3.5–5.2)
ALBUMIN/GLOB SERPL: 1.1 G/DL
ALP SERPL-CCNC: 62 U/L (ref 39–117)
ALT SERPL W P-5'-P-CCNC: 13 U/L (ref 1–33)
ANION GAP SERPL CALCULATED.3IONS-SCNC: 11 MMOL/L (ref 5–15)
AST SERPL-CCNC: 17 U/L (ref 1–32)
BASOPHILS # BLD AUTO: 0 10*3/MM3 (ref 0–0.2)
BASOPHILS NFR BLD AUTO: 0 % (ref 0–1.5)
BILIRUB SERPL-MCNC: 0.2 MG/DL (ref 0–1.2)
BILIRUB UR QL STRIP: ABNORMAL
BUN SERPL-MCNC: 17 MG/DL (ref 8–23)
BUN/CREAT SERPL: 16.2 (ref 7–25)
CALCIUM SPEC-SCNC: 9.1 MG/DL (ref 8.6–10.5)
CHLORIDE SERPL-SCNC: 104 MMOL/L (ref 98–107)
CLARITY UR: CLEAR
CO2 SERPL-SCNC: 22 MMOL/L (ref 22–29)
COLOR UR: YELLOW
CREAT SERPL-MCNC: 1.05 MG/DL (ref 0.57–1)
DEPRECATED RDW RBC AUTO: 52.5 FL (ref 37–54)
EGFRCR SERPLBLD CKD-EPI 2021: 59.8 ML/MIN/1.73
EOSINOPHIL # BLD AUTO: 0 10*3/MM3 (ref 0–0.4)
EOSINOPHIL NFR BLD AUTO: 0 % (ref 0.3–6.2)
ERYTHROCYTE [DISTWIDTH] IN BLOOD BY AUTOMATED COUNT: 13.7 % (ref 12.3–15.4)
GLOBULIN UR ELPH-MCNC: 3.2 GM/DL
GLUCOSE SERPL-MCNC: 145 MG/DL (ref 65–99)
GLUCOSE UR STRIP-MCNC: NEGATIVE MG/DL
HCT VFR BLD AUTO: 25.4 % (ref 34–46.6)
HGB BLD-MCNC: 8.4 G/DL (ref 12–15.9)
HGB UR QL STRIP.AUTO: NEGATIVE
KETONES UR QL STRIP: ABNORMAL
LEUKOCYTE ESTERASE UR QL STRIP.AUTO: NEGATIVE
LYMPHOCYTES # BLD AUTO: 0.31 10*3/MM3 (ref 0.7–3.1)
LYMPHOCYTES NFR BLD AUTO: 9.1 % (ref 19.6–45.3)
MCH RBC QN AUTO: 36.8 PG (ref 26.6–33)
MCHC RBC AUTO-ENTMCNC: 33.1 G/DL (ref 31.5–35.7)
MCV RBC AUTO: 111.4 FL (ref 79–97)
MONOCYTES # BLD AUTO: 0.41 10*3/MM3 (ref 0.1–0.9)
MONOCYTES NFR BLD AUTO: 12.1 % (ref 5–12)
NEUTROPHILS NFR BLD AUTO: 2.68 10*3/MM3 (ref 1.7–7)
NEUTROPHILS NFR BLD AUTO: 78.8 % (ref 42.7–76)
NITRITE UR QL STRIP: NEGATIVE
PH UR STRIP.AUTO: 5.5 [PH] (ref 5–8)
PLATELET # BLD AUTO: 57 10*3/MM3 (ref 140–450)
PMV BLD AUTO: 10.9 FL (ref 6–12)
POTASSIUM SERPL-SCNC: 4.7 MMOL/L (ref 3.5–5.2)
PROT SERPL-MCNC: 6.8 G/DL (ref 6–8.5)
PROT UR QL STRIP: ABNORMAL
RBC # BLD AUTO: 2.28 10*6/MM3 (ref 3.77–5.28)
SODIUM SERPL-SCNC: 137 MMOL/L (ref 136–145)
SP GR UR STRIP: >=1.03 (ref 1–1.03)
UROBILINOGEN UR QL STRIP: ABNORMAL
WBC NRBC COR # BLD: 3.4 10*3/MM3 (ref 3.4–10.8)

## 2022-04-11 PROCEDURE — 81003 URINALYSIS AUTO W/O SCOPE: CPT | Performed by: INTERNAL MEDICINE

## 2022-04-11 PROCEDURE — 80053 COMPREHEN METABOLIC PANEL: CPT | Performed by: INTERNAL MEDICINE

## 2022-04-11 PROCEDURE — 85025 COMPLETE CBC W/AUTO DIFF WBC: CPT | Performed by: INTERNAL MEDICINE

## 2022-04-11 PROCEDURE — 25010000002 HEPARIN LOCK FLUSH PER 10 UNITS: Performed by: INTERNAL MEDICINE

## 2022-04-11 PROCEDURE — 99443 PR PHYS/QHP TELEPHONE EVALUATION 21-30 MIN: CPT | Performed by: OBSTETRICS & GYNECOLOGY

## 2022-04-11 PROCEDURE — 36591 DRAW BLOOD OFF VENOUS DEVICE: CPT

## 2022-04-11 RX ORDER — HEPARIN SODIUM (PORCINE) LOCK FLUSH IV SOLN 100 UNIT/ML 100 UNIT/ML
500 SOLUTION INTRAVENOUS AS NEEDED
Status: CANCELLED | OUTPATIENT
Start: 2022-04-11

## 2022-04-11 RX ORDER — HEPARIN SODIUM (PORCINE) LOCK FLUSH IV SOLN 100 UNIT/ML 100 UNIT/ML
500 SOLUTION INTRAVENOUS AS NEEDED
Status: DISCONTINUED | OUTPATIENT
Start: 2022-04-11 | End: 2022-04-12 | Stop reason: HOSPADM

## 2022-04-11 RX ORDER — SODIUM CHLORIDE 0.9 % (FLUSH) 0.9 %
10 SYRINGE (ML) INJECTION AS NEEDED
Status: CANCELLED | OUTPATIENT
Start: 2022-04-11

## 2022-04-11 RX ORDER — SODIUM CHLORIDE 0.9 % (FLUSH) 0.9 %
10 SYRINGE (ML) INJECTION AS NEEDED
Status: CANCELLED | OUTPATIENT
Start: 2022-04-26

## 2022-04-11 RX ORDER — CEFAZOLIN SODIUM 2 G/100ML
2 INJECTION, SOLUTION INTRAVENOUS ONCE
Status: CANCELLED | OUTPATIENT
Start: 2022-04-26 | End: 2022-04-11

## 2022-04-11 RX ORDER — SODIUM CHLORIDE 0.9 % (FLUSH) 0.9 %
10 SYRINGE (ML) INJECTION AS NEEDED
Status: DISCONTINUED | OUTPATIENT
Start: 2022-04-11 | End: 2022-04-12 | Stop reason: HOSPADM

## 2022-04-11 RX ORDER — ONDANSETRON 2 MG/ML
4 INJECTION INTRAMUSCULAR; INTRAVENOUS EVERY 6 HOURS PRN
Status: CANCELLED | OUTPATIENT
Start: 2022-04-11

## 2022-04-11 RX ORDER — SODIUM CHLORIDE, SODIUM LACTATE, POTASSIUM CHLORIDE, CALCIUM CHLORIDE 600; 310; 30; 20 MG/100ML; MG/100ML; MG/100ML; MG/100ML
100 INJECTION, SOLUTION INTRAVENOUS CONTINUOUS
Status: CANCELLED | OUTPATIENT
Start: 2022-04-26

## 2022-04-11 RX ORDER — SODIUM CHLORIDE 0.9 % (FLUSH) 0.9 %
10 SYRINGE (ML) INJECTION EVERY 12 HOURS SCHEDULED
Status: CANCELLED | OUTPATIENT
Start: 2022-04-26

## 2022-04-11 RX ADMIN — Medication 10 ML: at 10:19

## 2022-04-11 RX ADMIN — HEPARIN 500 UNITS: 100 SYRINGE at 10:19

## 2022-04-11 NOTE — PROGRESS NOTES
Pt's plt count is 57 today. I spoke with Dr. Hernandez and she states to hold treatment x1 week. Pt verbalized understanding.

## 2022-04-11 NOTE — PROGRESS NOTES
*TELEHEALTH VISIT *     Patient consented to telephone visit for medical care today.   Total time spent reviewing images, labs, discussion and plan was 35 minutes.       Maggie Castro D.O  2022        Age: 61 y.o.  Sex: female  :  1960  MRN: [id number]     REFERRING PHYSICIAN: No ref. provider found  DATE OF VISIT: 2022     Lucy Mahan televisit today for CT Scan review. She was diagnsoed with advanved HG serous ocarian carcinoma by peritoneal bx in 2021. She received 3 cycles of carboplatin/taxol as neoadjuvant therapy. CT showed decrease in omental cake by 2cm (maybe 30% reduction) but increase in ascites, now pleural effusions, possible PNA and also carcinomatosis has remained largeley the same. She went on to complete another 2 cycles of carboplatin and taxol with the addition of Avastin. Interval CT 22 shows now decrease in bilateral masses, ometnal cake, resolvuiton of ascites and no LAD.  Her  was decreasing initially, then increased, but has decreased now with addition of Avastin, currently 110.      Oncology/Hematology History Overview Note   Lucy Mahan is a 60 y.o. 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     • 11/10/21: Peritoneum bx-metastatic ovarian serous carcinoma, ER +  • 11/10/21: Omentum bx-metastatic ovarian serous carcinoma, ER +  • 21: CT CAP-A 2 mm noncalcified nodule is located within the left lower lobe. Noncalcified nodules in the left upper lobe measure 4 mm and 3 mm. No right lung nodules are identified. Benign calcified granuloma is incidentally noted in the left lower lobeExtensive omental and peritoneal carcinomatosis. 5.3 cm enhancing soft tissue mass in the pelvis to the right of midline. Small quantity abdominal pelvic ascites, likely malignant ascites. Indeterminate 1.7 cm left adrenal nodule. Adrenal metastasis cannot be excluded.  • 22: CT AP-Interval development of a  trace right and small to moderate left pleural effusion. There is compressive atelectasis on the left lower lobe with questionable superimposed pneumonia. There is mild compressive atelectasis in the right lower lobe. Moderate abdominal and pelvic ascites which has increased. There are nodular peritoneal implants consistent with peritoneal carcinomatosis similar to before. No interval change in the right adnexal region heterogeneous enhancing mass representing either a large peritoneal implant versus ovarian carcinoma. Interval decrease in size of the area of omental caking in the left lower abdomen. Questionable peritoneal carcinomatosis along the dome of the urinary bladder. This area is difficult to evaluate due to scatter artifact from the patient's left hip prosthesis. Stable left adrenal nodular thickening felt to represent a benign adenoma.  • 4/08/22: CT CAP-Stable pulmonary nodules. No new suspicious pulmonary normality. Trace bilateral pleural effusions. Interval decrease in size of peritoneal tumor masses and resolution of ascites. No new suspicious abdominopelvic abnormality. Stable left adrenal nodule    4/11/22: Telephone; review scan    CURRENT TREATMENT (Dr. Hernandez)  • C1:12/7/21-Carbo AUC 6, Taxol 175 mg/m2  • C2: 12/28/21-Carbo AUC 6, Taxol 175 mg/m2  • C3: 1/24/22-Carbo AUC 4.5, Taxol 175 mg/m2  • C4: 2/14/22-Carbo AUC 4.5, Taxol 175 mg/m2  • C5: 3/07/22-Carbo AUC 4.5, Taxol 175 mg/m2  • C6: 04/04/22-Carbo AUC 4.5, Taxol 175 mg/m2     • C1: 2/14/22-Zirabev 10 mg/kg  • C2: 2/28/22-Zirabev 10 mg/kg  • C3: 3/14/22-Zirabev 10 mg/kg  • C4: 3/28/22-Zirabev 10 mg/kg        Date Value Ref Range Status  04/04/2022 110.9 (H) 0.0 - 38.1 U/mL Final  03/28/2022 132.0 (H) 0.0 - 38.1 U/mL Final  03/14/2022 135.1 (H) 0.0 - 38.1 U/mL Final  03/07/2022 174.2 (H) 0.0 - 38.1 U/mL Final  02/28/2022 191.0 (H) 0.0 - 38.1 U/mL Final  02/14/2022 275.9 (H) 0.0 - 38.1 U/mL Final  01/24/2022 365.2 (H) 0.0 - 38.1  U/mL Final  2022 303.0 (H) 0.0 - 38.1 U/mL Final  2021 635.2 (H) 0.0 - 38.1 U/mL Final  2021 738.2 (H) 0.0 - 38.1 U/mL Final  2021 754.3 (H) 0.0 - 38.1 U/mL Final     Malignant neoplasm of ovary (HCC)   12/3/2021 Initial Diagnosis    Malignant neoplasm of ovary (HCC)     2021 -  Chemotherapy    OP OVARIAN PACLitaxel / CARBOplatin (Q21D)     2022 Biopsy    OP OVARIAN Bevacizumab 10 mg/kg q 2 weeks  Plan Provider: Inga Hernandez MD  Treatment goal: Control  Line of treatment: [No plan line of treatment]         Past Medical History:  Past Medical History:   Diagnosis Date   • Arthritis    • Cancer (HCC)     ovarian   • Gall stones    • Hypertension    • Irritable bowel syndrome    • Rheumatoid aortitis    • Thyroid disease        Past Surgical History:  Past Surgical History:   Procedure Laterality Date   •  SECTION      x2   • CHOLECYSTECTOMY N/A 11/10/2021    Procedure: diagnostic laparoscopy and peritoneal biopsy;  Surgeon: Krunal Badillo MD;  Location: [facility] MAIN OR;  Service: General;  Laterality: N/A;   • COLONOSCOPY     • HIP SURGERY Left    • THYROIDECTOMY, PARTIAL  2017   • VENOUS ACCESS DEVICE (PORT) INSERTION Left 2021    Procedure: INSERTION VENOUS ACCESS DEVICE;  Surgeon: Krunal Badillo MD;  Location: [facility] MAIN OR;  Service: General;  Laterality: Left;        MEDICATIONS:    Current Outpatient Medications:   •  oxyCODONE-acetaminophen (PERCOCET) 5-325 MG per tablet, Take 1 tablet by mouth Every 6 (Six) Hours As Needed for Moderate Pain ., Disp: 120 tablet, Rfl: 0  •  Calcium Carbonate-Vitamin D 600-400 MG-UNIT chewable tablet, Chew 1 tablet Daily., Disp: , Rfl:   •  dexamethasone (DECADRON) 4 MG tablet, TAKE FIVE (5) TABLETS WITH FOOD THE NIGHT BEFORE chemotherapy, Disp: 5 tablet, Rfl: 5  •  folic acid (FOLVITE) 1 MG tablet, take 1 tablet by mouth once daily (Patient taking differently: Take 1 mg by mouth Daily.), Disp: 90 tablet, Rfl: 1  •   gabapentin (NEURONTIN) 300 MG capsule, Take 1 capsule by mouth Every Night., Disp: 30 capsule, Rfl: 3  •  hydroCHLOROthiazide (HYDRODIURIL) 12.5 MG tablet, Take 1 tablet by mouth Daily As Needed (swelling)., Disp: 90 tablet, Rfl: 0  •  hydroxychloroquine (PLAQUENIL) 200 MG tablet, TAKE 1 TABLET BY MOUTH TWICE A DAY (Patient taking differently: Take 200 mg by mouth 2 (Two) Times a Day. Takes in afternoon and evening), Disp: 180 tablet, Rfl: 0  •  hydrOXYzine (ATARAX) 10 MG tablet, TAKE ONE (1) TABLET BY MOUTH TWICE DAILY AS NEEDED FOR ANXIETY, Disp: 40 tablet, Rfl: 0  •  letrozole (FEMARA) 2.5 MG tablet, Take 1 tablet by mouth Daily., Disp: 30 tablet, Rfl: 3  •  levothyroxine (SYNTHROID, LEVOTHROID) 137 MCG tablet, Take 1 tablet by mouth Daily., Disp: 90 tablet, Rfl: 1  •  lidocaine-prilocaine (EMLA) 2.5-2.5 % cream, Apply 1 application topically to the appropriate area as directed Take As Directed. Apply to port 1 hour prior to port access, Disp: 30 g, Rfl: 1  •  lisinopril (PRINIVIL,ZESTRIL) 40 MG tablet, Take 1 tablet by mouth Daily., Disp: 90 tablet, Rfl: 1  •  loratadine (Claritin) 10 MG tablet, Take 1 tablet night before and 1 tablet morning of chemotherapy., Disp: 2 tablet, Rfl: 5  •  OLANZapine (zyPREXA) 5 MG tablet, TAKE ONE (1) TABLET BY MOUTH EVERY NIGHT AT BEDTIME. TAKE ON DAYS 2, 3, AND 4 AFTER chemotherapy, Disp: 3 tablet, Rfl: 5  •  omeprazole (priLOSEC) 40 MG capsule, Take 1 capsule by mouth Daily., Disp: 30 capsule, Rfl: 6  •  ondansetron (ZOFRAN) 8 MG tablet, Take 1 tablet by mouth 3 (Three) Times a Day As Needed for Nausea or Vomiting., Disp: 90 tablet, Rfl: 2  •  pantoprazole (Protonix) 40 MG EC tablet, Take 1 tablet by mouth Daily., Disp: 30 tablet, Rfl: 6  •  promethazine (PHENERGAN) 25 MG tablet, Take 1 tablet by mouth Every 6 (Six) Hours As Needed for Nausea or Vomiting., Disp: 90 tablet, Rfl: 1  No current facility-administered medications for this visit.    Facility-Administered Medications  Ordered in Other Visits:   •  heparin injection 500 Units, 500 Units, Intravenous, PRDavid JEFFERSON Ifeoma Roseline, MD, 500 Units at 04/11/22 1019  •  sodium chloride 0.9 % flush 10 mL, 10 mL, Intravenous, PRNDavid Ifeoma Roseline, MD, 10 mL at 04/11/22 1019    ALLERGIES:  No Known Allergies      ROS:  CONSTITUTIONAL:  Denies fever or chills.   NEUROLOGIC:  Denies headache, focal weakness or sensory changes.   EYES:  Denies change in visual acuity.  HEENT:  Denies nasal congestion or sore throat.   RESPIRATORY:  Denies cough or shortness of breath.   CARDIOVASCULAR:  Denies chest pain or edema.   GI:  Denies abdominal pain, nausea, vomiting, bloody stools or diarrhea.   :  Denies dysuria, leaking or incontinence.  MUSCULOSKELETAL:  Denies back pain or joint pain.   INTEGUMENT:  Denies rash.   ENDOCRINE:  Denies polyuria or polydipsia.   LYMPHATIC:  Denies swollen glands or lymphedema.   PSYCHIATRIC:  Denies depression or anxiety.      PHYSICAL EXAM:  There were no vitals filed for this visit.  There is no height or weight on file to calculate BMI.  Current Status 4/4/2022   ECOG score 1     PHQ-9 Total Score:         Result Review :  The pertinent labs, images, and/or pathology as noted in the oncology history were reviewed independently and discussed with the patient.   Maggie Castro,    11/29/2021    [facility] [place] [facility]:   WBC   Date Value Ref Range Status   04/11/2022 3.40 3.40 - 10.80 10*3/mm3 Final     RBC   Date Value Ref Range Status   04/11/2022 2.28 (L) 3.77 - 5.28 10*6/mm3 Final     Hemoglobin   Date Value Ref Range Status   04/11/2022 8.4 (L) 12.0 - 15.9 g/dL Final     Hematocrit   Date Value Ref Range Status   04/11/2022 25.4 (L) 34.0 - 46.6 % Final     Platelets   Date Value Ref Range Status   04/11/2022 57 (L) 140 - 450 10*3/mm3 Final        Date Value Ref Range Status   04/04/2022 110.9 (H) 0.0 - 38.1 U/mL Final       [facility] [place] [facility]:  CT Chest With Contrast Diagnostic    Result Date: 4/8/2022   1.  Stable pulmonary nodules. No new suspicious pulmonary normality 2. Trace bilateral pleural effusions 3. Interval decrease in size of peritoneal tumor masses and resolution of ascites. No new suspicious abdominopelvic abnormality 4. Stable left adrenal nodule  Electronically Signed By-Stan Turcios On:4/8/2022 10:44 AM This report was finalized on [id number] by  Stan Turcios, .    CT Chest With Contrast Diagnostic    Result Date: 2/9/2022  1.Stable pulmonary nodules. These could be seen with metastatic disease. These are too small for evaluation with PET exam. 2.There is persistent ascites with peritoneal thickening likely due to peritoneal carcinomatosis. 3.1.7 cm left adrenal mass unchanged.    Electronically Signed By-Mayda Elliott MD On:2/9/2022 3:47 PM This report was finalized on [id number] by  Mayda Elliott MD.    CT Abdomen Pelvis With Contrast    Result Date: 4/8/2022   1. Stable pulmonary nodules. No new suspicious pulmonary normality 2. Trace bilateral pleural effusions 3. Interval decrease in size of peritoneal tumor masses and resolution of ascites. No new suspicious abdominopelvic abnormality 4. Stable left adrenal nodule  Electronically Signed By-Stan Turcios On:4/8/2022 10:44 AM This report was finalized on [id number] by  Stan Turcios, .    CT Abdomen Pelvis With Contrast    Result Date: 1/12/2022   1. Interval development of a trace right and small to moderate left pleural effusion. There is compressive atelectasis on the left lower lobe with questionable superimposed pneumonia. There is mild compressive atelectasis in the right lower lobe. 2. Moderate abdominal and pelvic ascites which has increased. There are nodular peritoneal implants consistent with peritoneal carcinomatosis similar to before. 3. No interval change in the right adnexal region heterogeneous enhancing mass representing either a large peritoneal implant versus ovarian carcinoma. 4. Interval decrease in size of the  area of omental caking in the left lower abdomen. 5. Questionable peritoneal carcinomatosis along the dome of the urinary bladder. This area is difficult to evaluate due to scatter artifact from the patient's left hip prosthesis. 6. Stable left adrenal nodular thickening felt to represent a benign adenoma.  Electronically Signed By-Luis Briscoe MD On:1/12/2022 1:45 PM This report was finalized on [id number] by  Luis Briscoe MD.    US Paracentesis    Result Date: 2/23/2022  Informed consent was recorded and documented. The patient was placed in a supine position. Ultrasound of all 4 abdominal quadrants prior to intended paracentesis demonstrated absence of intraperitoneal fluid. No paracentesis was performed.  This report was finalized on 2/23/2022 1:02 PM by Dr. Jeff Cameron M.D.      US Thoracentesis    Result Date: 2/23/2022  Informed consent was obtained and documented in the patient's chart. The patient was placed in upright position. Ultrasound of both right and left chest prior to intended right thoracentesis demonstrated absence of pleural fluid. No thoracentesis was performed.  This report was finalized on 2/23/2022 1:04 PM by Dr. Jeff Cameron M.D.        ASSESSMENT :  • Advanced stage high grade serous ovarian caricnoma   - Diagnosed by omental biopsy 11/2021 -  754  - Lesions in the right pelvis 4.3cm, omental cake with 10.8cm at left mid abdomen, carcinomatosis, loculated fluid at subcapsular margin, 9mm lesion in right hepatic lobe, small INDIRA 4mm lesion.   - Post 3 C carboplatin/Taxol   - Interval imaging with marginal change in carcinomatosis, now with pleural effusions, increasing ascites and minimal omental reduction.   - Additional 3C of carboplatin/Taxol with addition of Avatin   - 4/4/22 CT now with decreased tumor masses, resolution of ascites and no LAD.         PLAN :    The case was reviewed with the patient in detail, taking into consideration symptoms, laboratory results, imaging,  pathology and physical exam findings.  At this point in time, I am recommending exploratory laparotomy, total abdominal hysterectomy, bilateral salpingooophorectomy, pelvic and aortic lymphadenectomy, omentectomy.     Risks, benefits, alternatives and indications to the procedure were reviewed in detail.    Risks of surgery include bleeding/hemorrhage which may require transfusion and associated transfusion risk (transfusion reaction, HCV, TRALI ); Infection, which may require antibiotic therapy or abscess drainage; Damage to surrounding internal organs (bowel, bladder, or urinary tract) which may result in obstruction or fistula; Nerve, or vascular injury which may result in numbness, pain, swelling or loss of strength. Risk of possible incomplete resolution of symptoms or failure to cure.  She understands that it is possible that intraoperative findings may warrant further treatment.  There is a low, but real, risk of unanticipated return to the OR for a problem associated with the surgery and a remote possibility of death.      All questions were addressed and answered to the patient's satisfaction. She indicates understanding of these risks and desires to proceed. She wishes me to use my judgement to do the procedure that I feel is in her best interest. Surgical consents were signed.            Maggie Castro D.O  4/11/2022    Gynecologic Oncology   [address] [place] [address]  [phone] office

## 2022-04-13 NOTE — PROGRESS NOTES
Hematology/Oncology Outpatient Follow Up    PATIENT NAME:Lucy Mahan  :1960  MRN: [id number]  PRIMARY CARE PHYSICIAN: [place] Avalos APRN  REFERRING PHYSICIAN: [place] Avalos APRN    Chief Complaint   Patient presents with   • Follow-up     Anemia, unspecified type        HISTORY OF PRESENT ILLNESS:     This is a 61 y.o.  female who developed abdominal pain in 2021.  Patient has also lost approximately 35 pounds during that..  She has had loss of appetite.  Due to the abdominal pain,  she had an ultrasound of the abdomen done on 10/13/2021.  This basically revealed no liver lesions.  Common bile duct is normal at 3 mm.  There is a nonmobile 3 cm shadowing gallstone within the gallbladder neck.  No gallbladder thickening or pericholecystic fluid collection.  Patient was then referred to general surgery and was seen by Dr. Badillo.  Who took her to surgery on 11/10/2021 for diagnostic laparoscopy and peritoneal biopsy.  Intra-Op , she was noted to have peritoneal studding with malignancy throughout the abdominal cavity and biopsies were completed. 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  The tumor was focally positive for progesterone receptor, positive for CK7, estrogen receptor, CA-125 and PAX 8..  The staining pattern is consistent with ovarian serous carcinoma.      She had CT scan of the chest, abdomen and pelvis and the chest is a 2 mm noncalcified nodule within the left lower lobe, noncalcified nodule in the left upper lobe measuring 4 mm and 3 mm.  In the abdomen there is a 5.1 x 5.3 cm enhancing soft tissue mass in the pelvis to the right of midline associated with the adnexal region possibly representing a primary ovarian malignancy.  No right or left ovarian tissue was seen.  There is abnormal omental thickening and nodularity consistent with carcinomatosis greatest bulk in the left mid abdomen measuring up to 10.8 cm x 2.5 cm.  There is nodular peritoneal  thickening also present within the abdomen and pelvis consistent with peritoneal carcinomatosis.  The small quantity of ascitic fluid in the abdomen and pelvis.  Carcinomatosis nodular studding is seen along the inferior right hepatic lobe.     She  has been referred to us for further evaluation and management of her newly diagnosed ovarian carcinoma.     Patient is accompanied today by her daughter for this appointment.  There is  family history of precancerous cells of the  uterusin her sister.        She does not smoke and does not drink alcohol.  Patient is  and has 2 daughters.  She works for the [employer].    · 11/29/2021 patient was seen by Dr. Dubois who has recommended neoadjuvant chemotherapy for 2-3 cycles and then interval cytoreduction.  She has recommended carboplatinum AUC 6, Taxol 1 7 5 mg per metered squared, Avastin 15 mg/kg as was done in the oceans trial but hold Avastin for the first few cycles due to bowel risk.  · Prechemo CA-125 was 754  · 12/7/2021 patient received first cycle of chemotherapy with carboplatinum and Taxol with Neulasta  · 12/28/2021 patient received cycle 2 of combination chemotherapy with carboplatinum and Taxol with Neulasta  · 12/28/2021 CA-125 was down to 635  · 1/11/2022 add-on appointment for nausea, dizziness, thrombocytopenia, pain in upper to mid abdomen 8/10, new onset in the last 4 to 5 days  · January 12, 2022: Due to acute GI symptoms patient had a CT scan of the abdomen and pelvis which basically revealed increasing effusion on the left lung mild pleural effusion on the right lung decreased in amount of omental caking but no interval change in the right adnexal area moderate abdominal and pelvic ascites which has increased.  She denies any significant pulmonary symptoms.  Her O2 sat today was 100% at rest and 99% with ambulation  · 1/24/2022 patient received cycle 3 of carboplatinum with Taxol with dose reduction due to significant  toxicities  · 2022 patient received cycle 4-day 1 of chemo platinum Taxol and Avastin  · 3/7/22: Patient received cycle 5-day 1 of chemotherapy with carboplatinum Taxol and Avastin  · 2022 patient received cycle 6 of chemotherapy with A platinum Taxol and Avastin      History of present illness was reviewed and is unchanged from the previous visit. 22        Past Medical History:   Diagnosis Date   • Arthritis    • Cancer (HCC)     ovarian   • Gall stones    • Hypertension    • Irritable bowel syndrome    • Rheumatoid aortitis    • Thyroid disease        Past Surgical History:   Procedure Laterality Date   •  SECTION      x2   • CHOLECYSTECTOMY N/A 11/10/2021    Procedure: diagnostic laparoscopy and peritoneal biopsy;  Surgeon: Krunal Badillo MD;  Location: [facility] MAIN OR;  Service: General;  Laterality: N/A;   • COLONOSCOPY     • HIP SURGERY Left    • THYROIDECTOMY, PARTIAL  2017   • VENOUS ACCESS DEVICE (PORT) INSERTION Left 2021    Procedure: INSERTION VENOUS ACCESS DEVICE;  Surgeon: Krunal Badillo MD;  Location: [facility] MAIN OR;  Service: General;  Laterality: Left;         Current Outpatient Medications:   •  Calcium Carbonate-Vitamin D 600-400 MG-UNIT chewable tablet, Chew 1 tablet Daily., Disp: , Rfl:   •  dexamethasone (DECADRON) 4 MG tablet, TAKE FIVE (5) TABLETS WITH FOOD THE NIGHT BEFORE chemotherapy, Disp: 5 tablet, Rfl: 5  •  folic acid (FOLVITE) 1 MG tablet, take 1 tablet by mouth once daily (Patient taking differently: Take 1 mg by mouth Daily.), Disp: 90 tablet, Rfl: 1  •  gabapentin (NEURONTIN) 300 MG capsule, Take 1 capsule by mouth 2 (Two) Times a Day., Disp: 60 capsule, Rfl: 0  •  hydroCHLOROthiazide (HYDRODIURIL) 12.5 MG tablet, Take 1 tablet by mouth Daily As Needed (swelling)., Disp: 90 tablet, Rfl: 0  •  hydroxychloroquine (PLAQUENIL) 200 MG tablet, TAKE 1 TABLET BY MOUTH TWICE A DAY (Patient taking differently: Take 200 mg by mouth 2 (Two) Times a Day. Takes in  afternoon and evening), Disp: 180 tablet, Rfl: 0  •  hydrOXYzine (ATARAX) 10 MG tablet, TAKE ONE (1) TABLET BY MOUTH TWICE DAILY AS NEEDED FOR ANXIETY, Disp: 40 tablet, Rfl: 0  •  letrozole (FEMARA) 2.5 MG tablet, Take 1 tablet by mouth Daily., Disp: 30 tablet, Rfl: 3  •  levothyroxine (SYNTHROID, LEVOTHROID) 137 MCG tablet, Take 1 tablet by mouth Daily., Disp: 90 tablet, Rfl: 1  •  lidocaine-prilocaine (EMLA) 2.5-2.5 % cream, Apply 1 application topically to the appropriate area as directed Take As Directed. Apply to port 1 hour prior to port access, Disp: 30 g, Rfl: 1  •  lisinopril (PRINIVIL,ZESTRIL) 40 MG tablet, Take 1 tablet by mouth Daily., Disp: 90 tablet, Rfl: 1  •  loratadine (Claritin) 10 MG tablet, Take 1 tablet night before and 1 tablet morning of chemotherapy., Disp: 2 tablet, Rfl: 5  •  OLANZapine (zyPREXA) 5 MG tablet, TAKE ONE (1) TABLET BY MOUTH EVERY NIGHT AT BEDTIME. TAKE ON DAYS 2, 3, AND 4 AFTER chemotherapy, Disp: 3 tablet, Rfl: 5  •  omeprazole (priLOSEC) 40 MG capsule, Take 1 capsule by mouth Daily., Disp: 30 capsule, Rfl: 6  •  ondansetron (ZOFRAN) 8 MG tablet, Take 1 tablet by mouth 3 (Three) Times a Day As Needed for Nausea or Vomiting., Disp: 90 tablet, Rfl: 2  •  oxyCODONE-acetaminophen (PERCOCET) 5-325 MG per tablet, Take 1 tablet by mouth Every 6 (Six) Hours As Needed for Moderate Pain ., Disp: 120 tablet, Rfl: 0  •  pantoprazole (Protonix) 40 MG EC tablet, Take 1 tablet by mouth Daily., Disp: 30 tablet, Rfl: 6  •  promethazine (PHENERGAN) 25 MG tablet, Take 1 tablet by mouth Every 6 (Six) Hours As Needed for Nausea or Vomiting., Disp: 90 tablet, Rfl: 1    No Known Allergies    Family History   Problem Relation Age of Onset   • Dementia Mother    • Arthritis Mother    • Heart disease Father    • Hypertension Father        Cancer-related family history is not on file.    Social History     Tobacco Use   • Smoking status: Never Smoker   • Smokeless tobacco: Never Used   Vaping Use   •  "Vaping Use: Never used   Substance Use Topics   • Alcohol use: Yes     Alcohol/week: 1.0 standard drink     Types: 1 Glasses of wine per week     Comment: less than monthly   • Drug use: No       HPI, ROS and PFSH have been reviewed and confirmed on 4/14/2022.     SUBJECTIVE:    Patient is scheduled for radical hysterectomy 4/26/2022.  Continues to experience neuropathy            REVIEW OF SYSTEMS:  Review of Systems   Constitutional: Positive for fatigue. Negative for chills and fever.   HENT: Negative for ear pain, mouth sores, nosebleeds and sore throat.    Eyes: Negative for photophobia and visual disturbance.   Respiratory: Negative for wheezing and stridor.    Cardiovascular: Negative for chest pain and palpitations.   Gastrointestinal: Positive for abdominal pain and nausea. Negative for diarrhea and vomiting.   Endocrine: Negative for cold intolerance and heat intolerance.   Genitourinary: Negative for dysuria and hematuria.        Decreased urine output   Musculoskeletal: Negative for joint swelling and neck stiffness.   Skin: Negative for color change and rash.   Neurological: Positive for dizziness, weakness and headaches. Negative for seizures and syncope.   Hematological: Negative for adenopathy.   Psychiatric/Behavioral: Negative for agitation, confusion and hallucinations.     Abdominal discomfort  OBJECTIVE:    Vitals:    04/14/22 1540   BP: 170/79   Pulse: 63   Resp: 18   Temp: 97.3 °F (36.3 °C)   SpO2: 100%   Weight: 83.1 kg (183 lb 3.2 oz)   Height: 165.1 cm (65\")   PainSc:   4     Body mass index is 30.49 kg/m².    ECOG  (1) Restricted in physically strenuous activity, ambulatory and able to do work of light nature    Physical Exam  Vitals and nursing note reviewed.   Constitutional:       General: She is not in acute distress.     Appearance: She is not diaphoretic.   HENT:      Head: Normocephalic and atraumatic.      Mouth/Throat:      Mouth: Mucous membranes are dry.   Eyes:      General: No " scleral icterus.        Right eye: No discharge.         Left eye: No discharge.      Conjunctiva/sclera: Conjunctivae normal.   Neck:      Thyroid: No thyromegaly.   Cardiovascular:      Rate and Rhythm: Normal rate and regular rhythm.      Heart sounds: Normal heart sounds.     No friction rub. No gallop.   Pulmonary:      Effort: Pulmonary effort is normal. No respiratory distress.      Breath sounds: No stridor. No wheezing.   Abdominal:      General: Bowel sounds are normal.      Palpations: Abdomen is soft. There is no mass.      Tenderness: There is no abdominal tenderness. There is no guarding or rebound.   Musculoskeletal:         General: No tenderness. Normal range of motion.      Cervical back: Normal range of motion and neck supple.   Lymphadenopathy:      Cervical: No cervical adenopathy.   Skin:     General: Skin is warm.      Findings: No erythema or rash.      Comments: Tenting skin turgor   Neurological:      Mental Status: She is alert and oriented to person, place, and time.      Motor: No abnormal muscle tone.   Psychiatric:         Behavior: Behavior normal.       I have reexamined the patient and the results are consistent with the previously documented exam. Inga Cori Hernandez MD   RECENT LABS  WBC   Date Value Ref Range Status   04/14/2022 9.32 3.40 - 10.80 10*3/mm3 Final     RBC   Date Value Ref Range Status   04/14/2022 2.32 (L) 3.77 - 5.28 10*6/mm3 Final     Hemoglobin   Date Value Ref Range Status   04/14/2022 8.7 (L) 12.0 - 15.9 g/dL Final     Hematocrit   Date Value Ref Range Status   04/14/2022 26.7 (L) 34.0 - 46.6 % Final     MCV   Date Value Ref Range Status   04/14/2022 115.1 (H) 79.0 - 97.0 fL Final     MCH   Date Value Ref Range Status   04/14/2022 37.5 (H) 26.6 - 33.0 pg Final     MCHC   Date Value Ref Range Status   04/14/2022 32.6 31.5 - 35.7 g/dL Final     RDW   Date Value Ref Range Status   04/14/2022 14.8 12.3 - 15.4 % Final     RDW-SD   Date Value Ref Range Status    04/14/2022 58.1 (H) 37.0 - 54.0 fl Final     MPV   Date Value Ref Range Status   04/14/2022 10.4 6.0 - 12.0 fL Final     Platelets   Date Value Ref Range Status   04/14/2022 79 (L) 140 - 450 10*3/mm3 Final     Neutrophil %   Date Value Ref Range Status   04/14/2022 74.8 42.7 - 76.0 % Final     Lymphocyte %   Date Value Ref Range Status   04/14/2022 16.4 (L) 19.6 - 45.3 % Final     Monocyte %   Date Value Ref Range Status   04/14/2022 8.6 5.0 - 12.0 % Final     Eosinophil %   Date Value Ref Range Status   04/14/2022 0.1 (L) 0.3 - 6.2 % Final     Basophil %   Date Value Ref Range Status   04/14/2022 0.1 0.0 - 1.5 % Final     Neutrophils, Absolute   Date Value Ref Range Status   04/14/2022 6.97 1.70 - 7.00 10*3/mm3 Final     Lymphocytes, Absolute   Date Value Ref Range Status   04/14/2022 1.53 0.70 - 3.10 10*3/mm3 Final     Monocytes, Absolute   Date Value Ref Range Status   04/14/2022 0.80 0.10 - 0.90 10*3/mm3 Final     Eosinophils, Absolute   Date Value Ref Range Status   04/14/2022 0.01 0.00 - 0.40 10*3/mm3 Final     Basophils, Absolute   Date Value Ref Range Status   04/14/2022 0.01 0.00 - 0.20 10*3/mm3 Final     nRBC   Date Value Ref Range Status   01/18/2022 0.0 0.0 - 0.2 /100 WBC Final       Lab Results   Component Value Date    GLUCOSE 105 (H) 04/14/2022    BUN 13 04/14/2022    CREATININE 0.99 04/14/2022    EGFRIFNONA 47 (L) 02/21/2022    EGFRIFAFRI 44 (L) 01/21/2020    BCR 13.1 04/14/2022    K 3.7 04/14/2022    CO2 26.0 04/14/2022    CALCIUM 8.6 04/14/2022    ALBUMIN 3.70 04/14/2022    LABIL2 1.2 (calc) 01/21/2020    AST 21 04/14/2022    ALT 15 04/14/2022         ASSESSMENT:      1. 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  CA-125 754 November 2021  2. On neoadjuvant chemotherapy with carboplatinum AUC 6, Taxol 175 mg per metered squared, with Avastin added due to lack of significant response with carboplatinum and Taxol.  Monitor serial CA-125's.  Down to 135 as of 3/14/2022.  Her CT scan  showed response therefore patient will proceed with radical hysterectomy to perform by Dr. Dubois on 4/26/2022.  Plans for additional chemotherapy along with Avastin and possibly maintenance treatment with PARP inhibitors in the future.  She is also on letrozole 2.5 mg p.o. daily  3. Pulmonary nodules of unclear etiology too small for biopsy or PET resolution  4. Chemotherapy induced nausea  5. Chemotherapy induced fatigue  6. Chemotherapy-induced anemia  7. 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  Patient already had some 25% dose reduction on her chemo.  She is currently on Neurontin 300 mg daily will increase to 300 mg twice a day  8. Severe thrombocytopenia secondary to chemotherapy most notably carboplatinum  9. Comprehensive gene analysis with cancer next technology was negative for any significant mutation in all 77 genes analyzed  10. 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  No other actionable mutations identified  11. Dehydration: Resolved.  She has increase p.o. fluid intake.  12. Abdominal discomfort secondary to malignancy  13. Assessment has been reviewed and updated     Discussion     I have reviewed her imaging studies, pathology reports.  She has metastatic serous carcinoma of the ovary.  She has liver involvement.  She also has pulmonary nodules of unclear etiology.  These are too small for biopsy or PET resolution therefore they will be followed over time.  We discussed that ovarian cancer is a surgical disease.  We will have her seen by GYN oncologist Dr. Castro to weigh in on her surgical options.  We discussed that she may benefit from neoadjuvant chemotherapy but will await Dr. Castro's final recommendations.     She was seen by Dr. Castro recommendation for her to have neoadjuvant chemotherapy with carboplatinum and Taxol      Discussed  side effects of chemotherapy to include but not limited to:    Chemotherapy side effects include, but not limited to, nausea, vomiting, bone marrow suppression, which can result in blood, platelet transfusion. There is also risk of permanent bone marrow destruction, which can cause myelodysplastic syndrome or leukemia years down the line. There is risk of infection which can result in hospitalization and even death. There is also risk of fatigue, asthenia, alopecia which could become permanent. Chemo will help to reduce risk of relapse of cancer, but does not eliminate risk completely.    Discussed that Taxol chemotherapy can lead to hypersensitivity reaction fluid retention, peripheral neuropathy, myalgias, small risk of permanent alopecia    Carboplatinum can lead to myelosuppression socially thrombocytopenia      Avastin will be added postoperatively       She will benefit from genetic testing as this could have some therapeutic implications for her.    Genetics      Today's risk assessment is based on the history the patient has provided.  We discussed that the best people to screen are the ones who have been affected by malignancy as their result is more informative.  We reviewed all the hallmarks of hereditary cancer syndrome including multiple relatives on the same side of the family being affected by malignancy, cancer diagnosis in young individuals, different cancers in one individual.      Comprehensive gene analysis with cancer next technology was negative for any mutation in all 77 genes analyzed.  Discussed that her malignancy was most likely sporadic.  We discussed lifetime risk of developing ovarian cancer estimated at 2 to 3%.          Plans:     · Increase Neurontin to 300 mg twice a day for neuropathy  · Hold chemotherapy for now until postop  · Proceed with radical hysterectomy 4/20/2022 already scheduled with Dr. Castro  · Comprehensive gene analysis with cancer next technology results reviewed  with patient and copies of her results was also given to her for her own records keeping  · Dose reduced chemotherapy carboplatinum by 25% due to significant toxicity with third cycle of chemotherapy  · Hold carboplatin Taxol and Avastin  · CT scans reviewed  · Follow-up with PCP for elevated blood pressure  · Continue weekly CBCs  · CA-125 with each cycle of chemotherapy  · Continue EMLA cream  · Continue Percocet 5 mg p.o. every 4-6 hours as needed for pain  · Prilosec 40 mg p.o. daily  · Continue vitamin b6 100mg po q 12   · Increase Neurontin 300mg po q 12  For neuropathy.  Discussed precautions with patient  · Foundation 1 testing results reviewed  · All questions answered      I spent 40 total minutes, face-to-face, caring for Lucy today.  90% of this time involved counseling and/or coordination of care as documented within this note.

## 2022-04-14 ENCOUNTER — OFFICE VISIT (OUTPATIENT)
Dept: ONCOLOGY | Facility: CLINIC | Age: 62
End: 2022-04-14

## 2022-04-14 ENCOUNTER — TELEPHONE (OUTPATIENT)
Dept: ONCOLOGY | Facility: CLINIC | Age: 62
End: 2022-04-14

## 2022-04-14 ENCOUNTER — LAB (OUTPATIENT)
Dept: LAB | Facility: HOSPITAL | Age: 62
End: 2022-04-14

## 2022-04-14 VITALS
DIASTOLIC BLOOD PRESSURE: 79 MMHG | RESPIRATION RATE: 18 BRPM | OXYGEN SATURATION: 100 % | SYSTOLIC BLOOD PRESSURE: 170 MMHG | HEIGHT: 65 IN | HEART RATE: 63 BPM | BODY MASS INDEX: 30.52 KG/M2 | WEIGHT: 183.2 LBS | TEMPERATURE: 97.3 F

## 2022-04-14 DIAGNOSIS — C56.9 MALIGNANT NEOPLASM OF OVARY, UNSPECIFIED LATERALITY: Primary | ICD-10-CM

## 2022-04-14 LAB
ALBUMIN SERPL-MCNC: 3.7 G/DL (ref 3.5–5.2)
ALBUMIN/GLOB SERPL: 1.2 G/DL
ALP SERPL-CCNC: 67 U/L (ref 39–117)
ALT SERPL W P-5'-P-CCNC: 15 U/L (ref 1–33)
ANION GAP SERPL CALCULATED.3IONS-SCNC: 11 MMOL/L (ref 5–15)
AST SERPL-CCNC: 21 U/L (ref 1–32)
BASOPHILS # BLD AUTO: 0.01 10*3/MM3 (ref 0–0.2)
BASOPHILS NFR BLD AUTO: 0.1 % (ref 0–1.5)
BILIRUB SERPL-MCNC: <0.2 MG/DL (ref 0–1.2)
BUN SERPL-MCNC: 13 MG/DL (ref 8–23)
BUN/CREAT SERPL: 13.1 (ref 7–25)
CALCIUM SPEC-SCNC: 8.6 MG/DL (ref 8.6–10.5)
CHLORIDE SERPL-SCNC: 105 MMOL/L (ref 98–107)
CO2 SERPL-SCNC: 26 MMOL/L (ref 22–29)
CREAT SERPL-MCNC: 0.99 MG/DL (ref 0.57–1)
DEPRECATED RDW RBC AUTO: 58.1 FL (ref 37–54)
EGFRCR SERPLBLD CKD-EPI 2021: 64.2 ML/MIN/1.73
EOSINOPHIL # BLD AUTO: 0.01 10*3/MM3 (ref 0–0.4)
EOSINOPHIL NFR BLD AUTO: 0.1 % (ref 0.3–6.2)
ERYTHROCYTE [DISTWIDTH] IN BLOOD BY AUTOMATED COUNT: 14.8 % (ref 12.3–15.4)
GLOBULIN UR ELPH-MCNC: 3.2 GM/DL
GLUCOSE SERPL-MCNC: 105 MG/DL (ref 65–99)
HCT VFR BLD AUTO: 26.7 % (ref 34–46.6)
HGB BLD-MCNC: 8.7 G/DL (ref 12–15.9)
HOLD SPECIMEN: NORMAL
LYMPHOCYTES # BLD AUTO: 1.53 10*3/MM3 (ref 0.7–3.1)
LYMPHOCYTES NFR BLD AUTO: 16.4 % (ref 19.6–45.3)
MCH RBC QN AUTO: 37.5 PG (ref 26.6–33)
MCHC RBC AUTO-ENTMCNC: 32.6 G/DL (ref 31.5–35.7)
MCV RBC AUTO: 115.1 FL (ref 79–97)
MONOCYTES # BLD AUTO: 0.8 10*3/MM3 (ref 0.1–0.9)
MONOCYTES NFR BLD AUTO: 8.6 % (ref 5–12)
NEUTROPHILS NFR BLD AUTO: 6.97 10*3/MM3 (ref 1.7–7)
NEUTROPHILS NFR BLD AUTO: 74.8 % (ref 42.7–76)
PLATELET # BLD AUTO: 79 10*3/MM3 (ref 140–450)
PMV BLD AUTO: 10.4 FL (ref 6–12)
POTASSIUM SERPL-SCNC: 3.7 MMOL/L (ref 3.5–5.2)
PROT SERPL-MCNC: 6.9 G/DL (ref 6–8.5)
RBC # BLD AUTO: 2.32 10*6/MM3 (ref 3.77–5.28)
SODIUM SERPL-SCNC: 142 MMOL/L (ref 136–145)
WBC NRBC COR # BLD: 9.32 10*3/MM3 (ref 3.4–10.8)

## 2022-04-14 PROCEDURE — 80053 COMPREHEN METABOLIC PANEL: CPT

## 2022-04-14 PROCEDURE — 99215 OFFICE O/P EST HI 40 MIN: CPT | Performed by: INTERNAL MEDICINE

## 2022-04-14 PROCEDURE — 36415 COLL VENOUS BLD VENIPUNCTURE: CPT

## 2022-04-14 PROCEDURE — 85025 COMPLETE CBC W/AUTO DIFF WBC: CPT | Performed by: INTERNAL MEDICINE

## 2022-04-14 RX ORDER — GABAPENTIN 100 MG/1
CAPSULE ORAL
COMMUNITY
Start: 2022-03-17 | End: 2022-04-14

## 2022-04-14 RX ORDER — GABAPENTIN 300 MG/1
300 CAPSULE ORAL 2 TIMES DAILY
Qty: 60 CAPSULE | Refills: 0 | Status: SHIPPED | OUTPATIENT
Start: 2022-04-14 | End: 2022-05-23

## 2022-04-14 NOTE — TELEPHONE ENCOUNTER
----- Message from Inga Hernandez MD sent at 4/12/2022  8:50 AM EDT -----  Hold all her treatments. She is to have gyne surgery 4/26/22.  ----- Message -----  From: Maggie Castro DO  Sent: 4/11/2022  11:35 AM EDT  To: Inga Hernandez MD    I think she is ready for surgery except plts     I want to add her on for 4/26, so no further chemo until then    Ill get her back to you week of 5/16 for another couple of cycles of carbo tax avastin then hopefully transition to zejula.     Ill send tissue to [facility] from surgery     Thank you so much for everything appreciate you :)     Maggie

## 2022-04-14 NOTE — TELEPHONE ENCOUNTER
Called pt to let her know that Dr. Hernandez is going to hold her treatments until her surgery on 4/26/22 Pt was aware. I told her I will go ahead and cancel those appts. She verbalized understanding, no questions or needs at this time.

## 2022-04-18 ENCOUNTER — HOSPITAL ENCOUNTER (OUTPATIENT)
Dept: ONCOLOGY | Facility: HOSPITAL | Age: 62
Setting detail: INFUSION SERIES
End: 2022-04-18

## 2022-04-18 DIAGNOSIS — F41.9 ANXIETY: ICD-10-CM

## 2022-04-18 RX ORDER — HYDROXYZINE HYDROCHLORIDE 10 MG/1
TABLET, FILM COATED ORAL
Qty: 40 TABLET | Refills: 0 | Status: SHIPPED | OUTPATIENT
Start: 2022-04-18 | End: 2022-07-21

## 2022-04-19 ENCOUNTER — OFFICE VISIT (OUTPATIENT)
Dept: FAMILY MEDICINE CLINIC | Facility: CLINIC | Age: 62
End: 2022-04-19

## 2022-04-19 VITALS
HEART RATE: 73 BPM | SYSTOLIC BLOOD PRESSURE: 152 MMHG | WEIGHT: 180.8 LBS | HEIGHT: 65 IN | BODY MASS INDEX: 30.12 KG/M2 | OXYGEN SATURATION: 99 % | DIASTOLIC BLOOD PRESSURE: 80 MMHG

## 2022-04-19 DIAGNOSIS — D64.9 ANEMIA, UNSPECIFIED TYPE: ICD-10-CM

## 2022-04-19 DIAGNOSIS — I10 ESSENTIAL HYPERTENSION: ICD-10-CM

## 2022-04-19 DIAGNOSIS — D69.6 THROMBOCYTOPENIA: ICD-10-CM

## 2022-04-19 DIAGNOSIS — C56.9 MALIGNANT NEOPLASM OF OVARY, UNSPECIFIED LATERALITY: Primary | ICD-10-CM

## 2022-04-19 PROCEDURE — 99214 OFFICE O/P EST MOD 30 MIN: CPT | Performed by: NURSE PRACTITIONER

## 2022-04-19 RX ORDER — AMLODIPINE BESYLATE 5 MG/1
5 TABLET ORAL DAILY
Qty: 90 TABLET | Refills: 0 | Status: SHIPPED | OUTPATIENT
Start: 2022-04-19 | End: 2022-08-01

## 2022-04-19 NOTE — PROGRESS NOTES
Chief Complaint  Ovarian Cancer (Pt reports she is having surgery 4/26 to try to remove what they can.) and Follow-up (3 mo)    Subjective          Lucy Mahan presents to [facility] for   History of Present Illness     Ovarian cancer, origin found in peritoneum, with liver involvement, lung nodules, gallbladder still intact with cholelithiasis. She is following with Dr. Hernandez, tolerating chemo. She reports nausea, loose stool, abd pain resolved and now having regular BMs. She continues pantoprazole and Zofran. She reports mid day fatigue and naps daily.  Daily. Neuropathy/burning B legs/feet improved with gabapentin and oxycodone prn.   Gabapentin increased to twice daily.    -12/6 venous access placed  -first chemo 12/7  -labs 12/14 plt 105,000, h/h 10/34.0  -mammo cancelled due to having port in place  -referred to GYN onc Dr. Castro for surgical consult, appt next week  CT Abdomen Pelvis With Contrast (01/12/2022 13:11)  -visit with Dr. Hernandez 1/26 pt ordered for kcl 40meq x1 for K3.4, wbc 20 had decadron 3 days prior  -CT chest has been ordered for restaging  -planning ca125 with each chemo  -taking decadron night before chemo, chemo currently on hold  - developed ascites and pleural effusions, not enough for thoracentesis/paracentesis  -surgery planned 4/26 for total hysterectomy and exploration  -will resume chemo again later date.   -ca125 4/4/22 down to 110 from 365 in January, highest 754 in November 2021      HTN, elevated, takes lisinopril daily.  Denies chest pain, headache, shortness of air, palpitations and swelling of extremities.           The following portions of the patient's history were reviewed and updated as appropriate: allergies, current medications, past family history, past medical history, past social history, past surgical history and problem list.    Past Medical History:   Diagnosis Date   • Arthritis    • Cancer (HCC)    • Gall stones    •  Hypertension    • Irritable bowel syndrome    • Rheumatoid aortitis    • Thyroid disease      Past Surgical History:   Procedure Laterality Date   •  SECTION      x2   • CHOLECYSTECTOMY N/A 11/10/2021    Procedure: diagnostic laparoscopy and peritoneal biopsy;  Surgeon: Krunal Badillo MD;  Location: [facility] MAIN OR;  Service: General;  Laterality: N/A;   • COLONOSCOPY     • HIP SURGERY Left    • THYROIDECTOMY, PARTIAL  2017   • VENOUS ACCESS DEVICE (PORT) INSERTION Left 2021    Procedure: INSERTION VENOUS ACCESS DEVICE;  Surgeon: Krunal Badillo MD;  Location: [facility] MAIN OR;  Service: General;  Laterality: Left;     Family History   Problem Relation Age of Onset   • Dementia Mother    • Arthritis Mother    • Heart disease Father    • Hypertension Father      Social History     Tobacco Use   • Smoking status: Never Smoker   • Smokeless tobacco: Never Used   Substance Use Topics   • Alcohol use: Yes     Alcohol/week: 1.0 standard drink     Types: 1 Glasses of wine per week     Comment: less than monthly       Current Outpatient Medications:   •  Calcium Carbonate-Vitamin D 600-400 MG-UNIT chewable tablet, Chew 1 tablet Daily., Disp: , Rfl:   •  dexamethasone (DECADRON) 4 MG tablet, TAKE FIVE (5) TABLETS WITH FOOD THE NIGHT BEFORE chemotherapy, Disp: 5 tablet, Rfl: 5  •  folic acid (FOLVITE) 1 MG tablet, take 1 tablet by mouth once daily (Patient taking differently: Take 1 mg by mouth Daily.), Disp: 90 tablet, Rfl: 1  •  gabapentin (NEURONTIN) 300 MG capsule, Take 1 capsule by mouth 2 (Two) Times a Day., Disp: 60 capsule, Rfl: 0  •  hydroCHLOROthiazide (HYDRODIURIL) 12.5 MG tablet, Take 1 tablet by mouth Daily As Needed (swelling)., Disp: 90 tablet, Rfl: 0  •  hydroxychloroquine (PLAQUENIL) 200 MG tablet, TAKE 1 TABLET BY MOUTH TWICE A DAY (Patient taking differently: Take 200 mg by mouth 2 (Two) Times a Day. Takes in afternoon and evening), Disp: 180 tablet, Rfl: 0  •  hydrOXYzine (ATARAX) 10 MG  "tablet, TAKE ONE (1) TABLET BY MOUTH TWICE DAILY AS NEEDED FOR ANXIETY, Disp: 40 tablet, Rfl: 0  •  letrozole (FEMARA) 2.5 MG tablet, Take 1 tablet by mouth Daily., Disp: 30 tablet, Rfl: 3  •  levothyroxine (SYNTHROID, LEVOTHROID) 137 MCG tablet, Take 1 tablet by mouth Daily., Disp: 90 tablet, Rfl: 1  •  lidocaine-prilocaine (EMLA) 2.5-2.5 % cream, Apply 1 application topically to the appropriate area as directed Take As Directed. Apply to port 1 hour prior to port access, Disp: 30 g, Rfl: 1  •  lisinopril (PRINIVIL,ZESTRIL) 40 MG tablet, Take 1 tablet by mouth Daily., Disp: 90 tablet, Rfl: 1  •  loratadine (Claritin) 10 MG tablet, Take 1 tablet night before and 1 tablet morning of chemotherapy., Disp: 2 tablet, Rfl: 5  •  OLANZapine (zyPREXA) 5 MG tablet, TAKE ONE (1) TABLET BY MOUTH EVERY NIGHT AT BEDTIME. TAKE ON DAYS 2, 3, AND 4 AFTER chemotherapy, Disp: 3 tablet, Rfl: 5  •  omeprazole (priLOSEC) 40 MG capsule, Take 1 capsule by mouth Daily., Disp: 30 capsule, Rfl: 6  •  ondansetron (ZOFRAN) 8 MG tablet, Take 1 tablet by mouth 3 (Three) Times a Day As Needed for Nausea or Vomiting., Disp: 90 tablet, Rfl: 2  •  oxyCODONE-acetaminophen (PERCOCET) 5-325 MG per tablet, Take 1 tablet by mouth Every 6 (Six) Hours As Needed for Moderate Pain ., Disp: 120 tablet, Rfl: 0  •  pantoprazole (Protonix) 40 MG EC tablet, Take 1 tablet by mouth Daily., Disp: 30 tablet, Rfl: 6  •  promethazine (PHENERGAN) 25 MG tablet, Take 1 tablet by mouth Every 6 (Six) Hours As Needed for Nausea or Vomiting., Disp: 90 tablet, Rfl: 1  •  amLODIPine (NORVASC) 5 MG tablet, Take 1 tablet by mouth Daily., Disp: 90 tablet, Rfl: 0    Objective   Vital Signs:   /80 (BP Location: Left arm, Patient Position: Sitting, Cuff Size: Adult)   Pulse 73   Ht 165.1 cm (65\")   Wt 82 kg (180 lb 12.8 oz)   SpO2 99%   BMI 30.09 kg/m²       Physical Exam  Vitals and nursing note reviewed.   Constitutional:       General: She is not in acute distress.     " Appearance: She is well-developed. She is not ill-appearing or diaphoretic.   Eyes:      Pupils: Pupils are equal, round, and reactive to light.   Neck:      Thyroid: No thyromegaly.      Vascular: No JVD.   Pulmonary:      Effort: Pulmonary effort is normal. No respiratory distress.   Abdominal:      General: Bowel sounds are normal. There is no distension.      Palpations: Abdomen is soft.      Tenderness: There is no abdominal tenderness.   Musculoskeletal:         General: Swelling (Trace pitting BLE ) present. No tenderness. Normal range of motion.      Cervical back: Normal range of motion and neck supple.   Skin:     General: Skin is warm and dry.   Neurological:      Mental Status: She is alert and oriented to person, place, and time.      Sensory: No sensory deficit.   Psychiatric:         Behavior: Behavior normal.         Thought Content: Thought content normal.         Judgment: Judgment normal.          Result Review :     Office Visit on 04/14/2022   Component Date Value Ref Range Status   • WBC 04/14/2022 9.32  3.40 - 10.80 10*3/mm3 Final   • RBC 04/14/2022 2.32 (A) 3.77 - 5.28 10*6/mm3 Final   • Hemoglobin 04/14/2022 8.7 (A) 12.0 - 15.9 g/dL Final   • Hematocrit 04/14/2022 26.7 (A) 34.0 - 46.6 % Final   • MCV 04/14/2022 115.1 (A) 79.0 - 97.0 fL Final   • MCH 04/14/2022 37.5 (A) 26.6 - 33.0 pg Final   • MCHC 04/14/2022 32.6  31.5 - 35.7 g/dL Final   • RDW 04/14/2022 14.8  12.3 - 15.4 % Final   • RDW-SD 04/14/2022 58.1 (A) 37.0 - 54.0 fl Final   • MPV 04/14/2022 10.4  6.0 - 12.0 fL Final   • Platelets 04/14/2022 79 (A) 140 - 450 10*3/mm3 Final   • Neutrophil % 04/14/2022 74.8  42.7 - 76.0 % Final   • Lymphocyte % 04/14/2022 16.4 (A) 19.6 - 45.3 % Final   • Monocyte % 04/14/2022 8.6  5.0 - 12.0 % Final   • Eosinophil % 04/14/2022 0.1 (A) 0.3 - 6.2 % Final   • Basophil % 04/14/2022 0.1  0.0 - 1.5 % Final   • Neutrophils, Absolute 04/14/2022 6.97  1.70 - 7.00 10*3/mm3 Final   • Lymphocytes, Absolute  04/14/2022 1.53  0.70 - 3.10 10*3/mm3 Final   • Monocytes, Absolute 04/14/2022 0.80  0.10 - 0.90 10*3/mm3 Final   • Eosinophils, Absolute 04/14/2022 0.01  0.00 - 0.40 10*3/mm3 Final   • Basophils, Absolute 04/14/2022 0.01  0.00 - 0.20 10*3/mm3 Final   Lab on 04/14/2022   Component Date Value Ref Range Status   • Glucose 04/14/2022 105 (A) 65 - 99 mg/dL Final   • BUN 04/14/2022 13  8 - 23 mg/dL Final   • Creatinine 04/14/2022 0.99  0.57 - 1.00 mg/dL Final   • Sodium 04/14/2022 142  136 - 145 mmol/L Final   • Potassium 04/14/2022 3.7  3.5 - 5.2 mmol/L Final   • Chloride 04/14/2022 105  98 - 107 mmol/L Final   • CO2 04/14/2022 26.0  22.0 - 29.0 mmol/L Final   • Calcium 04/14/2022 8.6  8.6 - 10.5 mg/dL Final   • Total Protein 04/14/2022 6.9  6.0 - 8.5 g/dL Final   • Albumin 04/14/2022 3.70  3.50 - 5.20 g/dL Final   • ALT (SGPT) 04/14/2022 15  1 - 33 U/L Final   • AST (SGOT) 04/14/2022 21  1 - 32 U/L Final   • Alkaline Phosphatase 04/14/2022 67  39 - 117 U/L Final   • Total Bilirubin 04/14/2022 <0.2  0.0 - 1.2 mg/dL Final   • Globulin 04/14/2022 3.2  gm/dL Final   • A/G Ratio 04/14/2022 1.2  g/dL Final   • BUN/Creatinine Ratio 04/14/2022 13.1  7.0 - 25.0 Final   • Anion Gap 04/14/2022 11.0  5.0 - 15.0 mmol/L Final   • eGFR 04/14/2022 64.2  >60.0 mL/min/1.73 Final    National Kidney Foundation and American Society of Nephrology (ASN) Task Force recommended calculation based on the Chronic Kidney Disease Epidemiology Collaboration (CKD-EPI) equation refit without adjustment for race.   • Extra Tube 04/14/2022 Hold for add-ons.   Final    Auto resulted.                       Assessment and Plan    Diagnoses and all orders for this visit:    1. Malignant neoplasm of ovary, unspecified laterality (HCC) (Primary)    2. Thrombocytopenia (HCC)    3. Anemia, unspecified type    4. Essential hypertension    Other orders  -     amLODIPine (NORVASC) 5 MG tablet; Take 1 tablet by mouth Daily.  Dispense: 90 tablet; Refill: 0    1.   Keep follow-ups with oncology and gynecologic oncology, surgery planned for next week  2.  Add amlodipine daily, continue lisinopril.  Patient to continue checking BP at home and notify if consistently greater than 140 systolic or less than 110  3.  Rest when needed, cont healthy lifestyle and diet  4.  We will plan mammogram once port is removed in the future  5.  Needs DEXA scan, will wait for period of time off of chemo.   6.  Consider ferrous sulfate, patient to discuss with Dr. Hernandez.   7.  Reviewed recent labs with continued anemia/thrombocytopenia but improvement in renal function  8. return in 3mo, or for hospital follow-up or otherwise.     I spent 30 minutes caring for Lucy Mahan on this date of service. This time includes time spent by me in the following activities: preparing for the visit, reviewing tests, performing a medically appropriate examination and/or evaluation , counseling and educating the patient/family/caregiver, ordering medications, tests, or procedures and documenting information in the medical record        Follow Up     Return in about 3 months (around 7/19/2022) for Recheck ovarian cancer.  Patient was given instructions and counseling regarding her condition or for health maintenance advice. Please see specific information pulled into the AVS if appropriate.        Part of this note may be an electronic transcription/translation of spoken language to printed text using the Dragon Dictation System

## 2022-04-21 ENCOUNTER — LAB (OUTPATIENT)
Dept: LAB | Facility: HOSPITAL | Age: 62
End: 2022-04-21

## 2022-04-21 ENCOUNTER — TELEPHONE (OUTPATIENT)
Dept: ONCOLOGY | Facility: HOSPITAL | Age: 62
End: 2022-04-21

## 2022-04-21 DIAGNOSIS — C56.9 MALIGNANT NEOPLASM OF OVARY, UNSPECIFIED LATERALITY: Primary | ICD-10-CM

## 2022-04-21 DIAGNOSIS — C56.9 MALIGNANT NEOPLASM OF OVARY, UNSPECIFIED LATERALITY: ICD-10-CM

## 2022-04-21 DIAGNOSIS — G89.3 CANCER RELATED PAIN: ICD-10-CM

## 2022-04-21 LAB
BASOPHILS # BLD AUTO: 0.02 10*3/MM3 (ref 0–0.2)
BASOPHILS NFR BLD AUTO: 0.4 % (ref 0–1.5)
DEPRECATED RDW RBC AUTO: 58 FL (ref 37–54)
EOSINOPHIL # BLD AUTO: 0.01 10*3/MM3 (ref 0–0.4)
EOSINOPHIL NFR BLD AUTO: 0.2 % (ref 0.3–6.2)
ERYTHROCYTE [DISTWIDTH] IN BLOOD BY AUTOMATED COUNT: 14.6 % (ref 12.3–15.4)
HCT VFR BLD AUTO: 27.6 % (ref 34–46.6)
HGB BLD-MCNC: 8.9 G/DL (ref 12–15.9)
LYMPHOCYTES # BLD AUTO: 1.32 10*3/MM3 (ref 0.7–3.1)
LYMPHOCYTES NFR BLD AUTO: 25.2 % (ref 19.6–45.3)
MCH RBC QN AUTO: 37.2 PG (ref 26.6–33)
MCHC RBC AUTO-ENTMCNC: 32.2 G/DL (ref 31.5–35.7)
MCV RBC AUTO: 115.5 FL (ref 79–97)
MONOCYTES # BLD AUTO: 0.73 10*3/MM3 (ref 0.1–0.9)
MONOCYTES NFR BLD AUTO: 14 % (ref 5–12)
NEUTROPHILS NFR BLD AUTO: 3.15 10*3/MM3 (ref 1.7–7)
NEUTROPHILS NFR BLD AUTO: 60.2 % (ref 42.7–76)
PLATELET # BLD AUTO: 62 10*3/MM3 (ref 140–450)
PMV BLD AUTO: 9.4 FL (ref 6–12)
RBC # BLD AUTO: 2.39 10*6/MM3 (ref 3.77–5.28)
WBC NRBC COR # BLD: 5.23 10*3/MM3 (ref 3.4–10.8)

## 2022-04-21 PROCEDURE — 85025 COMPLETE CBC W/AUTO DIFF WBC: CPT

## 2022-04-21 PROCEDURE — 36415 COLL VENOUS BLD VENIPUNCTURE: CPT

## 2022-04-21 NOTE — TELEPHONE ENCOUNTER
Rx Refill Note  Requested Prescriptions     Pending Prescriptions Disp Refills   • oxyCODONE-acetaminophen (PERCOCET) 5-325 MG per tablet 120 tablet 0     Sig: Take 1 tablet by mouth Every 6 (Six) Hours As Needed for Moderate Pain .      Last office visit with prescribing clinician: 4/14/2022      Next office visit with prescribing clinician: 5/17/2022            Mayda Harris RN  04/21/22, 14:31 EDT

## 2022-04-21 NOTE — TELEPHONE ENCOUNTER
Called patient and let her know that her platelets were low and our np would like her cbc rechecked in one week. She states she has not had issues and is having surgery next Tuesday so it would have to be two weeks. She is having surgery at [facility]. I let her know we will be able to follow the blood work from there. Patient to call with any bleeding issues.

## 2022-04-22 ENCOUNTER — PRE-ADMISSION TESTING (OUTPATIENT)
Dept: PREADMISSION TESTING | Facility: HOSPITAL | Age: 62
End: 2022-04-22

## 2022-04-22 ENCOUNTER — TELEPHONE (OUTPATIENT)
Dept: ONCOLOGY | Facility: CLINIC | Age: 62
End: 2022-04-22

## 2022-04-22 ENCOUNTER — HOSPITAL ENCOUNTER (OUTPATIENT)
Dept: GENERAL RADIOLOGY | Facility: HOSPITAL | Age: 62
Discharge: HOME | End: 2022-04-22

## 2022-04-22 VITALS
DIASTOLIC BLOOD PRESSURE: 76 MMHG | RESPIRATION RATE: 16 BRPM | HEART RATE: 77 BPM | BODY MASS INDEX: 29.66 KG/M2 | HEIGHT: 65 IN | SYSTOLIC BLOOD PRESSURE: 132 MMHG | WEIGHT: 178 LBS | TEMPERATURE: 97.6 F | OXYGEN SATURATION: 100 %

## 2022-04-22 DIAGNOSIS — C56.9 MALIGNANT NEOPLASM OF OVARY, UNSPECIFIED LATERALITY: ICD-10-CM

## 2022-04-22 LAB
ABO GROUP BLD: NORMAL
ANION GAP SERPL CALCULATED.3IONS-SCNC: 9.8 MMOL/L (ref 5–15)
ANISOCYTOSIS BLD QL: NORMAL
ANTI-K: NORMAL
APTT PPP: 28.2 SECONDS (ref 22.7–35.4)
B-HCG UR QL: NEGATIVE
BACTERIA UR QL AUTO: ABNORMAL /HPF
BILIRUB UR QL STRIP: NEGATIVE
BLD GP AB SCN SERPL QL: POSITIVE
BUN SERPL-MCNC: 17 MG/DL (ref 8–23)
BUN/CREAT SERPL: 13.9 (ref 7–25)
CALCIUM SPEC-SCNC: 9.3 MG/DL (ref 8.6–10.5)
CHLORIDE SERPL-SCNC: 105 MMOL/L (ref 98–107)
CLARITY UR: ABNORMAL
CO2 SERPL-SCNC: 26.2 MMOL/L (ref 22–29)
COLOR UR: YELLOW
CREAT SERPL-MCNC: 1.22 MG/DL (ref 0.57–1)
DEPRECATED RDW RBC AUTO: 54.8 FL (ref 37–54)
EGFRCR SERPLBLD CKD-EPI 2021: 50 ML/MIN/1.73
ERYTHROCYTE [DISTWIDTH] IN BLOOD BY AUTOMATED COUNT: 14.2 % (ref 12.3–15.4)
GLUCOSE SERPL-MCNC: 99 MG/DL (ref 65–99)
GLUCOSE UR STRIP-MCNC: NEGATIVE MG/DL
HCT VFR BLD AUTO: 27.2 % (ref 34–46.6)
HGB BLD-MCNC: 9.2 G/DL (ref 12–15.9)
HGB UR QL STRIP.AUTO: ABNORMAL
HYALINE CASTS UR QL AUTO: ABNORMAL /LPF
INR PPP: 1.01 (ref 0.9–1.1)
KETONES UR QL STRIP: NEGATIVE
LEUKOCYTE ESTERASE UR QL STRIP.AUTO: ABNORMAL
LYMPHOCYTES # BLD MANUAL: 1.4 10*3/MM3 (ref 0.7–3.1)
LYMPHOCYTES NFR BLD MANUAL: 5.1 % (ref 5–12)
MACROCYTES BLD QL SMEAR: NORMAL
MCH RBC QN AUTO: 36.2 PG (ref 26.6–33)
MCHC RBC AUTO-ENTMCNC: 33.8 G/DL (ref 31.5–35.7)
MCV RBC AUTO: 107.1 FL (ref 79–97)
MONOCYTES # BLD: 0.26 10*3/MM3 (ref 0.1–0.9)
NEUTROPHILS # BLD AUTO: 3.46 10*3/MM3 (ref 1.7–7)
NEUTROPHILS NFR BLD MANUAL: 67.7 % (ref 42.7–76)
NITRITE UR QL STRIP: NEGATIVE
PH UR STRIP.AUTO: <=5 [PH] (ref 5–8)
PLAT MORPH BLD: NORMAL
PLATELET # BLD AUTO: 69 10*3/MM3 (ref 140–450)
PMV BLD AUTO: 10.2 FL (ref 6–12)
POTASSIUM SERPL-SCNC: 3.8 MMOL/L (ref 3.5–5.2)
PROT UR QL STRIP: ABNORMAL
PROTHROMBIN TIME: 13.2 SECONDS (ref 11.7–14.2)
QT INTERVAL: 377 MS
RBC # BLD AUTO: 2.54 10*6/MM3 (ref 3.77–5.28)
RBC # UR STRIP: ABNORMAL /HPF
REF LAB TEST METHOD: ABNORMAL
RH BLD: POSITIVE
SODIUM SERPL-SCNC: 141 MMOL/L (ref 136–145)
SP GR UR STRIP: 1.02 (ref 1–1.03)
SQUAMOUS #/AREA URNS HPF: ABNORMAL /HPF
T&S EXPIRATION DATE: NORMAL
UROBILINOGEN UR QL STRIP: ABNORMAL
VARIANT LYMPHS NFR BLD MANUAL: 27.3 % (ref 19.6–45.3)
WBC # UR STRIP: ABNORMAL /HPF
WBC MORPH BLD: NORMAL
WBC NRBC COR # BLD: 5.11 10*3/MM3 (ref 3.4–10.8)

## 2022-04-22 PROCEDURE — 85007 BL SMEAR W/DIFF WBC COUNT: CPT

## 2022-04-22 PROCEDURE — 86901 BLOOD TYPING SEROLOGIC RH(D): CPT

## 2022-04-22 PROCEDURE — 85025 COMPLETE CBC W/AUTO DIFF WBC: CPT

## 2022-04-22 PROCEDURE — 86920 COMPATIBILITY TEST SPIN: CPT

## 2022-04-22 PROCEDURE — 85610 PROTHROMBIN TIME: CPT

## 2022-04-22 PROCEDURE — 81025 URINE PREGNANCY TEST: CPT

## 2022-04-22 PROCEDURE — 86922 COMPATIBILITY TEST ANTIGLOB: CPT

## 2022-04-22 PROCEDURE — 86870 RBC ANTIBODY IDENTIFICATION: CPT

## 2022-04-22 PROCEDURE — 80048 BASIC METABOLIC PNL TOTAL CA: CPT

## 2022-04-22 PROCEDURE — 86905 BLOOD TYPING RBC ANTIGENS: CPT

## 2022-04-22 PROCEDURE — 71045 X-RAY EXAM CHEST 1 VIEW: CPT

## 2022-04-22 PROCEDURE — 86850 RBC ANTIBODY SCREEN: CPT

## 2022-04-22 PROCEDURE — 81001 URINALYSIS AUTO W/SCOPE: CPT

## 2022-04-22 PROCEDURE — 93005 ELECTROCARDIOGRAM TRACING: CPT

## 2022-04-22 PROCEDURE — 85730 THROMBOPLASTIN TIME PARTIAL: CPT

## 2022-04-22 PROCEDURE — 87086 URINE CULTURE/COLONY COUNT: CPT

## 2022-04-22 PROCEDURE — 36415 COLL VENOUS BLD VENIPUNCTURE: CPT

## 2022-04-22 PROCEDURE — 93010 ELECTROCARDIOGRAM REPORT: CPT | Performed by: INTERNAL MEDICINE

## 2022-04-22 PROCEDURE — 86900 BLOOD TYPING SEROLOGIC ABO: CPT

## 2022-04-22 RX ORDER — OXYCODONE HYDROCHLORIDE AND ACETAMINOPHEN 5; 325 MG/1; MG/1
1 TABLET ORAL EVERY 6 HOURS PRN
Qty: 60 TABLET | Refills: 0 | Status: SHIPPED | OUTPATIENT
Start: 2022-04-22 | End: 2022-06-22

## 2022-04-22 NOTE — DISCHARGE INSTRUCTIONS
Take the following medications the morning of surgery:      AMLODIPINE, GABAPENTIN, PLAQUNIL, LEVOTHYROXINE, OXYCODONE, ZOFRAN      ARRIVE TO MAIN SURGERY AT 5:30 AM ON 4/26/2022      If you are on prescription narcotic pain medication to control your pain you may also take that medication the morning of surgery.    General Instructions:  Do not eat solid food after midnight the night before surgery.  You may drink clear liquids day of surgery but must stop at least one hour before your hospital arrival time.  It is beneficial for you to have a clear drink that contains carbohydrates the day of surgery.  We suggest a 12 to 20 ounce bottle of Gatorade or Powerade for non-diabetic patients or a 12 to 20 ounce bottle of G2 or Powerade Zero for diabetic patients. (Pediatric patients, are not advised to drink a 12 to 20 ounce carbohydrate drink)    Clear liquids are liquids you can see through.  Nothing red in color.     Plain water                               Sports drinks  Sodas                                   Gelatin (Jell-O)  Fruit juices without pulp such as white grape juice and apple juice  Popsicles that contain no fruit or yogurt  Tea or coffee (no cream or milk added)  Gatorade / Powerade  G2 / Powerade Zero    Infants may have breast milk up to four hours before surgery.  Infants drinking formula may drink formula up to six hours before surgery.   Patients who avoid smoking, chewing tobacco and alcohol for 4 weeks prior to surgery have a reduced risk of post-operative complications.  Quit smoking as many days before surgery as you can.  Do not smoke, use chewing tobacco or drink alcohol the day of surgery.   If applicable bring your C-PAP/ BI-PAP machine.  Bring any papers given to you in the doctor’s office.  Wear clean comfortable clothes.  Do not wear contact lenses, false eyelashes or make-up.  Bring a case for your glasses.   Bring crutches or walker if applicable.  Remove all piercings.  Leave  jewelry and any other valuables at home.  Hair extensions with metal clips must be removed prior to surgery.  The Pre-Admission Testing nurse will instruct you to bring medications if unable to obtain an accurate list in Pre-Admission Testing.        If you were given a blood bank ID arm band remember to bring it with you the day of surgery.    Preventing a Surgical Site Infection:  For 2 to 3 days before surgery, avoid shaving with a razor because the razor can irritate skin and make it easier to develop an infection.    Any areas of open skin can increase the risk of a post-operative wound infection by allowing bacteria to enter and travel throughout the body.  Notify your surgeon if you have any skin wounds / rashes even if it is not near the expected surgical site.  The area will need assessed to determine if surgery should be delayed until it is healed.  The night prior to surgery shower using a fresh bar of anti-bacterial soap (such as Dial) and clean washcloth.  Sleep in a clean bed with clean clothing.  Do not allow pets to sleep with you.  Shower on the morning of surgery using a fresh bar of anti-bacterial soap (such as Dial) and clean washcloth.  Dry with a clean towel and dress in clean clothing.  Ask your surgeon if you will be receiving antibiotics prior to surgery.  Make sure you, your family, and all healthcare providers clean their hands with soap and water or an alcohol based hand  before caring for you or your wound.    Day of surgery:  Your arrival time is approximately two hours before your scheduled surgery time.  Upon arrival, a Pre-op nurse and Anesthesiologist will review your health history, obtain vital signs, and answer questions you may have.  The only belongings needed at this time will be a list of your home medications and if applicable your C-PAP/BI-PAP machine.  A Pre-op nurse will start an IV and you may receive medication in preparation for surgery, including something to  help you relax.     Please be aware that surgery does come with discomfort.  We want to make every effort to control your discomfort so please discuss any uncontrolled symptoms with your nurse.   Your doctor will most likely have prescribed pain medications.      If you are going home after surgery you will receive individualized written care instructions before being discharged.  A responsible adult must drive you to and from the hospital on the day of your surgery and stay with you for 24 hours.  Discharge prescriptions can be filled by the hospital pharmacy during regular pharmacy hours.  If you are having surgery late in the day/evening your prescription may be e-prescribed to your pharmacy.  Please verify your pharmacy hours or chose a 24 hour pharmacy to avoid not having access to your prescription because your pharmacy has closed for the day.    If you are staying overnight following surgery, you will be transported to your hospital room following the recovery period.  [facility] has all private rooms.    If you have any questions please call Pre-Admission Testing at [phone].  Deductibles and co-payments are collected on the day of service. Please be prepared to pay the required co-pay, deductible or deposit on the day of service as defined by your plan.    Patient Education for Self-Quarantine Process    Following your COVID testing, we strongly recommend that you wear a mask when you are with other people and practice social distancing.   Limit your activities to only required outings.  Wash your hands with soap and water frequently for at least 20 seconds.   Avoid touching your eyes, nose and mouth with unwashed hands.  Do not share anything - utensils, drinking glasses, food from the same bowl.   Sanitize household surfaces daily. Include all high touch areas (door handles, light switches, phones, countertops, etc.)    Call your surgeon immediately if you experience any of the  following symptoms:  Sore Throat  Shortness of Breath or difficulty breathing  Cough  Chills  Body soreness or muscle pain  Headache  Fever  New loss of taste or smell  Do not arrive for your surgery ill.  Your procedure will need to be rescheduled to another time.  You will need to call your physician before the day of surgery to avoid any unnecessary exposure to hospital staff as well as other patients.

## 2022-04-22 NOTE — TELEPHONE ENCOUNTER
Caller: Lucy Mahan    Relationship: Self    Best call back number: [phone]    Requested Prescriptions:   OXYCODONE 5-325       Pharmacy where request should be sent:    [address], [facility]. 400 - [phone]  - [phone]    Does the patient have less than a 3 day supply:  [x] Yes  [] No    John GIBSON Rep   04/22/22 10:58 EDT

## 2022-04-23 ENCOUNTER — LAB (OUTPATIENT)
Dept: LAB | Facility: HOSPITAL | Age: 62
End: 2022-04-23

## 2022-04-23 DIAGNOSIS — C56.9 MALIGNANT NEOPLASM OF OVARY, UNSPECIFIED LATERALITY: ICD-10-CM

## 2022-04-23 LAB
BACTERIA SPEC AEROBE CULT: NORMAL
SARS-COV-2 ORF1AB RESP QL NAA+PROBE: NOT DETECTED

## 2022-04-23 PROCEDURE — C9803 HOPD COVID-19 SPEC COLLECT: HCPCS

## 2022-04-23 PROCEDURE — U0004 COV-19 TEST NON-CDC HGH THRU: HCPCS

## 2022-04-25 LAB — KELL: NEGATIVE

## 2022-04-25 PROCEDURE — 86902 BLOOD TYPE ANTIGEN DONOR EA: CPT

## 2022-04-25 PROCEDURE — 86850 RBC ANTIBODY SCREEN: CPT

## 2022-04-26 ENCOUNTER — ANESTHESIA (OUTPATIENT)
Dept: PERIOP | Facility: HOSPITAL | Age: 62
End: 2022-04-26

## 2022-04-26 ENCOUNTER — ANESTHESIA EVENT (OUTPATIENT)
Dept: PERIOP | Facility: HOSPITAL | Age: 62
End: 2022-04-26

## 2022-04-26 ENCOUNTER — HOSPITAL ENCOUNTER (INPATIENT)
Facility: HOSPITAL | Age: 62
LOS: 3 days | Discharge: HOME | End: 2022-04-29
Attending: OBSTETRICS & GYNECOLOGY | Admitting: OBSTETRICS & GYNECOLOGY

## 2022-04-26 DIAGNOSIS — C56.9 MALIGNANT NEOPLASM OF OVARY, UNSPECIFIED LATERALITY: ICD-10-CM

## 2022-04-26 DIAGNOSIS — D64.9 ANEMIA, UNSPECIFIED TYPE: ICD-10-CM

## 2022-04-26 LAB
BASOPHILS # BLD AUTO: 0.02 10*3/MM3 (ref 0–0.2)
BASOPHILS # BLD AUTO: 0.03 10*3/MM3 (ref 0–0.2)
BASOPHILS NFR BLD AUTO: 0.2 % (ref 0–1.5)
BASOPHILS NFR BLD AUTO: 0.6 % (ref 0–1.5)
BH BB BLOOD EXPIRATION DATE: NORMAL
BH BB BLOOD TYPE BARCODE: 6200
BH BB DISPENSE STATUS: NORMAL
BH BB PRODUCT CODE: NORMAL
BH BB UNIT NUMBER: NORMAL
DEPRECATED RDW RBC AUTO: 53.8 FL (ref 37–54)
DEPRECATED RDW RBC AUTO: 56.5 FL (ref 37–54)
EOSINOPHIL # BLD AUTO: 0 10*3/MM3 (ref 0–0.4)
EOSINOPHIL # BLD AUTO: 0.02 10*3/MM3 (ref 0–0.4)
EOSINOPHIL NFR BLD AUTO: 0 % (ref 0.3–6.2)
EOSINOPHIL NFR BLD AUTO: 0.4 % (ref 0.3–6.2)
ERYTHROCYTE [DISTWIDTH] IN BLOOD BY AUTOMATED COUNT: 13.6 % (ref 12.3–15.4)
ERYTHROCYTE [DISTWIDTH] IN BLOOD BY AUTOMATED COUNT: 13.9 % (ref 12.3–15.4)
HCT VFR BLD AUTO: 26.4 % (ref 34–46.6)
HCT VFR BLD AUTO: 28.8 % (ref 34–46.6)
HGB BLD-MCNC: 8.8 G/DL (ref 12–15.9)
HGB BLD-MCNC: 9.6 G/DL (ref 12–15.9)
LYMPHOCYTES # BLD AUTO: 0.23 10*3/MM3 (ref 0.7–3.1)
LYMPHOCYTES # BLD AUTO: 1.58 10*3/MM3 (ref 0.7–3.1)
LYMPHOCYTES NFR BLD AUTO: 2.3 % (ref 19.6–45.3)
LYMPHOCYTES NFR BLD AUTO: 31.2 % (ref 19.6–45.3)
MCH RBC QN AUTO: 37 PG (ref 26.6–33)
MCH RBC QN AUTO: 37.4 PG (ref 26.6–33)
MCHC RBC AUTO-ENTMCNC: 33.3 G/DL (ref 31.5–35.7)
MCHC RBC AUTO-ENTMCNC: 33.3 G/DL (ref 31.5–35.7)
MCV RBC AUTO: 110.9 FL (ref 79–97)
MCV RBC AUTO: 112.1 FL (ref 79–97)
MONOCYTES # BLD AUTO: 0.3 10*3/MM3 (ref 0.1–0.9)
MONOCYTES # BLD AUTO: 0.44 10*3/MM3 (ref 0.1–0.9)
MONOCYTES NFR BLD AUTO: 3 % (ref 5–12)
MONOCYTES NFR BLD AUTO: 8.7 % (ref 5–12)
NEUTROPHILS NFR BLD AUTO: 2.98 10*3/MM3 (ref 1.7–7)
NEUTROPHILS NFR BLD AUTO: 58.9 % (ref 42.7–76)
NEUTROPHILS NFR BLD AUTO: 9.38 10*3/MM3 (ref 1.7–7)
NEUTROPHILS NFR BLD AUTO: 94.1 % (ref 42.7–76)
PLATELET # BLD AUTO: 105 10*3/MM3 (ref 140–450)
PLATELET # BLD AUTO: 86 10*3/MM3 (ref 140–450)
PMV BLD AUTO: 10.4 FL (ref 6–12)
PMV BLD AUTO: 10.6 FL (ref 6–12)
RBC # BLD AUTO: 2.38 10*6/MM3 (ref 3.77–5.28)
RBC # BLD AUTO: 2.57 10*6/MM3 (ref 3.77–5.28)
UNIT  ABO: NORMAL
UNIT  RH: NORMAL
WBC NRBC COR # BLD: 5.06 10*3/MM3 (ref 3.4–10.8)
WBC NRBC COR # BLD: 9.97 10*3/MM3 (ref 3.4–10.8)

## 2022-04-26 PROCEDURE — 86901 BLOOD TYPING SEROLOGIC RH(D): CPT

## 2022-04-26 PROCEDURE — 25010000002 FENTANYL CITRATE (PF) 50 MCG/ML SOLUTION: Performed by: NURSE ANESTHETIST, CERTIFIED REGISTERED

## 2022-04-26 PROCEDURE — 0TN70ZZ RELEASE LEFT URETER, OPEN APPROACH: ICD-10-PCS | Performed by: OBSTETRICS & GYNECOLOGY

## 2022-04-26 PROCEDURE — 58953 TAH RAD DISSECT FOR DEBULK: CPT | Performed by: OBSTETRICS & GYNECOLOGY

## 2022-04-26 PROCEDURE — 25010000002 CEFAZOLIN IN DEXTROSE 2-4 GM/100ML-% SOLUTION: Performed by: OBSTETRICS & GYNECOLOGY

## 2022-04-26 PROCEDURE — 36430 TRANSFUSION BLD/BLD COMPNT: CPT

## 2022-04-26 PROCEDURE — 0 BUPIVACAINE LIPOSOME 1.3 % SUSPENSION: Performed by: ANESTHESIOLOGY

## 2022-04-26 PROCEDURE — P9035 PLATELET PHERES LEUKOREDUCED: HCPCS

## 2022-04-26 PROCEDURE — C1889 IMPLANT/INSERT DEVICE, NOC: HCPCS | Performed by: OBSTETRICS & GYNECOLOGY

## 2022-04-26 PROCEDURE — 86900 BLOOD TYPING SEROLOGIC ABO: CPT

## 2022-04-26 PROCEDURE — 88305 TISSUE EXAM BY PATHOLOGIST: CPT | Performed by: OBSTETRICS & GYNECOLOGY

## 2022-04-26 PROCEDURE — 88342 IMHCHEM/IMCYTCHM 1ST ANTB: CPT | Performed by: OBSTETRICS & GYNECOLOGY

## 2022-04-26 PROCEDURE — 0DBU0ZZ EXCISION OF OMENTUM, OPEN APPROACH: ICD-10-PCS | Performed by: OBSTETRICS & GYNECOLOGY

## 2022-04-26 PROCEDURE — 25010000002 ONDANSETRON PER 1 MG: Performed by: NURSE ANESTHETIST, CERTIFIED REGISTERED

## 2022-04-26 PROCEDURE — 25010000002 KETOROLAC TROMETHAMINE PER 15 MG: Performed by: OBSTETRICS & GYNECOLOGY

## 2022-04-26 PROCEDURE — P9100 PATHOGEN TEST FOR PLATELETS: HCPCS

## 2022-04-26 PROCEDURE — 25010000002 NEOSTIGMINE 5 MG/10ML SOLUTION: Performed by: NURSE ANESTHETIST, CERTIFIED REGISTERED

## 2022-04-26 PROCEDURE — 0 HYDROMORPHONE HCL PF 50 MG/5ML SOLUTION: Performed by: OBSTETRICS & GYNECOLOGY

## 2022-04-26 PROCEDURE — C9290 INJ, BUPIVACAINE LIPOSOME: HCPCS | Performed by: ANESTHESIOLOGY

## 2022-04-26 PROCEDURE — 88360 TUMOR IMMUNOHISTOCHEM/MANUAL: CPT | Performed by: OBSTETRICS & GYNECOLOGY

## 2022-04-26 PROCEDURE — 0UT20ZZ RESECTION OF BILATERAL OVARIES, OPEN APPROACH: ICD-10-PCS | Performed by: OBSTETRICS & GYNECOLOGY

## 2022-04-26 PROCEDURE — 0UT90ZZ RESECTION OF UTERUS, OPEN APPROACH: ICD-10-PCS | Performed by: OBSTETRICS & GYNECOLOGY

## 2022-04-26 PROCEDURE — 25010000002 PROPOFOL 10 MG/ML EMULSION: Performed by: NURSE ANESTHETIST, CERTIFIED REGISTERED

## 2022-04-26 PROCEDURE — 25010000002 DEXAMETHASONE PER 1 MG: Performed by: NURSE ANESTHETIST, CERTIFIED REGISTERED

## 2022-04-26 PROCEDURE — 0TN60ZZ RELEASE RIGHT URETER, OPEN APPROACH: ICD-10-PCS | Performed by: OBSTETRICS & GYNECOLOGY

## 2022-04-26 PROCEDURE — 25010000002 HYDROMORPHONE PER 4 MG: Performed by: NURSE ANESTHETIST, CERTIFIED REGISTERED

## 2022-04-26 PROCEDURE — 88341 IMHCHEM/IMCYTCHM EA ADD ANTB: CPT | Performed by: OBSTETRICS & GYNECOLOGY

## 2022-04-26 PROCEDURE — 88309 TISSUE EXAM BY PATHOLOGIST: CPT | Performed by: OBSTETRICS & GYNECOLOGY

## 2022-04-26 PROCEDURE — 0UT70ZZ RESECTION OF BILATERAL FALLOPIAN TUBES, OPEN APPROACH: ICD-10-PCS | Performed by: OBSTETRICS & GYNECOLOGY

## 2022-04-26 PROCEDURE — 85025 COMPLETE CBC W/AUTO DIFF WBC: CPT | Performed by: OBSTETRICS & GYNECOLOGY

## 2022-04-26 DEVICE — CLIP LIGAT VASC HORIZON TI MD/LG GRN 6CT: Type: IMPLANTABLE DEVICE | Site: ABDOMEN | Status: FUNCTIONAL

## 2022-04-26 DEVICE — SEALANT WND FIBRIN TISSEEL PREFIL/SYR/PRIMAFZ 10ML: Type: IMPLANTABLE DEVICE | Site: ABDOMEN | Status: FUNCTIONAL

## 2022-04-26 DEVICE — CLIP LIGAT VASC HORIZON TI LG ORNG 6CT: Type: IMPLANTABLE DEVICE | Site: ABDOMEN | Status: FUNCTIONAL

## 2022-04-26 RX ORDER — HYDROCODONE BITARTRATE AND ACETAMINOPHEN 10; 325 MG/1; MG/1
1 TABLET ORAL EVERY 4 HOURS PRN
Status: DISCONTINUED | OUTPATIENT
Start: 2022-04-26 | End: 2022-04-29 | Stop reason: HOSPADM

## 2022-04-26 RX ORDER — SODIUM CHLORIDE 0.9 % (FLUSH) 0.9 %
10 SYRINGE (ML) INJECTION EVERY 12 HOURS SCHEDULED
Status: DISCONTINUED | OUTPATIENT
Start: 2022-04-26 | End: 2022-04-26 | Stop reason: HOSPADM

## 2022-04-26 RX ORDER — SODIUM CHLORIDE 0.9 % (FLUSH) 0.9 %
3-10 SYRINGE (ML) INJECTION AS NEEDED
Status: DISCONTINUED | OUTPATIENT
Start: 2022-04-26 | End: 2022-04-26 | Stop reason: HOSPADM

## 2022-04-26 RX ORDER — LEVOTHYROXINE SODIUM 137 UG/1
137 TABLET ORAL DAILY
Status: DISCONTINUED | OUTPATIENT
Start: 2022-04-27 | End: 2022-04-29 | Stop reason: HOSPADM

## 2022-04-26 RX ORDER — ONDANSETRON 2 MG/ML
4 INJECTION INTRAMUSCULAR; INTRAVENOUS ONCE AS NEEDED
Status: DISCONTINUED | OUTPATIENT
Start: 2022-04-26 | End: 2022-04-26 | Stop reason: HOSPADM

## 2022-04-26 RX ORDER — HYDROCODONE BITARTRATE AND ACETAMINOPHEN 7.5; 325 MG/1; MG/1
1 TABLET ORAL ONCE AS NEEDED
Status: DISCONTINUED | OUTPATIENT
Start: 2022-04-26 | End: 2022-04-26 | Stop reason: HOSPADM

## 2022-04-26 RX ORDER — EPHEDRINE SULFATE 50 MG/ML
INJECTION, SOLUTION INTRAVENOUS AS NEEDED
Status: DISCONTINUED | OUTPATIENT
Start: 2022-04-26 | End: 2022-04-26 | Stop reason: SURG

## 2022-04-26 RX ORDER — HYDROMORPHONE HYDROCHLORIDE 1 MG/ML
0.5 INJECTION, SOLUTION INTRAMUSCULAR; INTRAVENOUS; SUBCUTANEOUS
Status: DISCONTINUED | OUTPATIENT
Start: 2022-04-26 | End: 2022-04-29 | Stop reason: HOSPADM

## 2022-04-26 RX ORDER — ENOXAPARIN SODIUM 100 MG/ML
30 INJECTION SUBCUTANEOUS DAILY
Status: DISCONTINUED | OUTPATIENT
Start: 2022-04-27 | End: 2022-04-28

## 2022-04-26 RX ORDER — HYDROMORPHONE HCL 110MG/55ML
PATIENT CONTROLLED ANALGESIA SYRINGE INTRAVENOUS AS NEEDED
Status: DISCONTINUED | OUTPATIENT
Start: 2022-04-26 | End: 2022-04-26 | Stop reason: SURG

## 2022-04-26 RX ORDER — SODIUM CHLORIDE 0.9 % (FLUSH) 0.9 %
10 SYRINGE (ML) INJECTION AS NEEDED
Status: DISCONTINUED | OUTPATIENT
Start: 2022-04-26 | End: 2022-04-26 | Stop reason: HOSPADM

## 2022-04-26 RX ORDER — KETAMINE HYDROCHLORIDE 10 MG/ML
INJECTION INTRAMUSCULAR; INTRAVENOUS AS NEEDED
Status: DISCONTINUED | OUTPATIENT
Start: 2022-04-26 | End: 2022-04-26 | Stop reason: SURG

## 2022-04-26 RX ORDER — NEOSTIGMINE METHYLSULFATE 0.5 MG/ML
INJECTION, SOLUTION INTRAVENOUS AS NEEDED
Status: DISCONTINUED | OUTPATIENT
Start: 2022-04-26 | End: 2022-04-26 | Stop reason: SURG

## 2022-04-26 RX ORDER — NALOXONE HCL 0.4 MG/ML
0.4 VIAL (ML) INJECTION
Status: DISCONTINUED | OUTPATIENT
Start: 2022-04-26 | End: 2022-04-29 | Stop reason: HOSPADM

## 2022-04-26 RX ORDER — HYDROMORPHONE HCL IN 0.9% NACL 10 MG/50ML
PATIENT CONTROLLED ANALGESIA SYRINGE INTRAVENOUS CONTINUOUS
Status: DISCONTINUED | OUTPATIENT
Start: 2022-04-26 | End: 2022-04-28

## 2022-04-26 RX ORDER — LIDOCAINE AND PRILOCAINE 25; 25 MG/G; MG/G
1 CREAM TOPICAL TAKE AS DIRECTED
Status: DISCONTINUED | OUTPATIENT
Start: 2022-04-27 | End: 2022-04-29 | Stop reason: HOSPADM

## 2022-04-26 RX ORDER — FAMOTIDINE 10 MG/ML
20 INJECTION, SOLUTION INTRAVENOUS ONCE
Status: COMPLETED | OUTPATIENT
Start: 2022-04-26 | End: 2022-04-26

## 2022-04-26 RX ORDER — MIDAZOLAM HYDROCHLORIDE 1 MG/ML
1 INJECTION INTRAMUSCULAR; INTRAVENOUS
Status: DISCONTINUED | OUTPATIENT
Start: 2022-04-26 | End: 2022-04-26 | Stop reason: HOSPADM

## 2022-04-26 RX ORDER — DOCUSATE SODIUM 100 MG/1
100 CAPSULE, LIQUID FILLED ORAL 2 TIMES DAILY PRN
Status: DISCONTINUED | OUTPATIENT
Start: 2022-04-26 | End: 2022-04-29 | Stop reason: HOSPADM

## 2022-04-26 RX ORDER — KETOROLAC TROMETHAMINE 30 MG/ML
30 INJECTION, SOLUTION INTRAMUSCULAR; INTRAVENOUS EVERY 8 HOURS
Status: COMPLETED | OUTPATIENT
Start: 2022-04-26 | End: 2022-04-27

## 2022-04-26 RX ORDER — LABETALOL HYDROCHLORIDE 5 MG/ML
5 INJECTION, SOLUTION INTRAVENOUS
Status: DISCONTINUED | OUTPATIENT
Start: 2022-04-26 | End: 2022-04-26 | Stop reason: HOSPADM

## 2022-04-26 RX ORDER — ONDANSETRON 4 MG/1
4 TABLET, FILM COATED ORAL EVERY 6 HOURS PRN
Status: DISCONTINUED | OUTPATIENT
Start: 2022-04-26 | End: 2022-04-29 | Stop reason: HOSPADM

## 2022-04-26 RX ORDER — BUPIVACAINE HYDROCHLORIDE 2.5 MG/ML
INJECTION, SOLUTION EPIDURAL; INFILTRATION; INTRACAUDAL
Status: COMPLETED | OUTPATIENT
Start: 2022-04-26 | End: 2022-04-26

## 2022-04-26 RX ORDER — OXYCODONE AND ACETAMINOPHEN 7.5; 325 MG/1; MG/1
1 TABLET ORAL EVERY 4 HOURS PRN
Status: DISCONTINUED | OUTPATIENT
Start: 2022-04-26 | End: 2022-04-26 | Stop reason: HOSPADM

## 2022-04-26 RX ORDER — AMLODIPINE BESYLATE 5 MG/1
5 TABLET ORAL DAILY
Status: DISCONTINUED | OUTPATIENT
Start: 2022-04-27 | End: 2022-04-29 | Stop reason: HOSPADM

## 2022-04-26 RX ORDER — CEFAZOLIN SODIUM 2 G/100ML
2 INJECTION, SOLUTION INTRAVENOUS ONCE
Status: COMPLETED | OUTPATIENT
Start: 2022-04-26 | End: 2022-04-26

## 2022-04-26 RX ORDER — HYDROMORPHONE HYDROCHLORIDE 1 MG/ML
0.5 INJECTION, SOLUTION INTRAMUSCULAR; INTRAVENOUS; SUBCUTANEOUS
Status: DISCONTINUED | OUTPATIENT
Start: 2022-04-26 | End: 2022-04-26 | Stop reason: HOSPADM

## 2022-04-26 RX ORDER — BISACODYL 10 MG
10 SUPPOSITORY, RECTAL RECTAL DAILY PRN
Status: DISCONTINUED | OUTPATIENT
Start: 2022-04-26 | End: 2022-04-29 | Stop reason: HOSPADM

## 2022-04-26 RX ORDER — BUPIVACAINE HYDROCHLORIDE 5 MG/ML
INJECTION, SOLUTION EPIDURAL; INTRACAUDAL
Status: COMPLETED | OUTPATIENT
Start: 2022-04-26 | End: 2022-04-26

## 2022-04-26 RX ORDER — DIPHENHYDRAMINE HYDROCHLORIDE 50 MG/ML
25 INJECTION INTRAMUSCULAR; INTRAVENOUS NIGHTLY PRN
Status: DISCONTINUED | OUTPATIENT
Start: 2022-04-26 | End: 2022-04-29 | Stop reason: HOSPADM

## 2022-04-26 RX ORDER — PROMETHAZINE HYDROCHLORIDE 25 MG/1
25 SUPPOSITORY RECTAL ONCE AS NEEDED
Status: DISCONTINUED | OUTPATIENT
Start: 2022-04-26 | End: 2022-04-26 | Stop reason: HOSPADM

## 2022-04-26 RX ORDER — ROCURONIUM BROMIDE 10 MG/ML
INJECTION, SOLUTION INTRAVENOUS AS NEEDED
Status: DISCONTINUED | OUTPATIENT
Start: 2022-04-26 | End: 2022-04-26 | Stop reason: SURG

## 2022-04-26 RX ORDER — LIDOCAINE HYDROCHLORIDE 20 MG/ML
INJECTION, SOLUTION INFILTRATION; PERINEURAL AS NEEDED
Status: DISCONTINUED | OUTPATIENT
Start: 2022-04-26 | End: 2022-04-26 | Stop reason: SURG

## 2022-04-26 RX ORDER — ACETAMINOPHEN 500 MG
1000 TABLET ORAL ONCE
Status: COMPLETED | OUTPATIENT
Start: 2022-04-26 | End: 2022-04-26

## 2022-04-26 RX ORDER — ACETAMINOPHEN 160 MG/5ML
650 SOLUTION ORAL EVERY 4 HOURS PRN
Status: DISCONTINUED | OUTPATIENT
Start: 2022-04-26 | End: 2022-04-29 | Stop reason: HOSPADM

## 2022-04-26 RX ORDER — PANTOPRAZOLE SODIUM 40 MG/1
40 TABLET, DELAYED RELEASE ORAL EVERY MORNING
Status: DISCONTINUED | OUTPATIENT
Start: 2022-04-27 | End: 2022-04-29 | Stop reason: HOSPADM

## 2022-04-26 RX ORDER — SODIUM CHLORIDE, SODIUM LACTATE, POTASSIUM CHLORIDE, CALCIUM CHLORIDE 600; 310; 30; 20 MG/100ML; MG/100ML; MG/100ML; MG/100ML
100 INJECTION, SOLUTION INTRAVENOUS CONTINUOUS
Status: DISCONTINUED | OUTPATIENT
Start: 2022-04-26 | End: 2022-04-29 | Stop reason: HOSPADM

## 2022-04-26 RX ORDER — SIMETHICONE 80 MG
120 TABLET,CHEWABLE ORAL
Status: DISCONTINUED | OUTPATIENT
Start: 2022-04-26 | End: 2022-04-29 | Stop reason: HOSPADM

## 2022-04-26 RX ORDER — DIPHENHYDRAMINE HCL 25 MG
25 CAPSULE ORAL NIGHTLY PRN
Status: DISCONTINUED | OUTPATIENT
Start: 2022-04-26 | End: 2022-04-29 | Stop reason: HOSPADM

## 2022-04-26 RX ORDER — HYDROCHLOROTHIAZIDE 12.5 MG/1
12.5 TABLET ORAL DAILY PRN
Status: DISCONTINUED | OUTPATIENT
Start: 2022-04-26 | End: 2022-04-29 | Stop reason: HOSPADM

## 2022-04-26 RX ORDER — NALOXONE HCL 0.4 MG/ML
0.2 VIAL (ML) INJECTION AS NEEDED
Status: DISCONTINUED | OUTPATIENT
Start: 2022-04-26 | End: 2022-04-26 | Stop reason: HOSPADM

## 2022-04-26 RX ORDER — SODIUM CHLORIDE 0.9 % (FLUSH) 0.9 %
3 SYRINGE (ML) INJECTION EVERY 12 HOURS SCHEDULED
Status: DISCONTINUED | OUTPATIENT
Start: 2022-04-26 | End: 2022-04-26 | Stop reason: HOSPADM

## 2022-04-26 RX ORDER — FENTANYL CITRATE 50 UG/ML
INJECTION, SOLUTION INTRAMUSCULAR; INTRAVENOUS AS NEEDED
Status: DISCONTINUED | OUTPATIENT
Start: 2022-04-26 | End: 2022-04-26 | Stop reason: SURG

## 2022-04-26 RX ORDER — ACETAMINOPHEN 325 MG/1
650 TABLET ORAL EVERY 4 HOURS PRN
Status: DISCONTINUED | OUTPATIENT
Start: 2022-04-26 | End: 2022-04-29 | Stop reason: HOSPADM

## 2022-04-26 RX ORDER — PROMETHAZINE HYDROCHLORIDE 12.5 MG/1
12.5 SUPPOSITORY RECTAL EVERY 6 HOURS PRN
Status: DISCONTINUED | OUTPATIENT
Start: 2022-04-26 | End: 2022-04-29 | Stop reason: HOSPADM

## 2022-04-26 RX ORDER — ONDANSETRON 2 MG/ML
4 INJECTION INTRAMUSCULAR; INTRAVENOUS EVERY 6 HOURS PRN
Status: DISCONTINUED | OUTPATIENT
Start: 2022-04-26 | End: 2022-04-29 | Stop reason: HOSPADM

## 2022-04-26 RX ORDER — FENTANYL CITRATE 50 UG/ML
50 INJECTION, SOLUTION INTRAMUSCULAR; INTRAVENOUS
Status: DISCONTINUED | OUTPATIENT
Start: 2022-04-26 | End: 2022-04-26 | Stop reason: HOSPADM

## 2022-04-26 RX ORDER — EPHEDRINE SULFATE 50 MG/ML
5 INJECTION, SOLUTION INTRAVENOUS ONCE AS NEEDED
Status: DISCONTINUED | OUTPATIENT
Start: 2022-04-26 | End: 2022-04-26 | Stop reason: HOSPADM

## 2022-04-26 RX ORDER — SODIUM CHLORIDE, SODIUM LACTATE, POTASSIUM CHLORIDE, CALCIUM CHLORIDE 600; 310; 30; 20 MG/100ML; MG/100ML; MG/100ML; MG/100ML
9 INJECTION, SOLUTION INTRAVENOUS CONTINUOUS
Status: DISCONTINUED | OUTPATIENT
Start: 2022-04-26 | End: 2022-04-29 | Stop reason: HOSPADM

## 2022-04-26 RX ORDER — GABAPENTIN 300 MG/1
300 CAPSULE ORAL 2 TIMES DAILY
Status: DISCONTINUED | OUTPATIENT
Start: 2022-04-26 | End: 2022-04-29 | Stop reason: HOSPADM

## 2022-04-26 RX ORDER — DIPHENHYDRAMINE HYDROCHLORIDE 50 MG/ML
12.5 INJECTION INTRAMUSCULAR; INTRAVENOUS
Status: DISCONTINUED | OUTPATIENT
Start: 2022-04-26 | End: 2022-04-26 | Stop reason: HOSPADM

## 2022-04-26 RX ORDER — DIPHENHYDRAMINE HCL 25 MG
25 CAPSULE ORAL
Status: DISCONTINUED | OUTPATIENT
Start: 2022-04-26 | End: 2022-04-26 | Stop reason: HOSPADM

## 2022-04-26 RX ORDER — OXYCODONE HYDROCHLORIDE AND ACETAMINOPHEN 5; 325 MG/1; MG/1
1 TABLET ORAL EVERY 6 HOURS PRN
Status: DISCONTINUED | OUTPATIENT
Start: 2022-04-26 | End: 2022-04-29 | Stop reason: HOSPADM

## 2022-04-26 RX ORDER — CALCIUM CARBONATE 200(500)MG
2 TABLET,CHEWABLE ORAL 3 TIMES DAILY PRN
Status: DISCONTINUED | OUTPATIENT
Start: 2022-04-26 | End: 2022-04-29 | Stop reason: HOSPADM

## 2022-04-26 RX ORDER — LETROZOLE 2.5 MG/1
2.5 TABLET, FILM COATED ORAL DAILY
Status: DISCONTINUED | OUTPATIENT
Start: 2022-04-26 | End: 2022-04-29 | Stop reason: HOSPADM

## 2022-04-26 RX ORDER — PROPOFOL 10 MG/ML
VIAL (ML) INTRAVENOUS AS NEEDED
Status: DISCONTINUED | OUTPATIENT
Start: 2022-04-26 | End: 2022-04-26 | Stop reason: SURG

## 2022-04-26 RX ORDER — SODIUM CHLORIDE 9 MG/ML
100 INJECTION, SOLUTION INTRAVENOUS CONTINUOUS
Status: DISCONTINUED | OUTPATIENT
Start: 2022-04-26 | End: 2022-04-29 | Stop reason: HOSPADM

## 2022-04-26 RX ORDER — HYDRALAZINE HYDROCHLORIDE 20 MG/ML
5 INJECTION INTRAMUSCULAR; INTRAVENOUS
Status: DISCONTINUED | OUTPATIENT
Start: 2022-04-26 | End: 2022-04-26 | Stop reason: HOSPADM

## 2022-04-26 RX ORDER — PROMETHAZINE HYDROCHLORIDE 25 MG/1
25 TABLET ORAL EVERY 6 HOURS PRN
Status: DISCONTINUED | OUTPATIENT
Start: 2022-04-26 | End: 2022-04-26 | Stop reason: SDUPTHER

## 2022-04-26 RX ORDER — PROMETHAZINE HYDROCHLORIDE 25 MG/1
25 TABLET ORAL ONCE AS NEEDED
Status: DISCONTINUED | OUTPATIENT
Start: 2022-04-26 | End: 2022-04-26 | Stop reason: HOSPADM

## 2022-04-26 RX ORDER — ACETAMINOPHEN 325 MG/1
650 TABLET ORAL ONCE AS NEEDED
Status: DISCONTINUED | OUTPATIENT
Start: 2022-04-26 | End: 2022-04-26 | Stop reason: HOSPADM

## 2022-04-26 RX ORDER — NALOXONE HCL 0.4 MG/ML
0.1 VIAL (ML) INJECTION
Status: DISCONTINUED | OUTPATIENT
Start: 2022-04-26 | End: 2022-04-29 | Stop reason: HOSPADM

## 2022-04-26 RX ORDER — PROMETHAZINE HYDROCHLORIDE 12.5 MG/1
12.5 TABLET ORAL EVERY 6 HOURS PRN
Status: DISCONTINUED | OUTPATIENT
Start: 2022-04-26 | End: 2022-04-29 | Stop reason: HOSPADM

## 2022-04-26 RX ORDER — FAMOTIDINE 20 MG/1
20 TABLET, FILM COATED ORAL DAILY
Status: DISCONTINUED | OUTPATIENT
Start: 2022-04-26 | End: 2022-04-29 | Stop reason: HOSPADM

## 2022-04-26 RX ORDER — ONDANSETRON 2 MG/ML
INJECTION INTRAMUSCULAR; INTRAVENOUS AS NEEDED
Status: DISCONTINUED | OUTPATIENT
Start: 2022-04-26 | End: 2022-04-26 | Stop reason: SURG

## 2022-04-26 RX ORDER — FLUMAZENIL 0.1 MG/ML
0.2 INJECTION INTRAVENOUS AS NEEDED
Status: DISCONTINUED | OUTPATIENT
Start: 2022-04-26 | End: 2022-04-26 | Stop reason: HOSPADM

## 2022-04-26 RX ORDER — GLYCOPYRROLATE 0.2 MG/ML
INJECTION INTRAMUSCULAR; INTRAVENOUS AS NEEDED
Status: DISCONTINUED | OUTPATIENT
Start: 2022-04-26 | End: 2022-04-26 | Stop reason: SURG

## 2022-04-26 RX ORDER — ONDANSETRON 4 MG/1
8 TABLET, FILM COATED ORAL 3 TIMES DAILY PRN
Status: DISCONTINUED | OUTPATIENT
Start: 2022-04-26 | End: 2022-04-26 | Stop reason: SDUPTHER

## 2022-04-26 RX ORDER — GABAPENTIN 300 MG/1
300 CAPSULE ORAL ONCE
Status: COMPLETED | OUTPATIENT
Start: 2022-04-26 | End: 2022-04-26

## 2022-04-26 RX ORDER — HYDROCODONE BITARTRATE AND ACETAMINOPHEN 5; 325 MG/1; MG/1
1 TABLET ORAL EVERY 4 HOURS PRN
Status: DISCONTINUED | OUTPATIENT
Start: 2022-04-26 | End: 2022-04-29 | Stop reason: HOSPADM

## 2022-04-26 RX ORDER — DEXAMETHASONE SODIUM PHOSPHATE 10 MG/ML
INJECTION INTRAMUSCULAR; INTRAVENOUS AS NEEDED
Status: DISCONTINUED | OUTPATIENT
Start: 2022-04-26 | End: 2022-04-26 | Stop reason: SURG

## 2022-04-26 RX ADMIN — HYDROMORPHONE HYDROCHLORIDE 0.5 MG: 2 INJECTION, SOLUTION INTRAMUSCULAR; INTRAVENOUS; SUBCUTANEOUS at 10:30

## 2022-04-26 RX ADMIN — BUPIVACAINE HYDROCHLORIDE 20 ML: 2.5 INJECTION, SOLUTION EPIDURAL; INFILTRATION; INTRACAUDAL; PERINEURAL at 07:54

## 2022-04-26 RX ADMIN — ONDANSETRON 4 MG: 2 INJECTION INTRAMUSCULAR; INTRAVENOUS at 10:20

## 2022-04-26 RX ADMIN — FENTANYL CITRATE 50 MCG: 0.05 INJECTION, SOLUTION INTRAMUSCULAR; INTRAVENOUS at 07:45

## 2022-04-26 RX ADMIN — ACETAMINOPHEN 500 MG: 500 TABLET ORAL at 07:27

## 2022-04-26 RX ADMIN — GABAPENTIN 300 MG: 300 CAPSULE ORAL at 07:27

## 2022-04-26 RX ADMIN — PROPOFOL 30 MCG/KG/MIN: 10 INJECTION, EMULSION INTRAVENOUS at 08:09

## 2022-04-26 RX ADMIN — GLYCOPYRROLATE 0.4 MG: 0.2 INJECTION INTRAMUSCULAR; INTRAVENOUS at 10:26

## 2022-04-26 RX ADMIN — SODIUM CHLORIDE, POTASSIUM CHLORIDE, SODIUM LACTATE AND CALCIUM CHLORIDE: 600; 310; 30; 20 INJECTION, SOLUTION INTRAVENOUS at 09:38

## 2022-04-26 RX ADMIN — KETOROLAC TROMETHAMINE 30 MG: 30 INJECTION, SOLUTION INTRAMUSCULAR; INTRAVENOUS at 21:13

## 2022-04-26 RX ADMIN — FENTANYL CITRATE 25 MCG: 0.05 INJECTION, SOLUTION INTRAMUSCULAR; INTRAVENOUS at 09:27

## 2022-04-26 RX ADMIN — FAMOTIDINE 20 MG: 10 INJECTION INTRAVENOUS at 07:27

## 2022-04-26 RX ADMIN — DEXAMETHASONE SODIUM PHOSPHATE 10 MG: 10 INJECTION INTRAMUSCULAR; INTRAVENOUS at 08:00

## 2022-04-26 RX ADMIN — ROCURONIUM BROMIDE 50 MG: 50 INJECTION INTRAVENOUS at 07:47

## 2022-04-26 RX ADMIN — LETROZOLE 2.5 MG: 2.5 TABLET ORAL at 14:36

## 2022-04-26 RX ADMIN — SODIUM CHLORIDE, POTASSIUM CHLORIDE, SODIUM LACTATE AND CALCIUM CHLORIDE 100 ML/HR: 600; 310; 30; 20 INJECTION, SOLUTION INTRAVENOUS at 07:28

## 2022-04-26 RX ADMIN — BUPIVACAINE HYDROCHLORIDE 20 ML: 5 INJECTION, SOLUTION EPIDURAL; INTRACAUDAL; PERINEURAL at 07:54

## 2022-04-26 RX ADMIN — SIMETHICONE 120 MG: 80 TABLET, CHEWABLE ORAL at 21:13

## 2022-04-26 RX ADMIN — BUPIVACAINE 20 ML: 13.3 INJECTION, SUSPENSION, LIPOSOMAL INFILTRATION at 07:54

## 2022-04-26 RX ADMIN — EPHEDRINE SULFATE 10 MG: 50 INJECTION INTRAVENOUS at 08:15

## 2022-04-26 RX ADMIN — EPHEDRINE SULFATE 10 MG: 50 INJECTION INTRAVENOUS at 08:50

## 2022-04-26 RX ADMIN — LIDOCAINE HYDROCHLORIDE 100 MG: 20 INJECTION, SOLUTION INFILTRATION; PERINEURAL at 07:47

## 2022-04-26 RX ADMIN — KETAMINE HYDROCHLORIDE 15 MG: 10 INJECTION INTRAMUSCULAR; INTRAVENOUS at 10:15

## 2022-04-26 RX ADMIN — HYDROMORPHONE HYDROCHLORIDE: 10 INJECTION, SOLUTION INTRAMUSCULAR; INTRAVENOUS; SUBCUTANEOUS at 11:17

## 2022-04-26 RX ADMIN — SIMETHICONE 120 MG: 80 TABLET, CHEWABLE ORAL at 17:24

## 2022-04-26 RX ADMIN — SODIUM CHLORIDE 100 ML/HR: 9 INJECTION, SOLUTION INTRAVENOUS at 12:43

## 2022-04-26 RX ADMIN — ROCURONIUM BROMIDE 10 MG: 50 INJECTION INTRAVENOUS at 08:58

## 2022-04-26 RX ADMIN — KETAMINE HYDROCHLORIDE 35 MG: 10 INJECTION INTRAMUSCULAR; INTRAVENOUS at 08:17

## 2022-04-26 RX ADMIN — PROPOFOL 140 MG: 10 INJECTION, EMULSION INTRAVENOUS at 07:47

## 2022-04-26 RX ADMIN — FENTANYL CITRATE 50 MCG: 0.05 INJECTION, SOLUTION INTRAMUSCULAR; INTRAVENOUS at 11:57

## 2022-04-26 RX ADMIN — GABAPENTIN 300 MG: 300 CAPSULE ORAL at 21:13

## 2022-04-26 RX ADMIN — NEOSTIGMINE METHYLSULFATE 3 MG: 0.5 INJECTION INTRAVENOUS at 10:26

## 2022-04-26 RX ADMIN — SODIUM CHLORIDE, POTASSIUM CHLORIDE, SODIUM LACTATE AND CALCIUM CHLORIDE 100 ML/HR: 600; 310; 30; 20 INJECTION, SOLUTION INTRAVENOUS at 23:10

## 2022-04-26 RX ADMIN — FENTANYL CITRATE 25 MCG: 0.05 INJECTION, SOLUTION INTRAMUSCULAR; INTRAVENOUS at 10:21

## 2022-04-26 RX ADMIN — CEFAZOLIN SODIUM 2 G: 2 INJECTION, SOLUTION INTRAVENOUS at 07:37

## 2022-04-26 RX ADMIN — SODIUM CHLORIDE, POTASSIUM CHLORIDE, SODIUM LACTATE AND CALCIUM CHLORIDE: 600; 310; 30; 20 INJECTION, SOLUTION INTRAVENOUS at 07:42

## 2022-04-26 NOTE — ANESTHESIA PROCEDURE NOTES
Peripheral Block      Patient reassessed immediately prior to procedure    Start time: 4/26/2022 7:48 AM  Stop time: 4/26/2022 7:54 AM  Reason for block: at surgeon's request and post-op pain management  Performed by  Anesthesiologist: Balbir Davis MD  Preanesthetic Checklist  Completed: patient identified, IV checked, site marked, risks and benefits discussed, surgical consent, monitors and equipment checked, pre-op evaluation and timeout performed  Prep:  Sterile barriers:cap, gloves and mask  Prep: ChloraPrep  Patient monitoring: blood pressure monitoring, continuous pulse oximetry and EKG  Procedure    Sedation: yes    Guidance:ultrasound guided  Images:still images obtained, printed/placed on chart    Laterality:Bilateral  Block Type:TAP  Injection Technique:single-shot  Needle Type:short-bevel  Needle Gauge:21 G  Loss of resistance: normal.    Medications Used: bupivacaine liposome (EXPAREL) 1.3 % injection, 20 mL  bupivacaine PF (MARCAINE) 0.25 % injection, 20 mL  bupivacaine (PF) (MARCAINE) 0.5 % injection, 20 mL  Med administered at 4/26/2022 7:54 AM      Medications  Comment:Half injected on each side.    Intrasound interpretation:  Ultrasound utilized for visualization of needle approach to fascial plane and verification of local anesthetic disbursement to appropriate area. Photo printed and placed on chart for reference.    Post Assessment  Injection Assessment: negative aspiration for heme, no paresthesia on injection and incremental injection  Patient Tolerance:comfortable throughout block  Complications:no

## 2022-04-26 NOTE — ANESTHESIA PREPROCEDURE EVALUATION
Anesthesia Evaluation     no history of anesthetic complications:  NPO Solid Status: > 8 hours  NPO Liquid Status: > 2 hours           Airway   Mallampati: II  Neck ROM: full  no difficulty expected  Dental - normal exam         Pulmonary - normal exam   (+) shortness of breath,   (-) COPD, asthma, sleep apnea, not a smoker    PE comment: nonlabored  Cardiovascular - normal exam    Rhythm: regular  Rate: normal    (+) hypertension, TORREZ,   (-) valvular problems/murmurs, past MI, CAD, dysrhythmias, angina      Neuro/Psych- negative ROS  (-) seizures, TIA, CVA  GI/Hepatic/Renal/Endo    (+)   renal disease (stage 2 CKD), thyroid problem hypothyroidism  (-) GERD, liver disease, diabetes    Musculoskeletal     (+) arthralgias,   Abdominal    Substance History      OB/GYN          Other   arthritis, blood dyscrasia (chemo-induced neutropenia/pancytopenia),   history of cancer (ovarian cancer) active      Other Comment: Sjogren's syndrome                Anesthesia Plan    ASA 3     general   (Bilateral TAP blocks for post-op pain PSR)  intravenous induction     Anesthetic plan, all risks, benefits, and alternatives have been provided, discussed and informed consent has been obtained with: patient.        CODE STATUS:

## 2022-04-26 NOTE — ANESTHESIA POSTPROCEDURE EVALUATION
"Patient: Lucy Mahan    Procedure Summary     Date: 04/26/22 Room / Location: [facility] OR 09 / [facility] MAIN OR    Anesthesia Start: 0740 Anesthesia Stop: 1049    Procedure: EXPLORATORY LAPAROTOMY, TOTAL ABDOMINAL HYSTERECTOMY, BILATERAL SALPINGOOOPHORECTOMY, PELVIC AND AORTIC LYMPHADENECTOMY, OMENTECTOMY (N/A Abdomen) Diagnosis:       Malignant neoplasm of ovary, unspecified laterality (HCC)      (Malignant neoplasm of ovary, unspecified laterality (HCC) [C56.9])    Surgeons: Maggie Castro DO Provider: Balbir Davis MD    Anesthesia Type: general ASA Status: 3          Anesthesia Type: general    Vitals  Vitals Value Taken Time   /76 04/26/22 1246   Temp 36.3 °C (97.4 °F) 04/26/22 1249   Pulse 78 04/26/22 1252   Resp 16 04/26/22 1231   SpO2 100 % 04/26/22 1253   Vitals shown include unvalidated device data.        Post Anesthesia Care and Evaluation    Patient location during evaluation: bedside  Patient participation: complete - patient participated  Level of consciousness: awake and alert  Pain management: adequate  Airway patency: patent  Anesthetic complications: No anesthetic complications    Cardiovascular status: acceptable  Respiratory status: acceptable  Hydration status: acceptable    Comments: /74 (BP Location: Right arm, Patient Position: Lying)   Pulse 74   Temp 36.1 °C (97 °F) (Oral)   Resp 16   Ht 165.1 cm (65\")   Wt 80.7 kg (178 lb)   LMP  (LMP Unknown)   SpO2 100%   BMI 29.62 kg/m²           "

## 2022-04-26 NOTE — ANESTHESIA PROCEDURE NOTES
Airway  Urgency: elective    Date/Time: 4/26/2022 7:52 AM  Airway not difficult    General Information and Staff    Patient location during procedure: OR  Anesthesiologist: Balbir Davis MD  CRNA/CAA: Hien Aguilar CRNA    Indications and Patient Condition  Indications for airway management: airway protection    Preoxygenated: yes  Mask difficulty assessment: 2 - vent by mask + OA or adjuvant +/- NMBA    Final Airway Details  Final airway type: endotracheal airway      Successful airway: ETT  Cuffed: yes   Successful intubation technique: direct laryngoscopy  Facilitating devices/methods: intubating stylet  Endotracheal tube insertion site: oral  Blade: Sabrina  Blade size: 3  ETT size (mm): 7.0  Cormack-Lehane Classification: grade IIa - partial view of glottis  Placement verified by: chest auscultation and capnometry   Measured from: lips  ETT/EBT  to lips (cm): 22  Number of attempts at approach: 1  Assessment: lips, teeth, and gum same as pre-op and atraumatic intubation    Additional Comments  Large chip noted on front L tooth prior to DL. No change to dentition

## 2022-04-27 LAB
ANION GAP SERPL CALCULATED.3IONS-SCNC: 11.9 MMOL/L (ref 5–15)
BASOPHILS # BLD AUTO: 0.02 10*3/MM3 (ref 0–0.2)
BASOPHILS NFR BLD AUTO: 0.3 % (ref 0–1.5)
BH BB BLOOD EXPIRATION DATE: NORMAL
BH BB BLOOD TYPE BARCODE: 6200
BH BB DISPENSE STATUS: NORMAL
BH BB PRODUCT CODE: NORMAL
BH BB UNIT NUMBER: NORMAL
BUN SERPL-MCNC: 18 MG/DL (ref 8–23)
BUN/CREAT SERPL: 16.1 (ref 7–25)
CALCIUM SPEC-SCNC: 8.6 MG/DL (ref 8.6–10.5)
CHLORIDE SERPL-SCNC: 104 MMOL/L (ref 98–107)
CO2 SERPL-SCNC: 21.1 MMOL/L (ref 22–29)
CREAT SERPL-MCNC: 1.12 MG/DL (ref 0.57–1)
DEPRECATED RDW RBC AUTO: 52 FL (ref 37–54)
EGFRCR SERPLBLD CKD-EPI 2021: 55.4 ML/MIN/1.73
EOSINOPHIL # BLD AUTO: 0.01 10*3/MM3 (ref 0–0.4)
EOSINOPHIL NFR BLD AUTO: 0.2 % (ref 0.3–6.2)
ERYTHROCYTE [DISTWIDTH] IN BLOOD BY AUTOMATED COUNT: 13.2 % (ref 12.3–15.4)
GLUCOSE SERPL-MCNC: 105 MG/DL (ref 65–99)
HCT VFR BLD AUTO: 22.2 % (ref 34–46.6)
HGB BLD-MCNC: 7.4 G/DL (ref 12–15.9)
LYMPHOCYTES # BLD AUTO: 0.83 10*3/MM3 (ref 0.7–3.1)
LYMPHOCYTES NFR BLD AUTO: 14.5 % (ref 19.6–45.3)
MCH RBC QN AUTO: 36.1 PG (ref 26.6–33)
MCHC RBC AUTO-ENTMCNC: 33.3 G/DL (ref 31.5–35.7)
MCV RBC AUTO: 108.3 FL (ref 79–97)
MONOCYTES # BLD AUTO: 0.51 10*3/MM3 (ref 0.1–0.9)
MONOCYTES NFR BLD AUTO: 8.9 % (ref 5–12)
NEUTROPHILS NFR BLD AUTO: 4.32 10*3/MM3 (ref 1.7–7)
NEUTROPHILS NFR BLD AUTO: 75.4 % (ref 42.7–76)
PLATELET # BLD AUTO: 90 10*3/MM3 (ref 140–450)
PMV BLD AUTO: 10.3 FL (ref 6–12)
POTASSIUM SERPL-SCNC: 4.5 MMOL/L (ref 3.5–5.2)
RBC # BLD AUTO: 2.05 10*6/MM3 (ref 3.77–5.28)
SODIUM SERPL-SCNC: 137 MMOL/L (ref 136–145)
UNIT  ABO: NORMAL
UNIT  RH: NORMAL
WBC NRBC COR # BLD: 5.73 10*3/MM3 (ref 3.4–10.8)

## 2022-04-27 PROCEDURE — 85025 COMPLETE CBC W/AUTO DIFF WBC: CPT | Performed by: OBSTETRICS & GYNECOLOGY

## 2022-04-27 PROCEDURE — 80048 BASIC METABOLIC PNL TOTAL CA: CPT | Performed by: OBSTETRICS & GYNECOLOGY

## 2022-04-27 PROCEDURE — 25010000002 KETOROLAC TROMETHAMINE PER 15 MG: Performed by: OBSTETRICS & GYNECOLOGY

## 2022-04-27 PROCEDURE — 25010000002 ENOXAPARIN PER 10 MG: Performed by: OBSTETRICS & GYNECOLOGY

## 2022-04-27 RX ADMIN — SIMETHICONE 120 MG: 80 TABLET, CHEWABLE ORAL at 17:00

## 2022-04-27 RX ADMIN — SODIUM CHLORIDE 100 ML/HR: 9 INJECTION, SOLUTION INTRAVENOUS at 10:13

## 2022-04-27 RX ADMIN — KETOROLAC TROMETHAMINE 30 MG: 30 INJECTION, SOLUTION INTRAMUSCULAR; INTRAVENOUS at 15:30

## 2022-04-27 RX ADMIN — FAMOTIDINE 20 MG: 20 TABLET ORAL at 08:36

## 2022-04-27 RX ADMIN — HYDROCODONE BITARTRATE AND ACETAMINOPHEN 1 TABLET: 5; 325 TABLET ORAL at 21:11

## 2022-04-27 RX ADMIN — AMLODIPINE BESYLATE 5 MG: 5 TABLET ORAL at 08:36

## 2022-04-27 RX ADMIN — SIMETHICONE 120 MG: 80 TABLET, CHEWABLE ORAL at 20:08

## 2022-04-27 RX ADMIN — ENOXAPARIN SODIUM 30 MG: 30 INJECTION SUBCUTANEOUS at 08:36

## 2022-04-27 RX ADMIN — KETOROLAC TROMETHAMINE 30 MG: 30 INJECTION, SOLUTION INTRAMUSCULAR; INTRAVENOUS at 05:34

## 2022-04-27 RX ADMIN — PANTOPRAZOLE SODIUM 40 MG: 40 TABLET, DELAYED RELEASE ORAL at 06:25

## 2022-04-27 RX ADMIN — GABAPENTIN 300 MG: 300 CAPSULE ORAL at 08:36

## 2022-04-27 RX ADMIN — SIMETHICONE 120 MG: 80 TABLET, CHEWABLE ORAL at 07:29

## 2022-04-27 RX ADMIN — HYDROCODONE BITARTRATE AND ACETAMINOPHEN 1 TABLET: 5; 325 TABLET ORAL at 16:59

## 2022-04-27 RX ADMIN — GABAPENTIN 300 MG: 300 CAPSULE ORAL at 20:08

## 2022-04-27 RX ADMIN — LEVOTHYROXINE SODIUM 137 MCG: 0.14 TABLET ORAL at 05:34

## 2022-04-27 RX ADMIN — SIMETHICONE 120 MG: 80 TABLET, CHEWABLE ORAL at 12:22

## 2022-04-27 RX ADMIN — LETROZOLE 2.5 MG: 2.5 TABLET ORAL at 08:37

## 2022-04-28 LAB
BASOPHILS # BLD AUTO: 0.01 10*3/MM3 (ref 0–0.2)
BASOPHILS NFR BLD AUTO: 0.2 % (ref 0–1.5)
DEPRECATED RDW RBC AUTO: 54 FL (ref 37–54)
EOSINOPHIL # BLD AUTO: 0.04 10*3/MM3 (ref 0–0.4)
EOSINOPHIL NFR BLD AUTO: 0.6 % (ref 0.3–6.2)
ERYTHROCYTE [DISTWIDTH] IN BLOOD BY AUTOMATED COUNT: 13.4 % (ref 12.3–15.4)
HCT VFR BLD AUTO: 20.4 % (ref 34–46.6)
HCT VFR BLD AUTO: 26.3 % (ref 34–46.6)
HGB BLD-MCNC: 6.7 G/DL (ref 12–15.9)
HGB BLD-MCNC: 9.1 G/DL (ref 12–15.9)
LYMPHOCYTES # BLD AUTO: 0.8 10*3/MM3 (ref 0.7–3.1)
LYMPHOCYTES NFR BLD AUTO: 12.8 % (ref 19.6–45.3)
MCH RBC QN AUTO: 36.2 PG (ref 26.6–33)
MCHC RBC AUTO-ENTMCNC: 32.8 G/DL (ref 31.5–35.7)
MCV RBC AUTO: 110.3 FL (ref 79–97)
MONOCYTES # BLD AUTO: 0.5 10*3/MM3 (ref 0.1–0.9)
MONOCYTES NFR BLD AUTO: 8 % (ref 5–12)
NEUTROPHILS NFR BLD AUTO: 4.88 10*3/MM3 (ref 1.7–7)
NEUTROPHILS NFR BLD AUTO: 78.1 % (ref 42.7–76)
PLATELET # BLD AUTO: 95 10*3/MM3 (ref 140–450)
PMV BLD AUTO: 10.3 FL (ref 6–12)
RBC # BLD AUTO: 1.85 10*6/MM3 (ref 3.77–5.28)
WBC NRBC COR # BLD: 6.25 10*3/MM3 (ref 3.4–10.8)

## 2022-04-28 PROCEDURE — 36430 TRANSFUSION BLD/BLD COMPNT: CPT

## 2022-04-28 PROCEDURE — 86900 BLOOD TYPING SEROLOGIC ABO: CPT

## 2022-04-28 PROCEDURE — 25010000002 KETOROLAC TROMETHAMINE PER 15 MG: Performed by: OBSTETRICS & GYNECOLOGY

## 2022-04-28 PROCEDURE — P9016 RBC LEUKOCYTES REDUCED: HCPCS

## 2022-04-28 PROCEDURE — 85014 HEMATOCRIT: CPT | Performed by: OBSTETRICS & GYNECOLOGY

## 2022-04-28 PROCEDURE — 85018 HEMOGLOBIN: CPT | Performed by: OBSTETRICS & GYNECOLOGY

## 2022-04-28 PROCEDURE — 85025 COMPLETE CBC W/AUTO DIFF WBC: CPT | Performed by: OBSTETRICS & GYNECOLOGY

## 2022-04-28 PROCEDURE — 25010000002 HYDROMORPHONE PER 4 MG: Performed by: OBSTETRICS & GYNECOLOGY

## 2022-04-28 RX ORDER — HYDROMORPHONE HYDROCHLORIDE 1 MG/ML
0.25 INJECTION, SOLUTION INTRAMUSCULAR; INTRAVENOUS; SUBCUTANEOUS ONCE
Status: COMPLETED | OUTPATIENT
Start: 2022-04-28 | End: 2022-04-28

## 2022-04-28 RX ORDER — KETOROLAC TROMETHAMINE 30 MG/ML
30 INJECTION, SOLUTION INTRAMUSCULAR; INTRAVENOUS EVERY 8 HOURS
Status: COMPLETED | OUTPATIENT
Start: 2022-04-28 | End: 2022-04-29

## 2022-04-28 RX ADMIN — DOCUSATE SODIUM 100 MG: 100 CAPSULE, LIQUID FILLED ORAL at 10:19

## 2022-04-28 RX ADMIN — SIMETHICONE 120 MG: 80 TABLET, CHEWABLE ORAL at 16:48

## 2022-04-28 RX ADMIN — HYDROCODONE BITARTRATE AND ACETAMINOPHEN 1 TABLET: 5; 325 TABLET ORAL at 01:26

## 2022-04-28 RX ADMIN — SIMETHICONE 120 MG: 80 TABLET, CHEWABLE ORAL at 20:01

## 2022-04-28 RX ADMIN — GABAPENTIN 300 MG: 300 CAPSULE ORAL at 08:31

## 2022-04-28 RX ADMIN — SIMETHICONE 120 MG: 80 TABLET, CHEWABLE ORAL at 08:31

## 2022-04-28 RX ADMIN — HYDROCODONE BITARTRATE AND ACETAMINOPHEN 1 TABLET: 10; 325 TABLET ORAL at 05:30

## 2022-04-28 RX ADMIN — PANTOPRAZOLE SODIUM 40 MG: 40 TABLET, DELAYED RELEASE ORAL at 06:17

## 2022-04-28 RX ADMIN — LETROZOLE 2.5 MG: 2.5 TABLET ORAL at 08:31

## 2022-04-28 RX ADMIN — HYDROMORPHONE HYDROCHLORIDE 0.25 MG: 1 INJECTION, SOLUTION INTRAMUSCULAR; INTRAVENOUS; SUBCUTANEOUS at 08:23

## 2022-04-28 RX ADMIN — LEVOTHYROXINE SODIUM 137 MCG: 0.14 TABLET ORAL at 06:17

## 2022-04-28 RX ADMIN — KETOROLAC TROMETHAMINE 30 MG: 30 INJECTION, SOLUTION INTRAMUSCULAR; INTRAVENOUS at 17:04

## 2022-04-28 RX ADMIN — GABAPENTIN 300 MG: 300 CAPSULE ORAL at 20:01

## 2022-04-28 RX ADMIN — HYDROCODONE BITARTRATE AND ACETAMINOPHEN 1 TABLET: 10; 325 TABLET ORAL at 21:02

## 2022-04-28 RX ADMIN — SIMETHICONE 120 MG: 80 TABLET, CHEWABLE ORAL at 06:17

## 2022-04-28 RX ADMIN — HYDROCODONE BITARTRATE AND ACETAMINOPHEN 1 TABLET: 10; 325 TABLET ORAL at 10:19

## 2022-04-28 RX ADMIN — FAMOTIDINE 20 MG: 20 TABLET ORAL at 08:37

## 2022-04-28 RX ADMIN — HYDROCODONE BITARTRATE AND ACETAMINOPHEN 1 TABLET: 10; 325 TABLET ORAL at 17:04

## 2022-04-28 RX ADMIN — KETOROLAC TROMETHAMINE 30 MG: 30 INJECTION, SOLUTION INTRAMUSCULAR; INTRAVENOUS at 08:53

## 2022-04-29 ENCOUNTER — READMISSION MANAGEMENT (OUTPATIENT)
Dept: CALL CENTER | Facility: HOSPITAL | Age: 62
End: 2022-04-29

## 2022-04-29 VITALS
RESPIRATION RATE: 16 BRPM | HEART RATE: 66 BPM | WEIGHT: 178 LBS | HEIGHT: 65 IN | TEMPERATURE: 97.1 F | OXYGEN SATURATION: 95 % | SYSTOLIC BLOOD PRESSURE: 129 MMHG | DIASTOLIC BLOOD PRESSURE: 62 MMHG | BODY MASS INDEX: 29.66 KG/M2

## 2022-04-29 LAB
BASOPHILS # BLD AUTO: 0.01 10*3/MM3 (ref 0–0.2)
BASOPHILS NFR BLD AUTO: 0.2 % (ref 0–1.5)
BH BB BLOOD EXPIRATION DATE: NORMAL
BH BB BLOOD EXPIRATION DATE: NORMAL
BH BB BLOOD TYPE BARCODE: 7300
BH BB BLOOD TYPE BARCODE: 7300
BH BB DISPENSE STATUS: NORMAL
BH BB DISPENSE STATUS: NORMAL
BH BB PRODUCT CODE: NORMAL
BH BB PRODUCT CODE: NORMAL
BH BB UNIT NUMBER: NORMAL
BH BB UNIT NUMBER: NORMAL
CROSSMATCH INTERPRETATION: NORMAL
CROSSMATCH INTERPRETATION: NORMAL
DEPRECATED RDW RBC AUTO: 72.6 FL (ref 37–54)
EOSINOPHIL # BLD AUTO: 0.1 10*3/MM3 (ref 0–0.4)
EOSINOPHIL NFR BLD AUTO: 1.8 % (ref 0.3–6.2)
ERYTHROCYTE [DISTWIDTH] IN BLOOD BY AUTOMATED COUNT: 20.7 % (ref 12.3–15.4)
HCT VFR BLD AUTO: 27.4 % (ref 34–46.6)
HGB BLD-MCNC: 9.4 G/DL (ref 12–15.9)
IMM GRANULOCYTES # BLD AUTO: 0.02 10*3/MM3 (ref 0–0.05)
IMM GRANULOCYTES NFR BLD AUTO: 0.4 % (ref 0–0.5)
LYMPHOCYTES # BLD AUTO: 0.87 10*3/MM3 (ref 0.7–3.1)
LYMPHOCYTES NFR BLD AUTO: 15.2 % (ref 19.6–45.3)
MCH RBC QN AUTO: 33.7 PG (ref 26.6–33)
MCHC RBC AUTO-ENTMCNC: 34.3 G/DL (ref 31.5–35.7)
MCV RBC AUTO: 98.2 FL (ref 79–97)
MONOCYTES # BLD AUTO: 0.38 10*3/MM3 (ref 0.1–0.9)
MONOCYTES NFR BLD AUTO: 6.7 % (ref 5–12)
NEUTROPHILS NFR BLD AUTO: 4.33 10*3/MM3 (ref 1.7–7)
NEUTROPHILS NFR BLD AUTO: 75.7 % (ref 42.7–76)
NRBC BLD AUTO-RTO: 0 /100 WBC (ref 0–0.2)
PLATELET # BLD AUTO: 85 10*3/MM3 (ref 140–450)
PMV BLD AUTO: 10.4 FL (ref 6–12)
RBC # BLD AUTO: 2.79 10*6/MM3 (ref 3.77–5.28)
UNIT  ABO: NORMAL
UNIT  ABO: NORMAL
UNIT  RH: NORMAL
UNIT  RH: NORMAL
WBC NRBC COR # BLD: 5.71 10*3/MM3 (ref 3.4–10.8)

## 2022-04-29 PROCEDURE — 85025 COMPLETE CBC W/AUTO DIFF WBC: CPT | Performed by: OBSTETRICS & GYNECOLOGY

## 2022-04-29 PROCEDURE — 25010000002 KETOROLAC TROMETHAMINE PER 15 MG: Performed by: OBSTETRICS & GYNECOLOGY

## 2022-04-29 RX ORDER — HYDROCODONE BITARTRATE AND ACETAMINOPHEN 5; 325 MG/1; MG/1
1 TABLET ORAL EVERY 6 HOURS PRN
Qty: 20 TABLET | Refills: 0 | Status: SHIPPED | OUTPATIENT
Start: 2022-04-29 | End: 2022-05-23 | Stop reason: SDUPTHER

## 2022-04-29 RX ADMIN — HYDROCODONE BITARTRATE AND ACETAMINOPHEN 1 TABLET: 10; 325 TABLET ORAL at 06:19

## 2022-04-29 RX ADMIN — SIMETHICONE 120 MG: 80 TABLET, CHEWABLE ORAL at 06:19

## 2022-04-29 RX ADMIN — PANTOPRAZOLE SODIUM 40 MG: 40 TABLET, DELAYED RELEASE ORAL at 06:20

## 2022-04-29 RX ADMIN — SIMETHICONE 120 MG: 80 TABLET, CHEWABLE ORAL at 09:06

## 2022-04-29 RX ADMIN — HYDROCODONE BITARTRATE AND ACETAMINOPHEN 1 TABLET: 10; 325 TABLET ORAL at 10:29

## 2022-04-29 RX ADMIN — KETOROLAC TROMETHAMINE 30 MG: 30 INJECTION, SOLUTION INTRAMUSCULAR; INTRAVENOUS at 00:31

## 2022-04-29 RX ADMIN — LEVOTHYROXINE SODIUM 137 MCG: 0.14 TABLET ORAL at 06:20

## 2022-04-29 RX ADMIN — GABAPENTIN 300 MG: 300 CAPSULE ORAL at 09:06

## 2022-04-29 RX ADMIN — LETROZOLE 2.5 MG: 2.5 TABLET ORAL at 09:06

## 2022-04-29 RX ADMIN — FAMOTIDINE 20 MG: 20 TABLET ORAL at 09:06

## 2022-04-29 NOTE — OUTREACH NOTE
Prep Survey    Flowsheet Row Responses   [facility] [place] patient discharged from? [place]   Is LACE score < 7 ? No   Emergency Room discharge w/ pulse ox? No   [facility]   Date of Admission 04/26/22   Date of Discharge 04/29/22   Discharge Disposition Home or Self Care   Discharge diagnosis Post operative stateEXPLORATORY LAPAROTOMY, TOTAL ABDOMINAL HYSTERECTOMY, BILATERAL SALPINGOOOPHORECTOMY, PELVIC AND AORTIC LYMPHADENECTOMY, OMENTECTOMY   Does the patient have one of the following disease processes/diagnoses(primary or secondary)? General Surgery   Does the patient have Home health ordered? No   Is there a DME ordered? No   Prep survey completed? Yes          KIERAN GARCIA - Registered Nurse

## 2022-05-02 ENCOUNTER — DOCUMENTATION (OUTPATIENT)
Dept: GYNECOLOGIC ONCOLOGY | Facility: CLINIC | Age: 62
End: 2022-05-02

## 2022-05-02 ENCOUNTER — TRANSITIONAL CARE MANAGEMENT TELEPHONE ENCOUNTER (OUTPATIENT)
Dept: CALL CENTER | Facility: HOSPITAL | Age: 62
End: 2022-05-02

## 2022-05-02 ENCOUNTER — TELEPHONE (OUTPATIENT)
Dept: ONCOLOGY | Facility: CLINIC | Age: 62
End: 2022-05-02

## 2022-05-02 NOTE — OUTREACH NOTE
Call Center TCM Note    Flowsheet Row Responses   [facility] patient discharged from? [place]   Does the patient have one of the following disease processes/diagnoses(primary or secondary)? General Surgery   TCM attempt successful? Yes   Discharge diagnosis Post operative stateEXPLORATORY LAPAROTOMY, TOTAL ABDOMINAL HYSTERECTOMY, BILATERAL SALPINGOOOPHORECTOMY, PELVIC AND AORTIC LYMPHADENECTOMY, OMENTECTOMY   Meds reviewed with patient/caregiver? Yes   Is the patient having any side effects they believe may be caused by any medication additions or changes? No   Does the patient have all medications related to this admission filled (includes all antibiotics, pain medications, etc.) Yes   Is the patient taking all medications as directed (includes completed medication regime)? Yes   Does the patient have a follow up appointment scheduled with their surgeon? Yes   Has the patient kept scheduled appointments due by today? N/A   Has home health visited the patient within 72 hours of discharge? N/A   Psychosocial issues? No   Did the patient receive a copy of their discharge instructions? Yes   Nursing interventions Reviewed instructions with patient   What is the patient's perception of their health status since discharge? Improving   Nursing interventions Nurse provided patient education   Is the patient /caregiver able to teach back basic post-op care? Continue use of incentive spirometry at least 1 week post discharge, Take showers only when approved by MD-sponge bathe until then, Do not remove steri-strips, Lifting as instructed by MD in discharge instructions, No tub bath, swimming, or hot tub until instructed by MD, Practice 'cough and deep breath', Keep incision areas clean,dry and protected, Drive as instructed by MD in discharge instructions   Is the patient/caregiver able to teach back signs and symptoms of incisional infection? Incisional warmth, Increased drainage or bleeding, Fever, Pus or odor from  incision, Increased redness, swelling or pain at the incisonal site   Is the patient/caregiver able to teach back steps to recovery at home? Set small, achievable goals for return to baseline health, Practice good oral hygiene, Eat a well-balance diet, Rest and rebuild strength, gradually increase activity, Weigh daily, Make a list of questions for surgeon's appointment   If the patient is a current smoker, are they able to teach back resources for cessation? Not a smoker   Is the patient/caregiver able to teach back the hierarchy of who to call/visit for symptoms/problems? PCP, Specialist, Home health nurse, Urgent Care, ED, 911 Yes   TCM call completed? Yes   Wrap up additional comments Pt doing well s/p YAEL. No fevers, chills, no N/V/D. Surgical site good. No questions at this time. First POST OP is 05/09/2022, next H/O appt is 05/17/2022. Pt is in touch with her PCP Argentina Avalso, who is fine with seeing pt at sched follow up in 07/2022.          Elisa Rowland MA    5/2/2022, 15:37 EDT

## 2022-05-02 NOTE — TELEPHONE ENCOUNTER
Received a call from the pt stating that she needs to schedule an appt for a port flush. Appt scheduled.

## 2022-05-03 ENCOUNTER — HOSPITAL ENCOUNTER (OUTPATIENT)
Dept: ONCOLOGY | Facility: HOSPITAL | Age: 62
Setting detail: INFUSION SERIES
Discharge: HOME | End: 2022-05-03

## 2022-05-03 VITALS — HEIGHT: 65 IN | BODY MASS INDEX: 29.72 KG/M2 | WEIGHT: 178.4 LBS

## 2022-05-03 DIAGNOSIS — E83.51 HYPOCALCEMIA: Primary | ICD-10-CM

## 2022-05-03 DIAGNOSIS — C56.9 MALIGNANT NEOPLASM OF OVARY, UNSPECIFIED LATERALITY: ICD-10-CM

## 2022-05-03 LAB
BASOPHILS # BLD AUTO: 0.02 10*3/MM3 (ref 0–0.2)
BASOPHILS NFR BLD AUTO: 0.4 % (ref 0–1.5)
DEPRECATED RDW RBC AUTO: 65.7 FL (ref 37–54)
EOSINOPHIL # BLD AUTO: 0.28 10*3/MM3 (ref 0–0.4)
EOSINOPHIL NFR BLD AUTO: 5 % (ref 0.3–6.2)
ERYTHROCYTE [DISTWIDTH] IN BLOOD BY AUTOMATED COUNT: 18 % (ref 12.3–15.4)
HCT VFR BLD AUTO: 30.7 % (ref 34–46.6)
HGB BLD-MCNC: 9.9 G/DL (ref 12–15.9)
LYMPHOCYTES # BLD AUTO: 1.13 10*3/MM3 (ref 0.7–3.1)
LYMPHOCYTES NFR BLD AUTO: 20 % (ref 19.6–45.3)
MCH RBC QN AUTO: 33.4 PG (ref 26.6–33)
MCHC RBC AUTO-ENTMCNC: 32.2 G/DL (ref 31.5–35.7)
MCV RBC AUTO: 103.7 FL (ref 79–97)
MONOCYTES # BLD AUTO: 0.73 10*3/MM3 (ref 0.1–0.9)
MONOCYTES NFR BLD AUTO: 12.9 % (ref 5–12)
NEUTROPHILS NFR BLD AUTO: 3.48 10*3/MM3 (ref 1.7–7)
NEUTROPHILS NFR BLD AUTO: 61.7 % (ref 42.7–76)
PLATELET # BLD AUTO: 123 10*3/MM3 (ref 140–450)
PMV BLD AUTO: 9.6 FL (ref 6–12)
RBC # BLD AUTO: 2.96 10*6/MM3 (ref 3.77–5.28)
WBC NRBC COR # BLD: 5.64 10*3/MM3 (ref 3.4–10.8)

## 2022-05-03 PROCEDURE — 25010000002 HEPARIN LOCK FLUSH PER 10 UNITS: Performed by: INTERNAL MEDICINE

## 2022-05-03 PROCEDURE — 85025 COMPLETE CBC W/AUTO DIFF WBC: CPT | Performed by: INTERNAL MEDICINE

## 2022-05-03 PROCEDURE — 36591 DRAW BLOOD OFF VENOUS DEVICE: CPT

## 2022-05-03 RX ORDER — HEPARIN SODIUM (PORCINE) LOCK FLUSH IV SOLN 100 UNIT/ML 100 UNIT/ML
500 SOLUTION INTRAVENOUS AS NEEDED
Status: DISCONTINUED | OUTPATIENT
Start: 2022-05-03 | End: 2022-05-04 | Stop reason: HOSPADM

## 2022-05-03 RX ORDER — HEPARIN SODIUM (PORCINE) LOCK FLUSH IV SOLN 100 UNIT/ML 100 UNIT/ML
500 SOLUTION INTRAVENOUS AS NEEDED
Status: CANCELLED | OUTPATIENT
Start: 2022-05-03

## 2022-05-03 RX ORDER — SODIUM CHLORIDE 0.9 % (FLUSH) 0.9 %
10 SYRINGE (ML) INJECTION AS NEEDED
Status: CANCELLED | OUTPATIENT
Start: 2022-05-03

## 2022-05-03 RX ORDER — SODIUM CHLORIDE 0.9 % (FLUSH) 0.9 %
10 SYRINGE (ML) INJECTION AS NEEDED
Status: DISCONTINUED | OUTPATIENT
Start: 2022-05-03 | End: 2022-05-04 | Stop reason: HOSPADM

## 2022-05-03 RX ADMIN — HEPARIN 500 UNITS: 100 SYRINGE at 13:47

## 2022-05-03 RX ADMIN — Medication 20 ML: at 13:45

## 2022-05-04 ENCOUNTER — TELEPHONE (OUTPATIENT)
Dept: FAMILY MEDICINE CLINIC | Facility: CLINIC | Age: 62
End: 2022-05-04

## 2022-05-04 NOTE — TELEPHONE ENCOUNTER
Spoke with pt about mammo and pt stated she has a port for chemo and cannot get mammo done until port comes out.

## 2022-05-06 ENCOUNTER — TELEPHONE (OUTPATIENT)
Dept: ONCOLOGY | Facility: CLINIC | Age: 62
End: 2022-05-06

## 2022-05-06 NOTE — TELEPHONE ENCOUNTER
Caller: ONESIMO MONTENEGRO    Relationship: Emergency Contact    Best call back number: [phone]      Who are you requesting to speak with (clinical staff, provider,  specific staff member):CLINICAL      What was the call regarding: CALLER STATES PATIENT IS EXPERIENCING SYMPTOMS OF DEPRESSION. WOULD LIKE TO HAVE PATIENT SPEAK WITH     Do you require a callback: YES

## 2022-05-09 ENCOUNTER — OFFICE VISIT (OUTPATIENT)
Dept: GYNECOLOGIC ONCOLOGY | Facility: CLINIC | Age: 62
End: 2022-05-09

## 2022-05-09 ENCOUNTER — TELEPHONE (OUTPATIENT)
Dept: ONCOLOGY | Facility: HOSPITAL | Age: 62
End: 2022-05-09

## 2022-05-09 VITALS
WEIGHT: 171.9 LBS | SYSTOLIC BLOOD PRESSURE: 131 MMHG | RESPIRATION RATE: 18 BRPM | BODY MASS INDEX: 28.64 KG/M2 | OXYGEN SATURATION: 98 % | HEIGHT: 65 IN | HEART RATE: 70 BPM | DIASTOLIC BLOOD PRESSURE: 76 MMHG

## 2022-05-09 DIAGNOSIS — C56.9 MALIGNANT NEOPLASM OF OVARY, UNSPECIFIED LATERALITY: Primary | ICD-10-CM

## 2022-05-09 PROCEDURE — 99213 OFFICE O/P EST LOW 20 MIN: CPT | Performed by: OBSTETRICS & GYNECOLOGY

## 2022-05-09 RX ORDER — OLANZAPINE 2.5 MG/1
2.5 TABLET ORAL NIGHTLY
Qty: 30 TABLET | Refills: 2 | Status: SHIPPED | OUTPATIENT
Start: 2022-05-09 | End: 2022-10-11

## 2022-05-09 NOTE — TELEPHONE ENCOUNTER
Returned Leni's call. She stated that the pt has been depressed, she has been unable to work and feels like she would benefit from talking to the . I asked if she felt like the patient could be a danger to herself and she said no. She said that she informed the pt that she called us and the pt is agreeable to speaking to someone. I relayed this to the , Bhavana, who said she would reach out to the pt.

## 2022-05-09 NOTE — TELEPHONE ENCOUNTER
Case Management/ Note    Patient Name: Lucy Mahan  YOB: 1960  MRN #: [id number]    OSW called Lucy at the request of SANTINO Ohara who said Lucy's daughter called concerned about her mother's mood. Lucy is pleasant, alert and oriented to person, place and time. She spoke openly about having a depressed mood. She said she is not sleeping well. She had her appointment with Dr. Castro who prescribed 2.5 mg olanzapine for sleep. She encouraged her to seek a support group or [employer] club. She had a total hysterectomy on 4/26.     Lucy said she has been getting up at 3:00 AM 3-4 nights a weeks and ruminating on the cancer that is remaining and what it may do to her body. She said she has energy to do what she needs to do but has no interest in doing what she needs or wants to do. Her appetite is poor. She denies SI / HI. She is interested in seeing OSW for counseling. She was told of our supportive oncology program and is open to having an appt with Dr. Hooks or CAMRYN Chaney. She will see OSW on 5/17.     Electronically signed by:   Bhavana Linares LCSW, OSW-C  05/09/22, 15:26 EDT

## 2022-05-09 NOTE — PROGRESS NOTES
Age: 61 y.o.  Sex: female  :  1960  MRN: [id number]       REFERRING PHYSICIAN: No ref. provider found  DATE OF VISIT: 2022        Lucy Mahan presents to [facility] for post op ex lap visit. Her path showed ovarian cancer bilateral tubes ovaires, serosa, cervical stroma, all deposits. She is healing well. Has no physical complaints. Feels depressed/struggling with down time as she frequently keeps her mind busy with work.       Oncology/Hematology History Overview Note   Lucy Mahan is a 60 y.o. 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     • 11/10/21: Peritoneum bx-metastatic ovarian serous carcinoma, ER +  • 11/10/21: Omentum bx-metastatic ovarian serous carcinoma, ER +  • 21: CT CAP-A 2 mm noncalcified nodule is located within the left lower lobe. Noncalcified nodules in the left upper lobe measure 4 mm and 3 mm. No right lung nodules are identified. Benign calcified granuloma is incidentally noted in the left lower lobeExtensive omental and peritoneal carcinomatosis. 5.3 cm enhancing soft tissue mass in the pelvis to the right of midline. Small quantity abdominal pelvic ascites, likely malignant ascites. Indeterminate 1.7 cm left adrenal nodule. Adrenal metastasis cannot be excluded.  • 22: CT AP-Interval development of a trace right and small to moderate left pleural effusion. There is compressive atelectasis on the left lower lobe with questionable superimposed pneumonia. There is mild compressive atelectasis in the right lower lobe. Moderate abdominal and pelvic ascites which has increased. There are nodular peritoneal implants consistent with peritoneal carcinomatosis similar to before. No interval change in the right adnexal region heterogeneous enhancing mass representing either a large peritoneal implant versus ovarian carcinoma. Interval decrease in size of the area of omental caking in the left  lower abdomen. Questionable peritoneal carcinomatosis along the dome of the urinary bladder. This area is difficult to evaluate due to scatter artifact from the patient's left hip prosthesis. Stable left adrenal nodular thickening felt to represent a benign adenoma.  • 4/08/22: CT CAP-Stable pulmonary nodules. No new suspicious pulmonary normality. Trace bilateral pleural effusions. Interval decrease in size of peritoneal tumor masses and resolution of ascites. No new suspicious abdominopelvic abnormality. Stable left adrenal nodule.  • 4/26/22: Exp. Lap, YAEL, BSO, pelvic/aortic lymphadenectomy, omentectomy   • 4/26/22: Pathology (sent to [place])-High grade carcinoma involving bilateral ovaries, bilateral fallopian tubes, uterine serosa and cervical stroma with LVSI, inactive endometrium, omentum-positive for high grade carcinoma, colon splenic flexure, debulking-positive for high grade carcinoma.     5/9/22: Post op    CURRENT TREATMENT (Dr. Hernandez)  • C1:12/7/21-Carbo AUC 6, Taxol 175 mg/m2  • C2: 12/28/21-Carbo AUC 6, Taxol 175 mg/m2  • C3: 1/24/22-Carbo AUC 4.5, Taxol 175 mg/m2  • C4: 2/14/22-Carbo AUC 4.5, Taxol 175 mg/m2  • C5: 3/07/22-Carbo AUC 4.5, Taxol 175 mg/m2  • C6: 04/04/22-Carbo AUC 4.5, Taxol 175 mg/m2     • C1: 2/14/22-Zirabev 10 mg/kg  • C2: 2/28/22-Zirabev 10 mg/kg  • C3: 3/14/22-Zirabev 10 mg/kg  • C4: 3/28/22-Zirabev 10 mg/kg        Date Value Ref Range Status  04/04/2022 110.9 (H) 0.0 - 38.1 U/mL Final  03/28/2022 132.0 (H) 0.0 - 38.1 U/mL Final  03/14/2022 135.1 (H) 0.0 - 38.1 U/mL Final  03/07/2022 174.2 (H) 0.0 - 38.1 U/mL Final  02/28/2022 191.0 (H) 0.0 - 38.1 U/mL Final  02/14/2022 275.9 (H) 0.0 - 38.1 U/mL Final  01/24/2022 365.2 (H) 0.0 - 38.1 U/mL Final  01/13/2022 303.0 (H) 0.0 - 38.1 U/mL Final  12/28/2021 635.2 (H) 0.0 - 38.1 U/mL Final  12/07/2021 738.2 (H) 0.0 - 38.1 U/mL Final  11/23/2021 754.3 (H) 0.0 - 38.1 U/mL Final         Past Medical History:  Past Medical History:    Diagnosis Date   • Arthritis    • Cancer (HCC)     ovarian   • Gall stones    • Hypertension    • Irritable bowel syndrome    • Rheumatoid aortitis    • Thyroid disease        Past Surgical History:  Past Surgical History:   Procedure Laterality Date   •  SECTION      x2   • CHOLECYSTECTOMY N/A 11/10/2021    Procedure: diagnostic laparoscopy and peritoneal biopsy;  Surgeon: Krunal Badillo MD;  Location: [facility] MAIN OR;  Service: General;  Laterality: N/A;   • COLONOSCOPY     • HIP SURGERY Left    • THYROIDECTOMY, PARTIAL  2017   • TOTAL ABDOMINAL HYSTERECTOMY N/A 2022    Procedure: EXPLORATORY LAPAROTOMY, TOTAL ABDOMINAL HYSTERECTOMY, BILATERAL SALPINGOOOPHORECTOMY, PELVIC AND AORTIC LYMPHADENECTOMY, OMENTECTOMY;  Surgeon: Maggie Castro DO;  Location: [facility] MAIN OR;  Service: Gynecology Oncology;  Laterality: N/A;   • VENOUS ACCESS DEVICE (PORT) INSERTION Left 2021    Procedure: INSERTION VENOUS ACCESS DEVICE;  Surgeon: Krunal Badillo MD;  Location: [facility] MAIN OR;  Service: General;  Laterality: Left;        MEDICATIONS:    Current Outpatient Medications:   •  amLODIPine (NORVASC) 5 MG tablet, Take 1 tablet by mouth Daily., Disp: 90 tablet, Rfl: 0  •  Calcium Carbonate-Vitamin D 600-400 MG-UNIT chewable tablet, Chew 1 tablet Daily., Disp: , Rfl:   •  dexamethasone (DECADRON) 4 MG tablet, TAKE FIVE (5) TABLETS WITH FOOD THE NIGHT BEFORE chemotherapy, Disp: 5 tablet, Rfl: 5  •  folic acid (FOLVITE) 1 MG tablet, take 1 tablet by mouth once daily (Patient taking differently: Take 1 mg by mouth Daily.), Disp: 90 tablet, Rfl: 1  •  gabapentin (NEURONTIN) 300 MG capsule, Take 1 capsule by mouth 2 (Two) Times a Day., Disp: 60 capsule, Rfl: 0  •  hydroCHLOROthiazide (HYDRODIURIL) 12.5 MG tablet, Take 1 tablet by mouth Daily As Needed (swelling)., Disp: 90 tablet, Rfl: 0  •  HYDROcodone-acetaminophen (Norco) 5-325 MG per tablet, Take 1 tablet by mouth Every 6 (Six) Hours As Needed for Moderate  Pain ., Disp: 20 tablet, Rfl: 0  •  hydroxychloroquine (PLAQUENIL) 200 MG tablet, TAKE 1 TABLET BY MOUTH TWICE A DAY (Patient taking differently: Take 200 mg by mouth 2 (Two) Times a Day. Takes in afternoon and evening), Disp: 180 tablet, Rfl: 0  •  hydrOXYzine (ATARAX) 10 MG tablet, TAKE ONE (1) TABLET BY MOUTH TWICE DAILY AS NEEDED FOR ANXIETY, Disp: 40 tablet, Rfl: 0  •  letrozole (FEMARA) 2.5 MG tablet, Take 1 tablet by mouth Daily., Disp: 30 tablet, Rfl: 3  •  levothyroxine (SYNTHROID, LEVOTHROID) 137 MCG tablet, Take 1 tablet by mouth Daily., Disp: 90 tablet, Rfl: 1  •  lidocaine-prilocaine (EMLA) 2.5-2.5 % cream, Apply 1 application topically to the appropriate area as directed Take As Directed. Apply to port 1 hour prior to port access, Disp: 30 g, Rfl: 1  •  lisinopril (PRINIVIL,ZESTRIL) 40 MG tablet, Take 1 tablet by mouth Daily., Disp: 90 tablet, Rfl: 1  •  loratadine (Claritin) 10 MG tablet, Take 1 tablet night before and 1 tablet morning of chemotherapy., Disp: 2 tablet, Rfl: 5  •  OLANZapine (zyPREXA) 5 MG tablet, TAKE ONE (1) TABLET BY MOUTH EVERY NIGHT AT BEDTIME. TAKE ON DAYS 2, 3, AND 4 AFTER chemotherapy, Disp: 3 tablet, Rfl: 5  •  omeprazole (priLOSEC) 40 MG capsule, Take 1 capsule by mouth Daily., Disp: 30 capsule, Rfl: 6  •  ondansetron (ZOFRAN) 8 MG tablet, Take 1 tablet by mouth 3 (Three) Times a Day As Needed for Nausea or Vomiting., Disp: 90 tablet, Rfl: 2  •  oxyCODONE-acetaminophen (PERCOCET) 5-325 MG per tablet, Take 1 tablet by mouth Every 6 (Six) Hours As Needed for Moderate Pain ., Disp: 60 tablet, Rfl: 0  •  pantoprazole (Protonix) 40 MG EC tablet, Take 1 tablet by mouth Daily., Disp: 30 tablet, Rfl: 6  •  promethazine (PHENERGAN) 25 MG tablet, Take 1 tablet by mouth Every 6 (Six) Hours As Needed for Nausea or Vomiting., Disp: 90 tablet, Rfl: 1    ALLERGIES:  No Known Allergies      ROS:  CONSTITUTIONAL:  Denies fever or chills.   NEUROLOGIC:  Denies headache, focal weakness or  "sensory changes.   EYES:  Denies change in visual acuity.  HEENT:  Denies nasal congestion or sore throat.   RESPIRATORY:  Denies cough or shortness of breath.   CARDIOVASCULAR:  Denies chest pain or edema.   GI:  Denies abdominal pain, nausea, vomiting, bloody stools or diarrhea.   :  Denies dysuria, leaking or incontinence.  MUSCULOSKELETAL:  Denies back pain or joint pain.   INTEGUMENT:  Denies rash.   ENDOCRINE:  Denies polyuria or polydipsia.   LYMPHATIC:  Denies swollen glands or lymphedema.   PSYCHIATRIC:  Denies depression or anxiety.      PHYSICAL EXAM:  Vitals:    05/09/22 1307   BP: 131/76   Pulse: 70   Resp: 18   SpO2: 98%   Weight: 78 kg (171 lb 14.4 oz)   Height: 165.1 cm (65\")   PainSc:   5   PainLoc: Abdomen     Body mass index is 28.61 kg/m².  Current Status 5/9/2022   ECOG score 0     PHQ-9 Total Score:         GEN: alert and oriented x 3, normal affect, well nourished and hydrated.  CARDIO: regular rate and rhythm.  PULM: Lungs CTA b.l, no RRW   ABD: Soft, nontender, nondistended. Incision - stapes removed, no erythema   GYN: defer   EXT: No petechiae, bruising, rash, candida. No CCE.       Result Review :  The pertinent labs, images, and/or pathology as noted in the oncology history were reviewed independently and discussed with the patient.   Maggie Castro DO   05/09/2022    [facility] LABS:   WBC   Date Value Ref Range Status   05/03/2022 5.64 3.40 - 10.80 10*3/mm3 Final     RBC   Date Value Ref Range Status   05/03/2022 2.96 (L) 3.77 - 5.28 10*6/mm3 Final     Hemoglobin   Date Value Ref Range Status   05/03/2022 9.9 (L) 12.0 - 15.9 g/dL Final     Hematocrit   Date Value Ref Range Status   05/03/2022 30.7 (L) 34.0 - 46.6 % Final     Platelets   Date Value Ref Range Status   05/03/2022 123 (L) 140 - 450 10*3/mm3 Final        Date Value Ref Range Status   04/04/2022 110.9 (H) 0.0 - 38.1 U/mL Final       [facility] IMAGING:  CT Chest With Contrast Diagnostic    Result Date: 4/8/2022   1. Stable " pulmonary nodules. No new suspicious pulmonary normality 2. Trace bilateral pleural effusions 3. Interval decrease in size of peritoneal tumor masses and resolution of ascites. No new suspicious abdominopelvic abnormality 4. Stable left adrenal nodule  Electronically Signed By-Stan Turcios On:4/8/2022 10:44 AM This report was finalized on [id number] by  Stan Turcios, .    CT Chest With Contrast Diagnostic    Result Date: 2/9/2022  1.Stable pulmonary nodules. These could be seen with metastatic disease. These are too small for evaluation with PET exam. 2.There is persistent ascites with peritoneal thickening likely due to peritoneal carcinomatosis. 3.1.7 cm left adrenal mass unchanged.    Electronically Signed By-Mayda Elliott MD On:2/9/2022 3:47 PM This report was finalized on [id number] by  Mayda Elliott MD.    CT Abdomen Pelvis With Contrast    Result Date: 4/8/2022   1. Stable pulmonary nodules. No new suspicious pulmonary normality 2. Trace bilateral pleural effusions 3. Interval decrease in size of peritoneal tumor masses and resolution of ascites. No new suspicious abdominopelvic abnormality 4. Stable left adrenal nodule  Electronically Signed By-Stan Turcios On:4/8/2022 10:44 AM This report was finalized on [id number] by  Stan Turcios, .    XR Chest 1 View    Result Date: 4/22/2022  No evidence for active disease in the chest.  This report was finalized on 4/22/2022 6:22 PM by Dr. Heriberto Gómez M.D.      US Paracentesis    Result Date: 2/23/2022  Informed consent was recorded and documented. The patient was placed in a supine position. Ultrasound of all 4 abdominal quadrants prior to intended paracentesis demonstrated absence of intraperitoneal fluid. No paracentesis was performed.  This report was finalized on 2/23/2022 1:02 PM by Dr. Jeff Cameron M.D.      US Thoracentesis    Result Date: 2/23/2022  Informed consent was obtained and documented in the patient's chart. The patient was  placed in upright position. Ultrasound of both right and left chest prior to intended right thoracentesis demonstrated absence of pleural fluid. No thoracentesis was performed.  This report was finalized on 2/23/2022 1:04 PM by Dr. Jeff Cameron M.D.              ASSESSMENT :  • Advanced stage high grade ovarian adenocaricnoma   - Post 6C neoadjuvant carbo/taxol/avastin/ letrozole  - Post ex lap interval cytoreduction       PLAN :  • She is healing well, admits depressed   • Offered olanzapine and pt accepts - will begin 2.5mg po qhs   • Encouraged [employer] club or suppport group as well as counseling while undergoing treatment.   • She will return to continue chemo with Dr Hernandez- Avlilia not until at least 5/25/22  • Pt wants to continue taking Letrozole , we reviewed the risks vs benefits of this drug.              Maggie Castro D.O  5/9/2022    Gynecologic Oncology   [address] [place] [address]  [phone] office

## 2022-05-10 ENCOUNTER — READMISSION MANAGEMENT (OUTPATIENT)
Dept: CALL CENTER | Facility: HOSPITAL | Age: 62
End: 2022-05-10

## 2022-05-10 NOTE — OUTREACH NOTE
General Surgery Week 2 Survey    Flowsheet Row Responses   [facility] patient discharged from? [place]   Does the patient have one of the following disease processes/diagnoses(primary or secondary)? General Surgery   Week 2 attempt successful? Yes   Call start time 1502   Call end time 1506   Discharge diagnosis Post operative stateEXPLORATORY LAPAROTOMY, TOTAL ABDOMINAL HYSTERECTOMY, BILATERAL SALPINGOOOPHORECTOMY, PELVIC AND AORTIC LYMPHADENECTOMY, OMENTECTOMY   Is the patient taking all medications as directed (includes completed medication regime)? Yes   Does the patient have a follow up appointment scheduled with their surgeon? Yes   Has the patient kept scheduled appointments due by today? Yes   Comments Saw Dr. Castro yesterday and has appt with Dr. Hernandez on 5/17   Has home health visited the patient within 72 hours of discharge? N/A   Psychosocial issues? No   What is the patient's perception of their health status since discharge? Improving   Is the patient /caregiver able to teach back basic post-op care? Lifting as instructed by MD in discharge instructions, Keep incision areas clean,dry and protected   Is the patient/caregiver able to teach back signs and symptoms of incisional infection? Increased redness, swelling or pain at the incisonal site, Increased drainage or bleeding, Incisional warmth, Pus or odor from incision, Fever   Is the patient/caregiver able to teach back steps to recovery at home? Set small, achievable goals for return to baseline health, Rest and rebuild strength, gradually increase activity   If the patient is a current smoker, are they able to teach back resources for cessation? Not a smoker   Is the patient/caregiver able to teach back the hierarchy of who to call/visit for symptoms/problems? PCP, Specialist, Home health nurse, Urgent Care, ED, 911 Yes   Additional teach back comments States she is doing well and has been feeling good.   Wrap up additional comments  Denies questions or needs at this time          RESHMA JAMES - Licensed Nurse

## 2022-05-11 NOTE — PROGRESS NOTES
Taxol/Avastin Hematology/Oncology Outpatient Follow Up    PATIENT NAME:Lucy Mahan  :1960  MRN: [id number]  PRIMARY CARE PHYSICIAN: Argnetina Avalos APRN  REFERRING PHYSICIAN: [place] Avalos APRN    Chief Complaint   Patient presents with   • Follow-up     Malignant neoplasm of ovary, unspecified laterality (HCC)        HISTORY OF PRESENT ILLNESS:     This is a 61 y.o.  female who developed abdominal pain in 2021.  Patient has also lost approximately 35 pounds during that..  She has had loss of appetite.  Due to the abdominal pain,  she had an ultrasound of the abdomen done on 10/13/2021.  This basically revealed no liver lesions.  Common bile duct is normal at 3 mm.  There is a nonmobile 3 cm shadowing gallstone within the gallbladder neck.  No gallbladder thickening or pericholecystic fluid collection.  Patient was then referred to general surgery and was seen by Dr. Badillo.  Who took her to surgery on 11/10/2021 for diagnostic laparoscopy and peritoneal biopsy.  Intra-Op , she was noted to have peritoneal studding with malignancy throughout the abdominal cavity and biopsies were completed. 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  The tumor was focally positive for progesterone receptor, positive for CK7, estrogen receptor, CA-125 and PAX 8..  The staining pattern is consistent with ovarian serous carcinoma.      She had CT scan of the chest, abdomen and pelvis and the chest is a 2 mm noncalcified nodule within the left lower lobe, noncalcified nodule in the left upper lobe measuring 4 mm and 3 mm.  In the abdomen there is a 5.1 x 5.3 cm enhancing soft tissue mass in the pelvis to the right of midline associated with the adnexal region possibly representing a primary ovarian malignancy.  No right or left ovarian tissue was seen.  There is abnormal omental thickening and nodularity consistent with carcinomatosis greatest bulk in the left mid abdomen measuring up to  10.8 cm x 2.5 cm.  There is nodular peritoneal thickening also present within the abdomen and pelvis consistent with peritoneal carcinomatosis.  The small quantity of ascitic fluid in the abdomen and pelvis.  Carcinomatosis nodular studding is seen along the inferior right hepatic lobe.     She  has been referred to us for further evaluation and management of her newly diagnosed ovarian carcinoma.     Patient is accompanied today by her daughter for this appointment.  There is  family history of precancerous cells of the  uterusin her sister.        She does not smoke and does not drink alcohol.  Patient is  and has 2 daughters.  She works for the [employer].    · 11/29/2021 patient was seen by Dr. Dubois who has recommended neoadjuvant chemotherapy for 2-3 cycles and then interval cytoreduction.  She has recommended carboplatinum AUC 6, Taxol 1 7 5 mg per metered squared, Avastin 15 mg/kg as was done in the oceans trial but hold Avastin for the first few cycles due to bowel risk.  · Prechemo CA-125 was 754  · 12/7/2021 patient received first cycle of chemotherapy with carboplatinum and Taxol with Neulasta  · 12/28/2021 patient received cycle 2 of combination chemotherapy with carboplatinum and Taxol with Neulasta  · 12/28/2021 CA-125 was down to 635  · 1/11/2022 add-on appointment for nausea, dizziness, thrombocytopenia, pain in upper to mid abdomen 8/10, new onset in the last 4 to 5 days  · January 12, 2022: Due to acute GI symptoms patient had a CT scan of the abdomen and pelvis which basically revealed increasing effusion on the left lung mild pleural effusion on the right lung decreased in amount of omental caking but no interval change in the right adnexal area moderate abdominal and pelvic ascites which has increased.  She denies any significant pulmonary symptoms.  Her O2 sat today was 100% at rest and 99% with ambulation  · 1/24/2022 patient received cycle 3 of carboplatinum with Taxol  with dose reduction due to significant toxicities  · 2022 patient received cycle 4-day 1 of chemo platinum Taxol and Avastin  · 3/7/22: Patient received cycle 5-day 1 of chemotherapy with carboplatinum Taxol and Avastin  · 2022 patient received cycle 6 of chemotherapy with A platinum Taxol and Avastin  · 2022 patient underwent exploratory laparotomy, total abdominal hysterectomy, bilateral salpingo-oophorectomy, pelvic and aortic lymphadenectomy, omentectomy performed by Dr Castro, pathology reviewed high-grade serous carcinoma involving the uterine serosa and outer myometrium, bilateral fallopian tubes and bilateral ovaries and adnexa soft tissue.  Also received are high-grade serous carcinoma involving the omentum as well as residual high-grade serous carcinoma with treatment effects on the colonic splenic [id number], estrogen receptor diffusely positive p53 patchy, p16 diffusely positive.  Molecular testing is pending      History of present illness was reviewed and is unchanged from the previous visit. 22        Past Medical History:   Diagnosis Date   • Arthritis    • Cancer (HCC)     ovarian   • Gall stones    • Hypertension    • Irritable bowel syndrome    • Rheumatoid aortitis    • Thyroid disease        Past Surgical History:   Procedure Laterality Date   •  SECTION      x2   • CHOLECYSTECTOMY N/A 11/10/2021    Procedure: diagnostic laparoscopy and peritoneal biopsy;  Surgeon: Krunal Badillo MD;  Location: [facility] OR;  Service: General;  Laterality: N/A;   • COLONOSCOPY     • HIP SURGERY Left    • THYROIDECTOMY, PARTIAL  2017   • TOTAL ABDOMINAL HYSTERECTOMY N/A 2022    Procedure: EXPLORATORY LAPAROTOMY, TOTAL ABDOMINAL HYSTERECTOMY, BILATERAL SALPINGOOOPHORECTOMY, PELVIC AND AORTIC LYMPHADENECTOMY, OMENTECTOMY;  Surgeon: Maggie Castro DO;  Location: [facility] OR;  Service: Gynecology Oncology;  Laterality: N/A;   • VENOUS ACCESS DEVICE (PORT) INSERTION  Left 12/6/2021    Procedure: INSERTION VENOUS ACCESS DEVICE;  Surgeon: Krunal Badillo MD;  Location: [facility] OR;  Service: General;  Laterality: Left;         Current Outpatient Medications:   •  amLODIPine (NORVASC) 5 MG tablet, Take 1 tablet by mouth Daily., Disp: 90 tablet, Rfl: 0  •  Calcium Carbonate-Vitamin D 600-400 MG-UNIT chewable tablet, Chew 1 tablet Daily., Disp: , Rfl:   •  dexamethasone (DECADRON) 4 MG tablet, TAKE FIVE (5) TABLETS WITH FOOD THE NIGHT BEFORE chemotherapy, Disp: 5 tablet, Rfl: 5  •  folic acid (FOLVITE) 1 MG tablet, take 1 tablet by mouth once daily (Patient taking differently: Take 1 mg by mouth Daily.), Disp: 90 tablet, Rfl: 1  •  gabapentin (NEURONTIN) 300 MG capsule, Take 1 capsule by mouth 2 (Two) Times a Day., Disp: 60 capsule, Rfl: 0  •  hydroCHLOROthiazide (HYDRODIURIL) 12.5 MG tablet, Take 1 tablet by mouth Daily As Needed (swelling)., Disp: 90 tablet, Rfl: 0  •  hydroxychloroquine (PLAQUENIL) 200 MG tablet, TAKE 1 TABLET BY MOUTH TWICE A DAY (Patient taking differently: Take 200 mg by mouth 2 (Two) Times a Day. Takes in afternoon and evening), Disp: 180 tablet, Rfl: 0  •  hydrOXYzine (ATARAX) 10 MG tablet, TAKE ONE (1) TABLET BY MOUTH TWICE DAILY AS NEEDED FOR ANXIETY, Disp: 40 tablet, Rfl: 0  •  letrozole (FEMARA) 2.5 MG tablet, Take 1 tablet by mouth Daily., Disp: 30 tablet, Rfl: 3  •  levothyroxine (SYNTHROID, LEVOTHROID) 137 MCG tablet, Take 1 tablet by mouth Daily., Disp: 90 tablet, Rfl: 1  •  lidocaine-prilocaine (EMLA) 2.5-2.5 % cream, Apply 1 application topically to the appropriate area as directed Take As Directed. Apply to port 1 hour prior to port access, Disp: 30 g, Rfl: 1  •  lisinopril (PRINIVIL,ZESTRIL) 40 MG tablet, Take 1 tablet by mouth Daily., Disp: 90 tablet, Rfl: 1  •  loratadine (Claritin) 10 MG tablet, Take 1 tablet night before and 1 tablet morning of chemotherapy., Disp: 2 tablet, Rfl: 5  •  OLANZapine (ZyPREXA) 2.5 MG tablet, Take 1 tablet by  mouth Every Night., Disp: 30 tablet, Rfl: 2  •  OLANZapine (zyPREXA) 5 MG tablet, TAKE ONE (1) TABLET BY MOUTH EVERY NIGHT AT BEDTIME. TAKE ON DAYS 2, 3, AND 4 AFTER chemotherapy, Disp: 3 tablet, Rfl: 5  •  omeprazole (priLOSEC) 40 MG capsule, Take 1 capsule by mouth Daily., Disp: 30 capsule, Rfl: 6  •  ondansetron (ZOFRAN) 8 MG tablet, Take 1 tablet by mouth 3 (Three) Times a Day As Needed for Nausea or Vomiting., Disp: 90 tablet, Rfl: 2  •  oxyCODONE-acetaminophen (PERCOCET) 5-325 MG per tablet, Take 1 tablet by mouth Every 6 (Six) Hours As Needed for Moderate Pain ., Disp: 60 tablet, Rfl: 0  •  pantoprazole (Protonix) 40 MG EC tablet, Take 1 tablet by mouth Daily., Disp: 30 tablet, Rfl: 6  •  promethazine (PHENERGAN) 25 MG tablet, Take 1 tablet by mouth Every 6 (Six) Hours As Needed for Nausea or Vomiting., Disp: 90 tablet, Rfl: 1  •  HYDROcodone-acetaminophen (Norco) 5-325 MG per tablet, Take 1 tablet by mouth Every 6 (Six) Hours As Needed for Moderate Pain ., Disp: 20 tablet, Rfl: 0    No Known Allergies    Family History   Problem Relation Age of Onset   • Dementia Mother    • Arthritis Mother    • Heart disease Father    • Hypertension Father    • Malig Hyperthermia Neg Hx        Cancer-related family history is not on file.    Social History     Tobacco Use   • Smoking status: Never Smoker   • Smokeless tobacco: Never Used   Vaping Use   • Vaping Use: Never used   Substance Use Topics   • Alcohol use: Yes     Alcohol/week: 1.0 standard drink     Types: 1 Glasses of wine per week     Comment: less than monthly   • Drug use: No       HPI, ROS and PFSH have been reviewed and confirmed on 5/17/2022.     SUBJECTIVE:    She is healing well from her surgical procedure.  Continues to experience neuropathy            REVIEW OF SYSTEMS:  Review of Systems   Constitutional: Positive for fatigue. Negative for chills and fever.   HENT: Negative for ear pain, mouth sores, nosebleeds and sore throat.    Eyes: Negative for  "photophobia and visual disturbance.   Respiratory: Negative for wheezing and stridor.    Cardiovascular: Negative for chest pain and palpitations.   Gastrointestinal: Positive for abdominal pain and nausea. Negative for diarrhea and vomiting.   Endocrine: Negative for cold intolerance and heat intolerance.   Genitourinary: Negative for dysuria and hematuria.        Decreased urine output   Musculoskeletal: Negative for joint swelling and neck stiffness.   Skin: Negative for color change and rash.   Neurological: Positive for dizziness, weakness and headaches. Negative for seizures and syncope.   Hematological: Negative for adenopathy.   Psychiatric/Behavioral: Negative for agitation, confusion and hallucinations.     Post op changes noted.    OBJECTIVE:    Vitals:    05/17/22 1301   BP: 120/75   Pulse: 68   Resp: 18   Temp: 97.1 °F (36.2 °C)   SpO2: 99%   Weight: 79.4 kg (175 lb)   Height: 165.1 cm (65\")   PainSc:   6   PainLoc: Foot  Comment: both feet     Body mass index is 29.12 kg/m².    ECOG  (1) Restricted in physically strenuous activity, ambulatory and able to do work of light nature    Physical Exam  Vitals and nursing note reviewed.   Constitutional:       General: She is not in acute distress.     Appearance: She is not diaphoretic.   HENT:      Head: Normocephalic and atraumatic.      Mouth/Throat:      Mouth: Mucous membranes are dry.   Eyes:      General: No scleral icterus.        Right eye: No discharge.         Left eye: No discharge.      Conjunctiva/sclera: Conjunctivae normal.   Neck:      Thyroid: No thyromegaly.   Cardiovascular:      Rate and Rhythm: Normal rate and regular rhythm.      Heart sounds: Normal heart sounds.     No friction rub. No gallop.   Pulmonary:      Effort: Pulmonary effort is normal. No respiratory distress.      Breath sounds: No stridor. No wheezing.   Abdominal:      General: Bowel sounds are normal.      Palpations: Abdomen is soft. There is no mass.      Tenderness: " There is no abdominal tenderness. There is no guarding or rebound.      Comments: Abdominal wound noted   Musculoskeletal:         General: No tenderness. Normal range of motion.      Cervical back: Normal range of motion and neck supple.   Lymphadenopathy:      Cervical: No cervical adenopathy.   Skin:     General: Skin is warm.      Findings: No erythema or rash.      Comments: Tenting skin turgor   Neurological:      Mental Status: She is alert and oriented to person, place, and time.      Motor: No abnormal muscle tone.   Psychiatric:         Behavior: Behavior normal.       I have reexamined the patient and the results are consistent with the previously documented exam. Inga Hernandez MD   RECENT LABS  WBC   Date Value Ref Range Status   05/17/2022 4.93 3.40 - 10.80 10*3/mm3 Final     RBC   Date Value Ref Range Status   05/17/2022 3.15 (L) 3.77 - 5.28 10*6/mm3 Final     Hemoglobin   Date Value Ref Range Status   05/17/2022 10.5 (L) 12.0 - 15.9 g/dL Final     Hematocrit   Date Value Ref Range Status   05/17/2022 33.5 (L) 34.0 - 46.6 % Final     MCV   Date Value Ref Range Status   05/17/2022 106.3 (H) 79.0 - 97.0 fL Final     MCH   Date Value Ref Range Status   05/17/2022 33.3 (H) 26.6 - 33.0 pg Final     MCHC   Date Value Ref Range Status   05/17/2022 31.3 (L) 31.5 - 35.7 g/dL Final     RDW   Date Value Ref Range Status   05/17/2022 17.1 (H) 12.3 - 15.4 % Final     RDW-SD   Date Value Ref Range Status   05/17/2022 63.2 (H) 37.0 - 54.0 fl Final     MPV   Date Value Ref Range Status   05/17/2022 10.3 6.0 - 12.0 fL Final     Platelets   Date Value Ref Range Status   05/17/2022 109 (L) 140 - 450 10*3/mm3 Final     Neutrophil %   Date Value Ref Range Status   05/17/2022 49.3 42.7 - 76.0 % Final     Lymphocyte %   Date Value Ref Range Status   05/17/2022 30.0 19.6 - 45.3 % Final     Monocyte %   Date Value Ref Range Status   05/17/2022 11.0 5.0 - 12.0 % Final     Eosinophil %   Date Value Ref Range Status    05/17/2022 9.1 (H) 0.3 - 6.2 % Final     Basophil %   Date Value Ref Range Status   05/17/2022 0.6 0.0 - 1.5 % Final     Immature Grans %   Date Value Ref Range Status   04/29/2022 0.4 0.0 - 0.5 % Final     Neutrophils, Absolute   Date Value Ref Range Status   05/17/2022 2.43 1.70 - 7.00 10*3/mm3 Final     Lymphocytes, Absolute   Date Value Ref Range Status   05/17/2022 1.48 0.70 - 3.10 10*3/mm3 Final     Monocytes, Absolute   Date Value Ref Range Status   05/17/2022 0.54 0.10 - 0.90 10*3/mm3 Final     Eosinophils, Absolute   Date Value Ref Range Status   05/17/2022 0.45 (H) 0.00 - 0.40 10*3/mm3 Final     Basophils, Absolute   Date Value Ref Range Status   05/17/2022 0.03 0.00 - 0.20 10*3/mm3 Final     Immature Grans, Absolute   Date Value Ref Range Status   04/29/2022 0.02 0.00 - 0.05 10*3/mm3 Final     nRBC   Date Value Ref Range Status   04/29/2022 0.0 0.0 - 0.2 /100 WBC Final       Lab Results   Component Value Date    GLUCOSE 105 (H) 04/27/2022    BUN 18 04/27/2022    CREATININE 1.12 (H) 04/27/2022    EGFRIFNONA 47 (L) 02/21/2022    EGFRIFAFRI 44 (L) 01/21/2020    BCR 16.1 04/27/2022    K 4.5 04/27/2022    CO2 21.1 (L) 04/27/2022    CALCIUM 8.6 04/27/2022    ALBUMIN 3.70 04/14/2022    LABIL2 1.2 (calc) 01/21/2020    AST 21 04/14/2022    ALT 15 04/14/2022         ASSESSMENT:      1. 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  CA-125 754 November 2021  2. On neoadjuvant chemotherapy with carboplatinum AUC 6, Taxol 175 mg per metered squared, with Avastin added due to lack of significant response with carboplatinum and Taxol.  Monitor serial CA-125's.  Down to 135 as of 3/14/2022.  Her CT scan showed response therefore patient will proceed with radical hysterectomy to perform by Dr. Dubois on 4/26/2022.  Plans for additional chemotherapy along with Avastin and possibly maintenance treatment with PARP inhibitors in the future.  She is also on letrozole 2.5 mg p.o. daily  3. Status post radical  hysterectomy.  See path report.  Caris testing pending  4. Pulmonary nodules of unclear etiology too small for biopsy or PET resolution: Continue surveillance CT scans  5. Chemotherapy induced nausea  6. Chemotherapy induced fatigue  7. Chemotherapy-induced anemia  8. 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  Patient already had some 25% dose reduction on her chemo.  She is currently on Neurontin 300 mg daily will increase to 300 mg twice a day.  Peripheral neuropathy continues to be an issue.  We will hold Taxol for now till this has improved  9. Severe thrombocytopenia secondary to chemotherapy most notably carboplatinum  10. Comprehensive gene analysis with cancer next technology was negative for any significant mutation in all 77 genes analyzed  11. 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  No other actionable mutations identified  12. Dehydration: Resolved.  She has increase p.o. fluid intake.  13. Abdominal discomfort secondary to malignancy  14. Assessment has been reviewed and updated     Discussion     I have reviewed her imaging studies, pathology reports.  She has metastatic serous carcinoma of the ovary.  She has liver involvement.  She also has pulmonary nodules of unclear etiology.  These are too small for biopsy or PET resolution therefore they will be followed over time.  We discussed that ovarian cancer is a surgical disease.  We will have her seen by GYN oncologist Dr. Castro to weigh in on her surgical options.  We discussed that she may benefit from neoadjuvant chemotherapy but will await Dr. Castro's final recommendations.     She was seen by Dr. Castro recommendation for her to have neoadjuvant chemotherapy with carboplatinum and Taxol      Discussed side effects of chemotherapy to include but not limited to:    Chemotherapy side effects  include, but not limited to, nausea, vomiting, bone marrow suppression, which can result in blood, platelet transfusion. There is also risk of permanent bone marrow destruction, which can cause myelodysplastic syndrome or leukemia years down the line. There is risk of infection which can result in hospitalization and even death. There is also risk of fatigue, asthenia, alopecia which could become permanent. Chemo will help to reduce risk of relapse of cancer, but does not eliminate risk completely.    Discussed that Taxol chemotherapy can lead to hypersensitivity reaction fluid retention, peripheral neuropathy, myalgias, small risk of permanent alopecia    Carboplatinum can lead to myelosuppression socially thrombocytopenia      Avastin will be added postoperatively       She will benefit from genetic testing as this could have some therapeutic implications for her.    Genetics      Today's risk assessment is based on the history the patient has provided.  We discussed that the best people to screen are the ones who have been affected by malignancy as their result is more informative.  We reviewed all the hallmarks of hereditary cancer syndrome including multiple relatives on the same side of the family being affected by malignancy, cancer diagnosis in young individuals, different cancers in one individual.      Comprehensive gene analysis with cancer next technology was negative for any mutation in all 77 genes analyzed.  Discussed that her malignancy was most likely sporadic.  We discussed lifetime risk of developing ovarian cancer estimated at 2 to 3%.          Plans:     · Continue Neurontin to 300 mg twice a day for neuropathy  · Plan to restart chemotherapy June 1, 2022 with carboplatinum and Avastin.  Hold Taxol onto neuropathy has improved, reviewed with patient  · Reviewed her path report from radical hysterectomy 4/20/2022  · Comprehensive gene analysis with cancer next technology results reviewed with  patient and copies of her results was also given to her for her own records keeping  · Dose reduced chemotherapy carboplatinum by 25% due to significant toxicity with third cycle of chemotherapy  · CT scans reviewed  · Follow-up with PCP for elevated blood pressure  · Continue weekly CBCs  · CA-125 with each cycle of chemotherapy  · Continue EMLA cream  · Continue Percocet 5 mg p.o. every 4-6 hours as needed for pain  · Prilosec 40 mg p.o. daily  · Continue vitamin b6 100mg po q 12   · Continue Neurontin 300mg po q 12  For neuropathy.  Discussed precautions with patient  · Foundation 1 testing results reviewed  · All questions answered       I spent 40 total minutes, face-to-face, caring for Lucy today.  80% of this time involved counseling and/or coordination of care as documented within this note.

## 2022-05-13 RX ORDER — HEPARIN SODIUM (PORCINE) LOCK FLUSH IV SOLN 100 UNIT/ML 100 UNIT/ML
500 SOLUTION INTRAVENOUS AS NEEDED
Status: CANCELLED | OUTPATIENT
Start: 2022-05-13

## 2022-05-13 RX ORDER — SODIUM CHLORIDE 0.9 % (FLUSH) 0.9 %
10 SYRINGE (ML) INJECTION AS NEEDED
Status: CANCELLED | OUTPATIENT
Start: 2022-05-13

## 2022-05-13 NOTE — ADDENDUM NOTE
Encounter addended by: Fernanda Villatoro LPN on: 5/13/2022 3:39 PM   Actions taken: Clinical Note Signed

## 2022-05-13 NOTE — ADDENDUM NOTE
Encounter addended by: Fernanda Villatoro LPN on: 5/13/2022 3:21 PM   Actions taken: Flowsheet accepted, Clinical Note Signed

## 2022-05-13 NOTE — ADDENDUM NOTE
Encounter addended by: Fernanda Villatoro LPN on: 5/13/2022 3:17 PM   Actions taken: Flowsheet accepted

## 2022-05-13 NOTE — ADDENDUM NOTE
Encounter addended by: Fernanda Villatoro LPN on: 5/13/2022 3:24 PM   Actions taken: Order list changed, Diagnosis association updated

## 2022-05-17 ENCOUNTER — OFFICE VISIT (OUTPATIENT)
Dept: ONCOLOGY | Facility: CLINIC | Age: 62
End: 2022-05-17

## 2022-05-17 ENCOUNTER — LAB (OUTPATIENT)
Dept: LAB | Facility: HOSPITAL | Age: 62
End: 2022-05-17

## 2022-05-17 VITALS
WEIGHT: 175 LBS | RESPIRATION RATE: 18 BRPM | OXYGEN SATURATION: 99 % | HEART RATE: 68 BPM | DIASTOLIC BLOOD PRESSURE: 75 MMHG | BODY MASS INDEX: 29.16 KG/M2 | TEMPERATURE: 97.1 F | SYSTOLIC BLOOD PRESSURE: 120 MMHG | HEIGHT: 65 IN

## 2022-05-17 DIAGNOSIS — C56.9 MALIGNANT NEOPLASM OF OVARY, UNSPECIFIED LATERALITY: Primary | ICD-10-CM

## 2022-05-17 LAB
BASOPHILS # BLD AUTO: 0.03 10*3/MM3 (ref 0–0.2)
BASOPHILS NFR BLD AUTO: 0.6 % (ref 0–1.5)
DEPRECATED RDW RBC AUTO: 63.2 FL (ref 37–54)
EOSINOPHIL # BLD AUTO: 0.45 10*3/MM3 (ref 0–0.4)
EOSINOPHIL NFR BLD AUTO: 9.1 % (ref 0.3–6.2)
ERYTHROCYTE [DISTWIDTH] IN BLOOD BY AUTOMATED COUNT: 17.1 % (ref 12.3–15.4)
HCT VFR BLD AUTO: 33.5 % (ref 34–46.6)
HGB BLD-MCNC: 10.5 G/DL (ref 12–15.9)
HOLD SPECIMEN: NORMAL
HOLD SPECIMEN: NORMAL
LYMPHOCYTES # BLD AUTO: 1.48 10*3/MM3 (ref 0.7–3.1)
LYMPHOCYTES NFR BLD AUTO: 30 % (ref 19.6–45.3)
MCH RBC QN AUTO: 33.3 PG (ref 26.6–33)
MCHC RBC AUTO-ENTMCNC: 31.3 G/DL (ref 31.5–35.7)
MCV RBC AUTO: 106.3 FL (ref 79–97)
MONOCYTES # BLD AUTO: 0.54 10*3/MM3 (ref 0.1–0.9)
MONOCYTES NFR BLD AUTO: 11 % (ref 5–12)
NEUTROPHILS NFR BLD AUTO: 2.43 10*3/MM3 (ref 1.7–7)
NEUTROPHILS NFR BLD AUTO: 49.3 % (ref 42.7–76)
PLATELET # BLD AUTO: 109 10*3/MM3 (ref 140–450)
PMV BLD AUTO: 10.3 FL (ref 6–12)
RBC # BLD AUTO: 3.15 10*6/MM3 (ref 3.77–5.28)
WBC NRBC COR # BLD: 4.93 10*3/MM3 (ref 3.4–10.8)

## 2022-05-17 PROCEDURE — 85025 COMPLETE CBC W/AUTO DIFF WBC: CPT

## 2022-05-17 PROCEDURE — 99215 OFFICE O/P EST HI 40 MIN: CPT | Performed by: INTERNAL MEDICINE

## 2022-05-17 PROCEDURE — 36415 COLL VENOUS BLD VENIPUNCTURE: CPT

## 2022-05-23 ENCOUNTER — TELEPHONE (OUTPATIENT)
Dept: ONCOLOGY | Facility: CLINIC | Age: 62
End: 2022-05-23

## 2022-05-23 DIAGNOSIS — C56.9 MALIGNANT NEOPLASM OF OVARY, UNSPECIFIED LATERALITY: ICD-10-CM

## 2022-05-23 RX ORDER — GABAPENTIN 300 MG/1
CAPSULE ORAL
Qty: 60 CAPSULE | Refills: 0 | Status: SHIPPED | OUTPATIENT
Start: 2022-05-23 | End: 2022-07-05

## 2022-05-23 NOTE — TELEPHONE ENCOUNTER
Caller: Lucy Mahan    Relationship: Self    Best call back number: [phone]    Requested Prescriptions:   OXYCODONE 5-325 MG       Pharmacy where request should be sent:    [address], [facility]. [address] [phone]  - [phone]        Does the patient have less than a 3 day supply:  [x] Yes  [] No    John GIBSON Rep   05/23/22 09:26 EDT

## 2022-05-25 LAB
DX PRELIMINARY: NORMAL
LAB AP CASE REPORT: NORMAL
LAB AP DIAGNOSIS COMMENT: NORMAL
LAB AP SPECIAL STAINS: NORMAL
LAB AP SYNOPTIC CHECKLIST: NORMAL
PATH REPORT.ADDENDUM SPEC: NORMAL
PATH REPORT.FINAL DX SPEC: NORMAL
PATH REPORT.GROSS SPEC: NORMAL

## 2022-05-27 RX ORDER — HYDROCODONE BITARTRATE AND ACETAMINOPHEN 5; 325 MG/1; MG/1
1 TABLET ORAL EVERY 6 HOURS PRN
Qty: 60 TABLET | Refills: 0 | Status: SHIPPED | OUTPATIENT
Start: 2022-05-27 | End: 2022-07-01 | Stop reason: SDUPTHER

## 2022-05-27 NOTE — TELEPHONE ENCOUNTER
Patient called in to check on the status of her refill request. I let her know it just need to be signed and that I would work on it. Mayda had already sent refill request. I sent a direct message to her and Dr. Hernandez asking for a signature so patient could have before the long weekend.

## 2022-06-02 ENCOUNTER — HOSPITAL ENCOUNTER (OUTPATIENT)
Dept: ONCOLOGY | Facility: HOSPITAL | Age: 62
Setting detail: INFUSION SERIES
Discharge: HOME | End: 2022-06-02

## 2022-06-02 VITALS
BODY MASS INDEX: 28.72 KG/M2 | SYSTOLIC BLOOD PRESSURE: 132 MMHG | WEIGHT: 172.6 LBS | DIASTOLIC BLOOD PRESSURE: 75 MMHG | OXYGEN SATURATION: 97 % | HEART RATE: 81 BPM | TEMPERATURE: 96.9 F

## 2022-06-02 DIAGNOSIS — C56.9 MALIGNANT NEOPLASM OF OVARY, UNSPECIFIED LATERALITY: Primary | ICD-10-CM

## 2022-06-02 DIAGNOSIS — E83.51 HYPOCALCEMIA: ICD-10-CM

## 2022-06-02 LAB
ALBUMIN SERPL-MCNC: 4 G/DL (ref 3.5–5.2)
ALBUMIN/GLOB SERPL: 1.3 G/DL
ALP SERPL-CCNC: 52 U/L (ref 39–117)
ALT SERPL W P-5'-P-CCNC: 10 U/L (ref 1–33)
ANION GAP SERPL CALCULATED.3IONS-SCNC: 14 MMOL/L (ref 5–15)
AST SERPL-CCNC: 18 U/L (ref 1–32)
BASOPHILS # BLD AUTO: 0 10*3/MM3 (ref 0–0.2)
BASOPHILS NFR BLD AUTO: 0 % (ref 0–1.5)
BILIRUB SERPL-MCNC: 0.3 MG/DL (ref 0–1.2)
BILIRUB UR QL STRIP: NEGATIVE
BUN SERPL-MCNC: 19 MG/DL (ref 8–23)
BUN/CREAT SERPL: 18.4 (ref 7–25)
CALCIUM SPEC-SCNC: 9.7 MG/DL (ref 8.6–10.5)
CANCER AG125 SERPL QL: 18.3 U/ML (ref 0–38.1)
CHLORIDE SERPL-SCNC: 101 MMOL/L (ref 98–107)
CLARITY UR: CLEAR
CO2 SERPL-SCNC: 21 MMOL/L (ref 22–29)
COLOR UR: YELLOW
CREAT BLDA-MCNC: 1.1 MG/DL (ref 0.6–1.3)
CREAT SERPL-MCNC: 1.03 MG/DL (ref 0.57–1)
DEPRECATED RDW RBC AUTO: 51.4 FL (ref 37–54)
EGFRCR SERPLBLD CKD-EPI 2021: 56.6 ML/MIN/1.73
EGFRCR SERPLBLD CKD-EPI 2021: 61.2 ML/MIN/1.73
EOSINOPHIL # BLD AUTO: 0 10*3/MM3 (ref 0–0.4)
EOSINOPHIL NFR BLD AUTO: 0 % (ref 0.3–6.2)
ERYTHROCYTE [DISTWIDTH] IN BLOOD BY AUTOMATED COUNT: 14.7 % (ref 12.3–15.4)
GLOBULIN UR ELPH-MCNC: 3.2 GM/DL
GLUCOSE SERPL-MCNC: 148 MG/DL (ref 65–99)
GLUCOSE UR STRIP-MCNC: NEGATIVE MG/DL
HCT VFR BLD AUTO: 36.3 % (ref 34–46.6)
HGB BLD-MCNC: 11.9 G/DL (ref 12–15.9)
HGB UR QL STRIP.AUTO: NEGATIVE
KETONES UR QL STRIP: NEGATIVE
LEUKOCYTE ESTERASE UR QL STRIP.AUTO: ABNORMAL
LYMPHOCYTES # BLD AUTO: 0.4 10*3/MM3 (ref 0.7–3.1)
LYMPHOCYTES NFR BLD AUTO: 11.4 % (ref 19.6–45.3)
MCH RBC QN AUTO: 32.9 PG (ref 26.6–33)
MCHC RBC AUTO-ENTMCNC: 32.8 G/DL (ref 31.5–35.7)
MCV RBC AUTO: 100.3 FL (ref 79–97)
MONOCYTES # BLD AUTO: 0.06 10*3/MM3 (ref 0.1–0.9)
MONOCYTES NFR BLD AUTO: 1.7 % (ref 5–12)
NEUTROPHILS NFR BLD AUTO: 3.04 10*3/MM3 (ref 1.7–7)
NEUTROPHILS NFR BLD AUTO: 86.9 % (ref 42.7–76)
NITRITE UR QL STRIP: NEGATIVE
PH UR STRIP.AUTO: 5.5 [PH] (ref 5–8)
PLATELET # BLD AUTO: 174 10*3/MM3 (ref 140–450)
PMV BLD AUTO: 9.4 FL (ref 6–12)
POTASSIUM SERPL-SCNC: 3.6 MMOL/L (ref 3.5–5.2)
PROT SERPL-MCNC: 7.2 G/DL (ref 6–8.5)
PROT UR QL STRIP: NEGATIVE
RBC # BLD AUTO: 3.62 10*6/MM3 (ref 3.77–5.28)
SODIUM SERPL-SCNC: 136 MMOL/L (ref 136–145)
SP GR UR STRIP: 1.01 (ref 1–1.03)
UROBILINOGEN UR QL STRIP: ABNORMAL
WBC NRBC COR # BLD: 3.5 10*3/MM3 (ref 3.4–10.8)

## 2022-06-02 PROCEDURE — 96375 TX/PRO/DX INJ NEW DRUG ADDON: CPT

## 2022-06-02 PROCEDURE — 96413 CHEMO IV INFUSION 1 HR: CPT

## 2022-06-02 PROCEDURE — 96377 APPLICATON ON-BODY INJECTOR: CPT

## 2022-06-02 PROCEDURE — 80053 COMPREHEN METABOLIC PANEL: CPT | Performed by: INTERNAL MEDICINE

## 2022-06-02 PROCEDURE — 25010000002 PALONOSETRON 0.25 MG/5ML SOLUTION PREFILLED SYRINGE: Performed by: INTERNAL MEDICINE

## 2022-06-02 PROCEDURE — 96367 TX/PROPH/DG ADDL SEQ IV INF: CPT

## 2022-06-02 PROCEDURE — 25010000002 PEGFILGRASTIM 6 MG/0.6ML PREFILLED SYRINGE KIT: Performed by: INTERNAL MEDICINE

## 2022-06-02 PROCEDURE — 25010000002 DIPHENHYDRAMINE PER 50 MG: Performed by: INTERNAL MEDICINE

## 2022-06-02 PROCEDURE — 25010000002 HEPARIN LOCK FLUSH PER 10 UNITS: Performed by: INTERNAL MEDICINE

## 2022-06-02 PROCEDURE — 81003 URINALYSIS AUTO W/O SCOPE: CPT | Performed by: INTERNAL MEDICINE

## 2022-06-02 PROCEDURE — 25010000002 FOSAPREPITANT PER 1 MG: Performed by: INTERNAL MEDICINE

## 2022-06-02 PROCEDURE — 25010000002 BEVACIZUMAB-BVZR 400 MG/16ML SOLUTION 16 ML VIAL: Performed by: INTERNAL MEDICINE

## 2022-06-02 PROCEDURE — 25010000002 CARBOPLATIN PER 50 MG: Performed by: INTERNAL MEDICINE

## 2022-06-02 PROCEDURE — 82565 ASSAY OF CREATININE: CPT

## 2022-06-02 PROCEDURE — 96417 CHEMO IV INFUS EACH ADDL SEQ: CPT

## 2022-06-02 PROCEDURE — 85025 COMPLETE CBC W/AUTO DIFF WBC: CPT | Performed by: INTERNAL MEDICINE

## 2022-06-02 PROCEDURE — 86304 IMMUNOASSAY TUMOR CA 125: CPT | Performed by: INTERNAL MEDICINE

## 2022-06-02 PROCEDURE — 25010000002 DEXAMETHASONE SODIUM PHOSPHATE 120 MG/30ML SOLUTION: Performed by: INTERNAL MEDICINE

## 2022-06-02 RX ORDER — FAMOTIDINE 10 MG/ML
20 INJECTION, SOLUTION INTRAVENOUS AS NEEDED
Status: CANCELLED | OUTPATIENT
Start: 2022-06-02

## 2022-06-02 RX ORDER — SODIUM CHLORIDE 0.9 % (FLUSH) 0.9 %
10 SYRINGE (ML) INJECTION AS NEEDED
Status: CANCELLED | OUTPATIENT
Start: 2022-06-02

## 2022-06-02 RX ORDER — HEPARIN SODIUM (PORCINE) LOCK FLUSH IV SOLN 100 UNIT/ML 100 UNIT/ML
500 SOLUTION INTRAVENOUS AS NEEDED
Status: DISCONTINUED | OUTPATIENT
Start: 2022-06-02 | End: 2022-06-03 | Stop reason: HOSPADM

## 2022-06-02 RX ORDER — PALONOSETRON 0.05 MG/ML
0.25 INJECTION, SOLUTION INTRAVENOUS ONCE
Status: CANCELLED | OUTPATIENT
Start: 2022-06-02

## 2022-06-02 RX ORDER — OLANZAPINE 5 MG/1
5 TABLET ORAL ONCE
Status: COMPLETED | OUTPATIENT
Start: 2022-06-02 | End: 2022-06-02

## 2022-06-02 RX ORDER — SODIUM CHLORIDE 9 MG/ML
250 INJECTION, SOLUTION INTRAVENOUS ONCE
Status: CANCELLED | OUTPATIENT
Start: 2022-06-02

## 2022-06-02 RX ORDER — SODIUM CHLORIDE 0.9 % (FLUSH) 0.9 %
10 SYRINGE (ML) INJECTION AS NEEDED
Status: DISCONTINUED | OUTPATIENT
Start: 2022-06-02 | End: 2022-06-03 | Stop reason: HOSPADM

## 2022-06-02 RX ORDER — HEPARIN SODIUM (PORCINE) LOCK FLUSH IV SOLN 100 UNIT/ML 100 UNIT/ML
500 SOLUTION INTRAVENOUS AS NEEDED
Status: CANCELLED | OUTPATIENT
Start: 2022-06-02

## 2022-06-02 RX ORDER — OLANZAPINE 5 MG/1
5 TABLET ORAL ONCE
Status: CANCELLED | OUTPATIENT
Start: 2022-06-02 | End: 2022-06-02

## 2022-06-02 RX ORDER — PALONOSETRON 0.05 MG/ML
0.25 INJECTION, SOLUTION INTRAVENOUS ONCE
Status: COMPLETED | OUTPATIENT
Start: 2022-06-02 | End: 2022-06-02

## 2022-06-02 RX ORDER — FAMOTIDINE 10 MG/ML
20 INJECTION, SOLUTION INTRAVENOUS ONCE
Status: CANCELLED | OUTPATIENT
Start: 2022-06-02

## 2022-06-02 RX ORDER — FAMOTIDINE 10 MG/ML
20 INJECTION, SOLUTION INTRAVENOUS ONCE
Status: COMPLETED | OUTPATIENT
Start: 2022-06-02 | End: 2022-06-02

## 2022-06-02 RX ORDER — SODIUM CHLORIDE 9 MG/ML
250 INJECTION, SOLUTION INTRAVENOUS ONCE
Status: COMPLETED | OUTPATIENT
Start: 2022-06-02 | End: 2022-06-02

## 2022-06-02 RX ORDER — SODIUM CHLORIDE 9 MG/ML
250 INJECTION, SOLUTION INTRAVENOUS ONCE
Status: DISCONTINUED | OUTPATIENT
Start: 2022-06-02 | End: 2022-06-03 | Stop reason: HOSPADM

## 2022-06-02 RX ORDER — DIPHENHYDRAMINE HYDROCHLORIDE 50 MG/ML
50 INJECTION INTRAMUSCULAR; INTRAVENOUS AS NEEDED
Status: CANCELLED | OUTPATIENT
Start: 2022-06-02

## 2022-06-02 RX ADMIN — FAMOTIDINE 20 MG: 10 INJECTION INTRAVENOUS at 11:12

## 2022-06-02 RX ADMIN — SODIUM CHLORIDE 780 MG: 9 INJECTION, SOLUTION INTRAVENOUS at 12:07

## 2022-06-02 RX ADMIN — OLANZAPINE 5 MG: 5 TABLET, FILM COATED ORAL at 11:15

## 2022-06-02 RX ADMIN — HEPARIN 500 UNITS: 100 SYRINGE at 13:21

## 2022-06-02 RX ADMIN — DIPHENHYDRAMINE HYDROCHLORIDE 50 MG: 50 INJECTION, SOLUTION INTRAMUSCULAR; INTRAVENOUS at 11:14

## 2022-06-02 RX ADMIN — CARBOPLATIN 400 MG: 10 INJECTION, SOLUTION INTRAVENOUS at 12:46

## 2022-06-02 RX ADMIN — DEXAMETHASONE SODIUM PHOSPHATE 20 MG: 4 INJECTION, SOLUTION INTRA-ARTICULAR; INTRALESIONAL; INTRAMUSCULAR; INTRAVENOUS; SOFT TISSUE at 10:52

## 2022-06-02 RX ADMIN — PEGFILGRASTIM 6 MG: KIT SUBCUTANEOUS at 13:21

## 2022-06-02 RX ADMIN — Medication 10 ML: at 13:21

## 2022-06-02 RX ADMIN — SODIUM CHLORIDE 250 ML: 9 INJECTION, SOLUTION INTRAVENOUS at 10:51

## 2022-06-02 RX ADMIN — SODIUM CHLORIDE 100 ML: 9 INJECTION, SOLUTION INTRAVENOUS at 11:35

## 2022-06-02 RX ADMIN — PALONOSETRON 0.25 MG: 0.25 INJECTION, SOLUTION INTRAVENOUS at 11:33

## 2022-06-02 NOTE — PROGRESS NOTES
Pt to the clinic for C7d1 of Carbo and C5D1 of Bevacizumab.  She denies having any complaints. Port accessed and flushed with good blood return noted. 10cc of blood wasted prior to specimen collection. Blood specimen obtained and sent to lab for processing per protocol.  Port flushed with saline and heparin prior to needle removal. AVS given prior to discharge.

## 2022-06-09 ENCOUNTER — LAB (OUTPATIENT)
Dept: LAB | Facility: HOSPITAL | Age: 62
End: 2022-06-09

## 2022-06-09 ENCOUNTER — TELEPHONE (OUTPATIENT)
Dept: ONCOLOGY | Facility: CLINIC | Age: 62
End: 2022-06-09

## 2022-06-09 DIAGNOSIS — C56.9 MALIGNANT NEOPLASM OF OVARY, UNSPECIFIED LATERALITY: ICD-10-CM

## 2022-06-09 LAB
BASOPHILS # BLD AUTO: 0.01 10*3/MM3 (ref 0–0.2)
BASOPHILS NFR BLD AUTO: 0.1 % (ref 0–1.5)
DEPRECATED RDW RBC AUTO: 54 FL (ref 37–54)
EOSINOPHIL # BLD AUTO: 0.07 10*3/MM3 (ref 0–0.4)
EOSINOPHIL NFR BLD AUTO: 0.8 % (ref 0.3–6.2)
ERYTHROCYTE [DISTWIDTH] IN BLOOD BY AUTOMATED COUNT: 14.7 % (ref 12.3–15.4)
HCT VFR BLD AUTO: 34.4 % (ref 34–46.6)
HGB BLD-MCNC: 11.1 G/DL (ref 12–15.9)
LYMPHOCYTES # BLD AUTO: 1.66 10*3/MM3 (ref 0.7–3.1)
LYMPHOCYTES NFR BLD AUTO: 18.8 % (ref 19.6–45.3)
MCH RBC QN AUTO: 33.2 PG (ref 26.6–33)
MCHC RBC AUTO-ENTMCNC: 32.3 G/DL (ref 31.5–35.7)
MCV RBC AUTO: 103 FL (ref 79–97)
MONOCYTES # BLD AUTO: 1.05 10*3/MM3 (ref 0.1–0.9)
MONOCYTES NFR BLD AUTO: 11.9 % (ref 5–12)
NEUTROPHILS NFR BLD AUTO: 6.05 10*3/MM3 (ref 1.7–7)
NEUTROPHILS NFR BLD AUTO: 68.4 % (ref 42.7–76)
PLATELET # BLD AUTO: 108 10*3/MM3 (ref 140–450)
PMV BLD AUTO: 9.4 FL (ref 6–12)
RBC # BLD AUTO: 3.34 10*6/MM3 (ref 3.77–5.28)
WBC NRBC COR # BLD: 8.84 10*3/MM3 (ref 3.4–10.8)

## 2022-06-09 PROCEDURE — 36415 COLL VENOUS BLD VENIPUNCTURE: CPT

## 2022-06-09 PROCEDURE — 85025 COMPLETE CBC W/AUTO DIFF WBC: CPT

## 2022-06-09 RX ORDER — LIDOCAINE AND PRILOCAINE 25; 25 MG/G; MG/G
CREAM TOPICAL
Qty: 30 G | Refills: 1 | Status: SHIPPED | OUTPATIENT
Start: 2022-06-09 | End: 2023-03-20

## 2022-06-09 NOTE — TELEPHONE ENCOUNTER
Caller: ANNITA    Relationship: [employer]     Best call back number: [phone]     WILL NEED CLAIM NUMBER [id number]    WHEN CALLING BACK     What is the best time to reach you: 8AM-5PM    Who are you requesting to speak with (clinical staff, provider,  specific staff member): DR RALPH OR HER NURSE OR CLINICAL STAFF      What was the call regarding:    NEEDING TO VERIFY END DATE OF CHEMO THERAPY ON THE PATIENT .     Do you require a callback: YES

## 2022-06-15 RX ORDER — LETROZOLE 2.5 MG/1
TABLET, FILM COATED ORAL
Qty: 30 TABLET | Refills: 3 | Status: SHIPPED | OUTPATIENT
Start: 2022-06-15 | End: 2022-11-01

## 2022-06-16 ENCOUNTER — HOSPITAL ENCOUNTER (OUTPATIENT)
Dept: ONCOLOGY | Facility: HOSPITAL | Age: 62
Setting detail: INFUSION SERIES
Discharge: HOME | End: 2022-06-16

## 2022-06-16 VITALS
HEART RATE: 80 BPM | BODY MASS INDEX: 28.71 KG/M2 | OXYGEN SATURATION: 97 % | DIASTOLIC BLOOD PRESSURE: 78 MMHG | TEMPERATURE: 97.7 F | WEIGHT: 172.3 LBS | SYSTOLIC BLOOD PRESSURE: 158 MMHG | HEIGHT: 65 IN | RESPIRATION RATE: 18 BRPM

## 2022-06-16 DIAGNOSIS — E83.51 HYPOCALCEMIA: ICD-10-CM

## 2022-06-16 DIAGNOSIS — C56.9 MALIGNANT NEOPLASM OF OVARY, UNSPECIFIED LATERALITY: Primary | ICD-10-CM

## 2022-06-16 LAB
ALBUMIN SERPL-MCNC: 3.8 G/DL (ref 3.5–5.2)
ALBUMIN/GLOB SERPL: 1 G/DL
ALP SERPL-CCNC: 76 U/L (ref 39–117)
ALT SERPL W P-5'-P-CCNC: 17 U/L (ref 1–33)
ANION GAP SERPL CALCULATED.3IONS-SCNC: 13 MMOL/L (ref 5–15)
AST SERPL-CCNC: 25 U/L (ref 1–32)
BASOPHILS # BLD AUTO: 0.02 10*3/MM3 (ref 0–0.2)
BASOPHILS NFR BLD AUTO: 0.4 % (ref 0–1.5)
BILIRUB SERPL-MCNC: 0.2 MG/DL (ref 0–1.2)
BILIRUB UR QL STRIP: ABNORMAL
BUN SERPL-MCNC: 25 MG/DL (ref 8–23)
BUN/CREAT SERPL: 20 (ref 7–25)
CALCIUM SPEC-SCNC: 9.5 MG/DL (ref 8.6–10.5)
CANCER AG125 SERPL QL: 17 U/ML (ref 0–38.1)
CHLORIDE SERPL-SCNC: 100 MMOL/L (ref 98–107)
CLARITY UR: CLEAR
CO2 SERPL-SCNC: 22 MMOL/L (ref 22–29)
COLOR UR: YELLOW
CREAT SERPL-MCNC: 1.25 MG/DL (ref 0.57–1)
DEPRECATED RDW RBC AUTO: 48.4 FL (ref 37–54)
EGFRCR SERPLBLD CKD-EPI 2021: 48.5 ML/MIN/1.73
EOSINOPHIL # BLD AUTO: 0.02 10*3/MM3 (ref 0–0.4)
EOSINOPHIL NFR BLD AUTO: 0.4 % (ref 0.3–6.2)
ERYTHROCYTE [DISTWIDTH] IN BLOOD BY AUTOMATED COUNT: 14.1 % (ref 12.3–15.4)
GLOBULIN UR ELPH-MCNC: 4 GM/DL
GLUCOSE SERPL-MCNC: 133 MG/DL (ref 65–99)
GLUCOSE UR STRIP-MCNC: NEGATIVE MG/DL
HCT VFR BLD AUTO: 32.2 % (ref 34–46.6)
HGB BLD-MCNC: 10.8 G/DL (ref 12–15.9)
HGB UR QL STRIP.AUTO: NEGATIVE
KETONES UR QL STRIP: ABNORMAL
LEUKOCYTE ESTERASE UR QL STRIP.AUTO: ABNORMAL
LYMPHOCYTES # BLD AUTO: 0.51 10*3/MM3 (ref 0.7–3.1)
LYMPHOCYTES NFR BLD AUTO: 9.1 % (ref 19.6–45.3)
MCH RBC QN AUTO: 33.2 PG (ref 26.6–33)
MCHC RBC AUTO-ENTMCNC: 33.5 G/DL (ref 31.5–35.7)
MCV RBC AUTO: 99.1 FL (ref 79–97)
MONOCYTES # BLD AUTO: 0.15 10*3/MM3 (ref 0.1–0.9)
MONOCYTES NFR BLD AUTO: 2.7 % (ref 5–12)
NEUTROPHILS NFR BLD AUTO: 4.88 10*3/MM3 (ref 1.7–7)
NEUTROPHILS NFR BLD AUTO: 87.4 % (ref 42.7–76)
NITRITE UR QL STRIP: NEGATIVE
PH UR STRIP.AUTO: 5.5 [PH] (ref 5–8)
PLATELET # BLD AUTO: 67 10*3/MM3 (ref 140–450)
PMV BLD AUTO: 10.1 FL (ref 6–12)
POTASSIUM SERPL-SCNC: 4.2 MMOL/L (ref 3.5–5.2)
PROT SERPL-MCNC: 7.8 G/DL (ref 6–8.5)
PROT UR QL STRIP: ABNORMAL
RBC # BLD AUTO: 3.25 10*6/MM3 (ref 3.77–5.28)
SODIUM SERPL-SCNC: 135 MMOL/L (ref 136–145)
SP GR UR STRIP: >=1.03 (ref 1–1.03)
UROBILINOGEN UR QL STRIP: ABNORMAL
WBC NRBC COR # BLD: 5.58 10*3/MM3 (ref 3.4–10.8)

## 2022-06-16 PROCEDURE — 85025 COMPLETE CBC W/AUTO DIFF WBC: CPT | Performed by: INTERNAL MEDICINE

## 2022-06-16 PROCEDURE — 25010000002 HEPARIN LOCK FLUSH PER 10 UNITS: Performed by: INTERNAL MEDICINE

## 2022-06-16 PROCEDURE — 86304 IMMUNOASSAY TUMOR CA 125: CPT | Performed by: INTERNAL MEDICINE

## 2022-06-16 PROCEDURE — 80053 COMPREHEN METABOLIC PANEL: CPT | Performed by: INTERNAL MEDICINE

## 2022-06-16 PROCEDURE — 36591 DRAW BLOOD OFF VENOUS DEVICE: CPT

## 2022-06-16 PROCEDURE — 81003 URINALYSIS AUTO W/O SCOPE: CPT | Performed by: INTERNAL MEDICINE

## 2022-06-16 RX ORDER — SODIUM CHLORIDE 0.9 % (FLUSH) 0.9 %
10 SYRINGE (ML) INJECTION AS NEEDED
Status: DISCONTINUED | OUTPATIENT
Start: 2022-06-16 | End: 2022-06-17 | Stop reason: HOSPADM

## 2022-06-16 RX ORDER — SODIUM CHLORIDE 0.9 % (FLUSH) 0.9 %
10 SYRINGE (ML) INJECTION AS NEEDED
Status: CANCELLED | OUTPATIENT
Start: 2022-06-16

## 2022-06-16 RX ORDER — SODIUM CHLORIDE 9 MG/ML
250 INJECTION, SOLUTION INTRAVENOUS ONCE
Status: CANCELLED | OUTPATIENT
Start: 2022-06-23

## 2022-06-16 RX ORDER — HEPARIN SODIUM (PORCINE) LOCK FLUSH IV SOLN 100 UNIT/ML 100 UNIT/ML
500 SOLUTION INTRAVENOUS AS NEEDED
Status: CANCELLED | OUTPATIENT
Start: 2022-06-16

## 2022-06-16 RX ORDER — HEPARIN SODIUM (PORCINE) LOCK FLUSH IV SOLN 100 UNIT/ML 100 UNIT/ML
500 SOLUTION INTRAVENOUS AS NEEDED
Status: DISCONTINUED | OUTPATIENT
Start: 2022-06-16 | End: 2022-06-17 | Stop reason: HOSPADM

## 2022-06-16 RX ADMIN — Medication 10 ML: at 14:45

## 2022-06-16 RX ADMIN — HEPARIN 500 UNITS: 100 SYRINGE at 14:45

## 2022-06-16 NOTE — PROGRESS NOTES
Patient here for Bevacizumab, patient has no complaints at this time, plts 67 Dr. Hernandez to hold treatment until Monday or Tuesday, patient has carbo on 6/23 and wants to do both on that day. Ok per Dr. Hernandez. Patient has AVS for next infusion. Patient in for port flush and blood draw.  10 cc of blood wasted prior to specimen collection.  Specimens sent to lab for processing.

## 2022-06-16 NOTE — TELEPHONE ENCOUNTER
Returned call. I told the insurance company that the pt completed Taxol on 4/4/22, but is still on Carbo every 21 days and Dr. Hernandez does not have an end date for that. They verbalized understanding.

## 2022-06-20 ENCOUNTER — TELEPHONE (OUTPATIENT)
Dept: ONCOLOGY | Facility: CLINIC | Age: 62
End: 2022-06-20

## 2022-06-20 ENCOUNTER — LAB (OUTPATIENT)
Dept: LAB | Facility: HOSPITAL | Age: 62
End: 2022-06-20

## 2022-06-20 DIAGNOSIS — N39.0 URINARY TRACT INFECTION WITHOUT HEMATURIA, SITE UNSPECIFIED: Primary | ICD-10-CM

## 2022-06-20 DIAGNOSIS — N39.0 URINARY TRACT INFECTION WITHOUT HEMATURIA, SITE UNSPECIFIED: ICD-10-CM

## 2022-06-20 PROCEDURE — 87086 URINE CULTURE/COLONY COUNT: CPT

## 2022-06-20 NOTE — TELEPHONE ENCOUNTER
----- Message from Inga Hernandez MD sent at 6/17/2022  3:30 PM EDT -----  Patient to increase p.o. fluids.  Send her urine for culture.

## 2022-06-20 NOTE — TELEPHONE ENCOUNTER
Called pt and LVM letting her know that she needs to increase her p.o fluids and that Dr. Hernandez would like for her to come in to leave a urine sample for a urine culture.

## 2022-06-21 NOTE — PROGRESS NOTES
Taxol/Avastin Hematology/Oncology Outpatient Follow Up    PATIENT NAME:Lucy Mahan  :1960  MRN: [id number]  PRIMARY CARE PHYSICIAN: Argentina Avalos APRN  REFERRING PHYSICIAN: [place] Avalos APRN    Chief Complaint   Patient presents with   • Follow-up     Malignant neoplasm of ovary, unspecified laterality (HCC)        HISTORY OF PRESENT ILLNESS:     This is a 61 y.o.  female who developed abdominal pain in 2021.  Patient has also lost approximately 35 pounds during that..  She has had loss of appetite.  Due to the abdominal pain,  she had an ultrasound of the abdomen done on 10/13/2021.  This basically revealed no liver lesions.  Common bile duct is normal at 3 mm.  There is a nonmobile 3 cm shadowing gallstone within the gallbladder neck.  No gallbladder thickening or pericholecystic fluid collection.  Patient was then referred to general surgery and was seen by Dr. Badillo.  Who took her to surgery on 11/10/2021 for diagnostic laparoscopy and peritoneal biopsy.  Intra-Op , she was noted to have peritoneal studding with malignancy throughout the abdominal cavity and biopsies were completed. 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  The tumor was focally positive for progesterone receptor, positive for CK7, estrogen receptor, CA-125 and PAX 8..  The staining pattern is consistent with ovarian serous carcinoma.      She had CT scan of the chest, abdomen and pelvis and the chest is a 2 mm noncalcified nodule within the left lower lobe, noncalcified nodule in the left upper lobe measuring 4 mm and 3 mm.  In the abdomen there is a 5.1 x 5.3 cm enhancing soft tissue mass in the pelvis to the right of midline associated with the adnexal region possibly representing a primary ovarian malignancy.  No right or left ovarian tissue was seen.  There is abnormal omental thickening and nodularity consistent with carcinomatosis greatest bulk in the left mid abdomen measuring up to  10.8 cm x 2.5 cm.  There is nodular peritoneal thickening also present within the abdomen and pelvis consistent with peritoneal carcinomatosis.  The small quantity of ascitic fluid in the abdomen and pelvis.  Carcinomatosis nodular studding is seen along the inferior right hepatic lobe.     She  has been referred to us for further evaluation and management of her newly diagnosed ovarian carcinoma.     Patient is accompanied today by her daughter for this appointment.  There is  family history of precancerous cells of the  uterusin her sister.        She does not smoke and does not drink alcohol.  Patient is  and has 2 daughters.  She works for the [employer].    · 11/29/2021 patient was seen by Dr. Dubois who has recommended neoadjuvant chemotherapy for 2-3 cycles and then interval cytoreduction.  She has recommended carboplatinum AUC 6, Taxol 1 7 5 mg per metered squared, Avastin 15 mg/kg as was done in the oceans trial but hold Avastin for the first few cycles due to bowel risk.  · Prechemo CA-125 was 754  · 12/7/2021 patient received first cycle of chemotherapy with carboplatinum and Taxol with Neulasta  · 12/28/2021 patient received cycle 2 of combination chemotherapy with carboplatinum and Taxol with Neulasta  · 12/28/2021 CA-125 was down to 635  · 1/11/2022 add-on appointment for nausea, dizziness, thrombocytopenia, pain in upper to mid abdomen 8/10, new onset in the last 4 to 5 days  · January 12, 2022: Due to acute GI symptoms patient had a CT scan of the abdomen and pelvis which basically revealed increasing effusion on the left lung mild pleural effusion on the right lung decreased in amount of omental caking but no interval change in the right adnexal area moderate abdominal and pelvic ascites which has increased.  She denies any significant pulmonary symptoms.  Her O2 sat today was 100% at rest and 99% with ambulation  · 1/24/2022 patient received cycle 3 of carboplatinum with Taxol  with dose reduction due to significant toxicities  · 2022 patient received cycle 4-day 1 of chemo platinum Taxol and Avastin  · 3/7/22: Patient received cycle 5-day 1 of chemotherapy with carboplatinum Taxol and Avastin  · 2022 patient received cycle 6 of chemotherapy with A platinum Taxol and Avastin  · 2022 patient underwent exploratory laparotomy, total abdominal hysterectomy, bilateral salpingo-oophorectomy, pelvic and aortic lymphadenectomy, omentectomy performed by Dr Castro, pathology reviewed high-grade serous carcinoma involving the uterine serosa and outer myometrium, bilateral fallopian tubes and bilateral ovaries and adnexa soft tissue.  Also received are high-grade serous carcinoma involving the omentum as well as residual high-grade serous carcinoma with treatment effects on the colonic splenic [id number], estrogen receptor diffusely positive p53 patchy, p16 diffusely positive.  Molecular testing is pending  · 2022 patient received cycle 7 of Combination chemotherapy with Avastin and carboplatinum.  Taxol was held due to significant peripheral neuropathy      History of present illness was reviewed and is unchanged from the previous visit. 22        Past Medical History:   Diagnosis Date   • Arthritis    • Cancer (HCC)     ovarian   • Gall stones    • Hypertension    • Irritable bowel syndrome    • Rheumatoid aortitis    • Thyroid disease        Past Surgical History:   Procedure Laterality Date   •  SECTION      x2   • CHOLECYSTECTOMY N/A 11/10/2021    Procedure: diagnostic laparoscopy and peritoneal biopsy;  Surgeon: Krunal Badillo MD;  Location: [facility];  Service: General;  Laterality: N/A;   • COLONOSCOPY     • HIP SURGERY Left    • THYROIDECTOMY, PARTIAL  2017   • TOTAL ABDOMINAL HYSTERECTOMY N/A 2022    Procedure: EXPLORATORY LAPAROTOMY, TOTAL ABDOMINAL HYSTERECTOMY, BILATERAL SALPINGOOOPHORECTOMY, PELVIC AND AORTIC LYMPHADENECTOMY, OMENTECTOMY;   Surgeon: Maggie Castro DO;  Location: [facility] MAIN OR;  Service: Gynecology Oncology;  Laterality: N/A;   • VENOUS ACCESS DEVICE (PORT) INSERTION Left 12/6/2021    Procedure: INSERTION VENOUS ACCESS DEVICE;  Surgeon: Krunal Badillo MD;  Location: [facility] MAIN OR;  Service: General;  Laterality: Left;         Current Outpatient Medications:   •  amLODIPine (NORVASC) 5 MG tablet, Take 1 tablet by mouth Daily., Disp: 90 tablet, Rfl: 0  •  dexamethasone (DECADRON) 4 MG tablet, TAKE FIVE (5) TABLETS WITH FOOD THE NIGHT BEFORE chemotherapy, Disp: 5 tablet, Rfl: 5  •  gabapentin (NEURONTIN) 300 MG capsule, TAKE ONE (1) CAPSULE BY MOUTH TWICE DAILY, Disp: 60 capsule, Rfl: 0  •  hydroCHLOROthiazide (HYDRODIURIL) 12.5 MG tablet, Take 1 tablet by mouth Daily As Needed (swelling)., Disp: 90 tablet, Rfl: 0  •  HYDROcodone-acetaminophen (Norco) 5-325 MG per tablet, Take 1 tablet by mouth Every 6 (Six) Hours As Needed for Moderate Pain ., Disp: 60 tablet, Rfl: 0  •  hydrOXYzine (ATARAX) 10 MG tablet, TAKE ONE (1) TABLET BY MOUTH TWICE DAILY AS NEEDED FOR ANXIETY, Disp: 40 tablet, Rfl: 0  •  letrozole (FEMARA) 2.5 MG tablet, TAKE ONE (1) TABLET BY MOUTH DAILY, Disp: 30 tablet, Rfl: 3  •  levothyroxine (SYNTHROID, LEVOTHROID) 137 MCG tablet, Take 1 tablet by mouth Daily., Disp: 90 tablet, Rfl: 1  •  lidocaine-prilocaine (EMLA) 2.5-2.5 % cream, Apply 1 application topically to the appropriate area as directed. Apply to port 1 hour prior to port access, Disp: 30 g, Rfl: 1  •  loratadine (Claritin) 10 MG tablet, Take 1 tablet night before and 1 tablet morning of chemotherapy., Disp: 2 tablet, Rfl: 5  •  OLANZapine (ZyPREXA) 2.5 MG tablet, Take 1 tablet by mouth Every Night., Disp: 30 tablet, Rfl: 2  •  OLANZapine (zyPREXA) 5 MG tablet, TAKE ONE (1) TABLET BY MOUTH EVERY NIGHT AT BEDTIME. TAKE ON DAYS 2, 3, AND 4 AFTER chemotherapy, Disp: 3 tablet, Rfl: 5  •  omeprazole (priLOSEC) 40 MG capsule, Take 1 capsule by mouth Daily.,  Disp: 30 capsule, Rfl: 6  •  ondansetron (ZOFRAN) 8 MG tablet, Take 1 tablet by mouth 3 (Three) Times a Day As Needed for Nausea or Vomiting., Disp: 90 tablet, Rfl: 2  •  pantoprazole (Protonix) 40 MG EC tablet, Take 1 tablet by mouth Daily., Disp: 30 tablet, Rfl: 6  •  promethazine (PHENERGAN) 25 MG tablet, Take 1 tablet by mouth Every 6 (Six) Hours As Needed for Nausea or Vomiting., Disp: 90 tablet, Rfl: 1    No Known Allergies    Family History   Problem Relation Age of Onset   • Dementia Mother    • Arthritis Mother    • Heart disease Father    • Hypertension Father    • Malig Hyperthermia Neg Hx        Cancer-related family history is not on file.    Social History     Tobacco Use   • Smoking status: Never Smoker   • Smokeless tobacco: Never Used   Vaping Use   • Vaping Use: Never used   Substance Use Topics   • Alcohol use: Yes     Alcohol/week: 1.0 standard drink     Types: 1 Glasses of wine per week     Comment: less than monthly   • Drug use: No       HPI, ROS and PFSH have been reviewed and confirmed on 6/22/2022.     SUBJECTIVE:    She denies any new issues.  She continues to experience neuropathy mostly in the feet.          REVIEW OF SYSTEMS:    Review of Systems   Constitutional: Positive for fatigue. Negative for chills and fever.   HENT: Negative for ear pain, mouth sores, nosebleeds and sore throat.    Eyes: Negative for photophobia and visual disturbance.   Respiratory: Negative for wheezing and stridor.    Cardiovascular: Negative for chest pain and palpitations.   Gastrointestinal: Positive for abdominal pain and nausea. Negative for diarrhea and vomiting.   Endocrine: Negative for cold intolerance and heat intolerance.   Genitourinary: Negative for dysuria and hematuria.        Decreased urine output   Musculoskeletal: Negative for joint swelling and neck stiffness.   Skin: Negative for color change and rash.   Neurological: Positive for dizziness, weakness and headaches. Negative for seizures  "and syncope.   Hematological: Negative for adenopathy.   Psychiatric/Behavioral: Negative for agitation, confusion and hallucinations.     Post op changes noted.    OBJECTIVE:    Vitals:    06/22/22 1437   BP: 139/80   Pulse: 84   Resp: 18   Temp: 96.9 °F (36.1 °C)   SpO2: 97%   Weight: 78.2 kg (172 lb 6.4 oz)   Height: 165.1 cm (65\")   PainSc:   6   PainLoc: Abdomen  Comment: abdomen into back     Body mass index is 28.69 kg/m².    ECOG    (1) Restricted in physically strenuous activity, ambulatory and able to do work of light nature    Physical Exam  Vitals and nursing note reviewed.   Constitutional:       General: She is not in acute distress.     Appearance: She is not diaphoretic.   HENT:      Head: Normocephalic and atraumatic.      Mouth/Throat:      Mouth: Mucous membranes are dry.   Eyes:      General: No scleral icterus.        Right eye: No discharge.         Left eye: No discharge.      Conjunctiva/sclera: Conjunctivae normal.   Neck:      Thyroid: No thyromegaly.   Cardiovascular:      Rate and Rhythm: Normal rate and regular rhythm.      Heart sounds: Normal heart sounds.     No friction rub. No gallop.   Pulmonary:      Effort: Pulmonary effort is normal. No respiratory distress.      Breath sounds: No stridor. No wheezing.   Abdominal:      General: Bowel sounds are normal.      Palpations: Abdomen is soft. There is no mass.      Tenderness: There is no abdominal tenderness. There is no guarding or rebound.      Comments: Abdominal wound noted   Musculoskeletal:         General: No tenderness. Normal range of motion.      Cervical back: Normal range of motion and neck supple.   Lymphadenopathy:      Cervical: No cervical adenopathy.   Skin:     General: Skin is warm.      Findings: No erythema or rash.      Comments: Tenting skin turgor   Neurological:      Mental Status: She is alert and oriented to person, place, and time.      Motor: No abnormal muscle tone.   Psychiatric:         Behavior: " Behavior normal.       I have reexamined the patient and the results are consistent with the previously documented exam. Inga Cori Hernandez MD     RECENT LABS    WBC   Date Value Ref Range Status   06/16/2022 5.58 3.40 - 10.80 10*3/mm3 Final     RBC   Date Value Ref Range Status   06/16/2022 3.25 (L) 3.77 - 5.28 10*6/mm3 Final     Hemoglobin   Date Value Ref Range Status   06/16/2022 10.8 (L) 12.0 - 15.9 g/dL Final     Hematocrit   Date Value Ref Range Status   06/16/2022 32.2 (L) 34.0 - 46.6 % Final     MCV   Date Value Ref Range Status   06/16/2022 99.1 (H) 79.0 - 97.0 fL Final     MCH   Date Value Ref Range Status   06/16/2022 33.2 (H) 26.6 - 33.0 pg Final     MCHC   Date Value Ref Range Status   06/16/2022 33.5 31.5 - 35.7 g/dL Final     RDW   Date Value Ref Range Status   06/16/2022 14.1 12.3 - 15.4 % Final     RDW-SD   Date Value Ref Range Status   06/16/2022 48.4 37.0 - 54.0 fl Final     MPV   Date Value Ref Range Status   06/16/2022 10.1 6.0 - 12.0 fL Final     Platelets   Date Value Ref Range Status   06/16/2022 67 (L) 140 - 450 10*3/mm3 Final     Neutrophil %   Date Value Ref Range Status   06/16/2022 87.4 (H) 42.7 - 76.0 % Final     Lymphocyte %   Date Value Ref Range Status   06/16/2022 9.1 (L) 19.6 - 45.3 % Final     Monocyte %   Date Value Ref Range Status   06/16/2022 2.7 (L) 5.0 - 12.0 % Final     Eosinophil %   Date Value Ref Range Status   06/16/2022 0.4 0.3 - 6.2 % Final     Basophil %   Date Value Ref Range Status   06/16/2022 0.4 0.0 - 1.5 % Final     Immature Grans %   Date Value Ref Range Status   04/29/2022 0.4 0.0 - 0.5 % Final     Neutrophils, Absolute   Date Value Ref Range Status   06/16/2022 4.88 1.70 - 7.00 10*3/mm3 Final     Lymphocytes, Absolute   Date Value Ref Range Status   06/16/2022 0.51 (L) 0.70 - 3.10 10*3/mm3 Final     Monocytes, Absolute   Date Value Ref Range Status   06/16/2022 0.15 0.10 - 0.90 10*3/mm3 Final     Eosinophils, Absolute   Date Value Ref Range Status    06/16/2022 0.02 0.00 - 0.40 10*3/mm3 Final     Basophils, Absolute   Date Value Ref Range Status   06/16/2022 0.02 0.00 - 0.20 10*3/mm3 Final     Immature Grans, Absolute   Date Value Ref Range Status   04/29/2022 0.02 0.00 - 0.05 10*3/mm3 Final     nRBC   Date Value Ref Range Status   04/29/2022 0.0 0.0 - 0.2 /100 WBC Final       Lab Results   Component Value Date    GLUCOSE 133 (H) 06/16/2022    BUN 25 (H) 06/16/2022    CREATININE 1.25 (H) 06/16/2022    EGFRIFNONA 47 (L) 02/21/2022    EGFRIFAFRI 44 (L) 01/21/2020    BCR 20.0 06/16/2022    K 4.2 06/16/2022    CO2 22.0 06/16/2022    CALCIUM 9.5 06/16/2022    ALBUMIN 3.80 06/16/2022    LABIL2 1.2 (calc) 01/21/2020    AST 25 06/16/2022    ALT 17 06/16/2022         ASSESSMENT:      1. 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  CA-125 754 November 2021  2. On neoadjuvant chemotherapy with carboplatinum AUC 6, Taxol 175 mg per metered squared, with Avastin added due to lack of significant response with carboplatinum and Taxol.  Monitor serial CA-125's.  Down to 135 as of 3/14/2022.  Her CT scan showed response therefore patient will proceed with radical hysterectomy to perform by Dr. Dubois on 4/26/2022.  Plans for additional chemotherapy along with Avastin and possibly maintenance treatment with PARP inhibitors in the future.  She is also on letrozole 2.5 mg p.o. daily.  Continue current treatment  3. Status post radical hysterectomy.  See path report.  Caris testing pending  4. Pulmonary nodules of unclear etiology too small for biopsy or PET resolution: Continue surveillance CT scans.  Continue surveillance 's  5. Chemotherapy induced nausea  6. Chemotherapy induced fatigue  7. Chemotherapy-induced anemia  8. 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  Patient already had some 25% dose reduction on her chemo.  She is currently on Neurontin 300 mg daily will increase to 300 mg twice a day.  Peripheral neuropathy  continues to be an issue.  We will hold Taxol for now till this has improved  9. Severe thrombocytopenia secondary to chemotherapy most notably carboplatinum  10. Comprehensive gene analysis with cancer next technology was negative for any significant mutation in all 77 genes analyzed  11. 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  No other actionable mutations identified  12. Dehydration: Resolved.  She has increase p.o. fluid intake.  13. Abdominal discomfort secondary to malignancy  14. Assessment has been reviewed and updated     Discussion     I have reviewed her imaging studies, pathology reports.  She has metastatic serous carcinoma of the ovary.  She has liver involvement.  She also has pulmonary nodules of unclear etiology.  These are too small for biopsy or PET resolution therefore they will be followed over time.  We discussed that ovarian cancer is a surgical disease.  We will have her seen by GYN oncologist Dr. Castro to weigh in on her surgical options.  We discussed that she may benefit from neoadjuvant chemotherapy but will await Dr. Castro's final recommendations.     She was seen by Dr. Castro recommendation for her to have neoadjuvant chemotherapy with carboplatinum and Taxol      Discussed side effects of chemotherapy to include but not limited to:    Chemotherapy side effects include, but not limited to, nausea, vomiting, bone marrow suppression, which can result in blood, platelet transfusion. There is also risk of permanent bone marrow destruction, which can cause myelodysplastic syndrome or leukemia years down the line. There is risk of infection which can result in hospitalization and even death. There is also risk of fatigue, asthenia, alopecia which could become permanent. Chemo will help to reduce risk of relapse of cancer, but does not eliminate risk  completely.    Discussed that Taxol chemotherapy can lead to hypersensitivity reaction fluid retention, peripheral neuropathy, myalgias, small risk of permanent alopecia    Carboplatinum can lead to myelosuppression socially thrombocytopenia      Avastin will be added postoperatively       She will benefit from genetic testing as this could have some therapeutic implications for her.    Genetics      Today's risk assessment is based on the history the patient has provided.  We discussed that the best people to screen are the ones who have been affected by malignancy as their result is more informative.  We reviewed all the hallmarks of hereditary cancer syndrome including multiple relatives on the same side of the family being affected by malignancy, cancer diagnosis in young individuals, different cancers in one individual.      Comprehensive gene analysis with [employer] next technology was negative for any mutation in all 77 genes analyzed.  Discussed that her malignancy was most likely sporadic.  We discussed lifetime risk of developing ovarian cancer estimated at 2 to 3%.          Plans:     · Continue Neurontin to 300 mg twice a day for neuropathy  · Continue chemotherapy with carboplatinum and Avastin.  Hold Taxol due to neuropathy.  Reassess today patient remains largely symptomatic from her neuropathy.  We will continue to hold Taxol  · Reviewed her path report from radical hysterectomy 4/20/2022  · Comprehensive gene analysis with cancer next technology results reviewed with patient and copies of her results was also given to her for her own records keeping  · Dose reduced chemotherapy carboplatinum by 25% due to significant toxicity with third cycle of chemotherapy  · CT scans reviewed  · Follow-up with me in 3 weeks  · CA-125 with each cycle of chemotherapy  · Continue EMLA cream  · Continue Percocet 5 mg p.o. every 4-6 hours as needed for pain  · Prilosec 40 mg p.o. daily  · Continue vitamin b6 100mg po q  12   · Continue Neurontin 300mg po q 12  For neuropathy.  Discussed precautions with patient  · Foundation 1 testing results reviewed  · All questions answered      I spent 30 total minutes, face-to-face, caring for Lucy today.  90% of this time involved counseling and/or coordination of care as documented within this note.

## 2022-06-22 ENCOUNTER — APPOINTMENT (OUTPATIENT)
Dept: LAB | Facility: HOSPITAL | Age: 62
End: 2022-06-22

## 2022-06-22 ENCOUNTER — OFFICE VISIT (OUTPATIENT)
Dept: ONCOLOGY | Facility: CLINIC | Age: 62
End: 2022-06-22

## 2022-06-22 VITALS
DIASTOLIC BLOOD PRESSURE: 80 MMHG | TEMPERATURE: 96.9 F | HEIGHT: 65 IN | BODY MASS INDEX: 28.72 KG/M2 | RESPIRATION RATE: 18 BRPM | SYSTOLIC BLOOD PRESSURE: 139 MMHG | OXYGEN SATURATION: 97 % | HEART RATE: 84 BPM | WEIGHT: 172.4 LBS

## 2022-06-22 DIAGNOSIS — C56.9 MALIGNANT NEOPLASM OF OVARY, UNSPECIFIED LATERALITY: Primary | ICD-10-CM

## 2022-06-22 LAB — BACTERIA SPEC AEROBE CULT: NORMAL

## 2022-06-22 PROCEDURE — 99214 OFFICE O/P EST MOD 30 MIN: CPT | Performed by: INTERNAL MEDICINE

## 2022-06-22 RX ORDER — PALONOSETRON 0.05 MG/ML
0.25 INJECTION, SOLUTION INTRAVENOUS ONCE
Status: CANCELLED | OUTPATIENT
Start: 2022-06-23

## 2022-06-22 RX ORDER — DIPHENHYDRAMINE HYDROCHLORIDE 50 MG/ML
50 INJECTION INTRAMUSCULAR; INTRAVENOUS AS NEEDED
Status: CANCELLED | OUTPATIENT
Start: 2022-06-23

## 2022-06-22 RX ORDER — FAMOTIDINE 10 MG/ML
20 INJECTION, SOLUTION INTRAVENOUS ONCE
Status: CANCELLED | OUTPATIENT
Start: 2022-06-23

## 2022-06-22 RX ORDER — SODIUM CHLORIDE 9 MG/ML
250 INJECTION, SOLUTION INTRAVENOUS ONCE
Status: CANCELLED | OUTPATIENT
Start: 2022-06-23

## 2022-06-22 RX ORDER — FAMOTIDINE 10 MG/ML
20 INJECTION, SOLUTION INTRAVENOUS AS NEEDED
Status: CANCELLED | OUTPATIENT
Start: 2022-06-23

## 2022-06-22 RX ORDER — OLANZAPINE 5 MG/1
5 TABLET ORAL ONCE
Status: CANCELLED | OUTPATIENT
Start: 2022-06-23 | End: 2022-06-23

## 2022-06-23 ENCOUNTER — HOSPITAL ENCOUNTER (OUTPATIENT)
Dept: ONCOLOGY | Facility: HOSPITAL | Age: 62
Setting detail: INFUSION SERIES
Discharge: HOME | End: 2022-06-23

## 2022-06-23 VITALS
RESPIRATION RATE: 18 BRPM | HEIGHT: 65 IN | SYSTOLIC BLOOD PRESSURE: 137 MMHG | BODY MASS INDEX: 28.07 KG/M2 | OXYGEN SATURATION: 100 % | DIASTOLIC BLOOD PRESSURE: 72 MMHG | WEIGHT: 168.5 LBS | TEMPERATURE: 97.1 F | HEART RATE: 66 BPM

## 2022-06-23 DIAGNOSIS — C56.9 MALIGNANT NEOPLASM OF OVARY, UNSPECIFIED LATERALITY: Primary | ICD-10-CM

## 2022-06-23 DIAGNOSIS — E83.51 HYPOCALCEMIA: ICD-10-CM

## 2022-06-23 LAB
ALBUMIN SERPL-MCNC: 3.8 G/DL (ref 3.5–5.2)
ALBUMIN/GLOB SERPL: 1 G/DL
ALP SERPL-CCNC: 62 U/L (ref 39–117)
ALT SERPL W P-5'-P-CCNC: 15 U/L (ref 1–33)
ANION GAP SERPL CALCULATED.3IONS-SCNC: 17 MMOL/L (ref 5–15)
AST SERPL-CCNC: 23 U/L (ref 1–32)
BASOPHILS # BLD AUTO: 0 10*3/MM3 (ref 0–0.2)
BASOPHILS NFR BLD AUTO: 0 % (ref 0–1.5)
BILIRUB SERPL-MCNC: 0.3 MG/DL (ref 0–1.2)
BILIRUB UR QL STRIP: NEGATIVE
BUN SERPL-MCNC: 21 MG/DL (ref 8–23)
BUN/CREAT SERPL: 19.4 (ref 7–25)
CALCIUM SPEC-SCNC: 9.9 MG/DL (ref 8.6–10.5)
CANCER AG125 SERPL QL: 16.7 U/ML (ref 0–38.1)
CHLORIDE SERPL-SCNC: 102 MMOL/L (ref 98–107)
CLARITY UR: ABNORMAL
CO2 SERPL-SCNC: 20 MMOL/L (ref 22–29)
COLOR UR: YELLOW
CREAT BLDA-MCNC: 1.1 MG/DL (ref 0.6–1.3)
CREAT SERPL-MCNC: 1.08 MG/DL (ref 0.57–1)
DEPRECATED RDW RBC AUTO: 50.6 FL (ref 37–54)
EGFRCR SERPLBLD CKD-EPI 2021: 56.6 ML/MIN/1.73
EGFRCR SERPLBLD CKD-EPI 2021: 57.8 ML/MIN/1.73
EOSINOPHIL # BLD AUTO: 0 10*3/MM3 (ref 0–0.4)
EOSINOPHIL NFR BLD AUTO: 0 % (ref 0.3–6.2)
ERYTHROCYTE [DISTWIDTH] IN BLOOD BY AUTOMATED COUNT: 14.9 % (ref 12.3–15.4)
GLOBULIN UR ELPH-MCNC: 3.8 GM/DL
GLUCOSE SERPL-MCNC: 173 MG/DL (ref 65–99)
GLUCOSE UR STRIP-MCNC: NEGATIVE MG/DL
HCT VFR BLD AUTO: 33.5 % (ref 34–46.6)
HGB BLD-MCNC: 11.2 G/DL (ref 12–15.9)
HGB UR QL STRIP.AUTO: NEGATIVE
KETONES UR QL STRIP: ABNORMAL
LEUKOCYTE ESTERASE UR QL STRIP.AUTO: ABNORMAL
LYMPHOCYTES # BLD AUTO: 0.38 10*3/MM3 (ref 0.7–3.1)
LYMPHOCYTES NFR BLD AUTO: 11.2 % (ref 19.6–45.3)
MCH RBC QN AUTO: 32.7 PG (ref 26.6–33)
MCHC RBC AUTO-ENTMCNC: 33.4 G/DL (ref 31.5–35.7)
MCV RBC AUTO: 97.7 FL (ref 79–97)
MONOCYTES # BLD AUTO: 0.06 10*3/MM3 (ref 0.1–0.9)
MONOCYTES NFR BLD AUTO: 1.8 % (ref 5–12)
NEUTROPHILS NFR BLD AUTO: 2.96 10*3/MM3 (ref 1.7–7)
NEUTROPHILS NFR BLD AUTO: 87 % (ref 42.7–76)
NITRITE UR QL STRIP: NEGATIVE
PH UR STRIP.AUTO: 5.5 [PH] (ref 5–8)
PLATELET # BLD AUTO: 90 10*3/MM3 (ref 140–450)
PMV BLD AUTO: 10.5 FL (ref 6–12)
POTASSIUM SERPL-SCNC: 3.8 MMOL/L (ref 3.5–5.2)
PROT SERPL-MCNC: 7.6 G/DL (ref 6–8.5)
PROT UR QL STRIP: NEGATIVE
RBC # BLD AUTO: 3.43 10*6/MM3 (ref 3.77–5.28)
SODIUM SERPL-SCNC: 139 MMOL/L (ref 136–145)
SP GR UR STRIP: 1.02 (ref 1–1.03)
UROBILINOGEN UR QL STRIP: ABNORMAL
WBC NRBC COR # BLD: 3.4 10*3/MM3 (ref 3.4–10.8)

## 2022-06-23 PROCEDURE — 96377 APPLICATON ON-BODY INJECTOR: CPT

## 2022-06-23 PROCEDURE — 25010000002 BEVACIZUMAB-BVZR 400 MG/16ML SOLUTION 16 ML VIAL: Performed by: INTERNAL MEDICINE

## 2022-06-23 PROCEDURE — 25010000002 DIPHENHYDRAMINE PER 50 MG: Performed by: INTERNAL MEDICINE

## 2022-06-23 PROCEDURE — 96375 TX/PRO/DX INJ NEW DRUG ADDON: CPT

## 2022-06-23 PROCEDURE — 96417 CHEMO IV INFUS EACH ADDL SEQ: CPT

## 2022-06-23 PROCEDURE — 25010000002 FOSAPREPITANT PER 1 MG: Performed by: INTERNAL MEDICINE

## 2022-06-23 PROCEDURE — 25010000002 DEXAMETHASONE SODIUM PHOSPHATE 120 MG/30ML SOLUTION: Performed by: INTERNAL MEDICINE

## 2022-06-23 PROCEDURE — 25010000002 PEGFILGRASTIM 6 MG/0.6ML PREFILLED SYRINGE KIT: Performed by: INTERNAL MEDICINE

## 2022-06-23 PROCEDURE — 25010000002 HEPARIN LOCK FLUSH PER 10 UNITS: Performed by: INTERNAL MEDICINE

## 2022-06-23 PROCEDURE — 96367 TX/PROPH/DG ADDL SEQ IV INF: CPT

## 2022-06-23 PROCEDURE — 96413 CHEMO IV INFUSION 1 HR: CPT

## 2022-06-23 PROCEDURE — 82565 ASSAY OF CREATININE: CPT

## 2022-06-23 PROCEDURE — 81003 URINALYSIS AUTO W/O SCOPE: CPT | Performed by: INTERNAL MEDICINE

## 2022-06-23 PROCEDURE — 85025 COMPLETE CBC W/AUTO DIFF WBC: CPT | Performed by: INTERNAL MEDICINE

## 2022-06-23 PROCEDURE — 25010000002 PALONOSETRON 0.25 MG/5ML SOLUTION PREFILLED SYRINGE: Performed by: INTERNAL MEDICINE

## 2022-06-23 PROCEDURE — 25010000002 CARBOPLATIN PER 50 MG: Performed by: INTERNAL MEDICINE

## 2022-06-23 PROCEDURE — 80053 COMPREHEN METABOLIC PANEL: CPT | Performed by: INTERNAL MEDICINE

## 2022-06-23 PROCEDURE — 86304 IMMUNOASSAY TUMOR CA 125: CPT | Performed by: INTERNAL MEDICINE

## 2022-06-23 RX ORDER — HEPARIN SODIUM (PORCINE) LOCK FLUSH IV SOLN 100 UNIT/ML 100 UNIT/ML
500 SOLUTION INTRAVENOUS AS NEEDED
Status: DISCONTINUED | OUTPATIENT
Start: 2022-06-23 | End: 2022-06-24 | Stop reason: HOSPADM

## 2022-06-23 RX ORDER — HEPARIN SODIUM (PORCINE) LOCK FLUSH IV SOLN 100 UNIT/ML 100 UNIT/ML
500 SOLUTION INTRAVENOUS AS NEEDED
Status: CANCELLED | OUTPATIENT
Start: 2022-06-23

## 2022-06-23 RX ORDER — OLANZAPINE 5 MG/1
5 TABLET ORAL ONCE
Status: COMPLETED | OUTPATIENT
Start: 2022-06-23 | End: 2022-06-23

## 2022-06-23 RX ORDER — SODIUM CHLORIDE 9 MG/ML
250 INJECTION, SOLUTION INTRAVENOUS ONCE
Status: COMPLETED | OUTPATIENT
Start: 2022-06-23 | End: 2022-06-23

## 2022-06-23 RX ORDER — SODIUM CHLORIDE 0.9 % (FLUSH) 0.9 %
10 SYRINGE (ML) INJECTION AS NEEDED
Status: DISCONTINUED | OUTPATIENT
Start: 2022-06-23 | End: 2022-06-24 | Stop reason: HOSPADM

## 2022-06-23 RX ORDER — SODIUM CHLORIDE 9 MG/ML
250 INJECTION, SOLUTION INTRAVENOUS ONCE
Status: DISCONTINUED | OUTPATIENT
Start: 2022-06-23 | End: 2022-06-24 | Stop reason: HOSPADM

## 2022-06-23 RX ORDER — PALONOSETRON 0.05 MG/ML
0.25 INJECTION, SOLUTION INTRAVENOUS ONCE
Status: COMPLETED | OUTPATIENT
Start: 2022-06-23 | End: 2022-06-23

## 2022-06-23 RX ORDER — SODIUM CHLORIDE 0.9 % (FLUSH) 0.9 %
10 SYRINGE (ML) INJECTION AS NEEDED
Status: CANCELLED | OUTPATIENT
Start: 2022-06-23

## 2022-06-23 RX ORDER — FAMOTIDINE 10 MG/ML
20 INJECTION, SOLUTION INTRAVENOUS ONCE
Status: COMPLETED | OUTPATIENT
Start: 2022-06-23 | End: 2022-06-23

## 2022-06-23 RX ADMIN — OLANZAPINE 5 MG: 5 TABLET, FILM COATED ORAL at 11:46

## 2022-06-23 RX ADMIN — SODIUM CHLORIDE 250 ML: 9 INJECTION, SOLUTION INTRAVENOUS at 11:22

## 2022-06-23 RX ADMIN — CARBOPLATIN 400 MG: 10 INJECTION, SOLUTION INTRAVENOUS at 13:16

## 2022-06-23 RX ADMIN — DIPHENHYDRAMINE HYDROCHLORIDE 50 MG: 50 INJECTION, SOLUTION INTRAMUSCULAR; INTRAVENOUS at 11:45

## 2022-06-23 RX ADMIN — SODIUM CHLORIDE 100 ML: 9 INJECTION, SOLUTION INTRAVENOUS at 12:02

## 2022-06-23 RX ADMIN — PALONOSETRON 0.25 MG: 0.25 INJECTION, SOLUTION INTRAVENOUS at 11:46

## 2022-06-23 RX ADMIN — FAMOTIDINE 20 MG: 10 INJECTION INTRAVENOUS at 11:22

## 2022-06-23 RX ADMIN — DEXAMETHASONE SODIUM PHOSPHATE 20 MG: 4 INJECTION, SOLUTION INTRA-ARTICULAR; INTRALESIONAL; INTRAMUSCULAR; INTRAVENOUS; SOFT TISSUE at 11:22

## 2022-06-23 RX ADMIN — Medication 10 ML: at 13:54

## 2022-06-23 RX ADMIN — PEGFILGRASTIM 6 MG: KIT SUBCUTANEOUS at 13:53

## 2022-06-23 RX ADMIN — HEPARIN 500 UNITS: 100 SYRINGE at 13:54

## 2022-06-23 RX ADMIN — SODIUM CHLORIDE 760 MG: 9 INJECTION, SOLUTION INTRAVENOUS at 12:37

## 2022-06-23 NOTE — PROGRESS NOTES
Patient here for Carbo and mvasi, plts 90 Dr. Hernandez ok to treat, patient has no complaints at this time, patient has AVS for next infusion. Patient going to be out of town and she will not be getting labs done on her off week and ok by Dr. Hernandez. Patient in for port flush and blood draw.  10 cc of blood wasted prior to specimen collection.  Specimens sent to lab for processing.

## 2022-07-01 DIAGNOSIS — C56.9 MALIGNANT NEOPLASM OF OVARY, UNSPECIFIED LATERALITY: ICD-10-CM

## 2022-07-01 RX ORDER — HYDROCODONE BITARTRATE AND ACETAMINOPHEN 5; 325 MG/1; MG/1
1 TABLET ORAL EVERY 6 HOURS PRN
Qty: 60 TABLET | Refills: 0 | Status: SHIPPED | OUTPATIENT
Start: 2022-07-01 | End: 2022-09-20 | Stop reason: SDUPTHER

## 2022-07-01 RX ORDER — DEXAMETHASONE 4 MG/1
TABLET ORAL
Qty: 5 TABLET | Refills: 5 | Status: SHIPPED | OUTPATIENT
Start: 2022-07-01 | End: 2022-07-29

## 2022-07-01 RX ORDER — OLANZAPINE 5 MG/1
TABLET ORAL
Qty: 3 TABLET | Refills: 5 | Status: SHIPPED | OUTPATIENT
Start: 2022-07-01 | End: 2022-07-29

## 2022-07-01 NOTE — TELEPHONE ENCOUNTER
Rx Refill Note  Requested Prescriptions     Pending Prescriptions Disp Refills   • HYDROcodone-acetaminophen (Norco) 5-325 MG per tablet 60 tablet 0     Sig: Take 1 tablet by mouth Every 6 (Six) Hours As Needed for Moderate Pain .     Signed Prescriptions Disp Refills   • dexamethasone (DECADRON) 4 MG tablet 5 tablet 5     Sig: TAKE FIVE (5) TABLETS WITH FOOD THE NIGHT BEFORE chemotherapy     Authorizing Provider: CHAGO RALPH     Ordering User: MAYDA CHANDRA   • OLANZapine (zyPREXA) 5 MG tablet 3 tablet 5     Sig: TAKE ONE (1) TABLET BY MOUTH EVERY NIGHT AT BEDTIME. TAKE ON DAYS 2, 3, AND 4 AFTER chemotherapy     Authorizing Provider: CHAGO RALPH     Ordering User: MAYDA CHANDRA      Last office visit with prescribing clinician: 6/22/2022      Next office visit with prescribing clinician: 7/14/2022            Mayda Chandra RN  07/01/22, 16:15 EDT

## 2022-07-05 RX ORDER — GABAPENTIN 300 MG/1
CAPSULE ORAL
Qty: 60 CAPSULE | Refills: 0 | Status: SHIPPED | OUTPATIENT
Start: 2022-07-05 | End: 2022-08-08

## 2022-07-05 RX ORDER — HYDROCHLOROTHIAZIDE 12.5 MG/1
TABLET ORAL
Qty: 90 TABLET | Refills: 0 | Status: SHIPPED | OUTPATIENT
Start: 2022-07-05 | End: 2022-09-14 | Stop reason: SDUPTHER

## 2022-07-07 ENCOUNTER — HOSPITAL ENCOUNTER (OUTPATIENT)
Dept: ONCOLOGY | Facility: HOSPITAL | Age: 62
Setting detail: INFUSION SERIES
Discharge: HOME | End: 2022-07-07

## 2022-07-07 VITALS
HEIGHT: 65 IN | WEIGHT: 172.6 LBS | TEMPERATURE: 97.5 F | BODY MASS INDEX: 28.76 KG/M2 | HEART RATE: 76 BPM | RESPIRATION RATE: 18 BRPM | OXYGEN SATURATION: 97 % | DIASTOLIC BLOOD PRESSURE: 73 MMHG | SYSTOLIC BLOOD PRESSURE: 133 MMHG

## 2022-07-07 DIAGNOSIS — C56.9 MALIGNANT NEOPLASM OF OVARY, UNSPECIFIED LATERALITY: Primary | ICD-10-CM

## 2022-07-07 DIAGNOSIS — E83.51 HYPOCALCEMIA: ICD-10-CM

## 2022-07-07 LAB
ALBUMIN SERPL-MCNC: 3.8 G/DL (ref 3.5–5.2)
ALBUMIN/GLOB SERPL: 1.2 G/DL
ALP SERPL-CCNC: 70 U/L (ref 39–117)
ALT SERPL W P-5'-P-CCNC: 20 U/L (ref 1–33)
ANION GAP SERPL CALCULATED.3IONS-SCNC: 13 MMOL/L (ref 5–15)
AST SERPL-CCNC: 22 U/L (ref 1–32)
BASOPHILS # BLD AUTO: 0.01 10*3/MM3 (ref 0–0.2)
BASOPHILS NFR BLD AUTO: 0.2 % (ref 0–1.5)
BILIRUB SERPL-MCNC: 0.2 MG/DL (ref 0–1.2)
BILIRUB UR QL STRIP: NEGATIVE
BUN SERPL-MCNC: 22 MG/DL (ref 8–23)
BUN/CREAT SERPL: 21.8 (ref 7–25)
CALCIUM SPEC-SCNC: 9.3 MG/DL (ref 8.6–10.5)
CHLORIDE SERPL-SCNC: 103 MMOL/L (ref 98–107)
CLARITY UR: ABNORMAL
CO2 SERPL-SCNC: 23 MMOL/L (ref 22–29)
COLOR UR: ABNORMAL
CREAT SERPL-MCNC: 1.01 MG/DL (ref 0.57–1)
DEPRECATED RDW RBC AUTO: 51.3 FL (ref 37–54)
EGFRCR SERPLBLD CKD-EPI 2021: 62.7 ML/MIN/1.73
EOSINOPHIL # BLD AUTO: 0 10*3/MM3 (ref 0–0.4)
EOSINOPHIL NFR BLD AUTO: 0 % (ref 0.3–6.2)
ERYTHROCYTE [DISTWIDTH] IN BLOOD BY AUTOMATED COUNT: 14.8 % (ref 12.3–15.4)
GLOBULIN UR ELPH-MCNC: 3.2 GM/DL
GLUCOSE SERPL-MCNC: 112 MG/DL (ref 65–99)
GLUCOSE UR STRIP-MCNC: NEGATIVE MG/DL
HCT VFR BLD AUTO: 27.6 % (ref 34–46.6)
HGB BLD-MCNC: 9.3 G/DL (ref 12–15.9)
HGB UR QL STRIP.AUTO: NEGATIVE
KETONES UR QL STRIP: ABNORMAL
LEUKOCYTE ESTERASE UR QL STRIP.AUTO: NEGATIVE
LYMPHOCYTES # BLD AUTO: 0.68 10*3/MM3 (ref 0.7–3.1)
LYMPHOCYTES NFR BLD AUTO: 12.6 % (ref 19.6–45.3)
MCH RBC QN AUTO: 34.2 PG (ref 26.6–33)
MCHC RBC AUTO-ENTMCNC: 33.7 G/DL (ref 31.5–35.7)
MCV RBC AUTO: 101.5 FL (ref 79–97)
MONOCYTES # BLD AUTO: 0.58 10*3/MM3 (ref 0.1–0.9)
MONOCYTES NFR BLD AUTO: 10.7 % (ref 5–12)
NEUTROPHILS NFR BLD AUTO: 4.13 10*3/MM3 (ref 1.7–7)
NEUTROPHILS NFR BLD AUTO: 76.5 % (ref 42.7–76)
NITRITE UR QL STRIP: NEGATIVE
PH UR STRIP.AUTO: 5.5 [PH] (ref 5–8)
PLATELET # BLD AUTO: 43 10*3/MM3 (ref 140–450)
PMV BLD AUTO: 10.3 FL (ref 6–12)
POTASSIUM SERPL-SCNC: 4.1 MMOL/L (ref 3.5–5.2)
PROT SERPL-MCNC: 7 G/DL (ref 6–8.5)
PROT UR QL STRIP: ABNORMAL
RBC # BLD AUTO: 2.72 10*6/MM3 (ref 3.77–5.28)
SODIUM SERPL-SCNC: 139 MMOL/L (ref 136–145)
SP GR UR STRIP: 1.02 (ref 1–1.03)
UROBILINOGEN UR QL STRIP: ABNORMAL
WBC NRBC COR # BLD: 5.4 10*3/MM3 (ref 3.4–10.8)

## 2022-07-07 PROCEDURE — 36591 DRAW BLOOD OFF VENOUS DEVICE: CPT

## 2022-07-07 PROCEDURE — 81003 URINALYSIS AUTO W/O SCOPE: CPT | Performed by: INTERNAL MEDICINE

## 2022-07-07 PROCEDURE — 85025 COMPLETE CBC W/AUTO DIFF WBC: CPT | Performed by: INTERNAL MEDICINE

## 2022-07-07 PROCEDURE — 86304 IMMUNOASSAY TUMOR CA 125: CPT | Performed by: INTERNAL MEDICINE

## 2022-07-07 PROCEDURE — 80053 COMPREHEN METABOLIC PANEL: CPT | Performed by: INTERNAL MEDICINE

## 2022-07-07 PROCEDURE — 25010000002 HEPARIN LOCK FLUSH PER 10 UNITS: Performed by: INTERNAL MEDICINE

## 2022-07-07 RX ORDER — SODIUM CHLORIDE 0.9 % (FLUSH) 0.9 %
10 SYRINGE (ML) INJECTION AS NEEDED
Status: DISCONTINUED | OUTPATIENT
Start: 2022-07-07 | End: 2022-07-08 | Stop reason: HOSPADM

## 2022-07-07 RX ORDER — HEPARIN SODIUM (PORCINE) LOCK FLUSH IV SOLN 100 UNIT/ML 100 UNIT/ML
500 SOLUTION INTRAVENOUS AS NEEDED
Status: CANCELLED | OUTPATIENT
Start: 2022-07-07

## 2022-07-07 RX ORDER — SODIUM CHLORIDE 9 MG/ML
250 INJECTION, SOLUTION INTRAVENOUS ONCE
Status: CANCELLED | OUTPATIENT
Start: 2022-07-14

## 2022-07-07 RX ORDER — SODIUM CHLORIDE 0.9 % (FLUSH) 0.9 %
10 SYRINGE (ML) INJECTION AS NEEDED
Status: CANCELLED | OUTPATIENT
Start: 2022-07-07

## 2022-07-07 RX ORDER — HEPARIN SODIUM (PORCINE) LOCK FLUSH IV SOLN 100 UNIT/ML 100 UNIT/ML
500 SOLUTION INTRAVENOUS AS NEEDED
Status: DISCONTINUED | OUTPATIENT
Start: 2022-07-07 | End: 2022-07-08 | Stop reason: HOSPADM

## 2022-07-07 RX ADMIN — Medication 10 ML: at 14:07

## 2022-07-07 RX ADMIN — HEPARIN 500 UNITS: 100 SYRINGE at 14:07

## 2022-07-07 NOTE — PROGRESS NOTES
PT here today chemo port accessed using sterile technique per protocol. 10cc of blood wasted prior to lab draw and lab samples sent to laboratory for testing, She states she is feeling great with no complaints other than back and abdominal pain a couple of days ago none today and numbness and tingling in lower extremities. Pt's platelets are 43 today Consulted Dr. Hull for orders to treat and he gave orders to hold for one week pt already had appt for 7-14 so her treatment was deferred. She Vu and was discharged home with friend.

## 2022-07-08 LAB — CANCER AG125 SERPL QL: 14.5 U/ML (ref 0–38.1)

## 2022-07-11 DIAGNOSIS — C56.9 MALIGNANT NEOPLASM OF OVARY, UNSPECIFIED LATERALITY: ICD-10-CM

## 2022-07-12 ENCOUNTER — LAB (OUTPATIENT)
Dept: LAB | Facility: HOSPITAL | Age: 62
End: 2022-07-12

## 2022-07-12 DIAGNOSIS — C56.9 MALIGNANT NEOPLASM OF OVARY, UNSPECIFIED LATERALITY: ICD-10-CM

## 2022-07-12 LAB
ALBUMIN SERPL-MCNC: 3.7 G/DL (ref 3.5–5.2)
ALBUMIN/GLOB SERPL: 1 G/DL
ALP SERPL-CCNC: 69 U/L (ref 39–117)
ALT SERPL W P-5'-P-CCNC: 19 U/L (ref 1–33)
ANION GAP SERPL CALCULATED.3IONS-SCNC: 9.3 MMOL/L (ref 5–15)
AST SERPL-CCNC: 20 U/L (ref 1–32)
BASOPHILS # BLD AUTO: 0.01 10*3/MM3 (ref 0–0.2)
BASOPHILS NFR BLD AUTO: 0.3 % (ref 0–1.5)
BILIRUB SERPL-MCNC: 0.2 MG/DL (ref 0–1.2)
BUN SERPL-MCNC: 19 MG/DL (ref 8–23)
BUN/CREAT SERPL: 15.3 (ref 7–25)
CALCIUM SPEC-SCNC: 9.1 MG/DL (ref 8.6–10.5)
CANCER AG125 SERPL QL: 16.2 U/ML (ref 0–38.1)
CHLORIDE SERPL-SCNC: 102 MMOL/L (ref 98–107)
CO2 SERPL-SCNC: 28.7 MMOL/L (ref 22–29)
CREAT SERPL-MCNC: 1.24 MG/DL (ref 0.57–1)
DEPRECATED RDW RBC AUTO: 56.7 FL (ref 37–54)
EGFRCR SERPLBLD CKD-EPI 2021: 49 ML/MIN/1.73
EOSINOPHIL # BLD AUTO: 0.01 10*3/MM3 (ref 0–0.4)
EOSINOPHIL NFR BLD AUTO: 0.3 % (ref 0.3–6.2)
ERYTHROCYTE [DISTWIDTH] IN BLOOD BY AUTOMATED COUNT: 17.2 % (ref 12.3–15.4)
GLOBULIN UR ELPH-MCNC: 3.6 GM/DL
GLUCOSE SERPL-MCNC: 101 MG/DL (ref 65–99)
HCT VFR BLD AUTO: 29.4 % (ref 34–46.6)
HGB BLD-MCNC: 9.9 G/DL (ref 12–15.9)
LYMPHOCYTES # BLD AUTO: 1.51 10*3/MM3 (ref 0.7–3.1)
LYMPHOCYTES NFR BLD AUTO: 40.3 % (ref 19.6–45.3)
MCH RBC QN AUTO: 34.4 PG (ref 26.6–33)
MCHC RBC AUTO-ENTMCNC: 33.7 G/DL (ref 31.5–35.7)
MCV RBC AUTO: 102.1 FL (ref 79–97)
MONOCYTES # BLD AUTO: 0.61 10*3/MM3 (ref 0.1–0.9)
MONOCYTES NFR BLD AUTO: 16.3 % (ref 5–12)
NEUTROPHILS NFR BLD AUTO: 1.61 10*3/MM3 (ref 1.7–7)
NEUTROPHILS NFR BLD AUTO: 42.8 % (ref 42.7–76)
PLATELET # BLD AUTO: 31 10*3/MM3 (ref 140–450)
PMV BLD AUTO: 9.1 FL (ref 6–12)
POTASSIUM SERPL-SCNC: 4.2 MMOL/L (ref 3.5–5.2)
PROT SERPL-MCNC: 7.3 G/DL (ref 6–8.5)
RBC # BLD AUTO: 2.88 10*6/MM3 (ref 3.77–5.28)
SODIUM SERPL-SCNC: 140 MMOL/L (ref 136–145)
WBC NRBC COR # BLD: 3.75 10*3/MM3 (ref 3.4–10.8)

## 2022-07-12 PROCEDURE — 80053 COMPREHEN METABOLIC PANEL: CPT

## 2022-07-12 PROCEDURE — 85025 COMPLETE CBC W/AUTO DIFF WBC: CPT

## 2022-07-12 PROCEDURE — 86304 IMMUNOASSAY TUMOR CA 125: CPT

## 2022-07-12 PROCEDURE — 36415 COLL VENOUS BLD VENIPUNCTURE: CPT

## 2022-07-12 RX ORDER — ONDANSETRON HYDROCHLORIDE 8 MG/1
8 TABLET, FILM COATED ORAL 3 TIMES DAILY PRN
Qty: 90 TABLET | Refills: 2 | Status: SHIPPED | OUTPATIENT
Start: 2022-07-12 | End: 2022-07-29

## 2022-07-12 NOTE — TELEPHONE ENCOUNTER
Received a message from the pt stating that she has been having some abdominal cramping. I called her back and told her that I refilled her Zofran that she sent a message requesting. I told her I would discuss the cramping with the NP and see if she has any recommendations. Pt denied constipation or diarrhea. She asked if she could come in today or tomorrow to check her labs to make sure she will be able to get treatment on Thursday. Lab appt scheduled.

## 2022-07-12 NOTE — PROGRESS NOTES
Patient came in for a cbc today. Platelets are 31 down from 44 last week. Patient denies issues or bleeding. Per best Ornelasay to recheck Thursday at follow up apt, patient to call with any bleeding issues.

## 2022-07-13 DIAGNOSIS — C56.9 MALIGNANT NEOPLASM OF OVARY, UNSPECIFIED LATERALITY: Primary | ICD-10-CM

## 2022-07-13 DIAGNOSIS — D69.6 THROMBOCYTOPENIA: ICD-10-CM

## 2022-07-14 ENCOUNTER — HOSPITAL ENCOUNTER (OUTPATIENT)
Dept: ONCOLOGY | Facility: HOSPITAL | Age: 62
Setting detail: INFUSION SERIES
Discharge: HOME | End: 2022-07-14

## 2022-07-14 ENCOUNTER — OFFICE VISIT (OUTPATIENT)
Dept: ONCOLOGY | Facility: CLINIC | Age: 62
End: 2022-07-14

## 2022-07-14 VITALS
RESPIRATION RATE: 18 BRPM | TEMPERATURE: 96.8 F | HEART RATE: 79 BPM | WEIGHT: 171.1 LBS | BODY MASS INDEX: 28.51 KG/M2 | SYSTOLIC BLOOD PRESSURE: 135 MMHG | DIASTOLIC BLOOD PRESSURE: 75 MMHG | HEIGHT: 65 IN | OXYGEN SATURATION: 95 %

## 2022-07-14 VITALS — BODY MASS INDEX: 28.47 KG/M2 | HEIGHT: 65 IN

## 2022-07-14 DIAGNOSIS — C56.9 MALIGNANT NEOPLASM OF OVARY, UNSPECIFIED LATERALITY: Primary | ICD-10-CM

## 2022-07-14 DIAGNOSIS — E83.51 HYPOCALCEMIA: ICD-10-CM

## 2022-07-14 DIAGNOSIS — R30.0 DYSURIA: ICD-10-CM

## 2022-07-14 DIAGNOSIS — G89.3 CANCER RELATED PAIN: ICD-10-CM

## 2022-07-14 DIAGNOSIS — D69.6 THROMBOCYTOPENIA: ICD-10-CM

## 2022-07-14 LAB
ALBUMIN SERPL-MCNC: 3.7 G/DL (ref 3.5–5.2)
ALBUMIN/GLOB SERPL: 1.2 G/DL
ALP SERPL-CCNC: 64 U/L (ref 39–117)
ALT SERPL W P-5'-P-CCNC: 14 U/L (ref 1–33)
ANION GAP SERPL CALCULATED.3IONS-SCNC: 13 MMOL/L (ref 5–15)
AST SERPL-CCNC: 23 U/L (ref 1–32)
BASOPHILS # BLD AUTO: 0.01 10*3/MM3 (ref 0–0.2)
BASOPHILS NFR BLD AUTO: 0.3 % (ref 0–1.5)
BILIRUB SERPL-MCNC: 0.3 MG/DL (ref 0–1.2)
BILIRUB UR QL STRIP: NEGATIVE
BUN SERPL-MCNC: 23 MG/DL (ref 8–23)
BUN/CREAT SERPL: 18.9 (ref 7–25)
CALCIUM SPEC-SCNC: 9.1 MG/DL (ref 8.6–10.5)
CANCER AG125 SERPL QL: 15.8 U/ML (ref 0–38.1)
CHLORIDE SERPL-SCNC: 101 MMOL/L (ref 98–107)
CLARITY UR: ABNORMAL
CO2 SERPL-SCNC: 22 MMOL/L (ref 22–29)
COLOR UR: YELLOW
CREAT BLDA-MCNC: 1.2 MG/DL (ref 0.6–1.3)
CREAT SERPL-MCNC: 1.22 MG/DL (ref 0.57–1)
DEPRECATED RDW RBC AUTO: 56.6 FL (ref 37–54)
EGFRCR SERPLBLD CKD-EPI 2021: 50 ML/MIN/1.73
EGFRCR SERPLBLD CKD-EPI 2021: 51 ML/MIN/1.73
EOSINOPHIL # BLD AUTO: 0 10*3/MM3 (ref 0–0.4)
EOSINOPHIL NFR BLD AUTO: 0 % (ref 0.3–6.2)
ERYTHROCYTE [DISTWIDTH] IN BLOOD BY AUTOMATED COUNT: 17 % (ref 12.3–15.4)
GLOBULIN UR ELPH-MCNC: 3.2 GM/DL
GLUCOSE SERPL-MCNC: 153 MG/DL (ref 65–99)
GLUCOSE UR STRIP-MCNC: NEGATIVE MG/DL
HCT VFR BLD AUTO: 26.9 % (ref 34–46.6)
HGB BLD-MCNC: 9.2 G/DL (ref 12–15.9)
HGB UR QL STRIP.AUTO: NEGATIVE
KETONES UR QL STRIP: NEGATIVE
LEUKOCYTE ESTERASE UR QL STRIP.AUTO: ABNORMAL
LYMPHOCYTES # BLD AUTO: 0.46 10*3/MM3 (ref 0.7–3.1)
LYMPHOCYTES NFR BLD AUTO: 14.4 % (ref 19.6–45.3)
MCH RBC QN AUTO: 34.3 PG (ref 26.6–33)
MCHC RBC AUTO-ENTMCNC: 34.2 G/DL (ref 31.5–35.7)
MCV RBC AUTO: 100.4 FL (ref 79–97)
MONOCYTES # BLD AUTO: 0.18 10*3/MM3 (ref 0.1–0.9)
MONOCYTES NFR BLD AUTO: 5.6 % (ref 5–12)
NEUTROPHILS NFR BLD AUTO: 2.54 10*3/MM3 (ref 1.7–7)
NEUTROPHILS NFR BLD AUTO: 79.7 % (ref 42.7–76)
NITRITE UR QL STRIP: NEGATIVE
PH UR STRIP.AUTO: 5.5 [PH] (ref 5–8)
PLATELET # BLD AUTO: 50 10*3/MM3 (ref 140–450)
PMV BLD AUTO: 9.9 FL (ref 6–12)
POTASSIUM SERPL-SCNC: 3.7 MMOL/L (ref 3.5–5.2)
PROT SERPL-MCNC: 6.9 G/DL (ref 6–8.5)
PROT UR QL STRIP: NEGATIVE
RBC # BLD AUTO: 2.68 10*6/MM3 (ref 3.77–5.28)
SODIUM SERPL-SCNC: 136 MMOL/L (ref 136–145)
SP GR UR STRIP: 1.02 (ref 1–1.03)
UROBILINOGEN UR QL STRIP: ABNORMAL
WBC NRBC COR # BLD: 3.19 10*3/MM3 (ref 3.4–10.8)

## 2022-07-14 PROCEDURE — 81003 URINALYSIS AUTO W/O SCOPE: CPT | Performed by: INTERNAL MEDICINE

## 2022-07-14 PROCEDURE — 80053 COMPREHEN METABOLIC PANEL: CPT | Performed by: INTERNAL MEDICINE

## 2022-07-14 PROCEDURE — 36591 DRAW BLOOD OFF VENOUS DEVICE: CPT

## 2022-07-14 PROCEDURE — 85025 COMPLETE CBC W/AUTO DIFF WBC: CPT | Performed by: INTERNAL MEDICINE

## 2022-07-14 PROCEDURE — 82565 ASSAY OF CREATININE: CPT

## 2022-07-14 PROCEDURE — 86304 IMMUNOASSAY TUMOR CA 125: CPT | Performed by: INTERNAL MEDICINE

## 2022-07-14 PROCEDURE — 99214 OFFICE O/P EST MOD 30 MIN: CPT | Performed by: NURSE PRACTITIONER

## 2022-07-14 PROCEDURE — 25010000002 HEPARIN LOCK FLUSH PER 10 UNITS: Performed by: INTERNAL MEDICINE

## 2022-07-14 RX ORDER — SODIUM CHLORIDE 0.9 % (FLUSH) 0.9 %
10 SYRINGE (ML) INJECTION AS NEEDED
Status: CANCELLED | OUTPATIENT
Start: 2022-07-14

## 2022-07-14 RX ORDER — PALONOSETRON 0.05 MG/ML
0.25 INJECTION, SOLUTION INTRAVENOUS ONCE
Status: CANCELLED | OUTPATIENT
Start: 2022-07-14

## 2022-07-14 RX ORDER — SODIUM CHLORIDE 0.9 % (FLUSH) 0.9 %
10 SYRINGE (ML) INJECTION AS NEEDED
Status: DISCONTINUED | OUTPATIENT
Start: 2022-07-14 | End: 2022-07-15 | Stop reason: HOSPADM

## 2022-07-14 RX ORDER — NITROFURANTOIN 25; 75 MG/1; MG/1
100 CAPSULE ORAL 2 TIMES DAILY
Qty: 10 CAPSULE | Refills: 0 | Status: SHIPPED | OUTPATIENT
Start: 2022-07-14 | End: 2022-07-28

## 2022-07-14 RX ORDER — FAMOTIDINE 10 MG/ML
20 INJECTION, SOLUTION INTRAVENOUS AS NEEDED
Status: CANCELLED | OUTPATIENT
Start: 2022-07-14

## 2022-07-14 RX ORDER — HEPARIN SODIUM (PORCINE) LOCK FLUSH IV SOLN 100 UNIT/ML 100 UNIT/ML
500 SOLUTION INTRAVENOUS AS NEEDED
Status: DISCONTINUED | OUTPATIENT
Start: 2022-07-14 | End: 2022-07-15 | Stop reason: HOSPADM

## 2022-07-14 RX ORDER — SODIUM CHLORIDE 9 MG/ML
250 INJECTION, SOLUTION INTRAVENOUS ONCE
Status: CANCELLED | OUTPATIENT
Start: 2022-07-14

## 2022-07-14 RX ORDER — OLANZAPINE 5 MG/1
5 TABLET ORAL ONCE
Status: CANCELLED | OUTPATIENT
Start: 2022-07-14 | End: 2022-07-14

## 2022-07-14 RX ORDER — DIPHENHYDRAMINE HYDROCHLORIDE 50 MG/ML
50 INJECTION INTRAMUSCULAR; INTRAVENOUS AS NEEDED
Status: CANCELLED | OUTPATIENT
Start: 2022-07-14

## 2022-07-14 RX ORDER — HEPARIN SODIUM (PORCINE) LOCK FLUSH IV SOLN 100 UNIT/ML 100 UNIT/ML
500 SOLUTION INTRAVENOUS AS NEEDED
Status: CANCELLED | OUTPATIENT
Start: 2022-07-14

## 2022-07-14 RX ORDER — FAMOTIDINE 10 MG/ML
20 INJECTION, SOLUTION INTRAVENOUS ONCE
Status: CANCELLED | OUTPATIENT
Start: 2022-07-14

## 2022-07-14 RX ADMIN — HEPARIN 500 UNITS: 100 SYRINGE at 13:54

## 2022-07-14 RX ADMIN — Medication 10 ML: at 13:54

## 2022-07-14 NOTE — PROGRESS NOTES
Patient here for Cabo, zirabev, plts are 50, holding per Ceci, patient is going to get ct scan of abd and they will scheduled her to see Dr. Hernandez to see what the plan is after her scans come back, patient is going on vacation next week, Ceci plans to bring her back around the 27th of July. Patient has AVS for next infusion and MD F/U. Patient in for port flush and blood draw.  10 cc of blood wasted prior to specimen collection.  Specimens sent to lab for processing.

## 2022-07-14 NOTE — PROGRESS NOTES
Taxol/Avastin Hematology/Oncology Outpatient Follow Up    PATIENT NAME:Lucy Mahan  :1960  MRN: [id number]  PRIMARY CARE PHYSICIAN: Argentina Avalos APRN  REFERRING PHYSICIAN: [place] Avalos APRN    Chief Complaint   Patient presents with   • Follow-up     Malignant neoplasm of ovary, unspecified laterality (HCC)        HISTORY OF PRESENT ILLNESS:     This is a 61 y.o.  female who developed abdominal pain in 2021.  Patient has also lost approximately 35 pounds during that..  She has had loss of appetite.  Due to the abdominal pain,  she had an ultrasound of the abdomen done on 10/13/2021.  This basically revealed no liver lesions.  Common bile duct is normal at 3 mm.  There is a nonmobile 3 cm shadowing gallstone within the gallbladder neck.  No gallbladder thickening or pericholecystic fluid collection.  Patient was then referred to general surgery and was seen by Dr. Badillo.  Who took her to surgery on 11/10/2021 for diagnostic laparoscopy and peritoneal biopsy.  Intra-Op , she was noted to have peritoneal studding with malignancy throughout the abdominal cavity and biopsies were completed. 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  The tumor was focally positive for progesterone receptor, positive for CK7, estrogen receptor, CA-125 and PAX 8..  The staining pattern is consistent with ovarian serous carcinoma.      She had CT scan of the chest, abdomen and pelvis and the chest is a 2 mm noncalcified nodule within the left lower lobe, noncalcified nodule in the left upper lobe measuring 4 mm and 3 mm.  In the abdomen there is a 5.1 x 5.3 cm enhancing soft tissue mass in the pelvis to the right of midline associated with the adnexal region possibly representing a primary ovarian malignancy.  No right or left ovarian tissue was seen.  There is abnormal omental thickening and nodularity consistent with carcinomatosis greatest bulk in the left mid abdomen measuring up to  10.8 cm x 2.5 cm.  There is nodular peritoneal thickening also present within the abdomen and pelvis consistent with peritoneal carcinomatosis.  The small quantity of ascitic fluid in the abdomen and pelvis.  Carcinomatosis nodular studding is seen along the inferior right hepatic lobe.     She  has been referred to us for further evaluation and management of her newly diagnosed ovarian carcinoma.     Patient is accompanied today by her daughter for this appointment.  There is  family history of precancerous cells of the  uterusin her sister.        She does not smoke and does not drink alcohol.  Patient is  and has 2 daughters.  She works for the [employer].    · 11/29/2021 patient was seen by Dr. Dubois who has recommended neoadjuvant chemotherapy for 2-3 cycles and then interval cytoreduction.  She has recommended carboplatinum AUC 6, Taxol 1 7 5 mg per metered squared, Avastin 15 mg/kg as was done in the oceans trial but hold Avastin for the first few cycles due to bowel risk.  · Prechemo CA-125 was 754  · 12/7/2021 patient received first cycle of chemotherapy with carboplatinum and Taxol with Neulasta  · 12/28/2021 patient received cycle 2 of combination chemotherapy with carboplatinum and Taxol with Neulasta  · 12/28/2021 CA-125 was down to 635  · 1/11/2022 add-on appointment for nausea, dizziness, thrombocytopenia, pain in upper to mid abdomen 8/10, new onset in the last 4 to 5 days  · January 12, 2022: Due to acute GI symptoms patient had a CT scan of the abdomen and pelvis which basically revealed increasing effusion on the left lung mild pleural effusion on the right lung decreased in amount of omental caking but no interval change in the right adnexal area moderate abdominal and pelvic ascites which has increased.  She denies any significant pulmonary symptoms.  Her O2 sat today was 100% at rest and 99% with ambulation  · 1/24/2022 patient received cycle 3 of carboplatinum with Taxol  with dose reduction due to significant toxicities  · 2022 patient received cycle 4-day 1 of chemo platinum Taxol and Avastin  · 3/7/22: Patient received cycle 5-day 1 of chemotherapy with carboplatinum Taxol and Avastin  · 2022 patient received cycle 6 of chemotherapy with A platinum Taxol and Avastin  · 2022 patient underwent exploratory laparotomy, total abdominal hysterectomy, bilateral salpingo-oophorectomy, pelvic and aortic lymphadenectomy, omentectomy performed by Dr Castro, pathology reviewed high-grade serous carcinoma involving the uterine serosa and outer myometrium, bilateral fallopian tubes and bilateral ovaries and adnexa soft tissue.  Also received are high-grade serous carcinoma involving the omentum as well as residual high-grade serous carcinoma with treatment effects on the colonic splenic [id number], estrogen receptor diffusely positive p53 patchy, p16 diffusely positive.  Molecular testing is pending  · 2022 patient received cycle 7 of Combination chemotherapy with Avastin and carboplatinum.  Taxol was held due to significant peripheral neuropathy  · 2022 patient presents for follow-up of ovarian cancer      History of present illness was reviewed and is unchanged from the previous visit. 22          Past Medical History:   Diagnosis Date   • Arthritis    • Cancer (HCC)     ovarian   • Gall stones    • Hypertension    • Irritable bowel syndrome    • Rheumatoid aortitis    • Thyroid disease        Past Surgical History:   Procedure Laterality Date   •  SECTION      x2   • CHOLECYSTECTOMY N/A 11/10/2021    Procedure: diagnostic laparoscopy and peritoneal biopsy;  Surgeon: Krunal Badillo MD;  Location: [facility];  Service: General;  Laterality: N/A;   • COLONOSCOPY     • HIP SURGERY Left    • THYROIDECTOMY, PARTIAL  2017   • TOTAL ABDOMINAL HYSTERECTOMY N/A 2022    Procedure: EXPLORATORY LAPAROTOMY, TOTAL ABDOMINAL HYSTERECTOMY, BILATERAL  SALPINGOOOPHORECTOMY, PELVIC AND AORTIC LYMPHADENECTOMY, OMENTECTOMY;  Surgeon: Maggie Castro DO;  Location: [facility] MAIN OR;  Service: Gynecology Oncology;  Laterality: N/A;   • VENOUS ACCESS DEVICE (PORT) INSERTION Left 12/6/2021    Procedure: INSERTION VENOUS ACCESS DEVICE;  Surgeon: Krunal Badillo MD;  Location: [facility] MAIN OR;  Service: General;  Laterality: Left;         Current Outpatient Medications:   •  amLODIPine (NORVASC) 5 MG tablet, Take 1 tablet by mouth Daily., Disp: 90 tablet, Rfl: 0  •  dexamethasone (DECADRON) 4 MG tablet, TAKE FIVE (5) TABLETS WITH FOOD THE NIGHT BEFORE chemotherapy, Disp: 5 tablet, Rfl: 5  •  gabapentin (NEURONTIN) 300 MG capsule, TAKE ONE (1) CAPSULE BY MOUTH TWICE DAILY, Disp: 60 capsule, Rfl: 0  •  hydroCHLOROthiazide (HYDRODIURIL) 12.5 MG tablet, TAKE ONE (1) TABLET BY MOUTH EVERY DAY AS NEEDED FOR swelling, Disp: 90 tablet, Rfl: 0  •  HYDROcodone-acetaminophen (Norco) 5-325 MG per tablet, Take 1 tablet by mouth Every 6 (Six) Hours As Needed for Moderate Pain ., Disp: 60 tablet, Rfl: 0  •  hydrOXYzine (ATARAX) 10 MG tablet, TAKE ONE (1) TABLET BY MOUTH TWICE DAILY AS NEEDED FOR ANXIETY, Disp: 40 tablet, Rfl: 0  •  letrozole (FEMARA) 2.5 MG tablet, TAKE ONE (1) TABLET BY MOUTH DAILY, Disp: 30 tablet, Rfl: 3  •  levothyroxine (SYNTHROID, LEVOTHROID) 137 MCG tablet, Take 1 tablet by mouth Daily., Disp: 90 tablet, Rfl: 1  •  lidocaine-prilocaine (EMLA) 2.5-2.5 % cream, Apply 1 application topically to the appropriate area as directed. Apply to port 1 hour prior to port access, Disp: 30 g, Rfl: 1  •  loratadine (Claritin) 10 MG tablet, Take 1 tablet night before and 1 tablet morning of chemotherapy., Disp: 2 tablet, Rfl: 5  •  nitrofurantoin, macrocrystal-monohydrate, (Macrobid) 100 MG capsule, Take 1 capsule by mouth 2 (Two) Times a Day., Disp: 10 capsule, Rfl: 0  •  OLANZapine (ZyPREXA) 2.5 MG tablet, Take 1 tablet by mouth Every Night., Disp: 30 tablet, Rfl: 2  •   OLANZapine (zyPREXA) 5 MG tablet, TAKE ONE (1) TABLET BY MOUTH EVERY NIGHT AT BEDTIME. TAKE ON DAYS 2, 3, AND 4 AFTER chemotherapy, Disp: 3 tablet, Rfl: 5  •  omeprazole (priLOSEC) 40 MG capsule, Take 1 capsule by mouth Daily., Disp: 30 capsule, Rfl: 6  •  ondansetron (ZOFRAN) 8 MG tablet, Take 1 tablet by mouth 3 (Three) Times a Day As Needed for Nausea or Vomiting., Disp: 90 tablet, Rfl: 2  •  pantoprazole (Protonix) 40 MG EC tablet, Take 1 tablet by mouth Daily., Disp: 30 tablet, Rfl: 6  •  promethazine (PHENERGAN) 25 MG tablet, Take 1 tablet by mouth Every 6 (Six) Hours As Needed for Nausea or Vomiting., Disp: 90 tablet, Rfl: 1    No Known Allergies    Family History   Problem Relation Age of Onset   • Dementia Mother    • Arthritis Mother    • Heart disease Father    • Hypertension Father    • Malig Hyperthermia Neg Hx        Cancer-related family history is not on file.    Social History     Tobacco Use   • Smoking status: Never Smoker   • Smokeless tobacco: Never Used   Vaping Use   • Vaping Use: Never used   Substance Use Topics   • Alcohol use: Yes     Alcohol/week: 1.0 standard drink     Types: 1 Glasses of wine per week     Comment: less than monthly   • Drug use: No       HPI, ROS and PFSH have been reviewed and confirmed on 7/14/2022.     SUBJECTIVE:    She denies any new issues.  She continues to experience neuropathy mostly in the feet.          REVIEW OF SYSTEMS:    Review of Systems   Constitutional: Positive for fatigue. Negative for chills and fever.   HENT: Negative for ear pain, mouth sores, nosebleeds and sore throat.    Eyes: Negative for photophobia and visual disturbance.   Respiratory: Negative for wheezing and stridor.    Cardiovascular: Negative for chest pain and palpitations.   Gastrointestinal: Positive for abdominal pain and nausea. Negative for diarrhea and vomiting.   Endocrine: Negative for cold intolerance and heat intolerance.   Genitourinary: Negative for dysuria and hematuria.  "  Musculoskeletal: Negative for joint swelling and neck stiffness.   Skin: Negative for color change and rash.   Neurological: Positive for dizziness, weakness and headaches. Negative for seizures and syncope.   Hematological: Negative for adenopathy.   Psychiatric/Behavioral: Negative for agitation, confusion and hallucinations.     Post op changes noted.    OBJECTIVE:    Vitals:    07/14/22 1300   Height: 165.1 cm (65\")     Body mass index is 28.47 kg/m².    ECOG    (1) Restricted in physically strenuous activity, ambulatory and able to do work of light nature    Physical Exam  Vitals and nursing note reviewed.   Constitutional:       General: She is not in acute distress.     Appearance: She is not diaphoretic.   HENT:      Head: Normocephalic and atraumatic.      Mouth/Throat:      Mouth: Mucous membranes are dry.   Eyes:      General: No scleral icterus.        Right eye: No discharge.         Left eye: No discharge.      Conjunctiva/sclera: Conjunctivae normal.   Neck:      Thyroid: No thyromegaly.   Cardiovascular:      Rate and Rhythm: Normal rate and regular rhythm.      Heart sounds: Normal heart sounds.     No friction rub. No gallop.   Pulmonary:      Effort: Pulmonary effort is normal. No respiratory distress.      Breath sounds: No stridor. No wheezing.   Abdominal:      General: Bowel sounds are normal.      Palpations: Abdomen is soft. There is no mass.      Tenderness: There is no abdominal tenderness. There is no guarding or rebound.      Comments: Abdominal wound noted   Musculoskeletal:         General: No tenderness. Normal range of motion.      Cervical back: Normal range of motion and neck supple.   Lymphadenopathy:      Cervical: No cervical adenopathy.   Skin:     General: Skin is warm.      Findings: No erythema or rash.      Comments: Tenting skin turgor   Neurological:      Mental Status: She is alert and oriented to person, place, and time.      Motor: No abnormal muscle tone. "   Psychiatric:         Behavior: Behavior normal.       I have reexamined the patient and the results are consistent with the previously documented exam. Ceci Mcdermott, FLORENCE     RECENT LABS    WBC   Date Value Ref Range Status   07/14/2022 3.19 (L) 3.40 - 10.80 10*3/mm3 Final     RBC   Date Value Ref Range Status   07/14/2022 2.68 (L) 3.77 - 5.28 10*6/mm3 Final     Hemoglobin   Date Value Ref Range Status   07/14/2022 9.2 (L) 12.0 - 15.9 g/dL Final     Hematocrit   Date Value Ref Range Status   07/14/2022 26.9 (L) 34.0 - 46.6 % Final     MCV   Date Value Ref Range Status   07/14/2022 100.4 (H) 79.0 - 97.0 fL Final     MCH   Date Value Ref Range Status   07/14/2022 34.3 (H) 26.6 - 33.0 pg Final     MCHC   Date Value Ref Range Status   07/14/2022 34.2 31.5 - 35.7 g/dL Final     RDW   Date Value Ref Range Status   07/14/2022 17.0 (H) 12.3 - 15.4 % Final     RDW-SD   Date Value Ref Range Status   07/14/2022 56.6 (H) 37.0 - 54.0 fl Final     MPV   Date Value Ref Range Status   07/14/2022 9.9 6.0 - 12.0 fL Final     Platelets   Date Value Ref Range Status   07/14/2022 50 (L) 140 - 450 10*3/mm3 Final     Neutrophil %   Date Value Ref Range Status   07/14/2022 79.7 (H) 42.7 - 76.0 % Final     Lymphocyte %   Date Value Ref Range Status   07/14/2022 14.4 (L) 19.6 - 45.3 % Final     Monocyte %   Date Value Ref Range Status   07/14/2022 5.6 5.0 - 12.0 % Final     Eosinophil %   Date Value Ref Range Status   07/14/2022 0.0 (L) 0.3 - 6.2 % Final     Basophil %   Date Value Ref Range Status   07/14/2022 0.3 0.0 - 1.5 % Final     Immature Grans %   Date Value Ref Range Status   04/29/2022 0.4 0.0 - 0.5 % Final     Neutrophils, Absolute   Date Value Ref Range Status   07/14/2022 2.54 1.70 - 7.00 10*3/mm3 Final     Lymphocytes, Absolute   Date Value Ref Range Status   07/14/2022 0.46 (L) 0.70 - 3.10 10*3/mm3 Final     Monocytes, Absolute   Date Value Ref Range Status   07/14/2022 0.18 0.10 - 0.90 10*3/mm3 Final     Eosinophils,  Absolute   Date Value Ref Range Status   07/14/2022 0.00 0.00 - 0.40 10*3/mm3 Final     Basophils, Absolute   Date Value Ref Range Status   07/14/2022 0.01 0.00 - 0.20 10*3/mm3 Final     Immature Grans, Absolute   Date Value Ref Range Status   04/29/2022 0.02 0.00 - 0.05 10*3/mm3 Final     nRBC   Date Value Ref Range Status   04/29/2022 0.0 0.0 - 0.2 /100 WBC Final       Lab Results   Component Value Date    GLUCOSE 153 (H) 07/14/2022    BUN 23 07/14/2022    CREATININE 1.20 07/14/2022    EGFRIFNONA 47 (L) 02/21/2022    EGFRIFAFRI 44 (L) 01/21/2020    BCR 18.9 07/14/2022    K 3.7 07/14/2022    CO2 22.0 07/14/2022    CALCIUM 9.1 07/14/2022    ALBUMIN 3.70 07/14/2022    LABIL2 1.2 (calc) 01/21/2020    AST 23 07/14/2022    ALT 14 07/14/2022         ASSESSMENT:      1. 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  CA-125 754 November 2021  2. On neoadjuvant chemotherapy with carboplatinum AUC 6, Taxol 175 mg per metered squared, with Avastin added due to lack of significant response with carboplatinum and Taxol.  Monitor serial CA-125's.  Down to 135 as of 3/14/2022.  Her CT scan showed response therefore patient will proceed with radical hysterectomy to perform by Dr. Dubois on 4/26/2022.  Plans for additional chemotherapy along with Avastin and possibly maintenance treatment with PARP inhibitors in the future.  She is also on letrozole 2.5 mg p.o. daily.  Hold chemotherapy for platelet 31  3. Status post radical hysterectomy.  See path report.  Caris testing pending  4. Pulmonary nodules of unclear etiology too small for biopsy or PET resolution: Continue surveillance CT scans.  Continue surveillance 's  5. Chemotherapy induced nausea  6. Chemotherapy induced fatigue  7. Chemotherapy-induced anemia  8. 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  Patient already had some 25% dose reduction on her chemo.  She is currently on Neurontin 300 mg daily will increase to 300 mg  twice a day.  Peripheral neuropathy continues to be an issue.  We will hold Taxol for now till this has improved  9. Severe thrombocytopenia secondary to chemotherapy most notably carboplatinum  10. Comprehensive gene analysis with cancer next technology was negative for any significant mutation in all 77 genes analyzed  11. 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  No other actionable mutations identified  12. Dehydration: Resolved.  She has increase p.o. fluid intake.  13. Abdominal discomfort secondary to malignancy; Increasing abdominal discomfort     Discussion  I have reviewed her imaging studies, pathology reports.  She has metastatic serous carcinoma of the ovary.  She has liver involvement.  She also has pulmonary nodules of unclear etiology.  These are too small for biopsy or PET resolution therefore they will be followed over time.  We discussed that ovarian cancer is a surgical disease.  We will have her seen by GYN oncologist Dr. Castro to weigh in on her surgical options.  We discussed that she may benefit from neoadjuvant chemotherapy but will await Dr. Castro's final recommendations.  She was seen by Dr. Castro recommendation for her to have neoadjuvant chemotherapy with carboplatinum and Taxol  Discussed side effects of chemotherapy to include but not limited to:  Chemotherapy side effects include, but not limited to, nausea, vomiting, bone marrow suppression, which can result in blood, platelet transfusion. There is also risk of permanent bone marrow destruction, which can cause myelodysplastic syndrome or leukemia years down the line. There is risk of infection which can result in hospitalization and even death. There is also risk of fatigue, asthenia, alopecia which could become permanent. Chemo will help to reduce risk of relapse of cancer, but does not eliminate risk  completely.  Discussed that Taxol chemotherapy can lead to hypersensitivity reaction fluid retention, peripheral neuropathy, myalgias, small risk of permanent alopecia  Carboplatinum can lead to myelosuppression socially thrombocytopenia  Avastin will be added postoperatively  She will benefit from genetic testing as this could have some therapeutic implications for her.    Genetics   Today's risk assessment is based on the history the patient has provided.  We discussed that the best people to screen are the ones who have been affected by malignancy as their result is more informative.  We reviewed all the hallmarks of hereditary cancer syndrome including multiple relatives on the same side of the family being affected by malignancy, cancer diagnosis in young individuals, different cancers in one individual.    Comprehensive gene analysis with cancer next technology was negative for any mutation in all 77 genes analyzed.  Discussed that her malignancy was most likely sporadic.  We discussed lifetime risk of developing ovarian cancer estimated at 2 to 3%.          Plans:     · Hold chemotherapy today due to platelet of 31  ·  15.8  · Macrobid for trace leuk esterase and dysuria  · UA today for low abdominal pain  · CT abdomen/pelvis for increasing abdominal pain  · Continue Neurontin to 300 mg twice a day for neuropathy  · Continue chemotherapy with carboplatinum and Avastin.  Hold Taxol due to neuropathy.  Reassess today patient remains largely symptomatic from her neuropathy.  We              will continue to hold Taxol  · Reviewed her path report from radical hysterectomy 4/20/2022  · Comprehensive gene analysis with cancer next technology results reviewed with patient and copies of her results was also given to her for her own records keeping  · Dose reduced chemotherapy carboplatinum by 25% due to significant toxicity with third cycle of chemotherapy - consider dose reduction with platelets               continuing to be low  · CA-125 with each cycle of chemotherapy  · Continue Percocet 5 mg p.o. every 4-6 hours as needed for pain  · Continue vitamin b6 100mg po q 12   · Continue Neurontin 300mg po q 12  For neuropathy.  Discussed precautions with patient  · Foundation 1 testing results reviewed  ·     I spent 30 minutes in diagnostic evaluation, physical assessment, review of chart, review of patient symptoms, ordering, and documentation    Electronically signed by FLORENCE Francisco, 07/19/22, 8:17 PM EDT.

## 2022-07-15 NOTE — TELEPHONE ENCOUNTER
Provider: DR RALPH  Caller: [facility]  Relationship to Patient: PHARM  Pharmacy: [facility]  Phone Number:[phone]  Reason for Call: [facility] REQUIRES A PA

## 2022-07-15 NOTE — TELEPHONE ENCOUNTER
"PA completed through CoverMyMeds. Response states \"Additional information required. Please advise the dispensing pharmacy to contact the Pharmacy Help Line at [phone] for assistance.\" Called [facility] and relayed this to them. They stated they would call.   "

## 2022-07-18 DIAGNOSIS — F41.9 ANXIETY: ICD-10-CM

## 2022-07-19 NOTE — TELEPHONE ENCOUNTER
"Returned call to [facility] and explained that I completed the PA and it states \"Please advise the dispensing pharmacy to contact the Pharmacy Help Line at [phone] for assistance.\" I told her that I already relayed this to someone there. She asked if she could transfer me. After waiting on hold, automated voice stated that call failed.   "

## 2022-07-19 NOTE — TELEPHONE ENCOUNTER
Caller: [facility] - [place], [place] - [address] [facility] - [phone]  - [phone]    Relationship: Pharmacy    Best call back number: [phone]      What was the call regarding: PHARMACY CALLED STATED THE [facility] NEEDS A PRIOR AUTH, THEY WANTED TO KNOW IF ONE WAS STARTED YET    Do you require a callback: YES

## 2022-07-21 RX ORDER — HYDROXYZINE HYDROCHLORIDE 10 MG/1
TABLET, FILM COATED ORAL
Qty: 40 TABLET | Refills: 0 | Status: SHIPPED | OUTPATIENT
Start: 2022-07-21 | End: 2022-08-23

## 2022-07-25 ENCOUNTER — HOSPITAL ENCOUNTER (OUTPATIENT)
Dept: CT IMAGING | Facility: HOSPITAL | Age: 62
Discharge: HOME | End: 2022-07-25
Admitting: NURSE PRACTITIONER

## 2022-07-25 DIAGNOSIS — G89.3 CANCER RELATED PAIN: ICD-10-CM

## 2022-07-25 DIAGNOSIS — C56.9 MALIGNANT NEOPLASM OF OVARY, UNSPECIFIED LATERALITY: ICD-10-CM

## 2022-07-25 DIAGNOSIS — R30.0 DYSURIA: ICD-10-CM

## 2022-07-25 DIAGNOSIS — D69.6 THROMBOCYTOPENIA: ICD-10-CM

## 2022-07-25 PROCEDURE — 0 IOPAMIDOL PER 1 ML: Performed by: NURSE PRACTITIONER

## 2022-07-25 PROCEDURE — 74177 CT ABD & PELVIS W/CONTRAST: CPT

## 2022-07-25 PROCEDURE — 71260 CT THORAX DX C+: CPT

## 2022-07-25 RX ORDER — HEPARIN SODIUM (PORCINE) LOCK FLUSH IV SOLN 100 UNIT/ML 100 UNIT/ML
500 SOLUTION INTRAVENOUS ONCE
Status: DISCONTINUED | OUTPATIENT
Start: 2022-07-25 | End: 2022-07-26 | Stop reason: HOSPADM

## 2022-07-25 RX ADMIN — IOPAMIDOL 100 ML: 755 INJECTION, SOLUTION INTRAVENOUS at 11:40

## 2022-07-27 ENCOUNTER — LAB (OUTPATIENT)
Dept: LAB | Facility: HOSPITAL | Age: 62
End: 2022-07-27

## 2022-07-27 ENCOUNTER — OFFICE VISIT (OUTPATIENT)
Dept: ONCOLOGY | Facility: CLINIC | Age: 62
End: 2022-07-27

## 2022-07-27 VITALS
RESPIRATION RATE: 18 BRPM | BODY MASS INDEX: 28.47 KG/M2 | HEART RATE: 67 BPM | DIASTOLIC BLOOD PRESSURE: 70 MMHG | SYSTOLIC BLOOD PRESSURE: 129 MMHG | HEIGHT: 65 IN | TEMPERATURE: 96.9 F

## 2022-07-27 DIAGNOSIS — C56.9 MALIGNANT NEOPLASM OF OVARY, UNSPECIFIED LATERALITY: ICD-10-CM

## 2022-07-27 DIAGNOSIS — C56.9 MALIGNANT NEOPLASM OF OVARY, UNSPECIFIED LATERALITY: Primary | ICD-10-CM

## 2022-07-27 DIAGNOSIS — D69.6 THROMBOCYTOPENIA: ICD-10-CM

## 2022-07-27 LAB
ALBUMIN SERPL-MCNC: 3.5 G/DL (ref 3.5–5.2)
ALBUMIN/GLOB SERPL: 1.1 G/DL
ALP SERPL-CCNC: 76 U/L (ref 39–117)
ALT SERPL W P-5'-P-CCNC: 14 U/L (ref 1–33)
ANION GAP SERPL CALCULATED.3IONS-SCNC: 11 MMOL/L (ref 5–15)
AST SERPL-CCNC: 23 U/L (ref 1–32)
BASOPHILS # BLD AUTO: 0.02 10*3/MM3 (ref 0–0.2)
BASOPHILS NFR BLD AUTO: 0.5 % (ref 0–1.5)
BILIRUB SERPL-MCNC: 0.2 MG/DL (ref 0–1.2)
BUN SERPL-MCNC: 16 MG/DL (ref 8–23)
BUN/CREAT SERPL: 16.2 (ref 7–25)
CALCIUM SPEC-SCNC: 9.3 MG/DL (ref 8.6–10.5)
CHLORIDE SERPL-SCNC: 104 MMOL/L (ref 98–107)
CO2 SERPL-SCNC: 29 MMOL/L (ref 22–29)
CREAT SERPL-MCNC: 0.99 MG/DL (ref 0.57–1)
DEPRECATED RDW RBC AUTO: 70.1 FL (ref 37–54)
EGFRCR SERPLBLD CKD-EPI 2021: 64.2 ML/MIN/1.73
EOSINOPHIL # BLD AUTO: 0.05 10*3/MM3 (ref 0–0.4)
EOSINOPHIL NFR BLD AUTO: 1.2 % (ref 0.3–6.2)
ERYTHROCYTE [DISTWIDTH] IN BLOOD BY AUTOMATED COUNT: 18.7 % (ref 12.3–15.4)
GLOBULIN UR ELPH-MCNC: 3.3 GM/DL
GLUCOSE SERPL-MCNC: 88 MG/DL (ref 65–99)
HCT VFR BLD AUTO: 31.8 % (ref 34–46.6)
HGB BLD-MCNC: 10.3 G/DL (ref 12–15.9)
LYMPHOCYTES # BLD AUTO: 1.49 10*3/MM3 (ref 0.7–3.1)
LYMPHOCYTES NFR BLD AUTO: 36.3 % (ref 19.6–45.3)
MCH RBC QN AUTO: 34.6 PG (ref 26.6–33)
MCHC RBC AUTO-ENTMCNC: 32.4 G/DL (ref 31.5–35.7)
MCV RBC AUTO: 106.7 FL (ref 79–97)
MONOCYTES # BLD AUTO: 0.53 10*3/MM3 (ref 0.1–0.9)
MONOCYTES NFR BLD AUTO: 12.9 % (ref 5–12)
NEUTROPHILS NFR BLD AUTO: 2.02 10*3/MM3 (ref 1.7–7)
NEUTROPHILS NFR BLD AUTO: 49.1 % (ref 42.7–76)
PLATELET # BLD AUTO: 232 10*3/MM3 (ref 140–450)
PMV BLD AUTO: 8.7 FL (ref 6–12)
POTASSIUM SERPL-SCNC: 3.4 MMOL/L (ref 3.5–5.2)
PROT SERPL-MCNC: 6.8 G/DL (ref 6–8.5)
RBC # BLD AUTO: 2.98 10*6/MM3 (ref 3.77–5.28)
SODIUM SERPL-SCNC: 144 MMOL/L (ref 136–145)
WBC NRBC COR # BLD: 4.11 10*3/MM3 (ref 3.4–10.8)

## 2022-07-27 PROCEDURE — 36415 COLL VENOUS BLD VENIPUNCTURE: CPT

## 2022-07-27 PROCEDURE — 80053 COMPREHEN METABOLIC PANEL: CPT

## 2022-07-27 PROCEDURE — 85025 COMPLETE CBC W/AUTO DIFF WBC: CPT | Performed by: NURSE PRACTITIONER

## 2022-07-27 PROCEDURE — 99215 OFFICE O/P EST HI 40 MIN: CPT | Performed by: INTERNAL MEDICINE

## 2022-07-27 PROCEDURE — 80061 LIPID PANEL: CPT | Performed by: NURSE PRACTITIONER

## 2022-07-27 RX ORDER — FAMOTIDINE 10 MG/ML
20 INJECTION, SOLUTION INTRAVENOUS ONCE
Status: CANCELLED | OUTPATIENT
Start: 2022-07-28

## 2022-07-27 RX ORDER — OLANZAPINE 5 MG/1
5 TABLET ORAL ONCE
Status: CANCELLED | OUTPATIENT
Start: 2022-07-28 | End: 2022-07-28

## 2022-07-27 RX ORDER — DIPHENHYDRAMINE HYDROCHLORIDE 50 MG/ML
50 INJECTION INTRAMUSCULAR; INTRAVENOUS AS NEEDED
Status: CANCELLED | OUTPATIENT
Start: 2022-07-28

## 2022-07-27 RX ORDER — SODIUM CHLORIDE 9 MG/ML
250 INJECTION, SOLUTION INTRAVENOUS ONCE
Status: CANCELLED | OUTPATIENT
Start: 2022-07-28

## 2022-07-27 RX ORDER — FAMOTIDINE 10 MG/ML
20 INJECTION, SOLUTION INTRAVENOUS AS NEEDED
Status: CANCELLED | OUTPATIENT
Start: 2022-07-28

## 2022-07-27 RX ORDER — PALONOSETRON 0.05 MG/ML
0.25 INJECTION, SOLUTION INTRAVENOUS ONCE
Status: CANCELLED | OUTPATIENT
Start: 2022-07-28

## 2022-07-28 ENCOUNTER — OFFICE VISIT (OUTPATIENT)
Dept: FAMILY MEDICINE CLINIC | Facility: CLINIC | Age: 62
End: 2022-07-28

## 2022-07-28 ENCOUNTER — HOSPITAL ENCOUNTER (OUTPATIENT)
Dept: ONCOLOGY | Facility: HOSPITAL | Age: 62
Setting detail: INFUSION SERIES
End: 2022-07-28

## 2022-07-28 VITALS
WEIGHT: 172.4 LBS | BODY MASS INDEX: 28.72 KG/M2 | HEART RATE: 63 BPM | DIASTOLIC BLOOD PRESSURE: 70 MMHG | HEIGHT: 65 IN | SYSTOLIC BLOOD PRESSURE: 144 MMHG | OXYGEN SATURATION: 100 %

## 2022-07-28 DIAGNOSIS — C56.9 MALIGNANT NEOPLASM OF OVARY, UNSPECIFIED LATERALITY: Primary | ICD-10-CM

## 2022-07-28 DIAGNOSIS — G57.93 NEUROPATHY OF BOTH FEET: ICD-10-CM

## 2022-07-28 DIAGNOSIS — I10 ESSENTIAL HYPERTENSION: ICD-10-CM

## 2022-07-28 DIAGNOSIS — D64.9 ANEMIA, UNSPECIFIED TYPE: ICD-10-CM

## 2022-07-28 DIAGNOSIS — E03.9 ACQUIRED HYPOTHYROIDISM: ICD-10-CM

## 2022-07-28 LAB
CHOLEST SERPL-MCNC: 159 MG/DL (ref 0–200)
HDLC SERPL-MCNC: 72 MG/DL (ref 40–60)
LDLC SERPL CALC-MCNC: 70 MG/DL (ref 0–100)
LDLC/HDLC SERPL: 0.95 {RATIO}
TRIGL SERPL-MCNC: 92 MG/DL (ref 0–150)
VLDLC SERPL-MCNC: 17 MG/DL (ref 5–40)

## 2022-07-28 PROCEDURE — 99214 OFFICE O/P EST MOD 30 MIN: CPT | Performed by: NURSE PRACTITIONER

## 2022-07-31 DIAGNOSIS — C56.9 MALIGNANT NEOPLASM OF OVARY, UNSPECIFIED LATERALITY: Primary | ICD-10-CM

## 2022-07-31 RX ORDER — SODIUM CHLORIDE 9 MG/ML
250 INJECTION, SOLUTION INTRAVENOUS ONCE
Status: CANCELLED | OUTPATIENT
Start: 2022-08-03

## 2022-08-01 RX ORDER — OMEPRAZOLE 40 MG/1
CAPSULE, DELAYED RELEASE ORAL
Qty: 30 CAPSULE | Refills: 6 | Status: SHIPPED | OUTPATIENT
Start: 2022-08-01

## 2022-08-01 RX ORDER — AMLODIPINE BESYLATE 5 MG/1
TABLET ORAL
Qty: 90 TABLET | Refills: 1 | Status: SHIPPED | OUTPATIENT
Start: 2022-08-01 | End: 2022-12-22 | Stop reason: SDUPTHER

## 2022-08-03 ENCOUNTER — HOSPITAL ENCOUNTER (OUTPATIENT)
Dept: ONCOLOGY | Facility: HOSPITAL | Age: 62
Setting detail: INFUSION SERIES
Discharge: HOME | End: 2022-08-03

## 2022-08-03 VITALS
TEMPERATURE: 96.9 F | BODY MASS INDEX: 28.62 KG/M2 | OXYGEN SATURATION: 97 % | RESPIRATION RATE: 18 BRPM | SYSTOLIC BLOOD PRESSURE: 129 MMHG | HEART RATE: 69 BPM | WEIGHT: 171.8 LBS | DIASTOLIC BLOOD PRESSURE: 74 MMHG | HEIGHT: 65 IN

## 2022-08-03 DIAGNOSIS — C56.9 MALIGNANT NEOPLASM OF OVARY, UNSPECIFIED LATERALITY: Primary | ICD-10-CM

## 2022-08-03 DIAGNOSIS — E83.51 HYPOCALCEMIA: ICD-10-CM

## 2022-08-03 LAB
ALBUMIN SERPL-MCNC: 3.8 G/DL (ref 3.5–5.2)
ALBUMIN/GLOB SERPL: 1 G/DL
ALP SERPL-CCNC: 64 U/L (ref 39–117)
ALT SERPL W P-5'-P-CCNC: 14 U/L (ref 1–33)
ANION GAP SERPL CALCULATED.3IONS-SCNC: 12 MMOL/L (ref 5–15)
AST SERPL-CCNC: 24 U/L (ref 1–32)
BASOPHILS # BLD AUTO: 0 10*3/MM3 (ref 0–0.2)
BASOPHILS NFR BLD AUTO: 0 % (ref 0–1.5)
BILIRUB SERPL-MCNC: 0.3 MG/DL (ref 0–1.2)
BILIRUB UR QL STRIP: NEGATIVE
BUN SERPL-MCNC: 22 MG/DL (ref 8–23)
BUN/CREAT SERPL: 20.8 (ref 7–25)
CALCIUM SPEC-SCNC: 9.6 MG/DL (ref 8.6–10.5)
CANCER AG125 SERPL QL: 23.1 U/ML (ref 0–38.1)
CHLORIDE SERPL-SCNC: 100 MMOL/L (ref 98–107)
CLARITY UR: ABNORMAL
CO2 SERPL-SCNC: 24 MMOL/L (ref 22–29)
COLOR UR: ABNORMAL
CREAT SERPL-MCNC: 1.06 MG/DL (ref 0.57–1)
DEPRECATED RDW RBC AUTO: 62.6 FL (ref 37–54)
EGFRCR SERPLBLD CKD-EPI 2021: 59.1 ML/MIN/1.73
EOSINOPHIL # BLD AUTO: 0 10*3/MM3 (ref 0–0.4)
EOSINOPHIL NFR BLD AUTO: 0 % (ref 0.3–6.2)
ERYTHROCYTE [DISTWIDTH] IN BLOOD BY AUTOMATED COUNT: 17 % (ref 12.3–15.4)
GLOBULIN UR ELPH-MCNC: 3.7 GM/DL
GLUCOSE SERPL-MCNC: 162 MG/DL (ref 65–99)
GLUCOSE UR STRIP-MCNC: NEGATIVE MG/DL
HCT VFR BLD AUTO: 31.8 % (ref 34–46.6)
HGB BLD-MCNC: 10.8 G/DL (ref 12–15.9)
HGB UR QL STRIP.AUTO: NEGATIVE
KETONES UR QL STRIP: NEGATIVE
LEUKOCYTE ESTERASE UR QL STRIP.AUTO: ABNORMAL
LYMPHOCYTES # BLD AUTO: 0.3 10*3/MM3 (ref 0.7–3.1)
LYMPHOCYTES NFR BLD AUTO: 10.6 % (ref 19.6–45.3)
MCH RBC QN AUTO: 35.1 PG (ref 26.6–33)
MCHC RBC AUTO-ENTMCNC: 34 G/DL (ref 31.5–35.7)
MCV RBC AUTO: 103.2 FL (ref 79–97)
MONOCYTES # BLD AUTO: 0.04 10*3/MM3 (ref 0.1–0.9)
MONOCYTES NFR BLD AUTO: 1.4 % (ref 5–12)
NEUTROPHILS NFR BLD AUTO: 2.5 10*3/MM3 (ref 1.7–7)
NEUTROPHILS NFR BLD AUTO: 88 % (ref 42.7–76)
NITRITE UR QL STRIP: NEGATIVE
PH UR STRIP.AUTO: 5.5 [PH] (ref 5–8)
PLATELET # BLD AUTO: 211 10*3/MM3 (ref 140–450)
PMV BLD AUTO: 9.1 FL (ref 6–12)
POTASSIUM SERPL-SCNC: 3.3 MMOL/L (ref 3.5–5.2)
PROT SERPL-MCNC: 7.5 G/DL (ref 6–8.5)
PROT UR QL STRIP: ABNORMAL
RBC # BLD AUTO: 3.08 10*6/MM3 (ref 3.77–5.28)
SODIUM SERPL-SCNC: 136 MMOL/L (ref 136–145)
SP GR UR STRIP: 1.02 (ref 1–1.03)
UROBILINOGEN UR QL STRIP: ABNORMAL
WBC NRBC COR # BLD: 2.84 10*3/MM3 (ref 3.4–10.8)

## 2022-08-03 PROCEDURE — 96413 CHEMO IV INFUSION 1 HR: CPT

## 2022-08-03 PROCEDURE — 80053 COMPREHEN METABOLIC PANEL: CPT | Performed by: INTERNAL MEDICINE

## 2022-08-03 PROCEDURE — 81003 URINALYSIS AUTO W/O SCOPE: CPT | Performed by: INTERNAL MEDICINE

## 2022-08-03 PROCEDURE — 25010000002 HEPARIN LOCK FLUSH PER 10 UNITS: Performed by: INTERNAL MEDICINE

## 2022-08-03 PROCEDURE — 85025 COMPLETE CBC W/AUTO DIFF WBC: CPT | Performed by: INTERNAL MEDICINE

## 2022-08-03 PROCEDURE — 25010000002 BEVACIZUMAB-BVZR 400 MG/16ML SOLUTION 16 ML VIAL: Performed by: INTERNAL MEDICINE

## 2022-08-03 PROCEDURE — 86304 IMMUNOASSAY TUMOR CA 125: CPT | Performed by: INTERNAL MEDICINE

## 2022-08-03 RX ORDER — HEPARIN SODIUM (PORCINE) LOCK FLUSH IV SOLN 100 UNIT/ML 100 UNIT/ML
500 SOLUTION INTRAVENOUS AS NEEDED
Status: DISCONTINUED | OUTPATIENT
Start: 2022-08-03 | End: 2022-08-04 | Stop reason: HOSPADM

## 2022-08-03 RX ORDER — HEPARIN SODIUM (PORCINE) LOCK FLUSH IV SOLN 100 UNIT/ML 100 UNIT/ML
500 SOLUTION INTRAVENOUS AS NEEDED
Status: CANCELLED | OUTPATIENT
Start: 2022-08-03

## 2022-08-03 RX ORDER — SODIUM CHLORIDE 0.9 % (FLUSH) 0.9 %
10 SYRINGE (ML) INJECTION AS NEEDED
Status: CANCELLED | OUTPATIENT
Start: 2022-08-03

## 2022-08-03 RX ORDER — SODIUM CHLORIDE 9 MG/ML
250 INJECTION, SOLUTION INTRAVENOUS ONCE
Status: COMPLETED | OUTPATIENT
Start: 2022-08-03 | End: 2022-08-03

## 2022-08-03 RX ORDER — SODIUM CHLORIDE 0.9 % (FLUSH) 0.9 %
10 SYRINGE (ML) INJECTION AS NEEDED
Status: DISCONTINUED | OUTPATIENT
Start: 2022-08-03 | End: 2022-08-04 | Stop reason: HOSPADM

## 2022-08-03 RX ADMIN — Medication 10 ML: at 14:38

## 2022-08-03 RX ADMIN — HEPARIN 500 UNITS: 100 SYRINGE at 14:38

## 2022-08-03 RX ADMIN — SODIUM CHLORIDE 250 ML: 9 INJECTION, SOLUTION INTRAVENOUS at 14:01

## 2022-08-03 RX ADMIN — SODIUM CHLORIDE 780 MG: 9 INJECTION, SOLUTION INTRAVENOUS at 14:01

## 2022-08-04 ENCOUNTER — TELEPHONE (OUTPATIENT)
Dept: ONCOLOGY | Facility: CLINIC | Age: 62
End: 2022-08-04

## 2022-08-04 NOTE — TELEPHONE ENCOUNTER
Called pt to let her know that her potassium level was slightly low and that FLORENCE Thompson would like for her to increase her dietary potassium and take a one time dose of KCL 40 mEq and recheck next week. She verbalized understanding and stated that she has potassium supplements at home that were left over from a previous prescription.

## 2022-08-04 NOTE — TELEPHONE ENCOUNTER
----- Message from FLORENCE Zambrano sent at 8/3/2022  4:50 PM EDT -----  Mildly low potassium level.  Please ask the patient to increase dietary potassium. We can Give KCL 40 meq po x1 also and recheck at her next visit. Thanks!

## 2022-08-07 DIAGNOSIS — E03.9 ACQUIRED HYPOTHYROIDISM: ICD-10-CM

## 2022-08-08 RX ORDER — GABAPENTIN 300 MG/1
CAPSULE ORAL
Qty: 60 CAPSULE | Refills: 0 | Status: SHIPPED | OUTPATIENT
Start: 2022-08-08 | End: 2022-10-11

## 2022-08-08 RX ORDER — LEVOTHYROXINE SODIUM 137 UG/1
TABLET ORAL
Qty: 90 TABLET | Refills: 1 | Status: SHIPPED | OUTPATIENT
Start: 2022-08-08 | End: 2023-01-23

## 2022-08-17 ENCOUNTER — HOSPITAL ENCOUNTER (OUTPATIENT)
Dept: ONCOLOGY | Facility: HOSPITAL | Age: 62
Setting detail: INFUSION SERIES
Discharge: HOME | End: 2022-08-17

## 2022-08-17 VITALS
TEMPERATURE: 97.1 F | HEIGHT: 65 IN | BODY MASS INDEX: 28.76 KG/M2 | DIASTOLIC BLOOD PRESSURE: 77 MMHG | WEIGHT: 172.6 LBS | HEART RATE: 56 BPM | RESPIRATION RATE: 18 BRPM | OXYGEN SATURATION: 98 % | SYSTOLIC BLOOD PRESSURE: 122 MMHG

## 2022-08-17 DIAGNOSIS — R30.0 DYSURIA: Primary | ICD-10-CM

## 2022-08-17 DIAGNOSIS — E83.51 HYPOCALCEMIA: ICD-10-CM

## 2022-08-17 DIAGNOSIS — C56.9 MALIGNANT NEOPLASM OF OVARY, UNSPECIFIED LATERALITY: Primary | ICD-10-CM

## 2022-08-17 DIAGNOSIS — R30.0 DYSURIA: ICD-10-CM

## 2022-08-17 LAB
ALBUMIN SERPL-MCNC: 3.8 G/DL (ref 3.5–5.2)
ALBUMIN/GLOB SERPL: 1.2 G/DL
ALP SERPL-CCNC: 64 U/L (ref 39–117)
ALT SERPL W P-5'-P-CCNC: 12 U/L (ref 1–33)
ANION GAP SERPL CALCULATED.3IONS-SCNC: 10 MMOL/L (ref 5–15)
AST SERPL-CCNC: 23 U/L (ref 1–32)
BASOPHILS # BLD AUTO: 0.02 10*3/MM3 (ref 0–0.2)
BASOPHILS NFR BLD AUTO: 0.4 % (ref 0–1.5)
BILIRUB SERPL-MCNC: 0.3 MG/DL (ref 0–1.2)
BILIRUB UR QL STRIP: NEGATIVE
BUN SERPL-MCNC: 18 MG/DL (ref 8–23)
BUN/CREAT SERPL: 18.6 (ref 7–25)
CALCIUM SPEC-SCNC: 10 MG/DL (ref 8.6–10.5)
CANCER AG125 SERPL QL: 19.2 U/ML (ref 0–38.1)
CHLORIDE SERPL-SCNC: 102 MMOL/L (ref 98–107)
CLARITY UR: CLEAR
CO2 SERPL-SCNC: 25 MMOL/L (ref 22–29)
COLOR UR: YELLOW
CREAT SERPL-MCNC: 0.97 MG/DL (ref 0.57–1)
DEPRECATED RDW RBC AUTO: 52.1 FL (ref 37–54)
EGFRCR SERPLBLD CKD-EPI 2021: 65.8 ML/MIN/1.73
EOSINOPHIL # BLD AUTO: 0.21 10*3/MM3 (ref 0–0.4)
EOSINOPHIL NFR BLD AUTO: 4.3 % (ref 0.3–6.2)
ERYTHROCYTE [DISTWIDTH] IN BLOOD BY AUTOMATED COUNT: 13.9 % (ref 12.3–15.4)
GLOBULIN UR ELPH-MCNC: 3.2 GM/DL
GLUCOSE SERPL-MCNC: 101 MG/DL (ref 65–99)
GLUCOSE UR STRIP-MCNC: NEGATIVE MG/DL
HCT VFR BLD AUTO: 34.3 % (ref 34–46.6)
HGB BLD-MCNC: 11.3 G/DL (ref 12–15.9)
HGB UR QL STRIP.AUTO: NEGATIVE
KETONES UR QL STRIP: NEGATIVE
LEUKOCYTE ESTERASE UR QL STRIP.AUTO: ABNORMAL
LYMPHOCYTES # BLD AUTO: 1.13 10*3/MM3 (ref 0.7–3.1)
LYMPHOCYTES NFR BLD AUTO: 23.2 % (ref 19.6–45.3)
MCH RBC QN AUTO: 34.5 PG (ref 26.6–33)
MCHC RBC AUTO-ENTMCNC: 32.9 G/DL (ref 31.5–35.7)
MCV RBC AUTO: 104.6 FL (ref 79–97)
MONOCYTES # BLD AUTO: 0.68 10*3/MM3 (ref 0.1–0.9)
MONOCYTES NFR BLD AUTO: 13.9 % (ref 5–12)
NEUTROPHILS NFR BLD AUTO: 2.84 10*3/MM3 (ref 1.7–7)
NEUTROPHILS NFR BLD AUTO: 58.2 % (ref 42.7–76)
NITRITE UR QL STRIP: NEGATIVE
PH UR STRIP.AUTO: 5.5 [PH] (ref 5–8)
PLATELET # BLD AUTO: 151 10*3/MM3 (ref 140–450)
PMV BLD AUTO: 9.3 FL (ref 6–12)
POTASSIUM SERPL-SCNC: 4 MMOL/L (ref 3.5–5.2)
PROT SERPL-MCNC: 7 G/DL (ref 6–8.5)
PROT UR QL STRIP: NEGATIVE
RBC # BLD AUTO: 3.28 10*6/MM3 (ref 3.77–5.28)
SODIUM SERPL-SCNC: 137 MMOL/L (ref 136–145)
SP GR UR STRIP: 1.01 (ref 1–1.03)
UROBILINOGEN UR QL STRIP: ABNORMAL
WBC NRBC COR # BLD: 4.88 10*3/MM3 (ref 3.4–10.8)

## 2022-08-17 PROCEDURE — 81003 URINALYSIS AUTO W/O SCOPE: CPT | Performed by: INTERNAL MEDICINE

## 2022-08-17 PROCEDURE — 86304 IMMUNOASSAY TUMOR CA 125: CPT | Performed by: INTERNAL MEDICINE

## 2022-08-17 PROCEDURE — 85025 COMPLETE CBC W/AUTO DIFF WBC: CPT | Performed by: INTERNAL MEDICINE

## 2022-08-17 PROCEDURE — 96413 CHEMO IV INFUSION 1 HR: CPT

## 2022-08-17 PROCEDURE — 25010000002 HEPARIN LOCK FLUSH PER 10 UNITS: Performed by: INTERNAL MEDICINE

## 2022-08-17 PROCEDURE — 25010000002 BEVACIZUMAB-BVZR 400 MG/16ML SOLUTION 16 ML VIAL: Performed by: INTERNAL MEDICINE

## 2022-08-17 PROCEDURE — 87086 URINE CULTURE/COLONY COUNT: CPT | Performed by: INTERNAL MEDICINE

## 2022-08-17 PROCEDURE — 80053 COMPREHEN METABOLIC PANEL: CPT | Performed by: INTERNAL MEDICINE

## 2022-08-17 RX ORDER — HEPARIN SODIUM (PORCINE) LOCK FLUSH IV SOLN 100 UNIT/ML 100 UNIT/ML
500 SOLUTION INTRAVENOUS AS NEEDED
Status: DISCONTINUED | OUTPATIENT
Start: 2022-08-17 | End: 2022-08-18 | Stop reason: HOSPADM

## 2022-08-17 RX ORDER — HEPARIN SODIUM (PORCINE) LOCK FLUSH IV SOLN 100 UNIT/ML 100 UNIT/ML
500 SOLUTION INTRAVENOUS AS NEEDED
Status: CANCELLED | OUTPATIENT
Start: 2022-08-17

## 2022-08-17 RX ORDER — SODIUM CHLORIDE 9 MG/ML
250 INJECTION, SOLUTION INTRAVENOUS ONCE
Status: CANCELLED | OUTPATIENT
Start: 2022-08-17

## 2022-08-17 RX ORDER — SODIUM CHLORIDE 0.9 % (FLUSH) 0.9 %
10 SYRINGE (ML) INJECTION AS NEEDED
Status: DISCONTINUED | OUTPATIENT
Start: 2022-08-17 | End: 2022-08-18 | Stop reason: HOSPADM

## 2022-08-17 RX ORDER — SODIUM CHLORIDE 9 MG/ML
250 INJECTION, SOLUTION INTRAVENOUS ONCE
Status: COMPLETED | OUTPATIENT
Start: 2022-08-17 | End: 2022-08-17

## 2022-08-17 RX ORDER — SODIUM CHLORIDE 0.9 % (FLUSH) 0.9 %
10 SYRINGE (ML) INJECTION AS NEEDED
Status: CANCELLED | OUTPATIENT
Start: 2022-08-17

## 2022-08-17 RX ADMIN — HEPARIN 500 UNITS: 100 SYRINGE at 12:28

## 2022-08-17 RX ADMIN — SODIUM CHLORIDE 250 ML: 9 INJECTION, SOLUTION INTRAVENOUS at 11:48

## 2022-08-17 RX ADMIN — SODIUM CHLORIDE 780 MG: 9 INJECTION, SOLUTION INTRAVENOUS at 11:51

## 2022-08-17 RX ADMIN — Medication 10 ML: at 12:28

## 2022-08-18 LAB — BACTERIA SPEC AEROBE CULT: NORMAL

## 2022-08-22 RX ORDER — PANTOPRAZOLE SODIUM 40 MG/1
TABLET, DELAYED RELEASE ORAL
Qty: 30 TABLET | Refills: 6 | Status: SHIPPED | OUTPATIENT
Start: 2022-08-22

## 2022-08-23 DIAGNOSIS — F41.9 ANXIETY: ICD-10-CM

## 2022-08-23 RX ORDER — HYDROXYZINE HYDROCHLORIDE 10 MG/1
TABLET, FILM COATED ORAL
Qty: 40 TABLET | Refills: 0 | Status: SHIPPED | OUTPATIENT
Start: 2022-08-23 | End: 2022-09-13

## 2022-08-31 ENCOUNTER — HOSPITAL ENCOUNTER (OUTPATIENT)
Dept: ONCOLOGY | Facility: HOSPITAL | Age: 62
Setting detail: INFUSION SERIES
Discharge: HOME | End: 2022-08-31

## 2022-08-31 VITALS
OXYGEN SATURATION: 100 % | HEIGHT: 65 IN | BODY MASS INDEX: 28.32 KG/M2 | SYSTOLIC BLOOD PRESSURE: 128 MMHG | TEMPERATURE: 97.1 F | RESPIRATION RATE: 18 BRPM | DIASTOLIC BLOOD PRESSURE: 63 MMHG | HEART RATE: 63 BPM | WEIGHT: 170 LBS

## 2022-08-31 DIAGNOSIS — E83.51 HYPOCALCEMIA: ICD-10-CM

## 2022-08-31 DIAGNOSIS — C56.9 MALIGNANT NEOPLASM OF OVARY, UNSPECIFIED LATERALITY: Primary | ICD-10-CM

## 2022-08-31 LAB
ALBUMIN SERPL-MCNC: 3.8 G/DL (ref 3.5–5.2)
ALBUMIN/GLOB SERPL: 1.1 G/DL
ALP SERPL-CCNC: 69 U/L (ref 39–117)
ALT SERPL W P-5'-P-CCNC: 12 U/L (ref 1–33)
ANION GAP SERPL CALCULATED.3IONS-SCNC: 12 MMOL/L (ref 5–15)
AST SERPL-CCNC: 26 U/L (ref 1–32)
BASOPHILS # BLD AUTO: 0.03 10*3/MM3 (ref 0–0.2)
BASOPHILS NFR BLD AUTO: 0.6 % (ref 0–1.5)
BILIRUB SERPL-MCNC: 0.3 MG/DL (ref 0–1.2)
BILIRUB UR QL STRIP: NEGATIVE
BUN SERPL-MCNC: 19 MG/DL (ref 8–23)
BUN/CREAT SERPL: 19.6 (ref 7–25)
CALCIUM SPEC-SCNC: 9.4 MG/DL (ref 8.6–10.5)
CANCER AG125 SERPL QL: 19.3 U/ML (ref 0–38.1)
CHLORIDE SERPL-SCNC: 99 MMOL/L (ref 98–107)
CLARITY UR: ABNORMAL
CO2 SERPL-SCNC: 28 MMOL/L (ref 22–29)
COLOR UR: YELLOW
CREAT SERPL-MCNC: 0.97 MG/DL (ref 0.57–1)
DEPRECATED RDW RBC AUTO: 45 FL (ref 37–54)
EGFRCR SERPLBLD CKD-EPI 2021: 65.8 ML/MIN/1.73
EOSINOPHIL # BLD AUTO: 0.22 10*3/MM3 (ref 0–0.4)
EOSINOPHIL NFR BLD AUTO: 4.2 % (ref 0.3–6.2)
ERYTHROCYTE [DISTWIDTH] IN BLOOD BY AUTOMATED COUNT: 12.5 % (ref 12.3–15.4)
GLOBULIN UR ELPH-MCNC: 3.6 GM/DL
GLUCOSE SERPL-MCNC: 90 MG/DL (ref 65–99)
GLUCOSE UR STRIP-MCNC: NEGATIVE MG/DL
HCT VFR BLD AUTO: 35.6 % (ref 34–46.6)
HGB BLD-MCNC: 11.8 G/DL (ref 12–15.9)
HGB UR QL STRIP.AUTO: NEGATIVE
KETONES UR QL STRIP: NEGATIVE
LEUKOCYTE ESTERASE UR QL STRIP.AUTO: ABNORMAL
LYMPHOCYTES # BLD AUTO: 1.52 10*3/MM3 (ref 0.7–3.1)
LYMPHOCYTES NFR BLD AUTO: 28.7 % (ref 19.6–45.3)
MCH RBC QN AUTO: 33.6 PG (ref 26.6–33)
MCHC RBC AUTO-ENTMCNC: 33.1 G/DL (ref 31.5–35.7)
MCV RBC AUTO: 101.4 FL (ref 79–97)
MONOCYTES # BLD AUTO: 0.63 10*3/MM3 (ref 0.1–0.9)
MONOCYTES NFR BLD AUTO: 11.9 % (ref 5–12)
NEUTROPHILS NFR BLD AUTO: 2.9 10*3/MM3 (ref 1.7–7)
NEUTROPHILS NFR BLD AUTO: 54.6 % (ref 42.7–76)
NITRITE UR QL STRIP: NEGATIVE
PH UR STRIP.AUTO: 5.5 [PH] (ref 5–8)
PLATELET # BLD AUTO: 155 10*3/MM3 (ref 140–450)
PMV BLD AUTO: 9.2 FL (ref 6–12)
POTASSIUM SERPL-SCNC: 3.5 MMOL/L (ref 3.5–5.2)
PROT SERPL-MCNC: 7.4 G/DL (ref 6–8.5)
PROT UR QL STRIP: NEGATIVE
RBC # BLD AUTO: 3.51 10*6/MM3 (ref 3.77–5.28)
SODIUM SERPL-SCNC: 139 MMOL/L (ref 136–145)
SP GR UR STRIP: 1.02 (ref 1–1.03)
UROBILINOGEN UR QL STRIP: ABNORMAL
WBC NRBC COR # BLD: 5.3 10*3/MM3 (ref 3.4–10.8)

## 2022-08-31 PROCEDURE — 81003 URINALYSIS AUTO W/O SCOPE: CPT | Performed by: INTERNAL MEDICINE

## 2022-08-31 PROCEDURE — 96413 CHEMO IV INFUSION 1 HR: CPT

## 2022-08-31 PROCEDURE — 25010000002 BEVACIZUMAB-BVZR 400 MG/16ML SOLUTION 16 ML VIAL: Performed by: INTERNAL MEDICINE

## 2022-08-31 PROCEDURE — 86304 IMMUNOASSAY TUMOR CA 125: CPT | Performed by: INTERNAL MEDICINE

## 2022-08-31 PROCEDURE — 80053 COMPREHEN METABOLIC PANEL: CPT | Performed by: INTERNAL MEDICINE

## 2022-08-31 PROCEDURE — 85025 COMPLETE CBC W/AUTO DIFF WBC: CPT | Performed by: INTERNAL MEDICINE

## 2022-08-31 PROCEDURE — 25010000002 HEPARIN LOCK FLUSH PER 10 UNITS: Performed by: INTERNAL MEDICINE

## 2022-08-31 RX ORDER — SODIUM CHLORIDE 0.9 % (FLUSH) 0.9 %
10 SYRINGE (ML) INJECTION AS NEEDED
Status: CANCELLED | OUTPATIENT
Start: 2022-08-31

## 2022-08-31 RX ORDER — SODIUM CHLORIDE 9 MG/ML
250 INJECTION, SOLUTION INTRAVENOUS ONCE
Status: COMPLETED | OUTPATIENT
Start: 2022-08-31 | End: 2022-08-31

## 2022-08-31 RX ORDER — SODIUM CHLORIDE 0.9 % (FLUSH) 0.9 %
10 SYRINGE (ML) INJECTION AS NEEDED
Status: DISCONTINUED | OUTPATIENT
Start: 2022-08-31 | End: 2022-09-01 | Stop reason: HOSPADM

## 2022-08-31 RX ORDER — SODIUM CHLORIDE 9 MG/ML
250 INJECTION, SOLUTION INTRAVENOUS ONCE
Status: CANCELLED | OUTPATIENT
Start: 2022-08-31

## 2022-08-31 RX ORDER — HEPARIN SODIUM (PORCINE) LOCK FLUSH IV SOLN 100 UNIT/ML 100 UNIT/ML
500 SOLUTION INTRAVENOUS AS NEEDED
Status: DISCONTINUED | OUTPATIENT
Start: 2022-08-31 | End: 2022-09-01 | Stop reason: HOSPADM

## 2022-08-31 RX ORDER — HEPARIN SODIUM (PORCINE) LOCK FLUSH IV SOLN 100 UNIT/ML 100 UNIT/ML
500 SOLUTION INTRAVENOUS AS NEEDED
Status: CANCELLED | OUTPATIENT
Start: 2022-08-31

## 2022-08-31 RX ADMIN — SODIUM CHLORIDE 250 ML: 9 INJECTION, SOLUTION INTRAVENOUS at 12:45

## 2022-08-31 RX ADMIN — Medication 10 ML: at 13:23

## 2022-08-31 RX ADMIN — SODIUM CHLORIDE 780 MG: 9 INJECTION, SOLUTION INTRAVENOUS at 12:48

## 2022-08-31 RX ADMIN — HEPARIN 500 UNITS: 100 SYRINGE at 13:23

## 2022-09-01 ENCOUNTER — APPOINTMENT (OUTPATIENT)
Dept: LAB | Facility: HOSPITAL | Age: 62
End: 2022-09-01

## 2022-09-01 ENCOUNTER — OFFICE VISIT (OUTPATIENT)
Dept: ONCOLOGY | Facility: CLINIC | Age: 62
End: 2022-09-01

## 2022-09-01 VITALS
DIASTOLIC BLOOD PRESSURE: 82 MMHG | HEIGHT: 65 IN | RESPIRATION RATE: 18 BRPM | HEART RATE: 63 BPM | BODY MASS INDEX: 28.32 KG/M2 | SYSTOLIC BLOOD PRESSURE: 151 MMHG | TEMPERATURE: 97.1 F | WEIGHT: 170 LBS

## 2022-09-01 DIAGNOSIS — C56.9 MALIGNANT NEOPLASM OF OVARY, UNSPECIFIED LATERALITY: Primary | ICD-10-CM

## 2022-09-01 PROCEDURE — 99215 OFFICE O/P EST HI 40 MIN: CPT | Performed by: INTERNAL MEDICINE

## 2022-09-01 RX ORDER — GABAPENTIN 300 MG/1
300 CAPSULE ORAL EVERY 8 HOURS
Qty: 90 CAPSULE | Refills: 0 | Status: SHIPPED | OUTPATIENT
Start: 2022-09-01 | End: 2022-10-10

## 2022-09-01 NOTE — PROGRESS NOTES
Taxol/Avastin Hematology/Oncology Outpatient Follow Up    PATIENT NAME:Lucy Mahan  :1960  MRN: [id number]  PRIMARY CARE PHYSICIAN: [place] Avalos APRN  REFERRING PHYSICIAN: [place] Avalos APRN    Chief Complaint   Patient presents with   • Follow-up     Malignant neoplasm of ovary, unspecified laterality (HCC)        HISTORY OF PRESENT ILLNESS:     This is a 61 y.o.  female who developed abdominal pain in 2021.  Patient has also lost approximately 35 pounds during that..  She has had loss of appetite.  Due to the abdominal pain,  she had an ultrasound of the abdomen done on 10/13/2021.  This basically revealed no liver lesions.  Common bile duct is normal at 3 mm.  There is a nonmobile 3 cm shadowing gallstone within the gallbladder neck.  No gallbladder thickening or pericholecystic fluid collection.  Patient was then referred to general surgery and was seen by Dr. Badillo.  Who took her to surgery on 11/10/2021 for diagnostic laparoscopy and peritoneal biopsy.  Intra-Op , she was noted to have peritoneal studding with malignancy throughout the abdominal cavity and biopsies were completed. 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  The tumor was focally positive for progesterone receptor, positive for CK7, estrogen receptor, CA-125 and PAX 8..  The staining pattern is consistent with ovarian serous carcinoma.      She had CT scan of the chest, abdomen and pelvis and the chest is a 2 mm noncalcified nodule within the left lower lobe, noncalcified nodule in the left upper lobe measuring 4 mm and 3 mm.  In the abdomen there is a 5.1 x 5.3 cm enhancing soft tissue mass in the pelvis to the right of midline associated with the adnexal region possibly representing a primary ovarian malignancy.  No right or left ovarian tissue was seen.  There is abnormal omental thickening and nodularity consistent with carcinomatosis greatest bulk in the left mid abdomen measuring up to  10.8 cm x 2.5 cm.  There is nodular peritoneal thickening also present within the abdomen and pelvis consistent with peritoneal carcinomatosis.  The small quantity of ascitic fluid in the abdomen and pelvis.  Carcinomatosis nodular studding is seen along the inferior right hepatic lobe.     She  has been referred to us for further evaluation and management of her newly diagnosed ovarian carcinoma.     Patient is accompanied today by her daughter for this appointment.  There is  family history of precancerous cells of the  uterusin her sister.        She does not smoke and does not drink alcohol.  Patient is  and has 2 daughters.  She works for the [employer].    · 11/29/2021 patient was seen by Dr. Dubois who has recommended neoadjuvant chemotherapy for 2-3 cycles and then interval cytoreduction.  She has recommended carboplatinum AUC 6, Taxol 1 7 5 mg per metered squared, Avastin 15 mg/kg as was done in the oceans trial but hold Avastin for the first few cycles due to bowel risk.  · Prechemo CA-125 was 754  · 12/7/2021 patient received first cycle of chemotherapy with carboplatinum and Taxol with Neulasta  · 12/28/2021 patient received cycle 2 of combination chemotherapy with carboplatinum and Taxol with Neulasta  · 12/28/2021 CA-125 was down to 635  · 1/11/2022 add-on appointment for nausea, dizziness, thrombocytopenia, pain in upper to mid abdomen 8/10, new onset in the last 4 to 5 days  · January 12, 2022: Due to acute GI symptoms patient had a CT scan of the abdomen and pelvis which basically revealed increasing effusion on the left lung mild pleural effusion on the right lung decreased in amount of omental caking but no interval change in the right adnexal area moderate abdominal and pelvic ascites which has increased.  She denies any significant pulmonary symptoms.  Her O2 sat today was 100% at rest and 99% with ambulation  · 1/24/2022 patient received cycle 3 of carboplatinum with Taxol  with dose reduction due to significant toxicities  · 2022 patient received cycle 4-day 1 of chemo platinum Taxol and Avastin  · 3/7/22: Patient received cycle 5-day 1 of chemotherapy with carboplatinum Taxol and Avastin  · 2022 patient received cycle 6 of chemotherapy with A platinum Taxol and Avastin  · 2022 patient underwent exploratory laparotomy, total abdominal hysterectomy, bilateral salpingo-oophorectomy, pelvic and aortic lymphadenectomy, omentectomy performed by Dr Castro, pathology reviewed high-grade serous carcinoma involving the uterine serosa and outer myometrium, bilateral fallopian tubes and bilateral ovaries and adnexa soft tissue.  Also received are high-grade serous carcinoma involving the omentum as well as residual high-grade serous carcinoma with treatment effects on the colonic splenic [id number], estrogen receptor diffusely positive p53 patchy, p16 diffusely positive.  Molecular testing is pending  · 2022 patient received cycle 7 of Combination chemotherapy with Avastin and carboplatinum.  Taxol was held due to significant peripheral neuropathy  · 2022 patient presents for follow-up of ovarian cancer  · 2020 patient received cycle 9 of carboplatinum  · Patient is currently on maintenance Avastin and letrozole for ovarian cancer suppression        Past Medical History:   Diagnosis Date   • Arthritis    • Cancer (HCC)     ovarian   • Gall stones    • Hypertension    • Irritable bowel syndrome    • Rheumatoid aortitis    • Thyroid disease        Past Surgical History:   Procedure Laterality Date   •  SECTION      x2   • CHOLECYSTECTOMY N/A 11/10/2021    Procedure: diagnostic laparoscopy and peritoneal biopsy;  Surgeon: Krunal Badillo MD;  [facility];  Service: General;  Laterality: N/A;   • COLONOSCOPY     • HIP SURGERY Left    • THYROIDECTOMY, PARTIAL  2017   • TOTAL ABDOMINAL HYSTERECTOMY N/A 2022    Procedure: EXPLORATORY  LAPAROTOMY, TOTAL ABDOMINAL HYSTERECTOMY, BILATERAL SALPINGOOOPHORECTOMY, PELVIC AND AORTIC LYMPHADENECTOMY, OMENTECTOMY;  Surgeon: Maggie Castro DO;  Location: [facility] MAIN OR;  Service: Gynecology Oncology;  Laterality: N/A;   • VENOUS ACCESS DEVICE (PORT) INSERTION Left 12/6/2021    Procedure: INSERTION VENOUS ACCESS DEVICE;  Surgeon: Krunal Badillo MD;  Location[facility] MAIN OR;  Service: General;  Laterality: Left;         Current Outpatient Medications:   •  amLODIPine (NORVASC) 5 MG tablet, TAKE ONE (1) TABLET BY MOUTH DAILY, Disp: 90 tablet, Rfl: 1  •  gabapentin (NEURONTIN) 300 MG capsule, TAKE ONE (1) CAPSULE BY MOUTH TWICE DAILY, Disp: 60 capsule, Rfl: 0  •  gabapentin (NEURONTIN) 300 MG capsule, Take 1 capsule by mouth Every 8 (Eight) Hours., Disp: 90 capsule, Rfl: 0  •  hydroCHLOROthiazide (HYDRODIURIL) 12.5 MG tablet, TAKE ONE (1) TABLET BY MOUTH EVERY DAY AS NEEDED FOR swelling, Disp: 90 tablet, Rfl: 0  •  HYDROcodone-acetaminophen (Norco) 5-325 MG per tablet, Take 1 tablet by mouth Every 6 (Six) Hours As Needed for Moderate Pain ., Disp: 60 tablet, Rfl: 0  •  hydrOXYzine (ATARAX) 10 MG tablet, TAKE ONE (1) TABLET BY MOUTH TWICE DAILY AS NEEDED FOR ANXIETY, Disp: 40 tablet, Rfl: 0  •  letrozole (FEMARA) 2.5 MG tablet, TAKE ONE (1) TABLET BY MOUTH DAILY, Disp: 30 tablet, Rfl: 3  •  levothyroxine (SYNTHROID, LEVOTHROID) 137 MCG tablet, TAKE ONE (1) TABLET BY MOUTH EVERY DAY, Disp: 90 tablet, Rfl: 1  •  lidocaine-prilocaine (EMLA) 2.5-2.5 % cream, Apply 1 application topically to the appropriate area as directed. Apply to port 1 hour prior to port access, Disp: 30 g, Rfl: 1  •  OLANZapine (ZyPREXA) 2.5 MG tablet, Take 1 tablet by mouth Every Night., Disp: 30 tablet, Rfl: 2  •  omeprazole (priLOSEC) 40 MG capsule, TAKE ONE (1) CAPSULE BY MOUTH EVERY DAY, Disp: 30 capsule, Rfl: 6  •  pantoprazole (PROTONIX) 40 MG EC tablet, TAKE ONE (1) TABLET BY MOUTH EVERY DAY, Disp: 30 tablet, Rfl: 6  •   promethazine (PHENERGAN) 25 MG tablet, Take 1 tablet by mouth Every 6 (Six) Hours As Needed for Nausea or Vomiting., Disp: 90 tablet, Rfl: 1  No current facility-administered medications for this visit.    No Known Allergies    Family History   Problem Relation Age of Onset   • Dementia Mother    • Arthritis Mother    • Heart disease Father    • Hypertension Father    • Malig Hyperthermia Neg Hx        Cancer-related family history is not on file.    Social History     Tobacco Use   • Smoking status: Never Smoker   • Smokeless tobacco: Never Used   Vaping Use   • Vaping Use: Never used   Substance Use Topics   • Alcohol use: Yes     Alcohol/week: 1.0 standard drink     Types: 1 Glasses of wine per week     Comment: less than monthly   • Drug use: No       I have reviewed and confirmed the accuracy of the patient's history: Chief complaint, HPI, ROS and Subjective as entered by the MA/LPN/RN. Inga Hernandez MD 09/01/22     SUBJECTIVE:    She denies any new issues.  Tolerating her treatment well.  She wants to increase her work hours.          REVIEW OF SYSTEMS:    Review of Systems   Constitutional: Positive for fatigue. Negative for chills and fever.   HENT: Negative for ear pain, mouth sores, nosebleeds and sore throat.    Eyes: Negative for photophobia and visual disturbance.   Respiratory: Negative for wheezing and stridor.    Cardiovascular: Negative for chest pain and palpitations.   Gastrointestinal: Positive for abdominal pain and nausea. Negative for diarrhea and vomiting.   Endocrine: Negative for cold intolerance and heat intolerance.   Genitourinary: Negative for dysuria and hematuria.   Musculoskeletal: Negative for joint swelling and neck stiffness.   Skin: Negative for color change and rash.   Neurological: Positive for dizziness, weakness and headaches. Negative for seizures and syncope.   Hematological: Negative for adenopathy.   Psychiatric/Behavioral: Negative for agitation, confusion and  "hallucinations.     Post op changes noted.    OBJECTIVE:    Vitals:    09/01/22 1055   BP: 151/82   Pulse: 63   Resp: 18   Temp: 97.1 °F (36.2 °C)   TempSrc: Infrared   Weight: 77.1 kg (170 lb)   Height: 165.1 cm (65\")   PainSc:   7   PainLoc: Comment: right hip, feet     Body mass index is 28.29 kg/m².    ECOG    (1) Restricted in physically strenuous activity, ambulatory and able to do work of light nature    Physical Exam  Vitals and nursing note reviewed.   Constitutional:       General: She is not in acute distress.     Appearance: She is not diaphoretic.   HENT:      Head: Normocephalic and atraumatic.      Mouth/Throat:      Mouth: Mucous membranes are dry.   Eyes:      General: No scleral icterus.        Right eye: No discharge.         Left eye: No discharge.      Conjunctiva/sclera: Conjunctivae normal.   Neck:      Thyroid: No thyromegaly.   Cardiovascular:      Rate and Rhythm: Normal rate and regular rhythm.      Heart sounds: Normal heart sounds.     No friction rub. No gallop.   Pulmonary:      Effort: Pulmonary effort is normal. No respiratory distress.      Breath sounds: No stridor. No wheezing.   Abdominal:      General: Bowel sounds are normal.      Palpations: Abdomen is soft. There is no mass.      Tenderness: There is no abdominal tenderness. There is no guarding or rebound.      Comments: Abdominal wound noted   Musculoskeletal:         General: No tenderness. Normal range of motion.      Cervical back: Normal range of motion and neck supple.   Lymphadenopathy:      Cervical: No cervical adenopathy.   Skin:     General: Skin is warm.      Findings: No erythema or rash.      Comments: Tenting skin turgor   Neurological:      Mental Status: She is alert and oriented to person, place, and time.      Motor: No abnormal muscle tone.   Psychiatric:         Behavior: Behavior normal.       I have reexamined the patient and the results are consistent with the previously documented exam. Inga " Cori Hernandez MD     RECENT LABS    WBC   Date Value Ref Range Status   08/31/2022 5.30 3.40 - 10.80 10*3/mm3 Final     RBC   Date Value Ref Range Status   08/31/2022 3.51 (L) 3.77 - 5.28 10*6/mm3 Final     Hemoglobin   Date Value Ref Range Status   08/31/2022 11.8 (L) 12.0 - 15.9 g/dL Final     Hematocrit   Date Value Ref Range Status   08/31/2022 35.6 34.0 - 46.6 % Final     MCV   Date Value Ref Range Status   08/31/2022 101.4 (H) 79.0 - 97.0 fL Final     MCH   Date Value Ref Range Status   08/31/2022 33.6 (H) 26.6 - 33.0 pg Final     MCHC   Date Value Ref Range Status   08/31/2022 33.1 31.5 - 35.7 g/dL Final     RDW   Date Value Ref Range Status   08/31/2022 12.5 12.3 - 15.4 % Final     RDW-SD   Date Value Ref Range Status   08/31/2022 45.0 37.0 - 54.0 fl Final     MPV   Date Value Ref Range Status   08/31/2022 9.2 6.0 - 12.0 fL Final     Platelets   Date Value Ref Range Status   08/31/2022 155 140 - 450 10*3/mm3 Final     Neutrophil %   Date Value Ref Range Status   08/31/2022 54.6 42.7 - 76.0 % Final     Lymphocyte %   Date Value Ref Range Status   08/31/2022 28.7 19.6 - 45.3 % Final     Monocyte %   Date Value Ref Range Status   08/31/2022 11.9 5.0 - 12.0 % Final     Eosinophil %   Date Value Ref Range Status   08/31/2022 4.2 0.3 - 6.2 % Final     Basophil %   Date Value Ref Range Status   08/31/2022 0.6 0.0 - 1.5 % Final     Immature Grans %   Date Value Ref Range Status   04/29/2022 0.4 0.0 - 0.5 % Final     Neutrophils, Absolute   Date Value Ref Range Status   08/31/2022 2.90 1.70 - 7.00 10*3/mm3 Final     Lymphocytes, Absolute   Date Value Ref Range Status   08/31/2022 1.52 0.70 - 3.10 10*3/mm3 Final     Monocytes, Absolute   Date Value Ref Range Status   08/31/2022 0.63 0.10 - 0.90 10*3/mm3 Final     Eosinophils, Absolute   Date Value Ref Range Status   08/31/2022 0.22 0.00 - 0.40 10*3/mm3 Final     Basophils, Absolute   Date Value Ref Range Status   08/31/2022 0.03 0.00 - 0.20 10*3/mm3 Final      Immature Grans, Absolute   Date Value Ref Range Status   04/29/2022 0.02 0.00 - 0.05 10*3/mm3 Final     nRBC   Date Value Ref Range Status   04/29/2022 0.0 0.0 - 0.2 /100 WBC Final       Lab Results   Component Value Date    GLUCOSE 90 08/31/2022    BUN 19 08/31/2022    CREATININE 0.97 08/31/2022    EGFRIFNONA 47 (L) 02/21/2022    EGFRIFAFRI 44 (L) 01/21/2020    BCR 19.6 08/31/2022    K 3.5 08/31/2022    CO2 28.0 08/31/2022    CALCIUM 9.4 08/31/2022    ALBUMIN 3.80 08/31/2022    LABIL2 1.2 (calc) 01/21/2020    AST 26 08/31/2022    ALT 12 08/31/2022         ASSESSMENT:      1. 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  CA-125 754 November 2021  2. Status post neoadjuvant chemotherapy with carboplatinum AUC 6, Taxol 175 mg per metered squared, with Avastin added due to lack of significant response with carboplatinum and Taxol.  Monitor serial CA-125's.  Down to 135 as of 3/14/2022.  Her CT scan showed response therefore patient will proceed with radical hysterectomy to perform by Dr. Dubois on 4/26/2022.  Plans for additional chemotherapy along with Avastin and possibly maintenance treatment with PARP inhibitors in the future.  She is also on letrozole 2.5 mg p.o. daily.  We will discontinue carboplatinum and proceed with only atorvastatin if platelets improve next week.  We will also check with Dr. Castro regarding maintenance treatment.  For now she will continue on Femara  3. Status post radical hysterectomy.  See path report.  Caris testing pending  4. Pulmonary nodules of unclear etiology too small for biopsy or PET resolution: Continue surveillance CT scans.  Also check 's  5. Chemotherapy induced nausea: Improved  6. Chemotherapy induced fatigue: Improved  7. Chemotherapy-induced anemia  8. 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  Patient already had some 25% dose reduction on her chemo.  She is currently on Neurontin 300 mg daily will increase to 300  mg twice a day.  Peripheral neuropathy continues to be an issue.  We will hold Taxol for now till this has improved  9. Severe thrombocytopenia secondary to chemotherapy most notably carboplatinum  10. Comprehensive gene analysis with cancer next technology was negative for any significant mutation in all 77 genes analyzed  11. 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  No other actionable mutations identified  12. Dehydration: Resolved.  She has increase p.o. fluid intake.  13. Abdominal discomfort secondary to malignancy; Increasing abdominal discomfort               Plans:     · Continue Avastin  · Continue letrozole  · Increase Neurontin to 300 mg every 8 hours for peripheral neuropathy  · Follow-up first or second week of October  · She will benefit from olaparib in the future  · Monitor 's  · Can resume full-time work, notes given  · Reviewed her path report from radical hysterectomy 4/20/2022  · Comprehensive gene analysis with cancer next technology results reviewed with patient and copies of her results was also given to her for her own records keeping  · Continue Percocet 5 mg p.o. every 4-6 hours as needed for pain  · Continue vitamin b6 100mg po q 12   · Foundation 1 testing results reviewed  · All questions answered          I spent 40 total minutes, face-to-face, caring for Lucy today.  90% of this time involved counseling and/or coordination of care as documented within this note.

## 2022-09-13 ENCOUNTER — TELEPHONE (OUTPATIENT)
Dept: ONCOLOGY | Facility: HOSPITAL | Age: 62
End: 2022-09-13

## 2022-09-13 DIAGNOSIS — F41.9 ANXIETY: ICD-10-CM

## 2022-09-13 DIAGNOSIS — C56.9 MALIGNANT NEOPLASM OF OVARY, UNSPECIFIED LATERALITY: Primary | ICD-10-CM

## 2022-09-13 RX ORDER — HYDROXYZINE HYDROCHLORIDE 10 MG/1
TABLET, FILM COATED ORAL
Qty: 40 TABLET | Refills: 0 | Status: SHIPPED | OUTPATIENT
Start: 2022-09-13 | End: 2022-11-07 | Stop reason: SDUPTHER

## 2022-09-14 ENCOUNTER — HOSPITAL ENCOUNTER (OUTPATIENT)
Dept: ONCOLOGY | Facility: HOSPITAL | Age: 62
Setting detail: INFUSION SERIES
Discharge: HOME | End: 2022-09-14

## 2022-09-14 VITALS
OXYGEN SATURATION: 99 % | RESPIRATION RATE: 18 BRPM | BODY MASS INDEX: 28.86 KG/M2 | SYSTOLIC BLOOD PRESSURE: 123 MMHG | WEIGHT: 173.2 LBS | HEART RATE: 66 BPM | DIASTOLIC BLOOD PRESSURE: 79 MMHG | HEIGHT: 65 IN | TEMPERATURE: 97.3 F

## 2022-09-14 DIAGNOSIS — E83.51 HYPOCALCEMIA: ICD-10-CM

## 2022-09-14 DIAGNOSIS — C56.9 MALIGNANT NEOPLASM OF OVARY, UNSPECIFIED LATERALITY: Primary | ICD-10-CM

## 2022-09-14 LAB
ALBUMIN SERPL-MCNC: 3.8 G/DL (ref 3.5–5.2)
ALBUMIN/GLOB SERPL: 1.2 G/DL
ALP SERPL-CCNC: 62 U/L (ref 39–117)
ALT SERPL W P-5'-P-CCNC: 18 U/L (ref 1–33)
ANION GAP SERPL CALCULATED.3IONS-SCNC: 10 MMOL/L (ref 5–15)
AST SERPL-CCNC: 30 U/L (ref 1–32)
BASOPHILS # BLD AUTO: 0.03 10*3/MM3 (ref 0–0.2)
BASOPHILS NFR BLD AUTO: 0.8 % (ref 0–1.5)
BILIRUB SERPL-MCNC: 0.3 MG/DL (ref 0–1.2)
BILIRUB UR QL STRIP: NEGATIVE
BUN SERPL-MCNC: 18 MG/DL (ref 8–23)
BUN/CREAT SERPL: 17.8 (ref 7–25)
CALCIUM SPEC-SCNC: 9.7 MG/DL (ref 8.6–10.5)
CANCER AG125 SERPL QL: 18.8 U/ML (ref 0–38.1)
CHLORIDE SERPL-SCNC: 102 MMOL/L (ref 98–107)
CLARITY UR: ABNORMAL
CO2 SERPL-SCNC: 27 MMOL/L (ref 22–29)
COLOR UR: ABNORMAL
CREAT SERPL-MCNC: 1.01 MG/DL (ref 0.57–1)
DEPRECATED RDW RBC AUTO: 44 FL (ref 37–54)
EGFRCR SERPLBLD CKD-EPI 2021: 62.7 ML/MIN/1.73
EOSINOPHIL # BLD AUTO: 0.22 10*3/MM3 (ref 0–0.4)
EOSINOPHIL NFR BLD AUTO: 5.7 % (ref 0.3–6.2)
ERYTHROCYTE [DISTWIDTH] IN BLOOD BY AUTOMATED COUNT: 12.4 % (ref 12.3–15.4)
GLOBULIN UR ELPH-MCNC: 3.3 GM/DL
GLUCOSE SERPL-MCNC: 99 MG/DL (ref 65–99)
GLUCOSE UR STRIP-MCNC: NEGATIVE MG/DL
HCT VFR BLD AUTO: 36 % (ref 34–46.6)
HGB BLD-MCNC: 11.8 G/DL (ref 12–15.9)
HGB UR QL STRIP.AUTO: NEGATIVE
KETONES UR QL STRIP: NEGATIVE
LEUKOCYTE ESTERASE UR QL STRIP.AUTO: ABNORMAL
LYMPHOCYTES # BLD AUTO: 1.32 10*3/MM3 (ref 0.7–3.1)
LYMPHOCYTES NFR BLD AUTO: 34.1 % (ref 19.6–45.3)
MCH RBC QN AUTO: 32.9 PG (ref 26.6–33)
MCHC RBC AUTO-ENTMCNC: 32.8 G/DL (ref 31.5–35.7)
MCV RBC AUTO: 100.3 FL (ref 79–97)
MONOCYTES # BLD AUTO: 0.52 10*3/MM3 (ref 0.1–0.9)
MONOCYTES NFR BLD AUTO: 13.4 % (ref 5–12)
NEUTROPHILS NFR BLD AUTO: 1.78 10*3/MM3 (ref 1.7–7)
NEUTROPHILS NFR BLD AUTO: 46 % (ref 42.7–76)
NITRITE UR QL STRIP: NEGATIVE
PH UR STRIP.AUTO: 5.5 [PH] (ref 5–8)
PLATELET # BLD AUTO: 138 10*3/MM3 (ref 140–450)
PMV BLD AUTO: 9.1 FL (ref 6–12)
POTASSIUM SERPL-SCNC: 3.7 MMOL/L (ref 3.5–5.2)
PROT SERPL-MCNC: 7.1 G/DL (ref 6–8.5)
PROT UR QL STRIP: ABNORMAL
RBC # BLD AUTO: 3.59 10*6/MM3 (ref 3.77–5.28)
SODIUM SERPL-SCNC: 139 MMOL/L (ref 136–145)
SP GR UR STRIP: 1.02 (ref 1–1.03)
UROBILINOGEN UR QL STRIP: ABNORMAL
WBC NRBC COR # BLD: 3.87 10*3/MM3 (ref 3.4–10.8)

## 2022-09-14 PROCEDURE — 25010000002 HEPARIN LOCK FLUSH PER 10 UNITS: Performed by: INTERNAL MEDICINE

## 2022-09-14 PROCEDURE — 85025 COMPLETE CBC W/AUTO DIFF WBC: CPT | Performed by: INTERNAL MEDICINE

## 2022-09-14 PROCEDURE — 80053 COMPREHEN METABOLIC PANEL: CPT | Performed by: INTERNAL MEDICINE

## 2022-09-14 PROCEDURE — 86304 IMMUNOASSAY TUMOR CA 125: CPT | Performed by: INTERNAL MEDICINE

## 2022-09-14 PROCEDURE — 81003 URINALYSIS AUTO W/O SCOPE: CPT | Performed by: INTERNAL MEDICINE

## 2022-09-14 PROCEDURE — 96413 CHEMO IV INFUSION 1 HR: CPT

## 2022-09-14 PROCEDURE — 25010000002 BEVACIZUMAB-BVZR 400 MG/16ML SOLUTION 16 ML VIAL: Performed by: INTERNAL MEDICINE

## 2022-09-14 RX ORDER — SODIUM CHLORIDE 9 MG/ML
250 INJECTION, SOLUTION INTRAVENOUS ONCE
Status: CANCELLED | OUTPATIENT
Start: 2022-09-14

## 2022-09-14 RX ORDER — SODIUM CHLORIDE 0.9 % (FLUSH) 0.9 %
10 SYRINGE (ML) INJECTION AS NEEDED
Status: DISCONTINUED | OUTPATIENT
Start: 2022-09-14 | End: 2022-09-15 | Stop reason: HOSPADM

## 2022-09-14 RX ORDER — SODIUM CHLORIDE 9 MG/ML
250 INJECTION, SOLUTION INTRAVENOUS ONCE
Status: COMPLETED | OUTPATIENT
Start: 2022-09-14 | End: 2022-09-14

## 2022-09-14 RX ORDER — SODIUM CHLORIDE 0.9 % (FLUSH) 0.9 %
10 SYRINGE (ML) INJECTION AS NEEDED
Status: CANCELLED | OUTPATIENT
Start: 2022-09-14

## 2022-09-14 RX ORDER — HEPARIN SODIUM (PORCINE) LOCK FLUSH IV SOLN 100 UNIT/ML 100 UNIT/ML
500 SOLUTION INTRAVENOUS AS NEEDED
Status: CANCELLED | OUTPATIENT
Start: 2022-09-14

## 2022-09-14 RX ORDER — HEPARIN SODIUM (PORCINE) LOCK FLUSH IV SOLN 100 UNIT/ML 100 UNIT/ML
500 SOLUTION INTRAVENOUS AS NEEDED
Status: DISCONTINUED | OUTPATIENT
Start: 2022-09-14 | End: 2022-09-15 | Stop reason: HOSPADM

## 2022-09-14 RX ORDER — HYDROCHLOROTHIAZIDE 12.5 MG/1
12.5 TABLET ORAL DAILY
Qty: 90 TABLET | Refills: 0 | Status: SHIPPED | OUTPATIENT
Start: 2022-09-14 | End: 2022-12-06

## 2022-09-14 RX ADMIN — SODIUM CHLORIDE 250 ML: 9 INJECTION, SOLUTION INTRAVENOUS at 15:05

## 2022-09-14 RX ADMIN — Medication 10 ML: at 15:39

## 2022-09-14 RX ADMIN — HEPARIN 500 UNITS: 100 SYRINGE at 15:39

## 2022-09-14 RX ADMIN — SODIUM CHLORIDE 780 MG: 9 INJECTION, SOLUTION INTRAVENOUS at 15:01

## 2022-09-14 NOTE — PROGRESS NOTES
Pt here for C10 Zirabev. No major complaints other than antione LE pain.  Requesting refill on hydrocodone.  Inbox message sent to Dr. Hernandez nurse Mayda.  Cbc reviewed.  Tx given per MAR. Pt tolerated well.  Pt d/c home.

## 2022-09-20 DIAGNOSIS — C56.9 MALIGNANT NEOPLASM OF OVARY, UNSPECIFIED LATERALITY: ICD-10-CM

## 2022-09-20 RX ORDER — HYDROCODONE BITARTRATE AND ACETAMINOPHEN 5; 325 MG/1; MG/1
1 TABLET ORAL EVERY 6 HOURS PRN
Qty: 60 TABLET | Refills: 0 | Status: SHIPPED | OUTPATIENT
Start: 2022-09-20 | End: 2022-11-28 | Stop reason: SDUPTHER

## 2022-09-28 ENCOUNTER — HOSPITAL ENCOUNTER (OUTPATIENT)
Dept: ONCOLOGY | Facility: HOSPITAL | Age: 62
Setting detail: INFUSION SERIES
Discharge: HOME | End: 2022-09-28

## 2022-09-28 VITALS
RESPIRATION RATE: 18 BRPM | BODY MASS INDEX: 28.66 KG/M2 | SYSTOLIC BLOOD PRESSURE: 134 MMHG | HEIGHT: 65 IN | HEART RATE: 93 BPM | TEMPERATURE: 96.9 F | OXYGEN SATURATION: 99 % | WEIGHT: 172 LBS | DIASTOLIC BLOOD PRESSURE: 74 MMHG

## 2022-09-28 DIAGNOSIS — E83.51 HYPOCALCEMIA: ICD-10-CM

## 2022-09-28 DIAGNOSIS — C56.9 MALIGNANT NEOPLASM OF OVARY, UNSPECIFIED LATERALITY: Primary | ICD-10-CM

## 2022-09-28 LAB
ALBUMIN SERPL-MCNC: 3.9 G/DL (ref 3.5–5.2)
ALBUMIN/GLOB SERPL: 1.1 G/DL
ALP SERPL-CCNC: 60 U/L (ref 39–117)
ALT SERPL W P-5'-P-CCNC: 14 U/L (ref 1–33)
ANION GAP SERPL CALCULATED.3IONS-SCNC: 12 MMOL/L (ref 5–15)
AST SERPL-CCNC: 31 U/L (ref 1–32)
BASOPHILS # BLD AUTO: 0.02 10*3/MM3 (ref 0–0.2)
BASOPHILS NFR BLD AUTO: 0.5 % (ref 0–1.5)
BILIRUB SERPL-MCNC: 0.3 MG/DL (ref 0–1.2)
BILIRUB UR QL STRIP: NEGATIVE
BUN SERPL-MCNC: 16 MG/DL (ref 8–23)
BUN/CREAT SERPL: 14.4 (ref 7–25)
CALCIUM SPEC-SCNC: 9.7 MG/DL (ref 8.6–10.5)
CANCER AG125 SERPL QL: 21.5 U/ML (ref 0–38.1)
CHLORIDE SERPL-SCNC: 100 MMOL/L (ref 98–107)
CLARITY UR: CLEAR
CO2 SERPL-SCNC: 27 MMOL/L (ref 22–29)
COLOR UR: YELLOW
CREAT SERPL-MCNC: 1.11 MG/DL (ref 0.57–1)
DEPRECATED RDW RBC AUTO: 44 FL (ref 37–54)
EGFRCR SERPLBLD CKD-EPI 2021: 56 ML/MIN/1.73
EOSINOPHIL # BLD AUTO: 0.22 10*3/MM3 (ref 0–0.4)
EOSINOPHIL NFR BLD AUTO: 5.4 % (ref 0.3–6.2)
ERYTHROCYTE [DISTWIDTH] IN BLOOD BY AUTOMATED COUNT: 12.4 % (ref 12.3–15.4)
GLOBULIN UR ELPH-MCNC: 3.7 GM/DL
GLUCOSE SERPL-MCNC: 127 MG/DL (ref 65–99)
GLUCOSE UR STRIP-MCNC: NEGATIVE MG/DL
HCT VFR BLD AUTO: 38.1 % (ref 34–46.6)
HGB BLD-MCNC: 12.3 G/DL (ref 12–15.9)
HGB UR QL STRIP.AUTO: NEGATIVE
KETONES UR QL STRIP: NEGATIVE
LEUKOCYTE ESTERASE UR QL STRIP.AUTO: NEGATIVE
LYMPHOCYTES # BLD AUTO: 1.37 10*3/MM3 (ref 0.7–3.1)
LYMPHOCYTES NFR BLD AUTO: 33.3 % (ref 19.6–45.3)
MCH RBC QN AUTO: 31.9 PG (ref 26.6–33)
MCHC RBC AUTO-ENTMCNC: 32.3 G/DL (ref 31.5–35.7)
MCV RBC AUTO: 98.7 FL (ref 79–97)
MONOCYTES # BLD AUTO: 0.48 10*3/MM3 (ref 0.1–0.9)
MONOCYTES NFR BLD AUTO: 11.7 % (ref 5–12)
NEUTROPHILS NFR BLD AUTO: 2.02 10*3/MM3 (ref 1.7–7)
NEUTROPHILS NFR BLD AUTO: 49.1 % (ref 42.7–76)
NITRITE UR QL STRIP: NEGATIVE
PH UR STRIP.AUTO: 5.5 [PH] (ref 5–8)
PLATELET # BLD AUTO: 130 10*3/MM3 (ref 140–450)
PMV BLD AUTO: 9.1 FL (ref 6–12)
POTASSIUM SERPL-SCNC: 3.6 MMOL/L (ref 3.5–5.2)
PROT SERPL-MCNC: 7.6 G/DL (ref 6–8.5)
PROT UR QL STRIP: NEGATIVE
RBC # BLD AUTO: 3.86 10*6/MM3 (ref 3.77–5.28)
SODIUM SERPL-SCNC: 139 MMOL/L (ref 136–145)
SP GR UR STRIP: 1.01 (ref 1–1.03)
UROBILINOGEN UR QL STRIP: NORMAL
WBC NRBC COR # BLD: 4.11 10*3/MM3 (ref 3.4–10.8)

## 2022-09-28 PROCEDURE — 85025 COMPLETE CBC W/AUTO DIFF WBC: CPT | Performed by: INTERNAL MEDICINE

## 2022-09-28 PROCEDURE — 80053 COMPREHEN METABOLIC PANEL: CPT | Performed by: INTERNAL MEDICINE

## 2022-09-28 PROCEDURE — 81003 URINALYSIS AUTO W/O SCOPE: CPT | Performed by: INTERNAL MEDICINE

## 2022-09-28 PROCEDURE — 96413 CHEMO IV INFUSION 1 HR: CPT

## 2022-09-28 PROCEDURE — 25010000002 BEVACIZUMAB-BVZR 400 MG/16ML SOLUTION 16 ML VIAL: Performed by: INTERNAL MEDICINE

## 2022-09-28 PROCEDURE — 25010000002 HEPARIN LOCK FLUSH PER 10 UNITS: Performed by: INTERNAL MEDICINE

## 2022-09-28 PROCEDURE — 86304 IMMUNOASSAY TUMOR CA 125: CPT | Performed by: INTERNAL MEDICINE

## 2022-09-28 RX ORDER — SODIUM CHLORIDE 0.9 % (FLUSH) 0.9 %
10 SYRINGE (ML) INJECTION AS NEEDED
Status: CANCELLED | OUTPATIENT
Start: 2022-09-28

## 2022-09-28 RX ORDER — SODIUM CHLORIDE 0.9 % (FLUSH) 0.9 %
10 SYRINGE (ML) INJECTION AS NEEDED
Status: DISCONTINUED | OUTPATIENT
Start: 2022-09-28 | End: 2022-09-29 | Stop reason: HOSPADM

## 2022-09-28 RX ORDER — HEPARIN SODIUM (PORCINE) LOCK FLUSH IV SOLN 100 UNIT/ML 100 UNIT/ML
500 SOLUTION INTRAVENOUS AS NEEDED
Status: DISCONTINUED | OUTPATIENT
Start: 2022-09-28 | End: 2022-09-29 | Stop reason: HOSPADM

## 2022-09-28 RX ORDER — SODIUM CHLORIDE 9 MG/ML
250 INJECTION, SOLUTION INTRAVENOUS ONCE
Status: COMPLETED | OUTPATIENT
Start: 2022-09-28 | End: 2022-09-28

## 2022-09-28 RX ORDER — HEPARIN SODIUM (PORCINE) LOCK FLUSH IV SOLN 100 UNIT/ML 100 UNIT/ML
500 SOLUTION INTRAVENOUS AS NEEDED
Status: CANCELLED | OUTPATIENT
Start: 2022-09-28

## 2022-09-28 RX ORDER — SODIUM CHLORIDE 9 MG/ML
250 INJECTION, SOLUTION INTRAVENOUS ONCE
Status: CANCELLED | OUTPATIENT
Start: 2022-09-28

## 2022-09-28 RX ADMIN — SODIUM CHLORIDE 780 MG: 9 INJECTION, SOLUTION INTRAVENOUS at 14:45

## 2022-09-28 RX ADMIN — Medication 10 ML: at 15:19

## 2022-09-28 RX ADMIN — HEPARIN 500 UNITS: 100 SYRINGE at 15:19

## 2022-09-28 RX ADMIN — SODIUM CHLORIDE 250 ML: 9 INJECTION, SOLUTION INTRAVENOUS at 14:45

## 2022-10-09 DIAGNOSIS — C56.9 MALIGNANT NEOPLASM OF OVARY, UNSPECIFIED LATERALITY: Primary | ICD-10-CM

## 2022-10-09 RX ORDER — SODIUM CHLORIDE 9 MG/ML
250 INJECTION, SOLUTION INTRAVENOUS ONCE
Status: CANCELLED | OUTPATIENT
Start: 2022-10-12

## 2022-10-10 RX ORDER — GABAPENTIN 300 MG/1
CAPSULE ORAL
Qty: 90 CAPSULE | Refills: 0 | Status: SHIPPED | OUTPATIENT
Start: 2022-10-10 | End: 2022-11-07

## 2022-10-11 ENCOUNTER — LAB (OUTPATIENT)
Dept: LAB | Facility: HOSPITAL | Age: 62
End: 2022-10-11

## 2022-10-11 ENCOUNTER — OFFICE VISIT (OUTPATIENT)
Dept: ONCOLOGY | Facility: CLINIC | Age: 62
End: 2022-10-11

## 2022-10-11 VITALS
HEART RATE: 62 BPM | TEMPERATURE: 96.9 F | BODY MASS INDEX: 28.49 KG/M2 | SYSTOLIC BLOOD PRESSURE: 146 MMHG | WEIGHT: 171 LBS | OXYGEN SATURATION: 100 % | RESPIRATION RATE: 18 BRPM | HEIGHT: 65 IN | DIASTOLIC BLOOD PRESSURE: 77 MMHG

## 2022-10-11 DIAGNOSIS — C56.9 MALIGNANT NEOPLASM OF OVARY, UNSPECIFIED LATERALITY: Primary | ICD-10-CM

## 2022-10-11 LAB
ALBUMIN SERPL-MCNC: 4.2 G/DL (ref 3.5–5.2)
ALBUMIN/GLOB SERPL: 1.1 G/DL
ALP SERPL-CCNC: 69 U/L (ref 39–117)
ALT SERPL W P-5'-P-CCNC: 16 U/L (ref 1–33)
ANION GAP SERPL CALCULATED.3IONS-SCNC: 12 MMOL/L (ref 5–15)
AST SERPL-CCNC: 29 U/L (ref 1–32)
BASOPHILS # BLD AUTO: 0.03 10*3/MM3 (ref 0–0.2)
BASOPHILS NFR BLD AUTO: 0.6 % (ref 0–1.5)
BILIRUB SERPL-MCNC: 0.3 MG/DL (ref 0–1.2)
BUN SERPL-MCNC: 22 MG/DL (ref 8–23)
BUN/CREAT SERPL: 20.2 (ref 7–25)
CALCIUM SPEC-SCNC: 10.6 MG/DL (ref 8.6–10.5)
CANCER AG125 SERPL QL: 23.9 U/ML (ref 0–38.1)
CHLORIDE SERPL-SCNC: 100 MMOL/L (ref 98–107)
CO2 SERPL-SCNC: 27 MMOL/L (ref 22–29)
CREAT SERPL-MCNC: 1.09 MG/DL (ref 0.57–1)
DEPRECATED RDW RBC AUTO: 42.6 FL (ref 37–54)
EGFRCR SERPLBLD CKD-EPI 2021: 57.2 ML/MIN/1.73
EOSINOPHIL # BLD AUTO: 0.33 10*3/MM3 (ref 0–0.4)
EOSINOPHIL NFR BLD AUTO: 6.4 % (ref 0.3–6.2)
ERYTHROCYTE [DISTWIDTH] IN BLOOD BY AUTOMATED COUNT: 12.5 % (ref 12.3–15.4)
GLOBULIN UR ELPH-MCNC: 3.8 GM/DL
GLUCOSE SERPL-MCNC: 111 MG/DL (ref 65–99)
HCT VFR BLD AUTO: 40.7 % (ref 34–46.6)
HGB BLD-MCNC: 13.1 G/DL (ref 12–15.9)
HOLD SPECIMEN: NORMAL
LYMPHOCYTES # BLD AUTO: 1.7 10*3/MM3 (ref 0.7–3.1)
LYMPHOCYTES NFR BLD AUTO: 33.2 % (ref 19.6–45.3)
MCH RBC QN AUTO: 30.8 PG (ref 26.6–33)
MCHC RBC AUTO-ENTMCNC: 32.2 G/DL (ref 31.5–35.7)
MCV RBC AUTO: 95.8 FL (ref 79–97)
MONOCYTES # BLD AUTO: 0.47 10*3/MM3 (ref 0.1–0.9)
MONOCYTES NFR BLD AUTO: 9.2 % (ref 5–12)
NEUTROPHILS NFR BLD AUTO: 2.59 10*3/MM3 (ref 1.7–7)
NEUTROPHILS NFR BLD AUTO: 50.6 % (ref 42.7–76)
PLATELET # BLD AUTO: 148 10*3/MM3 (ref 140–450)
PMV BLD AUTO: 9.3 FL (ref 6–12)
POTASSIUM SERPL-SCNC: 3.5 MMOL/L (ref 3.5–5.2)
PROT SERPL-MCNC: 8 G/DL (ref 6–8.5)
RBC # BLD AUTO: 4.25 10*6/MM3 (ref 3.77–5.28)
SODIUM SERPL-SCNC: 139 MMOL/L (ref 136–145)
WBC NRBC COR # BLD: 5.12 10*3/MM3 (ref 3.4–10.8)

## 2022-10-11 PROCEDURE — 99214 OFFICE O/P EST MOD 30 MIN: CPT | Performed by: INTERNAL MEDICINE

## 2022-10-11 PROCEDURE — 86304 IMMUNOASSAY TUMOR CA 125: CPT

## 2022-10-11 PROCEDURE — 85025 COMPLETE CBC W/AUTO DIFF WBC: CPT

## 2022-10-11 PROCEDURE — 36415 COLL VENOUS BLD VENIPUNCTURE: CPT

## 2022-10-11 PROCEDURE — 80053 COMPREHEN METABOLIC PANEL: CPT

## 2022-10-11 RX ORDER — DIPHENHYDRAMINE, LIDOCAINE, NYSTATIN
5 KIT ORAL 3 TIMES DAILY
Qty: 150 ML | Refills: 3 | Status: SHIPPED | OUTPATIENT
Start: 2022-10-11 | End: 2022-11-16

## 2022-10-11 NOTE — PROGRESS NOTES
Taxol/Avastin Hematology/Oncology Outpatient Follow Up    PATIENT NAME:Lucy Mahan  :1960  MRN: [id number]  PRIMARY CARE PHYSICIAN: [place] Avalos APRN  REFERRING PHYSICIAN: [place] Avalos APRN    Chief Complaint   Patient presents with   • Follow-up     Malignant neoplasm of ovary, unspecified laterality (HCC)        HISTORY OF PRESENT ILLNESS:     This is a 61 y.o.  female who developed abdominal pain in 2021.  Patient has also lost approximately 35 pounds during that..  She has had loss of appetite.  Due to the abdominal pain,  she had an ultrasound of the abdomen done on 10/13/2021.  This basically revealed no liver lesions.  Common bile duct is normal at 3 mm.  There is a nonmobile 3 cm shadowing gallstone within the gallbladder neck.  No gallbladder thickening or pericholecystic fluid collection.  Patient was then referred to general surgery and was seen by Dr. Badillo.  Who took her to surgery on 11/10/2021 for diagnostic laparoscopy and peritoneal biopsy.  Intra-Op , she was noted to have peritoneal studding with malignancy throughout the abdominal cavity and biopsies were completed. 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  The tumor was focally positive for progesterone receptor, positive for CK7, estrogen receptor, CA-125 and PAX 8..  The staining pattern is consistent with ovarian serous carcinoma.      She had CT scan of the chest, abdomen and pelvis and the chest is a 2 mm noncalcified nodule within the left lower lobe, noncalcified nodule in the left upper lobe measuring 4 mm and 3 mm.  In the abdomen there is a 5.1 x 5.3 cm enhancing soft tissue mass in the pelvis to the right of midline associated with the adnexal region possibly representing a primary ovarian malignancy.  No right or left ovarian tissue was seen.  There is abnormal omental thickening and nodularity consistent with carcinomatosis greatest bulk in the left mid abdomen measuring up to  10.8 cm x 2.5 cm.  There is nodular peritoneal thickening also present within the abdomen and pelvis consistent with peritoneal carcinomatosis.  The small quantity of ascitic fluid in the abdomen and pelvis.  Carcinomatosis nodular studding is seen along the inferior right hepatic lobe.     She  has been referred to us for further evaluation and management of her newly diagnosed ovarian carcinoma.     Patient is accompanied today by her daughter for this appointment.  There is  family history of precancerous cells of the  uterusin her sister.        She does not smoke and does not drink alcohol.  Patient is  and has 2 daughters.  She works for the [employer].    · 11/29/2021 patient was seen by Dr. Dubois who has recommended neoadjuvant chemotherapy for 2-3 cycles and then interval cytoreduction.  She has recommended carboplatinum AUC 6, Taxol 1 7 5 mg per metered squared, Avastin 15 mg/kg as was done in the oceans trial but hold Avastin for the first few cycles due to bowel risk.  · Prechemo CA-125 was 754  · 12/7/2021 patient received first cycle of chemotherapy with carboplatinum and Taxol with Neulasta  · 12/28/2021 patient received cycle 2 of combination chemotherapy with carboplatinum and Taxol with Neulasta  · 12/28/2021 CA-125 was down to 635  · 1/11/2022 add-on appointment for nausea, dizziness, thrombocytopenia, pain in upper to mid abdomen 8/10, new onset in the last 4 to 5 days  · January 12, 2022: Due to acute GI symptoms patient had a CT scan of the abdomen and pelvis which basically revealed increasing effusion on the left lung mild pleural effusion on the right lung decreased in amount of omental caking but no interval change in the right adnexal area moderate abdominal and pelvic ascites which has increased.  She denies any significant pulmonary symptoms.  Her O2 sat today was 100% at rest and 99% with ambulation  · 1/24/2022 patient received cycle 3 of carboplatinum with Taxol  with dose reduction due to significant toxicities  · 2022 patient received cycle 4-day 1 of chemo platinum Taxol and Avastin  · 3/7/22: Patient received cycle 5-day 1 of chemotherapy with carboplatinum Taxol and Avastin  · 2022 patient received cycle 6 of chemotherapy with A platinum Taxol and Avastin  · 2022 patient underwent exploratory laparotomy, total abdominal hysterectomy, bilateral salpingo-oophorectomy, pelvic and aortic lymphadenectomy, omentectomy performed by Dr Castro, pathology reviewed high-grade serous carcinoma involving the uterine serosa and outer myometrium, bilateral fallopian tubes and bilateral ovaries and adnexa soft tissue.  Also received are high-grade serous carcinoma involving the omentum as well as residual high-grade serous carcinoma with treatment effects on the colonic splenic [id number], estrogen receptor diffusely positive p53 patchy, p16 diffusely positive.  Molecular testing is pending  · 2022 patient received cycle 7 of Combination chemotherapy with Avastin and carboplatinum.  Taxol was held due to significant peripheral neuropathy  · 2022 patient presents for follow-up of ovarian cancer  · 2020 patient received cycle 9 of carboplatinum  · Patient is currently on maintenance Avastin and letrozole for ovarian cancer suppression        Past Medical History:   Diagnosis Date   • Arthritis    • Cancer (HCC)     ovarian   • Gall stones    • Hypertension    • Irritable bowel syndrome    • Rheumatoid aortitis    • Thyroid disease        Past Surgical History:   Procedure Laterality Date   •  SECTION      x2   • CHOLECYSTECTOMY N/A 11/10/2021    Procedure: diagnostic laparoscopy and peritoneal biopsy;  Surgeon: Krunal Badillo MD;  Location: [facility];  Service: General;  Laterality: N/A;   • COLONOSCOPY     • HIP SURGERY Left    • THYROIDECTOMY, PARTIAL  2017   • TOTAL ABDOMINAL HYSTERECTOMY N/A 2022    Procedure: EXPLORATORY  LAPAROTOMY, TOTAL ABDOMINAL HYSTERECTOMY, BILATERAL SALPINGOOOPHORECTOMY, PELVIC AND AORTIC LYMPHADENECTOMY, OMENTECTOMY;  Surgeon: Maggie Castro DO;  Location: [facility] MAIN OR;  Service: Gynecology Oncology;  Laterality: N/A;   • VENOUS ACCESS DEVICE (PORT) INSERTION Left 12/6/2021    Procedure: INSERTION VENOUS ACCESS DEVICE;  Surgeon: Krunal Badillo MD;  Location: [facility] MAIN OR;  Service: General;  Laterality: Left;         Current Outpatient Medications:   •  amLODIPine (NORVASC) 5 MG tablet, TAKE ONE (1) TABLET BY MOUTH DAILY, Disp: 90 tablet, Rfl: 1  •  gabapentin (NEURONTIN) 300 MG capsule, TAKE ONE (1) CAPSULE BY MOUTH EVERY 8 HOURS, Disp: 90 capsule, Rfl: 0  •  hydroCHLOROthiazide (HYDRODIURIL) 12.5 MG tablet, Take 1 tablet by mouth Daily., Disp: 90 tablet, Rfl: 0  •  HYDROcodone-acetaminophen (Norco) 5-325 MG per tablet, Take 1 tablet by mouth Every 6 (Six) Hours As Needed for Moderate Pain., Disp: 60 tablet, Rfl: 0  •  hydrOXYzine (ATARAX) 10 MG tablet, TAKE ONE (1) TABLET BY MOUTH TWICE DAILY AS NEEDED FOR ANXIETY, Disp: 40 tablet, Rfl: 0  •  letrozole (FEMARA) 2.5 MG tablet, TAKE ONE (1) TABLET BY MOUTH DAILY, Disp: 30 tablet, Rfl: 3  •  levothyroxine (SYNTHROID, LEVOTHROID) 137 MCG tablet, TAKE ONE (1) TABLET BY MOUTH EVERY DAY, Disp: 90 tablet, Rfl: 1  •  lidocaine-prilocaine (EMLA) 2.5-2.5 % cream, Apply 1 application topically to the appropriate area as directed. Apply to port 1 hour prior to port access, Disp: 30 g, Rfl: 1  •  omeprazole (priLOSEC) 40 MG capsule, TAKE ONE (1) CAPSULE BY MOUTH EVERY DAY, Disp: 30 capsule, Rfl: 6  •  pantoprazole (PROTONIX) 40 MG EC tablet, TAKE ONE (1) TABLET BY MOUTH EVERY DAY, Disp: 30 tablet, Rfl: 6  •  promethazine (PHENERGAN) 25 MG tablet, Take 1 tablet by mouth Every 6 (Six) Hours As Needed for Nausea or Vomiting., Disp: 90 tablet, Rfl: 1  •  nystatin susp + lidocaine viscous (MAGIC MOUTHWASH) oral suspension, Swish and spit 5 mL 3 (Three) Times a Day.,  "Disp: 150 mL, Rfl: 3    No Known Allergies    Family History   Problem Relation Age of Onset   • Dementia Mother    • Arthritis Mother    • Heart disease Father    • Hypertension Father    • Malig Hyperthermia Neg Hx        Cancer-related family history is not on file.    Social History     Tobacco Use   • Smoking status: Never   • Smokeless tobacco: Never   Vaping Use   • Vaping Use: Never used   Substance Use Topics   • Alcohol use: Yes     Alcohol/week: 1.0 standard drink     Types: 1 Glasses of wine per week     Comment: less than monthly   • Drug use: No       I have reviewed and confirmed the accuracy of the patient's history: Chief complaint, HPI, ROS and Subjective as entered by the MA/LPN/RN. Inga Cori Hernandez MD 10/11/22     SUBJECTIVE:    She complains of sores in the mouth          REVIEW OF SYSTEMS:    Review of Systems   Constitutional: Positive for fatigue. Negative for chills and fever.   HENT: Negative for ear pain, mouth sores, nosebleeds and sore throat.    Eyes: Negative for photophobia and visual disturbance.   Respiratory: Negative for wheezing and stridor.    Cardiovascular: Negative for chest pain and palpitations.   Gastrointestinal: Positive for abdominal pain and nausea. Negative for diarrhea and vomiting.   Endocrine: Negative for cold intolerance and heat intolerance.   Genitourinary: Negative for dysuria and hematuria.   Musculoskeletal: Negative for joint swelling and neck stiffness.   Skin: Negative for color change and rash.   Neurological: Positive for dizziness, weakness and headaches. Negative for seizures and syncope.   Hematological: Negative for adenopathy.   Psychiatric/Behavioral: Negative for agitation, confusion and hallucinations.     Post op changes noted.    OBJECTIVE:    Vitals:    10/11/22 1301   BP: 146/77   Pulse: 62   Resp: 18   Temp: 96.9 °F (36.1 °C)   SpO2: 100%   Weight: 77.6 kg (171 lb)  Comment: verbal   Height: 165.1 cm (65\")   PainSc:   5   PainLoc: " Foot  Comment: feet and joints     Body mass index is 28.46 kg/m².    ECOG    (1) Restricted in physically strenuous activity, ambulatory and able to do work of light nature    Physical Exam  Vitals and nursing note reviewed.   Constitutional:       General: She is not in acute distress.     Appearance: She is not diaphoretic.   HENT:      Head: Normocephalic and atraumatic.      Mouth/Throat:      Mouth: Mucous membranes are dry.   Eyes:      General: No scleral icterus.        Right eye: No discharge.         Left eye: No discharge.      Conjunctiva/sclera: Conjunctivae normal.   Neck:      Thyroid: No thyromegaly.   Cardiovascular:      Rate and Rhythm: Normal rate and regular rhythm.      Heart sounds: Normal heart sounds.     No friction rub. No gallop.   Pulmonary:      Effort: Pulmonary effort is normal. No respiratory distress.      Breath sounds: No stridor. No wheezing.   Abdominal:      General: Bowel sounds are normal.      Palpations: Abdomen is soft. There is no mass.      Tenderness: There is no abdominal tenderness. There is no guarding or rebound.      Comments: Abdominal wound noted   Musculoskeletal:         General: No tenderness. Normal range of motion.      Cervical back: Normal range of motion and neck supple.   Lymphadenopathy:      Cervical: No cervical adenopathy.   Skin:     General: Skin is warm.      Findings: No erythema or rash.      Comments: Tenting skin turgor   Neurological:      Mental Status: She is alert and oriented to person, place, and time.      Motor: No abnormal muscle tone.   Psychiatric:         Behavior: Behavior normal.       I have reexamined the patient and the results are consistent with the previously documented exam. Inga Hernandez MD     RECENT LABS    WBC   Date Value Ref Range Status   10/11/2022 5.12 3.40 - 10.80 10*3/mm3 Final     RBC   Date Value Ref Range Status   10/11/2022 4.25 3.77 - 5.28 10*6/mm3 Final     Hemoglobin   Date Value Ref Range  Status   10/11/2022 13.1 12.0 - 15.9 g/dL Final     Hematocrit   Date Value Ref Range Status   10/11/2022 40.7 34.0 - 46.6 % Final     MCV   Date Value Ref Range Status   10/11/2022 95.8 79.0 - 97.0 fL Final     MCH   Date Value Ref Range Status   10/11/2022 30.8 26.6 - 33.0 pg Final     MCHC   Date Value Ref Range Status   10/11/2022 32.2 31.5 - 35.7 g/dL Final     RDW   Date Value Ref Range Status   10/11/2022 12.5 12.3 - 15.4 % Final     RDW-SD   Date Value Ref Range Status   10/11/2022 42.6 37.0 - 54.0 fl Final     MPV   Date Value Ref Range Status   10/11/2022 9.3 6.0 - 12.0 fL Final     Platelets   Date Value Ref Range Status   10/11/2022 148 140 - 450 10*3/mm3 Final     Neutrophil %   Date Value Ref Range Status   10/11/2022 50.6 42.7 - 76.0 % Final     Lymphocyte %   Date Value Ref Range Status   10/11/2022 33.2 19.6 - 45.3 % Final     Monocyte %   Date Value Ref Range Status   10/11/2022 9.2 5.0 - 12.0 % Final     Eosinophil %   Date Value Ref Range Status   10/11/2022 6.4 (H) 0.3 - 6.2 % Final     Basophil %   Date Value Ref Range Status   10/11/2022 0.6 0.0 - 1.5 % Final     Immature Grans %   Date Value Ref Range Status   04/29/2022 0.4 0.0 - 0.5 % Final     Neutrophils, Absolute   Date Value Ref Range Status   10/11/2022 2.59 1.70 - 7.00 10*3/mm3 Final     Lymphocytes, Absolute   Date Value Ref Range Status   10/11/2022 1.70 0.70 - 3.10 10*3/mm3 Final     Monocytes, Absolute   Date Value Ref Range Status   10/11/2022 0.47 0.10 - 0.90 10*3/mm3 Final     Eosinophils, Absolute   Date Value Ref Range Status   10/11/2022 0.33 0.00 - 0.40 10*3/mm3 Final     Basophils, Absolute   Date Value Ref Range Status   10/11/2022 0.03 0.00 - 0.20 10*3/mm3 Final     Immature Grans, Absolute   Date Value Ref Range Status   04/29/2022 0.02 0.00 - 0.05 10*3/mm3 Final     nRBC   Date Value Ref Range Status   04/29/2022 0.0 0.0 - 0.2 /100 WBC Final       Lab Results   Component Value Date    GLUCOSE 127 (H) 09/28/2022    BUN  16 09/28/2022    CREATININE 1.11 (H) 09/28/2022    EGFRIFNONA 47 (L) 02/21/2022    EGFRIFAFRI 44 (L) 01/21/2020    BCR 14.4 09/28/2022    K 3.6 09/28/2022    CO2 27.0 09/28/2022    CALCIUM 9.7 09/28/2022    ALBUMIN 3.90 09/28/2022    LABIL2 1.2 (calc) 01/21/2020    AST 31 09/28/2022    ALT 14 09/28/2022         ASSESSMENT:      1. 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  Ongoing management  2. Status post neoadjuvant chemotherapy with carboplatinum AUC 6, Taxol 175 mg per metered squared, with Avastin added due to lack of significant response with carboplatinum and Taxol.  Monitor serial CA-125's.  Down to 135 as of 3/14/2022.  Her CT scan showed response therefore patient will proceed with radical hysterectomy to perform by Dr. Dubois on 4/26/2022.  Plans for additional chemotherapy along with Avastin and possibly maintenance treatment with PARP inhibitors in the future.  She is also on letrozole 2.5 mg p.o. daily.  We will discontinue carboplatinum and proceed with only atorvastatin if platelets improve next week.  We will also check with Dr. Castro regarding maintenance treatment.  For now she will continue on Femara  3. Status post radical hysterectomy.  See path report.  Caris testing pending  4. Mucositis: Begin Elisa's Magic mouthwash  5. Pulmonary nodules of unclear etiology too small for biopsy or PET resolution: Continue surveillance CT scans.  Also check 's  6. Chemotherapy induced nausea: Improved  7. Chemotherapy induced fatigue: Improved  8. Chemotherapy-induced anemia  9. 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  Patient already had some 25% dose reduction on her chemo.  She is currently on Neurontin 300 mg daily will increase to 300 mg twice a day.  Peripheral neuropathy continues to be an issue.  We will hold Taxol for now till this has improved  10. Severe thrombocytopenia secondary to chemotherapy most notably carboplatinum  11. Comprehensive  gene analysis with cancer next technology was negative for any significant mutation in all 77 genes analyzed  12. 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  No other actionable mutations identified  13. Dehydration: Resolved.  She has increase p.o. fluid intake.  14. Abdominal discomfort secondary to malignancy; Increasing abdominal discomfort               Plans:     · Continue Avastin  · Continue letrozole  · Continue Neurontin to 300 mg every 8 hours for peripheral neuropathy  · Schedule CT scans of the chest abdomen and pelvis for cancer restaging  · She will benefit from olaparib in the future  · Monitor 's, these were drawn today  · Elisa's Magic mouthwash for mucositis  · Can resume full-time work, notes given  · Reviewed her path report from radical hysterectomy 4/20/2022  · Comprehensive gene analysis with cancer next technology results reviewed with patient and copies of her results was also given to her for her own records keeping  · Continue Percocet 5 mg p.o. every 4-6 hours as needed for pain  · Continue vitamin b6 100mg po q 12   · Foundation 1 testing results reviewed  · All questions answered  · Follow-up 4 weeks          I spent 30 total minutes, face-to-face, caring for Lucy today.  90% of this time involved counseling and/or coordination of care as documented within this note.

## 2022-10-12 ENCOUNTER — HOSPITAL ENCOUNTER (OUTPATIENT)
Dept: ONCOLOGY | Facility: HOSPITAL | Age: 62
Setting detail: INFUSION SERIES
Discharge: HOME | End: 2022-10-12

## 2022-10-12 VITALS
RESPIRATION RATE: 18 BRPM | DIASTOLIC BLOOD PRESSURE: 73 MMHG | WEIGHT: 171 LBS | SYSTOLIC BLOOD PRESSURE: 129 MMHG | BODY MASS INDEX: 28.46 KG/M2 | HEART RATE: 71 BPM | OXYGEN SATURATION: 98 % | TEMPERATURE: 97.3 F

## 2022-10-12 DIAGNOSIS — C56.9 MALIGNANT NEOPLASM OF OVARY, UNSPECIFIED LATERALITY: Primary | ICD-10-CM

## 2022-10-12 DIAGNOSIS — E83.51 HYPOCALCEMIA: ICD-10-CM

## 2022-10-12 LAB
BILIRUB UR QL STRIP: NEGATIVE
CLARITY UR: CLEAR
COLOR UR: YELLOW
GLUCOSE UR STRIP-MCNC: NEGATIVE MG/DL
HGB UR QL STRIP.AUTO: NEGATIVE
KETONES UR QL STRIP: NEGATIVE
LEUKOCYTE ESTERASE UR QL STRIP.AUTO: ABNORMAL
NITRITE UR QL STRIP: NEGATIVE
PH UR STRIP.AUTO: 5.5 [PH] (ref 5–8)
PROT UR QL STRIP: NEGATIVE
SP GR UR STRIP: 1.01 (ref 1–1.03)
UROBILINOGEN UR QL STRIP: ABNORMAL

## 2022-10-12 PROCEDURE — 25010000002 BEVACIZUMAB-BVZR 400 MG/16ML SOLUTION 16 ML VIAL: Performed by: INTERNAL MEDICINE

## 2022-10-12 PROCEDURE — 25010000002 HEPARIN LOCK FLUSH PER 10 UNITS: Performed by: INTERNAL MEDICINE

## 2022-10-12 PROCEDURE — 96413 CHEMO IV INFUSION 1 HR: CPT

## 2022-10-12 PROCEDURE — 81003 URINALYSIS AUTO W/O SCOPE: CPT | Performed by: INTERNAL MEDICINE

## 2022-10-12 RX ORDER — SODIUM CHLORIDE 0.9 % (FLUSH) 0.9 %
10 SYRINGE (ML) INJECTION AS NEEDED
Status: DISCONTINUED | OUTPATIENT
Start: 2022-10-12 | End: 2022-10-13 | Stop reason: HOSPADM

## 2022-10-12 RX ORDER — HEPARIN SODIUM (PORCINE) LOCK FLUSH IV SOLN 100 UNIT/ML 100 UNIT/ML
500 SOLUTION INTRAVENOUS AS NEEDED
Status: DISCONTINUED | OUTPATIENT
Start: 2022-10-12 | End: 2022-10-13 | Stop reason: HOSPADM

## 2022-10-12 RX ORDER — SODIUM CHLORIDE 0.9 % (FLUSH) 0.9 %
10 SYRINGE (ML) INJECTION AS NEEDED
Status: CANCELLED | OUTPATIENT
Start: 2022-10-12

## 2022-10-12 RX ORDER — HEPARIN SODIUM (PORCINE) LOCK FLUSH IV SOLN 100 UNIT/ML 100 UNIT/ML
500 SOLUTION INTRAVENOUS AS NEEDED
Status: CANCELLED | OUTPATIENT
Start: 2022-10-12

## 2022-10-12 RX ORDER — SODIUM CHLORIDE 9 MG/ML
250 INJECTION, SOLUTION INTRAVENOUS ONCE
Status: COMPLETED | OUTPATIENT
Start: 2022-10-12 | End: 2022-10-12

## 2022-10-12 RX ADMIN — HEPARIN 500 UNITS: 100 SYRINGE at 14:52

## 2022-10-12 RX ADMIN — SODIUM CHLORIDE 780 MG: 9 INJECTION, SOLUTION INTRAVENOUS at 14:17

## 2022-10-12 RX ADMIN — Medication 10 ML: at 14:52

## 2022-10-12 RX ADMIN — SODIUM CHLORIDE 250 ML: 9 INJECTION, SOLUTION INTRAVENOUS at 14:17

## 2022-10-20 ENCOUNTER — HOSPITAL ENCOUNTER (OUTPATIENT)
Dept: PET IMAGING | Facility: HOSPITAL | Age: 62
Discharge: HOME | End: 2022-10-20
Admitting: INTERNAL MEDICINE

## 2022-10-20 DIAGNOSIS — C56.9 MALIGNANT NEOPLASM OF OVARY, UNSPECIFIED LATERALITY: ICD-10-CM

## 2022-10-20 PROCEDURE — 74177 CT ABD & PELVIS W/CONTRAST: CPT

## 2022-10-20 PROCEDURE — 71260 CT THORAX DX C+: CPT

## 2022-10-20 PROCEDURE — 25010000002 IOPAMIDOL 61 % SOLUTION: Performed by: INTERNAL MEDICINE

## 2022-10-20 RX ADMIN — IOPAMIDOL 100 ML: 612 INJECTION, SOLUTION INTRAVENOUS at 08:33

## 2022-10-24 NOTE — TELEPHONE ENCOUNTER
Caller: Lucy Mahan    Relationship to patient: Self    Best call back number: [phone]    Date of positive COVID19 test: 10/24/22    COVID19 symptoms: HEAD CONGESTION, DIARRHEA, ACHES AND PAINS, FATIGUE.    Date of initial quarantine: 10/24/22    Additional information or concerns: PATIENT STATES SHE TESTED POSTIVE TODAY, 10/24/22, WITH AN AT HOME COVID TEST. SHE IS REQUESTING A CALL BACK TO DISCUSS ADVISE AND TREATMENT OPTIONS.     What is the patients preferred pharmacy:     [place] [address], [facility]. [phone]  - [phone]        .”

## 2022-10-24 NOTE — TELEPHONE ENCOUNTER
Spoke with patient and recommended the mucinex DM, tylenol/motrin. Pt stated she is taking a tylenol cold and sinus. She also takes vitamin C and vitamin D. She is wondering since she is going through cancer treatments if there is something more she should be doing or if the antiviral would be beneficial. The oncologist recommended an 81mg aspirin and to follow up with her PCP. Please advise.

## 2022-10-26 ENCOUNTER — HOSPITAL ENCOUNTER (OUTPATIENT)
Dept: ONCOLOGY | Facility: HOSPITAL | Age: 62
Setting detail: INFUSION SERIES
End: 2022-10-26

## 2022-10-31 DIAGNOSIS — C56.9 MALIGNANT NEOPLASM OF OVARY, UNSPECIFIED LATERALITY: Primary | ICD-10-CM

## 2022-10-31 RX ORDER — SODIUM CHLORIDE 9 MG/ML
250 INJECTION, SOLUTION INTRAVENOUS ONCE
Status: CANCELLED | OUTPATIENT
Start: 2022-11-02

## 2022-11-01 RX ORDER — LETROZOLE 2.5 MG/1
TABLET, FILM COATED ORAL
Qty: 60 TABLET | Refills: 3 | Status: SHIPPED | OUTPATIENT
Start: 2022-11-01

## 2022-11-02 ENCOUNTER — HOSPITAL ENCOUNTER (OUTPATIENT)
Dept: ONCOLOGY | Facility: HOSPITAL | Age: 62
Setting detail: INFUSION SERIES
Discharge: HOME | End: 2022-11-02

## 2022-11-02 VITALS
BODY MASS INDEX: 28.82 KG/M2 | SYSTOLIC BLOOD PRESSURE: 118 MMHG | OXYGEN SATURATION: 98 % | HEART RATE: 72 BPM | RESPIRATION RATE: 18 BRPM | TEMPERATURE: 97 F | WEIGHT: 173 LBS | DIASTOLIC BLOOD PRESSURE: 73 MMHG | HEIGHT: 65 IN

## 2022-11-02 DIAGNOSIS — E87.6 HYPOKALEMIA: Primary | ICD-10-CM

## 2022-11-02 DIAGNOSIS — C56.9 MALIGNANT NEOPLASM OF OVARY, UNSPECIFIED LATERALITY: ICD-10-CM

## 2022-11-02 DIAGNOSIS — E83.51 HYPOCALCEMIA: Primary | ICD-10-CM

## 2022-11-02 LAB
ALBUMIN SERPL-MCNC: 3.6 G/DL (ref 3.5–5.2)
ALBUMIN/GLOB SERPL: 1 G/DL
ALP SERPL-CCNC: 70 U/L (ref 39–117)
ALT SERPL W P-5'-P-CCNC: 12 U/L (ref 1–33)
ANION GAP SERPL CALCULATED.3IONS-SCNC: 12 MMOL/L (ref 5–15)
AST SERPL-CCNC: 22 U/L (ref 1–32)
BASOPHILS # BLD AUTO: 0.01 10*3/MM3 (ref 0–0.2)
BASOPHILS NFR BLD AUTO: 0.2 % (ref 0–1.5)
BILIRUB SERPL-MCNC: 0.3 MG/DL (ref 0–1.2)
BILIRUB UR QL STRIP: NEGATIVE
BUN SERPL-MCNC: 23 MG/DL (ref 8–23)
BUN/CREAT SERPL: 26.1 (ref 7–25)
CALCIUM SPEC-SCNC: 9.7 MG/DL (ref 8.6–10.5)
CANCER AG125 SERPL QL: 24.6 U/ML (ref 0–38.1)
CHLORIDE SERPL-SCNC: 101 MMOL/L (ref 98–107)
CLARITY UR: CLEAR
CO2 SERPL-SCNC: 27 MMOL/L (ref 22–29)
COLOR UR: YELLOW
CREAT SERPL-MCNC: 0.88 MG/DL (ref 0.57–1)
DEPRECATED RDW RBC AUTO: 44.2 FL (ref 37–54)
EGFRCR SERPLBLD CKD-EPI 2021: 73.9 ML/MIN/1.73
EOSINOPHIL # BLD AUTO: 0.22 10*3/MM3 (ref 0–0.4)
EOSINOPHIL NFR BLD AUTO: 3.7 % (ref 0.3–6.2)
ERYTHROCYTE [DISTWIDTH] IN BLOOD BY AUTOMATED COUNT: 13.4 % (ref 12.3–15.4)
GLOBULIN UR ELPH-MCNC: 3.5 GM/DL
GLUCOSE SERPL-MCNC: 125 MG/DL (ref 65–99)
GLUCOSE UR STRIP-MCNC: NEGATIVE MG/DL
HCT VFR BLD AUTO: 38.9 % (ref 34–46.6)
HGB BLD-MCNC: 12.7 G/DL (ref 12–15.9)
HGB UR QL STRIP.AUTO: NEGATIVE
KETONES UR QL STRIP: NEGATIVE
LEUKOCYTE ESTERASE UR QL STRIP.AUTO: NEGATIVE
LYMPHOCYTES # BLD AUTO: 1.46 10*3/MM3 (ref 0.7–3.1)
LYMPHOCYTES NFR BLD AUTO: 24.3 % (ref 19.6–45.3)
MCH RBC QN AUTO: 30.4 PG (ref 26.6–33)
MCHC RBC AUTO-ENTMCNC: 32.6 G/DL (ref 31.5–35.7)
MCV RBC AUTO: 93.1 FL (ref 79–97)
MONOCYTES # BLD AUTO: 0.54 10*3/MM3 (ref 0.1–0.9)
MONOCYTES NFR BLD AUTO: 9 % (ref 5–12)
NEUTROPHILS NFR BLD AUTO: 3.78 10*3/MM3 (ref 1.7–7)
NEUTROPHILS NFR BLD AUTO: 62.8 % (ref 42.7–76)
NITRITE UR QL STRIP: NEGATIVE
PH UR STRIP.AUTO: 5.5 [PH] (ref 5–8)
PLATELET # BLD AUTO: 153 10*3/MM3 (ref 140–450)
PMV BLD AUTO: 9.3 FL (ref 6–12)
POTASSIUM SERPL-SCNC: 3.3 MMOL/L (ref 3.5–5.2)
PROT SERPL-MCNC: 7.1 G/DL (ref 6–8.5)
PROT UR QL STRIP: NEGATIVE
RBC # BLD AUTO: 4.18 10*6/MM3 (ref 3.77–5.28)
SODIUM SERPL-SCNC: 140 MMOL/L (ref 136–145)
SP GR UR STRIP: 1.02 (ref 1–1.03)
UROBILINOGEN UR QL STRIP: NORMAL
WBC NRBC COR # BLD: 6.01 10*3/MM3 (ref 3.4–10.8)

## 2022-11-02 PROCEDURE — 80053 COMPREHEN METABOLIC PANEL: CPT | Performed by: INTERNAL MEDICINE

## 2022-11-02 PROCEDURE — 25010000002 HEPARIN LOCK FLUSH PER 10 UNITS: Performed by: INTERNAL MEDICINE

## 2022-11-02 PROCEDURE — 86304 IMMUNOASSAY TUMOR CA 125: CPT | Performed by: INTERNAL MEDICINE

## 2022-11-02 PROCEDURE — 96413 CHEMO IV INFUSION 1 HR: CPT

## 2022-11-02 PROCEDURE — 81003 URINALYSIS AUTO W/O SCOPE: CPT | Performed by: INTERNAL MEDICINE

## 2022-11-02 PROCEDURE — 25010000002 BEVACIZUMAB-BVZR 400 MG/16ML SOLUTION 16 ML VIAL: Performed by: INTERNAL MEDICINE

## 2022-11-02 PROCEDURE — 85025 COMPLETE CBC W/AUTO DIFF WBC: CPT | Performed by: INTERNAL MEDICINE

## 2022-11-02 RX ORDER — SODIUM CHLORIDE 9 MG/ML
250 INJECTION, SOLUTION INTRAVENOUS ONCE
Status: COMPLETED | OUTPATIENT
Start: 2022-11-02 | End: 2022-11-02

## 2022-11-02 RX ORDER — SODIUM CHLORIDE 0.9 % (FLUSH) 0.9 %
10 SYRINGE (ML) INJECTION AS NEEDED
Status: DISCONTINUED | OUTPATIENT
Start: 2022-11-02 | End: 2022-11-03 | Stop reason: HOSPADM

## 2022-11-02 RX ORDER — SODIUM CHLORIDE 0.9 % (FLUSH) 0.9 %
10 SYRINGE (ML) INJECTION AS NEEDED
Status: CANCELLED | OUTPATIENT
Start: 2022-11-02

## 2022-11-02 RX ORDER — HEPARIN SODIUM (PORCINE) LOCK FLUSH IV SOLN 100 UNIT/ML 100 UNIT/ML
500 SOLUTION INTRAVENOUS AS NEEDED
Status: CANCELLED | OUTPATIENT
Start: 2022-11-02

## 2022-11-02 RX ORDER — POTASSIUM CHLORIDE 1500 MG/1
40 TABLET, FILM COATED, EXTENDED RELEASE ORAL ONCE
Qty: 2 TABLET | Refills: 0 | Status: SHIPPED | OUTPATIENT
Start: 2022-11-02 | End: 2022-11-02

## 2022-11-02 RX ORDER — HEPARIN SODIUM (PORCINE) LOCK FLUSH IV SOLN 100 UNIT/ML 100 UNIT/ML
500 SOLUTION INTRAVENOUS AS NEEDED
Status: DISCONTINUED | OUTPATIENT
Start: 2022-11-02 | End: 2022-11-03 | Stop reason: HOSPADM

## 2022-11-02 RX ADMIN — HEPARIN 500 UNITS: 100 SYRINGE at 11:59

## 2022-11-02 RX ADMIN — Medication 10 ML: at 11:59

## 2022-11-02 RX ADMIN — SODIUM CHLORIDE 250 ML: 9 INJECTION, SOLUTION INTRAVENOUS at 11:28

## 2022-11-02 RX ADMIN — SODIUM CHLORIDE 780 MG: 9 INJECTION, SOLUTION INTRAVENOUS at 11:25

## 2022-11-04 ENCOUNTER — TELEPHONE (OUTPATIENT)
Dept: ONCOLOGY | Facility: CLINIC | Age: 62
End: 2022-11-04

## 2022-11-04 NOTE — TELEPHONE ENCOUNTER
Called pt to let her know that her potassium was slightly low on her last set of labs and that FLORENCE Thompson sent in a one time dose of potassium and she will be rechecked at her next visit. Pt verbalized understanding. No further questions or needs at this time.

## 2022-11-04 NOTE — TELEPHONE ENCOUNTER
----- Message from FLORENCE Zambrano sent at 11/2/2022  3:18 PM EDT -----  Mildly low potassium level.  Please ask the patient to increase dietary potassium. We can Give KCL 40 meq po x1 (I will send) also and recheck at her next visit. Thanks!

## 2022-11-07 ENCOUNTER — OFFICE VISIT (OUTPATIENT)
Dept: FAMILY MEDICINE CLINIC | Facility: CLINIC | Age: 62
End: 2022-11-07

## 2022-11-07 VITALS
HEIGHT: 65 IN | OXYGEN SATURATION: 100 % | SYSTOLIC BLOOD PRESSURE: 155 MMHG | TEMPERATURE: 98.9 F | HEART RATE: 71 BPM | WEIGHT: 172.8 LBS | BODY MASS INDEX: 28.79 KG/M2 | DIASTOLIC BLOOD PRESSURE: 79 MMHG

## 2022-11-07 DIAGNOSIS — F32.0 CURRENT MILD EPISODE OF MAJOR DEPRESSIVE DISORDER WITHOUT PRIOR EPISODE: Primary | ICD-10-CM

## 2022-11-07 DIAGNOSIS — G57.93 NEUROPATHY OF BOTH FEET: ICD-10-CM

## 2022-11-07 DIAGNOSIS — M05.79 RHEUMATOID ARTHRITIS INVOLVING MULTIPLE SITES WITH POSITIVE RHEUMATOID FACTOR: ICD-10-CM

## 2022-11-07 DIAGNOSIS — C56.9 MALIGNANT NEOPLASM OF OVARY, UNSPECIFIED LATERALITY: ICD-10-CM

## 2022-11-07 DIAGNOSIS — F51.01 PRIMARY INSOMNIA: ICD-10-CM

## 2022-11-07 DIAGNOSIS — F41.9 ANXIETY: ICD-10-CM

## 2022-11-07 DIAGNOSIS — M25.551 RIGHT HIP PAIN: ICD-10-CM

## 2022-11-07 PROCEDURE — 99214 OFFICE O/P EST MOD 30 MIN: CPT | Performed by: NURSE PRACTITIONER

## 2022-11-07 RX ORDER — HYDROXYZINE HYDROCHLORIDE 10 MG/1
10 TABLET, FILM COATED ORAL EVERY 8 HOURS PRN
Qty: 45 TABLET | Refills: 2 | Status: SHIPPED | OUTPATIENT
Start: 2022-11-07

## 2022-11-07 RX ORDER — SERTRALINE HYDROCHLORIDE 25 MG/1
25 TABLET, FILM COATED ORAL DAILY
Qty: 30 TABLET | Refills: 1 | Status: SHIPPED | OUTPATIENT
Start: 2022-11-07 | End: 2023-01-30 | Stop reason: SDUPTHER

## 2022-11-07 RX ORDER — GABAPENTIN 300 MG/1
CAPSULE ORAL
Qty: 90 CAPSULE | Refills: 0 | Status: SHIPPED | OUTPATIENT
Start: 2022-11-07 | End: 2022-12-22 | Stop reason: SDUPTHER

## 2022-11-07 NOTE — PROGRESS NOTES
Chief Complaint  Chief Complaint   Patient presents with   • Joint Pain     Pt states arthritis is bad enough it wakes her from sleep.  She states hx of arthritis started appx 2 yrs ago.    • Depression     Pt states she feels been stuck in a rut for a bit of time now due to health issues.            Subjective          Lucy Mahan presents to [facility] for   History of Present Illness     Patient presents for arthritis of multiple joints, she has a history of rheumatoid arthritis, c/o severe pain in right hip, middle of back, right knee mild d/t hip, stopped all rheumatoid arthritis medications at the time of cancer diagnosis. She reports her joints ache/trhob, wake her up at night frequently and is not sleeping well.     Depression, worsening due to cancer, her current health conditions, ill , MIL in nursing home, she reports she is managing everything and still working. Has a lot going on and keeps piling up, can't get ahead.     Ovarian cancer, still getting chemo q 2wks, last scheduled in nov 30, she is following with oncology.  Overall doing well with treatments, however reports severe neuropathy of the feet, she is taking gabapentin which is somewhat effective for pain.      PHQ-9 Depression Screening  Little interest or pleasure in doing things? 2-->more than half the days   Feeling down, depressed, or hopeless? 1-->several days   Trouble falling or staying asleep, or sleeping too much? 2-->more than half the days   Feeling tired or having little energy? 2-->more than half the days   Poor appetite or overeating? 2-->more than half the days   Feeling bad about yourself - or that you are a failure or have let yourself or your family down? 1-->several days   Trouble concentrating on things, such as reading the newspaper or watching television? 2-->more than half the days   Moving or speaking so slowly that other people could have noticed? Or the opposite - being so  fidgety or restless that you have been moving around a lot more than usual? 0-->not at all   Thoughts that you would be better off dead, or of hurting yourself in some way? 0-->not at all   PHQ-9 Total Score 12   If you checked off any problems, how difficult have these problems made it for you to do your work, take care of things at home, or get along with other people? somewhat difficult           The following portions of the patient's history were reviewed and updated as appropriate: allergies, current medications, past family history, past medical history, past social history, past surgical history and problem list.    Past Medical History:   Diagnosis Date   • Arthritis    • Cancer (HCC)    • Depression    • Gall stones    • Hypertension    • Irritable bowel syndrome    • Rheumatoid aortitis    • Thyroid disease      Past Surgical History:   Procedure Laterality Date   •  SECTION      x2   • CHOLECYSTECTOMY N/A 11/10/2021    Procedure: diagnostic laparoscopy and peritoneal biopsy;  Surgeon: Krunal Badillo MD;  Location: [facility];  Service: General;  Laterality: N/A;   • COLONOSCOPY     • HIP SURGERY Left    • JOINT REPLACEMENT     • THYROIDECTOMY, PARTIAL  2017   • TOTAL ABDOMINAL HYSTERECTOMY N/A 2022    Procedure: EXPLORATORY LAPAROTOMY, TOTAL ABDOMINAL HYSTERECTOMY, BILATERAL SALPINGOOOPHORECTOMY, PELVIC AND AORTIC LYMPHADENECTOMY, OMENTECTOMY;  Surgeon: Maggie Castro DO;  Location: [facility];  Service: Gynecology Oncology;  Laterality: N/A;   • VENOUS ACCESS DEVICE (PORT) INSERTION Left 2021    Procedure: INSERTION VENOUS ACCESS DEVICE;  Surgeon: Krunal Badillo MD;  Location: [facility];  Service: General;  Laterality: Left;     Family History   Problem Relation Age of Onset   • Dementia Mother    • Arthritis Mother    • Heart disease Father    • Hypertension Father    • Malig Hyperthermia Neg Hx      Social History     Tobacco Use   • Smoking status: Never   •  Smokeless tobacco: Never   Substance Use Topics   • Alcohol use: Yes     Alcohol/week: 1.0 standard drink     Types: 1 Glasses of wine per week     Comment: less than monthly       Current Outpatient Medications:   •  amLODIPine (NORVASC) 5 MG tablet, TAKE ONE (1) TABLET BY MOUTH DAILY, Disp: 90 tablet, Rfl: 1  •  hydroCHLOROthiazide (HYDRODIURIL) 12.5 MG tablet, Take 1 tablet by mouth Daily., Disp: 90 tablet, Rfl: 0  •  HYDROcodone-acetaminophen (Norco) 5-325 MG per tablet, Take 1 tablet by mouth Every 6 (Six) Hours As Needed for Moderate Pain., Disp: 60 tablet, Rfl: 0  •  hydrOXYzine (ATARAX) 10 MG tablet, Take 1 tablet by mouth Every 8 (Eight) Hours As Needed for Anxiety., Disp: 45 tablet, Rfl: 2  •  letrozole (FEMARA) 2.5 MG tablet, TAKE ONE (1) TABLET BY MOUTH DAILY, Disp: 60 tablet, Rfl: 3  •  levothyroxine (SYNTHROID, LEVOTHROID) 137 MCG tablet, TAKE ONE (1) TABLET BY MOUTH EVERY DAY, Disp: 90 tablet, Rfl: 1  •  lidocaine-prilocaine (EMLA) 2.5-2.5 % cream, Apply 1 application topically to the appropriate area as directed. Apply to port 1 hour prior to port access, Disp: 30 g, Rfl: 1  •  nystatin susp + lidocaine viscous (MAGIC MOUTHWASH) oral suspension, Swish and spit 5 mL 3 (Three) Times a Day., Disp: 150 mL, Rfl: 3  •  omeprazole (priLOSEC) 40 MG capsule, TAKE ONE (1) CAPSULE BY MOUTH EVERY DAY, Disp: 30 capsule, Rfl: 6  •  pantoprazole (PROTONIX) 40 MG EC tablet, TAKE ONE (1) TABLET BY MOUTH EVERY DAY, Disp: 30 tablet, Rfl: 6  •  promethazine (PHENERGAN) 25 MG tablet, Take 1 tablet by mouth Every 6 (Six) Hours As Needed for Nausea or Vomiting., Disp: 90 tablet, Rfl: 1  •  gabapentin (NEURONTIN) 300 MG capsule, TAKE ONE (1) CAPSULE BY MOUTH EVERY 8 HOURS, Disp: 90 capsule, Rfl: 0  •  sertraline (Zoloft) 25 MG tablet, Take 1 tablet by mouth Daily., Disp: 30 tablet, Rfl: 1    Objective   Vital Signs:   /79 (BP Location: Left arm, Patient Position: Sitting, Cuff Size: Adult)   Pulse 71   Temp 98.9 °F  "(37.2 °C) (Temporal)   Ht 165.1 cm (65\")   Wt 78.4 kg (172 lb 12.8 oz)   SpO2 100%   BMI 28.76 kg/m²           Physical Exam  Vitals and nursing note reviewed.   Constitutional:       General: She is not in acute distress.     Appearance: She is well-developed. She is not diaphoretic.   Eyes:      Pupils: Pupils are equal, round, and reactive to light.   Neck:      Thyroid: No thyromegaly.      Vascular: No JVD.   Cardiovascular:      Rate and Rhythm: Normal rate and regular rhythm.      Heart sounds: Normal heart sounds. No murmur heard.  Pulmonary:      Effort: Pulmonary effort is normal. No respiratory distress.      Breath sounds: Normal breath sounds.   Abdominal:      General: Bowel sounds are normal. There is no distension.      Palpations: Abdomen is soft.      Tenderness: There is no abdominal tenderness.   Musculoskeletal:         General: Swelling (trace pitting BLE) and tenderness (R hip severe coley rom, ttp. multi-joint RA swelling/ttp, mild/mod coley rom) present.      Cervical back: Normal range of motion and neck supple.   Skin:     General: Skin is warm and dry.   Neurological:      Mental Status: She is alert and oriented to person, place, and time.      Sensory: Sensory deficit (neuropathy of B feet) present.   Psychiatric:         Attention and Perception: Attention normal.         Mood and Affect: Mood is depressed. Mood is not anxious.         Speech: Speech normal.         Behavior: Behavior normal. Behavior is cooperative.         Thought Content: Thought content normal. Thought content does not include suicidal ideation.         Cognition and Memory: Cognition normal.         Judgment: Judgment normal.          Result Review :     Hospital Outpatient Visit on 11/02/2022   Component Date Value Ref Range Status   • Glucose 11/02/2022 125 (H)  65 - 99 mg/dL Final   • BUN 11/02/2022 23  8 - 23 mg/dL Final   • Creatinine 11/02/2022 0.88  0.57 - 1.00 mg/dL Final   • Sodium 11/02/2022 140  136 - " 145 mmol/L Final   • Potassium 11/02/2022 3.3 (L)  3.5 - 5.2 mmol/L Final   • Chloride 11/02/2022 101  98 - 107 mmol/L Final   • CO2 11/02/2022 27.0  22.0 - 29.0 mmol/L Final   • Calcium 11/02/2022 9.7  8.6 - 10.5 mg/dL Final   • Total Protein 11/02/2022 7.1  6.0 - 8.5 g/dL Final   • Albumin 11/02/2022 3.60  3.50 - 5.20 g/dL Final   • ALT (SGPT) 11/02/2022 12  1 - 33 U/L Final   • AST (SGOT) 11/02/2022 22  1 - 32 U/L Final   • Alkaline Phosphatase 11/02/2022 70  39 - 117 U/L Final   • Total Bilirubin 11/02/2022 0.3  0.0 - 1.2 mg/dL Final   • Globulin 11/02/2022 3.5  gm/dL Final   • A/G Ratio 11/02/2022 1.0  g/dL Final   • BUN/Creatinine Ratio 11/02/2022 26.1 (H)  7.0 - 25.0 Final   • Anion Gap 11/02/2022 12.0  5.0 - 15.0 mmol/L Final   • eGFR 11/02/2022 73.9  >60.0 mL/min/1.73 Final    National Kidney Foundation and American Society of Nephrology (ASN) Task Force recommended calculation based on the Chronic Kidney Disease Epidemiology Collaboration (CKD-EPI) equation refit without adjustment for race.   • Color, UA 11/02/2022 Yellow  Yellow, Straw Final   • Appearance, UA 11/02/2022 Clear  Clear Final   • pH, UA 11/02/2022 5.5  5.0 - 8.0 Final   • Specific Gravity, UA 11/02/2022 1.020  1.005 - 1.030 Final   • Glucose, UA 11/02/2022 Negative  Negative Final   • Ketones, UA 11/02/2022 Negative  Negative Final   • Bilirubin, UA 11/02/2022 Negative  Negative Final   • Blood, UA 11/02/2022 Negative  Negative Final   • Protein, UA 11/02/2022 Negative  Negative Final   • Leuk Esterase, UA 11/02/2022 Negative  Negative Final   • Nitrite, UA 11/02/2022 Negative  Negative Final   • Urobilinogen, UA 11/02/2022 0.2 E.U./dL  0.2 - 1.0 E.U./dL Final   •  11/02/2022 24.6  0.0 - 38.1 U/mL Final   • WBC 11/02/2022 6.01  3.40 - 10.80 10*3/mm3 Final   • RBC 11/02/2022 4.18  3.77 - 5.28 10*6/mm3 Final   • Hemoglobin 11/02/2022 12.7  12.0 - 15.9 g/dL Final   • Hematocrit 11/02/2022 38.9  34.0 - 46.6 % Final   • MCV 11/02/2022 93.1   79.0 - 97.0 fL Final   • MCH 11/02/2022 30.4  26.6 - 33.0 pg Final   • MCHC 11/02/2022 32.6  31.5 - 35.7 g/dL Final   • RDW 11/02/2022 13.4  12.3 - 15.4 % Final   • RDW-SD 11/02/2022 44.2  37.0 - 54.0 fl Final   • MPV 11/02/2022 9.3  6.0 - 12.0 fL Final   • Platelets 11/02/2022 153  140 - 450 10*3/mm3 Final   • Neutrophil % 11/02/2022 62.8  42.7 - 76.0 % Final   • Lymphocyte % 11/02/2022 24.3  19.6 - 45.3 % Final   • Monocyte % 11/02/2022 9.0  5.0 - 12.0 % Final   • Eosinophil % 11/02/2022 3.7  0.3 - 6.2 % Final   • Basophil % 11/02/2022 0.2  0.0 - 1.5 % Final   • Neutrophils, Absolute 11/02/2022 3.78  1.70 - 7.00 10*3/mm3 Final   • Lymphocytes, Absolute 11/02/2022 1.46  0.70 - 3.10 10*3/mm3 Final   • Monocytes, Absolute 11/02/2022 0.54  0.10 - 0.90 10*3/mm3 Final   • Eosinophils, Absolute 11/02/2022 0.22  0.00 - 0.40 10*3/mm3 Final   • Basophils, Absolute 11/02/2022 0.01  0.00 - 0.20 10*3/mm3 Final                  BMI is >= 25 and <30. (Overweight) The following options were offered after discussion;: exercise counseling/recommendations and nutrition counseling/recommendations           Assessment and Plan    Diagnoses and all orders for this visit:    1. Current mild episode of major depressive disorder without prior episode (HCC) (Primary)  Comments:  PHQ-9 12, recommend trial of Zoloft.   Patient provided list of local therapists      2. Neuropathy of both feet  Comments:  Continue gabapentin 3 times daily    3. Rheumatoid arthritis involving multiple sites with positive rheumatoid factor (HCC)  Comments:  Refer back to Yoko De La Garza, patient previously saw her in the past was on Plaquenil and stopped when chemotherapy was initiated  Orders:  -     Ambulatory Referral to Rheumatology  -     Ambulatory Referral to Orthopedic Surgery    4. Malignant neoplasm of ovary, unspecified laterality (HCC)  Comments:  Keep follow-ups with oncology as directed    5. Primary insomnia  Comments:  Recommend retry melatonin  If  no improvement consider Quvviq    6. Anxiety  Comments:  add zoloft, continue hydroxyzine as needed  Orders:  -     hydrOXYzine (ATARAX) 10 MG tablet; Take 1 tablet by mouth Every 8 (Eight) Hours As Needed for Anxiety.  Dispense: 45 tablet; Refill: 2    7. Right hip pain  Comments:  Check dedicated right hip and pelvis x-ray  pt had previous L THR with Dr. Solorzano, will refer back for further eval.   Orders:  -     XR Hip With or Without Pelvis 2 - 3 View Right; Future  -     Ambulatory Referral to Orthopedic Surgery    Other orders  -     sertraline (Zoloft) 25 MG tablet; Take 1 tablet by mouth Daily.  Dispense: 30 tablet; Refill: 1         Cont gabapentin  Ok for prn nsaids      I spent 30 minutes caring for Lucy Mahan on this date of service. This time includes time spent by me in the following activities: preparing for the visit, reviewing tests, performing a medically appropriate examination and/or evaluation , counseling and educating the patient/family/caregiver, ordering medications, tests, or procedures and documenting information in the medical record        Follow Up     Return in about 6 weeks (around 12/19/2022) for Recheck.  Patient was given instructions and counseling regarding her condition or for health maintenance advice. Please see specific information pulled into the AVS if appropriate.        Part of this note may be an electronic transcription/translation of spoken language to printed text using the Dragon Dictation System

## 2022-11-16 ENCOUNTER — OFFICE VISIT (OUTPATIENT)
Dept: ONCOLOGY | Facility: CLINIC | Age: 62
End: 2022-11-16

## 2022-11-16 ENCOUNTER — HOSPITAL ENCOUNTER (OUTPATIENT)
Dept: ONCOLOGY | Facility: HOSPITAL | Age: 62
Setting detail: INFUSION SERIES
Discharge: HOME | End: 2022-11-16

## 2022-11-16 VITALS
TEMPERATURE: 97.7 F | OXYGEN SATURATION: 96 % | HEART RATE: 76 BPM | WEIGHT: 169.2 LBS | DIASTOLIC BLOOD PRESSURE: 80 MMHG | HEIGHT: 65 IN | SYSTOLIC BLOOD PRESSURE: 137 MMHG | BODY MASS INDEX: 28.19 KG/M2 | RESPIRATION RATE: 18 BRPM

## 2022-11-16 VITALS
HEART RATE: 76 BPM | DIASTOLIC BLOOD PRESSURE: 76 MMHG | RESPIRATION RATE: 18 BRPM | HEIGHT: 65 IN | BODY MASS INDEX: 28.16 KG/M2 | SYSTOLIC BLOOD PRESSURE: 165 MMHG | TEMPERATURE: 97.7 F | WEIGHT: 169 LBS

## 2022-11-16 DIAGNOSIS — E83.51 HYPOCALCEMIA: ICD-10-CM

## 2022-11-16 DIAGNOSIS — C56.9 MALIGNANT NEOPLASM OF OVARY, UNSPECIFIED LATERALITY: Primary | ICD-10-CM

## 2022-11-16 LAB
ALBUMIN SERPL-MCNC: 3.7 G/DL (ref 3.5–5.2)
ALBUMIN/GLOB SERPL: 1 G/DL
ALP SERPL-CCNC: 75 U/L (ref 39–117)
ALT SERPL W P-5'-P-CCNC: 17 U/L (ref 1–33)
ANION GAP SERPL CALCULATED.3IONS-SCNC: 11 MMOL/L (ref 5–15)
AST SERPL-CCNC: 28 U/L (ref 1–32)
BASOPHILS # BLD AUTO: 0.04 10*3/MM3 (ref 0–0.2)
BASOPHILS NFR BLD AUTO: 0.9 % (ref 0–1.5)
BILIRUB SERPL-MCNC: 0.5 MG/DL (ref 0–1.2)
BILIRUB UR QL STRIP: NEGATIVE
BUN SERPL-MCNC: 18 MG/DL (ref 8–23)
BUN/CREAT SERPL: 16.1 (ref 7–25)
CALCIUM SPEC-SCNC: 9.8 MG/DL (ref 8.6–10.5)
CANCER AG125 SERPL QL: 29 U/ML (ref 0–38.1)
CHLORIDE SERPL-SCNC: 103 MMOL/L (ref 98–107)
CLARITY UR: CLEAR
CO2 SERPL-SCNC: 27 MMOL/L (ref 22–29)
COLOR UR: YELLOW
CREAT SERPL-MCNC: 1.12 MG/DL (ref 0.57–1)
DEPRECATED RDW RBC AUTO: 50.3 FL (ref 37–54)
EGFRCR SERPLBLD CKD-EPI 2021: 55.4 ML/MIN/1.73
EOSINOPHIL # BLD AUTO: 0.22 10*3/MM3 (ref 0–0.4)
EOSINOPHIL NFR BLD AUTO: 4.7 % (ref 0.3–6.2)
ERYTHROCYTE [DISTWIDTH] IN BLOOD BY AUTOMATED COUNT: 14.7 % (ref 12.3–15.4)
GLOBULIN UR ELPH-MCNC: 3.8 GM/DL
GLUCOSE SERPL-MCNC: 107 MG/DL (ref 65–99)
GLUCOSE UR STRIP-MCNC: NEGATIVE MG/DL
HCT VFR BLD AUTO: 41 % (ref 34–46.6)
HGB BLD-MCNC: 13.2 G/DL (ref 12–15.9)
HGB UR QL STRIP.AUTO: NEGATIVE
KETONES UR QL STRIP: ABNORMAL
LEUKOCYTE ESTERASE UR QL STRIP.AUTO: ABNORMAL
LYMPHOCYTES # BLD AUTO: 1.43 10*3/MM3 (ref 0.7–3.1)
LYMPHOCYTES NFR BLD AUTO: 30.6 % (ref 19.6–45.3)
MCH RBC QN AUTO: 30.7 PG (ref 26.6–33)
MCHC RBC AUTO-ENTMCNC: 32.2 G/DL (ref 31.5–35.7)
MCV RBC AUTO: 95.3 FL (ref 79–97)
MONOCYTES # BLD AUTO: 0.5 10*3/MM3 (ref 0.1–0.9)
MONOCYTES NFR BLD AUTO: 10.7 % (ref 5–12)
NEUTROPHILS NFR BLD AUTO: 2.49 10*3/MM3 (ref 1.7–7)
NEUTROPHILS NFR BLD AUTO: 53.1 % (ref 42.7–76)
NITRITE UR QL STRIP: NEGATIVE
PH UR STRIP.AUTO: 5.5 [PH] (ref 5–8)
PLATELET # BLD AUTO: 135 10*3/MM3 (ref 140–450)
PMV BLD AUTO: 9.3 FL (ref 6–12)
POTASSIUM SERPL-SCNC: 3.8 MMOL/L (ref 3.5–5.2)
PROT SERPL-MCNC: 7.5 G/DL (ref 6–8.5)
PROT UR QL STRIP: ABNORMAL
RBC # BLD AUTO: 4.3 10*6/MM3 (ref 3.77–5.28)
SODIUM SERPL-SCNC: 141 MMOL/L (ref 136–145)
SP GR UR STRIP: >=1.03 (ref 1–1.03)
UROBILINOGEN UR QL STRIP: ABNORMAL
WBC NRBC COR # BLD: 4.68 10*3/MM3 (ref 3.4–10.8)

## 2022-11-16 PROCEDURE — 81003 URINALYSIS AUTO W/O SCOPE: CPT | Performed by: INTERNAL MEDICINE

## 2022-11-16 PROCEDURE — 96365 THER/PROPH/DIAG IV INF INIT: CPT

## 2022-11-16 PROCEDURE — 86304 IMMUNOASSAY TUMOR CA 125: CPT | Performed by: INTERNAL MEDICINE

## 2022-11-16 PROCEDURE — 80053 COMPREHEN METABOLIC PANEL: CPT | Performed by: INTERNAL MEDICINE

## 2022-11-16 PROCEDURE — 25010000002 HEPARIN LOCK FLUSH PER 10 UNITS: Performed by: INTERNAL MEDICINE

## 2022-11-16 PROCEDURE — 99214 OFFICE O/P EST MOD 30 MIN: CPT | Performed by: INTERNAL MEDICINE

## 2022-11-16 PROCEDURE — 96413 CHEMO IV INFUSION 1 HR: CPT

## 2022-11-16 PROCEDURE — 25010000002 BEVACIZUMAB-BVZR 400 MG/16ML SOLUTION 16 ML VIAL: Performed by: INTERNAL MEDICINE

## 2022-11-16 PROCEDURE — 85025 COMPLETE CBC W/AUTO DIFF WBC: CPT | Performed by: INTERNAL MEDICINE

## 2022-11-16 RX ORDER — POTASSIUM CHLORIDE 1500 MG/1
TABLET, FILM COATED, EXTENDED RELEASE ORAL
COMMUNITY
Start: 2022-11-03 | End: 2022-12-22

## 2022-11-16 RX ORDER — SODIUM CHLORIDE 0.9 % (FLUSH) 0.9 %
10 SYRINGE (ML) INJECTION AS NEEDED
Status: DISCONTINUED | OUTPATIENT
Start: 2022-11-16 | End: 2022-11-17 | Stop reason: HOSPADM

## 2022-11-16 RX ORDER — SODIUM CHLORIDE 0.9 % (FLUSH) 0.9 %
10 SYRINGE (ML) INJECTION AS NEEDED
Status: CANCELLED | OUTPATIENT
Start: 2022-11-16

## 2022-11-16 RX ORDER — NIRMATRELVIR AND RITONAVIR 150-100 MG
KIT ORAL
COMMUNITY
Start: 2022-10-24 | End: 2022-12-22

## 2022-11-16 RX ORDER — LIDOCAINE HYDROCHLORIDE 20 MG/ML
SOLUTION OROPHARYNGEAL
COMMUNITY
Start: 2022-10-13 | End: 2022-12-22

## 2022-11-16 RX ORDER — POTASSIUM CHLORIDE 20 MEQ/1
TABLET, EXTENDED RELEASE ORAL
COMMUNITY
Start: 2022-11-03 | End: 2022-12-22

## 2022-11-16 RX ORDER — HEPARIN SODIUM (PORCINE) LOCK FLUSH IV SOLN 100 UNIT/ML 100 UNIT/ML
500 SOLUTION INTRAVENOUS AS NEEDED
Status: CANCELLED | OUTPATIENT
Start: 2022-11-16

## 2022-11-16 RX ORDER — HEPARIN SODIUM (PORCINE) LOCK FLUSH IV SOLN 100 UNIT/ML 100 UNIT/ML
500 SOLUTION INTRAVENOUS AS NEEDED
Status: DISCONTINUED | OUTPATIENT
Start: 2022-11-16 | End: 2022-11-17 | Stop reason: HOSPADM

## 2022-11-16 RX ORDER — SODIUM CHLORIDE 9 MG/ML
250 INJECTION, SOLUTION INTRAVENOUS ONCE
Status: COMPLETED | OUTPATIENT
Start: 2022-11-16 | End: 2022-11-16

## 2022-11-16 RX ADMIN — SODIUM CHLORIDE 250 ML: 9 INJECTION, SOLUTION INTRAVENOUS at 12:22

## 2022-11-16 RX ADMIN — Medication 10 ML: at 12:56

## 2022-11-16 RX ADMIN — HEPARIN 500 UNITS: 100 SYRINGE at 12:57

## 2022-11-16 RX ADMIN — SODIUM CHLORIDE 780 MG: 9 INJECTION, SOLUTION INTRAVENOUS at 12:22

## 2022-11-16 NOTE — PROGRESS NOTES
Taxol/Avastin Hematology/Oncology Outpatient Follow Up    PATIENT NAME:Lucy Mahan  :1960  MRN: [id number]  PRIMARY CARE PHYSICIAN: Argentina Avalos APRN  REFERRING PHYSICIAN: [place] Avalos APRN    Chief Complaint   Patient presents with   • Follow-up     Malignant neoplasm of ovary, unspecified laterality (HCC)        HISTORY OF PRESENT ILLNESS:     This is a 61 y.o.  female who developed abdominal pain in 2021.  Patient has also lost approximately 35 pounds during that..  She has had loss of appetite.  Due to the abdominal pain,  she had an ultrasound of the abdomen done on 10/13/2021.  This basically revealed no liver lesions.  Common bile duct is normal at 3 mm.  There is a nonmobile 3 cm shadowing gallstone within the gallbladder neck.  No gallbladder thickening or pericholecystic fluid collection.  Patient was then referred to general surgery and was seen by Dr. Badillo.  Who took her to surgery on 11/10/2021 for diagnostic laparoscopy and peritoneal biopsy.  Intra-Op , she was noted to have peritoneal studding with malignancy throughout the abdominal cavity and biopsies were completed. 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  The tumor was focally positive for progesterone receptor, positive for CK7, estrogen receptor, CA-125 and PAX 8..  The staining pattern is consistent with ovarian serous carcinoma.      She had CT scan of the chest, abdomen and pelvis and the chest is a 2 mm noncalcified nodule within the left lower lobe, noncalcified nodule in the left upper lobe measuring 4 mm and 3 mm.  In the abdomen there is a 5.1 x 5.3 cm enhancing soft tissue mass in the pelvis to the right of midline associated with the adnexal region possibly representing a primary ovarian malignancy.  No right or left ovarian tissue was seen.  There is abnormal omental thickening and nodularity consistent with carcinomatosis greatest bulk in the left mid abdomen measuring up to  10.8 cm x 2.5 cm.  There is nodular peritoneal thickening also present within the abdomen and pelvis consistent with peritoneal carcinomatosis.  The small quantity of ascitic fluid in the abdomen and pelvis.  Carcinomatosis nodular studding is seen along the inferior right hepatic lobe.     She  has been referred to us for further evaluation and management of her newly diagnosed ovarian carcinoma.     Patient is accompanied today by her daughter for this appointment.  There is  family history of precancerous cells of the  uterusin her sister.        She does not smoke and does not drink alcohol.  Patient is  and has 2 daughters.  She works for the [employer].    · 11/29/2021 patient was seen by Dr. Dubois who has recommended neoadjuvant chemotherapy for 2-3 cycles and then interval cytoreduction.  She has recommended carboplatinum AUC 6, Taxol 1 7 5 mg per metered squared, Avastin 15 mg/kg as was done in the oceans trial but hold Avastin for the first few cycles due to bowel risk.  · Prechemo CA-125 was 754  · 12/7/2021 patient received first cycle of chemotherapy with carboplatinum and Taxol with Neulasta  · 12/28/2021 patient received cycle 2 of combination chemotherapy with carboplatinum and Taxol with Neulasta  · 12/28/2021 CA-125 was down to 635  · 1/11/2022 add-on appointment for nausea, dizziness, thrombocytopenia, pain in upper to mid abdomen 8/10, new onset in the last 4 to 5 days  · January 12, 2022: Due to acute GI symptoms patient had a CT scan of the abdomen and pelvis which basically revealed increasing effusion on the left lung mild pleural effusion on the right lung decreased in amount of omental caking but no interval change in the right adnexal area moderate abdominal and pelvic ascites which has increased.  She denies any significant pulmonary symptoms.  Her O2 sat today was 100% at rest and 99% with ambulation  · 1/24/2022 patient received cycle 3 of carboplatinum with Taxol  with dose reduction due to significant toxicities  · 2022 patient received cycle 4-day 1 of chemo platinum Taxol and Avastin  · 3/7/22: Patient received cycle 5-day 1 of chemotherapy with carboplatinum Taxol and Avastin  · 2022 patient received cycle 6 of chemotherapy with A platinum Taxol and Avastin  · 2022 patient underwent exploratory laparotomy, total abdominal hysterectomy, bilateral salpingo-oophorectomy, pelvic and aortic lymphadenectomy, omentectomy performed by Dr Castro, pathology reviewed high-grade serous carcinoma involving the uterine serosa and outer myometrium, bilateral fallopian tubes and bilateral ovaries and adnexa soft tissue.  Also received are high-grade serous carcinoma involving the omentum as well as residual high-grade serous carcinoma with treatment effects on the colonic splenic [id number], estrogen receptor diffusely positive p53 patchy, p16 diffusely positive.  Molecular testing is pending  · 2022 patient received cycle 7 of Combination chemotherapy with Avastin and carboplatinum.  Taxol was held due to significant peripheral neuropathy  · 2022 patient presents for follow-up of ovarian cancer  · 2020 patient received cycle 9 of carboplatinum  · Patient is currently on maintenance Avastin and letrozole for ovarian cancer suppression        Past Medical History:   Diagnosis Date   • Arthritis    • Cancer (HCC)     ovarian   • Depression    • Gall stones    • Hypertension    • Irritable bowel syndrome    • Rheumatoid aortitis    • Thyroid disease        Past Surgical History:   Procedure Laterality Date   •  SECTION      x2   • CHOLECYSTECTOMY N/A 11/10/2021    Procedure: diagnostic laparoscopy and peritoneal biopsy;  Surgeon: Krunal Badillo MD;  Location: [facility];  Service: General;  Laterality: N/A;   • COLONOSCOPY     • HIP SURGERY Left    • JOINT REPLACEMENT     • THYROIDECTOMY, PARTIAL  2017   • TOTAL ABDOMINAL HYSTERECTOMY N/A  04/26/2022    Procedure: EXPLORATORY LAPAROTOMY, TOTAL ABDOMINAL HYSTERECTOMY, BILATERAL SALPINGOOOPHORECTOMY, PELVIC AND AORTIC LYMPHADENECTOMY, OMENTECTOMY;  Surgeon: Maggie Castro DO;  Location[facility] MAIN OR;  Service: Gynecology Oncology;  Laterality: N/A;   • VENOUS ACCESS DEVICE (PORT) INSERTION Left 12/06/2021    Procedure: INSERTION VENOUS ACCESS DEVICE;  Surgeon: Krunal Badillo MD;  Location: [facility] MAIN OR;  Service: General;  Laterality: Left;         Current Outpatient Medications:   •  amLODIPine (NORVASC) 5 MG tablet, TAKE ONE (1) TABLET BY MOUTH DAILY, Disp: 90 tablet, Rfl: 1  •  gabapentin (NEURONTIN) 300 MG capsule, TAKE ONE (1) CAPSULE BY MOUTH EVERY 8 HOURS, Disp: 90 capsule, Rfl: 0  •  hydroCHLOROthiazide (HYDRODIURIL) 12.5 MG tablet, Take 1 tablet by mouth Daily., Disp: 90 tablet, Rfl: 0  •  HYDROcodone-acetaminophen (Norco) 5-325 MG per tablet, Take 1 tablet by mouth Every 6 (Six) Hours As Needed for Moderate Pain., Disp: 60 tablet, Rfl: 0  •  hydrOXYzine (ATARAX) 10 MG tablet, Take 1 tablet by mouth Every 8 (Eight) Hours As Needed for Anxiety., Disp: 45 tablet, Rfl: 2  •  letrozole (FEMARA) 2.5 MG tablet, TAKE ONE (1) TABLET BY MOUTH DAILY, Disp: 60 tablet, Rfl: 3  •  levothyroxine (SYNTHROID, LEVOTHROID) 137 MCG tablet, TAKE ONE (1) TABLET BY MOUTH EVERY DAY, Disp: 90 tablet, Rfl: 1  •  Lidocaine Viscous HCl (XYLOCAINE) 2 % solution, , Disp: , Rfl:   •  lidocaine-prilocaine (EMLA) 2.5-2.5 % cream, Apply 1 application topically to the appropriate area as directed. Apply to port 1 hour prior to port access, Disp: 30 g, Rfl: 1  •  omeprazole (priLOSEC) 40 MG capsule, TAKE ONE (1) CAPSULE BY MOUTH EVERY DAY, Disp: 30 capsule, Rfl: 6  •  pantoprazole (PROTONIX) 40 MG EC tablet, TAKE ONE (1) TABLET BY MOUTH EVERY DAY, Disp: 30 tablet, Rfl: 6  •  Paxlovid, 150/100, 10 x 150 MG & 10 x 100MG tablet therapy pack tablet (for renal adjustment), Take 2 tablets by mouth 2 (Two) Times a Day for 5  days., Disp: , Rfl:   •  potassium chloride (K-DUR,KLOR-CON) 20 MEQ CR tablet, Take 2 tablets by mouth 1 (One) Time for 1 dose., Disp: , Rfl:   •  potassium chloride ER (K-TAB) 20 MEQ tablet controlled-release ER tablet, , Disp: , Rfl:   •  promethazine (PHENERGAN) 25 MG tablet, Take 1 tablet by mouth Every 6 (Six) Hours As Needed for Nausea or Vomiting., Disp: 90 tablet, Rfl: 1  •  sertraline (Zoloft) 25 MG tablet, Take 1 tablet by mouth Daily., Disp: 30 tablet, Rfl: 1  No current facility-administered medications for this visit.    Facility-Administered Medications Ordered in Other Visits:   •  heparin injection 500 Units, 500 Units, Intravenous, PRN, Inga Hernandez MD, 500 Units at 11/16/22 1257  •  sodium chloride 0.9 % flush 10 mL, 10 mL, Intravenous, PRNDavid Ifeoma Roseline, MD, 10 mL at 11/16/22 1256    No Known Allergies    Family History   Problem Relation Age of Onset   • Dementia Mother    • Arthritis Mother    • Heart disease Father    • Hypertension Father    • Malig Hyperthermia Neg Hx        Cancer-related family history is not on file.    Social History     Tobacco Use   • Smoking status: Never   • Smokeless tobacco: Never   Vaping Use   • Vaping Use: Never used   Substance Use Topics   • Alcohol use: Yes     Alcohol/week: 1.0 standard drink     Types: 1 Glasses of wine per week     Comment: less than monthly   • Drug use: No     I have reviewed and confirmed the accuracy of the patient's history: Chief complaint, HPI, ROS and Subjective as entered by the MA/LPN/RN. Inga Hernandez MD 11/16/22       SUBJECTIVE:    Complains of some fatigue but overall doing well          REVIEW OF SYSTEMS:    Review of Systems   Constitutional: Positive for fatigue. Negative for chills and fever.   HENT: Negative for ear pain, mouth sores, nosebleeds and sore throat.    Eyes: Negative for photophobia and visual disturbance.   Respiratory: Negative for wheezing and stridor.    Cardiovascular:  "Negative for chest pain and palpitations.   Gastrointestinal: Positive for abdominal pain and nausea. Negative for diarrhea and vomiting.   Endocrine: Negative for cold intolerance and heat intolerance.   Genitourinary: Negative for dysuria and hematuria.   Musculoskeletal: Negative for joint swelling and neck stiffness.   Skin: Negative for color change and rash.   Neurological: Positive for dizziness, weakness and headaches. Negative for seizures and syncope.   Hematological: Negative for adenopathy.   Psychiatric/Behavioral: Negative for agitation, confusion and hallucinations.     Post op changes noted.    OBJECTIVE:    Vitals:    11/16/22 1124   BP: 165/76   Pulse: 76   Resp: 18   Temp: 97.7 °F (36.5 °C)   TempSrc: Oral   Weight: 76.7 kg (169 lb)   Height: 165.1 cm (65\")   PainSc: 0-No pain     Body mass index is 28.12 kg/m².    ECOG    (1) Restricted in physically strenuous activity, ambulatory and able to do work of light nature    Physical Exam  Vitals and nursing note reviewed.   Constitutional:       General: She is not in acute distress.     Appearance: She is not diaphoretic.   HENT:      Head: Normocephalic and atraumatic.      Mouth/Throat:      Mouth: Mucous membranes are dry.   Eyes:      General: No scleral icterus.        Right eye: No discharge.         Left eye: No discharge.      Conjunctiva/sclera: Conjunctivae normal.   Neck:      Thyroid: No thyromegaly.   Cardiovascular:      Rate and Rhythm: Normal rate and regular rhythm.      Heart sounds: Normal heart sounds.     No friction rub. No gallop.   Pulmonary:      Effort: Pulmonary effort is normal. No respiratory distress.      Breath sounds: No stridor. No wheezing.   Abdominal:      General: Bowel sounds are normal.      Palpations: Abdomen is soft. There is no mass.      Tenderness: There is no abdominal tenderness. There is no guarding or rebound.      Comments: Abdominal wound noted   Musculoskeletal:         General: No tenderness. " Normal range of motion.      Cervical back: Normal range of motion and neck supple.   Lymphadenopathy:      Cervical: No cervical adenopathy.   Skin:     General: Skin is warm.      Findings: No erythema or rash.      Comments: Tenting skin turgor   Neurological:      Mental Status: She is alert and oriented to person, place, and time.      Motor: No abnormal muscle tone.   Psychiatric:         Behavior: Behavior normal.       I have reexamined the patient and the results are consistent with the previously documented exam. Inga Cori Hernandez MD     RECENT LABS    WBC   Date Value Ref Range Status   11/16/2022 4.68 3.40 - 10.80 10*3/mm3 Final     RBC   Date Value Ref Range Status   11/16/2022 4.30 3.77 - 5.28 10*6/mm3 Final     Hemoglobin   Date Value Ref Range Status   11/16/2022 13.2 12.0 - 15.9 g/dL Final     Hematocrit   Date Value Ref Range Status   11/16/2022 41.0 34.0 - 46.6 % Final     MCV   Date Value Ref Range Status   11/16/2022 95.3 79.0 - 97.0 fL Final     MCH   Date Value Ref Range Status   11/16/2022 30.7 26.6 - 33.0 pg Final     MCHC   Date Value Ref Range Status   11/16/2022 32.2 31.5 - 35.7 g/dL Final     RDW   Date Value Ref Range Status   11/16/2022 14.7 12.3 - 15.4 % Final     RDW-SD   Date Value Ref Range Status   11/16/2022 50.3 37.0 - 54.0 fl Final     MPV   Date Value Ref Range Status   11/16/2022 9.3 6.0 - 12.0 fL Final     Platelets   Date Value Ref Range Status   11/16/2022 135 (L) 140 - 450 10*3/mm3 Final     Neutrophil %   Date Value Ref Range Status   11/16/2022 53.1 42.7 - 76.0 % Final     Lymphocyte %   Date Value Ref Range Status   11/16/2022 30.6 19.6 - 45.3 % Final     Monocyte %   Date Value Ref Range Status   11/16/2022 10.7 5.0 - 12.0 % Final     Eosinophil %   Date Value Ref Range Status   11/16/2022 4.7 0.3 - 6.2 % Final     Basophil %   Date Value Ref Range Status   11/16/2022 0.9 0.0 - 1.5 % Final     Immature Grans %   Date Value Ref Range Status   04/29/2022 0.4 0.0 -  0.5 % Final     Neutrophils, Absolute   Date Value Ref Range Status   11/16/2022 2.49 1.70 - 7.00 10*3/mm3 Final     Lymphocytes, Absolute   Date Value Ref Range Status   11/16/2022 1.43 0.70 - 3.10 10*3/mm3 Final     Monocytes, Absolute   Date Value Ref Range Status   11/16/2022 0.50 0.10 - 0.90 10*3/mm3 Final     Eosinophils, Absolute   Date Value Ref Range Status   11/16/2022 0.22 0.00 - 0.40 10*3/mm3 Final     Basophils, Absolute   Date Value Ref Range Status   11/16/2022 0.04 0.00 - 0.20 10*3/mm3 Final     Immature Grans, Absolute   Date Value Ref Range Status   04/29/2022 0.02 0.00 - 0.05 10*3/mm3 Final     nRBC   Date Value Ref Range Status   04/29/2022 0.0 0.0 - 0.2 /100 WBC Final       Lab Results   Component Value Date    GLUCOSE 107 (H) 11/16/2022    BUN 18 11/16/2022    CREATININE 1.12 (H) 11/16/2022    EGFRIFNONA 47 (L) 02/21/2022    EGFRIFAFRI 44 (L) 01/21/2020    BCR 16.1 11/16/2022    K 3.8 11/16/2022    CO2 27.0 11/16/2022    CALCIUM 9.8 11/16/2022    ALBUMIN 3.70 11/16/2022    LABIL2 1.2 (calc) 01/21/2020    AST 28 11/16/2022    ALT 17 11/16/2022         ASSESSMENT:      1. 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  Ongoing management  2. Status post neoadjuvant chemotherapy with carboplatinum AUC 6, Taxol 175 mg per metered squared, with Avastin added due to lack of significant response with carboplatinum and Taxol.  Monitor serial CA-125's.  Down to 135 as of 3/14/2022.  Her CT scan showed response therefore patient will proceed with radical hysterectomy to perform by Dr. Dubois on 4/26/2022.  Plans for additional chemotherapy along with Avastin and possibly maintenance treatment with PARP inhibitors in the future.  She is also on letrozole 2.5 mg p.o. daily.  Patient is currently on Avastin maintenance along with letrozole.  Reviewed her recent CT scans which does not show any evidence of progressive disease completed October 20, 2022  3. Status post radical hysterectomy.  See  path report.  Caris testing pending  4. Mucositis: This has resolved  5. Pulmonary nodules of unclear etiology too small for biopsy or PET resolution: Continue surveillance CT scans.  Also check 's  6. Chemotherapy induced nausea: Improved  7. Chemotherapy induced fatigue: Improved  8. Chemotherapy-induced anemia  9. 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  Patient already had some 25% dose reduction on her chemo.  She is currently on Neurontin 300 mg daily will increase to 300 mg twice a day.  Peripheral neuropathy continues to be an issue.  We will hold Taxol for now till this has improved  10. Severe thrombocytopenia secondary to chemotherapy most notably carboplatinum  11. Comprehensive gene analysis with cancer next technology was negative for any significant mutation in all 77 genes analyzed  12. 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  No other actionable mutations identified  13. Dehydration: Resolved.  She has increase p.o. fluid intake.  14. Abdominal discomfort secondary to malignancy; Increasing abdominal discomfort               Plans:     · Continue Avastin  · Continue letrozole  · Continue Neurontin to 300 mg every 8 hours for peripheral neuropathy  · Reviewed CT scans of the chest abdomen and pelvis for cancer restaging  · She will benefit from olaparib in the future  · Monitor 's, these were drawn today  · Elisa's Magic mouthwash for mucositis as needed  · Can resume full-time work, notes given  · Reviewed her path report from radical hysterectomy 4/20/2022  · Comprehensive gene analysis with cancer next technology results reviewed with patient and copies of her results was also given to her for her own records keeping  · Continue Percocet 5 mg p.o. every 4-6 hours as needed for pain  · Continue vitamin b6 100mg po q 12   · Foundation 1 testing  results reviewed  · All questions answered  · Follow-up 4 weeks          I spent 30 total minutes, face-to-face, caring for Lucy today.  90% of this time involved counseling and/or coordination of care as documented within this note.

## 2022-11-23 DIAGNOSIS — C56.9 MALIGNANT NEOPLASM OF OVARY, UNSPECIFIED LATERALITY: Primary | ICD-10-CM

## 2022-11-23 RX ORDER — SODIUM CHLORIDE 9 MG/ML
250 INJECTION, SOLUTION INTRAVENOUS ONCE
Status: CANCELLED | OUTPATIENT
Start: 2022-11-30

## 2022-11-28 DIAGNOSIS — C56.9 MALIGNANT NEOPLASM OF OVARY, UNSPECIFIED LATERALITY: ICD-10-CM

## 2022-11-28 RX ORDER — HYDROCODONE BITARTRATE AND ACETAMINOPHEN 5; 325 MG/1; MG/1
1 TABLET ORAL EVERY 6 HOURS PRN
Qty: 60 TABLET | Refills: 0 | Status: SHIPPED | OUTPATIENT
Start: 2022-11-28 | End: 2023-01-06 | Stop reason: SDUPTHER

## 2022-11-30 ENCOUNTER — HOSPITAL ENCOUNTER (OUTPATIENT)
Dept: ONCOLOGY | Facility: HOSPITAL | Age: 62
Setting detail: INFUSION SERIES
Discharge: HOME | End: 2022-11-30

## 2022-11-30 VITALS
HEART RATE: 66 BPM | SYSTOLIC BLOOD PRESSURE: 138 MMHG | BODY MASS INDEX: 29.16 KG/M2 | OXYGEN SATURATION: 99 % | HEIGHT: 65 IN | RESPIRATION RATE: 16 BRPM | WEIGHT: 175 LBS | TEMPERATURE: 97.4 F | DIASTOLIC BLOOD PRESSURE: 71 MMHG

## 2022-11-30 DIAGNOSIS — C56.9 MALIGNANT NEOPLASM OF OVARY, UNSPECIFIED LATERALITY: Primary | ICD-10-CM

## 2022-11-30 DIAGNOSIS — E83.51 HYPOCALCEMIA: ICD-10-CM

## 2022-11-30 LAB
ALBUMIN SERPL-MCNC: 3.5 G/DL (ref 3.5–5.2)
ALBUMIN/GLOB SERPL: 1.1 G/DL
ALP SERPL-CCNC: 69 U/L (ref 39–117)
ALT SERPL W P-5'-P-CCNC: 16 U/L (ref 1–33)
ANION GAP SERPL CALCULATED.3IONS-SCNC: 10 MMOL/L (ref 5–15)
AST SERPL-CCNC: 24 U/L (ref 1–32)
BASOPHILS # BLD AUTO: 0.02 10*3/MM3 (ref 0–0.2)
BASOPHILS NFR BLD AUTO: 0.5 % (ref 0–1.5)
BILIRUB SERPL-MCNC: 0.5 MG/DL (ref 0–1.2)
BILIRUB UR QL STRIP: NEGATIVE
BUN SERPL-MCNC: 16 MG/DL (ref 8–23)
BUN/CREAT SERPL: 18.4 (ref 7–25)
CALCIUM SPEC-SCNC: 9.5 MG/DL (ref 8.6–10.5)
CANCER AG125 SERPL QL: 39.4 U/ML (ref 0–38.1)
CHLORIDE SERPL-SCNC: 106 MMOL/L (ref 98–107)
CLARITY UR: CLEAR
CO2 SERPL-SCNC: 22 MMOL/L (ref 22–29)
COLOR UR: YELLOW
CREAT SERPL-MCNC: 0.87 MG/DL (ref 0.57–1)
DEPRECATED RDW RBC AUTO: 51.8 FL (ref 37–54)
EGFRCR SERPLBLD CKD-EPI 2021: 75 ML/MIN/1.73
EOSINOPHIL # BLD AUTO: 0.16 10*3/MM3 (ref 0–0.4)
EOSINOPHIL NFR BLD AUTO: 3.7 % (ref 0.3–6.2)
ERYTHROCYTE [DISTWIDTH] IN BLOOD BY AUTOMATED COUNT: 15.1 % (ref 12.3–15.4)
GLOBULIN UR ELPH-MCNC: 3.2 GM/DL
GLUCOSE SERPL-MCNC: 93 MG/DL (ref 65–99)
GLUCOSE UR STRIP-MCNC: NEGATIVE MG/DL
HCT VFR BLD AUTO: 39.6 % (ref 34–46.6)
HGB BLD-MCNC: 12.7 G/DL (ref 12–15.9)
HGB UR QL STRIP.AUTO: NEGATIVE
KETONES UR QL STRIP: NEGATIVE
LEUKOCYTE ESTERASE UR QL STRIP.AUTO: NEGATIVE
LYMPHOCYTES # BLD AUTO: 1.14 10*3/MM3 (ref 0.7–3.1)
LYMPHOCYTES NFR BLD AUTO: 26.7 % (ref 19.6–45.3)
MCH RBC QN AUTO: 31.1 PG (ref 26.6–33)
MCHC RBC AUTO-ENTMCNC: 32.1 G/DL (ref 31.5–35.7)
MCV RBC AUTO: 96.8 FL (ref 79–97)
MONOCYTES # BLD AUTO: 0.4 10*3/MM3 (ref 0.1–0.9)
MONOCYTES NFR BLD AUTO: 9.4 % (ref 5–12)
NEUTROPHILS NFR BLD AUTO: 2.55 10*3/MM3 (ref 1.7–7)
NEUTROPHILS NFR BLD AUTO: 59.7 % (ref 42.7–76)
NITRITE UR QL STRIP: NEGATIVE
PH UR STRIP.AUTO: 5.5 [PH] (ref 5–8)
PLATELET # BLD AUTO: 124 10*3/MM3 (ref 140–450)
PMV BLD AUTO: 9.5 FL (ref 6–12)
POTASSIUM SERPL-SCNC: 4.2 MMOL/L (ref 3.5–5.2)
PROT SERPL-MCNC: 6.7 G/DL (ref 6–8.5)
PROT UR QL STRIP: NEGATIVE
RBC # BLD AUTO: 4.09 10*6/MM3 (ref 3.77–5.28)
SODIUM SERPL-SCNC: 138 MMOL/L (ref 136–145)
SP GR UR STRIP: >=1.03 (ref 1–1.03)
UROBILINOGEN UR QL STRIP: NORMAL
WBC NRBC COR # BLD: 4.27 10*3/MM3 (ref 3.4–10.8)

## 2022-11-30 PROCEDURE — 96413 CHEMO IV INFUSION 1 HR: CPT

## 2022-11-30 PROCEDURE — 86304 IMMUNOASSAY TUMOR CA 125: CPT | Performed by: INTERNAL MEDICINE

## 2022-11-30 PROCEDURE — 25010000002 BEVACIZUMAB-BVZR 400 MG/16ML SOLUTION 16 ML VIAL: Performed by: INTERNAL MEDICINE

## 2022-11-30 PROCEDURE — 85025 COMPLETE CBC W/AUTO DIFF WBC: CPT | Performed by: INTERNAL MEDICINE

## 2022-11-30 PROCEDURE — 80053 COMPREHEN METABOLIC PANEL: CPT | Performed by: INTERNAL MEDICINE

## 2022-11-30 PROCEDURE — 25010000002 HEPARIN LOCK FLUSH PER 10 UNITS: Performed by: INTERNAL MEDICINE

## 2022-11-30 PROCEDURE — 81003 URINALYSIS AUTO W/O SCOPE: CPT | Performed by: INTERNAL MEDICINE

## 2022-11-30 RX ORDER — HEPARIN SODIUM (PORCINE) LOCK FLUSH IV SOLN 100 UNIT/ML 100 UNIT/ML
500 SOLUTION INTRAVENOUS AS NEEDED
Status: DISCONTINUED | OUTPATIENT
Start: 2022-11-30 | End: 2022-12-01 | Stop reason: HOSPADM

## 2022-11-30 RX ORDER — SODIUM CHLORIDE 0.9 % (FLUSH) 0.9 %
10 SYRINGE (ML) INJECTION AS NEEDED
Status: DISCONTINUED | OUTPATIENT
Start: 2022-11-30 | End: 2022-12-01 | Stop reason: HOSPADM

## 2022-11-30 RX ORDER — SODIUM CHLORIDE 0.9 % (FLUSH) 0.9 %
10 SYRINGE (ML) INJECTION AS NEEDED
Status: CANCELLED | OUTPATIENT
Start: 2022-11-30

## 2022-11-30 RX ORDER — HEPARIN SODIUM (PORCINE) LOCK FLUSH IV SOLN 100 UNIT/ML 100 UNIT/ML
500 SOLUTION INTRAVENOUS AS NEEDED
Status: CANCELLED | OUTPATIENT
Start: 2022-11-30

## 2022-11-30 RX ORDER — SODIUM CHLORIDE 9 MG/ML
250 INJECTION, SOLUTION INTRAVENOUS ONCE
Status: COMPLETED | OUTPATIENT
Start: 2022-11-30 | End: 2022-11-30

## 2022-11-30 RX ADMIN — HEPARIN 500 UNITS: 100 SYRINGE at 12:03

## 2022-11-30 RX ADMIN — SODIUM CHLORIDE 250 ML: 9 INJECTION, SOLUTION INTRAVENOUS at 11:26

## 2022-11-30 RX ADMIN — Medication 10 ML: at 12:03

## 2022-11-30 RX ADMIN — SODIUM CHLORIDE 780 MG: 9 INJECTION, SOLUTION INTRAVENOUS at 11:26

## 2022-12-06 DIAGNOSIS — I10 PRIMARY HYPERTENSION: Primary | ICD-10-CM

## 2022-12-06 RX ORDER — HYDROCHLOROTHIAZIDE 12.5 MG/1
TABLET ORAL
Qty: 90 TABLET | Refills: 0 | Status: SHIPPED | OUTPATIENT
Start: 2022-12-06 | End: 2023-03-08

## 2022-12-12 ENCOUNTER — TELEPHONE (OUTPATIENT)
Dept: FAMILY MEDICINE CLINIC | Facility: CLINIC | Age: 62
End: 2022-12-12

## 2022-12-14 ENCOUNTER — HOSPITAL ENCOUNTER (OUTPATIENT)
Dept: ONCOLOGY | Facility: HOSPITAL | Age: 62
Setting detail: INFUSION SERIES
Discharge: HOME | End: 2022-12-14
Payer: COMMERCIAL

## 2022-12-14 VITALS
OXYGEN SATURATION: 95 % | BODY MASS INDEX: 28.99 KG/M2 | DIASTOLIC BLOOD PRESSURE: 75 MMHG | SYSTOLIC BLOOD PRESSURE: 131 MMHG | HEART RATE: 65 BPM | WEIGHT: 174 LBS | TEMPERATURE: 98.3 F | RESPIRATION RATE: 18 BRPM | HEIGHT: 65 IN

## 2022-12-14 DIAGNOSIS — N39.0 URINARY TRACT INFECTION WITHOUT HEMATURIA, SITE UNSPECIFIED: ICD-10-CM

## 2022-12-14 DIAGNOSIS — Z45.2 ENCOUNTER FOR CARE RELATED TO PORT-A-CATH: ICD-10-CM

## 2022-12-14 DIAGNOSIS — R80.9 PROTEINURIA, UNSPECIFIED TYPE: ICD-10-CM

## 2022-12-14 DIAGNOSIS — C56.9 MALIGNANT NEOPLASM OF OVARY, UNSPECIFIED LATERALITY: Primary | ICD-10-CM

## 2022-12-14 DIAGNOSIS — E83.51 HYPOCALCEMIA: ICD-10-CM

## 2022-12-14 LAB
ALBUMIN SERPL-MCNC: 3.7 G/DL (ref 3.5–5.2)
ALBUMIN/GLOB SERPL: 1.1 G/DL
ALP SERPL-CCNC: 84 U/L (ref 39–117)
ALT SERPL W P-5'-P-CCNC: 16 U/L (ref 1–33)
ANION GAP SERPL CALCULATED.3IONS-SCNC: 11 MMOL/L (ref 5–15)
AST SERPL-CCNC: 27 U/L (ref 1–32)
BASOPHILS # BLD AUTO: 0.02 10*3/MM3 (ref 0–0.2)
BASOPHILS NFR BLD AUTO: 0.4 % (ref 0–1.5)
BILIRUB SERPL-MCNC: 0.6 MG/DL (ref 0–1.2)
BILIRUB UR QL STRIP: NEGATIVE
BUN SERPL-MCNC: 21 MG/DL (ref 8–23)
BUN/CREAT SERPL: 19.4 (ref 7–25)
CALCIUM SPEC-SCNC: 9.7 MG/DL (ref 8.6–10.5)
CANCER AG125 SERPL QL: 49.1 U/ML (ref 0–38.1)
CHLORIDE SERPL-SCNC: 103 MMOL/L (ref 98–107)
CLARITY UR: ABNORMAL
CO2 SERPL-SCNC: 26 MMOL/L (ref 22–29)
COLOR UR: YELLOW
CREAT SERPL-MCNC: 1.08 MG/DL (ref 0.57–1)
DEPRECATED RDW RBC AUTO: 50.8 FL (ref 37–54)
EGFRCR SERPLBLD CKD-EPI 2021: 57.8 ML/MIN/1.73
EOSINOPHIL # BLD AUTO: 0.17 10*3/MM3 (ref 0–0.4)
EOSINOPHIL NFR BLD AUTO: 3.7 % (ref 0.3–6.2)
ERYTHROCYTE [DISTWIDTH] IN BLOOD BY AUTOMATED COUNT: 14.8 % (ref 12.3–15.4)
GLOBULIN UR ELPH-MCNC: 3.5 GM/DL
GLUCOSE SERPL-MCNC: 96 MG/DL (ref 65–99)
GLUCOSE UR STRIP-MCNC: NEGATIVE MG/DL
HCT VFR BLD AUTO: 40.1 % (ref 34–46.6)
HGB BLD-MCNC: 12.8 G/DL (ref 12–15.9)
HGB UR QL STRIP.AUTO: NEGATIVE
KETONES UR QL STRIP: ABNORMAL
LEUKOCYTE ESTERASE UR QL STRIP.AUTO: ABNORMAL
LYMPHOCYTES # BLD AUTO: 1.12 10*3/MM3 (ref 0.7–3.1)
LYMPHOCYTES NFR BLD AUTO: 24.5 % (ref 19.6–45.3)
MCH RBC QN AUTO: 30.9 PG (ref 26.6–33)
MCHC RBC AUTO-ENTMCNC: 31.9 G/DL (ref 31.5–35.7)
MCV RBC AUTO: 96.9 FL (ref 79–97)
MONOCYTES # BLD AUTO: 0.51 10*3/MM3 (ref 0.1–0.9)
MONOCYTES NFR BLD AUTO: 11.1 % (ref 5–12)
NEUTROPHILS NFR BLD AUTO: 2.76 10*3/MM3 (ref 1.7–7)
NEUTROPHILS NFR BLD AUTO: 60.3 % (ref 42.7–76)
NITRITE UR QL STRIP: NEGATIVE
PH UR STRIP.AUTO: <=5 [PH] (ref 5–8)
PLATELET # BLD AUTO: 135 10*3/MM3 (ref 140–450)
PMV BLD AUTO: 9 FL (ref 6–12)
POTASSIUM SERPL-SCNC: 3.9 MMOL/L (ref 3.5–5.2)
PROT SERPL-MCNC: 7.2 G/DL (ref 6–8.5)
PROT UR QL STRIP: ABNORMAL
RBC # BLD AUTO: 4.14 10*6/MM3 (ref 3.77–5.28)
SODIUM SERPL-SCNC: 140 MMOL/L (ref 136–145)
SP GR UR STRIP: >=1.03 (ref 1–1.03)
UROBILINOGEN UR QL STRIP: ABNORMAL
WBC NRBC COR # BLD: 4.58 10*3/MM3 (ref 3.4–10.8)

## 2022-12-14 PROCEDURE — 80053 COMPREHEN METABOLIC PANEL: CPT | Performed by: INTERNAL MEDICINE

## 2022-12-14 PROCEDURE — 36591 DRAW BLOOD OFF VENOUS DEVICE: CPT

## 2022-12-14 PROCEDURE — 86304 IMMUNOASSAY TUMOR CA 125: CPT | Performed by: INTERNAL MEDICINE

## 2022-12-14 PROCEDURE — 25010000002 HEPARIN LOCK FLUSH PER 10 UNITS: Performed by: INTERNAL MEDICINE

## 2022-12-14 PROCEDURE — 85025 COMPLETE CBC W/AUTO DIFF WBC: CPT | Performed by: INTERNAL MEDICINE

## 2022-12-14 PROCEDURE — 87086 URINE CULTURE/COLONY COUNT: CPT | Performed by: INTERNAL MEDICINE

## 2022-12-14 PROCEDURE — 81003 URINALYSIS AUTO W/O SCOPE: CPT | Performed by: INTERNAL MEDICINE

## 2022-12-14 RX ORDER — SODIUM CHLORIDE 9 MG/ML
250 INJECTION, SOLUTION INTRAVENOUS ONCE
Status: CANCELLED | OUTPATIENT
Start: 2022-12-30

## 2022-12-14 RX ORDER — HEPARIN SODIUM (PORCINE) LOCK FLUSH IV SOLN 100 UNIT/ML 100 UNIT/ML
500 SOLUTION INTRAVENOUS AS NEEDED
Status: DISCONTINUED | OUTPATIENT
Start: 2022-12-14 | End: 2022-12-15 | Stop reason: HOSPADM

## 2022-12-14 RX ORDER — SODIUM CHLORIDE 0.9 % (FLUSH) 0.9 %
10 SYRINGE (ML) INJECTION AS NEEDED
Status: DISCONTINUED | OUTPATIENT
Start: 2022-12-14 | End: 2022-12-15 | Stop reason: HOSPADM

## 2022-12-14 RX ADMIN — Medication 500 UNITS: at 14:42

## 2022-12-14 RX ADMIN — Medication 10 ML: at 14:42

## 2022-12-14 NOTE — PROGRESS NOTES
Pt here treatment.  . Port accessed using sterile technique with positive blood return noted. 10cc of blood wasted prior to obtaining lab specimen per orders and flushed with saline.  Labs reviewed and pt with 2+ protein in her urine.  Dr. Hernandez notified.  Hold treatment x1 week and pt to do 24hour urine collection for protien.  Pt given supplies and instructions. Pt instructed to bring prior to next appointment so we will have results.  Understanding verbalized.  Port flushed with saline and heparin prior to removal. AVS given prior to discharge.

## 2022-12-15 DIAGNOSIS — N39.0 URINARY TRACT INFECTION WITHOUT HEMATURIA, SITE UNSPECIFIED: Primary | ICD-10-CM

## 2022-12-16 LAB — BACTERIA SPEC AEROBE CULT: NORMAL

## 2022-12-19 ENCOUNTER — TELEPHONE (OUTPATIENT)
Dept: FAMILY MEDICINE CLINIC | Facility: CLINIC | Age: 62
End: 2022-12-19

## 2022-12-20 ENCOUNTER — LAB (OUTPATIENT)
Dept: LAB | Facility: HOSPITAL | Age: 62
End: 2022-12-20
Payer: COMMERCIAL

## 2022-12-20 DIAGNOSIS — C56.9 MALIGNANT NEOPLASM OF OVARY, UNSPECIFIED LATERALITY: ICD-10-CM

## 2022-12-20 DIAGNOSIS — R80.9 PROTEINURIA, UNSPECIFIED TYPE: ICD-10-CM

## 2022-12-20 LAB
COLLECT DURATION TIME UR: 24 HRS
PROT 24H UR-MRATE: 124.2 MG/24HOURS (ref 0–150)
SPECIMEN VOL 24H UR: 600 ML

## 2022-12-20 PROCEDURE — 81050 URINALYSIS VOLUME MEASURE: CPT

## 2022-12-20 PROCEDURE — 84156 ASSAY OF PROTEIN URINE: CPT

## 2022-12-22 ENCOUNTER — HOSPITAL ENCOUNTER (OUTPATIENT)
Dept: ONCOLOGY | Facility: HOSPITAL | Age: 62
Setting detail: INFUSION SERIES
Discharge: HOME | End: 2022-12-22
Payer: COMMERCIAL

## 2022-12-22 ENCOUNTER — OFFICE VISIT (OUTPATIENT)
Dept: ONCOLOGY | Facility: CLINIC | Age: 62
End: 2022-12-22

## 2022-12-22 ENCOUNTER — APPOINTMENT (OUTPATIENT)
Dept: LAB | Facility: HOSPITAL | Age: 62
End: 2022-12-22
Payer: COMMERCIAL

## 2022-12-22 VITALS
SYSTOLIC BLOOD PRESSURE: 145 MMHG | BODY MASS INDEX: 27.99 KG/M2 | TEMPERATURE: 97.5 F | HEART RATE: 68 BPM | HEIGHT: 65 IN | WEIGHT: 168 LBS | OXYGEN SATURATION: 97 % | RESPIRATION RATE: 18 BRPM | DIASTOLIC BLOOD PRESSURE: 79 MMHG

## 2022-12-22 VITALS
SYSTOLIC BLOOD PRESSURE: 144 MMHG | HEART RATE: 68 BPM | DIASTOLIC BLOOD PRESSURE: 76 MMHG | RESPIRATION RATE: 18 BRPM | WEIGHT: 168 LBS | HEIGHT: 65 IN | BODY MASS INDEX: 27.99 KG/M2 | TEMPERATURE: 97.5 F

## 2022-12-22 DIAGNOSIS — Z45.2 ENCOUNTER FOR CARE RELATED TO PORT-A-CATH: ICD-10-CM

## 2022-12-22 DIAGNOSIS — C56.9 MALIGNANT NEOPLASM OF OVARY, UNSPECIFIED LATERALITY: Primary | ICD-10-CM

## 2022-12-22 DIAGNOSIS — E83.51 HYPOCALCEMIA: ICD-10-CM

## 2022-12-22 LAB
ALBUMIN SERPL-MCNC: 3.8 G/DL (ref 3.5–5.2)
ALBUMIN/GLOB SERPL: 1 G/DL
ALP SERPL-CCNC: 82 U/L (ref 39–117)
ALT SERPL W P-5'-P-CCNC: 17 U/L (ref 1–33)
ANION GAP SERPL CALCULATED.3IONS-SCNC: 11 MMOL/L (ref 5–15)
AST SERPL-CCNC: 28 U/L (ref 1–32)
BASOPHILS # BLD AUTO: 0.02 10*3/MM3 (ref 0–0.2)
BASOPHILS NFR BLD AUTO: 0.4 % (ref 0–1.5)
BILIRUB SERPL-MCNC: 0.5 MG/DL (ref 0–1.2)
BILIRUB UR QL STRIP: NEGATIVE
BUN SERPL-MCNC: 27 MG/DL (ref 8–23)
BUN/CREAT SERPL: 24.8 (ref 7–25)
CALCIUM SPEC-SCNC: 9.8 MG/DL (ref 8.6–10.5)
CANCER AG125 SERPL QL: 45.8 U/ML (ref 0–38.1)
CHLORIDE SERPL-SCNC: 102 MMOL/L (ref 98–107)
CLARITY UR: ABNORMAL
CO2 SERPL-SCNC: 26 MMOL/L (ref 22–29)
COLOR UR: ABNORMAL
CREAT SERPL-MCNC: 1.09 MG/DL (ref 0.57–1)
DEPRECATED RDW RBC AUTO: 47.4 FL (ref 37–54)
EGFRCR SERPLBLD CKD-EPI 2021: 56.8 ML/MIN/1.73
EOSINOPHIL # BLD AUTO: 0.16 10*3/MM3 (ref 0–0.4)
EOSINOPHIL NFR BLD AUTO: 2.9 % (ref 0.3–6.2)
ERYTHROCYTE [DISTWIDTH] IN BLOOD BY AUTOMATED COUNT: 14 % (ref 12.3–15.4)
GLOBULIN UR ELPH-MCNC: 4 GM/DL
GLUCOSE SERPL-MCNC: 109 MG/DL (ref 65–99)
GLUCOSE UR STRIP-MCNC: NEGATIVE MG/DL
HCT VFR BLD AUTO: 40.6 % (ref 34–46.6)
HGB BLD-MCNC: 13.5 G/DL (ref 12–15.9)
HGB UR QL STRIP.AUTO: NEGATIVE
KETONES UR QL STRIP: ABNORMAL
LEUKOCYTE ESTERASE UR QL STRIP.AUTO: ABNORMAL
LYMPHOCYTES # BLD AUTO: 1.73 10*3/MM3 (ref 0.7–3.1)
LYMPHOCYTES NFR BLD AUTO: 31.3 % (ref 19.6–45.3)
MCH RBC QN AUTO: 31.8 PG (ref 26.6–33)
MCHC RBC AUTO-ENTMCNC: 33.3 G/DL (ref 31.5–35.7)
MCV RBC AUTO: 95.8 FL (ref 79–97)
MONOCYTES # BLD AUTO: 0.64 10*3/MM3 (ref 0.1–0.9)
MONOCYTES NFR BLD AUTO: 11.6 % (ref 5–12)
NEUTROPHILS NFR BLD AUTO: 2.98 10*3/MM3 (ref 1.7–7)
NEUTROPHILS NFR BLD AUTO: 53.8 % (ref 42.7–76)
NITRITE UR QL STRIP: NEGATIVE
PH UR STRIP.AUTO: 5.5 [PH] (ref 5–8)
PLATELET # BLD AUTO: 133 10*3/MM3 (ref 140–450)
PMV BLD AUTO: 9.3 FL (ref 6–12)
POTASSIUM SERPL-SCNC: 3.7 MMOL/L (ref 3.5–5.2)
PROT SERPL-MCNC: 7.8 G/DL (ref 6–8.5)
PROT UR QL STRIP: ABNORMAL
RBC # BLD AUTO: 4.24 10*6/MM3 (ref 3.77–5.28)
SODIUM SERPL-SCNC: 139 MMOL/L (ref 136–145)
SP GR UR STRIP: >=1.03 (ref 1–1.03)
UROBILINOGEN UR QL STRIP: ABNORMAL
WBC NRBC COR # BLD: 5.53 10*3/MM3 (ref 3.4–10.8)

## 2022-12-22 PROCEDURE — 80053 COMPREHEN METABOLIC PANEL: CPT | Performed by: INTERNAL MEDICINE

## 2022-12-22 PROCEDURE — 36591 DRAW BLOOD OFF VENOUS DEVICE: CPT

## 2022-12-22 PROCEDURE — 81003 URINALYSIS AUTO W/O SCOPE: CPT | Performed by: INTERNAL MEDICINE

## 2022-12-22 PROCEDURE — 86304 IMMUNOASSAY TUMOR CA 125: CPT | Performed by: INTERNAL MEDICINE

## 2022-12-22 PROCEDURE — 85025 COMPLETE CBC W/AUTO DIFF WBC: CPT | Performed by: INTERNAL MEDICINE

## 2022-12-22 PROCEDURE — 25010000002 HEPARIN LOCK FLUSH PER 10 UNITS: Performed by: INTERNAL MEDICINE

## 2022-12-22 PROCEDURE — 99214 OFFICE O/P EST MOD 30 MIN: CPT | Performed by: INTERNAL MEDICINE

## 2022-12-22 RX ORDER — HEPARIN SODIUM (PORCINE) LOCK FLUSH IV SOLN 100 UNIT/ML 100 UNIT/ML
500 SOLUTION INTRAVENOUS AS NEEDED
Status: DISCONTINUED | OUTPATIENT
Start: 2022-12-22 | End: 2022-12-23 | Stop reason: HOSPADM

## 2022-12-22 RX ORDER — SODIUM CHLORIDE 0.9 % (FLUSH) 0.9 %
10 SYRINGE (ML) INJECTION AS NEEDED
Status: DISCONTINUED | OUTPATIENT
Start: 2022-12-22 | End: 2022-12-23 | Stop reason: HOSPADM

## 2022-12-22 RX ORDER — AMLODIPINE BESYLATE 10 MG/1
10 TABLET ORAL DAILY
Qty: 90 TABLET | Refills: 1 | Status: SHIPPED | OUTPATIENT
Start: 2022-12-22

## 2022-12-22 RX ORDER — GABAPENTIN 300 MG/1
300 CAPSULE ORAL EVERY 8 HOURS
Qty: 90 CAPSULE | Refills: 3 | Status: SHIPPED | OUTPATIENT
Start: 2022-12-22

## 2022-12-22 RX ADMIN — Medication 10 ML: at 14:42

## 2022-12-22 RX ADMIN — Medication 500 UNITS: at 14:42

## 2022-12-22 NOTE — PROGRESS NOTES
Taxol/Avastin Hematology/Oncology Outpatient Follow Up    PATIENT NAME:Lucy Mahan  :1960  MRN: [id number]  PRIMARY CARE PHYSICIAN: Argentina Avalos APRN  REFERRING PHYSICIAN: [place] Avalos APRN    Chief Complaint   Patient presents with   • Follow-up     Malignant neoplasm of ovary, unspecified laterality (HCC)        HISTORY OF PRESENT ILLNESS:     This is a 62 y.o.  female who developed abdominal pain in 2021.  Patient has also lost approximately 35 pounds during that..  She has had loss of appetite.  Due to the abdominal pain,  she had an ultrasound of the abdomen done on 10/13/2021.  This basically revealed no liver lesions.  Common bile duct is normal at 3 mm.  There is a nonmobile 3 cm shadowing gallstone within the gallbladder neck.  No gallbladder thickening or pericholecystic fluid collection.  Patient was then referred to general surgery and was seen by Dr. Badillo.  Who took her to surgery on 11/10/2021 for diagnostic laparoscopy and peritoneal biopsy.  Intra-Op , she was noted to have peritoneal studding with malignancy throughout the abdominal cavity and biopsies were completed. 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  The tumor was focally positive for progesterone receptor, positive for CK7, estrogen receptor, CA-125 and PAX 8..  The staining pattern is consistent with ovarian serous carcinoma.      She had CT scan of the chest, abdomen and pelvis and the chest is a 2 mm noncalcified nodule within the left lower lobe, noncalcified nodule in the left upper lobe measuring 4 mm and 3 mm.  In the abdomen there is a 5.1 x 5.3 cm enhancing soft tissue mass in the pelvis to the right of midline associated with the adnexal region possibly representing a primary ovarian malignancy.  No right or left ovarian tissue was seen.  There is abnormal omental thickening and nodularity consistent with carcinomatosis greatest bulk in the left mid abdomen measuring up to  10.8 cm x 2.5 cm.  There is nodular peritoneal thickening also present within the abdomen and pelvis consistent with peritoneal carcinomatosis.  The small quantity of ascitic fluid in the abdomen and pelvis.  Carcinomatosis nodular studding is seen along the inferior right hepatic lobe.     She  has been referred to us for further evaluation and management of her newly diagnosed ovarian carcinoma.     Patient is accompanied today by her daughter for this appointment.  There is  family history of precancerous cells of the  uterusin her sister.        She does not smoke and does not drink alcohol.  Patient is  and has 2 daughters.  She works for the [employer].    · 11/29/2021 patient was seen by Dr. Dubois who has recommended neoadjuvant chemotherapy for 2-3 cycles and then interval cytoreduction.  She has recommended carboplatinum AUC 6, Taxol 1 7 5 mg per metered squared, Avastin 15 mg/kg as was done in the oceans trial but hold Avastin for the first few cycles due to bowel risk.  · Prechemo CA-125 was 754  · 12/7/2021 patient received first cycle of chemotherapy with carboplatinum and Taxol with Neulasta  · 12/28/2021 patient received cycle 2 of combination chemotherapy with carboplatinum and Taxol with Neulasta  · 12/28/2021 CA-125 was down to 635  · 1/11/2022 add-on appointment for nausea, dizziness, thrombocytopenia, pain in upper to mid abdomen 8/10, new onset in the last 4 to 5 days  · January 12, 2022: Due to acute GI symptoms patient had a CT scan of the abdomen and pelvis which basically revealed increasing effusion on the left lung mild pleural effusion on the right lung decreased in amount of omental caking but no interval change in the right adnexal area moderate abdominal and pelvic ascites which has increased.  She denies any significant pulmonary symptoms.  Her O2 sat today was 100% at rest and 99% with ambulation  · 1/24/2022 patient received cycle 3 of carboplatinum with Taxol  with dose reduction due to significant toxicities  · 2022 patient received cycle 4-day 1 of chemo platinum Taxol and Avastin  · 3/7/22: Patient received cycle 5-day 1 of chemotherapy with carboplatinum Taxol and Avastin  · 2022 patient received cycle 6 of chemotherapy with A platinum Taxol and Avastin  · 2022 patient underwent exploratory laparotomy, total abdominal hysterectomy, bilateral salpingo-oophorectomy, pelvic and aortic lymphadenectomy, omentectomy performed by Dr Castro, pathology reviewed high-grade serous carcinoma involving the uterine serosa and outer myometrium, bilateral fallopian tubes and bilateral ovaries and adnexa soft tissue.  Also received are high-grade serous carcinoma involving the omentum as well as residual high-grade serous carcinoma with treatment effects on the colonic splenic [id number], estrogen receptor diffusely positive p53 patchy, p16 diffusely positive.  Molecular testing is pending  · 2022 patient received cycle 7 of Combination chemotherapy with Avastin and carboplatinum.  Taxol was held due to significant peripheral neuropathy  · 2022 patient presents for follow-up of ovarian cancer  · 2020 patient received cycle 9 of carboplatinum  · Patient is currently on maintenance Avastin and letrozole for ovarian cancer suppression        Past Medical History:   Diagnosis Date   • Arthritis    • Cancer (HCC)     ovarian   • Depression    • Gall stones    • Hypertension    • Irritable bowel syndrome    • Rheumatoid aortitis    • Thyroid disease        Past Surgical History:   Procedure Laterality Date   •  SECTION      x2   • CHOLECYSTECTOMY N/A 11/10/2021    Procedure: diagnostic laparoscopy and peritoneal biopsy;  Surgeon: Krunal Badillo MD;  Location: [facility];  Service: General;  Laterality: N/A;   • COLONOSCOPY     • HIP SURGERY Left    • JOINT REPLACEMENT     • THYROIDECTOMY, PARTIAL  2017   • TOTAL ABDOMINAL HYSTERECTOMY N/A  04/26/2022    Procedure: EXPLORATORY LAPAROTOMY, TOTAL ABDOMINAL HYSTERECTOMY, BILATERAL SALPINGOOOPHORECTOMY, PELVIC AND AORTIC LYMPHADENECTOMY, OMENTECTOMY;  Surgeon: Maggie Castro DO;  Location: [facility] MAIN OR;  Service: Gynecology Oncology;  Laterality: N/A;   • VENOUS ACCESS DEVICE (PORT) INSERTION Left 12/06/2021    Procedure: INSERTION VENOUS ACCESS DEVICE;  Surgeon: Krunal Badillo MD;  Location: [facility] MAIN OR;  Service: General;  Laterality: Left;         Current Outpatient Medications:   •  amLODIPine (NORVASC) 5 MG tablet, TAKE ONE (1) TABLET BY MOUTH DAILY, Disp: 90 tablet, Rfl: 1  •  gabapentin (NEURONTIN) 300 MG capsule, TAKE ONE (1) CAPSULE BY MOUTH EVERY 8 HOURS, Disp: 90 capsule, Rfl: 0  •  hydroCHLOROthiazide (HYDRODIURIL) 12.5 MG tablet, TAKE ONE (1) TABLET BY MOUTH EVERY DAY, Disp: 90 tablet, Rfl: 0  •  HYDROcodone-acetaminophen (Norco) 5-325 MG per tablet, Take 1 tablet by mouth Every 6 (Six) Hours As Needed for Moderate Pain., Disp: 60 tablet, Rfl: 0  •  hydrOXYzine (ATARAX) 10 MG tablet, Take 1 tablet by mouth Every 8 (Eight) Hours As Needed for Anxiety., Disp: 45 tablet, Rfl: 2  •  letrozole (FEMARA) 2.5 MG tablet, TAKE ONE (1) TABLET BY MOUTH DAILY, Disp: 60 tablet, Rfl: 3  •  levothyroxine (SYNTHROID, LEVOTHROID) 137 MCG tablet, TAKE ONE (1) TABLET BY MOUTH EVERY DAY, Disp: 90 tablet, Rfl: 1  •  lidocaine-prilocaine (EMLA) 2.5-2.5 % cream, Apply 1 application topically to the appropriate area as directed. Apply to port 1 hour prior to port access, Disp: 30 g, Rfl: 1  •  omeprazole (priLOSEC) 40 MG capsule, TAKE ONE (1) CAPSULE BY MOUTH EVERY DAY, Disp: 30 capsule, Rfl: 6  •  pantoprazole (PROTONIX) 40 MG EC tablet, TAKE ONE (1) TABLET BY MOUTH EVERY DAY, Disp: 30 tablet, Rfl: 6  •  promethazine (PHENERGAN) 25 MG tablet, Take 1 tablet by mouth Every 6 (Six) Hours As Needed for Nausea or Vomiting., Disp: 90 tablet, Rfl: 1  •  sertraline (Zoloft) 25 MG tablet, Take 1 tablet by mouth  "Daily., Disp: 30 tablet, Rfl: 1    No Known Allergies    Family History   Problem Relation Age of Onset   • Dementia Mother    • Arthritis Mother    • Heart disease Father    • Hypertension Father    • Malig Hyperthermia Neg Hx        Cancer-related family history is not on file.    Social History     Tobacco Use   • Smoking status: Never   • Smokeless tobacco: Never   Vaping Use   • Vaping Use: Never used   Substance Use Topics   • Alcohol use: Yes     Alcohol/week: 1.0 standard drink     Types: 1 Glasses of wine per week     Comment: less than monthly   • Drug use: No     I have reviewed and confirmed the accuracy of the patient's history: Chief complaint, HPI, ROS and Subjective as entered by the MA/LPN/RN. Ingaalesia Hernandez MD 12/22/22       SUBJECTIVE:    Patient denies any new issues          REVIEW OF SYSTEMS:    Review of Systems   Constitutional: Positive for fatigue. Negative for chills and fever.   HENT: Negative for ear pain, mouth sores, nosebleeds and sore throat.    Eyes: Negative for photophobia and visual disturbance.   Respiratory: Negative for wheezing and stridor.    Cardiovascular: Negative for chest pain and palpitations.   Gastrointestinal: Positive for abdominal pain and nausea. Negative for diarrhea and vomiting.   Endocrine: Negative for cold intolerance and heat intolerance.   Genitourinary: Negative for dysuria and hematuria.   Musculoskeletal: Negative for joint swelling and neck stiffness.   Skin: Negative for color change and rash.   Neurological: Positive for dizziness, weakness and headaches. Negative for seizures and syncope.   Hematological: Negative for adenopathy.   Psychiatric/Behavioral: Negative for agitation, confusion and hallucinations.     Post op changes noted.    OBJECTIVE:    Vitals:    12/22/22 1354   BP: 144/76   Pulse: 68   Resp: 18   Temp: 97.5 °F (36.4 °C)   TempSrc: Infrared   Weight: 76.2 kg (168 lb)   Height: 165.1 cm (65\")   PainSc: 0-No pain     Body mass " index is 27.96 kg/m².    ECOG    (1) Restricted in physically strenuous activity, ambulatory and able to do work of light nature    Physical Exam  Vitals and nursing note reviewed.   Constitutional:       General: She is not in acute distress.     Appearance: She is not diaphoretic.   HENT:      Head: Normocephalic and atraumatic.      Mouth/Throat:      Mouth: Mucous membranes are dry.   Eyes:      General: No scleral icterus.        Right eye: No discharge.         Left eye: No discharge.      Conjunctiva/sclera: Conjunctivae normal.   Neck:      Thyroid: No thyromegaly.   Cardiovascular:      Rate and Rhythm: Normal rate and regular rhythm.      Heart sounds: Normal heart sounds.     No friction rub. No gallop.   Pulmonary:      Effort: Pulmonary effort is normal. No respiratory distress.      Breath sounds: No stridor. No wheezing.   Abdominal:      General: Bowel sounds are normal.      Palpations: Abdomen is soft. There is no mass.      Tenderness: There is no abdominal tenderness. There is no guarding or rebound.      Comments: Abdominal wound noted   Musculoskeletal:         General: No tenderness. Normal range of motion.      Cervical back: Normal range of motion and neck supple.   Lymphadenopathy:      Cervical: No cervical adenopathy.   Skin:     General: Skin is warm.      Findings: No erythema or rash.      Comments: Tenting skin turgor   Neurological:      Mental Status: She is alert and oriented to person, place, and time.      Motor: No abnormal muscle tone.   Psychiatric:         Behavior: Behavior normal.     I have reexamined the patient and the results are consistent with the previously documented exam. Inga Hernandez MD       RECENT LABS    WBC   Date Value Ref Range Status   12/22/2022 5.53 3.40 - 10.80 10*3/mm3 Final     RBC   Date Value Ref Range Status   12/22/2022 4.24 3.77 - 5.28 10*6/mm3 Final     Hemoglobin   Date Value Ref Range Status   12/22/2022 13.5 12.0 - 15.9 g/dL Final      Hematocrit   Date Value Ref Range Status   12/22/2022 40.6 34.0 - 46.6 % Final     MCV   Date Value Ref Range Status   12/22/2022 95.8 79.0 - 97.0 fL Final     MCH   Date Value Ref Range Status   12/22/2022 31.8 26.6 - 33.0 pg Final     MCHC   Date Value Ref Range Status   12/22/2022 33.3 31.5 - 35.7 g/dL Final     RDW   Date Value Ref Range Status   12/22/2022 14.0 12.3 - 15.4 % Final     RDW-SD   Date Value Ref Range Status   12/22/2022 47.4 37.0 - 54.0 fl Final     MPV   Date Value Ref Range Status   12/22/2022 9.3 6.0 - 12.0 fL Final     Platelets   Date Value Ref Range Status   12/22/2022 133 (L) 140 - 450 10*3/mm3 Final     Neutrophil %   Date Value Ref Range Status   12/22/2022 53.8 42.7 - 76.0 % Final     Lymphocyte %   Date Value Ref Range Status   12/22/2022 31.3 19.6 - 45.3 % Final     Monocyte %   Date Value Ref Range Status   12/22/2022 11.6 5.0 - 12.0 % Final     Eosinophil %   Date Value Ref Range Status   12/22/2022 2.9 0.3 - 6.2 % Final     Basophil %   Date Value Ref Range Status   12/22/2022 0.4 0.0 - 1.5 % Final     Immature Grans %   Date Value Ref Range Status   04/29/2022 0.4 0.0 - 0.5 % Final     Neutrophils, Absolute   Date Value Ref Range Status   12/22/2022 2.98 1.70 - 7.00 10*3/mm3 Final     Lymphocytes, Absolute   Date Value Ref Range Status   12/22/2022 1.73 0.70 - 3.10 10*3/mm3 Final     Monocytes, Absolute   Date Value Ref Range Status   12/22/2022 0.64 0.10 - 0.90 10*3/mm3 Final     Eosinophils, Absolute   Date Value Ref Range Status   12/22/2022 0.16 0.00 - 0.40 10*3/mm3 Final     Basophils, Absolute   Date Value Ref Range Status   12/22/2022 0.02 0.00 - 0.20 10*3/mm3 Final     Immature Grans, Absolute   Date Value Ref Range Status   04/29/2022 0.02 0.00 - 0.05 10*3/mm3 Final     nRBC   Date Value Ref Range Status   04/29/2022 0.0 0.0 - 0.2 /100 WBC Final       Lab Results   Component Value Date    GLUCOSE 96 12/14/2022    BUN 21 12/14/2022    CREATININE 1.08 (H) 12/14/2022     EGFRIFNONA 47 (L) 02/21/2022    EGFRIFAFRI 44 (L) 01/21/2020    BCR 19.4 12/14/2022    K 3.9 12/14/2022    CO2 26.0 12/14/2022    CALCIUM 9.7 12/14/2022    ALBUMIN 3.70 12/14/2022    LABIL2 1.2 (calc) 01/21/2020    AST 27 12/14/2022    ALT 16 12/14/2022         ASSESSMENT:      1. 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  Ongoing management  2. Status post neoadjuvant chemotherapy with carboplatinum AUC 6, Taxol 175 mg per metered squared, with Avastin added due to lack of significant response with carboplatinum and Taxol.  Monitor serial CA-125's.  Down to 135 as of 3/14/2022.  Her CT scan showed response therefore patient will proceed with radical hysterectomy to perform by Dr. Dubois on 4/26/2022.  Plans for additional chemotherapy along with Avastin and possibly maintenance treatment with PARP inhibitors in the future.  She is also on letrozole 2.5 mg p.o. daily.  Patient is currently on Avastin maintenance along with letrozole.  Reviewed her recent CT scans which does not show any evidence of progressive disease completed October 20, 2022.  She will be due for repeat  CT scans end of January 2023  3. Status post radical hysterectomy.  See path report.  Caris testing pending  4. Mucositis: This has resolved  5. Elevated blood pressure: Patient follow-up with her PCP  6. Pulmonary nodules of unclear etiology too small for biopsy or PET resolution: Continue surveillance CT scans.  Also check 's  7. Chemotherapy induced nausea: Improved  8. Chemotherapy induced fatigue: Improved  9. Chemotherapy-induced anemia  10. 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  Patient already had some 25% dose reduction on her chemo.  She is currently on Neurontin 300 mg daily will increase to 300 mg twice a day.  Peripheral neuropathy continues to be an issue.  We will hold Taxol for now till this has improved  11. Severe thrombocytopenia secondary to chemotherapy most notably  carboplatinum  12. Comprehensive gene analysis with cancer next technology was negative for any significant mutation in all 77 genes analyzed  13. 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  No other actionable mutations identified  14. Dehydration: Resolved.  She has increase p.o. fluid intake.  15. Abdominal discomfort secondary to malignancy; Increasing abdominal discomfort               Plans:     · Hold Avastin today due to elevated blood pressure.  Patient has been asked to follow-up with her PCP.  She is currently on Norvasc 5 mg daily  · Continue letrozole  · Continue Neurontin to 300 mg every 8 hours for peripheral neuropathy.  We will refill today  · Schedule CT scans January 2023 a few days before her next appointment with me  · Reviewed CT scans of the chest abdomen and pelvis for cancer restaging  · She will benefit from olaparib in the future  · Monitor 's, slight increase trend  · Elisa's Magic mouthwash for mucositis as needed  · Can resume full-time work, notes given  · Reviewed her path report from radical hysterectomy 4/20/2022  · Comprehensive gene analysis with cancer next technology results reviewed with patient and copies of her results was also given to her for her own records keeping  · Continue Percocet 5 mg p.o. every 4-6 hours as needed for pain  · Continue vitamin b6 100mg po q 12   · Foundation 1 testing results reviewed  · All questions answered  · Follow-up 4 weeks        I spent 30 total minutes, face-to-face, caring for Lucy garner.  90% of this time involved counseling and/or coordination of care as documented within this note.

## 2022-12-22 NOTE — PROGRESS NOTES
Port accessed for blood draw.  Blood return noted.  10 CC blood wasted prior to specimen collection.  Blood specimen obtained and sent to lab for processing    .Patient was held today per Dr Hernandez for elevated BP.  Patient to return for possible treatment next week

## 2022-12-29 ENCOUNTER — TELEPHONE (OUTPATIENT)
Dept: FAMILY MEDICINE CLINIC | Facility: CLINIC | Age: 62
End: 2022-12-29

## 2022-12-30 ENCOUNTER — HOSPITAL ENCOUNTER (OUTPATIENT)
Dept: ONCOLOGY | Facility: HOSPITAL | Age: 62
Setting detail: INFUSION SERIES
Discharge: HOME | End: 2022-12-30
Payer: COMMERCIAL

## 2022-12-30 VITALS
RESPIRATION RATE: 16 BRPM | WEIGHT: 172.5 LBS | BODY MASS INDEX: 28.71 KG/M2 | DIASTOLIC BLOOD PRESSURE: 78 MMHG | OXYGEN SATURATION: 100 % | HEART RATE: 74 BPM | TEMPERATURE: 97.4 F | SYSTOLIC BLOOD PRESSURE: 128 MMHG

## 2022-12-30 DIAGNOSIS — Z45.2 ENCOUNTER FOR CARE RELATED TO PORT-A-CATH: ICD-10-CM

## 2022-12-30 DIAGNOSIS — E83.51 HYPOCALCEMIA: ICD-10-CM

## 2022-12-30 DIAGNOSIS — C56.9 MALIGNANT NEOPLASM OF OVARY, UNSPECIFIED LATERALITY: Primary | ICD-10-CM

## 2022-12-30 LAB
ALBUMIN SERPL-MCNC: 3.6 G/DL (ref 3.5–5.2)
ALBUMIN/GLOB SERPL: 1.1 G/DL
ALP SERPL-CCNC: 80 U/L (ref 39–117)
ALT SERPL W P-5'-P-CCNC: 18 U/L (ref 1–33)
ANION GAP SERPL CALCULATED.3IONS-SCNC: 10 MMOL/L (ref 5–15)
AST SERPL-CCNC: 26 U/L (ref 1–32)
BASOPHILS # BLD AUTO: 0.03 10*3/MM3 (ref 0–0.2)
BASOPHILS NFR BLD AUTO: 0.6 % (ref 0–1.5)
BILIRUB SERPL-MCNC: 0.4 MG/DL (ref 0–1.2)
BILIRUB UR QL STRIP: NEGATIVE
BUN SERPL-MCNC: 26 MG/DL (ref 8–23)
BUN/CREAT SERPL: 21.8 (ref 7–25)
CALCIUM SPEC-SCNC: 9.4 MG/DL (ref 8.6–10.5)
CANCER AG125 SERPL QL: 56.9 U/ML (ref 0–38.1)
CHLORIDE SERPL-SCNC: 104 MMOL/L (ref 98–107)
CLARITY UR: CLEAR
CO2 SERPL-SCNC: 25 MMOL/L (ref 22–29)
COLOR UR: YELLOW
CREAT SERPL-MCNC: 1.19 MG/DL (ref 0.57–1)
DEPRECATED RDW RBC AUTO: 48.9 FL (ref 37–54)
EGFRCR SERPLBLD CKD-EPI 2021: 51.2 ML/MIN/1.73
EOSINOPHIL # BLD AUTO: 0.13 10*3/MM3 (ref 0–0.4)
EOSINOPHIL NFR BLD AUTO: 2.6 % (ref 0.3–6.2)
ERYTHROCYTE [DISTWIDTH] IN BLOOD BY AUTOMATED COUNT: 13.9 % (ref 12.3–15.4)
GLOBULIN UR ELPH-MCNC: 3.2 GM/DL
GLUCOSE SERPL-MCNC: 118 MG/DL (ref 65–99)
GLUCOSE UR STRIP-MCNC: NEGATIVE MG/DL
HCT VFR BLD AUTO: 39.7 % (ref 34–46.6)
HGB BLD-MCNC: 12.9 G/DL (ref 12–15.9)
HGB UR QL STRIP.AUTO: NEGATIVE
KETONES UR QL STRIP: NEGATIVE
LEUKOCYTE ESTERASE UR QL STRIP.AUTO: NEGATIVE
LYMPHOCYTES # BLD AUTO: 1.44 10*3/MM3 (ref 0.7–3.1)
LYMPHOCYTES NFR BLD AUTO: 28.7 % (ref 19.6–45.3)
MCH RBC QN AUTO: 32 PG (ref 26.6–33)
MCHC RBC AUTO-ENTMCNC: 32.5 G/DL (ref 31.5–35.7)
MCV RBC AUTO: 98.5 FL (ref 79–97)
MONOCYTES # BLD AUTO: 0.53 10*3/MM3 (ref 0.1–0.9)
MONOCYTES NFR BLD AUTO: 10.6 % (ref 5–12)
NEUTROPHILS NFR BLD AUTO: 2.88 10*3/MM3 (ref 1.7–7)
NEUTROPHILS NFR BLD AUTO: 57.5 % (ref 42.7–76)
NITRITE UR QL STRIP: NEGATIVE
PH UR STRIP.AUTO: 5.5 [PH] (ref 5–8)
PLATELET # BLD AUTO: 96 10*3/MM3 (ref 140–450)
PMV BLD AUTO: 10.2 FL (ref 6–12)
POTASSIUM SERPL-SCNC: 3.9 MMOL/L (ref 3.5–5.2)
PROT SERPL-MCNC: 6.8 G/DL (ref 6–8.5)
PROT UR QL STRIP: NEGATIVE
RBC # BLD AUTO: 4.03 10*6/MM3 (ref 3.77–5.28)
SODIUM SERPL-SCNC: 139 MMOL/L (ref 136–145)
SP GR UR STRIP: 1.01 (ref 1–1.03)
UROBILINOGEN UR QL STRIP: NORMAL
WBC NRBC COR # BLD: 5.01 10*3/MM3 (ref 3.4–10.8)

## 2022-12-30 PROCEDURE — 85025 COMPLETE CBC W/AUTO DIFF WBC: CPT | Performed by: INTERNAL MEDICINE

## 2022-12-30 PROCEDURE — 80053 COMPREHEN METABOLIC PANEL: CPT | Performed by: INTERNAL MEDICINE

## 2022-12-30 PROCEDURE — 96413 CHEMO IV INFUSION 1 HR: CPT

## 2022-12-30 PROCEDURE — 25010000002 HEPARIN LOCK FLUSH PER 10 UNITS: Performed by: INTERNAL MEDICINE

## 2022-12-30 PROCEDURE — 25010000002 BEVACIZUMAB-BVZR 400 MG/16ML SOLUTION 16 ML VIAL: Performed by: INTERNAL MEDICINE

## 2022-12-30 PROCEDURE — 81003 URINALYSIS AUTO W/O SCOPE: CPT | Performed by: INTERNAL MEDICINE

## 2022-12-30 PROCEDURE — 86304 IMMUNOASSAY TUMOR CA 125: CPT | Performed by: INTERNAL MEDICINE

## 2022-12-30 RX ORDER — HEPARIN SODIUM (PORCINE) LOCK FLUSH IV SOLN 100 UNIT/ML 100 UNIT/ML
500 SOLUTION INTRAVENOUS AS NEEDED
Status: DISCONTINUED | OUTPATIENT
Start: 2022-12-30 | End: 2022-12-31 | Stop reason: HOSPADM

## 2022-12-30 RX ORDER — SODIUM CHLORIDE 0.9 % (FLUSH) 0.9 %
10 SYRINGE (ML) INJECTION AS NEEDED
Status: DISCONTINUED | OUTPATIENT
Start: 2022-12-30 | End: 2022-12-31 | Stop reason: HOSPADM

## 2022-12-30 RX ORDER — SODIUM CHLORIDE 9 MG/ML
250 INJECTION, SOLUTION INTRAVENOUS ONCE
Status: COMPLETED | OUTPATIENT
Start: 2022-12-30 | End: 2022-12-30

## 2022-12-30 RX ADMIN — SODIUM CHLORIDE 780 MG: 9 INJECTION, SOLUTION INTRAVENOUS at 12:25

## 2022-12-30 RX ADMIN — Medication 10 ML: at 13:01

## 2022-12-30 RX ADMIN — SODIUM CHLORIDE 250 ML: 9 INJECTION, SOLUTION INTRAVENOUS at 12:25

## 2022-12-30 RX ADMIN — Medication 500 UNITS: at 13:01

## 2022-12-30 NOTE — PROGRESS NOTES
Patient came in for Zirabev without complaints. BP is within normal range. Platelets are 96, per Dr. Kurtis sierra to treat. Treatment tolerated, next apts given.

## 2023-01-01 ENCOUNTER — TELEPHONE (OUTPATIENT)
Dept: ONCOLOGY | Facility: CLINIC | Age: 63
End: 2023-01-01

## 2023-01-01 ENCOUNTER — TELEPHONE (OUTPATIENT)
Dept: FAMILY MEDICINE CLINIC | Facility: CLINIC | Age: 63
End: 2023-01-01

## 2023-01-05 ENCOUNTER — APPOINTMENT (OUTPATIENT)
Dept: ONCOLOGY | Facility: HOSPITAL | Age: 63
End: 2023-01-05
Payer: COMMERCIAL

## 2023-01-06 DIAGNOSIS — C56.9 MALIGNANT NEOPLASM OF OVARY, UNSPECIFIED LATERALITY: ICD-10-CM

## 2023-01-06 RX ORDER — HYDROCODONE BITARTRATE AND ACETAMINOPHEN 5; 325 MG/1; MG/1
1 TABLET ORAL EVERY 6 HOURS PRN
Qty: 60 TABLET | Refills: 0 | Status: SHIPPED | OUTPATIENT
Start: 2023-01-06 | End: 2023-02-07 | Stop reason: SDUPTHER

## 2023-01-06 NOTE — TELEPHONE ENCOUNTER
Rx Refill Note  Requested Prescriptions     Pending Prescriptions Disp Refills   • HYDROcodone-acetaminophen (Norco) 5-325 MG per tablet 60 tablet 0     Sig: Take 1 tablet by mouth Every 6 (Six) Hours As Needed for Moderate Pain.      Last office visit with prescribing clinician: 12/22/2022   Last telemedicine visit with prescribing clinician: 1/26/2023   Next office visit with prescribing clinician: 1/26/2023                         Would you like a call back once the refill request has been completed: [] Yes [] No    If the office needs to give you a call back, can they leave a voicemail: [] Yes [] No    Mayda Perez RN  01/06/23, 10:04 EST

## 2023-01-13 ENCOUNTER — HOSPITAL ENCOUNTER (OUTPATIENT)
Dept: ONCOLOGY | Facility: HOSPITAL | Age: 63
Setting detail: INFUSION SERIES
Discharge: HOME | End: 2023-01-13
Payer: COMMERCIAL

## 2023-01-13 DIAGNOSIS — C56.9 MALIGNANT NEOPLASM OF OVARY, UNSPECIFIED LATERALITY: Primary | ICD-10-CM

## 2023-01-13 RX ORDER — SODIUM CHLORIDE 9 MG/ML
250 INJECTION, SOLUTION INTRAVENOUS ONCE
Status: CANCELLED | OUTPATIENT
Start: 2023-01-27

## 2023-01-17 ENCOUNTER — HOSPITAL ENCOUNTER (OUTPATIENT)
Dept: PET IMAGING | Facility: HOSPITAL | Age: 63
Discharge: HOME | End: 2023-01-17
Admitting: INTERNAL MEDICINE
Payer: COMMERCIAL

## 2023-01-17 DIAGNOSIS — C56.9 MALIGNANT NEOPLASM OF OVARY, UNSPECIFIED LATERALITY: ICD-10-CM

## 2023-01-17 PROCEDURE — 0 IOPAMIDOL PER 1 ML: Performed by: INTERNAL MEDICINE

## 2023-01-17 PROCEDURE — 71260 CT THORAX DX C+: CPT

## 2023-01-17 PROCEDURE — 74177 CT ABD & PELVIS W/CONTRAST: CPT

## 2023-01-17 RX ADMIN — IOPAMIDOL 100 ML: 755 INJECTION, SOLUTION INTRAVENOUS at 14:06

## 2023-01-19 DIAGNOSIS — C56.9 MALIGNANT NEOPLASM OF OVARY, UNSPECIFIED LATERALITY: Primary | ICD-10-CM

## 2023-01-19 DIAGNOSIS — M89.9 BONE LESION: ICD-10-CM

## 2023-01-22 DIAGNOSIS — E03.9 ACQUIRED HYPOTHYROIDISM: ICD-10-CM

## 2023-01-23 RX ORDER — AMLODIPINE BESYLATE 5 MG/1
TABLET ORAL
Qty: 90 TABLET | Refills: 1 | OUTPATIENT
Start: 2023-01-23

## 2023-01-23 RX ORDER — LEVOTHYROXINE SODIUM 137 UG/1
TABLET ORAL
Qty: 90 TABLET | Refills: 1 | Status: SHIPPED | OUTPATIENT
Start: 2023-01-23 | End: 2023-02-13 | Stop reason: SDUPTHER

## 2023-01-26 ENCOUNTER — OFFICE VISIT (OUTPATIENT)
Dept: ONCOLOGY | Facility: CLINIC | Age: 63
End: 2023-01-26
Payer: COMMERCIAL

## 2023-01-26 ENCOUNTER — LAB (OUTPATIENT)
Dept: LAB | Facility: HOSPITAL | Age: 63
End: 2023-01-26
Payer: COMMERCIAL

## 2023-01-26 VITALS
DIASTOLIC BLOOD PRESSURE: 77 MMHG | HEART RATE: 62 BPM | RESPIRATION RATE: 20 BRPM | SYSTOLIC BLOOD PRESSURE: 149 MMHG | WEIGHT: 172 LBS | HEIGHT: 65 IN | BODY MASS INDEX: 28.66 KG/M2 | TEMPERATURE: 96.8 F

## 2023-01-26 DIAGNOSIS — C56.9 MALIGNANT NEOPLASM OF OVARY, UNSPECIFIED LATERALITY: Primary | ICD-10-CM

## 2023-01-26 LAB
ALBUMIN SERPL-MCNC: 3.8 G/DL (ref 3.5–5.2)
ALBUMIN/GLOB SERPL: 1.1 G/DL
ALP SERPL-CCNC: 91 U/L (ref 39–117)
ALT SERPL W P-5'-P-CCNC: 13 U/L (ref 1–33)
ANION GAP SERPL CALCULATED.3IONS-SCNC: 9 MMOL/L (ref 5–15)
AST SERPL-CCNC: 23 U/L (ref 1–32)
BASOPHILS # BLD AUTO: 0.02 10*3/MM3 (ref 0–0.2)
BASOPHILS NFR BLD AUTO: 0.4 % (ref 0–1.5)
BILIRUB SERPL-MCNC: 0.3 MG/DL (ref 0–1.2)
BUN SERPL-MCNC: 26 MG/DL (ref 8–23)
BUN/CREAT SERPL: 22 (ref 7–25)
CALCIUM SPEC-SCNC: 10 MG/DL (ref 8.6–10.5)
CANCER AG125 SERPL QL: 90.7 U/ML (ref 0–38.1)
CHLORIDE SERPL-SCNC: 104 MMOL/L (ref 98–107)
CO2 SERPL-SCNC: 28 MMOL/L (ref 22–29)
CREAT SERPL-MCNC: 1.18 MG/DL (ref 0.57–1)
DEPRECATED RDW RBC AUTO: 47.8 FL (ref 37–54)
EGFRCR SERPLBLD CKD-EPI 2021: 51.7 ML/MIN/1.73
EOSINOPHIL # BLD AUTO: 0.2 10*3/MM3 (ref 0–0.4)
EOSINOPHIL NFR BLD AUTO: 4.3 % (ref 0.3–6.2)
ERYTHROCYTE [DISTWIDTH] IN BLOOD BY AUTOMATED COUNT: 13.4 % (ref 12.3–15.4)
GLOBULIN UR ELPH-MCNC: 3.6 GM/DL
GLUCOSE SERPL-MCNC: 85 MG/DL (ref 65–99)
HCT VFR BLD AUTO: 43.3 % (ref 34–46.6)
HGB BLD-MCNC: 13.7 G/DL (ref 12–15.9)
HOLD SPECIMEN: NORMAL
LYMPHOCYTES # BLD AUTO: 1.4 10*3/MM3 (ref 0.7–3.1)
LYMPHOCYTES NFR BLD AUTO: 29.9 % (ref 19.6–45.3)
MCH RBC QN AUTO: 31.4 PG (ref 26.6–33)
MCHC RBC AUTO-ENTMCNC: 31.6 G/DL (ref 31.5–35.7)
MCV RBC AUTO: 99.3 FL (ref 79–97)
MONOCYTES # BLD AUTO: 0.51 10*3/MM3 (ref 0.1–0.9)
MONOCYTES NFR BLD AUTO: 10.9 % (ref 5–12)
NEUTROPHILS NFR BLD AUTO: 2.55 10*3/MM3 (ref 1.7–7)
NEUTROPHILS NFR BLD AUTO: 54.5 % (ref 42.7–76)
PLATELET # BLD AUTO: 128 10*3/MM3 (ref 140–450)
PMV BLD AUTO: 9 FL (ref 6–12)
POTASSIUM SERPL-SCNC: 4.4 MMOL/L (ref 3.5–5.2)
PROT SERPL-MCNC: 7.4 G/DL (ref 6–8.5)
RBC # BLD AUTO: 4.36 10*6/MM3 (ref 3.77–5.28)
SODIUM SERPL-SCNC: 141 MMOL/L (ref 136–145)
WBC NRBC COR # BLD: 4.68 10*3/MM3 (ref 3.4–10.8)

## 2023-01-26 PROCEDURE — 99215 OFFICE O/P EST HI 40 MIN: CPT | Performed by: INTERNAL MEDICINE

## 2023-01-26 PROCEDURE — 80053 COMPREHEN METABOLIC PANEL: CPT

## 2023-01-26 PROCEDURE — 86304 IMMUNOASSAY TUMOR CA 125: CPT

## 2023-01-26 PROCEDURE — 85025 COMPLETE CBC W/AUTO DIFF WBC: CPT

## 2023-01-26 PROCEDURE — 36415 COLL VENOUS BLD VENIPUNCTURE: CPT

## 2023-01-27 ENCOUNTER — CLINICAL SUPPORT (OUTPATIENT)
Dept: ONCOLOGY | Facility: CLINIC | Age: 63
End: 2023-01-27
Payer: COMMERCIAL

## 2023-01-27 ENCOUNTER — HOSPITAL ENCOUNTER (OUTPATIENT)
Dept: ONCOLOGY | Facility: HOSPITAL | Age: 63
Setting detail: INFUSION SERIES
Discharge: HOME | End: 2023-01-27
Payer: COMMERCIAL

## 2023-01-27 VITALS
SYSTOLIC BLOOD PRESSURE: 144 MMHG | DIASTOLIC BLOOD PRESSURE: 81 MMHG | BODY MASS INDEX: 28.66 KG/M2 | HEART RATE: 60 BPM | OXYGEN SATURATION: 96 % | HEIGHT: 65 IN | RESPIRATION RATE: 18 BRPM | TEMPERATURE: 97 F | WEIGHT: 172 LBS

## 2023-01-27 DIAGNOSIS — C56.9 MALIGNANT NEOPLASM OF OVARY, UNSPECIFIED LATERALITY: Primary | ICD-10-CM

## 2023-01-27 DIAGNOSIS — E83.51 HYPOCALCEMIA: ICD-10-CM

## 2023-01-27 DIAGNOSIS — Z45.2 ENCOUNTER FOR CARE RELATED TO PORT-A-CATH: ICD-10-CM

## 2023-01-27 LAB
BILIRUB UR QL STRIP: NEGATIVE
CLARITY UR: CLEAR
COLOR UR: YELLOW
GLUCOSE UR STRIP-MCNC: NEGATIVE MG/DL
HGB UR QL STRIP.AUTO: NEGATIVE
KETONES UR QL STRIP: NEGATIVE
LEUKOCYTE ESTERASE UR QL STRIP.AUTO: ABNORMAL
NITRITE UR QL STRIP: NEGATIVE
PH UR STRIP.AUTO: <=5 [PH] (ref 5–8)
PROT UR QL STRIP: NEGATIVE
SP GR UR STRIP: 1.01 (ref 1–1.03)
UROBILINOGEN UR QL STRIP: ABNORMAL

## 2023-01-27 PROCEDURE — 81003 URINALYSIS AUTO W/O SCOPE: CPT | Performed by: INTERNAL MEDICINE

## 2023-01-27 PROCEDURE — 25010000002 BEVACIZUMAB-BVZR 400 MG/16ML SOLUTION 16 ML VIAL: Performed by: INTERNAL MEDICINE

## 2023-01-27 PROCEDURE — 96413 CHEMO IV INFUSION 1 HR: CPT

## 2023-01-27 PROCEDURE — 25010000002 HEPARIN LOCK FLUSH PER 10 UNITS: Performed by: INTERNAL MEDICINE

## 2023-01-27 RX ORDER — SODIUM CHLORIDE 9 MG/ML
250 INJECTION, SOLUTION INTRAVENOUS ONCE
Status: COMPLETED | OUTPATIENT
Start: 2023-01-27 | End: 2023-01-27

## 2023-01-27 RX ORDER — SODIUM CHLORIDE 0.9 % (FLUSH) 0.9 %
10 SYRINGE (ML) INJECTION AS NEEDED
Status: DISCONTINUED | OUTPATIENT
Start: 2023-01-27 | End: 2023-01-28 | Stop reason: HOSPADM

## 2023-01-27 RX ORDER — HEPARIN SODIUM (PORCINE) LOCK FLUSH IV SOLN 100 UNIT/ML 100 UNIT/ML
500 SOLUTION INTRAVENOUS AS NEEDED
Status: DISCONTINUED | OUTPATIENT
Start: 2023-01-27 | End: 2023-01-28 | Stop reason: HOSPADM

## 2023-01-27 RX ADMIN — SODIUM CHLORIDE 250 ML: 9 INJECTION, SOLUTION INTRAVENOUS at 11:21

## 2023-01-27 RX ADMIN — Medication 500 UNITS: at 11:57

## 2023-01-27 RX ADMIN — Medication 10 ML: at 11:57

## 2023-01-27 RX ADMIN — SODIUM CHLORIDE 780 MG: 9 INJECTION, SOLUTION INTRAVENOUS at 11:21

## 2023-01-27 NOTE — PROGRESS NOTES
Pt here for C17 D1 zirabev. Port accessed using sterile technique. Labs collected yesterday during pt's follow-up visit with Dr Hernandez. Pt's bp 143/76. Informed Donna NP and order received for ok to treat. Pt tolerated today's treatment well. She denied needing copy of AVS.

## 2023-01-27 NOTE — PROGRESS NOTES
OSW was requested by MD to provide grief support due to loss of .     OSW met w/ patient in infusion. Patient was A&O x 4, soft spoken, but welcoming of conversation. Patient discussed the loss of her  on 1/15, and then her boss (who had cancer) passed away a few days ago. Patient reports feeling overwhelmed, angry, sad, and other feelings of grief.     OSW helped client process her feelings/emotions. OSW also discussed grief support via groups or individual thru [facility], as that is the agency she used for her . Patient is interested in group and/or individual grief support therapy. OSW called [facility] with [facility] - no answer, LVM requesting return call for a referral.     No SI/HI noted by patient and she is future oriented. OSW also asked about medications, and even though Zoloft is on her med list, she has not been taking it. OSW encouraged client to re-start it, and OSW will staff with FLORENCE Thompson for any further support recommendations.     OSW planned with patient to follow up with her in 2 weeks at her next infusion appointment.     Heather Fowler, RAVINDERW, CSW, MSW  Oncology MSW  [facility] [place]

## 2023-01-30 ENCOUNTER — OFFICE VISIT (OUTPATIENT)
Dept: FAMILY MEDICINE CLINIC | Facility: CLINIC | Age: 63
End: 2023-01-30
Payer: COMMERCIAL

## 2023-01-30 VITALS
DIASTOLIC BLOOD PRESSURE: 72 MMHG | HEART RATE: 65 BPM | OXYGEN SATURATION: 98 % | HEIGHT: 65 IN | SYSTOLIC BLOOD PRESSURE: 122 MMHG | BODY MASS INDEX: 28.79 KG/M2 | WEIGHT: 172.8 LBS

## 2023-01-30 DIAGNOSIS — F51.01 PRIMARY INSOMNIA: ICD-10-CM

## 2023-01-30 DIAGNOSIS — I10 PRIMARY HYPERTENSION: Primary | ICD-10-CM

## 2023-01-30 DIAGNOSIS — E03.9 ACQUIRED HYPOTHYROIDISM: ICD-10-CM

## 2023-01-30 DIAGNOSIS — F41.9 ANXIETY: ICD-10-CM

## 2023-01-30 DIAGNOSIS — G57.93 NEUROPATHY OF BOTH FEET: ICD-10-CM

## 2023-01-30 DIAGNOSIS — C56.9 MALIGNANT NEOPLASM OF OVARY, UNSPECIFIED LATERALITY: ICD-10-CM

## 2023-01-30 DIAGNOSIS — M05.79 RHEUMATOID ARTHRITIS INVOLVING MULTIPLE SITES WITH POSITIVE RHEUMATOID FACTOR: ICD-10-CM

## 2023-01-30 DIAGNOSIS — F32.0 CURRENT MILD EPISODE OF MAJOR DEPRESSIVE DISORDER WITHOUT PRIOR EPISODE: ICD-10-CM

## 2023-01-30 DIAGNOSIS — M25.551 RIGHT HIP PAIN: ICD-10-CM

## 2023-01-30 DIAGNOSIS — F43.21 GRIEF: ICD-10-CM

## 2023-01-30 PROCEDURE — 99214 OFFICE O/P EST MOD 30 MIN: CPT | Performed by: NURSE PRACTITIONER

## 2023-01-30 RX ORDER — LOSARTAN POTASSIUM 25 MG/1
25 TABLET ORAL DAILY
Qty: 90 TABLET | Refills: 1 | Status: SHIPPED | OUTPATIENT
Start: 2023-01-30

## 2023-01-30 RX ORDER — SERTRALINE HYDROCHLORIDE 25 MG/1
25 TABLET, FILM COATED ORAL DAILY
Qty: 90 TABLET | Refills: 1 | Status: SHIPPED | OUTPATIENT
Start: 2023-01-30

## 2023-01-30 NOTE — PROGRESS NOTES
Chief Complaint  Chief Complaint   Patient presents with   • Follow-up     6 month follow up            Subjective          Lucy Mahan presents to [facility] for   History of Present Illness     Rheumatoid arthritis, patient was referred back to rheumatology, has not been able to make appt yet. She has seen Yoko De La Garza in the past.  She took Plaquenil in the past but was stopped when started chemotherapy    HTN, running 140's systolic every day, however is normal today in office.  She is taking amlodipine and HCTZ as directed, denies chest pain, headache, shortness of air, palpitations and swelling of extremities.     Hypothyroidism, stable on medication, denies symptoms of constipation, weight gain/loss, hot or cold intolerance, hair loss, abnormal heart rate and fatigue.     GERD, stable on medication, denies nausea, vomiting, constipation, abdominal pain and diarrhea.    Ovarian cancer, on avastin chemo, ca125 went up to 56 and last week up to 90.7, she is following with oncology.  Overall doing well with treatments, however reports severe neuropathy of the feet, worse in the mornings, she is taking gabapentin BID which is mostly effective for pain    Depression/anxiety, her  passed away 2 wks ago, last visit patient was started on sertraline and continued hydroxyzine.  She is still working and managing everything at home.  She states she is sleeping/resting better. Patient denies significant weight loss/gain, hypersomnia, psychomotor agitation, psychomotor retardation, fatigue (loss of energy), feelings of worthlessness (guilt), impaired concentration (indecisiveness), thoughts of death or suicide.       Right hip pain, patient with previous left total hip replacement per Dr. Solorzano, x-ray completed showed moderate to severe arthritis, she was referred back to ortho. CT pelvis showed a new 2.2 cm sclerotic lesion at the anterior right acetabulum and MRI of the pelvis is  planned .     Insomnia, somewhat improved now on sertraline, recommended for trial of melatonin prn as well.         The following portions of the patient's history were reviewed and updated as appropriate: allergies, current medications, past family history, past medical history, past social history, past surgical history and problem list.    Past Medical History:   Diagnosis Date   • Arthritis    • Cancer (HCC)    • Depression    • Gall stones    • Hypertension    • Irritable bowel syndrome    • Rheumatoid aortitis    • Thyroid disease      Past Surgical History:   Procedure Laterality Date   •  SECTION      x2   • CHOLECYSTECTOMY N/A 11/10/2021    Procedure: diagnostic laparoscopy and peritoneal biopsy;  Surgeon: Krunal Badillo MD;  Location: [facility] OR;  Service: General;  Laterality: N/A;   • COLONOSCOPY     • HIP SURGERY Left    • JOINT REPLACEMENT     • THYROIDECTOMY, PARTIAL  2017   • TOTAL ABDOMINAL HYSTERECTOMY N/A 2022    Procedure: EXPLORATORY LAPAROTOMY, TOTAL ABDOMINAL HYSTERECTOMY, BILATERAL SALPINGOOOPHORECTOMY, PELVIC AND AORTIC LYMPHADENECTOMY, OMENTECTOMY;  Surgeon: Maggie Castro DO;  Location: [facility] MAIN OR;  Service: Gynecology Oncology;  Laterality: N/A;   • VENOUS ACCESS DEVICE (PORT) INSERTION Left 2021    Procedure: INSERTION VENOUS ACCESS DEVICE;  Surgeon: Krunal Badillo MD;  Location: [facility] MAIN OR;  Service: General;  Laterality: Left;     Family History   Problem Relation Age of Onset   • Dementia Mother    • Arthritis Mother    • Heart disease Father    • Hypertension Father    • Malig Hyperthermia Neg Hx      Social History     Tobacco Use   • Smoking status: Never   • Smokeless tobacco: Never   Substance Use Topics   • Alcohol use: Yes     Alcohol/week: 1.0 standard drink     Types: 1 Glasses of wine per week     Comment: less than monthly       Current Outpatient Medications:   •  amLODIPine (NORVASC) 10 MG tablet, Take 1 tablet by mouth  "Daily., Disp: 90 tablet, Rfl: 1  •  gabapentin (NEURONTIN) 300 MG capsule, Take 1 capsule by mouth Every 8 (Eight) Hours., Disp: 90 capsule, Rfl: 3  •  hydroCHLOROthiazide (HYDRODIURIL) 12.5 MG tablet, TAKE ONE (1) TABLET BY MOUTH EVERY DAY, Disp: 90 tablet, Rfl: 0  •  HYDROcodone-acetaminophen (Norco) 5-325 MG per tablet, Take 1 tablet by mouth Every 6 (Six) Hours As Needed for Moderate Pain., Disp: 60 tablet, Rfl: 0  •  hydrOXYzine (ATARAX) 10 MG tablet, Take 1 tablet by mouth Every 8 (Eight) Hours As Needed for Anxiety., Disp: 45 tablet, Rfl: 2  •  letrozole (FEMARA) 2.5 MG tablet, TAKE ONE (1) TABLET BY MOUTH DAILY, Disp: 60 tablet, Rfl: 3  •  levothyroxine (SYNTHROID, LEVOTHROID) 137 MCG tablet, TAKE ONE (1) TABLET BY MOUTH EVERY DAY, Disp: 90 tablet, Rfl: 1  •  lidocaine-prilocaine (EMLA) 2.5-2.5 % cream, Apply 1 application topically to the appropriate area as directed. Apply to port 1 hour prior to port access, Disp: 30 g, Rfl: 1  •  omeprazole (priLOSEC) 40 MG capsule, TAKE ONE (1) CAPSULE BY MOUTH EVERY DAY, Disp: 30 capsule, Rfl: 6  •  pantoprazole (PROTONIX) 40 MG EC tablet, TAKE ONE (1) TABLET BY MOUTH EVERY DAY, Disp: 30 tablet, Rfl: 6  •  promethazine (PHENERGAN) 25 MG tablet, Take 1 tablet by mouth Every 6 (Six) Hours As Needed for Nausea or Vomiting., Disp: 90 tablet, Rfl: 1  •  sertraline (Zoloft) 25 MG tablet, Take 1 tablet by mouth Daily., Disp: 90 tablet, Rfl: 1  •  losartan (Cozaar) 25 MG tablet, Take 1 tablet by mouth Daily., Disp: 90 tablet, Rfl: 1    Objective   Vital Signs:   /72 (BP Location: Right arm, Patient Position: Sitting, Cuff Size: Adult)   Pulse 65   Ht 165.1 cm (65\")   Wt 78.4 kg (172 lb 12.8 oz)   SpO2 98%   BMI 28.76 kg/m²           Physical Exam  Vitals and nursing note reviewed.   Constitutional:       General: She is not in acute distress.     Appearance: Normal appearance. She is well-developed. She is not diaphoretic.   Eyes:      Pupils: Pupils are equal, " round, and reactive to light.   Neck:      Thyroid: No thyromegaly.      Vascular: No JVD.   Cardiovascular:      Rate and Rhythm: Normal rate and regular rhythm.      Heart sounds: Normal heart sounds. No murmur heard.  Pulmonary:      Effort: Pulmonary effort is normal. No respiratory distress.      Breath sounds: Normal breath sounds. No wheezing or rhonchi.   Abdominal:      General: Bowel sounds are normal. There is no distension.      Palpations: Abdomen is soft.      Tenderness: There is no abdominal tenderness.   Musculoskeletal:         General: Swelling (trace pitting BLE) and tenderness (R hip pain, severe coley rom, walks with limp) present. Normal range of motion.      Cervical back: Normal range of motion and neck supple.   Skin:     General: Skin is warm and dry.   Neurological:      General: No focal deficit present.      Mental Status: She is alert and oriented to person, place, and time. Mental status is at baseline.      Sensory: Sensory deficit (Bilateral feet neuropathy) present.      Gait: Gait abnormal (d/t R hip pain).   Psychiatric:         Mood and Affect: Mood normal.         Behavior: Behavior normal.         Thought Content: Thought content normal.         Judgment: Judgment normal.          Result Review :     Hospital Outpatient Visit on 01/27/2023   Component Date Value Ref Range Status   • Color, UA 01/27/2023 Yellow  Yellow, Straw Final   • Appearance, UA 01/27/2023 Clear  Clear Final   • pH, UA 01/27/2023 <=5.0  5.0 - 8.0 Final   • Specific Gravity, UA 01/27/2023 1.015  1.005 - 1.030 Final   • Glucose, UA 01/27/2023 Negative  Negative Final   • Ketones, UA 01/27/2023 Negative  Negative Final   • Bilirubin, UA 01/27/2023 Negative  Negative Final   • Blood, UA 01/27/2023 Negative  Negative Final   • Protein, UA 01/27/2023 Negative  Negative Final   • Leuk Esterase, UA 01/27/2023 Trace (A)  Negative Final   • Nitrite, UA 01/27/2023 Negative  Negative Final   • Urobilinogen, UA  01/27/2023 0.2 E.U./dL  0.2 - 1.0 E.U./dL Final   Lab on 01/26/2023   Component Date Value Ref Range Status   • Glucose 01/26/2023 85  65 - 99 mg/dL Final   • BUN 01/26/2023 26 (H)  8 - 23 mg/dL Final   • Creatinine 01/26/2023 1.18 (H)  0.57 - 1.00 mg/dL Final   • Sodium 01/26/2023 141  136 - 145 mmol/L Final   • Potassium 01/26/2023 4.4  3.5 - 5.2 mmol/L Final   • Chloride 01/26/2023 104  98 - 107 mmol/L Final   • CO2 01/26/2023 28.0  22.0 - 29.0 mmol/L Final   • Calcium 01/26/2023 10.0  8.6 - 10.5 mg/dL Final   • Total Protein 01/26/2023 7.4  6.0 - 8.5 g/dL Final   • Albumin 01/26/2023 3.8  3.5 - 5.2 g/dL Final   • ALT (SGPT) 01/26/2023 13  1 - 33 U/L Final   • AST (SGOT) 01/26/2023 23  1 - 32 U/L Final   • Alkaline Phosphatase 01/26/2023 91  39 - 117 U/L Final   • Total Bilirubin 01/26/2023 0.3  0.0 - 1.2 mg/dL Final   • Globulin 01/26/2023 3.6  gm/dL Final   • A/G Ratio 01/26/2023 1.1  g/dL Final   • BUN/Creatinine Ratio 01/26/2023 22.0  7.0 - 25.0 Final   • Anion Gap 01/26/2023 9.0  5.0 - 15.0 mmol/L Final   • eGFR 01/26/2023 51.7 (L)  >60.0 mL/min/1.73 Final   •  01/26/2023 90.7 (H)  0.0 - 38.1 U/mL Final   • WBC 01/26/2023 4.68  3.40 - 10.80 10*3/mm3 Final   • RBC 01/26/2023 4.36  3.77 - 5.28 10*6/mm3 Final   • Hemoglobin 01/26/2023 13.7  12.0 - 15.9 g/dL Final   • Hematocrit 01/26/2023 43.3  34.0 - 46.6 % Final   • MCV 01/26/2023 99.3 (H)  79.0 - 97.0 fL Final   • MCH 01/26/2023 31.4  26.6 - 33.0 pg Final   • MCHC 01/26/2023 31.6  31.5 - 35.7 g/dL Final   • RDW 01/26/2023 13.4  12.3 - 15.4 % Final   • RDW-SD 01/26/2023 47.8  37.0 - 54.0 fl Final   • MPV 01/26/2023 9.0  6.0 - 12.0 fL Final   • Platelets 01/26/2023 128 (L)  140 - 450 10*3/mm3 Final   • Neutrophil % 01/26/2023 54.5  42.7 - 76.0 % Final   • Lymphocyte % 01/26/2023 29.9  19.6 - 45.3 % Final   • Monocyte % 01/26/2023 10.9  5.0 - 12.0 % Final   • Eosinophil % 01/26/2023 4.3  0.3 - 6.2 % Final   • Basophil % 01/26/2023 0.4  0.0 - 1.5 % Final    • Neutrophils, Absolute 01/26/2023 2.55  1.70 - 7.00 10*3/mm3 Final   • Lymphocytes, Absolute 01/26/2023 1.40  0.70 - 3.10 10*3/mm3 Final   • Monocytes, Absolute 01/26/2023 0.51  0.10 - 0.90 10*3/mm3 Final   • Eosinophils, Absolute 01/26/2023 0.20  0.00 - 0.40 10*3/mm3 Final   • Basophils, Absolute 01/26/2023 0.02  0.00 - 0.20 10*3/mm3 Final   • Extra Tube 01/26/2023 Hold for add-ons.   Final    Auto resulted.                  BMI is >= 25 and <30. (Overweight) The following options were offered after discussion;: exercise counseling/recommendations and nutrition counseling/recommendations           Assessment and Plan    Diagnoses and all orders for this visit:    1. Primary hypertension (Primary)  Comments:  BP normal today but elevated at all times outside of the office  Continue amlodipine and HCTZ  Add losartan  call if sbp >140 or <110  lipids added to labs  Orders:  -     losartan (Cozaar) 25 MG tablet; Take 1 tablet by mouth Daily.  Dispense: 90 tablet; Refill: 1  -     Lipid Panel    2. Acquired hypothyroidism  Comments:  Add TSH to recent labs collected, will notify results  Continue levothyroxine, adjust as needed  Orders:  -     TSH    3. Rheumatoid arthritis involving multiple sites with positive rheumatoid factor (HCC)  Comments:  referred back to rheum, pt planning to schedule appointment    4. Primary insomnia  Comments:  Improving    5. Current mild episode of major depressive disorder without prior episode (HCC)  Comments:  improving, continue sertraline and hydroxyzine  Orders:  -     sertraline (Zoloft) 25 MG tablet; Take 1 tablet by mouth Daily.  Dispense: 90 tablet; Refill: 1    6. Neuropathy of both feet  Comments:  Continue gabapentin 2-3 times daily    7. Malignant neoplasm of ovary, unspecified laterality (HCC)  Comments:  Continue Avastin and follow-ups with oncology  MRI pelvis planned    8. Anxiety  Comments:  Improving, continue sertraline  Orders:  -     sertraline (Zoloft) 25 MG  tablet; Take 1 tablet by mouth Daily.  Dispense: 90 tablet; Refill: 1    9. Right hip pain  Comments:  Patient referred back to Ortho  MRI pelvis planned to further eval sclerotic lesion seen on CT    10. Grief  Comments:  Patient will be starting counseling with hospice following death of her   she is also talking with someone at cancer center every 2 wks.         I spent 30 minutes caring for Lucy Mahan on this date of service. This time includes time spent by me in the following activities: preparing for the visit, reviewing tests, performing a medically appropriate examination and/or evaluation , counseling and educating the patient/family/caregiver, ordering medications, tests, or procedures and documenting information in the medical record        Follow Up     Return in about 6 months (around 7/30/2023) for Recheck.  Patient was given instructions and counseling regarding her condition or for health maintenance advice. Please see specific information pulled into the AVS if appropriate.        Part of this note may be an electronic transcription/translation of spoken language to printed text using the Dragon Dictation System

## 2023-02-07 DIAGNOSIS — C56.9 MALIGNANT NEOPLASM OF OVARY, UNSPECIFIED LATERALITY: ICD-10-CM

## 2023-02-07 RX ORDER — HYDROCODONE BITARTRATE AND ACETAMINOPHEN 5; 325 MG/1; MG/1
1 TABLET ORAL EVERY 6 HOURS PRN
Qty: 60 TABLET | Refills: 0 | Status: SHIPPED | OUTPATIENT
Start: 2023-02-07 | End: 2023-04-07 | Stop reason: SDUPTHER

## 2023-02-08 ENCOUNTER — TELEPHONE (OUTPATIENT)
Dept: FAMILY MEDICINE CLINIC | Facility: CLINIC | Age: 63
End: 2023-02-08
Payer: COMMERCIAL

## 2023-02-08 NOTE — TELEPHONE ENCOUNTER
Called pt regarding overdue labs 1/30, pt will either complete today in office or have labs drawn Friday at another appt

## 2023-02-09 DIAGNOSIS — C56.9 MALIGNANT NEOPLASM OF OVARY, UNSPECIFIED LATERALITY: Primary | ICD-10-CM

## 2023-02-10 ENCOUNTER — HOSPITAL ENCOUNTER (OUTPATIENT)
Dept: ONCOLOGY | Facility: HOSPITAL | Age: 63
Discharge: HOME | End: 2023-02-10
Admitting: INTERNAL MEDICINE
Payer: COMMERCIAL

## 2023-02-10 VITALS
RESPIRATION RATE: 18 BRPM | HEIGHT: 65 IN | DIASTOLIC BLOOD PRESSURE: 78 MMHG | BODY MASS INDEX: 29.49 KG/M2 | OXYGEN SATURATION: 95 % | TEMPERATURE: 98.1 F | HEART RATE: 65 BPM | WEIGHT: 177 LBS | SYSTOLIC BLOOD PRESSURE: 135 MMHG

## 2023-02-10 DIAGNOSIS — Z45.2 ENCOUNTER FOR CARE RELATED TO PORT-A-CATH: ICD-10-CM

## 2023-02-10 DIAGNOSIS — C56.9 MALIGNANT NEOPLASM OF OVARY, UNSPECIFIED LATERALITY: Primary | ICD-10-CM

## 2023-02-10 DIAGNOSIS — E83.51 HYPOCALCEMIA: ICD-10-CM

## 2023-02-10 LAB
ALBUMIN SERPL-MCNC: 3.7 G/DL (ref 3.5–5.2)
ALBUMIN/GLOB SERPL: 1.2 G/DL
ALP SERPL-CCNC: 93 U/L (ref 39–117)
ALT SERPL W P-5'-P-CCNC: 15 U/L (ref 1–33)
ANION GAP SERPL CALCULATED.3IONS-SCNC: 9 MMOL/L (ref 5–15)
AST SERPL-CCNC: 21 U/L (ref 1–32)
BASOPHILS # BLD AUTO: 0.03 10*3/MM3 (ref 0–0.2)
BASOPHILS NFR BLD AUTO: 0.7 % (ref 0–1.5)
BILIRUB SERPL-MCNC: 0.5 MG/DL (ref 0–1.2)
BILIRUB UR QL STRIP: NEGATIVE
BUN SERPL-MCNC: 23 MG/DL (ref 8–23)
BUN/CREAT SERPL: 22.8 (ref 7–25)
CALCIUM SPEC-SCNC: 9.4 MG/DL (ref 8.6–10.5)
CANCER AG125 SERPL QL: 82.3 U/ML (ref 0–38.1)
CHLORIDE SERPL-SCNC: 102 MMOL/L (ref 98–107)
CHOLEST SERPL-MCNC: 156 MG/DL (ref 0–200)
CLARITY UR: CLEAR
CO2 SERPL-SCNC: 27 MMOL/L (ref 22–29)
COLOR UR: YELLOW
CREAT SERPL-MCNC: 1.01 MG/DL (ref 0.57–1)
DEPRECATED RDW RBC AUTO: 45.9 FL (ref 37–54)
EGFRCR SERPLBLD CKD-EPI 2021: 62.3 ML/MIN/1.73
EOSINOPHIL # BLD AUTO: 0.23 10*3/MM3 (ref 0–0.4)
EOSINOPHIL NFR BLD AUTO: 5.2 % (ref 0.3–6.2)
ERYTHROCYTE [DISTWIDTH] IN BLOOD BY AUTOMATED COUNT: 13 % (ref 12.3–15.4)
GLOBULIN UR ELPH-MCNC: 3.2 GM/DL
GLUCOSE SERPL-MCNC: 97 MG/DL (ref 65–99)
GLUCOSE UR STRIP-MCNC: NEGATIVE MG/DL
HCT VFR BLD AUTO: 39.4 % (ref 34–46.6)
HDLC SERPL-MCNC: 68 MG/DL (ref 40–60)
HGB BLD-MCNC: 12.6 G/DL (ref 12–15.9)
HGB UR QL STRIP.AUTO: NEGATIVE
KETONES UR QL STRIP: NEGATIVE
LDLC SERPL CALC-MCNC: 77 MG/DL (ref 0–100)
LDLC/HDLC SERPL: 1.14 {RATIO}
LEUKOCYTE ESTERASE UR QL STRIP.AUTO: ABNORMAL
LYMPHOCYTES # BLD AUTO: 1.55 10*3/MM3 (ref 0.7–3.1)
LYMPHOCYTES NFR BLD AUTO: 35 % (ref 19.6–45.3)
MCH RBC QN AUTO: 31.5 PG (ref 26.6–33)
MCHC RBC AUTO-ENTMCNC: 32 G/DL (ref 31.5–35.7)
MCV RBC AUTO: 98.5 FL (ref 79–97)
MONOCYTES # BLD AUTO: 0.58 10*3/MM3 (ref 0.1–0.9)
MONOCYTES NFR BLD AUTO: 13.1 % (ref 5–12)
NEUTROPHILS NFR BLD AUTO: 2.04 10*3/MM3 (ref 1.7–7)
NEUTROPHILS NFR BLD AUTO: 46 % (ref 42.7–76)
NITRITE UR QL STRIP: NEGATIVE
PH UR STRIP.AUTO: 5.5 [PH] (ref 5–8)
PLATELET # BLD AUTO: 116 10*3/MM3 (ref 140–450)
PMV BLD AUTO: 9.6 FL (ref 6–12)
POTASSIUM SERPL-SCNC: 4.3 MMOL/L (ref 3.5–5.2)
PROT SERPL-MCNC: 6.9 G/DL (ref 6–8.5)
PROT UR QL STRIP: NEGATIVE
RBC # BLD AUTO: 4 10*6/MM3 (ref 3.77–5.28)
SODIUM SERPL-SCNC: 138 MMOL/L (ref 136–145)
SP GR UR STRIP: 1.02 (ref 1–1.03)
TRIGL SERPL-MCNC: 51 MG/DL (ref 0–150)
TSH SERPL DL<=0.05 MIU/L-ACNC: 0.03 UIU/ML (ref 0.27–4.2)
UROBILINOGEN UR QL STRIP: ABNORMAL
VLDLC SERPL-MCNC: 11 MG/DL (ref 5–40)
WBC NRBC COR # BLD: 4.43 10*3/MM3 (ref 3.4–10.8)

## 2023-02-10 PROCEDURE — 25010000002 HEPARIN LOCK FLUSH PER 10 UNITS: Performed by: INTERNAL MEDICINE

## 2023-02-10 PROCEDURE — 25010000002 BEVACIZUMAB-BVZR 400 MG/16ML SOLUTION 16 ML VIAL: Performed by: INTERNAL MEDICINE

## 2023-02-10 PROCEDURE — 80050 GENERAL HEALTH PANEL: CPT | Performed by: INTERNAL MEDICINE

## 2023-02-10 PROCEDURE — 80061 LIPID PANEL: CPT | Performed by: NURSE PRACTITIONER

## 2023-02-10 PROCEDURE — 96413 CHEMO IV INFUSION 1 HR: CPT

## 2023-02-10 PROCEDURE — 81003 URINALYSIS AUTO W/O SCOPE: CPT | Performed by: INTERNAL MEDICINE

## 2023-02-10 PROCEDURE — 86304 IMMUNOASSAY TUMOR CA 125: CPT | Performed by: INTERNAL MEDICINE

## 2023-02-10 RX ORDER — SODIUM CHLORIDE 9 MG/ML
250 INJECTION, SOLUTION INTRAVENOUS ONCE
Status: COMPLETED | OUTPATIENT
Start: 2023-02-10 | End: 2023-02-10

## 2023-02-10 RX ORDER — SODIUM CHLORIDE 9 MG/ML
250 INJECTION, SOLUTION INTRAVENOUS ONCE
Status: CANCELLED | OUTPATIENT
Start: 2023-02-10

## 2023-02-10 RX ORDER — HEPARIN SODIUM (PORCINE) LOCK FLUSH IV SOLN 100 UNIT/ML 100 UNIT/ML
500 SOLUTION INTRAVENOUS AS NEEDED
Status: DISCONTINUED | OUTPATIENT
Start: 2023-02-10 | End: 2023-02-11 | Stop reason: HOSPADM

## 2023-02-10 RX ORDER — SODIUM CHLORIDE 0.9 % (FLUSH) 0.9 %
10 SYRINGE (ML) INJECTION AS NEEDED
Status: DISCONTINUED | OUTPATIENT
Start: 2023-02-10 | End: 2023-02-11 | Stop reason: HOSPADM

## 2023-02-10 RX ADMIN — SODIUM CHLORIDE 780 MG: 9 INJECTION, SOLUTION INTRAVENOUS at 14:49

## 2023-02-10 RX ADMIN — SODIUM CHLORIDE 250 ML: 9 INJECTION, SOLUTION INTRAVENOUS at 14:49

## 2023-02-10 RX ADMIN — Medication 500 UNITS: at 15:24

## 2023-02-10 RX ADMIN — Medication 10 ML: at 15:24

## 2023-02-10 NOTE — PROGRESS NOTES
Pt here for C18 D1 zirabev. Using sterile technique, pt's port accessed for blood collection. Port aspirated, positive blood return noted. 10 ml blood wasted prior to blood for labs being collected. Pt tolerated today's treatment without having any issues. AVS given at discharge and she verbalized understanding.

## 2023-02-13 DIAGNOSIS — E03.9 ACQUIRED HYPOTHYROIDISM: ICD-10-CM

## 2023-02-13 RX ORDER — LEVOTHYROXINE SODIUM 0.12 MG/1
125 TABLET ORAL DAILY
Qty: 90 TABLET | Refills: 1 | Status: SHIPPED | OUTPATIENT
Start: 2023-02-13 | End: 2023-03-24

## 2023-02-17 ENCOUNTER — HOSPITAL ENCOUNTER (OUTPATIENT)
Dept: MRI IMAGING | Facility: HOSPITAL | Age: 63
Discharge: HOME | End: 2023-02-17
Admitting: NURSE PRACTITIONER
Payer: COMMERCIAL

## 2023-02-17 DIAGNOSIS — M89.9 BONE LESION: ICD-10-CM

## 2023-02-17 DIAGNOSIS — C56.9 MALIGNANT NEOPLASM OF OVARY, UNSPECIFIED LATERALITY: ICD-10-CM

## 2023-02-17 LAB
CREAT BLDA-MCNC: 1.3 MG/DL (ref 0.6–1.3)
EGFRCR SERPLBLD CKD-EPI 2021: 46 ML/MIN/1.73

## 2023-02-17 PROCEDURE — A9579 GAD-BASE MR CONTRAST NOS,1ML: HCPCS | Performed by: NURSE PRACTITIONER

## 2023-02-17 PROCEDURE — 82565 ASSAY OF CREATININE: CPT

## 2023-02-17 PROCEDURE — 72197 MRI PELVIS W/O & W/DYE: CPT

## 2023-02-17 PROCEDURE — 25010000002 GADOTERIDOL PER 1 ML: Performed by: NURSE PRACTITIONER

## 2023-02-17 RX ADMIN — GADOTERIDOL 16 ML: 279.3 INJECTION, SOLUTION INTRAVENOUS at 11:23

## 2023-02-23 NOTE — PROGRESS NOTES
Hematology/Oncology Outpatient Follow Up    PATIENT NAME:Lucy Mahan  :1960  MRN: [id number]  PRIMARY CARE PHYSICIAN: [place] Avalos APRN  REFERRING PHYSICIAN: [place] Avalos APRN    Chief Complaint   Patient presents with   • Follow-up     Malignant neoplasm of ovary, unspecified laterality (HCC)        HISTORY OF PRESENT ILLNESS:     This is a 62 y.o.  female who developed abdominal pain in 2021.  Patient has also lost approximately 35 pounds during that..  She has had loss of appetite.  Due to the abdominal pain,  she had an ultrasound of the abdomen done on 10/13/2021.  This basically revealed no liver lesions.  Common bile duct is normal at 3 mm.  There is a nonmobile 3 cm shadowing gallstone within the gallbladder neck.  No gallbladder thickening or pericholecystic fluid collection.  Patient was then referred to general surgery and was seen by Dr. Badillo.  Who took her to surgery on 11/10/2021 for diagnostic laparoscopy and peritoneal biopsy.  Intra-Op , she was noted to have peritoneal studding with malignancy throughout the abdominal cavity and biopsies were completed. 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  The tumor was focally positive for progesterone receptor, positive for CK7, estrogen receptor, CA-125 and PAX 8..  The staining pattern is consistent with ovarian serous carcinoma.      She had CT scan of the chest, abdomen and pelvis and the chest is a 2 mm noncalcified nodule within the left lower lobe, noncalcified nodule in the left upper lobe measuring 4 mm and 3 mm.  In the abdomen there is a 5.1 x 5.3 cm enhancing soft tissue mass in the pelvis to the right of midline associated with the adnexal region possibly representing a primary ovarian malignancy.  No right or left ovarian tissue was seen.  There is abnormal omental thickening and nodularity consistent with carcinomatosis greatest bulk in the left mid abdomen measuring up to 10.8 cm x 2.5  cm.  There is nodular peritoneal thickening also present within the abdomen and pelvis consistent with peritoneal carcinomatosis.  The small quantity of ascitic fluid in the abdomen and pelvis.  Carcinomatosis nodular studding is seen along the inferior right hepatic lobe.     She  has been referred to us for further evaluation and management of her newly diagnosed ovarian carcinoma.     Patient is accompanied today by her daughter for this appointment.  There is  family history of precancerous cells of the  uterusin her sister.        She does not smoke and does not drink alcohol.  Patient is  and has 2 daughters.  She works for the [employer].    · 11/29/2021 patient was seen by Dr. Dubois who has recommended neoadjuvant chemotherapy for 2-3 cycles and then interval cytoreduction.  She has recommended carboplatinum AUC 6, Taxol 1 7 5 mg per metered squared, Avastin 15 mg/kg as was done in the oceans trial but hold Avastin for the first few cycles due to bowel risk.  · Prechemo CA-125 was 754  · 12/7/2021 patient received first cycle of chemotherapy with carboplatinum and Taxol with Neulasta  · 12/28/2021 patient received cycle 2 of combination chemotherapy with carboplatinum and Taxol with Neulasta  · 12/28/2021 CA-125 was down to 635  · 1/11/2022 add-on appointment for nausea, dizziness, thrombocytopenia, pain in upper to mid abdomen 8/10, new onset in the last 4 to 5 days  · January 12, 2022: Due to acute GI symptoms patient had a CT scan of the abdomen and pelvis which basically revealed increasing effusion on the left lung mild pleural effusion on the right lung decreased in amount of omental caking but no interval change in the right adnexal area moderate abdominal and pelvic ascites which has increased.  She denies any significant pulmonary symptoms.  Her O2 sat today was 100% at rest and 99% with ambulation  · 1/24/2022 patient received cycle 3 of carboplatinum with Taxol with dose  reduction due to significant toxicities  · 2/14/2022 patient received cycle 4-day 1 of chemo platinum Taxol and Avastin  · 3/7/22: Patient received cycle 5-day 1 of chemotherapy with carboplatinum Taxol and Avastin  · 4/4/2022 patient received cycle 6 of chemotherapy with A platinum Taxol and Avastin  · 4/26/2022 patient underwent exploratory laparotomy, total abdominal hysterectomy, bilateral salpingo-oophorectomy, pelvic and aortic lymphadenectomy, omentectomy performed by Dr Castro, pathology reviewed high-grade serous carcinoma involving the uterine serosa and outer myometrium, bilateral fallopian tubes and bilateral ovaries and adnexa soft tissue.  Also received are high-grade serous carcinoma involving the omentum as well as residual high-grade serous carcinoma with treatment effects on the colonic splenic [id number], estrogen receptor diffusely positive p53 patchy, p16 diffusely positive.  Molecular testing is pending  · 6/2/2022 patient received cycle 7 of Combination chemotherapy with Avastin and carboplatinum.  Taxol was held due to significant peripheral neuropathy  · 7/14/2022 patient presents for follow-up of ovarian cancer  · 7/28/2020 patient received cycle 9 of carboplatinum  · Patient is currently on maintenance Avastin and letrozole for ovarian cancer suppression  · 1/17/2023 patient had CT scan of the chest, abdomen and pelvis and this basically revealed stable small pulmonary nodules on the chest.  Peritoneal thickening in the right paracolic gutter but no definite new peritoneal nodules or mass.  There is a new sclerotic lesion involving the right anterior acetabulum measuring 2.1 cm, severe right hip osteoarthritis further evaluation with right hip MRI has been recommended to further evaluate.  · 12/30/2022  was 54 prior was 43  · 2/17/2023 patient had MRI of the pelvis which showed enhancing sclerotic lesion within the anterior acetabulum on the right like representing metastatic  disease.  Severe generative changes of the right hip joint was noted.  Gluteal tendinopathy on the right with no evidence of focal tear or significant bursitis.        Past Medical History:   Diagnosis Date   • Arthritis    • Cancer (HCC)     ovarian   • Depression    • Gall stones    • Hypertension    • Irritable bowel syndrome    • Rheumatoid aortitis    • Thyroid disease        Past Surgical History:   Procedure Laterality Date   •  SECTION      x2   • CHOLECYSTECTOMY N/A 11/10/2021    Procedure: diagnostic laparoscopy and peritoneal biopsy;  Surgeon: Krunal Badillo MD;  Location: [facility] MAIN OR;  Service: General;  Laterality: N/A;   • COLONOSCOPY     • HIP SURGERY Left    • JOINT REPLACEMENT     • THYROIDECTOMY, PARTIAL  2017   • TOTAL ABDOMINAL HYSTERECTOMY N/A 2022    Procedure: EXPLORATORY LAPAROTOMY, TOTAL ABDOMINAL HYSTERECTOMY, BILATERAL SALPINGOOOPHORECTOMY, PELVIC AND AORTIC LYMPHADENECTOMY, OMENTECTOMY;  Surgeon: Maggie Castro DO;  Location: [facility] MAIN OR;  Service: Gynecology Oncology;  Laterality: N/A;   • VENOUS ACCESS DEVICE (PORT) INSERTION Left 2021    Procedure: INSERTION VENOUS ACCESS DEVICE;  Surgeon: Krunal Badillo MD;  Location: [facility];  Service: General;  Laterality: Left;         Current Outpatient Medications:   •  amLODIPine (NORVASC) 10 MG tablet, Take 1 tablet by mouth Daily., Disp: 90 tablet, Rfl: 1  •  gabapentin (NEURONTIN) 300 MG capsule, Take 1 capsule by mouth Every 8 (Eight) Hours., Disp: 90 capsule, Rfl: 3  •  hydroCHLOROthiazide (HYDRODIURIL) 12.5 MG tablet, TAKE ONE (1) TABLET BY MOUTH EVERY DAY, Disp: 90 tablet, Rfl: 0  •  HYDROcodone-acetaminophen (Norco) 5-325 MG per tablet, Take 1 tablet by mouth Every 6 (Six) Hours As Needed for Moderate Pain., Disp: 60 tablet, Rfl: 0  •  hydrOXYzine (ATARAX) 10 MG tablet, Take 1 tablet by mouth Every 8 (Eight) Hours As Needed for Anxiety., Disp: 45 tablet, Rfl: 2  •  letrozole (FEMARA) 2.5 MG  tablet, TAKE ONE (1) TABLET BY MOUTH DAILY, Disp: 60 tablet, Rfl: 3  •  levothyroxine (SYNTHROID, LEVOTHROID) 125 MCG tablet, Take 1 tablet by mouth Daily., Disp: 90 tablet, Rfl: 1  •  lidocaine-prilocaine (EMLA) 2.5-2.5 % cream, Apply 1 application topically to the appropriate area as directed. Apply to port 1 hour prior to port access, Disp: 30 g, Rfl: 1  •  losartan (Cozaar) 25 MG tablet, Take 1 tablet by mouth Daily., Disp: 90 tablet, Rfl: 1  •  omeprazole (priLOSEC) 40 MG capsule, TAKE ONE (1) CAPSULE BY MOUTH EVERY DAY, Disp: 30 capsule, Rfl: 6  •  pantoprazole (PROTONIX) 40 MG EC tablet, TAKE ONE (1) TABLET BY MOUTH EVERY DAY, Disp: 30 tablet, Rfl: 6  •  promethazine (PHENERGAN) 25 MG tablet, Take 1 tablet by mouth Every 6 (Six) Hours As Needed for Nausea or Vomiting., Disp: 90 tablet, Rfl: 1  •  sertraline (Zoloft) 25 MG tablet, Take 1 tablet by mouth Daily., Disp: 90 tablet, Rfl: 1  No current facility-administered medications for this visit.    Facility-Administered Medications Ordered in Other Visits:   •  Bevacizumab-bvzr (ZIRABEV) 800 mg in sodium chloride 0.9 % 132 mL chemo IVPB, 800 mg, Intravenous, Once, Inga Hernandez MD  •  heparin injection 500 Units, 500 Units, Intravenous, PRN, Inga Hernandez MD  •  sodium chloride 0.9 % flush 10 mL, 10 mL, Intravenous, PRN, Inga Hernandez MD  •  sodium chloride 0.9 % infusion 250 mL, 250 mL, Intravenous, Once, Inga Hernandez MD    No Known Allergies    Family History   Problem Relation Age of Onset   • Dementia Mother    • Arthritis Mother    • Heart disease Father    • Hypertension Father    • Malig Hyperthermia Neg Hx        Cancer-related family history is not on file.    Social History     Tobacco Use   • Smoking status: Never   • Smokeless tobacco: Never   Vaping Use   • Vaping Use: Never used   Substance Use Topics   • Alcohol use: Yes     Alcohol/week: 1.0 standard drink     Types: 1 Glasses of wine per week      "Comment: less than monthly   • Drug use: No     I have reviewed and confirmed the accuracy of the patient's history: Chief complaint, HPI, ROS and Subjective as entered by the MA/LPN/RN. Inga Hernandez MD 02/24/23     SUBJECTIVE:    Patient is here today for Avastin infusion and denies any new issues          REVIEW OF SYSTEMS:    Review of Systems   Constitutional: Positive for fatigue. Negative for chills and fever.   HENT: Negative for ear pain, mouth sores, nosebleeds and sore throat.    Eyes: Negative for photophobia and visual disturbance.   Respiratory: Negative for wheezing and stridor.    Cardiovascular: Negative for chest pain and palpitations.   Gastrointestinal: Positive for abdominal pain and nausea. Negative for diarrhea and vomiting.   Endocrine: Negative for cold intolerance and heat intolerance.   Genitourinary: Negative for dysuria and hematuria.   Musculoskeletal: Negative for joint swelling and neck stiffness.   Skin: Negative for color change and rash.   Neurological: Positive for dizziness, weakness and headaches. Negative for seizures and syncope.   Hematological: Negative for adenopathy.   Psychiatric/Behavioral: Negative for agitation, confusion and hallucinations.     Post op changes noted.    OBJECTIVE:    Vitals:    02/24/23 1118   Pulse: 91   Resp: 18   Temp: 97.6 °F (36.4 °C)   TempSrc: Oral   Weight: 80.7 kg (178 lb)   Height: 165.1 cm (65\")   PainSc: 0-No pain     Body mass index is 29.62 kg/m².    ECOG    (1) Restricted in physically strenuous activity, ambulatory and able to do work of light nature    Physical Exam  Vitals and nursing note reviewed.   Constitutional:       General: She is not in acute distress.     Appearance: She is not diaphoretic.   HENT:      Head: Normocephalic and atraumatic.      Mouth/Throat:      Mouth: Mucous membranes are dry.   Eyes:      General: No scleral icterus.        Right eye: No discharge.         Left eye: No discharge.      " Conjunctiva/sclera: Conjunctivae normal.   Neck:      Thyroid: No thyromegaly.   Cardiovascular:      Rate and Rhythm: Normal rate and regular rhythm.      Heart sounds: Normal heart sounds.     No friction rub. No gallop.   Pulmonary:      Effort: Pulmonary effort is normal. No respiratory distress.      Breath sounds: No stridor. No wheezing.   Abdominal:      General: Bowel sounds are normal.      Palpations: Abdomen is soft. There is no mass.      Tenderness: There is no abdominal tenderness. There is no guarding or rebound.      Comments: Abdominal wound noted   Musculoskeletal:         General: No tenderness. Normal range of motion.      Cervical back: Normal range of motion and neck supple.   Lymphadenopathy:      Cervical: No cervical adenopathy.   Skin:     General: Skin is warm.      Findings: No erythema or rash.      Comments: Tenting skin turgor   Neurological:      Mental Status: She is alert and oriented to person, place, and time.      Motor: No abnormal muscle tone.   Psychiatric:         Behavior: Behavior normal.     I have reexamined the patient and the results are consistent with the previously documented exam. Inga Cori Hernandez MD       RECENT LABS    WBC   Date Value Ref Range Status   02/24/2023 4.85 3.40 - 10.80 10*3/mm3 Final     RBC   Date Value Ref Range Status   02/24/2023 4.06 3.77 - 5.28 10*6/mm3 Final     Hemoglobin   Date Value Ref Range Status   02/24/2023 12.9 12.0 - 15.9 g/dL Final     Hematocrit   Date Value Ref Range Status   02/24/2023 39.3 34.0 - 46.6 % Final     MCV   Date Value Ref Range Status   02/24/2023 96.8 79.0 - 97.0 fL Final     MCH   Date Value Ref Range Status   02/24/2023 31.8 26.6 - 33.0 pg Final     MCHC   Date Value Ref Range Status   02/24/2023 32.8 31.5 - 35.7 g/dL Final     RDW   Date Value Ref Range Status   02/24/2023 12.9 12.3 - 15.4 % Final     RDW-SD   Date Value Ref Range Status   02/24/2023 44.2 37.0 - 54.0 fl Final     MPV   Date Value Ref Range  Status   02/24/2023 9.1 6.0 - 12.0 fL Final     Platelets   Date Value Ref Range Status   02/24/2023 138 (L) 140 - 450 10*3/mm3 Final     Neutrophil %   Date Value Ref Range Status   02/24/2023 49.9 42.7 - 76.0 % Final     Lymphocyte %   Date Value Ref Range Status   02/24/2023 30.3 19.6 - 45.3 % Final     Monocyte %   Date Value Ref Range Status   02/24/2023 13.8 (H) 5.0 - 12.0 % Final     Eosinophil %   Date Value Ref Range Status   02/24/2023 5.2 0.3 - 6.2 % Final     Basophil %   Date Value Ref Range Status   02/24/2023 0.8 0.0 - 1.5 % Final     Immature Grans %   Date Value Ref Range Status   04/29/2022 0.4 0.0 - 0.5 % Final     Neutrophils, Absolute   Date Value Ref Range Status   02/24/2023 2.42 1.70 - 7.00 10*3/mm3 Final     Lymphocytes, Absolute   Date Value Ref Range Status   02/24/2023 1.47 0.70 - 3.10 10*3/mm3 Final     Monocytes, Absolute   Date Value Ref Range Status   02/24/2023 0.67 0.10 - 0.90 10*3/mm3 Final     Eosinophils, Absolute   Date Value Ref Range Status   02/24/2023 0.25 0.00 - 0.40 10*3/mm3 Final     Basophils, Absolute   Date Value Ref Range Status   02/24/2023 0.04 0.00 - 0.20 10*3/mm3 Final     Immature Grans, Absolute   Date Value Ref Range Status   04/29/2022 0.02 0.00 - 0.05 10*3/mm3 Final     nRBC   Date Value Ref Range Status   04/29/2022 0.0 0.0 - 0.2 /100 WBC Final       Lab Results   Component Value Date    GLUCOSE 97 02/10/2023    BUN 23 02/10/2023    CREATININE 1.30 02/17/2023    EGFRIFNONA 47 (L) 02/21/2022    EGFRIFAFRI 44 (L) 01/21/2020    BCR 22.8 02/10/2023    K 4.3 02/10/2023    CO2 27.0 02/10/2023    CALCIUM 9.4 02/10/2023    ALBUMIN 3.7 02/10/2023    LABIL2 1.2 (calc) 01/21/2020    AST 21 02/10/2023    ALT 15 02/10/2023         ASSESSMENT:      1. 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  Ongoing management  2. Status post neoadjuvant chemotherapy with carboplatinum AUC 6, Taxol 175 mg per metered squared, with Avastin added due to lack of significant  response with carboplatinum and Taxol.  Monitor serial CA-125's.  Down to 135 as of 3/14/2022.  Her CT scan showed response therefore patient will proceed with radical hysterectomy to perform by Dr. Dubois on 4/26/2022.  Plans for additional chemotherapy along with Avastin and possibly maintenance treatment with PARP inhibitors in the future.  She is also on letrozole 2.5 mg p.o. daily.  Patient is currently on Avastin maintenance along with letrozole.  Reviewed her recent CT scans which show evidence of sclerotic densities noted on the right hip may be related to surgery.  MRI of the right hip was recommended.  Reviewed the MRI completed on 2/17/2023 the area is suspicious for metastatic disease.  Also patient has had rising 's.  Refer to Dr. Tapia to see if she would be a candidate for SBRT.  We will also have patient be seen by Dr. Toni Hedrick given the severe DJD she has in that area to see if any surgical options.  In the meantime we will continue Avastin and letrozole  3. Status post radical hysterectomy.  See path report.  Caris testing pending  4. Mucositis: This has resolved  5. Elevated blood pressure: This has improved.  Patient will continue to follow-up with her PCP  6. Pulmonary nodules of unclear etiology too small for biopsy or PET resolution: Continue surveillance CT scans.  Also check 's  7. Chemotherapy induced nausea: Improved  8. Chemotherapy induced fatigue: Improved  9. Chemotherapy-induced anemia  10. 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  Patient already had some 25% dose reduction on her chemo.  She is currently on Neurontin 300 mg daily will increase to 300 mg twice a day.  Peripheral neuropathy continues to be an issue.  We will hold Taxol for now till this has improved  11. Severe thrombocytopenia secondary to chemotherapy most notably carboplatinum  12. Comprehensive gene analysis with cancer next technology was negative for any significant  mutation in all 77 genes analyzed  13. 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  No other actionable mutations identified  14. Dehydration: Resolved.  She has increase p.o. fluid intake.  15. Abdominal discomfort secondary to malignancy; Increasing abdominal discomfort               Plans:     · Continue Avastin  · Refer to Dr. Toni Hedrick Ortho oncology  · Refer to Dr. Tapia to evaluate for candidacy for SRS  · Continue letrozole  · Continue Neurontin to 300 mg every 8 hours for peripheral neuropathy.  We will refill today  · Reviewed CT scans of the chest abdomen and pelvis for cancer restaging.  Completed January 2023  · She will benefit from olaparib in the future  · Monitor 's, slight increase trend  · Elisa's Magic mouthwash for mucositis as needed  · Can resume full-time work, notes given  · Reviewed her path report from radical hysterectomy 4/20/2022  · Comprehensive gene analysis with cancer next technology results reviewed with patient and copies of her results was also given to her for her own records keeping  · Continue Percocet 5 mg p.o. every 4-6 hours as needed for pain  · Continue vitamin b6 100mg po q 12   · Foundation 1 testing results reviewed  · All questions answered  · Follow-up 4 weeks or earlier as needed      I spent 40 total minutes, face-to-face, caring for Lucy today.  90% of this time involved counseling and/or coordination of care as documented within this note.

## 2023-02-24 ENCOUNTER — OFFICE VISIT (OUTPATIENT)
Dept: ONCOLOGY | Facility: CLINIC | Age: 63
End: 2023-02-24
Payer: COMMERCIAL

## 2023-02-24 ENCOUNTER — HOSPITAL ENCOUNTER (OUTPATIENT)
Dept: ONCOLOGY | Facility: HOSPITAL | Age: 63
Discharge: HOME | End: 2023-02-24
Admitting: INTERNAL MEDICINE
Payer: COMMERCIAL

## 2023-02-24 VITALS
DIASTOLIC BLOOD PRESSURE: 76 MMHG | RESPIRATION RATE: 18 BRPM | OXYGEN SATURATION: 97 % | HEART RATE: 91 BPM | BODY MASS INDEX: 29.66 KG/M2 | TEMPERATURE: 97.6 F | SYSTOLIC BLOOD PRESSURE: 134 MMHG | WEIGHT: 178 LBS | HEIGHT: 65 IN

## 2023-02-24 VITALS
WEIGHT: 178 LBS | HEIGHT: 65 IN | RESPIRATION RATE: 18 BRPM | HEART RATE: 91 BPM | TEMPERATURE: 97.6 F | BODY MASS INDEX: 29.66 KG/M2

## 2023-02-24 DIAGNOSIS — C56.9 MALIGNANT NEOPLASM OF OVARY, UNSPECIFIED LATERALITY: Primary | ICD-10-CM

## 2023-02-24 DIAGNOSIS — C79.51 MALIGNANT NEOPLASM METASTATIC TO BONE: ICD-10-CM

## 2023-02-24 DIAGNOSIS — E83.51 HYPOCALCEMIA: ICD-10-CM

## 2023-02-24 DIAGNOSIS — M89.9 BONE LESION: ICD-10-CM

## 2023-02-24 DIAGNOSIS — Z45.2 ENCOUNTER FOR CARE RELATED TO PORT-A-CATH: ICD-10-CM

## 2023-02-24 LAB
ALBUMIN SERPL-MCNC: 3.6 G/DL (ref 3.5–5.2)
ALBUMIN/GLOB SERPL: 1 G/DL
ALP SERPL-CCNC: 98 U/L (ref 39–117)
ALT SERPL W P-5'-P-CCNC: 16 U/L (ref 1–33)
ANION GAP SERPL CALCULATED.3IONS-SCNC: 10 MMOL/L (ref 5–15)
AST SERPL-CCNC: 25 U/L (ref 1–32)
BASOPHILS # BLD AUTO: 0.04 10*3/MM3 (ref 0–0.2)
BASOPHILS NFR BLD AUTO: 0.8 % (ref 0–1.5)
BILIRUB SERPL-MCNC: 0.4 MG/DL (ref 0–1.2)
BILIRUB UR QL STRIP: NEGATIVE
BUN SERPL-MCNC: 19 MG/DL (ref 8–23)
BUN/CREAT SERPL: 17.4 (ref 7–25)
CALCIUM SPEC-SCNC: 9.4 MG/DL (ref 8.6–10.5)
CANCER AG125 SERPL QL: 95.2 U/ML (ref 0–38.1)
CHLORIDE SERPL-SCNC: 103 MMOL/L (ref 98–107)
CLARITY UR: CLEAR
CO2 SERPL-SCNC: 27 MMOL/L (ref 22–29)
COLOR UR: YELLOW
CREAT SERPL-MCNC: 1.09 MG/DL (ref 0.57–1)
DEPRECATED RDW RBC AUTO: 44.2 FL (ref 37–54)
EGFRCR SERPLBLD CKD-EPI 2021: 56.8 ML/MIN/1.73
EOSINOPHIL # BLD AUTO: 0.25 10*3/MM3 (ref 0–0.4)
EOSINOPHIL NFR BLD AUTO: 5.2 % (ref 0.3–6.2)
ERYTHROCYTE [DISTWIDTH] IN BLOOD BY AUTOMATED COUNT: 12.9 % (ref 12.3–15.4)
GLOBULIN UR ELPH-MCNC: 3.7 GM/DL
GLUCOSE SERPL-MCNC: 82 MG/DL (ref 65–99)
GLUCOSE UR STRIP-MCNC: NEGATIVE MG/DL
HCT VFR BLD AUTO: 39.3 % (ref 34–46.6)
HGB BLD-MCNC: 12.9 G/DL (ref 12–15.9)
HGB UR QL STRIP.AUTO: NEGATIVE
KETONES UR QL STRIP: NEGATIVE
LEUKOCYTE ESTERASE UR QL STRIP.AUTO: NEGATIVE
LYMPHOCYTES # BLD AUTO: 1.47 10*3/MM3 (ref 0.7–3.1)
LYMPHOCYTES NFR BLD AUTO: 30.3 % (ref 19.6–45.3)
MCH RBC QN AUTO: 31.8 PG (ref 26.6–33)
MCHC RBC AUTO-ENTMCNC: 32.8 G/DL (ref 31.5–35.7)
MCV RBC AUTO: 96.8 FL (ref 79–97)
MONOCYTES # BLD AUTO: 0.67 10*3/MM3 (ref 0.1–0.9)
MONOCYTES NFR BLD AUTO: 13.8 % (ref 5–12)
NEUTROPHILS NFR BLD AUTO: 2.42 10*3/MM3 (ref 1.7–7)
NEUTROPHILS NFR BLD AUTO: 49.9 % (ref 42.7–76)
NITRITE UR QL STRIP: NEGATIVE
PH UR STRIP.AUTO: 5.5 [PH] (ref 5–8)
PLATELET # BLD AUTO: 138 10*3/MM3 (ref 140–450)
PMV BLD AUTO: 9.1 FL (ref 6–12)
POTASSIUM SERPL-SCNC: 3.8 MMOL/L (ref 3.5–5.2)
PROT SERPL-MCNC: 7.3 G/DL (ref 6–8.5)
PROT UR QL STRIP: NEGATIVE
RBC # BLD AUTO: 4.06 10*6/MM3 (ref 3.77–5.28)
SODIUM SERPL-SCNC: 140 MMOL/L (ref 136–145)
SP GR UR STRIP: 1.02 (ref 1–1.03)
UROBILINOGEN UR QL STRIP: NORMAL
WBC NRBC COR # BLD: 4.85 10*3/MM3 (ref 3.4–10.8)

## 2023-02-24 PROCEDURE — 99215 OFFICE O/P EST HI 40 MIN: CPT | Performed by: INTERNAL MEDICINE

## 2023-02-24 PROCEDURE — 86304 IMMUNOASSAY TUMOR CA 125: CPT | Performed by: INTERNAL MEDICINE

## 2023-02-24 PROCEDURE — 85025 COMPLETE CBC W/AUTO DIFF WBC: CPT | Performed by: INTERNAL MEDICINE

## 2023-02-24 PROCEDURE — 25010000002 HEPARIN LOCK FLUSH PER 10 UNITS: Performed by: INTERNAL MEDICINE

## 2023-02-24 PROCEDURE — 80053 COMPREHEN METABOLIC PANEL: CPT | Performed by: INTERNAL MEDICINE

## 2023-02-24 PROCEDURE — 96413 CHEMO IV INFUSION 1 HR: CPT

## 2023-02-24 PROCEDURE — 25010000002 BEVACIZUMAB-BVZR 400 MG/16ML SOLUTION 16 ML VIAL: Performed by: INTERNAL MEDICINE

## 2023-02-24 PROCEDURE — 81003 URINALYSIS AUTO W/O SCOPE: CPT | Performed by: INTERNAL MEDICINE

## 2023-02-24 RX ORDER — SODIUM CHLORIDE 9 MG/ML
250 INJECTION, SOLUTION INTRAVENOUS ONCE
Status: COMPLETED | OUTPATIENT
Start: 2023-02-24 | End: 2023-02-24

## 2023-02-24 RX ORDER — HEPARIN SODIUM (PORCINE) LOCK FLUSH IV SOLN 100 UNIT/ML 100 UNIT/ML
500 SOLUTION INTRAVENOUS AS NEEDED
Status: DISCONTINUED | OUTPATIENT
Start: 2023-02-24 | End: 2023-02-25 | Stop reason: HOSPADM

## 2023-02-24 RX ORDER — SODIUM CHLORIDE 9 MG/ML
250 INJECTION, SOLUTION INTRAVENOUS ONCE
Status: CANCELLED | OUTPATIENT
Start: 2023-02-24

## 2023-02-24 RX ORDER — SODIUM CHLORIDE 0.9 % (FLUSH) 0.9 %
10 SYRINGE (ML) INJECTION AS NEEDED
Status: DISCONTINUED | OUTPATIENT
Start: 2023-02-24 | End: 2023-02-25 | Stop reason: HOSPADM

## 2023-02-24 RX ADMIN — SODIUM CHLORIDE 800 MG: 9 INJECTION, SOLUTION INTRAVENOUS at 12:29

## 2023-02-24 RX ADMIN — SODIUM CHLORIDE 250 ML: 9 INJECTION, SOLUTION INTRAVENOUS at 12:29

## 2023-02-24 RX ADMIN — Medication 10 ML: at 13:08

## 2023-02-24 RX ADMIN — Medication 500 UNITS: at 13:08

## 2023-02-24 NOTE — PROGRESS NOTES
Pt here for MIRIAM Weaver and MD.  Pt w/ no complaints at this time.  Port accessed w/ positive blood return noted.  Blood drawn from port.  10 cc blood wasted prior to specimen collection.  Specimens sent to lab for processing.  Treatment given and pt tolerated.  Pt seen by Dr. Hernandez in f/u.  Pt given AVS w/ next appointment and v/u.  Pt discharged from clinic.

## 2023-03-01 ENCOUNTER — TELEPHONE (OUTPATIENT)
Dept: RADIATION ONCOLOGY | Facility: HOSPITAL | Age: 63
End: 2023-03-01
Payer: COMMERCIAL

## 2023-03-01 ENCOUNTER — CONSULT (OUTPATIENT)
Dept: RADIATION ONCOLOGY | Facility: HOSPITAL | Age: 63
End: 2023-03-01
Payer: COMMERCIAL

## 2023-03-01 ENCOUNTER — HOSPITAL ENCOUNTER (OUTPATIENT)
Dept: RADIATION ONCOLOGY | Facility: HOSPITAL | Age: 63
Setting detail: RADIATION/ONCOLOGY SERIES
End: 2023-03-01
Payer: COMMERCIAL

## 2023-03-01 VITALS
TEMPERATURE: 97.9 F | HEIGHT: 65 IN | DIASTOLIC BLOOD PRESSURE: 76 MMHG | SYSTOLIC BLOOD PRESSURE: 156 MMHG | WEIGHT: 181.2 LBS | HEART RATE: 66 BPM | RESPIRATION RATE: 18 BRPM | BODY MASS INDEX: 30.19 KG/M2 | OXYGEN SATURATION: 98 %

## 2023-03-01 DIAGNOSIS — C56.9 MALIGNANT NEOPLASM OF OVARY, UNSPECIFIED LATERALITY: Primary | ICD-10-CM

## 2023-03-01 PROCEDURE — 99205 OFFICE O/P NEW HI 60 MIN: CPT | Performed by: RADIOLOGY

## 2023-03-01 PROCEDURE — G0463 HOSPITAL OUTPT CLINIC VISIT: HCPCS | Performed by: RADIOLOGY

## 2023-03-01 NOTE — TELEPHONE ENCOUNTER
Radiation Oncology Nurse Note    Received return call from Basilia at Dr. Toni Hedrick's office regarding referral. Basilia stated she had not received referral from Dr. Hernandez's office (entered 2/24/2023). Basilia stated she would reach out to patient and schedule her for consultation with Dr. Hedrick on Monday or Tuesday of next week (3/6/2023 or 3/7/2023). Provided patient's contact information. Faxed to Basilia at [phone] referral order, most recent notes from Dr. Hernandez and Dr. Tapia, most recent MRI report, and most recent CT CAP report. Encouraged Basilia reach out with any questions or concerns.    Fernanda Hopper, RN, BSN  Radiation Oncology Department  [facility]  [phone]  Office  [phone]  Fax

## 2023-03-02 ENCOUNTER — HOSPITAL ENCOUNTER (OUTPATIENT)
Dept: RADIATION ONCOLOGY | Facility: HOSPITAL | Age: 63
Discharge: HOME | End: 2023-03-02

## 2023-03-02 ENCOUNTER — TELEPHONE (OUTPATIENT)
Dept: ONCOLOGY | Facility: CLINIC | Age: 63
End: 2023-03-02
Payer: COMMERCIAL

## 2023-03-02 DIAGNOSIS — C56.9 MALIGNANT NEOPLASM OF OVARY, UNSPECIFIED LATERALITY: Primary | ICD-10-CM

## 2023-03-02 PROCEDURE — 77334 RADIATION TREATMENT AID(S): CPT | Performed by: RADIOLOGY

## 2023-03-02 NOTE — TELEPHONE ENCOUNTER
Distress Screening Follow-up     Diagnosis: Malignant Neoplasm of Ovary      Comments: OSW has previously spoken to patient and provided supportive care, along with a referral to [facility] for grief counseling due to loss of  earlier this year.     OSW attempted to call patient - no answer - LVM requesting return call. Will send [employer] message, also.     Location of Distress Screening:  Radiation Oncology      Distress Level: 8 (03/01/2023; 0830)     Physical Concerns:      Pain: Y  Sleep: Y  Fatigue: Y  Tobacco Use: N  Substance Use: N  Memory / Concentration: N  Sexual Health: N  Changes in Eating: N  Loss / Change of Physical Abilities: N  Changes in Appearance: N    Emotional Concerns:      Worry / Anxiety: Y  Sadness / Depression: Y  Loss of Interest / Enjoyment: N  Grief / Loss: Y  Fear: N  Loneliness: Y  Anger: N  Feelings of Worthlessness / [place]: N    Social Concerns:      Relationship w/ Spouse / Partner: N  Relationship w/ Children: N  Relationship w/ Family Members: N  Relationship w/ Friends / Coworkers: N  Ability to have Children: N     Practical Problems:      Communication w/ Healthcare Team: N  Taking Care of Myself: N  Taking Care of Others: N  Work / School: N   Housing: N  Finances: N  Insurance: N  Transportation: N  Childcare: N   Food Insecurity: N  Access to Medicines: N  Treatment Decisions: Y    Spiritual Concerns:     Sense of Meaning or Purpose: N  Changes in Alejandra or Beliefs: N  Death, Dying, or Afterlife: N  Conflict b/t Beliefs & Cancer Treatments: N  Relationship w/ the Sacred: N  Ritual or Dietary Needs: N

## 2023-03-06 PROCEDURE — 77263 THER RADIOLOGY TX PLNG CPLX: CPT | Performed by: RADIOLOGY

## 2023-03-07 ENCOUNTER — TELEPHONE (OUTPATIENT)
Dept: RADIATION ONCOLOGY | Facility: HOSPITAL | Age: 63
End: 2023-03-07
Payer: COMMERCIAL

## 2023-03-07 DIAGNOSIS — M25.559 PAIN IN HIP: ICD-10-CM

## 2023-03-07 DIAGNOSIS — M89.9 BONE LESION: Primary | ICD-10-CM

## 2023-03-07 DIAGNOSIS — C56.9 MALIGNANT NEOPLASM OF OVARY, UNSPECIFIED LATERALITY: ICD-10-CM

## 2023-03-07 DIAGNOSIS — M89.8X5 LYTIC BONE LESION OF HIP: ICD-10-CM

## 2023-03-07 NOTE — TELEPHONE ENCOUNTER
Radiation Oncology Nurse Note    Called and spoke with patient regarding bone scan order. Patient prefers [facility]. Order received from Dr. Toni Hedrick's office. Entered order as transcribed from paper and faxed to [facility].    Fernanda Hopper RN, BSN  Radiation Oncology Department  [facility]  [phone]  Office  [phone]  Fax

## 2023-03-07 NOTE — TELEPHONE ENCOUNTER
Radiation Oncology Nurse Note    Called and LVM with surgery scheduler for Dr. Toni Hedrick (transferred to her voicemail) requesting return call to discuss expediting bone scan. Also requested his consult note be faxed to me at [phone]. Provided return call back number.    Fernanda Hopper RN, BSN  Radiation Oncology Department  [facility]  [phone]  Office  [phone]  Fax

## 2023-03-08 ENCOUNTER — TRANSCRIBE ORDERS (OUTPATIENT)
Dept: ADMINISTRATIVE | Facility: HOSPITAL | Age: 63
End: 2023-03-08
Payer: COMMERCIAL

## 2023-03-08 DIAGNOSIS — I10 PRIMARY HYPERTENSION: ICD-10-CM

## 2023-03-08 PROCEDURE — 77301 RADIOTHERAPY DOSE PLAN IMRT: CPT | Performed by: RADIOLOGY

## 2023-03-08 PROCEDURE — 77300 RADIATION THERAPY DOSE PLAN: CPT | Performed by: RADIOLOGY

## 2023-03-08 PROCEDURE — 77338 DESIGN MLC DEVICE FOR IMRT: CPT | Performed by: RADIOLOGY

## 2023-03-08 RX ORDER — HYDROCHLOROTHIAZIDE 12.5 MG/1
TABLET ORAL
Qty: 90 TABLET | Refills: 0 | Status: SHIPPED | OUTPATIENT
Start: 2023-03-08 | End: 2023-03-15 | Stop reason: SDUPTHER

## 2023-03-10 ENCOUNTER — HOSPITAL ENCOUNTER (OUTPATIENT)
Dept: ONCOLOGY | Facility: HOSPITAL | Age: 63
Discharge: HOME | End: 2023-03-10
Admitting: INTERNAL MEDICINE
Payer: COMMERCIAL

## 2023-03-10 VITALS
HEART RATE: 61 BPM | RESPIRATION RATE: 18 BRPM | HEIGHT: 65 IN | BODY MASS INDEX: 30.16 KG/M2 | OXYGEN SATURATION: 97 % | WEIGHT: 181 LBS | TEMPERATURE: 97.5 F | DIASTOLIC BLOOD PRESSURE: 77 MMHG | SYSTOLIC BLOOD PRESSURE: 132 MMHG

## 2023-03-10 DIAGNOSIS — E83.51 HYPOCALCEMIA: ICD-10-CM

## 2023-03-10 DIAGNOSIS — Z45.2 ENCOUNTER FOR CARE RELATED TO PORT-A-CATH: ICD-10-CM

## 2023-03-10 DIAGNOSIS — C56.9 MALIGNANT NEOPLASM OF OVARY, UNSPECIFIED LATERALITY: Primary | ICD-10-CM

## 2023-03-10 LAB
ALBUMIN SERPL-MCNC: 3.6 G/DL (ref 3.5–5.2)
ALBUMIN/GLOB SERPL: 1.1 G/DL
ALP SERPL-CCNC: 82 U/L (ref 39–117)
ALT SERPL W P-5'-P-CCNC: 12 U/L (ref 1–33)
ANION GAP SERPL CALCULATED.3IONS-SCNC: 8 MMOL/L (ref 5–15)
AST SERPL-CCNC: 21 U/L (ref 1–32)
BASOPHILS # BLD AUTO: 0.01 10*3/MM3 (ref 0–0.2)
BASOPHILS NFR BLD AUTO: 0.2 % (ref 0–1.5)
BILIRUB SERPL-MCNC: 0.4 MG/DL (ref 0–1.2)
BILIRUB UR QL STRIP: NEGATIVE
BUN SERPL-MCNC: 15 MG/DL (ref 8–23)
BUN/CREAT SERPL: 13.5 (ref 7–25)
CALCIUM SPEC-SCNC: 9.4 MG/DL (ref 8.6–10.5)
CANCER AG125 SERPL QL: 129 U/ML (ref 0–38.1)
CHLORIDE SERPL-SCNC: 105 MMOL/L (ref 98–107)
CLARITY UR: ABNORMAL
CO2 SERPL-SCNC: 27 MMOL/L (ref 22–29)
COLOR UR: ABNORMAL
CREAT SERPL-MCNC: 1.11 MG/DL (ref 0.57–1)
DEPRECATED RDW RBC AUTO: 47.6 FL (ref 37–54)
EGFRCR SERPLBLD CKD-EPI 2021: 55.6 ML/MIN/1.73
EOSINOPHIL # BLD AUTO: 0.17 10*3/MM3 (ref 0–0.4)
EOSINOPHIL NFR BLD AUTO: 3.5 % (ref 0.3–6.2)
ERYTHROCYTE [DISTWIDTH] IN BLOOD BY AUTOMATED COUNT: 13.6 % (ref 12.3–15.4)
GLOBULIN UR ELPH-MCNC: 3.3 GM/DL
GLUCOSE SERPL-MCNC: 104 MG/DL (ref 65–99)
GLUCOSE UR STRIP-MCNC: NEGATIVE MG/DL
HCT VFR BLD AUTO: 39.1 % (ref 34–46.6)
HGB BLD-MCNC: 12.4 G/DL (ref 12–15.9)
HGB UR QL STRIP.AUTO: NEGATIVE
KETONES UR QL STRIP: NEGATIVE
LEUKOCYTE ESTERASE UR QL STRIP.AUTO: NEGATIVE
LYMPHOCYTES # BLD AUTO: 1.21 10*3/MM3 (ref 0.7–3.1)
LYMPHOCYTES NFR BLD AUTO: 25.3 % (ref 19.6–45.3)
MCH RBC QN AUTO: 31.2 PG (ref 26.6–33)
MCHC RBC AUTO-ENTMCNC: 31.7 G/DL (ref 31.5–35.7)
MCV RBC AUTO: 98.5 FL (ref 79–97)
MONOCYTES # BLD AUTO: 0.59 10*3/MM3 (ref 0.1–0.9)
MONOCYTES NFR BLD AUTO: 12.3 % (ref 5–12)
NEUTROPHILS NFR BLD AUTO: 2.81 10*3/MM3 (ref 1.7–7)
NEUTROPHILS NFR BLD AUTO: 58.7 % (ref 42.7–76)
NITRITE UR QL STRIP: NEGATIVE
PH UR STRIP.AUTO: 5.5 [PH] (ref 5–8)
PLATELET # BLD AUTO: 130 10*3/MM3 (ref 140–450)
PMV BLD AUTO: 9.2 FL (ref 6–12)
POTASSIUM SERPL-SCNC: 4 MMOL/L (ref 3.5–5.2)
PROT SERPL-MCNC: 6.9 G/DL (ref 6–8.5)
PROT UR QL STRIP: ABNORMAL
RBC # BLD AUTO: 3.97 10*6/MM3 (ref 3.77–5.28)
SODIUM SERPL-SCNC: 140 MMOL/L (ref 136–145)
SP GR UR STRIP: 1.02 (ref 1–1.03)
UROBILINOGEN UR QL STRIP: ABNORMAL
WBC NRBC COR # BLD: 4.79 10*3/MM3 (ref 3.4–10.8)

## 2023-03-10 PROCEDURE — 85025 COMPLETE CBC W/AUTO DIFF WBC: CPT | Performed by: INTERNAL MEDICINE

## 2023-03-10 PROCEDURE — 81003 URINALYSIS AUTO W/O SCOPE: CPT | Performed by: INTERNAL MEDICINE

## 2023-03-10 PROCEDURE — 25010000002 HEPARIN LOCK FLUSH PER 10 UNITS: Performed by: INTERNAL MEDICINE

## 2023-03-10 PROCEDURE — 25010000002 BEVACIZUMAB-BVZR 400 MG/16ML SOLUTION 16 ML VIAL: Performed by: INTERNAL MEDICINE

## 2023-03-10 PROCEDURE — 86304 IMMUNOASSAY TUMOR CA 125: CPT | Performed by: INTERNAL MEDICINE

## 2023-03-10 PROCEDURE — 80053 COMPREHEN METABOLIC PANEL: CPT | Performed by: INTERNAL MEDICINE

## 2023-03-10 PROCEDURE — 96413 CHEMO IV INFUSION 1 HR: CPT

## 2023-03-10 RX ORDER — SODIUM CHLORIDE 9 MG/ML
250 INJECTION, SOLUTION INTRAVENOUS ONCE
Status: COMPLETED | OUTPATIENT
Start: 2023-03-10 | End: 2023-03-10

## 2023-03-10 RX ORDER — HEPARIN SODIUM (PORCINE) LOCK FLUSH IV SOLN 100 UNIT/ML 100 UNIT/ML
500 SOLUTION INTRAVENOUS AS NEEDED
Status: DISCONTINUED | OUTPATIENT
Start: 2023-03-10 | End: 2023-03-11 | Stop reason: HOSPADM

## 2023-03-10 RX ORDER — SODIUM CHLORIDE 9 MG/ML
250 INJECTION, SOLUTION INTRAVENOUS ONCE
Status: CANCELLED | OUTPATIENT
Start: 2023-03-10

## 2023-03-10 RX ORDER — SODIUM CHLORIDE 0.9 % (FLUSH) 0.9 %
10 SYRINGE (ML) INJECTION AS NEEDED
Status: DISCONTINUED | OUTPATIENT
Start: 2023-03-10 | End: 2023-03-11 | Stop reason: HOSPADM

## 2023-03-10 RX ADMIN — HEPARIN 500 UNITS: 100 SYRINGE at 10:54

## 2023-03-10 RX ADMIN — SODIUM CHLORIDE 250 ML: 9 INJECTION, SOLUTION INTRAVENOUS at 10:19

## 2023-03-10 RX ADMIN — SODIUM CHLORIDE 800 MG: 9 INJECTION, SOLUTION INTRAVENOUS at 10:20

## 2023-03-10 RX ADMIN — Medication 10 ML: at 10:54

## 2023-03-14 ENCOUNTER — HOSPITAL ENCOUNTER (OUTPATIENT)
Dept: NUCLEAR MEDICINE | Facility: HOSPITAL | Age: 63
Discharge: HOME | End: 2023-03-14
Payer: COMMERCIAL

## 2023-03-14 DIAGNOSIS — C56.9 MALIGNANT NEOPLASM OF OVARY, UNSPECIFIED LATERALITY: ICD-10-CM

## 2023-03-14 DIAGNOSIS — M89.9 BONE LESION: ICD-10-CM

## 2023-03-14 DIAGNOSIS — M25.559 PAIN IN HIP: ICD-10-CM

## 2023-03-14 PROCEDURE — 0 TECHNETIUM MEDRONATE KIT: Performed by: ORTHOPAEDIC SURGERY

## 2023-03-14 PROCEDURE — A9503 TC99M MEDRONATE: HCPCS | Performed by: ORTHOPAEDIC SURGERY

## 2023-03-14 PROCEDURE — 78306 BONE IMAGING WHOLE BODY: CPT

## 2023-03-14 RX ORDER — TC 99M MEDRONATE 20 MG/10ML
25.3 INJECTION, POWDER, LYOPHILIZED, FOR SOLUTION INTRAVENOUS
Status: COMPLETED | OUTPATIENT
Start: 2023-03-14 | End: 2023-03-14

## 2023-03-14 RX ADMIN — TC 99M MEDRONATE 25.3 MILLICURIE: 20 INJECTION, POWDER, LYOPHILIZED, FOR SOLUTION INTRAVENOUS at 11:18

## 2023-03-15 ENCOUNTER — HOSPITAL ENCOUNTER (OUTPATIENT)
Dept: INTERVENTIONAL RADIOLOGY/VASCULAR | Facility: HOSPITAL | Age: 63
Discharge: HOME | End: 2023-03-15
Admitting: ORTHOPAEDIC SURGERY
Payer: COMMERCIAL

## 2023-03-15 ENCOUNTER — OFFICE VISIT (OUTPATIENT)
Dept: FAMILY MEDICINE CLINIC | Facility: CLINIC | Age: 63
End: 2023-03-15
Payer: COMMERCIAL

## 2023-03-15 VITALS
SYSTOLIC BLOOD PRESSURE: 132 MMHG | BODY MASS INDEX: 29.36 KG/M2 | OXYGEN SATURATION: 96 % | DIASTOLIC BLOOD PRESSURE: 72 MMHG | WEIGHT: 176.2 LBS | HEIGHT: 65 IN | HEART RATE: 65 BPM

## 2023-03-15 DIAGNOSIS — I10 PRIMARY HYPERTENSION: Primary | ICD-10-CM

## 2023-03-15 DIAGNOSIS — C56.9 MALIGNANT NEOPLASM OF OVARY, UNSPECIFIED LATERALITY: ICD-10-CM

## 2023-03-15 DIAGNOSIS — M16.11 OSTEOARTHRITIS OF RIGHT HIP, UNSPECIFIED OSTEOARTHRITIS TYPE: ICD-10-CM

## 2023-03-15 DIAGNOSIS — M79.89 SWELLING OF BOTH LOWER EXTREMITIES: ICD-10-CM

## 2023-03-15 DIAGNOSIS — E03.9 ACQUIRED HYPOTHYROIDISM: ICD-10-CM

## 2023-03-15 PROCEDURE — 77002 NEEDLE LOCALIZATION BY XRAY: CPT

## 2023-03-15 PROCEDURE — 99214 OFFICE O/P EST MOD 30 MIN: CPT | Performed by: NURSE PRACTITIONER

## 2023-03-15 PROCEDURE — 25510000001 IOPAMIDOL PER 1 ML: Performed by: ORTHOPAEDIC SURGERY

## 2023-03-15 PROCEDURE — 0 LIDOCAINE 1 % SOLUTION: Performed by: ORTHOPAEDIC SURGERY

## 2023-03-15 PROCEDURE — 25010000002 METHYLPREDNISOLONE PER 80 MG: Performed by: ORTHOPAEDIC SURGERY

## 2023-03-15 RX ORDER — BUPIVACAINE HYDROCHLORIDE 2.5 MG/ML
5 INJECTION, SOLUTION EPIDURAL; INFILTRATION; INTRACAUDAL ONCE
Status: COMPLETED | OUTPATIENT
Start: 2023-03-15 | End: 2023-03-15

## 2023-03-15 RX ORDER — HYDROCHLOROTHIAZIDE 25 MG/1
25 TABLET ORAL DAILY
Qty: 90 TABLET | Refills: 1 | Status: SHIPPED | OUTPATIENT
Start: 2023-03-15

## 2023-03-15 RX ORDER — LIDOCAINE HYDROCHLORIDE 10 MG/ML
10 INJECTION, SOLUTION INFILTRATION; PERINEURAL ONCE
Status: COMPLETED | OUTPATIENT
Start: 2023-03-15 | End: 2023-03-15

## 2023-03-15 RX ORDER — METHYLPREDNISOLONE ACETATE 80 MG/ML
80 INJECTION, SUSPENSION INTRA-ARTICULAR; INTRALESIONAL; INTRAMUSCULAR; SOFT TISSUE ONCE
Status: COMPLETED | OUTPATIENT
Start: 2023-03-15 | End: 2023-03-15

## 2023-03-15 RX ORDER — FUROSEMIDE 20 MG/1
20 TABLET ORAL DAILY PRN
Qty: 40 TABLET | Refills: 1 | Status: SHIPPED | OUTPATIENT
Start: 2023-03-15

## 2023-03-15 RX ORDER — POTASSIUM CHLORIDE 750 MG/1
10 TABLET, FILM COATED, EXTENDED RELEASE ORAL DAILY PRN
Qty: 40 TABLET | Refills: 1 | Status: SHIPPED | OUTPATIENT
Start: 2023-03-15

## 2023-03-15 RX ADMIN — LIDOCAINE HYDROCHLORIDE 14 ML: 10 INJECTION, SOLUTION INFILTRATION; PERINEURAL at 14:20

## 2023-03-15 RX ADMIN — BUPIVACAINE HYDROCHLORIDE 3 ML: 2.5 INJECTION, SOLUTION EPIDURAL; INFILTRATION; INTRACAUDAL; PERINEURAL at 14:19

## 2023-03-15 RX ADMIN — IOPAMIDOL 1 ML: 755 INJECTION, SOLUTION INTRAVENOUS at 14:20

## 2023-03-15 RX ADMIN — METHYLPREDNISOLONE ACETATE 80 MG: 80 INJECTION, SUSPENSION INTRA-ARTICULAR; INTRALESIONAL; INTRAMUSCULAR; SOFT TISSUE at 14:20

## 2023-03-15 NOTE — PROGRESS NOTES
Chief Complaint  Chief Complaint   Patient presents with   • Follow-up     6 wk follow up. Pt having swelling in both lower legs, she states the swelling has gotten worse since last visit. Pt states she has been taking 2 hydrochlorothiazide for the last week to see if it helps with the swelling. Pt said she has noticed and increase in her weight as well.            Subjective          Lucy Mahan presents to [facility] for   History of Present Illness       HTN, running higher in the 140's systolic at home, is taking amlodopine 10 mg, losartan 25 mg and HCTZ 25mg daily, she reports increased swelling of the lower extremities, she started taking HCTZ 25 mg instead of 12.5, reports for the last 2 to 3 weeks has been gaining approximately 5 to 6 pounds per week. He does not add salt, has been trying to eat more protein.  Denies chest pain, headache, shortness of air, palpitations.     Hypothyroidism, levothyroxine decreased to 125 mcg in February, she has gained weight but also with swelling of lower extremities, denies symptoms of constipation, hot or cold intolerance, hair loss, abnormal heart rate and fatigue.     Ovarian cancer with bone metastasis, she is following with Dr. Hernandez, lesion found on R hip, had nuclear bone scan yesterday, she is not following with Dr. Tapia and planning to start radiation for new 2.2 cm sclerotic lesion at the anterior right acetabulum.  She has also been referred to Dr. Hedrick for surgical consideration.  She continues a Avastin and letrozole treatments.       The following portions of the patient's history were reviewed and updated as appropriate: allergies, current medications, past family history, past medical history, past social history, past surgical history and problem list.    Past Medical History:   Diagnosis Date   • Arthritis    • Cancer (HCC)    • Depression    • Gall stones    • Hypertension    • Irritable bowel syndrome    • Rheumatoid  aortitis    • Thyroid disease      Past Surgical History:   Procedure Laterality Date   •  SECTION      x2   • CHOLECYSTECTOMY N/A 11/10/2021    Procedure: diagnostic laparoscopy and peritoneal biopsy;  Surgeon: Krunal Badillo MD;  Location: [facility] MAIN OR;  Service: General;  Laterality: N/A;   • COLONOSCOPY     • HIP SURGERY Left    • JOINT REPLACEMENT     • THYROIDECTOMY, PARTIAL  2017   • TOTAL ABDOMINAL HYSTERECTOMY N/A 2022    Procedure: EXPLORATORY LAPAROTOMY, TOTAL ABDOMINAL HYSTERECTOMY, BILATERAL SALPINGOOOPHORECTOMY, PELVIC AND AORTIC LYMPHADENECTOMY, OMENTECTOMY;  Surgeon: Maggie Castro DO;  Location: [facility] MAIN OR;  Service: Gynecology Oncology;  Laterality: N/A;   • VENOUS ACCESS DEVICE (PORT) INSERTION Left 2021    Procedure: INSERTION VENOUS ACCESS DEVICE;  Surgeon: Krunal Badillo MD;  Location: [facility] MAIN OR;  Service: General;  Laterality: Left;     Family History   Problem Relation Age of Onset   • Dementia Mother    • Arthritis Mother    • Heart disease Father    • Hypertension Father    • Malig Hyperthermia Neg Hx      Social History     Tobacco Use   • Smoking status: Never   • Smokeless tobacco: Never   Substance Use Topics   • Alcohol use: Yes     Alcohol/week: 1.0 standard drink     Types: 1 Glasses of wine per week     Comment: less than monthly       Current Outpatient Medications:   •  amLODIPine (NORVASC) 10 MG tablet, Take 1 tablet by mouth Daily., Disp: 90 tablet, Rfl: 1  •  gabapentin (NEURONTIN) 300 MG capsule, Take 1 capsule by mouth Every 8 (Eight) Hours., Disp: 90 capsule, Rfl: 3  •  hydroCHLOROthiazide (HYDRODIURIL) 25 MG tablet, Take 1 tablet by mouth Daily., Disp: 90 tablet, Rfl: 1  •  HYDROcodone-acetaminophen (Norco) 5-325 MG per tablet, Take 1 tablet by mouth Every 6 (Six) Hours As Needed for Moderate Pain., Disp: 60 tablet, Rfl: 0  •  hydrOXYzine (ATARAX) 10 MG tablet, Take 1 tablet by mouth Every 8 (Eight) Hours As Needed for Anxiety., Disp:  "45 tablet, Rfl: 2  •  letrozole (FEMARA) 2.5 MG tablet, TAKE ONE (1) TABLET BY MOUTH DAILY, Disp: 60 tablet, Rfl: 3  •  levothyroxine (SYNTHROID, LEVOTHROID) 125 MCG tablet, Take 1 tablet by mouth Daily., Disp: 90 tablet, Rfl: 1  •  lidocaine-prilocaine (EMLA) 2.5-2.5 % cream, Apply 1 application topically to the appropriate area as directed. Apply to port 1 hour prior to port access, Disp: 30 g, Rfl: 1  •  losartan (Cozaar) 25 MG tablet, Take 1 tablet by mouth Daily., Disp: 90 tablet, Rfl: 1  •  omeprazole (priLOSEC) 40 MG capsule, TAKE ONE (1) CAPSULE BY MOUTH EVERY DAY, Disp: 30 capsule, Rfl: 6  •  pantoprazole (PROTONIX) 40 MG EC tablet, TAKE ONE (1) TABLET BY MOUTH EVERY DAY, Disp: 30 tablet, Rfl: 6  •  promethazine (PHENERGAN) 25 MG tablet, Take 1 tablet by mouth Every 6 (Six) Hours As Needed for Nausea or Vomiting., Disp: 90 tablet, Rfl: 1  •  sertraline (Zoloft) 25 MG tablet, Take 1 tablet by mouth Daily., Disp: 90 tablet, Rfl: 1  •  furosemide (Lasix) 20 MG tablet, Take 1 tablet by mouth Daily As Needed (swelling and weight gain of 3 lbs in 48 hours)., Disp: 40 tablet, Rfl: 1  •  potassium chloride (KLOR-CON) 10 MEQ CR tablet, Take 1 tablet by mouth Daily As Needed (if takes lasix)., Disp: 40 tablet, Rfl: 1  No current facility-administered medications for this visit.    Objective   Vital Signs:   /72 (BP Location: Right arm, Patient Position: Sitting, Cuff Size: Large Adult)   Pulse 65   Ht 165.1 cm (65\")   Wt 79.9 kg (176 lb 3.2 oz)   SpO2 96%   BMI 29.32 kg/m²           Physical Exam  Vitals and nursing note reviewed.   Constitutional:       General: She is not in acute distress.     Appearance: Normal appearance. She is well-developed. She is not diaphoretic.   Eyes:      Pupils: Pupils are equal, round, and reactive to light.   Neck:      Thyroid: No thyromegaly.      Vascular: No JVD.   Cardiovascular:      Rate and Rhythm: Normal rate and regular rhythm.      Heart sounds: Normal heart " sounds. No murmur heard.  Pulmonary:      Effort: Pulmonary effort is normal. No respiratory distress.      Breath sounds: Normal breath sounds. No wheezing, rhonchi or rales.   Abdominal:      General: Bowel sounds are normal. There is no distension.      Palpations: Abdomen is soft.      Tenderness: There is no abdominal tenderness.   Musculoskeletal:         General: Swelling (+1 pitting BLE) and tenderness (R hip pain, moderate coley rom, walks with limp ) present. Normal range of motion.      Cervical back: Normal range of motion and neck supple.   Skin:     General: Skin is warm and dry.   Neurological:      General: No focal deficit present.      Mental Status: She is alert and oriented to person, place, and time. Mental status is at baseline.      Sensory: Sensory deficit (Bilateral feet neuropathy) present.      Gait: Gait abnormal (d/t R hip pain).   Psychiatric:         Mood and Affect: Mood normal.         Behavior: Behavior normal.         Thought Content: Thought content normal.         Judgment: Judgment normal.          Result Review :     Hospital Outpatient Visit on 03/10/2023   Component Date Value Ref Range Status   • Glucose 03/10/2023 104 (H)  65 - 99 mg/dL Final   • BUN 03/10/2023 15  8 - 23 mg/dL Final   • Creatinine 03/10/2023 1.11 (H)  0.57 - 1.00 mg/dL Final   • Sodium 03/10/2023 140  136 - 145 mmol/L Final   • Potassium 03/10/2023 4.0  3.5 - 5.2 mmol/L Final   • Chloride 03/10/2023 105  98 - 107 mmol/L Final   • CO2 03/10/2023 27.0  22.0 - 29.0 mmol/L Final   • Calcium 03/10/2023 9.4  8.6 - 10.5 mg/dL Final   • Total Protein 03/10/2023 6.9  6.0 - 8.5 g/dL Final   • Albumin 03/10/2023 3.6  3.5 - 5.2 g/dL Final   • ALT (SGPT) 03/10/2023 12  1 - 33 U/L Final   • AST (SGOT) 03/10/2023 21  1 - 32 U/L Final   • Alkaline Phosphatase 03/10/2023 82  39 - 117 U/L Final   • Total Bilirubin 03/10/2023 0.4  0.0 - 1.2 mg/dL Final   • Globulin 03/10/2023 3.3  gm/dL Final   • A/G Ratio 03/10/2023 1.1  g/dL  Final   • BUN/Creatinine Ratio 03/10/2023 13.5  7.0 - 25.0 Final   • Anion Gap 03/10/2023 8.0  5.0 - 15.0 mmol/L Final   • eGFR 03/10/2023 55.6 (L)  >60.0 mL/min/1.73 Final   • Color, UA 03/10/2023 Dark Yellow (A)  Yellow, Straw Final   • Appearance, UA 03/10/2023 Cloudy (A)  Clear Final   • pH, UA 03/10/2023 5.5  5.0 - 8.0 Final   • Specific Gravity, UA 03/10/2023 1.020  1.005 - 1.030 Final   • Glucose, UA 03/10/2023 Negative  Negative Final   • Ketones, UA 03/10/2023 Negative  Negative Final   • Bilirubin, UA 03/10/2023 Negative  Negative Final   • Blood, UA 03/10/2023 Negative  Negative Final   • Protein, UA 03/10/2023 Trace (A)  Negative Final   • Leuk Esterase, UA 03/10/2023 Negative  Negative Final   • Nitrite, UA 03/10/2023 Negative  Negative Final   • Urobilinogen, UA 03/10/2023 0.2 E.U./dL  0.2 - 1.0 E.U./dL Final   •  03/10/2023 129.0 (H)  0.0 - 38.1 U/mL Final   • WBC 03/10/2023 4.79  3.40 - 10.80 10*3/mm3 Final   • RBC 03/10/2023 3.97  3.77 - 5.28 10*6/mm3 Final   • Hemoglobin 03/10/2023 12.4  12.0 - 15.9 g/dL Final   • Hematocrit 03/10/2023 39.1  34.0 - 46.6 % Final   • MCV 03/10/2023 98.5 (H)  79.0 - 97.0 fL Final   • MCH 03/10/2023 31.2  26.6 - 33.0 pg Final   • MCHC 03/10/2023 31.7  31.5 - 35.7 g/dL Final   • RDW 03/10/2023 13.6  12.3 - 15.4 % Final   • RDW-SD 03/10/2023 47.6  37.0 - 54.0 fl Final   • MPV 03/10/2023 9.2  6.0 - 12.0 fL Final   • Platelets 03/10/2023 130 (L)  140 - 450 10*3/mm3 Final   • Neutrophil % 03/10/2023 58.7  42.7 - 76.0 % Final   • Lymphocyte % 03/10/2023 25.3  19.6 - 45.3 % Final   • Monocyte % 03/10/2023 12.3 (H)  5.0 - 12.0 % Final   • Eosinophil % 03/10/2023 3.5  0.3 - 6.2 % Final   • Basophil % 03/10/2023 0.2  0.0 - 1.5 % Final   • Neutrophils, Absolute 03/10/2023 2.81  1.70 - 7.00 10*3/mm3 Final   • Lymphocytes, Absolute 03/10/2023 1.21  0.70 - 3.10 10*3/mm3 Final   • Monocytes, Absolute 03/10/2023 0.59  0.10 - 0.90 10*3/mm3 Final   • Eosinophils, Absolute  03/10/2023 0.17  0.00 - 0.40 10*3/mm3 Final   • Basophils, Absolute 03/10/2023 0.01  0.00 - 0.20 10*3/mm3 Final                  BMI is >= 25 and <30. (Overweight) The following options were offered after discussion;: exercise counseling/recommendations and nutrition counseling/recommendations           Assessment and Plan    Diagnoses and all orders for this visit:    1. Primary hypertension (Primary)  -     hydroCHLOROthiazide (HYDRODIURIL) 25 MG tablet; Take 1 tablet by mouth Daily.  Dispense: 90 tablet; Refill: 1    2. Acquired hypothyroidism  -     TSH; Future    3. Swelling of both lower extremities    4. Malignant neoplasm of ovary, unspecified laterality (HCC)    Other orders  -     furosemide (Lasix) 20 MG tablet; Take 1 tablet by mouth Daily As Needed (swelling and weight gain of 3 lbs in 48 hours).  Dispense: 40 tablet; Refill: 1  -     potassium chloride (KLOR-CON) 10 MEQ CR tablet; Take 1 tablet by mouth Daily As Needed (if takes lasix).  Dispense: 40 tablet; Refill: 1    Continue amlodipine and losartan, continue HCTZ 25 mg  Recommend increase protein to 30 to 45 g/day in diet  No added salt  Recommend compression stockings  Elevate BLE as tolerated and use stool under desk while working  Add Lasix/KCl for weight gain 3lbs in 48 hours or shortness of air  TSH next week at oncology office  Follow-up with oncology, radiation oncology as directed, continue current treatments, planning radiation      I spent 30 minutes caring for Lucy Mahan on this date of service. This time includes time spent by me in the following activities: preparing for the visit, reviewing tests, performing a medically appropriate examination and/or evaluation , counseling and educating the patient/family/caregiver, ordering medications, tests, or procedures and documenting information in the medical record        Follow Up     Return in about 3 months (around 6/15/2023), or if symptoms worsen or fail to improve, for  Recheck.  Patient was given instructions and counseling regarding her condition or for health maintenance advice. Please see specific information pulled into the AVS if appropriate.        Part of this note may be an electronic transcription/translation of spoken language to printed text using the Dragon Dictation System

## 2023-03-17 ENCOUNTER — TELEPHONE (OUTPATIENT)
Dept: RADIATION ONCOLOGY | Facility: HOSPITAL | Age: 63
End: 2023-03-17
Payer: COMMERCIAL

## 2023-03-17 NOTE — TELEPHONE ENCOUNTER
Radiation Oncology Nurse Note    Whole body bone scan completed on 3/14/2023 at [facility]. Right hip injection completed 3/15/2023 at [facility] IR.     Called the office of Dr. Toni Hedrick and ALRRY with his MA requesting return call with next steps for patient's care. Provided return call back number.    Called and notified treatment team of above.    Fernanda Hopper RN, BSN  Radiation Oncology Department  [facility]  [phone]  Office  [phone]  Fax

## 2023-03-20 RX ORDER — LIDOCAINE AND PRILOCAINE 25; 25 MG/G; MG/G
CREAM TOPICAL
Qty: 30 G | Refills: 1 | Status: SHIPPED | OUTPATIENT
Start: 2023-03-20

## 2023-03-24 ENCOUNTER — APPOINTMENT (OUTPATIENT)
Dept: LAB | Facility: HOSPITAL | Age: 63
End: 2023-03-24
Payer: COMMERCIAL

## 2023-03-24 ENCOUNTER — OFFICE VISIT (OUTPATIENT)
Dept: ONCOLOGY | Facility: CLINIC | Age: 63
End: 2023-03-24
Payer: COMMERCIAL

## 2023-03-24 ENCOUNTER — HOSPITAL ENCOUNTER (OUTPATIENT)
Dept: ONCOLOGY | Facility: HOSPITAL | Age: 63
Discharge: HOME | End: 2023-03-24
Payer: COMMERCIAL

## 2023-03-24 ENCOUNTER — DOCUMENTATION (OUTPATIENT)
Dept: ONCOLOGY | Facility: CLINIC | Age: 63
End: 2023-03-24
Payer: COMMERCIAL

## 2023-03-24 VITALS
TEMPERATURE: 97 F | WEIGHT: 173 LBS | HEIGHT: 65 IN | RESPIRATION RATE: 18 BRPM | HEART RATE: 66 BPM | BODY MASS INDEX: 28.82 KG/M2 | SYSTOLIC BLOOD PRESSURE: 141 MMHG | DIASTOLIC BLOOD PRESSURE: 82 MMHG

## 2023-03-24 VITALS
HEART RATE: 63 BPM | DIASTOLIC BLOOD PRESSURE: 77 MMHG | TEMPERATURE: 97.6 F | SYSTOLIC BLOOD PRESSURE: 134 MMHG | WEIGHT: 173.8 LBS | BODY MASS INDEX: 28.92 KG/M2

## 2023-03-24 DIAGNOSIS — C56.9 MALIGNANT NEOPLASM OF OVARY, UNSPECIFIED LATERALITY: Primary | ICD-10-CM

## 2023-03-24 DIAGNOSIS — E83.51 HYPOCALCEMIA: ICD-10-CM

## 2023-03-24 DIAGNOSIS — Z45.2 ENCOUNTER FOR CARE RELATED TO PORT-A-CATH: ICD-10-CM

## 2023-03-24 DIAGNOSIS — E03.9 ACQUIRED HYPOTHYROIDISM: Primary | ICD-10-CM

## 2023-03-24 DIAGNOSIS — E03.9 ACQUIRED HYPOTHYROIDISM: ICD-10-CM

## 2023-03-24 LAB
ALBUMIN SERPL-MCNC: 3.9 G/DL (ref 3.5–5.2)
ALBUMIN/GLOB SERPL: 1.1 G/DL
ALP SERPL-CCNC: 93 U/L (ref 39–117)
ALT SERPL W P-5'-P-CCNC: 13 U/L (ref 1–33)
ANION GAP SERPL CALCULATED.3IONS-SCNC: 12 MMOL/L (ref 5–15)
AST SERPL-CCNC: 23 U/L (ref 1–32)
BASOPHILS # BLD AUTO: 0.03 10*3/MM3 (ref 0–0.2)
BASOPHILS NFR BLD AUTO: 0.5 % (ref 0–1.5)
BILIRUB SERPL-MCNC: 0.6 MG/DL (ref 0–1.2)
BILIRUB UR QL STRIP: NEGATIVE
BUN SERPL-MCNC: 23 MG/DL (ref 8–23)
BUN/CREAT SERPL: 21.7 (ref 7–25)
CALCIUM SPEC-SCNC: 9.6 MG/DL (ref 8.6–10.5)
CANCER AG125 SERPL QL: 238 U/ML (ref 0–38.1)
CHLORIDE SERPL-SCNC: 102 MMOL/L (ref 98–107)
CLARITY UR: CLEAR
CO2 SERPL-SCNC: 24 MMOL/L (ref 22–29)
COLOR UR: YELLOW
CREAT SERPL-MCNC: 1.06 MG/DL (ref 0.57–1)
DEPRECATED RDW RBC AUTO: 48.7 FL (ref 37–54)
EGFRCR SERPLBLD CKD-EPI 2021: 58.8 ML/MIN/1.73
EOSINOPHIL # BLD AUTO: 0.16 10*3/MM3 (ref 0–0.4)
EOSINOPHIL NFR BLD AUTO: 2.4 % (ref 0.3–6.2)
ERYTHROCYTE [DISTWIDTH] IN BLOOD BY AUTOMATED COUNT: 14.2 % (ref 12.3–15.4)
GLOBULIN UR ELPH-MCNC: 3.6 GM/DL
GLUCOSE SERPL-MCNC: 107 MG/DL (ref 65–99)
GLUCOSE UR STRIP-MCNC: NEGATIVE MG/DL
HCT VFR BLD AUTO: 40.5 % (ref 34–46.6)
HGB BLD-MCNC: 13.5 G/DL (ref 12–15.9)
HGB UR QL STRIP.AUTO: NEGATIVE
KETONES UR QL STRIP: NEGATIVE
LEUKOCYTE ESTERASE UR QL STRIP.AUTO: ABNORMAL
LYMPHOCYTES # BLD AUTO: 1.53 10*3/MM3 (ref 0.7–3.1)
LYMPHOCYTES NFR BLD AUTO: 23.4 % (ref 19.6–45.3)
MCH RBC QN AUTO: 31.5 PG (ref 26.6–33)
MCHC RBC AUTO-ENTMCNC: 33.3 G/DL (ref 31.5–35.7)
MCV RBC AUTO: 94.4 FL (ref 79–97)
MONOCYTES # BLD AUTO: 0.86 10*3/MM3 (ref 0.1–0.9)
MONOCYTES NFR BLD AUTO: 13.1 % (ref 5–12)
NEUTROPHILS NFR BLD AUTO: 3.97 10*3/MM3 (ref 1.7–7)
NEUTROPHILS NFR BLD AUTO: 60.6 % (ref 42.7–76)
NITRITE UR QL STRIP: NEGATIVE
PH UR STRIP.AUTO: 5.5 [PH] (ref 5–8)
PLATELET # BLD AUTO: 149 10*3/MM3 (ref 140–450)
PMV BLD AUTO: 9.4 FL (ref 6–12)
POTASSIUM SERPL-SCNC: 4.4 MMOL/L (ref 3.5–5.2)
PROT SERPL-MCNC: 7.5 G/DL (ref 6–8.5)
PROT UR QL STRIP: NEGATIVE
RBC # BLD AUTO: 4.29 10*6/MM3 (ref 3.77–5.28)
SODIUM SERPL-SCNC: 138 MMOL/L (ref 136–145)
SP GR UR STRIP: 1.02 (ref 1–1.03)
TSH SERPL DL<=0.05 MIU/L-ACNC: 0.09 UIU/ML (ref 0.27–4.2)
UROBILINOGEN UR QL STRIP: ABNORMAL
WBC NRBC COR # BLD: 6.55 10*3/MM3 (ref 3.4–10.8)

## 2023-03-24 PROCEDURE — 86304 IMMUNOASSAY TUMOR CA 125: CPT | Performed by: INTERNAL MEDICINE

## 2023-03-24 PROCEDURE — 25010000002 HEPARIN LOCK FLUSH PER 10 UNITS: Performed by: INTERNAL MEDICINE

## 2023-03-24 PROCEDURE — 99215 OFFICE O/P EST HI 40 MIN: CPT | Performed by: INTERNAL MEDICINE

## 2023-03-24 PROCEDURE — 25010000002 BEVACIZUMAB-BVZR 400 MG/16ML SOLUTION 16 ML VIAL: Performed by: INTERNAL MEDICINE

## 2023-03-24 PROCEDURE — 96413 CHEMO IV INFUSION 1 HR: CPT

## 2023-03-24 PROCEDURE — 81003 URINALYSIS AUTO W/O SCOPE: CPT | Performed by: INTERNAL MEDICINE

## 2023-03-24 PROCEDURE — 80050 GENERAL HEALTH PANEL: CPT | Performed by: INTERNAL MEDICINE

## 2023-03-24 RX ORDER — SODIUM CHLORIDE 0.9 % (FLUSH) 0.9 %
20 SYRINGE (ML) INJECTION AS NEEDED
Status: CANCELLED | OUTPATIENT
Start: 2023-03-24

## 2023-03-24 RX ORDER — LEVOTHYROXINE SODIUM 137 UG/1
TABLET ORAL
COMMUNITY
End: 2023-03-24

## 2023-03-24 RX ORDER — LETROZOLE 2.5 MG/1
1 TABLET, FILM COATED ORAL DAILY
COMMUNITY
Start: 2022-11-01

## 2023-03-24 RX ORDER — HEPARIN SODIUM (PORCINE) LOCK FLUSH IV SOLN 100 UNIT/ML 100 UNIT/ML
500 SOLUTION INTRAVENOUS AS NEEDED
Status: CANCELLED | OUTPATIENT
Start: 2023-03-24

## 2023-03-24 RX ORDER — SODIUM CHLORIDE 9 MG/ML
250 INJECTION, SOLUTION INTRAVENOUS ONCE
Status: CANCELLED | OUTPATIENT
Start: 2023-03-24

## 2023-03-24 RX ORDER — LEVOTHYROXINE SODIUM 112 UG/1
112 TABLET ORAL DAILY
Qty: 90 TABLET | Refills: 0 | Status: SHIPPED | OUTPATIENT
Start: 2023-03-24

## 2023-03-24 RX ORDER — HEPARIN SODIUM (PORCINE) LOCK FLUSH IV SOLN 100 UNIT/ML 100 UNIT/ML
500 SOLUTION INTRAVENOUS AS NEEDED
Status: DISCONTINUED | OUTPATIENT
Start: 2023-03-24 | End: 2023-03-25 | Stop reason: HOSPADM

## 2023-03-24 RX ORDER — SODIUM CHLORIDE 9 MG/ML
250 INJECTION, SOLUTION INTRAVENOUS ONCE
Status: COMPLETED | OUTPATIENT
Start: 2023-03-24 | End: 2023-03-24

## 2023-03-24 RX ORDER — SODIUM CHLORIDE 0.9 % (FLUSH) 0.9 %
20 SYRINGE (ML) INJECTION AS NEEDED
Status: DISCONTINUED | OUTPATIENT
Start: 2023-03-24 | End: 2023-03-25 | Stop reason: HOSPADM

## 2023-03-24 RX ADMIN — Medication 20 ML: at 13:05

## 2023-03-24 RX ADMIN — SODIUM CHLORIDE 790 MG: 9 INJECTION, SOLUTION INTRAVENOUS at 12:29

## 2023-03-24 RX ADMIN — HEPARIN 500 UNITS: 100 SYRINGE at 13:05

## 2023-03-24 RX ADMIN — SODIUM CHLORIDE 250 ML: 9 INJECTION, SOLUTION INTRAVENOUS at 12:29

## 2023-03-24 NOTE — PROGRESS NOTES
Hematology/Oncology Outpatient Follow Up    PATIENT NAME:Lucy Mahan  :1960  MRN: [id number]  PRIMARY CARE PHYSICIAN: [place] Avalos APRN  REFERRING PHYSICIAN: [place] Avalos APRN    Chief Complaint   Patient presents with   • Follow-up     Malignant neoplasm of ovary, unspecified laterality (HCC)        HISTORY OF PRESENT ILLNESS:     This is a 62 y.o.  female who developed abdominal pain in 2021.  Patient has also lost approximately 35 pounds during that..  She has had loss of appetite.  Due to the abdominal pain,  she had an ultrasound of the abdomen done on 10/13/2021.  This basically revealed no liver lesions.  Common bile duct is normal at 3 mm.  There is a nonmobile 3 cm shadowing gallstone within the gallbladder neck.  No gallbladder thickening or pericholecystic fluid collection.  Patient was then referred to general surgery and was seen by Dr. Badillo.  Who took her to surgery on 11/10/2021 for diagnostic laparoscopy and peritoneal biopsy.  Intra-Op , she was noted to have peritoneal studding with malignancy throughout the abdominal cavity and biopsies were completed. 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  The tumor was focally positive for progesterone receptor, positive for CK7, estrogen receptor, CA-125 and PAX 8..  The staining pattern is consistent with ovarian serous carcinoma.      She had CT scan of the chest, abdomen and pelvis and the chest is a 2 mm noncalcified nodule within the left lower lobe, noncalcified nodule in the left upper lobe measuring 4 mm and 3 mm.  In the abdomen there is a 5.1 x 5.3 cm enhancing soft tissue mass in the pelvis to the right of midline associated with the adnexal region possibly representing a primary ovarian malignancy.  No right or left ovarian tissue was seen.  There is abnormal omental thickening and nodularity consistent with carcinomatosis greatest bulk in the left mid abdomen measuring up to 10.8 cm x 2.5  cm.  There is nodular peritoneal thickening also present within the abdomen and pelvis consistent with peritoneal carcinomatosis.  The small quantity of ascitic fluid in the abdomen and pelvis.  Carcinomatosis nodular studding is seen along the inferior right hepatic lobe.     She  has been referred to us for further evaluation and management of her newly diagnosed ovarian carcinoma.     Patient is accompanied today by her daughter for this appointment.  There is  family history of precancerous cells of the  uterusin her sister.        She does not smoke and does not drink alcohol.  Patient is  and has 2 daughters.  She works for the [employer].    · 11/29/2021 patient was seen by Dr. Dubois who has recommended neoadjuvant chemotherapy for 2-3 cycles and then interval cytoreduction.  She has recommended carboplatinum AUC 6, Taxol 1 7 5 mg per metered squared, Avastin 15 mg/kg as was done in the oceans trial but hold Avastin for the first few cycles due to bowel risk.  · Prechemo CA-125 was 754  · 12/7/2021 patient received first cycle of chemotherapy with carboplatinum and Taxol with Neulasta  · 12/28/2021 patient received cycle 2 of combination chemotherapy with carboplatinum and Taxol with Neulasta  · 12/28/2021 CA-125 was down to 635  · 1/11/2022 add-on appointment for nausea, dizziness, thrombocytopenia, pain in upper to mid abdomen 8/10, new onset in the last 4 to 5 days  · January 12, 2022: Due to acute GI symptoms patient had a CT scan of the abdomen and pelvis which basically revealed increasing effusion on the left lung mild pleural effusion on the right lung decreased in amount of omental caking but no interval change in the right adnexal area moderate abdominal and pelvic ascites which has increased.  She denies any significant pulmonary symptoms.  Her O2 sat today was 100% at rest and 99% with ambulation  · 1/24/2022 patient received cycle 3 of carboplatinum with Taxol with dose  reduction due to significant toxicities  · 2/14/2022 patient received cycle 4-day 1 of chemo platinum Taxol and Avastin  · 3/7/22: Patient received cycle 5-day 1 of chemotherapy with carboplatinum Taxol and Avastin  · 4/4/2022 patient received cycle 6 of chemotherapy with A platinum Taxol and Avastin  · 4/26/2022 patient underwent exploratory laparotomy, total abdominal hysterectomy, bilateral salpingo-oophorectomy, pelvic and aortic lymphadenectomy, omentectomy performed by Dr Castro, pathology reviewed high-grade serous carcinoma involving the uterine serosa and outer myometrium, bilateral fallopian tubes and bilateral ovaries and adnexa soft tissue.  Also received are high-grade serous carcinoma involving the omentum as well as residual high-grade serous carcinoma with treatment effects on the colonic splenic [id number], estrogen receptor diffusely positive p53 patchy, p16 diffusely positive.  Molecular testing is pending  · 6/2/2022 patient received cycle 7 of Combination chemotherapy with Avastin and carboplatinum.  Taxol was held due to significant peripheral neuropathy  · 7/14/2022 patient presents for follow-up of ovarian cancer  · 7/28/2020 patient received cycle 9 of carboplatinum  · Patient is currently on maintenance Avastin and letrozole for ovarian cancer suppression  · 1/17/2023 patient had CT scan of the chest, abdomen and pelvis and this basically revealed stable small pulmonary nodules on the chest.  Peritoneal thickening in the right paracolic gutter but no definite new peritoneal nodules or mass.  There is a new sclerotic lesion involving the right anterior acetabulum measuring 2.1 cm, severe right hip osteoarthritis further evaluation with right hip MRI has been recommended to further evaluate.  · 12/30/2022  was 54 prior was 43  · 2/17/2023 patient had MRI of the pelvis which showed enhancing sclerotic lesion within the anterior acetabulum on the right like representing metastatic  disease.  Severe generative changes of the right hip joint was noted.  Gluteal tendinopathy on the right with no evidence of focal tear or significant bursitis.        Past Medical History:   Diagnosis Date   • Arthritis    • Cancer (HCC)     ovarian   • Depression    • Gall stones    • Hypertension    • Irritable bowel syndrome    • Rheumatoid aortitis    • Thyroid disease        Past Surgical History:   Procedure Laterality Date   •  SECTION      x2   • CHOLECYSTECTOMY N/A 11/10/2021    Procedure: diagnostic laparoscopy and peritoneal biopsy;  Surgeon: Krunal Badillo MD;  Location: [facility] MAIN OR;  Service: General;  Laterality: N/A;   • COLONOSCOPY     • HIP SURGERY Left    • JOINT REPLACEMENT     • THYROIDECTOMY, PARTIAL  2017   • TOTAL ABDOMINAL HYSTERECTOMY N/A 2022    Procedure: EXPLORATORY LAPAROTOMY, TOTAL ABDOMINAL HYSTERECTOMY, BILATERAL SALPINGOOOPHORECTOMY, PELVIC AND AORTIC LYMPHADENECTOMY, OMENTECTOMY;  Surgeon: Maggie Castro DO;  Location: [facility] MAIN OR;  Service: Gynecology Oncology;  Laterality: N/A;   • VENOUS ACCESS DEVICE (PORT) INSERTION Left 2021    Procedure: INSERTION VENOUS ACCESS DEVICE;  Surgeon: Krunal Badillo MD;  Location: [facility];  Service: General;  Laterality: Left;         Current Outpatient Medications:   •  letrozole (FEMARA) 2.5 MG tablet, Take 1 tablet by mouth Daily., Disp: , Rfl:   •  amLODIPine (NORVASC) 10 MG tablet, Take 1 tablet by mouth Daily., Disp: 90 tablet, Rfl: 1  •  furosemide (Lasix) 20 MG tablet, Take 1 tablet by mouth Daily As Needed (swelling and weight gain of 3 lbs in 48 hours)., Disp: 40 tablet, Rfl: 1  •  gabapentin (NEURONTIN) 300 MG capsule, Take 1 capsule by mouth Every 8 (Eight) Hours., Disp: 90 capsule, Rfl: 3  •  hydroCHLOROthiazide (HYDRODIURIL) 25 MG tablet, Take 1 tablet by mouth Daily., Disp: 90 tablet, Rfl: 1  •  HYDROcodone-acetaminophen (Norco) 5-325 MG per tablet, Take 1 tablet by mouth Every 6 (Six) Hours  As Needed for Moderate Pain., Disp: 60 tablet, Rfl: 0  •  hydrOXYzine (ATARAX) 10 MG tablet, Take 1 tablet by mouth Every 8 (Eight) Hours As Needed for Anxiety., Disp: 45 tablet, Rfl: 2  •  letrozole (FEMARA) 2.5 MG tablet, TAKE ONE (1) TABLET BY MOUTH DAILY, Disp: 60 tablet, Rfl: 3  •  levothyroxine (Synthroid) 112 MCG tablet, Take 1 tablet by mouth Daily., Disp: 90 tablet, Rfl: 0  •  lidocaine-prilocaine (EMLA) 2.5-2.5 % cream, APPLY TO THE AFFECTED AREA OF port prior TO access AS DIRECTED, Disp: 30 g, Rfl: 1  •  losartan (Cozaar) 25 MG tablet, Take 1 tablet by mouth Daily., Disp: 90 tablet, Rfl: 1  •  omeprazole (priLOSEC) 40 MG capsule, TAKE ONE (1) CAPSULE BY MOUTH EVERY DAY, Disp: 30 capsule, Rfl: 6  •  pantoprazole (PROTONIX) 40 MG EC tablet, TAKE ONE (1) TABLET BY MOUTH EVERY DAY (Patient not taking: Reported on 3/24/2023), Disp: 30 tablet, Rfl: 6  •  potassium chloride (KLOR-CON) 10 MEQ CR tablet, Take 1 tablet by mouth Daily As Needed (if takes lasix)., Disp: 40 tablet, Rfl: 1  •  promethazine (PHENERGAN) 25 MG tablet, Take 1 tablet by mouth Every 6 (Six) Hours As Needed for Nausea or Vomiting., Disp: 90 tablet, Rfl: 1  •  sertraline (Zoloft) 25 MG tablet, Take 1 tablet by mouth Daily., Disp: 90 tablet, Rfl: 1  No current facility-administered medications for this visit.    Facility-Administered Medications Ordered in Other Visits:   •  heparin injection 500 Units, 500 Units, Intravenous, PRN, Inga Hernandez MD, 500 Units at 03/24/23 1305  •  sodium chloride 0.9 % flush 20 mL, 20 mL, Intravenous, PRNDavid Ifeoma Roseline, MD, 20 mL at 03/24/23 1305    No Known Allergies    Family History   Problem Relation Age of Onset   • Dementia Mother    • Arthritis Mother    • Heart disease Father    • Hypertension Father    • Malig Hyperthermia Neg Hx        Cancer-related family history is not on file.    Social History     Tobacco Use   • Smoking status: Never   • Smokeless tobacco: Never   Vaping Use  "  • Vaping Use: Never used   Substance Use Topics   • Alcohol use: Yes     Alcohol/week: 1.0 standard drink     Types: 1 Glasses of wine per week     Comment: less than monthly   • Drug use: No     I have reviewed and confirmed the accuracy of the patient's history: Chief complaint, HPI, ROS and Subjective as entered by the MA/LPN/RN. Inga Cori Hernandez MD 03/24/23       SUBJECTIVE:    Patient is here today for Avastin infusion and denies any new issues    Her right hip pain has almost completely resolved          REVIEW OF SYSTEMS:    Review of Systems   Constitutional: Positive for fatigue. Negative for chills and fever.   HENT: Negative for ear pain, mouth sores, nosebleeds and sore throat.    Eyes: Negative for photophobia and visual disturbance.   Respiratory: Negative for wheezing and stridor.    Cardiovascular: Negative for chest pain and palpitations.   Gastrointestinal: Positive for abdominal pain and nausea. Negative for diarrhea and vomiting.   Endocrine: Negative for cold intolerance and heat intolerance.   Genitourinary: Negative for dysuria and hematuria.   Musculoskeletal: Negative for joint swelling and neck stiffness.   Skin: Negative for color change and rash.   Neurological: Positive for dizziness, weakness and headaches. Negative for seizures and syncope.   Hematological: Negative for adenopathy.   Psychiatric/Behavioral: Negative for agitation, confusion and hallucinations.     Post op changes noted.    OBJECTIVE:    Vitals:    03/24/23 1150   BP: 141/82   Pulse: 66   Resp: 18   Temp: 97 °F (36.1 °C)   TempSrc: Infrared   Weight: 78.5 kg (173 lb)   Height: 165.1 cm (65\")   PainSc: 0-No pain     Body mass index is 28.79 kg/m².    ECOG    (1) Restricted in physically strenuous activity, ambulatory and able to do work of light nature    Physical Exam  Vitals and nursing note reviewed.   Constitutional:       General: She is not in acute distress.     Appearance: She is not diaphoretic.   HENT: "      Head: Normocephalic and atraumatic.      Mouth/Throat:      Mouth: Mucous membranes are dry.   Eyes:      General: No scleral icterus.        Right eye: No discharge.         Left eye: No discharge.      Conjunctiva/sclera: Conjunctivae normal.   Neck:      Thyroid: No thyromegaly.   Cardiovascular:      Rate and Rhythm: Normal rate and regular rhythm.      Heart sounds: Normal heart sounds.     No friction rub. No gallop.   Pulmonary:      Effort: Pulmonary effort is normal. No respiratory distress.      Breath sounds: No stridor. No wheezing.   Abdominal:      General: Bowel sounds are normal.      Palpations: Abdomen is soft. There is no mass.      Tenderness: There is no abdominal tenderness. There is no guarding or rebound.      Comments: Abdominal wound noted   Musculoskeletal:         General: No tenderness. Normal range of motion.      Cervical back: Normal range of motion and neck supple.   Lymphadenopathy:      Cervical: No cervical adenopathy.   Skin:     General: Skin is warm.      Findings: No erythema or rash.      Comments: Tenting skin turgor   Neurological:      Mental Status: She is alert and oriented to person, place, and time.      Motor: No abnormal muscle tone.   Psychiatric:         Behavior: Behavior normal.     I have reexamined the patient and the results are consistent with the previously documented exam. Inga Hernandez MD       RECENT LABS    WBC   Date Value Ref Range Status   03/24/2023 6.55 3.40 - 10.80 10*3/mm3 Final     RBC   Date Value Ref Range Status   03/24/2023 4.29 3.77 - 5.28 10*6/mm3 Final     Hemoglobin   Date Value Ref Range Status   03/24/2023 13.5 12.0 - 15.9 g/dL Final     Hematocrit   Date Value Ref Range Status   03/24/2023 40.5 34.0 - 46.6 % Final     MCV   Date Value Ref Range Status   03/24/2023 94.4 79.0 - 97.0 fL Final     MCH   Date Value Ref Range Status   03/24/2023 31.5 26.6 - 33.0 pg Final     MCHC   Date Value Ref Range Status   03/24/2023 33.3  31.5 - 35.7 g/dL Final     RDW   Date Value Ref Range Status   03/24/2023 14.2 12.3 - 15.4 % Final     RDW-SD   Date Value Ref Range Status   03/24/2023 48.7 37.0 - 54.0 fl Final     MPV   Date Value Ref Range Status   03/24/2023 9.4 6.0 - 12.0 fL Final     Platelets   Date Value Ref Range Status   03/24/2023 149 140 - 450 10*3/mm3 Final     Neutrophil %   Date Value Ref Range Status   03/24/2023 60.6 42.7 - 76.0 % Final     Lymphocyte %   Date Value Ref Range Status   03/24/2023 23.4 19.6 - 45.3 % Final     Monocyte %   Date Value Ref Range Status   03/24/2023 13.1 (H) 5.0 - 12.0 % Final     Eosinophil %   Date Value Ref Range Status   03/24/2023 2.4 0.3 - 6.2 % Final     Basophil %   Date Value Ref Range Status   03/24/2023 0.5 0.0 - 1.5 % Final     Immature Grans %   Date Value Ref Range Status   04/29/2022 0.4 0.0 - 0.5 % Final     Neutrophils, Absolute   Date Value Ref Range Status   03/24/2023 3.97 1.70 - 7.00 10*3/mm3 Final     Lymphocytes, Absolute   Date Value Ref Range Status   03/24/2023 1.53 0.70 - 3.10 10*3/mm3 Final     Monocytes, Absolute   Date Value Ref Range Status   03/24/2023 0.86 0.10 - 0.90 10*3/mm3 Final     Eosinophils, Absolute   Date Value Ref Range Status   03/24/2023 0.16 0.00 - 0.40 10*3/mm3 Final     Basophils, Absolute   Date Value Ref Range Status   03/24/2023 0.03 0.00 - 0.20 10*3/mm3 Final     Immature Grans, Absolute   Date Value Ref Range Status   04/29/2022 0.02 0.00 - 0.05 10*3/mm3 Final     nRBC   Date Value Ref Range Status   04/29/2022 0.0 0.0 - 0.2 /100 WBC Final       Lab Results   Component Value Date    GLUCOSE 107 (H) 03/24/2023    BUN 23 03/24/2023    CREATININE 1.06 (H) 03/24/2023    EGFRIFNONA 47 (L) 02/21/2022    EGFRIFAFRI 44 (L) 01/21/2020    BCR 21.7 03/24/2023    K 4.4 03/24/2023    CO2 24.0 03/24/2023    CALCIUM 9.6 03/24/2023    ALBUMIN 3.9 03/24/2023    LABIL2 1.2 (calc) 01/21/2020    AST 23 03/24/2023    ALT 13 03/24/2023         ASSESSMENT:      1. 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  Ongoing management  2. Status post neoadjuvant chemotherapy with carboplatinum AUC 6, Taxol 175 mg per metered squared, with Avastin added due to lack of significant response with carboplatinum and Taxol.  Monitor serial CA-125's.  Down to 135 as of 3/14/2022.  Her CT scan showed response therefore patient will proceed with radical hysterectomy to perform by Dr. Dubois on 4/26/2022.  Plans for additional chemotherapy along with Avastin and possibly maintenance treatment with PARP inhibitors in the future.  She is also on letrozole 2.5 mg p.o. daily.  Patient is currently on Avastin maintenance along with letrozole.  Reviewed her recent CT scans which show evidence of sclerotic densities noted on the right hip may be related to surgery.  MRI of the right hip was recommended.  Reviewed the MRI completed on 2/17/2023 the area is suspicious for metastatic disease.  Also patient has had rising 's.  Refer to Dr. Tapia to see if she would be a candidate for SBRT.  We will also have patient be seen by Dr. Toni Hedrick given the severe DJD she has in that area to see if any surgical options.  In the meantime we will continue Avastin and letrozole.  Ongoing management  3. Right hip being almost completely resolved  4. Status post radical hysterectomy.  See path report.  Caris testing pending  5. Mucositis: This has resolved  6. Elevated blood pressure: This has improved.  Patient will continue to follow-up with her PCP  7. Pulmonary nodules of unclear etiology too small for biopsy or PET resolution: Continue surveillance CT scans.  Also check 's  8. 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  Patient already had some 25% dose reduction on her chemo.  She is currently on Neurontin 300 mg daily will increase to 300 mg twice a day.  Peripheral neuropathy continues to be an issue.  We will hold Taxol for now till this has  improved  9. Comprehensive gene analysis with cancer next technology was negative for any significant mutation in all 77 genes analyzed  10. 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  No other actionable mutations identified                 Plans:     · Continue Avastin  · Follow-up with Dr. Toni Hedrick Ortho oncology  · Follow up with Dr. Tapia to evaluate for candidacy for SRS  · Continue letrozole  · Continue Neurontin to 300 mg every 8 hours for peripheral neuropathy.  We will refill today that this is helping her neuropathy  · Reviewed CT scans of the chest abdomen and pelvis for cancer restaging.  Completed January 2023  · She will benefit from olaparib in the future  · Monitor 's, slight increase trend  · Elisa's Magic mouthwash for mucositis as needed  · Can resume full-time work, notes given  · Reviewed her path report from radical hysterectomy 4/20/2022  · Comprehensive gene analysis with cancer next technology results reviewed with patient and copies of her results was also given to her for her own records keeping  · Continue Percocet 5 mg p.o. every 4-6 hours as needed for pain  · Continue vitamin b6 100mg po q 12   · Foundation 1 testing results reviewed  · All questions answered  · Follow-up up end of April and at that time discussed when her next scans will be        I spent 40 total minutes, face-to-face, caring for Lucy today.  90% of this time involved counseling and/or coordination of care as documented within this note.

## 2023-03-24 NOTE — PROGRESS NOTES
OSW met w/ patient/daughter in infusion suite during her infusion.     Patient reports she's speaking to someone from [facility] monthly on the phone or more, if needed.     OSW and patient discussed grief, coping strategies and current functioning - OSW provided support/encouragement.     No other needs noted at this time.     Heather Fowler, LSW, CSW, MSW  Oncology MSW  [facility]

## 2023-03-27 ENCOUNTER — OFFICE VISIT (OUTPATIENT)
Dept: ONCOLOGY | Facility: CLINIC | Age: 63
End: 2023-03-27
Payer: COMMERCIAL

## 2023-03-27 ENCOUNTER — HOSPITAL ENCOUNTER (OUTPATIENT)
Dept: ONCOLOGY | Facility: HOSPITAL | Age: 63
Discharge: HOME | End: 2023-03-27
Admitting: NURSE PRACTITIONER
Payer: COMMERCIAL

## 2023-03-27 ENCOUNTER — DOCUMENTATION (OUTPATIENT)
Dept: ONCOLOGY | Facility: CLINIC | Age: 63
End: 2023-03-27

## 2023-03-27 ENCOUNTER — HOSPITAL ENCOUNTER (OUTPATIENT)
Dept: CT IMAGING | Facility: HOSPITAL | Age: 63
Discharge: HOME | End: 2023-03-27
Admitting: NURSE PRACTITIONER
Payer: COMMERCIAL

## 2023-03-27 VITALS
BODY MASS INDEX: 27.82 KG/M2 | OXYGEN SATURATION: 97 % | DIASTOLIC BLOOD PRESSURE: 93 MMHG | TEMPERATURE: 98 F | HEIGHT: 65 IN | RESPIRATION RATE: 18 BRPM | HEART RATE: 90 BPM | WEIGHT: 167 LBS | SYSTOLIC BLOOD PRESSURE: 165 MMHG

## 2023-03-27 DIAGNOSIS — R10.30 LOWER ABDOMINAL PAIN: ICD-10-CM

## 2023-03-27 DIAGNOSIS — E86.0 DEHYDRATION: ICD-10-CM

## 2023-03-27 DIAGNOSIS — R19.7 DIARRHEA, UNSPECIFIED TYPE: Primary | ICD-10-CM

## 2023-03-27 DIAGNOSIS — C56.9 MALIGNANT NEOPLASM OF OVARY, UNSPECIFIED LATERALITY: ICD-10-CM

## 2023-03-27 DIAGNOSIS — E86.0 DEHYDRATION: Primary | ICD-10-CM

## 2023-03-27 DIAGNOSIS — R19.7 DIARRHEA, UNSPECIFIED TYPE: ICD-10-CM

## 2023-03-27 DIAGNOSIS — R10.30 LOWER ABDOMINAL PAIN: Primary | ICD-10-CM

## 2023-03-27 DIAGNOSIS — R30.0 DYSURIA: ICD-10-CM

## 2023-03-27 DIAGNOSIS — R11.0 NAUSEA: ICD-10-CM

## 2023-03-27 DIAGNOSIS — R53.83 OTHER FATIGUE: ICD-10-CM

## 2023-03-27 DIAGNOSIS — R11.2 NAUSEA AND VOMITING, UNSPECIFIED VOMITING TYPE: ICD-10-CM

## 2023-03-27 DIAGNOSIS — Z45.2 ENCOUNTER FOR CARE RELATED TO PORT-A-CATH: ICD-10-CM

## 2023-03-27 DIAGNOSIS — E83.51 HYPOCALCEMIA: ICD-10-CM

## 2023-03-27 LAB
ALBUMIN SERPL-MCNC: 3.4 G/DL (ref 3.5–5.2)
ALBUMIN/GLOB SERPL: 0.9 G/DL
ALP SERPL-CCNC: 63 U/L (ref 39–117)
ALT SERPL W P-5'-P-CCNC: 10 U/L (ref 1–33)
ANION GAP SERPL CALCULATED.3IONS-SCNC: 13 MMOL/L (ref 5–15)
AST SERPL-CCNC: 19 U/L (ref 1–32)
BASOPHILS # BLD AUTO: 0.03 10*3/MM3 (ref 0–0.2)
BASOPHILS NFR BLD AUTO: 0.4 % (ref 0–1.5)
BILIRUB SERPL-MCNC: 0.6 MG/DL (ref 0–1.2)
BILIRUB UR QL STRIP: NEGATIVE
BUN SERPL-MCNC: 24 MG/DL (ref 8–23)
BUN/CREAT SERPL: 22.2 (ref 7–25)
CALCIUM SPEC-SCNC: 9.4 MG/DL (ref 8.6–10.5)
CHLORIDE SERPL-SCNC: 101 MMOL/L (ref 98–107)
CLARITY UR: CLEAR
CO2 SERPL-SCNC: 21 MMOL/L (ref 22–29)
COLOR UR: YELLOW
CREAT SERPL-MCNC: 1.08 MG/DL (ref 0.57–1)
DEPRECATED RDW RBC AUTO: 45.7 FL (ref 37–54)
EGFRCR SERPLBLD CKD-EPI 2021: 57.5 ML/MIN/1.73
EOSINOPHIL # BLD AUTO: 0.11 10*3/MM3 (ref 0–0.4)
EOSINOPHIL NFR BLD AUTO: 1.5 % (ref 0.3–6.2)
ERYTHROCYTE [DISTWIDTH] IN BLOOD BY AUTOMATED COUNT: 13.8 % (ref 12.3–15.4)
GLOBULIN UR ELPH-MCNC: 3.8 GM/DL
GLUCOSE SERPL-MCNC: 100 MG/DL (ref 65–99)
GLUCOSE UR STRIP-MCNC: NEGATIVE MG/DL
HCT VFR BLD AUTO: 42.2 % (ref 34–46.6)
HGB BLD-MCNC: 13.9 G/DL (ref 12–15.9)
HGB UR QL STRIP.AUTO: NEGATIVE
KETONES UR QL STRIP: NEGATIVE
LEUKOCYTE ESTERASE UR QL STRIP.AUTO: ABNORMAL
LYMPHOCYTES # BLD AUTO: 1.59 10*3/MM3 (ref 0.7–3.1)
LYMPHOCYTES NFR BLD AUTO: 22 % (ref 19.6–45.3)
MAGNESIUM SERPL-MCNC: 1.5 MG/DL (ref 1.6–2.4)
MCH RBC QN AUTO: 31.3 PG (ref 26.6–33)
MCHC RBC AUTO-ENTMCNC: 32.9 G/DL (ref 31.5–35.7)
MCV RBC AUTO: 95 FL (ref 79–97)
MONOCYTES # BLD AUTO: 0.78 10*3/MM3 (ref 0.1–0.9)
MONOCYTES NFR BLD AUTO: 10.8 % (ref 5–12)
NEUTROPHILS NFR BLD AUTO: 4.71 10*3/MM3 (ref 1.7–7)
NEUTROPHILS NFR BLD AUTO: 65.3 % (ref 42.7–76)
NITRITE UR QL STRIP: NEGATIVE
PH UR STRIP.AUTO: 5.5 [PH] (ref 5–8)
PLATELET # BLD AUTO: 139 10*3/MM3 (ref 140–450)
PMV BLD AUTO: 9 FL (ref 6–12)
POTASSIUM SERPL-SCNC: 3.8 MMOL/L (ref 3.5–5.2)
PROT SERPL-MCNC: 7.2 G/DL (ref 6–8.5)
PROT UR QL STRIP: NEGATIVE
RBC # BLD AUTO: 4.44 10*6/MM3 (ref 3.77–5.28)
SODIUM SERPL-SCNC: 135 MMOL/L (ref 136–145)
SP GR UR STRIP: 1.02 (ref 1–1.03)
UROBILINOGEN UR QL STRIP: ABNORMAL
WBC NRBC COR # BLD: 7.22 10*3/MM3 (ref 3.4–10.8)

## 2023-03-27 PROCEDURE — 81003 URINALYSIS AUTO W/O SCOPE: CPT | Performed by: NURSE PRACTITIONER

## 2023-03-27 PROCEDURE — 25510000001 IOPAMIDOL PER 1 ML: Performed by: NURSE PRACTITIONER

## 2023-03-27 PROCEDURE — 85025 COMPLETE CBC W/AUTO DIFF WBC: CPT | Performed by: NURSE PRACTITIONER

## 2023-03-27 PROCEDURE — 96375 TX/PRO/DX INJ NEW DRUG ADDON: CPT

## 2023-03-27 PROCEDURE — 25010000002 HEPARIN LOCK FLUSH PER 10 UNITS: Performed by: INTERNAL MEDICINE

## 2023-03-27 PROCEDURE — 25010000002 ONDANSETRON PER 1 MG: Performed by: NURSE PRACTITIONER

## 2023-03-27 PROCEDURE — 96360 HYDRATION IV INFUSION INIT: CPT

## 2023-03-27 PROCEDURE — 99215 OFFICE O/P EST HI 40 MIN: CPT | Performed by: NURSE PRACTITIONER

## 2023-03-27 PROCEDURE — 96374 THER/PROPH/DIAG INJ IV PUSH: CPT

## 2023-03-27 PROCEDURE — 74177 CT ABD & PELVIS W/CONTRAST: CPT

## 2023-03-27 PROCEDURE — 80053 COMPREHEN METABOLIC PANEL: CPT | Performed by: NURSE PRACTITIONER

## 2023-03-27 PROCEDURE — 25010000002 DEXAMETHASONE PER 1 MG: Performed by: NURSE PRACTITIONER

## 2023-03-27 PROCEDURE — 83735 ASSAY OF MAGNESIUM: CPT | Performed by: NURSE PRACTITIONER

## 2023-03-27 PROCEDURE — 96361 HYDRATE IV INFUSION ADD-ON: CPT

## 2023-03-27 RX ORDER — HEPARIN SODIUM (PORCINE) LOCK FLUSH IV SOLN 100 UNIT/ML 100 UNIT/ML
500 SOLUTION INTRAVENOUS AS NEEDED
Status: DISCONTINUED | OUTPATIENT
Start: 2023-03-27 | End: 2023-03-28 | Stop reason: HOSPADM

## 2023-03-27 RX ORDER — SODIUM CHLORIDE 0.9 % (FLUSH) 0.9 %
20 SYRINGE (ML) INJECTION AS NEEDED
Status: CANCELLED | OUTPATIENT
Start: 2023-03-27

## 2023-03-27 RX ORDER — METRONIDAZOLE 500 MG/1
500 TABLET ORAL 2 TIMES DAILY
Qty: 14 TABLET | Refills: 0 | Status: SHIPPED | OUTPATIENT
Start: 2023-03-27 | End: 2023-04-07

## 2023-03-27 RX ORDER — DEXAMETHASONE SODIUM PHOSPHATE 4 MG/ML
10 INJECTION, SOLUTION INTRA-ARTICULAR; INTRALESIONAL; INTRAMUSCULAR; INTRAVENOUS; SOFT TISSUE ONCE
Status: COMPLETED | OUTPATIENT
Start: 2023-03-27 | End: 2023-03-27

## 2023-03-27 RX ORDER — SODIUM CHLORIDE 0.9 % (FLUSH) 0.9 %
20 SYRINGE (ML) INJECTION AS NEEDED
Status: DISCONTINUED | OUTPATIENT
Start: 2023-03-27 | End: 2023-03-28 | Stop reason: HOSPADM

## 2023-03-27 RX ORDER — DEXAMETHASONE SODIUM PHOSPHATE 10 MG/ML
10 INJECTION INTRAMUSCULAR; INTRAVENOUS ONCE
Status: CANCELLED
Start: 2023-03-27

## 2023-03-27 RX ORDER — ONDANSETRON 2 MG/ML
8 INJECTION INTRAMUSCULAR; INTRAVENOUS ONCE
Status: COMPLETED | OUTPATIENT
Start: 2023-03-27 | End: 2023-03-27

## 2023-03-27 RX ORDER — PREDNISONE 20 MG/1
40 TABLET ORAL DAILY
Qty: 10 TABLET | Refills: 0 | Status: SHIPPED | OUTPATIENT
Start: 2023-03-27 | End: 2023-04-07

## 2023-03-27 RX ORDER — HEPARIN SODIUM (PORCINE) LOCK FLUSH IV SOLN 100 UNIT/ML 100 UNIT/ML
500 SOLUTION INTRAVENOUS AS NEEDED
Status: CANCELLED | OUTPATIENT
Start: 2023-03-27

## 2023-03-27 RX ADMIN — SODIUM CHLORIDE 1000 ML: 9 INJECTION, SOLUTION INTRAVENOUS at 14:11

## 2023-03-27 RX ADMIN — IOPAMIDOL 100 ML: 755 INJECTION, SOLUTION INTRAVENOUS at 17:02

## 2023-03-27 RX ADMIN — DEXAMETHASONE SODIUM PHOSPHATE 10 MG: 4 INJECTION INTRA-ARTICULAR; INTRALESIONAL; INTRAMUSCULAR; INTRAVENOUS; SOFT TISSUE at 15:26

## 2023-03-27 RX ADMIN — Medication 20 ML: at 15:48

## 2023-03-27 RX ADMIN — HEPARIN 500 UNITS: 100 SYRINGE at 15:48

## 2023-03-27 RX ADMIN — ONDANSETRON 8 MG: 2 INJECTION, SOLUTION INTRAMUSCULAR; INTRAVENOUS at 15:19

## 2023-03-27 NOTE — PROGRESS NOTES
Pt here for FLORENCE Ornelas f/u and labs/IVF.  Pt reports she has had sever abdominal cramping, nausea, and vomiting since Friday evening.  States the last time she vomited was Saturday morning and last had watery stool this morning.  Has not ate or drank much since Saturday.  Port accessed and labs drawn.  IVF given per MAR.  FLORENCE Ornelas at chairside.  Orders for zofran and dexamethasone received and given per MAR.  Pt to go to hospital for CT scan when she leaves infusion clinic.  Pt states she is feeling better after IVF given.  Pt verbalized understanding.  Pt d/c.

## 2023-03-27 NOTE — TELEPHONE ENCOUNTER
Spoke to pt and she said she still has abd cramping and diarrhea. Vomiting has resolved. Rec taking otc imodium as directed. Encouraged pt to call if symptoms do not improve. Pt indicated understanding

## 2023-03-27 NOTE — PROGRESS NOTES
Hematology/Oncology Outpatient Follow Up    PATIENT NAME:Lucy Mahan  :1960  MRN: [id number]  PRIMARY CARE PHYSICIAN: [place] Avalos APRN  REFERRING PHYSICIAN: [place] Avalos APRN    Chief Complaint   Patient presents with   • Abdominal Pain   • Nausea   • vomiting   • dysuria   • fatigue        HISTORY OF PRESENT ILLNESS:     This is a 62 y.o.  female who developed abdominal pain in 2021.  Patient has also lost approximately 35 pounds during that..  She has had loss of appetite.  Due to the abdominal pain,  she had an ultrasound of the abdomen done on 10/13/2021.  This basically revealed no liver lesions.  Common bile duct is normal at 3 mm.  There is a nonmobile 3 cm shadowing gallstone within the gallbladder neck.  No gallbladder thickening or pericholecystic fluid collection.  Patient was then referred to general surgery and was seen by Dr. Badillo.  Who took her to surgery on 11/10/2021 for diagnostic laparoscopy and peritoneal biopsy.  Intra-Op , she was noted to have peritoneal studding with malignancy throughout the abdominal cavity and biopsies were completed. 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  The tumor was focally positive for progesterone receptor, positive for CK7, estrogen receptor, CA-125 and PAX 8..  The staining pattern is consistent with ovarian serous carcinoma.      She had CT scan of the chest, abdomen and pelvis and the chest is a 2 mm noncalcified nodule within the left lower lobe, noncalcified nodule in the left upper lobe measuring 4 mm and 3 mm.  In the abdomen there is a 5.1 x 5.3 cm enhancing soft tissue mass in the pelvis to the right of midline associated with the adnexal region possibly representing a primary ovarian malignancy.  No right or left ovarian tissue was seen.  There is abnormal omental thickening and nodularity consistent with carcinomatosis greatest bulk in the left mid abdomen measuring up to 10.8 cm x 2.5 cm.  There  is nodular peritoneal thickening also present within the abdomen and pelvis consistent with peritoneal carcinomatosis.  The small quantity of ascitic fluid in the abdomen and pelvis.  Carcinomatosis nodular studding is seen along the inferior right hepatic lobe.     She  has been referred to us for further evaluation and management of her newly diagnosed ovarian carcinoma.     Patient is accompanied today by her daughter for this appointment.  There is  family history of precancerous cells of the  uterusin her sister.        She does not smoke and does not drink alcohol.  Patient is  and has 2 daughters.  She works for the [employer].    · 11/29/2021 patient was seen by Dr. Dubois who has recommended neoadjuvant chemotherapy for 2-3 cycles and then interval cytoreduction.  She has recommended carboplatinum AUC 6, Taxol 1 7 5 mg per metered squared, Avastin 15 mg/kg as was done in the oceans trial but hold Avastin for the first few cycles due to bowel risk.  · Prechemo CA-125 was 754  · 12/7/2021 patient received first cycle of chemotherapy with carboplatinum and Taxol with Neulasta  · 12/28/2021 patient received cycle 2 of combination chemotherapy with carboplatinum and Taxol with Neulasta  · 12/28/2021 CA-125 was down to 635  · 1/11/2022 add-on appointment for nausea, dizziness, thrombocytopenia, pain in upper to mid abdomen 8/10, new onset in the last 4 to 5 days  · January 12, 2022: Due to acute GI symptoms patient had a CT scan of the abdomen and pelvis which basically revealed increasing effusion on the left lung mild pleural effusion on the right lung decreased in amount of omental caking but no interval change in the right adnexal area moderate abdominal and pelvic ascites which has increased.  She denies any significant pulmonary symptoms.  Her O2 sat today was 100% at rest and 99% with ambulation  · 1/24/2022 patient received cycle 3 of carboplatinum with Taxol with dose reduction due to  significant toxicities  · 2/14/2022 patient received cycle 4-day 1 of chemo platinum Taxol and Avastin  · 3/7/22: Patient received cycle 5-day 1 of chemotherapy with carboplatinum Taxol and Avastin  · 4/4/2022 patient received cycle 6 of chemotherapy with A platinum Taxol and Avastin  · 4/26/2022 patient underwent exploratory laparotomy, total abdominal hysterectomy, bilateral salpingo-oophorectomy, pelvic and aortic lymphadenectomy, omentectomy performed by Dr Castro, pathology reviewed high-grade serous carcinoma involving the uterine serosa and outer myometrium, bilateral fallopian tubes and bilateral ovaries and adnexa soft tissue.  Also received are high-grade serous carcinoma involving the omentum as well as residual high-grade serous carcinoma with treatment effects on the colonic splenic [id number], estrogen receptor diffusely positive p53 patchy, p16 diffusely positive.  Molecular testing is pending  · 6/2/2022 patient received cycle 7 of Combination chemotherapy with Avastin and carboplatinum.  Taxol was held due to significant peripheral neuropathy  · 7/14/2022 patient presents for follow-up of ovarian cancer  · 7/28/2020 patient received cycle 9 of carboplatinum  · Patient is currently on maintenance Avastin and letrozole for ovarian cancer suppression  · 1/17/2023 patient had CT scan of the chest, abdomen and pelvis and this basically revealed stable small pulmonary nodules on the chest.  Peritoneal thickening in the right paracolic gutter but no definite new peritoneal nodules or mass.  There is a new sclerotic lesion involving the right anterior acetabulum measuring 2.1 cm, severe right hip osteoarthritis further evaluation with right hip MRI has been recommended to further evaluate.  · 12/30/2022  was 54 prior was 43  · 2/17/2023 patient had MRI of the pelvis which showed enhancing sclerotic lesion within the anterior acetabulum on the right like representing metastatic disease.  Severe  generative changes of the right hip joint was noted.  Gluteal tendinopathy on the right with no evidence of focal tear or significant bursitis.  · 3/27/2023 add on for abdominal pain, nausea and vomiting, fatigue    History of present illness was reviewed and is unchanged from the previous visit. 23        Past Medical History:   Diagnosis Date   • Arthritis    • Cancer (HCC)     ovarian   • Depression    • Gall stones    • Hypertension    • Irritable bowel syndrome    • Rheumatoid aortitis    • Thyroid disease        Past Surgical History:   Procedure Laterality Date   •  SECTION      x2   • CHOLECYSTECTOMY N/A 11/10/2021    Procedure: diagnostic laparoscopy and peritoneal biopsy;  Surgeon: Krunal Badillo MD;  Location: [facility] MAIN OR;  Service: General;  Laterality: N/A;   • COLONOSCOPY     • HIP SURGERY Left    • JOINT REPLACEMENT     • THYROIDECTOMY, PARTIAL  2017   • TOTAL ABDOMINAL HYSTERECTOMY N/A 2022    Procedure: EXPLORATORY LAPAROTOMY, TOTAL ABDOMINAL HYSTERECTOMY, BILATERAL SALPINGOOOPHORECTOMY, PELVIC AND AORTIC LYMPHADENECTOMY, OMENTECTOMY;  Surgeon: Maggie Castro DO;  Location: [facility] MAIN OR;  Service: Gynecology Oncology;  Laterality: N/A;   • VENOUS ACCESS DEVICE (PORT) INSERTION Left 2021    Procedure: INSERTION VENOUS ACCESS DEVICE;  Surgeon: Krunal Badillo MD;  Location: [facility] MAIN OR;  Service: General;  Laterality: Left;         Current Outpatient Medications:   •  amLODIPine (NORVASC) 10 MG tablet, Take 1 tablet by mouth Daily., Disp: 90 tablet, Rfl: 1  •  furosemide (Lasix) 20 MG tablet, Take 1 tablet by mouth Daily As Needed (swelling and weight gain of 3 lbs in 48 hours)., Disp: 40 tablet, Rfl: 1  •  gabapentin (NEURONTIN) 300 MG capsule, Take 1 capsule by mouth Every 8 (Eight) Hours., Disp: 90 capsule, Rfl: 3  •  hydroCHLOROthiazide (HYDRODIURIL) 25 MG tablet, Take 1 tablet by mouth Daily., Disp: 90 tablet, Rfl: 1  •  HYDROcodone-acetaminophen (Norco)  5-325 MG per tablet, Take 1 tablet by mouth Every 6 (Six) Hours As Needed for Moderate Pain., Disp: 60 tablet, Rfl: 0  •  hydrOXYzine (ATARAX) 10 MG tablet, Take 1 tablet by mouth Every 8 (Eight) Hours As Needed for Anxiety., Disp: 45 tablet, Rfl: 2  •  letrozole (FEMARA) 2.5 MG tablet, TAKE ONE (1) TABLET BY MOUTH DAILY, Disp: 60 tablet, Rfl: 3  •  letrozole (FEMARA) 2.5 MG tablet, Take 1 tablet by mouth Daily., Disp: , Rfl:   •  levothyroxine (Synthroid) 112 MCG tablet, Take 1 tablet by mouth Daily., Disp: 90 tablet, Rfl: 0  •  lidocaine-prilocaine (EMLA) 2.5-2.5 % cream, APPLY TO THE AFFECTED AREA OF port prior TO access AS DIRECTED, Disp: 30 g, Rfl: 1  •  losartan (Cozaar) 25 MG tablet, Take 1 tablet by mouth Daily., Disp: 90 tablet, Rfl: 1  •  omeprazole (priLOSEC) 40 MG capsule, TAKE ONE (1) CAPSULE BY MOUTH EVERY DAY, Disp: 30 capsule, Rfl: 6  •  pantoprazole (PROTONIX) 40 MG EC tablet, TAKE ONE (1) TABLET BY MOUTH EVERY DAY (Patient not taking: Reported on 3/24/2023), Disp: 30 tablet, Rfl: 6  •  potassium chloride (KLOR-CON) 10 MEQ CR tablet, Take 1 tablet by mouth Daily As Needed (if takes lasix)., Disp: 40 tablet, Rfl: 1  •  promethazine (PHENERGAN) 25 MG tablet, Take 1 tablet by mouth Every 6 (Six) Hours As Needed for Nausea or Vomiting., Disp: 90 tablet, Rfl: 1  •  sertraline (Zoloft) 25 MG tablet, Take 1 tablet by mouth Daily., Disp: 90 tablet, Rfl: 1  No current facility-administered medications for this visit.    Facility-Administered Medications Ordered in Other Visits:   •  heparin injection 500 Units, 500 Units, Intravenous, PRN, Inga Hernandez MD  •  sodium chloride 0.9 % flush 20 mL, 20 mL, Intravenous, PRN, Inga Hernandez MD    No Known Allergies    Family History   Problem Relation Age of Onset   • Dementia Mother    • Arthritis Mother    • Heart disease Father    • Hypertension Father    • Malig Hyperthermia Neg Hx        Cancer-related family history is not on  file.    Social History     Tobacco Use   • Smoking status: Never   • Smokeless tobacco: Never   Vaping Use   • Vaping Use: Never used   Substance Use Topics   • Alcohol use: Yes     Alcohol/week: 1.0 standard drink     Types: 1 Glasses of wine per week     Comment: less than monthly   • Drug use: No     I have reviewed and confirmed the accuracy of the patient's history: Chief complaint, HPI, ROS and Subjective as entered by the MA/LPN/RN. Ceci Mcdermott, APRN 03/27/23       SUBJECTIVE:  Add on for nausea and vomiting since Friday.  Initially thought she was sick from food poisoning from something she ate after receiving treatment.  She felt increased fatigue and went to sleep. She woke up early Saturday morning with diarrhea and vomiting and stomach cramping.  She took phenergan after initial vomiting episode but it didn't stay down.  She has had no sick contacts.  Her pain is below her umbilicus and is 10/10.  Her last vomiting episode was Sunday morning.  She has had only small bites of peanut butter and crackers, orange slices, sips of fluids. The diarrhea has happened twice today but 4-5 times yesterday.  She did not take any medication for the diarrhea.    She has no fevers through the illness but has had diaphoresis, waking up sweating.          REVIEW OF SYSTEMS:    Review of Systems   Constitutional: Positive for fatigue. Negative for chills and fever.   HENT: Negative for ear pain, mouth sores, nosebleeds and sore throat.    Eyes: Negative for photophobia and visual disturbance.   Respiratory: Negative for wheezing and stridor.    Cardiovascular: Negative for chest pain and palpitations.   Gastrointestinal: Positive for abdominal pain and nausea. Negative for diarrhea and vomiting. decreased oral intake  Endocrine: Negative for cold intolerance and heat intolerance.   Genitourinary: Negative for dysuria and hematuria.   Musculoskeletal: Negative for joint swelling and neck stiffness.   Skin: Negative  for color change and rash.   Neurological: Positive for dizziness, weakness and headaches. Negative for seizures and syncope.   Hematological: Negative for adenopathy.   Psychiatric/Behavioral: Negative for agitation, confusion and hallucinations.     Post op changes noted.    OBJECTIVE:    There were no vitals filed for this visit.  There is no height or weight on file to calculate BMI.    ECOG    (1) Restricted in physically strenuous activity, ambulatory and able to do work of light nature    Physical Exam  Vitals and nursing note reviewed.   Constitutional:       General: She is not in acute distress.     Appearance: She is not diaphoretic.   HENT:      Head: Normocephalic and atraumatic.      Mouth/Throat:      Mouth: Mucous membranes are dry.   Eyes:      General: No scleral icterus.        Right eye: No discharge.         Left eye: No discharge.      Conjunctiva/sclera: Conjunctivae normal.   Neck:      Thyroid: No thyromegaly.   Cardiovascular:      Rate and Rhythm: Normal rate and regular rhythm.      Heart sounds: Normal heart sounds.     No friction rub. No gallop.   Pulmonary:      Effort: Pulmonary effort is normal. No respiratory distress.      Breath sounds: No stridor. No wheezing.   Abdominal:      General: Bowel sounds are normal.      Palpations: Abdomen is soft. There is no mass. Mid to lower abdominal tenderness when palpated     Tenderness: There is no abdominal tenderness. There is no guarding or rebound.      Comments: Abdominal wound noted   Musculoskeletal:         General: No tenderness. Normal range of motion.      Cervical back: Normal range of motion and neck supple.   Lymphadenopathy:      Cervical: No cervical adenopathy.   Skin:     General: Skin is warm.      Findings: No erythema or rash.      Comments: Tenting skin turgor   Neurological:      Mental Status: She is alert and oriented to person, place, and time.      Motor: No abnormal muscle tone.   Psychiatric:         Behavior:  Behavior normal.     I have reexamined the patient and the results are consistent with the previously documented exam. Ceci Mcdermott, APRN       RECENT LABS    WBC   Date Value Ref Range Status   03/27/2023 7.22 3.40 - 10.80 10*3/mm3 Final     RBC   Date Value Ref Range Status   03/27/2023 4.44 3.77 - 5.28 10*6/mm3 Final     Hemoglobin   Date Value Ref Range Status   03/27/2023 13.9 12.0 - 15.9 g/dL Final     Hematocrit   Date Value Ref Range Status   03/27/2023 42.2 34.0 - 46.6 % Final     MCV   Date Value Ref Range Status   03/27/2023 95.0 79.0 - 97.0 fL Final     MCH   Date Value Ref Range Status   03/27/2023 31.3 26.6 - 33.0 pg Final     MCHC   Date Value Ref Range Status   03/27/2023 32.9 31.5 - 35.7 g/dL Final     RDW   Date Value Ref Range Status   03/27/2023 13.8 12.3 - 15.4 % Final     RDW-SD   Date Value Ref Range Status   03/27/2023 45.7 37.0 - 54.0 fl Final     MPV   Date Value Ref Range Status   03/27/2023 9.0 6.0 - 12.0 fL Final     Platelets   Date Value Ref Range Status   03/27/2023 139 (L) 140 - 450 10*3/mm3 Final     Neutrophil %   Date Value Ref Range Status   03/27/2023 65.3 42.7 - 76.0 % Final     Lymphocyte %   Date Value Ref Range Status   03/27/2023 22.0 19.6 - 45.3 % Final     Monocyte %   Date Value Ref Range Status   03/27/2023 10.8 5.0 - 12.0 % Final     Eosinophil %   Date Value Ref Range Status   03/27/2023 1.5 0.3 - 6.2 % Final     Basophil %   Date Value Ref Range Status   03/27/2023 0.4 0.0 - 1.5 % Final     Immature Grans %   Date Value Ref Range Status   04/29/2022 0.4 0.0 - 0.5 % Final     Neutrophils, Absolute   Date Value Ref Range Status   03/27/2023 4.71 1.70 - 7.00 10*3/mm3 Final     Lymphocytes, Absolute   Date Value Ref Range Status   03/27/2023 1.59 0.70 - 3.10 10*3/mm3 Final     Monocytes, Absolute   Date Value Ref Range Status   03/27/2023 0.78 0.10 - 0.90 10*3/mm3 Final     Eosinophils, Absolute   Date Value Ref Range Status   03/27/2023 0.11 0.00 - 0.40 10*3/mm3  Final     Basophils, Absolute   Date Value Ref Range Status   03/27/2023 0.03 0.00 - 0.20 10*3/mm3 Final     Immature Grans, Absolute   Date Value Ref Range Status   04/29/2022 0.02 0.00 - 0.05 10*3/mm3 Final     nRBC   Date Value Ref Range Status   04/29/2022 0.0 0.0 - 0.2 /100 WBC Final       Lab Results   Component Value Date    GLUCOSE 107 (H) 03/24/2023    BUN 23 03/24/2023    CREATININE 1.06 (H) 03/24/2023    EGFRIFNONA 47 (L) 02/21/2022    EGFRIFAFRI 44 (L) 01/21/2020    BCR 21.7 03/24/2023    K 4.4 03/24/2023    CO2 24.0 03/24/2023    CALCIUM 9.6 03/24/2023    ALBUMIN 3.9 03/24/2023    LABIL2 1.2 (calc) 01/21/2020    AST 23 03/24/2023    ALT 13 03/24/2023         ASSESSMENT:      1. 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  Ongoing management  2. Status post neoadjuvant chemotherapy with carboplatinum AUC 6, Taxol 175 mg per metered squared, with Avastin added due to lack of significant response with carboplatinum and Taxol.  Monitor serial CA-125's.  Down to 135 as of 3/14/2022.  Her CT scan showed response therefore patient will proceed with radical hysterectomy to perform by Dr. Dubois on 4/26/2022.  Plans for additional chemotherapy along with Avastin and possibly maintenance treatment with PARP inhibitors in the future.  She is also on letrozole 2.5 mg p.o. daily.  Patient is currently on Avastin maintenance along with letrozole.  Reviewed her recent CT scans which show evidence of sclerotic densities noted on the right hip may be related to surgery.  MRI of the right hip was recommended.  Reviewed the MRI completed on 2/17/2023 the area is suspicious for metastatic disease.  Also patient has had rising 's.  Refer to Dr. Tapia to see if she would be a candidate for SBRT.  We will also have patient be seen by Dr. Toni Hedrick given the severe DJD she has in that area to see if any surgical options.  In the meantime we will continue Avastin and letrozole.  Ongoing management  3. Right  hip being almost completely resolved  4. Status post radical hysterectomy.  See path report.  Caris testing pending  5. Mucositis: This has resolved  6. Elevated blood pressure: This has improved.  Patient will continue to follow-up with her PCP  7. Pulmonary nodules of unclear etiology too small for biopsy or PET resolution: Continue surveillance CT scans.  Also check 's  8. 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  Patient already had some 25% dose reduction on her chemo.  She is currently on Neurontin 300 mg daily will increase to 300 mg twice a day.  Peripheral neuropathy continues to be an issue.  We will hold Taxol for now till this has improved  9. Comprehensive gene analysis with cancer next technology was negative for any significant mutation in all 77 genes analyzed  10. 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  No other actionable mutations identified     Plans:     · Continue Avastin  · Follow-up with Dr. Toni Hedrick [facility] oncology  · Follow up with Dr. Tapia to evaluate for candidacy for SRS  · Continue letrozole  · Continue Neurontin to 300 mg every 8 hours for peripheral neuropathy.  We will refill today that this is helping her neuropathy  · Reviewed CT scans of the chest abdomen and pelvis for cancer restaging.  Completed January 2023  · She will benefit from olaparib in the future  · Monitor 's, slight increase trend  · Elisa's Magic mouthwash for mucositis as needed  · Can resume full-time work, notes given  · Reviewed her path report from radical hysterectomy 4/20/2022  · Comprehensive gene analysis with cancer next technology results reviewed with patient and copies of her results was also given to her for her own records keeping  · Continue Percocet 5 mg p.o. every 4-6 hours as needed for pain  · Continue vitamin b6 100mg po q 12    · Foundation 1 testing results reviewed  · All questions answered  · Follow-up up end of April and at that time discussed when her next scans will be    3/27/23: Abdominal pain/Nausea/ Vomiting x 3 days    PLAN 3/27/23:  Normal saline 1000 cc over 1 hour IV  zofran 8 mg IV   Decadron 10 mg IV  CT abdomen and pelvis without oral contrast      Differentials include: possible colitis, viral gastroenteritis, foodborne illness perforation with Bevacizumab therapy    After hours call to patient to explain CT results and call in Flagyl and prednisone.  She states she felt better and able to hold down soup and toast for supper after receiving fluids and decadron in IV at cancer center.  Discussed taking PPI with the other prescribed meds to avoid stomach irritation further.  Discussed fluid in abdomen (ascites), distal and terminal ileum inflammatory/Infectious process.  Informed to call if she had fever or condition worsened.  Patient verbalized understanding.    I spent 40 mintutes in physical exam, assessment of diarrhea, assessment of abdominal pain, assessment of nausea and vomiting, review of history, review of medications, discussion of need for CT abdomen, ordering and documentation.    Electronically signed by FLORENCE Francisco, 03/28/23, 4:39 PM EDT.

## 2023-03-27 NOTE — TELEPHONE ENCOUNTER
Caller: Lucy Mahan    Relationship: Self    Best call back number: [phone]    What medication are you requesting:     What are your current symptoms: STOMACH CRAMPING, DIRRAHEA, VOMITING WHICH HAS NOW STOPPED ON Reymundo 3/26/23    How long have you been experiencing symptoms: SINCE Friday 3/24/23    Have you had these symptoms before:    [] Yes  [x] No    Have you been treated for these symptoms before:   [] Yes  [x] No    If a prescription is needed, what is your preferred pharmacy and phone number: [address], [facility]. [address] - [phone]  - [phone]     Additional notes:  PATIENT STATES SHE HAS FOOD POISON AND WOULD LIKE MEDICATION  CALLED INTO THE PHARMACY IF ALL POSSIBLE.

## 2023-03-28 NOTE — PROGRESS NOTES
CT scan results 3/27/23    Called patient and discussed CT scan results of infectious, inflammatory process in distal small bowel, terminal ileum, gastritis in stomach and small amount of fluid in abdomen, pelvis.    Reinforced earlier discussion of taking steroid and Flagyl as well as her Protonix for stomach protection.  Patient verbalized understanding.    She will call if she begins running fever or condition worsens.    She will start her prescriptions tomorrow morning.    Offered more fluids tomorrow and patient declined.  She states she feels much better after the fluids, decadron and zofran today.  She was able to eat soup and toast for supper and has not had diarrhea or nausea/vomitng.    Reinforced to call office if she feels she needs more fluids or condition worsens.  She verbalized that she would do so.      Electronically signed by FLORENCE Francisco, 03/27/23, 8:14 PM EDT.

## 2023-03-29 ENCOUNTER — HOSPITAL ENCOUNTER (OUTPATIENT)
Dept: RADIATION ONCOLOGY | Facility: HOSPITAL | Age: 63
Discharge: HOME | End: 2023-03-29

## 2023-03-29 ENCOUNTER — RADIATION ONCOLOGY WEEKLY ASSESSMENT (OUTPATIENT)
Dept: RADIATION ONCOLOGY | Facility: HOSPITAL | Age: 63
End: 2023-03-29
Payer: COMMERCIAL

## 2023-03-29 VITALS
OXYGEN SATURATION: 98 % | RESPIRATION RATE: 18 BRPM | SYSTOLIC BLOOD PRESSURE: 138 MMHG | HEART RATE: 63 BPM | WEIGHT: 171 LBS | DIASTOLIC BLOOD PRESSURE: 84 MMHG | BODY MASS INDEX: 28.49 KG/M2 | HEIGHT: 65 IN | TEMPERATURE: 97.8 F

## 2023-03-29 DIAGNOSIS — C79.51 BONE METASTASIS: Primary | ICD-10-CM

## 2023-03-29 LAB
RAD ONC ARIA COURSE ID: NORMAL
RAD ONC ARIA COURSE LAST TREATMENT DATE: NORMAL
RAD ONC ARIA COURSE START DATE: NORMAL
RAD ONC ARIA COURSE TREATMENT ELAPSED DAYS: 0
RAD ONC ARIA FIRST TREATMENT DATE: NORMAL
RAD ONC ARIA PLAN FRACTIONS TREATED TO DATE: 1
RAD ONC ARIA PLAN ID: NORMAL
RAD ONC ARIA PLAN PRESCRIBED DOSE PER FRACTION: 6 GY
RAD ONC ARIA PLAN PRIMARY REFERENCE POINT: NORMAL
RAD ONC ARIA PLAN TOTAL FRACTIONS PRESCRIBED: 5
RAD ONC ARIA PLAN TOTAL PRESCRIBED DOSE: 3000 CGY
RAD ONC ARIA REFERENCE POINT DOSAGE GIVEN TO DATE: 6 GY
RAD ONC ARIA REFERENCE POINT ID: NORMAL
RAD ONC ARIA REFERENCE POINT SESSION DOSAGE GIVEN: 6 GY

## 2023-03-29 PROCEDURE — 77373 STRTCTC BDY RAD THER TX DLVR: CPT | Performed by: RADIOLOGY

## 2023-03-29 PROCEDURE — FACE2FACE: Performed by: RADIOLOGY

## 2023-03-29 PROCEDURE — 77435 SBRT MANAGEMENT: CPT | Performed by: RADIOLOGY

## 2023-03-29 NOTE — PROGRESS NOTES
"NAME: Lucy Mahan DATE: 3/29/2023   : 1960    MRN: [id number] DIAGNOSIS: Rt acetabular bone met (ovarian primary)       RADIATION ON TREATMENT VISIT NOTE - GENERAL     Treatment Summary  Treatment Site Ref. ID Energy Dose/Fx (cGy) #Fx Dose Correction (cGy) Total Dose (cGy) Start Date End Date Elapsed Days   RtAcetabulum RtAcetabulum 6X-  0 600 3/29/2023           General:     Review of Systems  [x] No new complaints  [] Cough   [] Dysphagia  [] Bowel changes   [] Fever/chills  [] Nausea/vomiting  [] Skin itching/soreness/breakdown  [] Fatigue,  severity: ---------------- [x] Pain,  severity: 0, location: denies pain  Nausea regimen: NONE   Pain medication regimen: NONE   Bowel regimen: NONE   Skin regimen: NONE     Subjective:  Started treatment today, she doesn't have any concerns or complaints.  Denies any N/V  No bladder issues    KPS: 90     Vital Signs: /84   Pulse 63   Temp 97.8 °F (36.6 °C) (Oral)   Resp 18   Ht 165.1 cm (65\")   Wt 77.6 kg (171 lb)   LMP  (LMP Unknown)   SpO2 98%   BMI 28.46 kg/m²     Weight:   Wt Readings from Last 3 Encounters:   23 77.6 kg (171 lb)   23 75.8 kg (167 lb)   23 78.8 kg (173 lb 12.8 oz)       Medication:   Current Outpatient Medications:   •  amLODIPine (NORVASC) 10 MG tablet, Take 1 tablet by mouth Daily., Disp: 90 tablet, Rfl: 1  •  furosemide (Lasix) 20 MG tablet, Take 1 tablet by mouth Daily As Needed (swelling and weight gain of 3 lbs in 48 hours)., Disp: 40 tablet, Rfl: 1  •  gabapentin (NEURONTIN) 300 MG capsule, Take 1 capsule by mouth Every 8 (Eight) Hours., Disp: 90 capsule, Rfl: 3  •  hydroCHLOROthiazide (HYDRODIURIL) 25 MG tablet, Take 1 tablet by mouth Daily., Disp: 90 tablet, Rfl: 1  •  HYDROcodone-acetaminophen (Norco) 5-325 MG per tablet, Take 1 tablet by mouth Every 6 (Six) Hours As Needed for Moderate Pain., Disp: 60 tablet, Rfl: 0  •  hydrOXYzine (ATARAX) 10 MG tablet, Take 1 tablet by mouth Every 8 " (Eight) Hours As Needed for Anxiety., Disp: 45 tablet, Rfl: 2  •  letrozole (FEMARA) 2.5 MG tablet, TAKE ONE (1) TABLET BY MOUTH DAILY, Disp: 60 tablet, Rfl: 3  •  letrozole (FEMARA) 2.5 MG tablet, Take 1 tablet by mouth Daily., Disp: , Rfl:   •  levothyroxine (Synthroid) 112 MCG tablet, Take 1 tablet by mouth Daily., Disp: 90 tablet, Rfl: 0  •  lidocaine-prilocaine (EMLA) 2.5-2.5 % cream, APPLY TO THE AFFECTED AREA OF port prior TO access AS DIRECTED, Disp: 30 g, Rfl: 1  •  losartan (Cozaar) 25 MG tablet, Take 1 tablet by mouth Daily., Disp: 90 tablet, Rfl: 1  •  metroNIDAZOLE (Flagyl) 500 MG tablet, Take 1 tablet by mouth 2 (Two) Times a Day., Disp: 14 tablet, Rfl: 0  •  omeprazole (priLOSEC) 40 MG capsule, TAKE ONE (1) CAPSULE BY MOUTH EVERY DAY, Disp: 30 capsule, Rfl: 6  •  pantoprazole (PROTONIX) 40 MG EC tablet, TAKE ONE (1) TABLET BY MOUTH EVERY DAY (Patient not taking: Reported on 3/24/2023), Disp: 30 tablet, Rfl: 6  •  potassium chloride (KLOR-CON) 10 MEQ CR tablet, Take 1 tablet by mouth Daily As Needed (if takes lasix)., Disp: 40 tablet, Rfl: 1  •  predniSONE (DELTASONE) 20 MG tablet, Take 2 tablets by mouth Daily., Disp: 10 tablet, Rfl: 0  •  promethazine (PHENERGAN) 25 MG tablet, Take 1 tablet by mouth Every 6 (Six) Hours As Needed for Nausea or Vomiting., Disp: 90 tablet, Rfl: 1  •  sertraline (Zoloft) 25 MG tablet, Take 1 tablet by mouth Daily., Disp: 90 tablet, Rfl: 1     LABS (Reviewed):  Hematology  WBC   Date Value Ref Range Status   03/27/2023 7.22 3.40 - 10.80 10*3/mm3 Final     RBC   Date Value Ref Range Status   03/27/2023 4.44 3.77 - 5.28 10*6/mm3 Final     Hemoglobin   Date Value Ref Range Status   03/27/2023 13.9 12.0 - 15.9 g/dL Final     Hematocrit   Date Value Ref Range Status   03/27/2023 42.2 34.0 - 46.6 % Final     Platelets   Date Value Ref Range Status   03/27/2023 139 (L) 140 - 450 10*3/mm3 Final     Chemistry   Lab Results   Component Value Date    GLUCOSE 100 (H) 03/27/2023     BUN 24 (H) 03/27/2023    CREATININE 1.08 (H) 03/27/2023    EGFRIFNONA 47 (L) 02/21/2022    EGFRIFAFRI 44 (L) 01/21/2020    BCR 22.2 03/27/2023    K 3.8 03/27/2023    CO2 21.0 (L) 03/27/2023    CALCIUM 9.4 03/27/2023    ALBUMIN 3.4 (L) 03/27/2023    LABIL2 1.2 (calc) 01/21/2020    AST 19 03/27/2023    ALT 10 03/27/2023       Physical Exam:     Neck: [] Lymphadenopathy  Lungs: [x] Clear to auscultation  CVS: [x] Regular rate & rhythm  Skin: [x] stGstrstastdstest:st st1st No suprapubic pain.    Comments/Notes:      [x] Review of labs, images, dosimetry, dose delivered, & treatment parameters.   Comments:     [x] Patient treatment setup reviewed.    Comments:     Recommendations:  Continue XRT and supportive measures  Tolerated today's treatment well.    [x] Continue RT  [] Change RT Plan [] Hold RT, length:      Approved Electronically By:  Yovany Tapia MD, 3/29/2023, 13:19 EDT      Confidentiality of this medical record shall be maintained except when use or disclosure is required or permitted by law, regulation or written authorization by the patient.

## 2023-03-30 ENCOUNTER — HOSPITAL ENCOUNTER (OUTPATIENT)
Dept: RADIATION ONCOLOGY | Facility: HOSPITAL | Age: 63
Discharge: HOME | End: 2023-03-30

## 2023-03-30 PROBLEM — C79.51 BONE METASTASIS: Status: ACTIVE | Noted: 2023-03-30

## 2023-03-30 LAB
RAD ONC ARIA COURSE ID: NORMAL
RAD ONC ARIA COURSE LAST TREATMENT DATE: NORMAL
RAD ONC ARIA COURSE START DATE: NORMAL
RAD ONC ARIA COURSE TREATMENT ELAPSED DAYS: 1
RAD ONC ARIA FIRST TREATMENT DATE: NORMAL
RAD ONC ARIA PLAN FRACTIONS TREATED TO DATE: 2
RAD ONC ARIA PLAN ID: NORMAL
RAD ONC ARIA PLAN PRESCRIBED DOSE PER FRACTION: 6 GY
RAD ONC ARIA PLAN PRIMARY REFERENCE POINT: NORMAL
RAD ONC ARIA PLAN TOTAL FRACTIONS PRESCRIBED: 5
RAD ONC ARIA PLAN TOTAL PRESCRIBED DOSE: 3000 CGY
RAD ONC ARIA REFERENCE POINT DOSAGE GIVEN TO DATE: 12 GY
RAD ONC ARIA REFERENCE POINT ID: NORMAL
RAD ONC ARIA REFERENCE POINT SESSION DOSAGE GIVEN: 6 GY

## 2023-03-30 PROCEDURE — 77336 RADIATION PHYSICS CONSULT: CPT | Performed by: RADIOLOGY

## 2023-03-30 PROCEDURE — 77373 STRTCTC BDY RAD THER TX DLVR: CPT | Performed by: RADIOLOGY

## 2023-03-31 ENCOUNTER — HOSPITAL ENCOUNTER (OUTPATIENT)
Dept: RADIATION ONCOLOGY | Facility: HOSPITAL | Age: 63
Discharge: HOME | End: 2023-03-31

## 2023-03-31 LAB
RAD ONC ARIA COURSE ID: NORMAL
RAD ONC ARIA COURSE LAST TREATMENT DATE: NORMAL
RAD ONC ARIA COURSE START DATE: NORMAL
RAD ONC ARIA COURSE TREATMENT ELAPSED DAYS: 2
RAD ONC ARIA FIRST TREATMENT DATE: NORMAL
RAD ONC ARIA PLAN FRACTIONS TREATED TO DATE: 3
RAD ONC ARIA PLAN ID: NORMAL
RAD ONC ARIA PLAN PRESCRIBED DOSE PER FRACTION: 6 GY
RAD ONC ARIA PLAN PRIMARY REFERENCE POINT: NORMAL
RAD ONC ARIA PLAN TOTAL FRACTIONS PRESCRIBED: 5
RAD ONC ARIA PLAN TOTAL PRESCRIBED DOSE: 3000 CGY
RAD ONC ARIA REFERENCE POINT DOSAGE GIVEN TO DATE: 18 GY
RAD ONC ARIA REFERENCE POINT ID: NORMAL
RAD ONC ARIA REFERENCE POINT SESSION DOSAGE GIVEN: 6 GY

## 2023-03-31 PROCEDURE — 77373 STRTCTC BDY RAD THER TX DLVR: CPT | Performed by: RADIOLOGY

## 2023-04-03 ENCOUNTER — HOSPITAL ENCOUNTER (OUTPATIENT)
Dept: RADIATION ONCOLOGY | Facility: HOSPITAL | Age: 63
Discharge: HOME | End: 2023-04-03

## 2023-04-03 ENCOUNTER — HOSPITAL ENCOUNTER (OUTPATIENT)
Dept: RADIATION ONCOLOGY | Facility: HOSPITAL | Age: 63
Setting detail: RADIATION/ONCOLOGY SERIES
End: 2023-04-03
Payer: COMMERCIAL

## 2023-04-03 LAB
RAD ONC ARIA COURSE ID: NORMAL
RAD ONC ARIA COURSE LAST TREATMENT DATE: NORMAL
RAD ONC ARIA COURSE START DATE: NORMAL
RAD ONC ARIA COURSE TREATMENT ELAPSED DAYS: 5
RAD ONC ARIA FIRST TREATMENT DATE: NORMAL
RAD ONC ARIA PLAN FRACTIONS TREATED TO DATE: 4
RAD ONC ARIA PLAN ID: NORMAL
RAD ONC ARIA PLAN PRESCRIBED DOSE PER FRACTION: 6 GY
RAD ONC ARIA PLAN PRIMARY REFERENCE POINT: NORMAL
RAD ONC ARIA PLAN TOTAL FRACTIONS PRESCRIBED: 5
RAD ONC ARIA PLAN TOTAL PRESCRIBED DOSE: 3000 CGY
RAD ONC ARIA REFERENCE POINT DOSAGE GIVEN TO DATE: 24 GY
RAD ONC ARIA REFERENCE POINT ID: NORMAL
RAD ONC ARIA REFERENCE POINT SESSION DOSAGE GIVEN: 6 GY

## 2023-04-03 PROCEDURE — 77373 STRTCTC BDY RAD THER TX DLVR: CPT | Performed by: RADIOLOGY

## 2023-04-04 ENCOUNTER — HOSPITAL ENCOUNTER (OUTPATIENT)
Dept: RADIATION ONCOLOGY | Facility: HOSPITAL | Age: 63
Discharge: HOME | End: 2023-04-04

## 2023-04-04 ENCOUNTER — RADIATION ONCOLOGY WEEKLY ASSESSMENT (OUTPATIENT)
Dept: RADIATION ONCOLOGY | Facility: HOSPITAL | Age: 63
End: 2023-04-04
Payer: COMMERCIAL

## 2023-04-04 DIAGNOSIS — C79.51 MALIGNANT NEOPLASM METASTATIC TO BONE: Primary | ICD-10-CM

## 2023-04-04 LAB
RAD ONC ARIA COURSE ID: NORMAL
RAD ONC ARIA COURSE LAST TREATMENT DATE: NORMAL
RAD ONC ARIA COURSE START DATE: NORMAL
RAD ONC ARIA COURSE TREATMENT ELAPSED DAYS: 6
RAD ONC ARIA FIRST TREATMENT DATE: NORMAL
RAD ONC ARIA PLAN FRACTIONS TREATED TO DATE: 5
RAD ONC ARIA PLAN ID: NORMAL
RAD ONC ARIA PLAN PRESCRIBED DOSE PER FRACTION: 6 GY
RAD ONC ARIA PLAN PRIMARY REFERENCE POINT: NORMAL
RAD ONC ARIA PLAN TOTAL FRACTIONS PRESCRIBED: 5
RAD ONC ARIA PLAN TOTAL PRESCRIBED DOSE: 3000 CGY
RAD ONC ARIA REFERENCE POINT DOSAGE GIVEN TO DATE: 30 GY
RAD ONC ARIA REFERENCE POINT ID: NORMAL
RAD ONC ARIA REFERENCE POINT SESSION DOSAGE GIVEN: 6 GY

## 2023-04-04 PROCEDURE — 77373 STRTCTC BDY RAD THER TX DLVR: CPT | Performed by: RADIOLOGY

## 2023-04-04 NOTE — PROGRESS NOTES
RADIATION THERAPY COMPLETION and DISCHARGE     Lucy Mahan completed radiation therapy on 04/04/2023 for bone metastases. The summary of his/her treatment is as follows:    TREATMENT SITE:   Rt Acetabulum  START DATE:   03/29/2023   TOTAL DOSE (cGy):   3000 END DATE:   04/04/2023   DOSE/FRACTION:   600 ELAPSED DAYS:   6   TOTAL FACTIONS:   5 PHYSICIAN:    Yovany Tapia MD     He/She is scheduled for a one-month follow-up appointment with Dr. Tapia on May 1, 2023 at 11:30am.     The following instructions were provided to the patient and/or family in their printed after visit summary (AVS) as well as discussed in-person with the radiation oncology nurse. The patient and/or family member had the opportunity to ask questions and verbalized their questions were adequately answered.   _______________________________________________________________________    RADIATION THERAPY DISCHARGE INSTRUCTIONS  General    CONGRATULATIONS! You completed 5 radiation treatments for treatment of your metastatic ovarian cancer. These discharge instructions are important for you to follow until your one-month follow up appointment with Dr. Tapia. Please make sure to review these instructions and call the Radiation Oncology Department if you have any questions or concerns with symptoms you may experience. Thank you for trusting us with your cancer treatment!    DIET  • Eat a regular, well balanced diet that is high in protein such as meat, eggs, cheese, and nut butters  • Drink 48 to 64 ounces of fluid daily  • Use nutritional supplements if you are not able to eat full meals  • Monitor your weight and report continued weight loss to your doctor    MEDICATIONS  • Use Tylenol as needed to decrease discomfort and irritation to treatment area  • Take pain medications only as prescribed  • Take a laxative or stool softener as needed to prevent constipation due to pain medications    SKIN CARE  • Wash treated skin gently with your hands using a  mild, non-drying soap such as Dove® or Aveeno® until skin returns to normal  • Pat skin dry - do not rub  • Keep treated skin moist with frequent applications of Aquaphor® or unscented lotion (such as Eucerin)®  • Always protect your treated skin when outdoors by wearing protective clothing and applying sunscreen SPF 15 or higher at least 30 minutes before going outdoors and reapply frequently    ACTIVITY  Fatigue is a normal side effect of radiation therapy and should improve over time  • Alternate rest and activity  • Exercise such as walking may help to improve your fatigue    FOLLOW-UP  • Continue follow-up appointments with all other doctors as necessary  • Call your radiation oncology doctor if you are concerned with any side effects you are experiencing: [phone]    _______________________________________________________________________    Completed by: Imelda Burgos MA, Radiation Oncology Medical Assistant on 04/04/2023  at 08:03 EDT

## 2023-04-04 NOTE — PROGRESS NOTES
Hematology/Oncology Outpatient Follow Up    PATIENT NAME:Lucy Mahan  :1960  MRN: [id number]  PRIMARY CARE PHYSICIAN: [place] Avalos APRN  REFERRING PHYSICIAN: [place] Avalos APRN    Chief Complaint   Patient presents with   • Follow-up     Malignant neoplasm of ovary, unspecified laterality        HISTORY OF PRESENT ILLNESS:     This is a 62 y.o.  female who developed abdominal pain in 2021.  Patient has also lost approximately 35 pounds during that..  She has had loss of appetite.  Due to the abdominal pain,  she had an ultrasound of the abdomen done on 10/13/2021.  This basically revealed no liver lesions.  Common bile duct is normal at 3 mm.  There is a nonmobile 3 cm shadowing gallstone within the gallbladder neck.  No gallbladder thickening or pericholecystic fluid collection.  Patient was then referred to general surgery and was seen by Dr. Badillo.  Who took her to surgery on 11/10/2021 for diagnostic laparoscopy and peritoneal biopsy.  Intra-Op , she was noted to have peritoneal studding with malignancy throughout the abdominal cavity and biopsies were completed. 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  The tumor was focally positive for progesterone receptor, positive for CK7, estrogen receptor, CA-125 and PAX 8..  The staining pattern is consistent with ovarian serous carcinoma.      She had CT scan of the chest, abdomen and pelvis and the chest is a 2 mm noncalcified nodule within the left lower lobe, noncalcified nodule in the left upper lobe measuring 4 mm and 3 mm.  In the abdomen there is a 5.1 x 5.3 cm enhancing soft tissue mass in the pelvis to the right of midline associated with the adnexal region possibly representing a primary ovarian malignancy.  No right or left ovarian tissue was seen.  There is abnormal omental thickening and nodularity consistent with carcinomatosis greatest bulk in the left mid abdomen measuring up to 10.8 cm x 2.5 cm.   There is nodular peritoneal thickening also present within the abdomen and pelvis consistent with peritoneal carcinomatosis.  The small quantity of ascitic fluid in the abdomen and pelvis.  Carcinomatosis nodular studding is seen along the inferior right hepatic lobe.     She  has been referred to us for further evaluation and management of her newly diagnosed ovarian carcinoma.     Patient is accompanied today by her daughter for this appointment.  There is  family history of precancerous cells of the  uterusin her sister.        She does not smoke and does not drink alcohol.  Patient is  and has 2 daughters.  She works for the [email or web address].    · 11/29/2021 patient was seen by Dr. Dubois who has recommended neoadjuvant chemotherapy for 2-3 cycles and then interval cytoreduction.  She has recommended carboplatinum AUC 6, Taxol 1 7 5 mg per metered squared, Avastin 15 mg/kg as was done in the oceans trial but hold Avastin for the first few cycles due to bowel risk.  · Prechemo CA-125 was 754  · 12/7/2021 patient received first cycle of chemotherapy with carboplatinum and Taxol with Neulasta  · 12/28/2021 patient received cycle 2 of combination chemotherapy with carboplatinum and Taxol with Neulasta  · 12/28/2021 CA-125 was down to 635  · 1/11/2022 add-on appointment for nausea, dizziness, thrombocytopenia, pain in upper to mid abdomen 8/10, new onset in the last 4 to 5 days  · January 12, 2022: Due to acute GI symptoms patient had a CT scan of the abdomen and pelvis which basically revealed increasing effusion on the left lung mild pleural effusion on the right lung decreased in amount of omental caking but no interval change in the right adnexal area moderate abdominal and pelvic ascites which has increased.  She denies any significant pulmonary symptoms.  Her O2 sat today was 100% at rest and 99% with ambulation  · 1/24/2022 patient received cycle 3 of carboplatinum with Taxol with dose reduction  due to significant toxicities  · 2/14/2022 patient received cycle 4-day 1 of chemo platinum Taxol and Avastin  · 3/7/22: Patient received cycle 5-day 1 of chemotherapy with carboplatinum Taxol and Avastin  · 4/4/2022 patient received cycle 6 of chemotherapy with A platinum Taxol and Avastin  · 4/26/2022 patient underwent exploratory laparotomy, total abdominal hysterectomy, bilateral salpingo-oophorectomy, pelvic and aortic lymphadenectomy, omentectomy performed by Dr Castro, pathology reviewed high-grade serous carcinoma involving the uterine serosa and outer myometrium, bilateral fallopian tubes and bilateral ovaries and adnexa soft tissue.  Also received are high-grade serous carcinoma involving the omentum as well as residual high-grade serous carcinoma with treatment effects on the colonic splenic [id number], estrogen receptor diffusely positive p53 patchy, p16 diffusely positive.  Molecular testing is pending  · 6/2/2022 patient received cycle 7 of Combination chemotherapy with Avastin and carboplatinum.  Taxol was held due to significant peripheral neuropathy  · 7/14/2022 patient presents for follow-up of ovarian cancer  · 7/28/2020 patient received cycle 9 of carboplatinum  · Patient is currently on maintenance Avastin and letrozole for ovarian cancer suppression  · 1/17/2023 patient had CT scan of the chest, abdomen and pelvis and this basically revealed stable small pulmonary nodules on the chest.  Peritoneal thickening in the right paracolic gutter but no definite new peritoneal nodules or mass.  There is a new sclerotic lesion involving the right anterior acetabulum measuring 2.1 cm, severe right hip osteoarthritis further evaluation with right hip MRI has been recommended to further evaluate.  · 12/30/2022  was 54 prior was 43  · 2/17/2023 patient had MRI of the pelvis which showed enhancing sclerotic lesion within the anterior acetabulum on the right like representing metastatic disease.   Severe generative changes of the right hip joint was noted.  Gluteal tendinopathy on the right with no evidence of focal tear or significant bursitis.  · 3/27/2023 add on for abdominal pain, nausea and vomiting, fatigue    History of present illness was reviewed and is unchanged from the previous visit. 23        Past Medical History:   Diagnosis Date   • Arthritis    • Cancer     ovarian   • Depression    • Gall stones    • Hypertension    • Irritable bowel syndrome    • Rheumatoid aortitis    • Thyroid disease        Past Surgical History:   Procedure Laterality Date   •  SECTION      x2   • CHOLECYSTECTOMY N/A 11/10/2021    Procedure: diagnostic laparoscopy and peritoneal biopsy;  Surgeon: Krunal Badillo MD;  Location: [facility] MAIN OR;  Service: General;  Laterality: N/A;   • COLONOSCOPY     • HIP SURGERY Left    • JOINT REPLACEMENT     • THYROIDECTOMY, PARTIAL  2017   • TOTAL ABDOMINAL HYSTERECTOMY N/A 2022    Procedure: EXPLORATORY LAPAROTOMY, TOTAL ABDOMINAL HYSTERECTOMY, BILATERAL SALPINGOOOPHORECTOMY, PELVIC AND AORTIC LYMPHADENECTOMY, OMENTECTOMY;  Surgeon: Maggie Castro DO;  Location: [facility] MAIN OR;  Service: Gynecology Oncology;  Laterality: N/A;   • VENOUS ACCESS DEVICE (PORT) INSERTION Left 2021    Procedure: INSERTION VENOUS ACCESS DEVICE;  Surgeon: Krunal Badillo MD;  Location: [facility] MAIN OR;  Service: General;  Laterality: Left;         Current Outpatient Medications:   •  HYDROcodone-acetaminophen (Norco) 5-325 MG per tablet, Take 1 tablet by mouth Every 6 (Six) Hours As Needed for Moderate Pain., Disp: 60 tablet, Rfl: 0  •  amLODIPine (NORVASC) 10 MG tablet, Take 1 tablet by mouth Daily., Disp: 90 tablet, Rfl: 1  •  furosemide (Lasix) 20 MG tablet, Take 1 tablet by mouth Daily As Needed (swelling and weight gain of 3 lbs in 48 hours)., Disp: 40 tablet, Rfl: 1  •  gabapentin (NEURONTIN) 300 MG capsule, Take 1 capsule by mouth Every 8 (Eight) Hours., Disp: 90  capsule, Rfl: 3  •  hydroCHLOROthiazide (HYDRODIURIL) 25 MG tablet, Take 1 tablet by mouth Daily., Disp: 90 tablet, Rfl: 1  •  hydrOXYzine (ATARAX) 10 MG tablet, Take 1 tablet by mouth Every 8 (Eight) Hours As Needed for Anxiety., Disp: 45 tablet, Rfl: 2  •  letrozole (FEMARA) 2.5 MG tablet, TAKE ONE (1) TABLET BY MOUTH DAILY, Disp: 60 tablet, Rfl: 3  •  letrozole (FEMARA) 2.5 MG tablet, Take 1 tablet by mouth Daily., Disp: , Rfl:   •  levothyroxine (Synthroid) 112 MCG tablet, Take 1 tablet by mouth Daily., Disp: 90 tablet, Rfl: 0  •  lidocaine-prilocaine (EMLA) 2.5-2.5 % cream, APPLY TO THE AFFECTED AREA OF port prior TO access AS DIRECTED, Disp: 30 g, Rfl: 1  •  losartan (Cozaar) 25 MG tablet, Take 1 tablet by mouth Daily., Disp: 90 tablet, Rfl: 1  •  omeprazole (priLOSEC) 40 MG capsule, TAKE ONE (1) CAPSULE BY MOUTH EVERY DAY, Disp: 30 capsule, Rfl: 6  •  pantoprazole (PROTONIX) 40 MG EC tablet, TAKE ONE (1) TABLET BY MOUTH EVERY DAY (Patient not taking: Reported on 3/24/2023), Disp: 30 tablet, Rfl: 6  •  potassium chloride (KLOR-CON) 10 MEQ CR tablet, Take 1 tablet by mouth Daily As Needed (if takes lasix)., Disp: 40 tablet, Rfl: 1  •  promethazine (PHENERGAN) 25 MG tablet, Take 1 tablet by mouth Every 6 (Six) Hours As Needed for Nausea or Vomiting., Disp: 90 tablet, Rfl: 1  •  sertraline (Zoloft) 25 MG tablet, Take 1 tablet by mouth Daily., Disp: 90 tablet, Rfl: 1  No current facility-administered medications for this visit.    Facility-Administered Medications Ordered in Other Visits:   •  heparin injection 500 Units, 500 Units, Intravenous, PRN, Inga Hernandez MD  •  sodium chloride 0.9 % flush 20 mL, 20 mL, Intravenous, PRN, Inga Hernandez MD    No Known Allergies    Family History   Problem Relation Age of Onset   • Dementia Mother    • Arthritis Mother    • Heart disease Father    • Hypertension Father    • Malig Hyperthermia Neg Hx        Cancer-related family history is not on  file.    Social History     Tobacco Use   • Smoking status: Never   • Smokeless tobacco: Never   Vaping Use   • Vaping Use: Never used   Substance Use Topics   • Alcohol use: Yes     Alcohol/week: 1.0 standard drink     Types: 1 Glasses of wine per week     Comment: less than monthly   • Drug use: No     I have reviewed and confirmed the accuracy of the patient's history: Chief complaint, HPI, ROS and Subjective as entered by the MA/LPN/RN. Donna Engle, APRN 04/07/23       SUBJECTIVE:  Patient is here today for reevaluation after treatment for colitis.  She reports that all of her symptoms have improved.  She also reports that she has completed her radiation therapy to the hip.  She has no new concerns.          REVIEW OF SYSTEMS:    Review of Systems   Constitutional: Positive for fatigue. Negative for chills and fever.   HENT: Negative for ear pain, mouth sores, nosebleeds and sore throat.    Eyes: Negative for photophobia and visual disturbance.   Respiratory: Negative for wheezing and stridor.    Cardiovascular: Negative for chest pain and palpitations.   Gastrointestinal:Negative for diarrhea and vomiting.   Endocrine: Negative for cold intolerance and heat intolerance.   Genitourinary: Negative for dysuria and hematuria.   Musculoskeletal: Negative for joint swelling and neck stiffness.   Skin: Negative for color change and rash.   Neurological:  Negative for seizures and syncope.   Hematological: Negative for adenopathy.   Psychiatric/Behavioral: Negative for agitation, confusion and hallucinations.       OBJECTIVE:  97.8 °F, pulse 80 bpm, respirations 18, blood pressure 128/74, O2 saturation 98%  Height 65 inches, weight 174 pounds    There were no vitals filed for this visit.  There is no height or weight on file to calculate BMI.    ECOG    (1) Restricted in physically strenuous activity, ambulatory and able to do work of light nature    Physical Exam  Vitals and nursing note reviewed.    Constitutional:       General: She is not in acute distress.     Appearance: She is not diaphoretic.   HENT:      Head: Normocephalic and atraumatic.      Mouth/Throat:      Mouth: Mucous membranes are moist  Eyes:      General: No scleral icterus.        Right eye: No discharge.         Left eye: No discharge.      Conjunctiva/sclera: Conjunctivae normal.   Neck:      Thyroid: No thyromegaly.   Cardiovascular:      Rate and Rhythm: Normal rate and regular rhythm.      Heart sounds: Normal heart sounds.     No friction rub. No gallop.   Pulmonary:      Effort: Pulmonary effort is normal. No respiratory distress.      Breath sounds: No stridor. No wheezing.   Abdominal:      General: Bowel sounds are normal.      Palpations: Abdomen is soft. There is no mass.      Tenderness: There is no abdominal tenderness. There is no guarding or rebound.      Comments:   Musculoskeletal:         General: No tenderness. Normal range of motion.      Cervical back: Normal range of motion and neck supple.   Lymphadenopathy:      Cervical: No cervical adenopathy.   Skin:     General: Skin is warm.      Findings: No erythema or rash.      Comments:   Neurological:      Mental Status: She is alert and oriented to person, place, and time.      Motor: No abnormal muscle tone.   Psychiatric:         Behavior: Behavior normal.     I have reexamined the patient and the results are consistent with the previously documented exam. Donna Engle, [place]       RECENT LABS    WBC   Date Value Ref Range Status   04/07/2023 5.54 3.40 - 10.80 10*3/mm3 Final     RBC   Date Value Ref Range Status   04/07/2023 4.16 3.77 - 5.28 10*6/mm3 Final     Hemoglobin   Date Value Ref Range Status   04/07/2023 13.1 12.0 - 15.9 g/dL Final     Hematocrit   Date Value Ref Range Status   04/07/2023 40.3 34.0 - 46.6 % Final     MCV   Date Value Ref Range Status   04/07/2023 96.9 79.0 - 97.0 fL Final     MCH   Date Value Ref Range Status   04/07/2023 31.5 26.6 -  33.0 pg Final     MCHC   Date Value Ref Range Status   04/07/2023 32.5 31.5 - 35.7 g/dL Final     RDW   Date Value Ref Range Status   04/07/2023 14.0 12.3 - 15.4 % Final     RDW-SD   Date Value Ref Range Status   04/07/2023 48.2 37.0 - 54.0 fl Final     MPV   Date Value Ref Range Status   04/07/2023 9.2 6.0 - 12.0 fL Final     Platelets   Date Value Ref Range Status   04/07/2023 130 (L) 140 - 450 10*3/mm3 Final     Neutrophil %   Date Value Ref Range Status   04/07/2023 69.5 42.7 - 76.0 % Final     Lymphocyte %   Date Value Ref Range Status   04/07/2023 17.9 (L) 19.6 - 45.3 % Final     Monocyte %   Date Value Ref Range Status   04/07/2023 10.5 5.0 - 12.0 % Final     Eosinophil %   Date Value Ref Range Status   04/07/2023 1.6 0.3 - 6.2 % Final     Basophil %   Date Value Ref Range Status   04/07/2023 0.5 0.0 - 1.5 % Final     Immature Grans %   Date Value Ref Range Status   04/29/2022 0.4 0.0 - 0.5 % Final     Neutrophils, Absolute   Date Value Ref Range Status   04/07/2023 3.85 1.70 - 7.00 10*3/mm3 Final     Lymphocytes, Absolute   Date Value Ref Range Status   04/07/2023 0.99 0.70 - 3.10 10*3/mm3 Final     Monocytes, Absolute   Date Value Ref Range Status   04/07/2023 0.58 0.10 - 0.90 10*3/mm3 Final     Eosinophils, Absolute   Date Value Ref Range Status   04/07/2023 0.09 0.00 - 0.40 10*3/mm3 Final     Basophils, Absolute   Date Value Ref Range Status   04/07/2023 0.03 0.00 - 0.20 10*3/mm3 Final     Immature Grans, Absolute   Date Value Ref Range Status   04/29/2022 0.02 0.00 - 0.05 10*3/mm3 Final     nRBC   Date Value Ref Range Status   04/29/2022 0.0 0.0 - 0.2 /100 WBC Final       Lab Results   Component Value Date    GLUCOSE 100 (H) 03/27/2023    BUN 24 (H) 03/27/2023    CREATININE 1.08 (H) 03/27/2023    EGFRIFNONA 47 (L) 02/21/2022    EGFRIFAFRI 44 (L) 01/21/2020    BCR 22.2 03/27/2023    K 3.8 03/27/2023    CO2 21.0 (L) 03/27/2023    CALCIUM 9.4 03/27/2023    ALBUMIN 3.4 (L) 03/27/2023    LABIL2 1.2 (calc)  01/21/2020    AST 19 03/27/2023    ALT 10 03/27/2023         ASSESSMENT:      1. 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  Ongoing management  2. Status post neoadjuvant chemotherapy with carboplatinum AUC 6, Taxol 175 mg per metered squared, with Avastin added due to lack of significant response with carboplatinum and Taxol.  Monitor serial CA-125's.  Down to 135 as of 3/14/2022.  Her CT scan showed response therefore patient will proceed with radical hysterectomy to perform by Dr. Dubois on 4/26/2022.  Plans for additional chemotherapy along with Avastin and possibly maintenance treatment with PARP inhibitors in the future.  She is also on letrozole 2.5 mg p.o. daily.  Patient is currently on Avastin maintenance along with letrozole.  Reviewed her recent CT scans which show evidence of sclerotic densities noted on the right hip may be related to surgery.  MRI of the right hip was recommended.  Reviewed the MRI completed on 2/17/2023 the area is suspicious for metastatic disease.  Also patient has had rising 's.  Refer to Dr. Tapia to see if she would be a candidate for SBRT.  We will also have patient be seen by Dr. Toni Hedrick given the severe DJD she has in that area to see if any surgical options.  In the meantime we will continue Avastin and letrozole.  Ongoing management  3. Right hip being almost completely resolved  4. Status post radical hysterectomy.  See path report.  Caris testing pending  5. Mucositis: This has resolved  6. Elevated blood pressure: This has improved.  Patient will continue to follow-up with her PCP  7. Pulmonary nodules of unclear etiology too small for biopsy or PET resolution: Continue surveillance CT scans.  Also check 's  8. 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  Patient already had some 25% dose reduction on her chemo.  She is currently on Neurontin 300 mg daily will increase to 300 mg twice a day.  Peripheral neuropathy  continues to be an issue.  We will hold Taxol for now till this has improved  9. Comprehensive gene analysis with cancer next technology was negative for any significant mutation in all 77 genes analyzed  10. 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  No other actionable mutations identified     Plans:     · Continue Avastin  · Follow-up with Dr. Toni Hedrick Ortho oncology  · Follow up with Dr. Tapia to evaluate for candidacy for SRS  · Continue letrozole  · Continue Neurontin to 300 mg every 8 hours for peripheral neuropathy.  We will refill today that this is helping her neuropathy  · Reviewed CT scans of the chest abdomen and pelvis for cancer restaging.  Completed January 2023  · She will benefit from olaparib in the future  · Monitor 's, slight increase trend  · Elisa's Magic mouthwash for mucositis as needed  · Can resume full-time work, notes given  · Reviewed her path report from radical hysterectomy 4/20/2022  · Comprehensive gene analysis with cancer next technology results reviewed with patient and copies of her results was also given to her for her own records keeping  · Continue Percocet 5 mg p.o. every 4-6 hours as needed for pain  · Continue vitamin b6 100mg po q 12   · Foundation 1 testing results reviewed  · All questions answered  · Follow-up up end of April and at that time discussed when her next scans will be    4/7/2023 follow-up for: Colitis/gastritis, abdominal pain and vomiting    Patient has completed course of oral Flagyl and prednisone.  She is on PPI.  She has no complaints today and is at her normal baseline.  Pain medication refilled today.  Okay to proceed with Avastin.    I spent 30 mintutes in physical exam, assessment of diarrhea, assessment of abdominal pain, assessment of nausea and vomiting, review of history, review of medications, discussion of need for CT  abdomen, ordering and documentation.

## 2023-04-04 NOTE — PATIENT INSTRUCTIONS
RADIATION THERAPY DISCHARGE INSTRUCTIONS  General    CONGRATULATIONS! You completed 5 radiation treatments for treatment of your metastatic ovarian cancer. These discharge instructions are important for you to follow until your one-month follow up appointment with Dr. Tapia. Please make sure to review these instructions and call the Radiation Oncology Department if you have any questions or concerns with symptoms you may experience. Thank you for trusting us with your cancer treatment!    DIET  Eat a regular, well balanced diet that is high in protein such as meat, eggs, cheese, and nut butters  Drink 48 to 64 ounces of fluid daily  Use nutritional supplements if you are not able to eat full meals  Monitor your weight and report continued weight loss to your doctor    MEDICATIONS  Use Tylenol as needed to decrease discomfort and irritation to treatment area  Take pain medications only as prescribed  Take a laxative or stool softener as needed to prevent constipation due to pain medications    SKIN CARE  Wash treated skin gently with your hands using a mild, non-drying soap such as Dove® or Aveeno® until skin returns to normal  Pat skin dry - do not rub  Keep treated skin moist with frequent applications of Aquaphor® or unscented lotion (such as Eucerin)®  Always protect your treated skin when outdoors by wearing protective clothing and applying sunscreen SPF 15 or higher at least 30 minutes before going outdoors and reapply frequently    ACTIVITY  Fatigue is a normal side effect of radiation therapy and should improve over time  Alternate rest and activity  Exercise such as walking may help to improve your fatigue    FOLLOW-UP  Continue follow-up appointments with all other doctors as necessary  Call your radiation oncology doctor if you are concerned with any side effects you are experiencing: [phone]    _______________________________________________________________________    Completed by: Imelda Burgos  MA on 4/4/2023 at 08:01 EDT

## 2023-04-06 LAB
RAD ONC ARIA COURSE END DATE: NORMAL
RAD ONC ARIA COURSE ID: NORMAL
RAD ONC ARIA COURSE LAST TREATMENT DATE: NORMAL
RAD ONC ARIA COURSE START DATE: NORMAL
RAD ONC ARIA COURSE TREATMENT ELAPSED DAYS: 6
RAD ONC ARIA FIRST TREATMENT DATE: NORMAL
RAD ONC ARIA PLAN FRACTIONS TREATED TO DATE: 5
RAD ONC ARIA PLAN ID: NORMAL
RAD ONC ARIA PLAN NAME: NORMAL
RAD ONC ARIA PLAN PRESCRIBED DOSE PER FRACTION: 6 GY
RAD ONC ARIA PLAN PRIMARY REFERENCE POINT: NORMAL
RAD ONC ARIA PLAN TOTAL FRACTIONS PRESCRIBED: 5
RAD ONC ARIA PLAN TOTAL PRESCRIBED DOSE: 3000 CGY
RAD ONC ARIA REFERENCE POINT DOSAGE GIVEN TO DATE: 30 GY
RAD ONC ARIA REFERENCE POINT ID: NORMAL

## 2023-04-07 ENCOUNTER — OFFICE VISIT (OUTPATIENT)
Dept: ONCOLOGY | Facility: CLINIC | Age: 63
End: 2023-04-07
Payer: COMMERCIAL

## 2023-04-07 ENCOUNTER — APPOINTMENT (OUTPATIENT)
Dept: LAB | Facility: HOSPITAL | Age: 63
End: 2023-04-07
Payer: COMMERCIAL

## 2023-04-07 ENCOUNTER — HOSPITAL ENCOUNTER (OUTPATIENT)
Dept: ONCOLOGY | Facility: HOSPITAL | Age: 63
Discharge: HOME | End: 2023-04-07
Payer: COMMERCIAL

## 2023-04-07 VITALS
HEIGHT: 65 IN | OXYGEN SATURATION: 98 % | DIASTOLIC BLOOD PRESSURE: 74 MMHG | BODY MASS INDEX: 28.99 KG/M2 | RESPIRATION RATE: 18 BRPM | WEIGHT: 174 LBS | SYSTOLIC BLOOD PRESSURE: 128 MMHG | TEMPERATURE: 97.8 F | HEART RATE: 80 BPM

## 2023-04-07 DIAGNOSIS — C56.9 MALIGNANT NEOPLASM OF OVARY, UNSPECIFIED LATERALITY: ICD-10-CM

## 2023-04-07 DIAGNOSIS — E83.51 HYPOCALCEMIA: ICD-10-CM

## 2023-04-07 DIAGNOSIS — Z45.2 ENCOUNTER FOR CARE RELATED TO PORT-A-CATH: ICD-10-CM

## 2023-04-07 DIAGNOSIS — C56.9 MALIGNANT NEOPLASM OF OVARY, UNSPECIFIED LATERALITY: Primary | ICD-10-CM

## 2023-04-07 DIAGNOSIS — R10.30 LOWER ABDOMINAL PAIN: Primary | ICD-10-CM

## 2023-04-07 LAB
ALBUMIN SERPL-MCNC: 3.3 G/DL (ref 3.5–5.2)
ALBUMIN/GLOB SERPL: 1.1 G/DL
ALP SERPL-CCNC: 67 U/L (ref 39–117)
ALT SERPL W P-5'-P-CCNC: 12 U/L (ref 1–33)
ANION GAP SERPL CALCULATED.3IONS-SCNC: 9 MMOL/L (ref 5–15)
AST SERPL-CCNC: 15 U/L (ref 1–32)
BASOPHILS # BLD AUTO: 0.03 10*3/MM3 (ref 0–0.2)
BASOPHILS NFR BLD AUTO: 0.5 % (ref 0–1.5)
BILIRUB SERPL-MCNC: 0.3 MG/DL (ref 0–1.2)
BILIRUB UR QL STRIP: NEGATIVE
BUN SERPL-MCNC: 17 MG/DL (ref 8–23)
BUN/CREAT SERPL: 17.5 (ref 7–25)
CALCIUM SPEC-SCNC: 8.7 MG/DL (ref 8.6–10.5)
CANCER AG125 SERPL QL: 231 U/ML (ref 0–38.1)
CHLORIDE SERPL-SCNC: 101 MMOL/L (ref 98–107)
CLARITY UR: CLEAR
CO2 SERPL-SCNC: 27 MMOL/L (ref 22–29)
COLOR UR: YELLOW
CREAT SERPL-MCNC: 0.97 MG/DL (ref 0.57–1)
DEPRECATED RDW RBC AUTO: 48.2 FL (ref 37–54)
EGFRCR SERPLBLD CKD-EPI 2021: 65.4 ML/MIN/1.73
EOSINOPHIL # BLD AUTO: 0.09 10*3/MM3 (ref 0–0.4)
EOSINOPHIL NFR BLD AUTO: 1.6 % (ref 0.3–6.2)
ERYTHROCYTE [DISTWIDTH] IN BLOOD BY AUTOMATED COUNT: 14 % (ref 12.3–15.4)
GLOBULIN UR ELPH-MCNC: 3 GM/DL
GLUCOSE SERPL-MCNC: 127 MG/DL (ref 65–99)
GLUCOSE UR STRIP-MCNC: NEGATIVE MG/DL
HCT VFR BLD AUTO: 40.3 % (ref 34–46.6)
HGB BLD-MCNC: 13.1 G/DL (ref 12–15.9)
HGB UR QL STRIP.AUTO: NEGATIVE
KETONES UR QL STRIP: NEGATIVE
LEUKOCYTE ESTERASE UR QL STRIP.AUTO: NEGATIVE
LYMPHOCYTES # BLD AUTO: 0.99 10*3/MM3 (ref 0.7–3.1)
LYMPHOCYTES NFR BLD AUTO: 17.9 % (ref 19.6–45.3)
MCH RBC QN AUTO: 31.5 PG (ref 26.6–33)
MCHC RBC AUTO-ENTMCNC: 32.5 G/DL (ref 31.5–35.7)
MCV RBC AUTO: 96.9 FL (ref 79–97)
MONOCYTES # BLD AUTO: 0.58 10*3/MM3 (ref 0.1–0.9)
MONOCYTES NFR BLD AUTO: 10.5 % (ref 5–12)
NEUTROPHILS NFR BLD AUTO: 3.85 10*3/MM3 (ref 1.7–7)
NEUTROPHILS NFR BLD AUTO: 69.5 % (ref 42.7–76)
NITRITE UR QL STRIP: NEGATIVE
PH UR STRIP.AUTO: 5.5 [PH] (ref 5–8)
PLATELET # BLD AUTO: 130 10*3/MM3 (ref 140–450)
PMV BLD AUTO: 9.2 FL (ref 6–12)
POTASSIUM SERPL-SCNC: 3.2 MMOL/L (ref 3.5–5.2)
PROT SERPL-MCNC: 6.3 G/DL (ref 6–8.5)
PROT UR QL STRIP: ABNORMAL
RBC # BLD AUTO: 4.16 10*6/MM3 (ref 3.77–5.28)
SODIUM SERPL-SCNC: 137 MMOL/L (ref 136–145)
SP GR UR STRIP: 1.02 (ref 1–1.03)
UROBILINOGEN UR QL STRIP: ABNORMAL
WBC NRBC COR # BLD: 5.54 10*3/MM3 (ref 3.4–10.8)

## 2023-04-07 PROCEDURE — 81003 URINALYSIS AUTO W/O SCOPE: CPT | Performed by: INTERNAL MEDICINE

## 2023-04-07 PROCEDURE — 25010000002 BEVACIZUMAB-BVZR 400 MG/16ML SOLUTION 16 ML VIAL: Performed by: INTERNAL MEDICINE

## 2023-04-07 PROCEDURE — 25010000002 HEPARIN LOCK FLUSH PER 10 UNITS: Performed by: INTERNAL MEDICINE

## 2023-04-07 PROCEDURE — 86304 IMMUNOASSAY TUMOR CA 125: CPT | Performed by: INTERNAL MEDICINE

## 2023-04-07 PROCEDURE — 80053 COMPREHEN METABOLIC PANEL: CPT | Performed by: INTERNAL MEDICINE

## 2023-04-07 PROCEDURE — 85025 COMPLETE CBC W/AUTO DIFF WBC: CPT | Performed by: INTERNAL MEDICINE

## 2023-04-07 PROCEDURE — 96413 CHEMO IV INFUSION 1 HR: CPT

## 2023-04-07 PROCEDURE — 99214 OFFICE O/P EST MOD 30 MIN: CPT | Performed by: NURSE PRACTITIONER

## 2023-04-07 RX ORDER — HEPARIN SODIUM (PORCINE) LOCK FLUSH IV SOLN 100 UNIT/ML 100 UNIT/ML
500 SOLUTION INTRAVENOUS AS NEEDED
Status: DISCONTINUED | OUTPATIENT
Start: 2023-04-07 | End: 2023-04-08 | Stop reason: HOSPADM

## 2023-04-07 RX ORDER — SODIUM CHLORIDE 0.9 % (FLUSH) 0.9 %
20 SYRINGE (ML) INJECTION AS NEEDED
Status: CANCELLED | OUTPATIENT
Start: 2023-04-07

## 2023-04-07 RX ORDER — SODIUM CHLORIDE 9 MG/ML
250 INJECTION, SOLUTION INTRAVENOUS ONCE
Status: CANCELLED | OUTPATIENT
Start: 2023-04-07

## 2023-04-07 RX ORDER — SODIUM CHLORIDE 0.9 % (FLUSH) 0.9 %
20 SYRINGE (ML) INJECTION AS NEEDED
Status: DISCONTINUED | OUTPATIENT
Start: 2023-04-07 | End: 2023-04-08 | Stop reason: HOSPADM

## 2023-04-07 RX ORDER — HEPARIN SODIUM (PORCINE) LOCK FLUSH IV SOLN 100 UNIT/ML 100 UNIT/ML
500 SOLUTION INTRAVENOUS AS NEEDED
Status: CANCELLED | OUTPATIENT
Start: 2023-04-07

## 2023-04-07 RX ORDER — HYDROCODONE BITARTRATE AND ACETAMINOPHEN 5; 325 MG/1; MG/1
1 TABLET ORAL EVERY 6 HOURS PRN
Qty: 60 TABLET | Refills: 0 | Status: SHIPPED | OUTPATIENT
Start: 2023-04-07

## 2023-04-07 RX ORDER — SODIUM CHLORIDE 9 MG/ML
250 INJECTION, SOLUTION INTRAVENOUS ONCE
Status: COMPLETED | OUTPATIENT
Start: 2023-04-07 | End: 2023-04-07

## 2023-04-07 RX ADMIN — Medication 20 ML: at 12:01

## 2023-04-07 RX ADMIN — HEPARIN 500 UNITS: 100 SYRINGE at 12:02

## 2023-04-07 RX ADMIN — SODIUM CHLORIDE 790 MG: 9 INJECTION, SOLUTION INTRAVENOUS at 11:24

## 2023-04-07 RX ADMIN — SODIUM CHLORIDE 250 ML: 9 INJECTION, SOLUTION INTRAVENOUS at 11:08

## 2023-04-07 NOTE — PROGRESS NOTES
Patient here for C22D1 Zirabev. Port accessed and flushed with good blood return noted. 10cc of blood wasted prior to specimen collection. Blood specimen obtained and sent to lab for processing per protocol.  Port flushed with saline and heparin prior to needle removal. Patient stated that here sever abdominal pain is gone-she now only occasionally has an aching feeling and that her diarrhea has subsided-for stools are soft and formed. She stated the cramping is gone. She stated that she is having to eat smaller meals-that she gets a full feeling after eating a little. She also stated her back started hurting off and off after radiation and when she mentioned it to them they stated it was probably from the table. Kalpesh Engle NP at bedside to assess patient prior to treatment-she started she is good to get treatment today as long as her labs are within parameter-labs are all within parameters. Pt tolerated treatment and no complaints at discharge. AVS given.

## 2023-04-10 RX ORDER — POTASSIUM CHLORIDE 20 MEQ/1
40 TABLET, EXTENDED RELEASE ORAL ONCE
Qty: 2 TABLET | Refills: 0 | Status: SHIPPED | OUTPATIENT
Start: 2023-04-10 | End: 2023-04-10

## 2023-04-10 RX ORDER — OMEPRAZOLE 40 MG/1
CAPSULE, DELAYED RELEASE ORAL
Qty: 30 CAPSULE | Refills: 6 | Status: SHIPPED | OUTPATIENT
Start: 2023-04-10

## 2023-04-11 ENCOUNTER — TELEPHONE (OUTPATIENT)
Dept: GYNECOLOGIC ONCOLOGY | Facility: CLINIC | Age: 63
End: 2023-04-11
Payer: COMMERCIAL

## 2023-04-11 NOTE — TELEPHONE ENCOUNTER
Called patient to discuss refill request for letrizole. Let patient know that in order to refill medicine she needed to be seen in office first because it has been almost a year since last appointment. Patient stated that she was seeing Dr. Hernandez next week and would ask their office about medicine and call our office back.

## 2023-04-21 ENCOUNTER — HOSPITAL ENCOUNTER (OUTPATIENT)
Dept: ONCOLOGY | Facility: HOSPITAL | Age: 63
Discharge: HOME | End: 2023-04-21
Payer: COMMERCIAL

## 2023-04-21 ENCOUNTER — HOSPITAL ENCOUNTER (OUTPATIENT)
Dept: CARDIOLOGY | Facility: HOSPITAL | Age: 63
Discharge: HOME | End: 2023-04-21
Payer: COMMERCIAL

## 2023-04-21 ENCOUNTER — APPOINTMENT (OUTPATIENT)
Dept: LAB | Facility: HOSPITAL | Age: 63
End: 2023-04-21
Payer: COMMERCIAL

## 2023-04-21 ENCOUNTER — OFFICE VISIT (OUTPATIENT)
Dept: ONCOLOGY | Facility: CLINIC | Age: 63
End: 2023-04-21
Payer: COMMERCIAL

## 2023-04-21 VITALS
TEMPERATURE: 97.6 F | BODY MASS INDEX: 30.49 KG/M2 | HEART RATE: 71 BPM | HEIGHT: 65 IN | WEIGHT: 183 LBS | SYSTOLIC BLOOD PRESSURE: 139 MMHG | DIASTOLIC BLOOD PRESSURE: 76 MMHG | RESPIRATION RATE: 18 BRPM

## 2023-04-21 VITALS
TEMPERATURE: 97.6 F | HEIGHT: 65 IN | WEIGHT: 183.7 LBS | SYSTOLIC BLOOD PRESSURE: 139 MMHG | BODY MASS INDEX: 30.6 KG/M2 | HEART RATE: 71 BPM | DIASTOLIC BLOOD PRESSURE: 76 MMHG

## 2023-04-21 DIAGNOSIS — M25.472 ANKLE SWELLING, LEFT: ICD-10-CM

## 2023-04-21 DIAGNOSIS — E83.51 HYPOCALCEMIA: ICD-10-CM

## 2023-04-21 DIAGNOSIS — C56.9 MALIGNANT NEOPLASM OF OVARY, UNSPECIFIED LATERALITY: Primary | ICD-10-CM

## 2023-04-21 DIAGNOSIS — M25.471 ANKLE SWELLING, RIGHT: ICD-10-CM

## 2023-04-21 DIAGNOSIS — Z45.2 ENCOUNTER FOR CARE RELATED TO PORT-A-CATH: ICD-10-CM

## 2023-04-21 DIAGNOSIS — M25.472 ANKLE SWELLING, LEFT: Primary | ICD-10-CM

## 2023-04-21 LAB
ALBUMIN SERPL-MCNC: 3.4 G/DL (ref 3.5–5.2)
ALBUMIN/GLOB SERPL: 1 G/DL
ALP SERPL-CCNC: 83 U/L (ref 39–117)
ALT SERPL W P-5'-P-CCNC: 9 U/L (ref 1–33)
ANION GAP SERPL CALCULATED.3IONS-SCNC: 11 MMOL/L (ref 5–15)
AST SERPL-CCNC: 20 U/L (ref 1–32)
BASOPHILS # BLD AUTO: 0.02 10*3/MM3 (ref 0–0.2)
BASOPHILS NFR BLD AUTO: 0.5 % (ref 0–1.5)
BH CV LOWER VASCULAR LEFT COMMON FEMORAL AUGMENT: NORMAL
BH CV LOWER VASCULAR LEFT COMMON FEMORAL COMPETENT: NORMAL
BH CV LOWER VASCULAR LEFT COMMON FEMORAL COMPRESS: NORMAL
BH CV LOWER VASCULAR LEFT COMMON FEMORAL PHASIC: NORMAL
BH CV LOWER VASCULAR LEFT COMMON FEMORAL SPONT: NORMAL
BH CV LOWER VASCULAR LEFT DISTAL FEMORAL COMPRESS: NORMAL
BH CV LOWER VASCULAR LEFT GASTRONEMIUS COMPRESS: NORMAL
BH CV LOWER VASCULAR LEFT GREATER SAPH AK COMPRESS: NORMAL
BH CV LOWER VASCULAR LEFT GREATER SAPH BK COMPRESS: NORMAL
BH CV LOWER VASCULAR LEFT LESSER SAPH COMPRESS: NORMAL
BH CV LOWER VASCULAR LEFT MID FEMORAL AUGMENT: NORMAL
BH CV LOWER VASCULAR LEFT MID FEMORAL COMPETENT: NORMAL
BH CV LOWER VASCULAR LEFT MID FEMORAL COMPRESS: NORMAL
BH CV LOWER VASCULAR LEFT MID FEMORAL PHASIC: NORMAL
BH CV LOWER VASCULAR LEFT MID FEMORAL SPONT: NORMAL
BH CV LOWER VASCULAR LEFT PERONEAL COMPRESS: NORMAL
BH CV LOWER VASCULAR LEFT POPLITEAL AUGMENT: NORMAL
BH CV LOWER VASCULAR LEFT POPLITEAL COMPETENT: NORMAL
BH CV LOWER VASCULAR LEFT POPLITEAL COMPRESS: NORMAL
BH CV LOWER VASCULAR LEFT POPLITEAL PHASIC: NORMAL
BH CV LOWER VASCULAR LEFT POPLITEAL SPONT: NORMAL
BH CV LOWER VASCULAR LEFT POSTERIOR TIBIAL COMPRESS: NORMAL
BH CV LOWER VASCULAR LEFT PROXIMAL FEMORAL COMPRESS: NORMAL
BH CV LOWER VASCULAR LEFT SAPHENOFEMORAL JUNCTION COMPRESS: NORMAL
BH CV LOWER VASCULAR RIGHT COMMON FEMORAL AUGMENT: NORMAL
BH CV LOWER VASCULAR RIGHT COMMON FEMORAL COMPETENT: NORMAL
BH CV LOWER VASCULAR RIGHT COMMON FEMORAL COMPRESS: NORMAL
BH CV LOWER VASCULAR RIGHT COMMON FEMORAL PHASIC: NORMAL
BH CV LOWER VASCULAR RIGHT COMMON FEMORAL SPONT: NORMAL
BH CV LOWER VASCULAR RIGHT DISTAL FEMORAL COMPRESS: NORMAL
BH CV LOWER VASCULAR RIGHT GASTRONEMIUS COMPRESS: NORMAL
BH CV LOWER VASCULAR RIGHT GREATER SAPH AK COMPRESS: NORMAL
BH CV LOWER VASCULAR RIGHT GREATER SAPH BK COMPRESS: NORMAL
BH CV LOWER VASCULAR RIGHT LESSER SAPH COMPRESS: NORMAL
BH CV LOWER VASCULAR RIGHT MID FEMORAL AUGMENT: NORMAL
BH CV LOWER VASCULAR RIGHT MID FEMORAL COMPETENT: NORMAL
BH CV LOWER VASCULAR RIGHT MID FEMORAL COMPRESS: NORMAL
BH CV LOWER VASCULAR RIGHT MID FEMORAL PHASIC: NORMAL
BH CV LOWER VASCULAR RIGHT MID FEMORAL SPONT: NORMAL
BH CV LOWER VASCULAR RIGHT PERONEAL COMPRESS: NORMAL
BH CV LOWER VASCULAR RIGHT POPLITEAL AUGMENT: NORMAL
BH CV LOWER VASCULAR RIGHT POPLITEAL COMPETENT: NORMAL
BH CV LOWER VASCULAR RIGHT POPLITEAL COMPRESS: NORMAL
BH CV LOWER VASCULAR RIGHT POPLITEAL PHASIC: NORMAL
BH CV LOWER VASCULAR RIGHT POPLITEAL SPONT: NORMAL
BH CV LOWER VASCULAR RIGHT POSTERIOR TIBIAL COMPRESS: NORMAL
BH CV LOWER VASCULAR RIGHT PROXIMAL FEMORAL COMPRESS: NORMAL
BH CV LOWER VASCULAR RIGHT SAPHENOFEMORAL JUNCTION COMPRESS: NORMAL
BH CV VAS PRELIMINARY FINDINGS SCRIPTING: 1
BILIRUB SERPL-MCNC: 0.4 MG/DL (ref 0–1.2)
BILIRUB UR QL STRIP: NEGATIVE
BUN SERPL-MCNC: 21 MG/DL (ref 8–23)
BUN/CREAT SERPL: 17.1 (ref 7–25)
CALCIUM SPEC-SCNC: 9.2 MG/DL (ref 8.6–10.5)
CANCER AG125 SERPL QL: 226 U/ML (ref 0–38.1)
CHLORIDE SERPL-SCNC: 103 MMOL/L (ref 98–107)
CLARITY UR: CLEAR
CO2 SERPL-SCNC: 25 MMOL/L (ref 22–29)
COLOR UR: YELLOW
CREAT SERPL-MCNC: 1.23 MG/DL (ref 0.57–1)
DEPRECATED RDW RBC AUTO: 52.4 FL (ref 37–54)
EGFRCR SERPLBLD CKD-EPI 2021: 49.2 ML/MIN/1.73
EOSINOPHIL # BLD AUTO: 0.12 10*3/MM3 (ref 0–0.4)
EOSINOPHIL NFR BLD AUTO: 2.8 % (ref 0.3–6.2)
ERYTHROCYTE [DISTWIDTH] IN BLOOD BY AUTOMATED COUNT: 14.6 % (ref 12.3–15.4)
GLOBULIN UR ELPH-MCNC: 3.3 GM/DL
GLUCOSE SERPL-MCNC: 101 MG/DL (ref 65–99)
GLUCOSE UR STRIP-MCNC: NEGATIVE MG/DL
HCT VFR BLD AUTO: 39.6 % (ref 34–46.6)
HGB BLD-MCNC: 12.9 G/DL (ref 12–15.9)
HGB UR QL STRIP.AUTO: NEGATIVE
KETONES UR QL STRIP: NEGATIVE
LEUKOCYTE ESTERASE UR QL STRIP.AUTO: NEGATIVE
LYMPHOCYTES # BLD AUTO: 1.14 10*3/MM3 (ref 0.7–3.1)
LYMPHOCYTES NFR BLD AUTO: 26.3 % (ref 19.6–45.3)
MAXIMAL PREDICTED HEART RATE: 156 BPM
MCH RBC QN AUTO: 32.3 PG (ref 26.6–33)
MCHC RBC AUTO-ENTMCNC: 32.6 G/DL (ref 31.5–35.7)
MCV RBC AUTO: 99.2 FL (ref 79–97)
MONOCYTES # BLD AUTO: 0.6 10*3/MM3 (ref 0.1–0.9)
MONOCYTES NFR BLD AUTO: 13.9 % (ref 5–12)
NEUTROPHILS NFR BLD AUTO: 2.45 10*3/MM3 (ref 1.7–7)
NEUTROPHILS NFR BLD AUTO: 56.5 % (ref 42.7–76)
NITRITE UR QL STRIP: NEGATIVE
PH UR STRIP.AUTO: <=5 [PH] (ref 5–8)
PLATELET # BLD AUTO: 170 10*3/MM3 (ref 140–450)
PMV BLD AUTO: 9.2 FL (ref 6–12)
POTASSIUM SERPL-SCNC: 3.7 MMOL/L (ref 3.5–5.2)
PROT SERPL-MCNC: 6.7 G/DL (ref 6–8.5)
PROT UR QL STRIP: NEGATIVE
RBC # BLD AUTO: 3.99 10*6/MM3 (ref 3.77–5.28)
SODIUM SERPL-SCNC: 139 MMOL/L (ref 136–145)
SP GR UR STRIP: 1.01 (ref 1–1.03)
STRESS TARGET HR: 133 BPM
UROBILINOGEN UR QL STRIP: NORMAL
WBC NRBC COR # BLD: 4.33 10*3/MM3 (ref 3.4–10.8)

## 2023-04-21 PROCEDURE — 86304 IMMUNOASSAY TUMOR CA 125: CPT | Performed by: INTERNAL MEDICINE

## 2023-04-21 PROCEDURE — 25010000002 HEPARIN LOCK FLUSH PER 10 UNITS: Performed by: INTERNAL MEDICINE

## 2023-04-21 PROCEDURE — 93970 EXTREMITY STUDY: CPT

## 2023-04-21 PROCEDURE — 36591 DRAW BLOOD OFF VENOUS DEVICE: CPT

## 2023-04-21 PROCEDURE — 80053 COMPREHEN METABOLIC PANEL: CPT | Performed by: INTERNAL MEDICINE

## 2023-04-21 PROCEDURE — 81003 URINALYSIS AUTO W/O SCOPE: CPT | Performed by: INTERNAL MEDICINE

## 2023-04-21 PROCEDURE — 85025 COMPLETE CBC W/AUTO DIFF WBC: CPT | Performed by: INTERNAL MEDICINE

## 2023-04-21 RX ORDER — SODIUM CHLORIDE 0.9 % (FLUSH) 0.9 %
20 SYRINGE (ML) INJECTION AS NEEDED
Status: CANCELLED | OUTPATIENT
Start: 2023-04-21

## 2023-04-21 RX ORDER — LEVOTHYROXINE SODIUM 137 UG/1
TABLET ORAL
COMMUNITY
Start: 2023-04-20

## 2023-04-21 RX ORDER — SODIUM CHLORIDE 0.9 % (FLUSH) 0.9 %
20 SYRINGE (ML) INJECTION AS NEEDED
Status: DISCONTINUED | OUTPATIENT
Start: 2023-04-21 | End: 2023-04-22 | Stop reason: HOSPADM

## 2023-04-21 RX ORDER — HEPARIN SODIUM (PORCINE) LOCK FLUSH IV SOLN 100 UNIT/ML 100 UNIT/ML
500 SOLUTION INTRAVENOUS AS NEEDED
Status: CANCELLED | OUTPATIENT
Start: 2023-04-21

## 2023-04-21 RX ORDER — HEPARIN SODIUM (PORCINE) LOCK FLUSH IV SOLN 100 UNIT/ML 100 UNIT/ML
500 SOLUTION INTRAVENOUS AS NEEDED
Status: DISCONTINUED | OUTPATIENT
Start: 2023-04-21 | End: 2023-04-22 | Stop reason: HOSPADM

## 2023-04-21 RX ADMIN — Medication 20 ML: at 12:25

## 2023-04-21 RX ADMIN — HEPARIN 500 UNITS: 100 SYRINGE at 12:25

## 2023-04-21 NOTE — PROGRESS NOTES
Hematology/Oncology Outpatient Follow Up    PATIENT NAME:Lucy Mahan  :1960  MRN: [id number]  PRIMARY CARE PHYSICIAN: [place] Avalos APRN  REFERRING PHYSICIAN: No ref. provider found    Chief Complaint   Patient presents with   • Follow-up     Malignant neoplasm of ovary, unspecified laterality        HISTORY OF PRESENT ILLNESS:     This is a 62 y.o.  female who developed abdominal pain in 2021.  Patient has also lost approximately 35 pounds during that..  She has had loss of appetite.  Due to the abdominal pain,  she had an ultrasound of the abdomen done on 10/13/2021.  This basically revealed no liver lesions.  Common bile duct is normal at 3 mm.  There is a nonmobile 3 cm shadowing gallstone within the gallbladder neck.  No gallbladder thickening or pericholecystic fluid collection.  Patient was then referred to general surgery and was seen by Dr. Badillo.  Who took her to surgery on 11/10/2021 for diagnostic laparoscopy and peritoneal biopsy.  Intra-Op , she was noted to have peritoneal studding with malignancy throughout the abdominal cavity and biopsies were completed. 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  The tumor was focally positive for progesterone receptor, positive for CK7, estrogen receptor, CA-125 and PAX 8..  The staining pattern is consistent with ovarian serous carcinoma.      She had CT scan of the chest, abdomen and pelvis and the chest is a 2 mm noncalcified nodule within the left lower lobe, noncalcified nodule in the left upper lobe measuring 4 mm and 3 mm.  In the abdomen there is a 5.1 x 5.3 cm enhancing soft tissue mass in the pelvis to the right of midline associated with the adnexal region possibly representing a primary ovarian malignancy.  No right or left ovarian tissue was seen.  There is abnormal omental thickening and nodularity consistent with carcinomatosis greatest bulk in the left mid abdomen measuring up to 10.8 cm x 2.5 cm.   There is nodular peritoneal thickening also present within the abdomen and pelvis consistent with peritoneal carcinomatosis.  The small quantity of ascitic fluid in the abdomen and pelvis.  Carcinomatosis nodular studding is seen along the inferior right hepatic lobe.     She  has been referred to us for further evaluation and management of her newly diagnosed ovarian carcinoma.     Patient is accompanied today by her daughter for this appointment.  There is  family history of precancerous cells of the  uterusin her sister.        She does not smoke and does not drink alcohol.  Patient is  and has 2 daughters.  She works for the [employer].    · 11/29/2021 patient was seen by Dr. Dubois who has recommended neoadjuvant chemotherapy for 2-3 cycles and then interval cytoreduction.  She has recommended carboplatinum AUC 6, Taxol 1 7 5 mg per metered squared, Avastin 15 mg/kg as was done in the oceans trial but hold Avastin for the first few cycles due to bowel risk.  · Prechemo CA-125 was 754  · 12/7/2021 patient received first cycle of chemotherapy with carboplatinum and Taxol with Neulasta  · 12/28/2021 patient received cycle 2 of combination chemotherapy with carboplatinum and Taxol with Neulasta  · 12/28/2021 CA-125 was down to 635  · 1/11/2022 add-on appointment for nausea, dizziness, thrombocytopenia, pain in upper to mid abdomen 8/10, new onset in the last 4 to 5 days  · January 12, 2022: Due to acute GI symptoms patient had a CT scan of the abdomen and pelvis which basically revealed increasing effusion on the left lung mild pleural effusion on the right lung decreased in amount of omental caking but no interval change in the right adnexal area moderate abdominal and pelvic ascites which has increased.  She denies any significant pulmonary symptoms.  Her O2 sat today was 100% at rest and 99% with ambulation  · 1/24/2022 patient received cycle 3 of carboplatinum with Taxol with dose reduction  due to significant toxicities  · 2/14/2022 patient received cycle 4-day 1 of chemo platinum Taxol and Avastin  · 3/7/22: Patient received cycle 5-day 1 of chemotherapy with carboplatinum Taxol and Avastin  · 4/4/2022 patient received cycle 6 of chemotherapy with A platinum Taxol and Avastin  · 4/26/2022 patient underwent exploratory laparotomy, total abdominal hysterectomy, bilateral salpingo-oophorectomy, pelvic and aortic lymphadenectomy, omentectomy performed by Dr Castro, pathology reviewed high-grade serous carcinoma involving the uterine serosa and outer myometrium, bilateral fallopian tubes and bilateral ovaries and adnexa soft tissue.  Also received are high-grade serous carcinoma involving the omentum as well as residual high-grade serous carcinoma with treatment effects on the colonic splenic [id number], estrogen receptor diffusely positive p53 patchy, p16 diffusely positive.  Molecular testing is pending  · 6/2/2022 patient received cycle 7 of Combination chemotherapy with Avastin and carboplatinum.  Taxol was held due to significant peripheral neuropathy  · 7/14/2022 patient presents for follow-up of ovarian cancer  · 7/28/2020 patient received cycle 9 of carboplatinum  · Patient is currently on maintenance Avastin and letrozole for ovarian cancer suppression  · 1/17/2023 patient had CT scan of the chest, abdomen and pelvis and this basically revealed stable small pulmonary nodules on the chest.  Peritoneal thickening in the right paracolic gutter but no definite new peritoneal nodules or mass.  There is a new sclerotic lesion involving the right anterior acetabulum measuring 2.1 cm, severe right hip osteoarthritis further evaluation with right hip MRI has been recommended to further evaluate.  · 12/30/2022  was 54 prior was 43  · 2/17/2023 patient had MRI of the pelvis which showed enhancing sclerotic lesion within the anterior acetabulum on the right like representing metastatic disease.   Severe generative changes of the right hip joint was noted.  Gluteal tendinopathy on the right with no evidence of focal tear or significant bursitis.  · 3/27/2023 add on for abdominal pain, nausea and vomiting, fatigue    History of present illness was reviewed and is unchanged from the previous visit. 23        Past Medical History:   Diagnosis Date   • Arthritis    • Cancer     ovarian   • Depression    • Gall stones    • Hypertension    • Irritable bowel syndrome    • Rheumatoid aortitis    • Thyroid disease        Past Surgical History:   Procedure Laterality Date   •  SECTION      x2   • CHOLECYSTECTOMY N/A 11/10/2021    Procedure: diagnostic laparoscopy and peritoneal biopsy;  Surgeon: Krunal Badillo MD;  Location: [facility] MAIN OR;  Service: General;  Laterality: N/A;   • COLONOSCOPY     • HIP SURGERY Left    • JOINT REPLACEMENT     • THYROIDECTOMY, PARTIAL  2017   • TOTAL ABDOMINAL HYSTERECTOMY N/A 2022    Procedure: EXPLORATORY LAPAROTOMY, TOTAL ABDOMINAL HYSTERECTOMY, BILATERAL SALPINGOOOPHORECTOMY, PELVIC AND AORTIC LYMPHADENECTOMY, OMENTECTOMY;  Surgeon: Maggie Castro DO;  Location: [facility] MAIN OR;  Service: Gynecology Oncology;  Laterality: N/A;   • VENOUS ACCESS DEVICE (PORT) INSERTION Left 2021    Procedure: INSERTION VENOUS ACCESS DEVICE;  Surgeon: Krunal Badillo MD;  Location: [facility] MAIN OR;  Service: General;  Laterality: Left;         Current Outpatient Medications:   •  amLODIPine (NORVASC) 10 MG tablet, Take 1 tablet by mouth Daily., Disp: 90 tablet, Rfl: 1  •  furosemide (Lasix) 20 MG tablet, Take 1 tablet by mouth Daily As Needed (swelling and weight gain of 3 lbs in 48 hours)., Disp: 40 tablet, Rfl: 1  •  gabapentin (NEURONTIN) 300 MG capsule, Take 1 capsule by mouth Every 8 (Eight) Hours., Disp: 90 capsule, Rfl: 3  •  hydroCHLOROthiazide (HYDRODIURIL) 25 MG tablet, Take 1 tablet by mouth Daily., Disp: 90 tablet, Rfl: 1  •  HYDROcodone-acetaminophen (Norco)  5-325 MG per tablet, Take 1 tablet by mouth Every 6 (Six) Hours As Needed for Moderate Pain., Disp: 60 tablet, Rfl: 0  •  hydrOXYzine (ATARAX) 10 MG tablet, Take 1 tablet by mouth Every 8 (Eight) Hours As Needed for Anxiety., Disp: 45 tablet, Rfl: 2  •  letrozole (FEMARA) 2.5 MG tablet, TAKE ONE (1) TABLET BY MOUTH DAILY, Disp: 60 tablet, Rfl: 3  •  letrozole (FEMARA) 2.5 MG tablet, Take 1 tablet by mouth Daily., Disp: , Rfl:   •  levothyroxine (Synthroid) 112 MCG tablet, Take 1 tablet by mouth Daily., Disp: 90 tablet, Rfl: 0  •  levothyroxine (SYNTHROID, LEVOTHROID) 137 MCG tablet, , Disp: , Rfl:   •  lidocaine-prilocaine (EMLA) 2.5-2.5 % cream, APPLY TO THE AFFECTED AREA OF port prior TO access AS DIRECTED, Disp: 30 g, Rfl: 1  •  losartan (Cozaar) 25 MG tablet, Take 1 tablet by mouth Daily., Disp: 90 tablet, Rfl: 1  •  omeprazole (priLOSEC) 40 MG capsule, TAKE ONE (1) CAPSULE BY MOUTH EVERY DAY, Disp: 30 capsule, Rfl: 6  •  pantoprazole (PROTONIX) 40 MG EC tablet, TAKE ONE (1) TABLET BY MOUTH EVERY DAY (Patient not taking: Reported on 3/24/2023), Disp: 30 tablet, Rfl: 6  •  potassium chloride (KLOR-CON) 10 MEQ CR tablet, Take 1 tablet by mouth Daily As Needed (if takes lasix)., Disp: 40 tablet, Rfl: 1  •  promethazine (PHENERGAN) 25 MG tablet, Take 1 tablet by mouth Every 6 (Six) Hours As Needed for Nausea or Vomiting., Disp: 90 tablet, Rfl: 1  •  sertraline (Zoloft) 25 MG tablet, Take 1 tablet by mouth Daily., Disp: 90 tablet, Rfl: 1  No current facility-administered medications for this visit.    Facility-Administered Medications Ordered in Other Visits:   •  heparin injection 500 Units, 500 Units, Intravenous, PRN, David, Inga Persaud MD, 500 Units at 04/21/23 1225  •  sodium chloride 0.9 % flush 20 mL, 20 mL, Intravenous, PRN, Inga Hernandez MD, 20 mL at 04/21/23 1225    No Known Allergies    Family History   Problem Relation Age of Onset   • Dementia Mother    • Arthritis Mother    • Heart  "disease Father    • Hypertension Father    • Malig Hyperthermia Neg Hx        Cancer-related family history is not on file.    Social History     Tobacco Use   • Smoking status: Never   • Smokeless tobacco: Never   Vaping Use   • Vaping Use: Never used   Substance Use Topics   • Alcohol use: Yes     Alcohol/week: 1.0 standard drink     Types: 1 Glasses of wine per week     Comment: less than monthly   • Drug use: No     I have reviewed and confirmed the accuracy of the patient's history: Chief complaint, HPI, ROS and Subjective as entered by the MA/LPN/RN. Inga Hernandez MD 04/21/23       SUBJECTIVE:    Patient with complaint of bilateral lower extremity swelling but no pain.  Patient was prescribed diuretics by her PCP.  She denies any pain associated with the above.          REVIEW OF SYSTEMS:    Review of Systems   Constitutional: Positive for fatigue. Negative for chills and fever.   HENT: Negative for ear pain, mouth sores, nosebleeds and sore throat.    Eyes: Negative for photophobia and visual disturbance.   Respiratory: Negative for wheezing and stridor.    Cardiovascular: Negative for chest pain and palpitations.   Gastrointestinal:Negative for diarrhea and vomiting.   Endocrine: Negative for cold intolerance and heat intolerance.   Genitourinary: Negative for dysuria and hematuria.   Musculoskeletal: Negative for joint swelling and neck stiffness.   Skin: Negative for color change and rash.   Neurological:  Negative for seizures and syncope.   Hematological: Negative for adenopathy.   Psychiatric/Behavioral: Negative for agitation, confusion and hallucinations.       OBJECTIVE:  97.8 °F, pulse 80 bpm, respirations 18, blood pressure 128/74, O2 saturation 98%  Height 65 inches, weight 174 pounds    Vitals:    04/21/23 1236   BP: 139/76   Pulse: 71   Resp: 18   Temp: 97.6 °F (36.4 °C)   TempSrc: Infrared   Weight: 83 kg (183 lb)   Height: 165.1 cm (65\")   PainSc: 0-No pain     Body mass index is " 30.45 kg/m².    ECOG    (1) Restricted in physically strenuous activity, ambulatory and able to do work of light nature    Physical Exam  Vitals and nursing note reviewed.   Constitutional:       General: She is not in acute distress.     Appearance: She is not diaphoretic.   HENT:      Head: Normocephalic and atraumatic.      Mouth/Throat:      Mouth: Mucous membranes are moist  Eyes:      General: No scleral icterus.        Right eye: No discharge.         Left eye: No discharge.      Conjunctiva/sclera: Conjunctivae normal.   Neck:      Thyroid: No thyromegaly.   Cardiovascular:      Rate and Rhythm: Normal rate and regular rhythm.      Heart sounds: Normal heart sounds.     No friction rub. No gallop.   Pulmonary:      Effort: Pulmonary effort is normal. No respiratory distress.      Breath sounds: No stridor. No wheezing.   Abdominal:      General: Bowel sounds are normal.      Palpations: Abdomen is soft. There is no mass.      Tenderness: There is no abdominal tenderness. There is no guarding or rebound.      Comments:   Musculoskeletal:         General: No tenderness. Normal range of motion.      Cervical back: Normal range of motion and neck supple.   Lymphadenopathy:      Cervical: No cervical adenopathy.   Skin:     General: Skin is warm.      Findings: No erythema or rash.      Comments:   Neurological:      Mental Status: She is alert and oriented to person, place, and time.      Motor: No abnormal muscle tone.   Psychiatric:         Behavior: Behavior normal.     I have reexamined the patient and the results are consistent with the previously documented exam. Inga Hernandez MD       RECENT LABS    WBC   Date Value Ref Range Status   04/21/2023 4.33 3.40 - 10.80 10*3/mm3 Final     RBC   Date Value Ref Range Status   04/21/2023 3.99 3.77 - 5.28 10*6/mm3 Final     Hemoglobin   Date Value Ref Range Status   04/21/2023 12.9 12.0 - 15.9 g/dL Final     Hematocrit   Date Value Ref Range Status    04/21/2023 39.6 34.0 - 46.6 % Final     MCV   Date Value Ref Range Status   04/21/2023 99.2 (H) 79.0 - 97.0 fL Final     MCH   Date Value Ref Range Status   04/21/2023 32.3 26.6 - 33.0 pg Final     MCHC   Date Value Ref Range Status   04/21/2023 32.6 31.5 - 35.7 g/dL Final     RDW   Date Value Ref Range Status   04/21/2023 14.6 12.3 - 15.4 % Final     RDW-SD   Date Value Ref Range Status   04/21/2023 52.4 37.0 - 54.0 fl Final     MPV   Date Value Ref Range Status   04/21/2023 9.2 6.0 - 12.0 fL Final     Platelets   Date Value Ref Range Status   04/21/2023 170 140 - 450 10*3/mm3 Final     Neutrophil %   Date Value Ref Range Status   04/21/2023 56.5 42.7 - 76.0 % Final     Lymphocyte %   Date Value Ref Range Status   04/21/2023 26.3 19.6 - 45.3 % Final     Monocyte %   Date Value Ref Range Status   04/21/2023 13.9 (H) 5.0 - 12.0 % Final     Eosinophil %   Date Value Ref Range Status   04/21/2023 2.8 0.3 - 6.2 % Final     Basophil %   Date Value Ref Range Status   04/21/2023 0.5 0.0 - 1.5 % Final     Immature Grans %   Date Value Ref Range Status   04/29/2022 0.4 0.0 - 0.5 % Final     Neutrophils, Absolute   Date Value Ref Range Status   04/21/2023 2.45 1.70 - 7.00 10*3/mm3 Final     Lymphocytes, Absolute   Date Value Ref Range Status   04/21/2023 1.14 0.70 - 3.10 10*3/mm3 Final     Monocytes, Absolute   Date Value Ref Range Status   04/21/2023 0.60 0.10 - 0.90 10*3/mm3 Final     Eosinophils, Absolute   Date Value Ref Range Status   04/21/2023 0.12 0.00 - 0.40 10*3/mm3 Final     Basophils, Absolute   Date Value Ref Range Status   04/21/2023 0.02 0.00 - 0.20 10*3/mm3 Final     Immature Grans, Absolute   Date Value Ref Range Status   04/29/2022 0.02 0.00 - 0.05 10*3/mm3 Final     nRBC   Date Value Ref Range Status   04/29/2022 0.0 0.0 - 0.2 /100 WBC Final       Lab Results   Component Value Date    GLUCOSE 127 (H) 04/07/2023    BUN 17 04/07/2023    CREATININE 0.97 04/07/2023    EGFRIFNONA 47 (L) 02/21/2022     EGFRIFAFRI 44 (L) 01/21/2020    BCR 17.5 04/07/2023    K 3.2 (L) 04/07/2023    CO2 27.0 04/07/2023    CALCIUM 8.7 04/07/2023    ALBUMIN 3.3 (L) 04/07/2023    LABIL2 1.2 (calc) 01/21/2020    AST 15 04/07/2023    ALT 12 04/07/2023         ASSESSMENT:      1. 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  Ongoing systemic treatment with Avastin  2. Status post neoadjuvant chemotherapy with carboplatinum AUC 6, Taxol 175 mg per metered squared, with Avastin added due to lack of significant response with carboplatinum and Taxol.  Monitor serial CA-125's.  Down to 135 as of 3/14/2022.  Her CT scan showed response therefore patient will proceed with radical hysterectomy to perform by Dr. Dubois on 4/26/2022.  Plans for additional chemotherapy along with Avastin and possibly maintenance treatment with PARP inhibitors in the future.  She is also on letrozole 2.5 mg p.o. daily.  Patient is currently on Avastin maintenance along with letrozole.  Reviewed her recent CT scans which show evidence of sclerotic densities noted on the right hip may be related to surgery.  MRI of the right hip was recommended.  Reviewed the MRI completed on 2/17/2023 the area is suspicious for metastatic disease.  Also patient has had rising 's.  Refer to Dr. Tapia to see if she would be a candidate for SBRT.  We will also have patient be seen by Dr. Toni Hedrick given the severe DJD she has in that area to see if any surgical options.  In the meantime we will continue Avastin and letrozole.  Ongoing management  3. Right hip being almost completely resolved  4. Bilateral lower extremity swelling 2+ Doppler study starts today was negative for clots.  Patient to resume Avastin  5. Status post radical hysterectomy.  See path report.  Caris testing pending  6. Mucositis: This has resolved  7. Elevated blood pressure: This has improved.  Patient will continue to follow-up with her PCP  8. Pulmonary nodules of unclear etiology too small for  biopsy or PET resolution: Continue surveillance CT scans.  Also check 's  9. 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  Patient already had some 25% dose reduction on her chemo.  She is currently on Neurontin 300 mg daily will increase to 300 mg twice a day.  Peripheral neuropathy continues to be an issue.  We will hold Taxol for now till this has improved  10. Comprehensive gene analysis with cancer next technology was negative for any significant mutation in all 77 genes analyzed  11. 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  No other actionable mutations identified     Plans:     · Continue Avastin early next week  · Status post SRS to the hip.  Pain resolved  · Continue letrozole  · Continue Neurontin to 300 mg every 8 hours for peripheral neuropathy.  We will refill today that this is helping her neuropathy  · Reviewed CT scans of the chest abdomen and pelvis for cancer restaging.  Completed January 2023  · She will benefit from olaparib in the future  · Monitor 's, slight increase trend  · Elisa's Magic mouthwash for mucositis as needed  · Can resume full-time work, notes given  · Reviewed her path report from radical hysterectomy 4/20/2022  · Comprehensive gene analysis with cancer next technology results reviewed with patient and copies of her results was also given to her for her own records keeping  · Continue Percocet 5 mg p.o. every 4-6 hours as needed for pain  · Continue vitamin b6 100mg po q 12   · Foundation 1 testing results reviewed  · All questions answered  · Follow-up up end of April and at that time discussed when her next scans will be. She has apt May 1    4/7/2023 follow-up for: Colitis/gastritis, abdominal pain and vomiting    Patient has completed course of oral Flagyl and prednisone.  She is on PPI.  She has no complaints today and is  at her normal baseline.  Pain medication refilled today.  Okay to proceed with Avastin.        I spent 30 total minutes, face-to-face, caring for Lucy today. 90% of this time involved counseling and/or coordination of care as documented within this note.

## 2023-04-21 NOTE — PROGRESS NOTES
Patient here for C23 of Zirabev.  Port accessed by Oksana Saez RN with sterile technique and positive blood return noted.  Blood drawn from port.  10 cc blood wasted prior to specimen collection.  Specimens sent to lab for processing. Dr. Hernandez was notified of bilateral ankle/leg swelling. She ordered a stat ultrasound of the legs and treatment was deferred to Monday, 4/24, if U/S is negative. Patient was educated and sent to [facility] for treatment. Port deaccessed and patient verbalized understanding.

## 2023-04-24 ENCOUNTER — HOSPITAL ENCOUNTER (OUTPATIENT)
Dept: ONCOLOGY | Facility: HOSPITAL | Age: 63
Discharge: HOME | End: 2023-04-24
Admitting: INTERNAL MEDICINE
Payer: COMMERCIAL

## 2023-04-24 VITALS
SYSTOLIC BLOOD PRESSURE: 136 MMHG | HEART RATE: 78 BPM | BODY MASS INDEX: 30.66 KG/M2 | OXYGEN SATURATION: 96 % | TEMPERATURE: 97.4 F | DIASTOLIC BLOOD PRESSURE: 77 MMHG | HEIGHT: 65 IN | WEIGHT: 184 LBS

## 2023-04-24 DIAGNOSIS — Z45.2 ENCOUNTER FOR CARE RELATED TO PORT-A-CATH: Primary | ICD-10-CM

## 2023-04-24 DIAGNOSIS — C56.9 MALIGNANT NEOPLASM OF OVARY, UNSPECIFIED LATERALITY: ICD-10-CM

## 2023-04-24 DIAGNOSIS — E83.51 HYPOCALCEMIA: ICD-10-CM

## 2023-04-24 DIAGNOSIS — C56.9 MALIGNANT NEOPLASM OF OVARY, UNSPECIFIED LATERALITY: Primary | ICD-10-CM

## 2023-04-24 PROCEDURE — 25010000002 BEVACIZUMAB-BVZR 400 MG/16ML SOLUTION 16 ML VIAL: Performed by: INTERNAL MEDICINE

## 2023-04-24 PROCEDURE — 96413 CHEMO IV INFUSION 1 HR: CPT

## 2023-04-24 PROCEDURE — 25010000002 HEPARIN LOCK FLUSH PER 10 UNITS: Performed by: INTERNAL MEDICINE

## 2023-04-24 RX ORDER — SODIUM CHLORIDE 0.9 % (FLUSH) 0.9 %
20 SYRINGE (ML) INJECTION AS NEEDED
OUTPATIENT
Start: 2023-04-24

## 2023-04-24 RX ORDER — HEPARIN SODIUM (PORCINE) LOCK FLUSH IV SOLN 100 UNIT/ML 100 UNIT/ML
500 SOLUTION INTRAVENOUS AS NEEDED
Status: DISCONTINUED | OUTPATIENT
Start: 2023-04-24 | End: 2023-04-25 | Stop reason: HOSPADM

## 2023-04-24 RX ORDER — SODIUM CHLORIDE 9 MG/ML
250 INJECTION, SOLUTION INTRAVENOUS ONCE
Status: COMPLETED | OUTPATIENT
Start: 2023-04-24 | End: 2023-04-24

## 2023-04-24 RX ORDER — SODIUM CHLORIDE 0.9 % (FLUSH) 0.9 %
20 SYRINGE (ML) INJECTION AS NEEDED
Status: DISCONTINUED | OUTPATIENT
Start: 2023-04-24 | End: 2023-04-25 | Stop reason: HOSPADM

## 2023-04-24 RX ORDER — HEPARIN SODIUM (PORCINE) LOCK FLUSH IV SOLN 100 UNIT/ML 100 UNIT/ML
500 SOLUTION INTRAVENOUS AS NEEDED
OUTPATIENT
Start: 2023-04-24

## 2023-04-24 RX ORDER — SODIUM CHLORIDE 9 MG/ML
250 INJECTION, SOLUTION INTRAVENOUS ONCE
Status: CANCELLED | OUTPATIENT
Start: 2023-04-24

## 2023-04-24 RX ADMIN — Medication 20 ML: at 15:23

## 2023-04-24 RX ADMIN — SODIUM CHLORIDE 250 ML: 9 INJECTION, SOLUTION INTRAVENOUS at 14:44

## 2023-04-24 RX ADMIN — SODIUM CHLORIDE 790 MG: 9 INJECTION, SOLUTION INTRAVENOUS at 14:44

## 2023-04-24 RX ADMIN — HEPARIN 500 UNITS: 100 SYRINGE at 15:23

## 2023-04-24 NOTE — PROGRESS NOTES
Pt here for C23 Zirabev.  Was here on Friday and had labs drawn that were WNL but was sent to have LE ultrasound due to pitting edema.  Ultrasound was negative and pt OK for treatment today.  Confirmed with pharmacy that labs results from Friday OK to use.  Tx given per MAR.  Pt tolerated well.  Pt d/c home.

## 2023-04-27 NOTE — PROGRESS NOTES
RADIATION ONCOLOGY FOLLOW-UP NOTE    NAME: Lucy Mahan  YOB: 1960  MRN #: [id number]  DATE OF SERVICE: 5/1/2023  REFERRING PROVIDER: Inga Hernandez MD  PRIMARY CARE PROVIDER: Argentina Avalos APRN    DIAGNOSIS:  Stage IV Ovarian Cancer  Encounter Diagnoses   Name Primary?   • Malignant neoplasm metastatic to bone    • Malignant neoplasm of ovary, unspecified laterality Yes     REASON FOR VISIT:  1M s/p XRT F/U    RADIATION TREATMENT COURSE:  3000 cGy in 5 fractions to right acetabulum, completed 04/04/2023.    HISTORY OF PRESENT ILLNESS: The patient is a 62 y.o. year old female who was last seen in our office on 04/04/2023 upon completion of radiation therapy treatment.    She follows with Dr. Hernandez, and was last seen on 04/21/2023. Their plan is to continue Avastin, continue letrozole, and follow up on May 1, 2023, following this appointment.    No interval imaging.    Labs post therapy:   was 231 on 04/07/2023  Trending down now at 226 on 04/21/2023    She was also having bilateral ankle swelling and Dr. Hernandez ordered a LE venous duplex scan which was normal on 04/21/2023; no DVT.    Pain has greatly improved status Post IR steroid injections and then further with the SBRT course.  She is ambulating well now and without pain.  She does still have a bit of a limp.  She has increased her activity and is very happy with QOL    The following portions of the patient's history were reviewed and updated as appropriate: allergies, current medications, past family history, past medical history, past social history, past surgical history and problem list. Reviewed with the patient and remain unchanged.    PAST MEDICAL HISTORY:  she has a past medical history of Arthritis, Cancer, Depression, Gall stones, Hypertension, Irritable bowel syndrome, Rheumatoid aortitis, and Thyroid disease.    MEDICATIONS:    Current Outpatient Medications:   •  amLODIPine (NORVASC) 10 MG tablet, Take 1 tablet by  mouth Daily., Disp: 90 tablet, Rfl: 1  •  furosemide (Lasix) 20 MG tablet, Take 1 tablet by mouth Daily As Needed (swelling and weight gain of 3 lbs in 48 hours)., Disp: 40 tablet, Rfl: 1  •  gabapentin (NEURONTIN) 300 MG capsule, Take 1 capsule by mouth Every 8 (Eight) Hours., Disp: 90 capsule, Rfl: 3  •  hydroCHLOROthiazide (HYDRODIURIL) 25 MG tablet, Take 1 tablet by mouth Daily., Disp: 90 tablet, Rfl: 1  •  HYDROcodone-acetaminophen (Norco) 5-325 MG per tablet, Take 1 tablet by mouth Every 6 (Six) Hours As Needed for Moderate Pain., Disp: 60 tablet, Rfl: 0  •  hydrOXYzine (ATARAX) 10 MG tablet, Take 1 tablet by mouth Every 8 (Eight) Hours As Needed for Anxiety., Disp: 45 tablet, Rfl: 2  •  letrozole (FEMARA) 2.5 MG tablet, TAKE ONE (1) TABLET BY MOUTH DAILY, Disp: 60 tablet, Rfl: 3  •  letrozole (FEMARA) 2.5 MG tablet, Take 1 tablet by mouth Daily., Disp: , Rfl:   •  levothyroxine (Synthroid) 112 MCG tablet, Take 1 tablet by mouth Daily., Disp: 90 tablet, Rfl: 0  •  levothyroxine (SYNTHROID, LEVOTHROID) 137 MCG tablet, , Disp: , Rfl:   •  lidocaine-prilocaine (EMLA) 2.5-2.5 % cream, APPLY TO THE AFFECTED AREA OF port prior TO access AS DIRECTED, Disp: 30 g, Rfl: 1  •  losartan (Cozaar) 25 MG tablet, Take 1 tablet by mouth Daily., Disp: 90 tablet, Rfl: 1  •  omeprazole (priLOSEC) 40 MG capsule, TAKE ONE (1) CAPSULE BY MOUTH EVERY DAY, Disp: 30 capsule, Rfl: 6  •  pantoprazole (PROTONIX) 40 MG EC tablet, TAKE ONE (1) TABLET BY MOUTH EVERY DAY, Disp: 30 tablet, Rfl: 6  •  potassium chloride (KLOR-CON) 10 MEQ CR tablet, Take 1 tablet by mouth Daily As Needed (if takes lasix)., Disp: 40 tablet, Rfl: 1  •  promethazine (PHENERGAN) 25 MG tablet, Take 1 tablet by mouth Every 6 (Six) Hours As Needed for Nausea or Vomiting., Disp: 90 tablet, Rfl: 1  •  sertraline (Zoloft) 25 MG tablet, Take 1 tablet by mouth Daily., Disp: 90 tablet, Rfl: 1    ALLERGIES:  No Known Allergies    PAST SURGICAL HISTORY:  she has a past  "surgical history that includes Hip surgery (Left);  section; Thyroidectomy, partial (2017); Colonoscopy; Cholecystectomy (N/A, 11/10/2021); Venous Access Device (Port) (Left, 2021); Total abdominal hysterectomy (N/A, 2022); and Joint replacement.    PREVIOUS RADIOTHERAPY OR CHEMOTHERAPY:  yes    FAMILY HISTORY:  herfamily history includes Arthritis in her mother; Dementia in her mother; Heart disease in her father; Hypertension in her father.    SOCIAL HISTORY:  she reports that she has never smoked. She has never used smokeless tobacco. She reports current alcohol use of about 1.0 standard drink per week. She reports that she does not use drugs.    PAIN AND PAIN MANAGEMENT:  Much improved at Rt hip/acetabulum  Recent back pain related to over use injury for spring cleaning.  Vitals:    23 1202   BP: 131/80   Pulse: 70   Resp: 18   Temp: 98.3 °F (36.8 °C)   TempSrc: Oral   SpO2: 99%   Weight: 82.3 kg (181 lb 6.4 oz)   Height: 165.1 cm (65\")   PainSc:   6   PainLoc: Back       NUTRITIONAL STATUS:   no issues    KPS:  80:  Normal activity with effort; some signs or symptoms      REVIEW OF SYSTEMS:   General: No fevers, chills, weight change, or drenching night sweats. Skin: No rashes or jaundice.  HEENT: No change in vision or hearing, no headaches.  Neck: No dysphagia or masses.  Heme/Lymph: No easy bruising or bleeding.  Respiratory System: No shortness of breath or cough.  Cardiovascular: No chest pain, palpitations, or dyspnea on exertion.  - Pacemaker. GI: No nausea, vomiting, diarrhea, melena, or hematochezia.  : No dysuria or hematuria.  Endocrine: No heat or cold intolerance. Musculoskeletal: As noted above.  Neuro: No weakness, numbness, syncope, or seizures. Psych: No mood changes or depression. Ext: Denies swelling.    Objective   VITAL SIGNS:  Vitals:    23 1202   BP: 131/80   Pulse: 70   Resp: 18   Temp: 98.3 °F (36.8 °C)   TempSrc: Oral   SpO2: 99%   Weight: 82.3 kg (181 " "lb 6.4 oz)   Height: 165.1 cm (65\")   PainSc:   6   PainLoc: Back       PHYSICAL EXAM:  GENERAL: In no apparent distress, sitting comfortably in room.    CARDIOVASCULAR: S1 & S2 detected; no murmurs, rubs or gallops.  CHEST: Clear to auscultation bilaterally; no wheezes, crackles or rubs. Work of breathing normal.  ABDOMEN: Bowel sounds present. Abdomen is soft, nontender, nondistended.   MUSCULOSKELETAL: No tenderness to palpation along the spine or scapulae. Normal range of motion.  EXTREMITIES: No clubbing, cyanosis, edema.  SKIN: No erythema, rashes, ulcerations noted.   NEUROLOGIC: Cranial nerves II-XII grossly intact bilaterally. No focal neurologic deficits.  PSYCHIATRIC:  Alert, aware, and appropriate.    PERTINENT IMAGING/PATHOLOGY/LABS (Medical Decision Making):     COORDINATION OF CARE: A copy of this note is sent to the referring provider.    PATHOLOGY (Reviewed):     IMAGING (Reviewed):     LABS (Reviewed): Tumor markers as noted above.  HEMATOLOGY:  WBC   Date Value Ref Range Status   05/01/2023 5.31 3.40 - 10.80 10*3/mm3 Final     RBC   Date Value Ref Range Status   05/01/2023 4.10 3.77 - 5.28 10*6/mm3 Final     Hemoglobin   Date Value Ref Range Status   05/01/2023 13.1 12.0 - 15.9 g/dL Final     Hematocrit   Date Value Ref Range Status   05/01/2023 41.0 34.0 - 46.6 % Final     Platelets   Date Value Ref Range Status   05/01/2023 180 140 - 450 10*3/mm3 Final     CHEMISTRY:  Lab Results   Component Value Date    GLUCOSE 101 (H) 04/21/2023    BUN 21 04/21/2023    CREATININE 1.23 (H) 04/21/2023    EGFRIFNONA 47 (L) 02/21/2022    EGFRIFAFRI 44 (L) 01/21/2020    BCR 17.1 04/21/2023    K 3.7 04/21/2023    CO2 25.0 04/21/2023    CALCIUM 9.2 04/21/2023    ALBUMIN 3.4 (L) 04/21/2023    LABIL2 1.2 (calc) 01/21/2020    AST 20 04/21/2023    ALT 9 04/21/2023     Assessment & Plan   ASSESSMENT AND PLAN:    1. Malignant neoplasm of ovary, unspecified laterality    2. Malignant neoplasm metastatic to bone     "   -Great pain and functional improvement post SBRT as noted.  -Continues systemic therapy with Dr. Hernandez as noted.  -Imaging and labs/tumor markers per Dr. Hernandez.    We will plan to see her again in 4 months for subacute XRT toxicity assessment and imaging/lab review.  We will remain available as needed.    This assessment comes from my review of the imaging, pathology, physician notes and other pertinent information as mentioned.          CC: MD Yovany Vance MD  5/1/2023  1:39 PM EDT

## 2023-04-28 NOTE — PROGRESS NOTES
Patient Name: Lucy Mahan    Date: 2023  : 1960       MRN: [id number]     Referring Physician: Toni Hedrick MD  DX: No diagnosis found.     COMPLETION NOTE      Primary Radiation Oncologist: Yovany Tapia MD    PRIMARY SITE AND HISTOPATHOLOGY:   Stage IV Ovarian Cancer, symptomatic Rt acetabular met      STAGE: IV      SIGNIFICANT LAB AND X-RAY FINDINGS: Lucy Mahan is a 62 y.o. female who was first diagnosed with ovarian cancer in 2021.     2021-2022: 6 cycles Carboplatin/Paclitaxel  2022: Started Letrozole 2.5 mg daily  2022: Added Bevacizumab  2022-2022: 2 cycles Carboplatin + Bevacizumab (Q14D)  2022-present: Bevacizumab Q14D     CT CAP with contrast on 2023 showed a new 2.2 cm sclerotic lesion at the anterior right acetabulum.  MRI of the pelvis with contrast was recommended to exclude osseous metastatic disease.     MRI pelvis with and without contrast on 2023 showed an enhancing sclerotic lesion within the anterior acetabulum on the right likely representing metastatic disease. Also noted severe degenerative changes of the right hip joint.     She was last seen in the office by Dr. Hernandez on 2023. She was referred to our clinic for consideration of radiation therapy and Dr. Toni Hedrick for surgical consideration.     She notes that pain is about an 8/10; lateral right hip and will radiate down toward her knee.  She cannot completely answer when the symptoms started--around the late holiday time.  She denies falling or profound weakness. She is now using a right cane.    Bone scan is only + in this area  Rising tumor markers  IR injection has helped some with joint pain.  No ortho procedure at this time and Multi-D ok with palliative/SBRT treatment now and close ortho monitoring.      PLAN OF TREATMENT:  SBRT for local control and palliation.      DATE STARTED:  2023   DATE COMPLETED:  2023; 7 days of  treatment duration      TOTAL DOSE: 3000 cGy   DOSE/FRACTION: 600 cGy per fraction  ENERGY:  6MV FFF VMAT/IGRT   STATUS OF TUMOR/PATIENT AT COMPLETION OF RADIOTHERAPY: stable, no unexpected toxicities or treatment breaks      TOLERANCE: Grade 1 fatigue, no issues      DISPOSITION:  Continues very close management with Dr. Hernandez; Ortho has follow-up in place.  She will RTC here in 1 month or earlier as needed.      Current Outpatient Medications:   •  amLODIPine (NORVASC) 10 MG tablet, Take 1 tablet by mouth Daily., Disp: 90 tablet, Rfl: 1  •  furosemide (Lasix) 20 MG tablet, Take 1 tablet by mouth Daily As Needed (swelling and weight gain of 3 lbs in 48 hours)., Disp: 40 tablet, Rfl: 1  •  gabapentin (NEURONTIN) 300 MG capsule, Take 1 capsule by mouth Every 8 (Eight) Hours., Disp: 90 capsule, Rfl: 3  •  hydroCHLOROthiazide (HYDRODIURIL) 25 MG tablet, Take 1 tablet by mouth Daily., Disp: 90 tablet, Rfl: 1  •  HYDROcodone-acetaminophen (Norco) 5-325 MG per tablet, Take 1 tablet by mouth Every 6 (Six) Hours As Needed for Moderate Pain., Disp: 60 tablet, Rfl: 0  •  hydrOXYzine (ATARAX) 10 MG tablet, Take 1 tablet by mouth Every 8 (Eight) Hours As Needed for Anxiety., Disp: 45 tablet, Rfl: 2  •  letrozole (FEMARA) 2.5 MG tablet, TAKE ONE (1) TABLET BY MOUTH DAILY, Disp: 60 tablet, Rfl: 3  •  letrozole (FEMARA) 2.5 MG tablet, Take 1 tablet by mouth Daily., Disp: , Rfl:   •  levothyroxine (Synthroid) 112 MCG tablet, Take 1 tablet by mouth Daily., Disp: 90 tablet, Rfl: 0  •  levothyroxine (SYNTHROID, LEVOTHROID) 137 MCG tablet, , Disp: , Rfl:   •  lidocaine-prilocaine (EMLA) 2.5-2.5 % cream, APPLY TO THE AFFECTED AREA OF port prior TO access AS DIRECTED, Disp: 30 g, Rfl: 1  •  losartan (Cozaar) 25 MG tablet, Take 1 tablet by mouth Daily., Disp: 90 tablet, Rfl: 1  •  omeprazole (priLOSEC) 40 MG capsule, TAKE ONE (1) CAPSULE BY MOUTH EVERY DAY, Disp: 30 capsule, Rfl: 6  •  pantoprazole (PROTONIX) 40 MG EC tablet, TAKE ONE (1)  TABLET BY MOUTH EVERY DAY (Patient not taking: Reported on 3/24/2023), Disp: 30 tablet, Rfl: 6  •  potassium chloride (KLOR-CON) 10 MEQ CR tablet, Take 1 tablet by mouth Daily As Needed (if takes lasix)., Disp: 40 tablet, Rfl: 1  •  promethazine (PHENERGAN) 25 MG tablet, Take 1 tablet by mouth Every 6 (Six) Hours As Needed for Nausea or Vomiting., Disp: 90 tablet, Rfl: 1  •  sertraline (Zoloft) 25 MG tablet, Take 1 tablet by mouth Daily., Disp: 90 tablet, Rfl: 1    KPS: 70            cc: MD Toni Vance MD          Approved by:     Yovany Tapai MD

## 2023-05-01 ENCOUNTER — OFFICE VISIT (OUTPATIENT)
Dept: RADIATION ONCOLOGY | Facility: HOSPITAL | Age: 63
End: 2023-05-01
Payer: COMMERCIAL

## 2023-05-01 ENCOUNTER — OFFICE VISIT (OUTPATIENT)
Dept: ONCOLOGY | Facility: CLINIC | Age: 63
End: 2023-05-01
Payer: COMMERCIAL

## 2023-05-01 ENCOUNTER — LAB (OUTPATIENT)
Dept: LAB | Facility: HOSPITAL | Age: 63
End: 2023-05-01
Payer: COMMERCIAL

## 2023-05-01 ENCOUNTER — HOSPITAL ENCOUNTER (OUTPATIENT)
Dept: RADIATION ONCOLOGY | Facility: HOSPITAL | Age: 63
Setting detail: RADIATION/ONCOLOGY SERIES
End: 2023-05-01

## 2023-05-01 VITALS
TEMPERATURE: 98.3 F | SYSTOLIC BLOOD PRESSURE: 131 MMHG | DIASTOLIC BLOOD PRESSURE: 80 MMHG | OXYGEN SATURATION: 99 % | RESPIRATION RATE: 18 BRPM | HEART RATE: 70 BPM | WEIGHT: 181.4 LBS | HEIGHT: 65 IN | BODY MASS INDEX: 30.22 KG/M2

## 2023-05-01 VITALS
SYSTOLIC BLOOD PRESSURE: 131 MMHG | RESPIRATION RATE: 18 BRPM | WEIGHT: 181 LBS | BODY MASS INDEX: 30.16 KG/M2 | HEIGHT: 65 IN | HEART RATE: 70 BPM | TEMPERATURE: 98.3 F | DIASTOLIC BLOOD PRESSURE: 80 MMHG

## 2023-05-01 DIAGNOSIS — C56.9 MALIGNANT NEOPLASM OF OVARY, UNSPECIFIED LATERALITY: Primary | ICD-10-CM

## 2023-05-01 DIAGNOSIS — C79.51 MALIGNANT NEOPLASM METASTATIC TO BONE: ICD-10-CM

## 2023-05-01 LAB
BASOPHILS # BLD AUTO: 0.04 10*3/MM3 (ref 0–0.2)
BASOPHILS NFR BLD AUTO: 0.8 % (ref 0–1.5)
DEPRECATED RDW RBC AUTO: 50.5 FL (ref 37–54)
EOSINOPHIL # BLD AUTO: 0.18 10*3/MM3 (ref 0–0.4)
EOSINOPHIL NFR BLD AUTO: 3.4 % (ref 0.3–6.2)
ERYTHROCYTE [DISTWIDTH] IN BLOOD BY AUTOMATED COUNT: 14.1 % (ref 12.3–15.4)
HCT VFR BLD AUTO: 41 % (ref 34–46.6)
HGB BLD-MCNC: 13.1 G/DL (ref 12–15.9)
HOLD SPECIMEN: NORMAL
HOLD SPECIMEN: NORMAL
LYMPHOCYTES # BLD AUTO: 1.32 10*3/MM3 (ref 0.7–3.1)
LYMPHOCYTES NFR BLD AUTO: 24.9 % (ref 19.6–45.3)
MCH RBC QN AUTO: 32 PG (ref 26.6–33)
MCHC RBC AUTO-ENTMCNC: 32 G/DL (ref 31.5–35.7)
MCV RBC AUTO: 100 FL (ref 79–97)
MONOCYTES # BLD AUTO: 0.64 10*3/MM3 (ref 0.1–0.9)
MONOCYTES NFR BLD AUTO: 12.1 % (ref 5–12)
NEUTROPHILS NFR BLD AUTO: 3.13 10*3/MM3 (ref 1.7–7)
NEUTROPHILS NFR BLD AUTO: 58.8 % (ref 42.7–76)
PLATELET # BLD AUTO: 180 10*3/MM3 (ref 140–450)
PMV BLD AUTO: 8.6 FL (ref 6–12)
RBC # BLD AUTO: 4.1 10*6/MM3 (ref 3.77–5.28)
WBC NRBC COR # BLD: 5.31 10*3/MM3 (ref 3.4–10.8)

## 2023-05-01 PROCEDURE — 36415 COLL VENOUS BLD VENIPUNCTURE: CPT

## 2023-05-01 PROCEDURE — G0463 HOSPITAL OUTPT CLINIC VISIT: HCPCS | Performed by: RADIOLOGY

## 2023-05-01 PROCEDURE — 85025 COMPLETE CBC W/AUTO DIFF WBC: CPT

## 2023-05-01 NOTE — PROGRESS NOTES
Hematology/Oncology Outpatient Follow Up    PATIENT NAME:Lucy Mahan  :1960  MRN: [id number]  PRIMARY CARE PHYSICIAN: [place] Avalos APRN  REFERRING PHYSICIAN: No ref. provider found    Chief Complaint   Patient presents with   • Follow-up     Malignant neoplasm of ovary, unspecified laterality        HISTORY OF PRESENT ILLNESS:     This is a 62 y.o.  female who developed abdominal pain in 2021.  Patient has also lost approximately 35 pounds during that..  She has had loss of appetite.  Due to the abdominal pain,  she had an ultrasound of the abdomen done on 10/13/2021.  This basically revealed no liver lesions.  Common bile duct is normal at 3 mm.  There is a nonmobile 3 cm shadowing gallstone within the gallbladder neck.  No gallbladder thickening or pericholecystic fluid collection.  Patient was then referred to general surgery and was seen by Dr. Badillo.  Who took her to surgery on 11/10/2021 for diagnostic laparoscopy and peritoneal biopsy.  Intra-Op , she was noted to have peritoneal studding with malignancy throughout the abdominal cavity and biopsies were completed. 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  The tumor was focally positive for progesterone receptor, positive for CK7, estrogen receptor, CA-125 and PAX 8..  The staining pattern is consistent with ovarian serous carcinoma.      She had CT scan of the chest, abdomen and pelvis and the chest is a 2 mm noncalcified nodule within the left lower lobe, noncalcified nodule in the left upper lobe measuring 4 mm and 3 mm.  In the abdomen there is a 5.1 x 5.3 cm enhancing soft tissue mass in the pelvis to the right of midline associated with the adnexal region possibly representing a primary ovarian malignancy.  No right or left ovarian tissue was seen.  There is abnormal omental thickening and nodularity consistent with carcinomatosis greatest bulk in the left mid abdomen measuring up to 10.8 cm x 2.5 cm.   There is nodular peritoneal thickening also present within the abdomen and pelvis consistent with peritoneal carcinomatosis.  The small quantity of ascitic fluid in the abdomen and pelvis.  Carcinomatosis nodular studding is seen along the inferior right hepatic lobe.     She  has been referred to us for further evaluation and management of her newly diagnosed ovarian carcinoma.     Patient is accompanied today by her daughter for this appointment.  There is  family history of precancerous cells of the  uterusin her sister.        She does not smoke and does not drink alcohol.  Patient is  and has 2 daughters.  She works for the [employer].    · 11/29/2021 patient was seen by Dr. Dubois who has recommended neoadjuvant chemotherapy for 2-3 cycles and then interval cytoreduction.  She has recommended carboplatinum AUC 6, Taxol 1 7 5 mg per metered squared, Avastin 15 mg/kg as was done in the oceans trial but hold Avastin for the first few cycles due to bowel risk.  · Prechemo CA-125 was 754  · 12/7/2021 patient received first cycle of chemotherapy with carboplatinum and Taxol with Neulasta  · 12/28/2021 patient received cycle 2 of combination chemotherapy with carboplatinum and Taxol with Neulasta  · 12/28/2021 CA-125 was down to 635  · 1/11/2022 add-on appointment for nausea, dizziness, thrombocytopenia, pain in upper to mid abdomen 8/10, new onset in the last 4 to 5 days  · January 12, 2022: Due to acute GI symptoms patient had a CT scan of the abdomen and pelvis which basically revealed increasing effusion on the left lung mild pleural effusion on the right lung decreased in amount of omental caking but no interval change in the right adnexal area moderate abdominal and pelvic ascites which has increased.  She denies any significant pulmonary symptoms.  Her O2 sat today was 100% at rest and 99% with ambulation  · 1/24/2022 patient received cycle 3 of carboplatinum with Taxol with dose reduction  due to significant toxicities  · 2/14/2022 patient received cycle 4-day 1 of chemo platinum Taxol and Avastin  · 3/7/22: Patient received cycle 5-day 1 of chemotherapy with carboplatinum Taxol and Avastin  · 4/4/2022 patient received cycle 6 of chemotherapy with A platinum Taxol and Avastin  · 4/26/2022 patient underwent exploratory laparotomy, total abdominal hysterectomy, bilateral salpingo-oophorectomy, pelvic and aortic lymphadenectomy, omentectomy performed by Dr Castro, pathology reviewed high-grade serous carcinoma involving the uterine serosa and outer myometrium, bilateral fallopian tubes and bilateral ovaries and adnexa soft tissue.  Also received are high-grade serous carcinoma involving the omentum as well as residual high-grade serous carcinoma with treatment effects on the colonic splenic [id number], estrogen receptor diffusely positive p53 patchy, p16 diffusely positive.  Molecular testing is pending  · 6/2/2022 patient received cycle 7 of Combination chemotherapy with Avastin and carboplatinum.  Taxol was held due to significant peripheral neuropathy  · 7/14/2022 patient presents for follow-up of ovarian cancer  · 7/28/2020 patient received cycle 9 of carboplatinum  · Patient is currently on maintenance Avastin and letrozole for ovarian cancer suppression  · 1/17/2023 patient had CT scan of the chest, abdomen and pelvis and this basically revealed stable small pulmonary nodules on the chest.  Peritoneal thickening in the right paracolic gutter but no definite new peritoneal nodules or mass.  There is a new sclerotic lesion involving the right anterior acetabulum measuring 2.1 cm, severe right hip osteoarthritis further evaluation with right hip MRI has been recommended to further evaluate.  · 12/30/2022  was 54 prior was 43  · 2/17/2023 patient had MRI of the pelvis which showed enhancing sclerotic lesion within the anterior acetabulum on the right like representing metastatic disease.   Severe generative changes of the right hip joint was noted.  Gluteal tendinopathy on the right with no evidence of focal tear or significant bursitis.  · 3/27/2023 add on for abdominal pain, nausea and vomiting, fatigue    History of present illness was reviewed and is unchanged from the previous visit.         Past Medical History:   Diagnosis Date   • Arthritis    • Cancer     ovarian   • Depression    • Gall stones    • Hypertension    • Irritable bowel syndrome    • Rheumatoid aortitis    • Thyroid disease        Past Surgical History:   Procedure Laterality Date   •  SECTION      x2   • CHOLECYSTECTOMY N/A 11/10/2021    Procedure: diagnostic laparoscopy and peritoneal biopsy;  Surgeon: Krunal Badillo MD;  Location: [facility] MAIN OR;  Service: General;  Laterality: N/A;   • COLONOSCOPY     • HIP SURGERY Left    • JOINT REPLACEMENT     • THYROIDECTOMY, PARTIAL  2017   • TOTAL ABDOMINAL HYSTERECTOMY N/A 2022    Procedure: EXPLORATORY LAPAROTOMY, TOTAL ABDOMINAL HYSTERECTOMY, BILATERAL SALPINGOOOPHORECTOMY, PELVIC AND AORTIC LYMPHADENECTOMY, OMENTECTOMY;  Surgeon: Maggie Castro DO;  Location: [facility] MAIN OR;  Service: Gynecology Oncology;  Laterality: N/A;   • VENOUS ACCESS DEVICE (PORT) INSERTION Left 2021    Procedure: INSERTION VENOUS ACCESS DEVICE;  Surgeon: Krunal Badillo MD;  Location: [facility] MAIN OR;  Service: General;  Laterality: Left;         Current Outpatient Medications:   •  amLODIPine (NORVASC) 10 MG tablet, Take 1 tablet by mouth Daily., Disp: 90 tablet, Rfl: 1  •  furosemide (Lasix) 20 MG tablet, Take 1 tablet by mouth Daily As Needed (swelling and weight gain of 3 lbs in 48 hours)., Disp: 40 tablet, Rfl: 1  •  gabapentin (NEURONTIN) 300 MG capsule, Take 1 capsule by mouth Every 8 (Eight) Hours., Disp: 90 capsule, Rfl: 3  •  hydroCHLOROthiazide (HYDRODIURIL) 25 MG tablet, Take 1 tablet by mouth Daily., Disp: 90 tablet, Rfl: 1  •  HYDROcodone-acetaminophen (Norco) 5-325 MG  per tablet, Take 1 tablet by mouth Every 6 (Six) Hours As Needed for Moderate Pain., Disp: 60 tablet, Rfl: 0  •  hydrOXYzine (ATARAX) 10 MG tablet, Take 1 tablet by mouth Every 8 (Eight) Hours As Needed for Anxiety., Disp: 45 tablet, Rfl: 2  •  letrozole (FEMARA) 2.5 MG tablet, TAKE ONE (1) TABLET BY MOUTH DAILY, Disp: 60 tablet, Rfl: 3  •  letrozole (FEMARA) 2.5 MG tablet, Take 1 tablet by mouth Daily., Disp: , Rfl:   •  levothyroxine (Synthroid) 112 MCG tablet, Take 1 tablet by mouth Daily., Disp: 90 tablet, Rfl: 0  •  levothyroxine (SYNTHROID, LEVOTHROID) 137 MCG tablet, , Disp: , Rfl:   •  lidocaine-prilocaine (EMLA) 2.5-2.5 % cream, APPLY TO THE AFFECTED AREA OF port prior TO access AS DIRECTED, Disp: 30 g, Rfl: 1  •  losartan (Cozaar) 25 MG tablet, Take 1 tablet by mouth Daily., Disp: 90 tablet, Rfl: 1  •  omeprazole (priLOSEC) 40 MG capsule, TAKE ONE (1) CAPSULE BY MOUTH EVERY DAY, Disp: 30 capsule, Rfl: 6  •  pantoprazole (PROTONIX) 40 MG EC tablet, TAKE ONE (1) TABLET BY MOUTH EVERY DAY, Disp: 30 tablet, Rfl: 6  •  potassium chloride (KLOR-CON) 10 MEQ CR tablet, Take 1 tablet by mouth Daily As Needed (if takes lasix)., Disp: 40 tablet, Rfl: 1  •  promethazine (PHENERGAN) 25 MG tablet, Take 1 tablet by mouth Every 6 (Six) Hours As Needed for Nausea or Vomiting., Disp: 90 tablet, Rfl: 1  •  sertraline (Zoloft) 25 MG tablet, Take 1 tablet by mouth Daily., Disp: 90 tablet, Rfl: 1    No Known Allergies    Family History   Problem Relation Age of Onset   • Dementia Mother    • Arthritis Mother    • Heart disease Father    • Hypertension Father    • Malig Hyperthermia Neg Hx        Cancer-related family history is not on file.    Social History     Tobacco Use   • Smoking status: Never   • Smokeless tobacco: Never   Vaping Use   • Vaping Use: Never used   Substance Use Topics   • Alcohol use: Yes     Alcohol/week: 1.0 standard drink     Types: 1 Glasses of wine per week     Comment: less than monthly   • Drug use:  "No     I have reviewed and confirmed the accuracy of the patient's history: Chief complaint, HPI, ROS and Subjective as entered by the MA/LPN/RN. Inga Hernandez MD 05/01/23       SUBJECTIVE:    Patient with complaint of bilateral lower extremity swelling but no pain.  Patient was prescribed diuretics by her PCP.  She denies any pain associated with the above.    Her lower extremity swelling has improved.  She has problems with bowels sometimes with pain while defecating.  She no longer has hip pain          REVIEW OF SYSTEMS:    Review of Systems   Constitutional: Positive for fatigue. Negative for chills and fever.   HENT: Negative for ear pain, mouth sores, nosebleeds and sore throat.    Eyes: Negative for photophobia and visual disturbance.   Respiratory: Negative for wheezing and stridor.    Cardiovascular: Negative for chest pain and palpitations.   Gastrointestinal:Negative for diarrhea and vomiting.   Endocrine: Negative for cold intolerance and heat intolerance.   Genitourinary: Negative for dysuria and hematuria.   Musculoskeletal: Negative for joint swelling and neck stiffness.   Skin: Negative for color change and rash.   Neurological:  Negative for seizures and syncope.   Hematological: Negative for adenopathy.   Psychiatric/Behavioral: Negative for agitation, confusion and hallucinations.       OBJECTIVE:  97.8 °F, pulse 80 bpm, respirations 18, blood pressure 128/74, O2 saturation 98%  Height 65 inches, weight 174 pounds    Vitals:    05/01/23 1237   BP: 131/80   Pulse: 70   Resp: 18   Temp: 98.3 °F (36.8 °C)   TempSrc: Infrared   Weight: 82.1 kg (181 lb)   Height: 165.1 cm (65\")   PainSc:   6   PainLoc: Back     Body mass index is 30.12 kg/m².    ECOG    (1) Restricted in physically strenuous activity, ambulatory and able to do work of light nature    Physical Exam  Vitals and nursing note reviewed.   Constitutional:       General: She is not in acute distress.     Appearance: She is not " diaphoretic.   HENT:      Head: Normocephalic and atraumatic.      Mouth/Throat:      Mouth: Mucous membranes are moist  Eyes:      General: No scleral icterus.        Right eye: No discharge.         Left eye: No discharge.      Conjunctiva/sclera: Conjunctivae normal.   Neck:      Thyroid: No thyromegaly.   Cardiovascular:      Rate and Rhythm: Normal rate and regular rhythm.      Heart sounds: Normal heart sounds.     No friction rub. No gallop.   Pulmonary:      Effort: Pulmonary effort is normal. No respiratory distress.      Breath sounds: No stridor. No wheezing.   Abdominal:      General: Bowel sounds are normal.      Palpations: Abdomen is soft. There is no mass.      Tenderness: There is no abdominal tenderness. There is no guarding or rebound.      Comments:   Musculoskeletal:         General: No tenderness. Normal range of motion.      Cervical back: Normal range of motion and neck supple.   Lymphadenopathy:      Cervical: No cervical adenopathy.   Skin:     General: Skin is warm.      Findings: No erythema or rash.      Comments:   Neurological:      Mental Status: She is alert and oriented to person, place, and time.      Motor: No abnormal muscle tone.   Psychiatric:         Behavior: Behavior normal.     Abdominal wall appears hardened    I have reexamined the patient and the results are consistent with the previously documented exam. Inga Hernandez MD       RECENT LABS    WBC   Date Value Ref Range Status   05/01/2023 5.31 3.40 - 10.80 10*3/mm3 Final     RBC   Date Value Ref Range Status   05/01/2023 4.10 3.77 - 5.28 10*6/mm3 Final     Hemoglobin   Date Value Ref Range Status   05/01/2023 13.1 12.0 - 15.9 g/dL Final     Hematocrit   Date Value Ref Range Status   05/01/2023 41.0 34.0 - 46.6 % Final     MCV   Date Value Ref Range Status   05/01/2023 100.0 (H) 79.0 - 97.0 fL Final     MCH   Date Value Ref Range Status   05/01/2023 32.0 26.6 - 33.0 pg Final     MCHC   Date Value Ref Range Status    05/01/2023 32.0 31.5 - 35.7 g/dL Final     RDW   Date Value Ref Range Status   05/01/2023 14.1 12.3 - 15.4 % Final     RDW-SD   Date Value Ref Range Status   05/01/2023 50.5 37.0 - 54.0 fl Final     MPV   Date Value Ref Range Status   05/01/2023 8.6 6.0 - 12.0 fL Final     Platelets   Date Value Ref Range Status   05/01/2023 180 140 - 450 10*3/mm3 Final     Neutrophil %   Date Value Ref Range Status   05/01/2023 58.8 42.7 - 76.0 % Final     Lymphocyte %   Date Value Ref Range Status   05/01/2023 24.9 19.6 - 45.3 % Final     Monocyte %   Date Value Ref Range Status   05/01/2023 12.1 (H) 5.0 - 12.0 % Final     Eosinophil %   Date Value Ref Range Status   05/01/2023 3.4 0.3 - 6.2 % Final     Basophil %   Date Value Ref Range Status   05/01/2023 0.8 0.0 - 1.5 % Final     Immature Grans %   Date Value Ref Range Status   04/29/2022 0.4 0.0 - 0.5 % Final     Neutrophils, Absolute   Date Value Ref Range Status   05/01/2023 3.13 1.70 - 7.00 10*3/mm3 Final     Lymphocytes, Absolute   Date Value Ref Range Status   05/01/2023 1.32 0.70 - 3.10 10*3/mm3 Final     Monocytes, Absolute   Date Value Ref Range Status   05/01/2023 0.64 0.10 - 0.90 10*3/mm3 Final     Eosinophils, Absolute   Date Value Ref Range Status   05/01/2023 0.18 0.00 - 0.40 10*3/mm3 Final     Basophils, Absolute   Date Value Ref Range Status   05/01/2023 0.04 0.00 - 0.20 10*3/mm3 Final     Immature Grans, Absolute   Date Value Ref Range Status   04/29/2022 0.02 0.00 - 0.05 10*3/mm3 Final     nRBC   Date Value Ref Range Status   04/29/2022 0.0 0.0 - 0.2 /100 WBC Final       Lab Results   Component Value Date    GLUCOSE 101 (H) 04/21/2023    BUN 21 04/21/2023    CREATININE 1.23 (H) 04/21/2023    EGFRIFNONA 47 (L) 02/21/2022    EGFRIFAFRI 44 (L) 01/21/2020    BCR 17.1 04/21/2023    K 3.7 04/21/2023    CO2 25.0 04/21/2023    CALCIUM 9.2 04/21/2023    ALBUMIN 3.4 (L) 04/21/2023    LABIL2 1.2 (calc) 01/21/2020    AST 20 04/21/2023    ALT 9 04/21/2023          ASSESSMENT:      1. 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  Ongoing systemic treatment with Avastin.  We will continue to review her next CT scans  2. Status post neoadjuvant chemotherapy with carboplatinum AUC 6, Taxol 175 mg per metered squared, with Avastin added due to lack of significant response with carboplatinum and Taxol.  Monitor serial CA-125's.  Down to 135 as of 3/14/2022.  Her CT scan showed response therefore patient will proceed with radical hysterectomy to perform by Dr. Dubois on 4/26/2022.  Plans for additional chemotherapy along with Avastin and possibly maintenance treatment with PARP inhibitors in the future.  She is also on letrozole 2.5 mg p.o. daily.  Patient is currently on Avastin maintenance along with letrozole.  Reviewed her recent CT scans which show evidence of sclerotic densities noted on the right hip may be related to surgery.  MRI of the right hip was recommended.  Reviewed the MRI completed on 2/17/2023 the area is suspicious for metastatic disease.  Also patient has had rising 's.  Refer to Dr. Tapia to see if she would be a candidate for SBRT.  We will also have patient be seen by Dr. Toni Hedrick given the severe DJD she has in that area to see if any surgical options.  In the meantime we will continue Avastin and letrozole.  Ongoing management  3. Right hip pain has completely resolved  4. Abdominal wall masslike?: We will further evaluate with a PET CT scan  5. Bilateral lower extremity swelling 2+ Doppler study starts today was negative for clots.  Patient to resume Avastin  6. Status post radical hysterectomy.  See path report.  Caris testing pending  7. Mucositis: This has resolved  8. Elevated blood pressure: This has improved.  Patient will continue to follow-up with her PCP  9. Pulmonary nodules of unclear etiology too small for biopsy or PET resolution: Continue surveillance CT scans.  Also check 's  10. 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  Patient already had some 25% dose reduction on her chemo.  She is currently on Neurontin 300 mg daily will increase to 300 mg twice a day.  Peripheral neuropathy continues to be an issue.  We will hold Taxol for now till this has improved  11. Comprehensive gene analysis with cancer next technology was negative for any significant mutation in all 77 genes analyzed  12. 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  No other actionable mutations identified     Plans:     · PET CT scan as soon as possible  · Trend 's  · In the meantime continue Avastin  · Status post SRS to the hip.  Pain resolved  · Continue letrozole  · Continue Neurontin to 300 mg every 8 hours for peripheral neuropathy.  We will refill today that this is helping her neuropathy  · Reviewed CT scans of the chest abdomen and pelvis for cancer restaging.  Completed January 2023  · She will benefit from olaparib in the future  · Can resume full-time work, notes given  · Reviewed her path report from radical hysterectomy 4/20/2022  · Comprehensive gene analysis with cancer next technology results reviewed with patient and copies of her results was also given to her for her own records keeping  · Continue Percocet 5 mg p.o. every 4-6 hours as needed for pain  · Continue vitamin b6 100mg po q 12   · Foundation 1 testing results reviewed  · All questions answered  · Follow-up up end of April and at that time discussed when her next scans will be. She has apt May 1        I spent 40 total minutes, face-to-face, caring for Lucy today. 90% of this time involved counseling and/or coordination of care as documented within this note.

## 2023-05-07 DIAGNOSIS — C56.9 MALIGNANT NEOPLASM OF OVARY, UNSPECIFIED LATERALITY: Primary | ICD-10-CM

## 2023-05-07 RX ORDER — SODIUM CHLORIDE 9 MG/ML
250 INJECTION, SOLUTION INTRAVENOUS ONCE
Status: CANCELLED | OUTPATIENT
Start: 2023-05-08

## 2023-05-08 ENCOUNTER — HOSPITAL ENCOUNTER (OUTPATIENT)
Dept: ONCOLOGY | Facility: HOSPITAL | Age: 63
Discharge: HOME | End: 2023-05-08
Admitting: INTERNAL MEDICINE
Payer: COMMERCIAL

## 2023-05-08 VITALS
HEART RATE: 64 BPM | SYSTOLIC BLOOD PRESSURE: 120 MMHG | DIASTOLIC BLOOD PRESSURE: 73 MMHG | OXYGEN SATURATION: 98 % | TEMPERATURE: 97.7 F | RESPIRATION RATE: 18 BRPM | BODY MASS INDEX: 29.95 KG/M2 | WEIGHT: 180 LBS

## 2023-05-08 DIAGNOSIS — E83.51 HYPOCALCEMIA: ICD-10-CM

## 2023-05-08 DIAGNOSIS — Z45.2 ENCOUNTER FOR CARE RELATED TO PORT-A-CATH: ICD-10-CM

## 2023-05-08 DIAGNOSIS — C56.9 MALIGNANT NEOPLASM OF OVARY, UNSPECIFIED LATERALITY: Primary | ICD-10-CM

## 2023-05-08 LAB
ALBUMIN SERPL-MCNC: 3.4 G/DL (ref 3.5–5.2)
ALBUMIN/GLOB SERPL: 1 G/DL
ALP SERPL-CCNC: 93 U/L (ref 39–117)
ALT SERPL W P-5'-P-CCNC: 9 U/L (ref 1–33)
ANION GAP SERPL CALCULATED.3IONS-SCNC: 11 MMOL/L (ref 5–15)
AST SERPL-CCNC: 20 U/L (ref 1–32)
BASOPHILS # BLD AUTO: 0.02 10*3/MM3 (ref 0–0.2)
BASOPHILS NFR BLD AUTO: 0.4 % (ref 0–1.5)
BILIRUB SERPL-MCNC: 0.3 MG/DL (ref 0–1.2)
BILIRUB UR QL STRIP: NEGATIVE
BUN SERPL-MCNC: 22 MG/DL (ref 8–23)
BUN/CREAT SERPL: 14.5 (ref 7–25)
CALCIUM SPEC-SCNC: 9.1 MG/DL (ref 8.6–10.5)
CANCER AG125 SERPL QL: 267 U/ML (ref 0–38.1)
CHLORIDE SERPL-SCNC: 102 MMOL/L (ref 98–107)
CLARITY UR: CLEAR
CO2 SERPL-SCNC: 26 MMOL/L (ref 22–29)
COLOR UR: YELLOW
CREAT SERPL-MCNC: 1.52 MG/DL (ref 0.57–1)
DEPRECATED RDW RBC AUTO: 50.3 FL (ref 37–54)
EGFRCR SERPLBLD CKD-EPI 2021: 38.1 ML/MIN/1.73
EOSINOPHIL # BLD AUTO: 0.25 10*3/MM3 (ref 0–0.4)
EOSINOPHIL NFR BLD AUTO: 5.3 % (ref 0.3–6.2)
ERYTHROCYTE [DISTWIDTH] IN BLOOD BY AUTOMATED COUNT: 14.1 % (ref 12.3–15.4)
GLOBULIN UR ELPH-MCNC: 3.5 GM/DL
GLUCOSE SERPL-MCNC: 117 MG/DL (ref 65–99)
GLUCOSE UR STRIP-MCNC: NEGATIVE MG/DL
HCT VFR BLD AUTO: 39.6 % (ref 34–46.6)
HGB BLD-MCNC: 12.6 G/DL (ref 12–15.9)
HGB UR QL STRIP.AUTO: NEGATIVE
KETONES UR QL STRIP: NEGATIVE
LEUKOCYTE ESTERASE UR QL STRIP.AUTO: ABNORMAL
LYMPHOCYTES # BLD AUTO: 1.16 10*3/MM3 (ref 0.7–3.1)
LYMPHOCYTES NFR BLD AUTO: 24.5 % (ref 19.6–45.3)
MCH RBC QN AUTO: 31.7 PG (ref 26.6–33)
MCHC RBC AUTO-ENTMCNC: 31.8 G/DL (ref 31.5–35.7)
MCV RBC AUTO: 99.5 FL (ref 79–97)
MONOCYTES # BLD AUTO: 0.69 10*3/MM3 (ref 0.1–0.9)
MONOCYTES NFR BLD AUTO: 14.6 % (ref 5–12)
NEUTROPHILS NFR BLD AUTO: 2.61 10*3/MM3 (ref 1.7–7)
NEUTROPHILS NFR BLD AUTO: 55.2 % (ref 42.7–76)
NITRITE UR QL STRIP: NEGATIVE
PH UR STRIP.AUTO: <=5 [PH] (ref 5–8)
PLATELET # BLD AUTO: 170 10*3/MM3 (ref 140–450)
PMV BLD AUTO: 9.5 FL (ref 6–12)
POTASSIUM SERPL-SCNC: 3.5 MMOL/L (ref 3.5–5.2)
PROT SERPL-MCNC: 6.9 G/DL (ref 6–8.5)
PROT UR QL STRIP: NEGATIVE
RBC # BLD AUTO: 3.98 10*6/MM3 (ref 3.77–5.28)
SODIUM SERPL-SCNC: 139 MMOL/L (ref 136–145)
SP GR UR STRIP: 1.01 (ref 1–1.03)
UROBILINOGEN UR QL STRIP: ABNORMAL
WBC NRBC COR # BLD: 4.73 10*3/MM3 (ref 3.4–10.8)

## 2023-05-08 PROCEDURE — 81003 URINALYSIS AUTO W/O SCOPE: CPT | Performed by: INTERNAL MEDICINE

## 2023-05-08 PROCEDURE — 96413 CHEMO IV INFUSION 1 HR: CPT

## 2023-05-08 PROCEDURE — 25010000002 HEPARIN LOCK FLUSH PER 10 UNITS: Performed by: INTERNAL MEDICINE

## 2023-05-08 PROCEDURE — 86304 IMMUNOASSAY TUMOR CA 125: CPT | Performed by: INTERNAL MEDICINE

## 2023-05-08 PROCEDURE — 80053 COMPREHEN METABOLIC PANEL: CPT | Performed by: INTERNAL MEDICINE

## 2023-05-08 PROCEDURE — 25010000002 BEVACIZUMAB-BVZR 400 MG/16ML SOLUTION 16 ML VIAL: Performed by: INTERNAL MEDICINE

## 2023-05-08 PROCEDURE — 85025 COMPLETE CBC W/AUTO DIFF WBC: CPT | Performed by: INTERNAL MEDICINE

## 2023-05-08 RX ORDER — HEPARIN SODIUM (PORCINE) LOCK FLUSH IV SOLN 100 UNIT/ML 100 UNIT/ML
500 SOLUTION INTRAVENOUS AS NEEDED
OUTPATIENT
Start: 2023-05-08

## 2023-05-08 RX ORDER — SODIUM CHLORIDE 0.9 % (FLUSH) 0.9 %
20 SYRINGE (ML) INJECTION AS NEEDED
OUTPATIENT
Start: 2023-05-08

## 2023-05-08 RX ORDER — HEPARIN SODIUM (PORCINE) LOCK FLUSH IV SOLN 100 UNIT/ML 100 UNIT/ML
500 SOLUTION INTRAVENOUS AS NEEDED
Status: DISCONTINUED | OUTPATIENT
Start: 2023-05-08 | End: 2023-05-09 | Stop reason: HOSPADM

## 2023-05-08 RX ORDER — SODIUM CHLORIDE 9 MG/ML
250 INJECTION, SOLUTION INTRAVENOUS ONCE
Status: COMPLETED | OUTPATIENT
Start: 2023-05-08 | End: 2023-05-08

## 2023-05-08 RX ORDER — SODIUM CHLORIDE 0.9 % (FLUSH) 0.9 %
20 SYRINGE (ML) INJECTION AS NEEDED
Status: DISCONTINUED | OUTPATIENT
Start: 2023-05-08 | End: 2023-05-09 | Stop reason: HOSPADM

## 2023-05-08 RX ADMIN — Medication 20 ML: at 15:49

## 2023-05-08 RX ADMIN — SODIUM CHLORIDE 250 ML: 9 INJECTION, SOLUTION INTRAVENOUS at 15:14

## 2023-05-08 RX ADMIN — SODIUM CHLORIDE 790 MG: 9 INJECTION, SOLUTION INTRAVENOUS at 15:14

## 2023-05-08 RX ADMIN — Medication 500 UNITS: at 15:49

## 2023-05-08 NOTE — PROGRESS NOTES
Pt. Is here at clinic for [facility].   Pt. Has no complaints today.   Pt. Stated she seen her eye doctor last week and she has a cataract in her left eye which will have to be removed soon. Pt. Was wondering if it would be ok to schedule eye surgery.   Message sent to MD/NP through secure chat and Inbox.   Treatment given as ordered and pt. Tolerated well.   Pt. Discharged from clinic with no complaints and AVS was given.

## 2023-05-09 ENCOUNTER — HOSPITAL ENCOUNTER (OUTPATIENT)
Dept: PET IMAGING | Facility: HOSPITAL | Age: 63
Discharge: HOME | End: 2023-05-09
Admitting: INTERNAL MEDICINE
Payer: COMMERCIAL

## 2023-05-09 DIAGNOSIS — C56.9 MALIGNANT NEOPLASM OF OVARY, UNSPECIFIED LATERALITY: ICD-10-CM

## 2023-05-09 LAB — GLUCOSE BLDC GLUCOMTR-MCNC: 90 MG/DL (ref 70–105)

## 2023-05-09 PROCEDURE — 78815 PET IMAGE W/CT SKULL-THIGH: CPT

## 2023-05-09 PROCEDURE — 0 FLUDEOXYGLUCOSE F18 SOLUTION: Performed by: INTERNAL MEDICINE

## 2023-05-09 PROCEDURE — A9552 F18 FDG: HCPCS | Performed by: INTERNAL MEDICINE

## 2023-05-09 PROCEDURE — 82948 REAGENT STRIP/BLOOD GLUCOSE: CPT

## 2023-05-09 RX ADMIN — FLUDEOXYGLUCOSE F18 1 DOSE: 300 INJECTION INTRAVENOUS at 08:00

## 2023-05-10 ENCOUNTER — CLINICAL SUPPORT (OUTPATIENT)
Dept: FAMILY MEDICINE CLINIC | Facility: CLINIC | Age: 63
End: 2023-05-10
Payer: COMMERCIAL

## 2023-05-10 ENCOUNTER — TELEPHONE (OUTPATIENT)
Dept: FAMILY MEDICINE CLINIC | Facility: CLINIC | Age: 63
End: 2023-05-10

## 2023-05-10 DIAGNOSIS — E03.9 ACQUIRED HYPOTHYROIDISM: Primary | ICD-10-CM

## 2023-05-10 PROCEDURE — 84443 ASSAY THYROID STIM HORMONE: CPT | Performed by: NURSE PRACTITIONER

## 2023-05-10 PROCEDURE — 36415 COLL VENOUS BLD VENIPUNCTURE: CPT | Performed by: NURSE PRACTITIONER

## 2023-05-10 NOTE — PROGRESS NOTES
Venipuncture Blood Specimen Collection  Venipuncture performed in the right arm by Corie Soares MA with good hemostasis. Patient tolerated the procedure well without complications.   05/10/23   Corie Soares MA  Answers for HPI/ROS submitted by the patient on 5/9/2023  Please describe your symptoms.: Getting labs  Have you had these symptoms before?: Yes  How long have you been having these symptoms?: 1-4 days  What is the primary reason for your visit?: Other

## 2023-05-10 NOTE — TELEPHONE ENCOUNTER
----- Message from FLORENCE Troncoso sent at 5/10/2023 12:43 PM EDT -----  Pls let pt know of labs and comments mentioned per Donna HENRIQUEZ. Conitnue hctz, she should only be using furosemide prn weight gain of >3 lbs. Pls make sure she is not using it daily. Thanks   ----- Message -----  From: Pina Ernst MA  Sent: 5/9/2023   8:04 AM EDT  To: FLORENCE Troncoso

## 2023-05-11 DIAGNOSIS — J18.9 LUNG INFECTION: Primary | ICD-10-CM

## 2023-05-11 LAB — TSH SERPL DL<=0.05 MIU/L-ACNC: 0.14 UIU/ML (ref 0.27–4.2)

## 2023-05-11 RX ORDER — LEVOFLOXACIN 500 MG/1
500 TABLET, FILM COATED ORAL DAILY
Qty: 10 TABLET | Refills: 0 | Status: SHIPPED | OUTPATIENT
Start: 2023-05-11

## 2023-05-12 RX ORDER — LEVOTHYROXINE SODIUM 0.1 MG/1
100 TABLET ORAL DAILY
Qty: 30 TABLET | Refills: 1 | Status: SHIPPED | OUTPATIENT
Start: 2023-05-12

## 2023-05-16 RX ORDER — GABAPENTIN 300 MG/1
CAPSULE ORAL
Qty: 90 CAPSULE | Refills: 3 | Status: SHIPPED | OUTPATIENT
Start: 2023-05-16

## 2023-05-19 DIAGNOSIS — C56.9 MALIGNANT NEOPLASM OF OVARY, UNSPECIFIED LATERALITY: Primary | ICD-10-CM

## 2023-05-19 RX ORDER — SODIUM CHLORIDE 9 MG/ML
250 INJECTION, SOLUTION INTRAVENOUS ONCE
Status: CANCELLED | OUTPATIENT
Start: 2023-05-22

## 2023-05-22 ENCOUNTER — HOSPITAL ENCOUNTER (OUTPATIENT)
Dept: ONCOLOGY | Facility: HOSPITAL | Age: 63
Discharge: HOME | End: 2023-05-22
Admitting: INTERNAL MEDICINE
Payer: COMMERCIAL

## 2023-05-22 VITALS
DIASTOLIC BLOOD PRESSURE: 79 MMHG | OXYGEN SATURATION: 98 % | HEART RATE: 72 BPM | SYSTOLIC BLOOD PRESSURE: 123 MMHG | BODY MASS INDEX: 29.66 KG/M2 | TEMPERATURE: 97.5 F | RESPIRATION RATE: 16 BRPM | HEIGHT: 65 IN | WEIGHT: 178 LBS

## 2023-05-22 DIAGNOSIS — Z45.2 ENCOUNTER FOR CARE RELATED TO PORT-A-CATH: ICD-10-CM

## 2023-05-22 DIAGNOSIS — E83.51 HYPOCALCEMIA: ICD-10-CM

## 2023-05-22 DIAGNOSIS — C56.9 MALIGNANT NEOPLASM OF OVARY, UNSPECIFIED LATERALITY: Primary | ICD-10-CM

## 2023-05-22 LAB
ALBUMIN SERPL-MCNC: 3.2 G/DL (ref 3.5–5.2)
ALBUMIN/GLOB SERPL: 0.9 G/DL
ALP SERPL-CCNC: 83 U/L (ref 39–117)
ALT SERPL W P-5'-P-CCNC: 6 U/L (ref 1–33)
ANION GAP SERPL CALCULATED.3IONS-SCNC: 13 MMOL/L (ref 5–15)
AST SERPL-CCNC: 17 U/L (ref 1–32)
BASOPHILS # BLD AUTO: 0.03 10*3/MM3 (ref 0–0.2)
BASOPHILS NFR BLD AUTO: 0.6 % (ref 0–1.5)
BILIRUB SERPL-MCNC: 0.3 MG/DL (ref 0–1.2)
BILIRUB UR QL STRIP: ABNORMAL
BUN SERPL-MCNC: 19 MG/DL (ref 8–23)
BUN/CREAT SERPL: 15 (ref 7–25)
CALCIUM SPEC-SCNC: 8.9 MG/DL (ref 8.6–10.5)
CANCER AG125 SERPL QL: 280 U/ML (ref 0–38.1)
CHLORIDE SERPL-SCNC: 101 MMOL/L (ref 98–107)
CLARITY UR: CLEAR
CO2 SERPL-SCNC: 23 MMOL/L (ref 22–29)
COLOR UR: YELLOW
CREAT SERPL-MCNC: 1.27 MG/DL (ref 0.57–1)
DEPRECATED RDW RBC AUTO: 47.2 FL (ref 37–54)
EGFRCR SERPLBLD CKD-EPI 2021: 47.3 ML/MIN/1.73
EOSINOPHIL # BLD AUTO: 0.21 10*3/MM3 (ref 0–0.4)
EOSINOPHIL NFR BLD AUTO: 4.3 % (ref 0.3–6.2)
ERYTHROCYTE [DISTWIDTH] IN BLOOD BY AUTOMATED COUNT: 13.6 % (ref 12.3–15.4)
GLOBULIN UR ELPH-MCNC: 3.6 GM/DL
GLUCOSE SERPL-MCNC: 118 MG/DL (ref 65–99)
GLUCOSE UR STRIP-MCNC: NEGATIVE MG/DL
HCT VFR BLD AUTO: 39.1 % (ref 34–46.6)
HGB BLD-MCNC: 12.8 G/DL (ref 12–15.9)
HGB UR QL STRIP.AUTO: NEGATIVE
KETONES UR QL STRIP: ABNORMAL
LEUKOCYTE ESTERASE UR QL STRIP.AUTO: ABNORMAL
LYMPHOCYTES # BLD AUTO: 1.01 10*3/MM3 (ref 0.7–3.1)
LYMPHOCYTES NFR BLD AUTO: 20.6 % (ref 19.6–45.3)
MCH RBC QN AUTO: 31.8 PG (ref 26.6–33)
MCHC RBC AUTO-ENTMCNC: 32.7 G/DL (ref 31.5–35.7)
MCV RBC AUTO: 97.3 FL (ref 79–97)
MONOCYTES # BLD AUTO: 0.79 10*3/MM3 (ref 0.1–0.9)
MONOCYTES NFR BLD AUTO: 16.1 % (ref 5–12)
NEUTROPHILS NFR BLD AUTO: 2.87 10*3/MM3 (ref 1.7–7)
NEUTROPHILS NFR BLD AUTO: 58.4 % (ref 42.7–76)
NITRITE UR QL STRIP: NEGATIVE
PH UR STRIP.AUTO: 5.5 [PH] (ref 5–8)
PLATELET # BLD AUTO: 153 10*3/MM3 (ref 140–450)
PMV BLD AUTO: 8.9 FL (ref 6–12)
POTASSIUM SERPL-SCNC: 3.6 MMOL/L (ref 3.5–5.2)
PROT SERPL-MCNC: 6.8 G/DL (ref 6–8.5)
PROT UR QL STRIP: ABNORMAL
RBC # BLD AUTO: 4.02 10*6/MM3 (ref 3.77–5.28)
SODIUM SERPL-SCNC: 137 MMOL/L (ref 136–145)
SP GR UR STRIP: >=1.03 (ref 1–1.03)
UROBILINOGEN UR QL STRIP: ABNORMAL
WBC NRBC COR # BLD: 4.91 10*3/MM3 (ref 3.4–10.8)

## 2023-05-22 PROCEDURE — 86304 IMMUNOASSAY TUMOR CA 125: CPT | Performed by: INTERNAL MEDICINE

## 2023-05-22 PROCEDURE — 25010000002 HEPARIN LOCK FLUSH PER 10 UNITS: Performed by: INTERNAL MEDICINE

## 2023-05-22 PROCEDURE — 85025 COMPLETE CBC W/AUTO DIFF WBC: CPT | Performed by: INTERNAL MEDICINE

## 2023-05-22 PROCEDURE — 96413 CHEMO IV INFUSION 1 HR: CPT

## 2023-05-22 PROCEDURE — 81003 URINALYSIS AUTO W/O SCOPE: CPT | Performed by: INTERNAL MEDICINE

## 2023-05-22 PROCEDURE — 80053 COMPREHEN METABOLIC PANEL: CPT | Performed by: INTERNAL MEDICINE

## 2023-05-22 PROCEDURE — 25010000002 BEVACIZUMAB-BVZR 400 MG/16ML SOLUTION 16 ML VIAL: Performed by: INTERNAL MEDICINE

## 2023-05-22 RX ORDER — HEPARIN SODIUM (PORCINE) LOCK FLUSH IV SOLN 100 UNIT/ML 100 UNIT/ML
500 SOLUTION INTRAVENOUS AS NEEDED
Status: DISCONTINUED | OUTPATIENT
Start: 2023-05-22 | End: 2023-05-23 | Stop reason: HOSPADM

## 2023-05-22 RX ORDER — SODIUM CHLORIDE 0.9 % (FLUSH) 0.9 %
20 SYRINGE (ML) INJECTION AS NEEDED
Status: DISCONTINUED | OUTPATIENT
Start: 2023-05-22 | End: 2023-05-23 | Stop reason: HOSPADM

## 2023-05-22 RX ORDER — SODIUM CHLORIDE 9 MG/ML
250 INJECTION, SOLUTION INTRAVENOUS ONCE
Status: COMPLETED | OUTPATIENT
Start: 2023-05-22 | End: 2023-05-22

## 2023-05-22 RX ORDER — SODIUM CHLORIDE 0.9 % (FLUSH) 0.9 %
20 SYRINGE (ML) INJECTION AS NEEDED
OUTPATIENT
Start: 2023-05-22

## 2023-05-22 RX ORDER — HEPARIN SODIUM (PORCINE) LOCK FLUSH IV SOLN 100 UNIT/ML 100 UNIT/ML
500 SOLUTION INTRAVENOUS AS NEEDED
OUTPATIENT
Start: 2023-05-22

## 2023-05-22 RX ADMIN — SODIUM CHLORIDE 790 MG: 9 INJECTION, SOLUTION INTRAVENOUS at 15:01

## 2023-05-22 RX ADMIN — SODIUM CHLORIDE 250 ML: 9 INJECTION, SOLUTION INTRAVENOUS at 15:01

## 2023-05-22 RX ADMIN — Medication 500 UNITS: at 15:38

## 2023-05-22 RX ADMIN — Medication 20 ML: at 15:38

## 2023-05-24 DIAGNOSIS — C56.9 MALIGNANT NEOPLASM OF OVARY, UNSPECIFIED LATERALITY: ICD-10-CM

## 2023-05-24 RX ORDER — HYDROCODONE BITARTRATE AND ACETAMINOPHEN 5; 325 MG/1; MG/1
1 TABLET ORAL EVERY 6 HOURS PRN
Qty: 60 TABLET | Refills: 0 | Status: SHIPPED | OUTPATIENT
Start: 2023-05-24

## 2023-05-31 ENCOUNTER — OFFICE VISIT (OUTPATIENT)
Dept: ONCOLOGY | Facility: CLINIC | Age: 63
End: 2023-05-31

## 2023-05-31 ENCOUNTER — LAB (OUTPATIENT)
Dept: LAB | Facility: HOSPITAL | Age: 63
End: 2023-05-31

## 2023-05-31 VITALS
RESPIRATION RATE: 20 BRPM | TEMPERATURE: 97.5 F | WEIGHT: 179 LBS | DIASTOLIC BLOOD PRESSURE: 83 MMHG | HEART RATE: 74 BPM | BODY MASS INDEX: 29.82 KG/M2 | HEIGHT: 65 IN | SYSTOLIC BLOOD PRESSURE: 154 MMHG | OXYGEN SATURATION: 94 %

## 2023-05-31 DIAGNOSIS — C56.9 MALIGNANT NEOPLASM OF OVARY, UNSPECIFIED LATERALITY: Primary | ICD-10-CM

## 2023-05-31 LAB
BASOPHILS # BLD AUTO: 0.02 10*3/MM3 (ref 0–0.2)
BASOPHILS NFR BLD AUTO: 0.3 % (ref 0–1.5)
DEPRECATED RDW RBC AUTO: 48.8 FL (ref 37–54)
EOSINOPHIL # BLD AUTO: 0.27 10*3/MM3 (ref 0–0.4)
EOSINOPHIL NFR BLD AUTO: 4.4 % (ref 0.3–6.2)
ERYTHROCYTE [DISTWIDTH] IN BLOOD BY AUTOMATED COUNT: 14 % (ref 12.3–15.4)
HCT VFR BLD AUTO: 44.1 % (ref 34–46.6)
HGB BLD-MCNC: 14 G/DL (ref 12–15.9)
HOLD SPECIMEN: NORMAL
HOLD SPECIMEN: NORMAL
LYMPHOCYTES # BLD AUTO: 1.39 10*3/MM3 (ref 0.7–3.1)
LYMPHOCYTES NFR BLD AUTO: 22.8 % (ref 19.6–45.3)
MCH RBC QN AUTO: 30.9 PG (ref 26.6–33)
MCHC RBC AUTO-ENTMCNC: 31.7 G/DL (ref 31.5–35.7)
MCV RBC AUTO: 97.4 FL (ref 79–97)
MONOCYTES # BLD AUTO: 0.78 10*3/MM3 (ref 0.1–0.9)
MONOCYTES NFR BLD AUTO: 12.8 % (ref 5–12)
NEUTROPHILS NFR BLD AUTO: 3.63 10*3/MM3 (ref 1.7–7)
NEUTROPHILS NFR BLD AUTO: 59.7 % (ref 42.7–76)
PLATELET # BLD AUTO: 191 10*3/MM3 (ref 140–450)
PMV BLD AUTO: 9.3 FL (ref 6–12)
RBC # BLD AUTO: 4.53 10*6/MM3 (ref 3.77–5.28)
WBC NRBC COR # BLD: 6.09 10*3/MM3 (ref 3.4–10.8)

## 2023-05-31 PROCEDURE — 85025 COMPLETE CBC W/AUTO DIFF WBC: CPT

## 2023-05-31 PROCEDURE — 36415 COLL VENOUS BLD VENIPUNCTURE: CPT

## 2023-05-31 RX ORDER — ONDANSETRON HYDROCHLORIDE 8 MG/1
8 TABLET, FILM COATED ORAL EVERY 8 HOURS PRN
Qty: 30 TABLET | Refills: 3 | Status: SHIPPED | OUTPATIENT
Start: 2023-05-31

## 2023-05-31 NOTE — PROGRESS NOTES
Hematology/Oncology Outpatient Follow Up    PATIENT NAME:Lucy Mahan  :1960  MRN: [id number]  PRIMARY CARE PHYSICIAN: [place] Avalos APRN  REFERRING PHYSICIAN: No ref. provider found    Chief Complaint   Patient presents with   • Follow-up     Malignant neoplasm of ovary, unspecified laterality        HISTORY OF PRESENT ILLNESS:     This is a 62 y.o.  female who developed abdominal pain in 2021.  Patient has also lost approximately 35 pounds during that..  She has had loss of appetite.  Due to the abdominal pain,  she had an ultrasound of the abdomen done on 10/13/2021.  This basically revealed no liver lesions.  Common bile duct is normal at 3 mm.  There is a nonmobile 3 cm shadowing gallstone within the gallbladder neck.  No gallbladder thickening or pericholecystic fluid collection.  Patient was then referred to general surgery and was seen by Dr. Badillo.  Who took her to surgery on 11/10/2021 for diagnostic laparoscopy and peritoneal biopsy.  Intra-Op , she was noted to have peritoneal studding with malignancy throughout the abdominal cavity and biopsies were completed. 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  The tumor was focally positive for progesterone receptor, positive for CK7, estrogen receptor, CA-125 and PAX 8..  The staining pattern is consistent with ovarian serous carcinoma.      She had CT scan of the chest, abdomen and pelvis and the chest is a 2 mm noncalcified nodule within the left lower lobe, noncalcified nodule in the left upper lobe measuring 4 mm and 3 mm.  In the abdomen there is a 5.1 x 5.3 cm enhancing soft tissue mass in the pelvis to the right of midline associated with the adnexal region possibly representing a primary ovarian malignancy.  No right or left ovarian tissue was seen.  There is abnormal omental thickening and nodularity consistent with carcinomatosis greatest bulk in the left mid abdomen measuring up to 10.8 cm x 2.5 cm.   There is nodular peritoneal thickening also present within the abdomen and pelvis consistent with peritoneal carcinomatosis.  The small quantity of ascitic fluid in the abdomen and pelvis.  Carcinomatosis nodular studding is seen along the inferior right hepatic lobe.     She  has been referred to us for further evaluation and management of her newly diagnosed ovarian carcinoma.     Patient is accompanied today by her daughter for this appointment.  There is  family history of precancerous cells of the  uterusin her sister.        She does not smoke and does not drink alcohol.  Patient is  and has 2 daughters.  She works for the [employer].    · 11/29/2021 patient was seen by Dr. Dubois who has recommended neoadjuvant chemotherapy for 2-3 cycles and then interval cytoreduction.  She has recommended carboplatinum AUC 6, Taxol 1 7 5 mg per metered squared, Avastin 15 mg/kg as was done in the oceans trial but hold Avastin for the first few cycles due to bowel risk.  · Prechemo CA-125 was 754  · 12/7/2021 patient received first cycle of chemotherapy with carboplatinum and Taxol with Neulasta  · 12/28/2021 patient received cycle 2 of combination chemotherapy with carboplatinum and Taxol with Neulasta  · 12/28/2021 CA-125 was down to 635  · 1/11/2022 add-on appointment for nausea, dizziness, thrombocytopenia, pain in upper to mid abdomen 8/10, new onset in the last 4 to 5 days  · January 12, 2022: Due to acute GI symptoms patient had a CT scan of the abdomen and pelvis which basically revealed increasing effusion on the left lung mild pleural effusion on the right lung decreased in amount of omental caking but no interval change in the right adnexal area moderate abdominal and pelvic ascites which has increased.  She denies any significant pulmonary symptoms.  Her O2 sat today was 100% at rest and 99% with ambulation  · 1/24/2022 patient received cycle 3 of carboplatinum with Taxol with dose reduction  due to significant toxicities  · 2/14/2022 patient received cycle 4-day 1 of chemo platinum Taxol and Avastin  · 3/7/22: Patient received cycle 5-day 1 of chemotherapy with carboplatinum Taxol and Avastin  · 4/4/2022 patient received cycle 6 of chemotherapy with A platinum Taxol and Avastin  · 4/26/2022 patient underwent exploratory laparotomy, total abdominal hysterectomy, bilateral salpingo-oophorectomy, pelvic and aortic lymphadenectomy, omentectomy performed by Dr Castro, pathology reviewed high-grade serous carcinoma involving the uterine serosa and outer myometrium, bilateral fallopian tubes and bilateral ovaries and adnexa soft tissue.  Also received are high-grade serous carcinoma involving the omentum as well as residual high-grade serous carcinoma with treatment effects on the colonic splenic [id number], estrogen receptor diffusely positive p53 patchy, p16 diffusely positive.  Molecular testing is pending  · 6/2/2022 patient received cycle 7 of Combination chemotherapy with Avastin and carboplatinum.  Taxol was held due to significant peripheral neuropathy  · 7/14/2022 patient presents for follow-up of ovarian cancer  · 7/28/2020 patient received cycle 9 of carboplatinum  · Patient is currently on maintenance Avastin and letrozole for ovarian cancer suppression  · 1/17/2023 patient had CT scan of the chest, abdomen and pelvis and this basically revealed stable small pulmonary nodules on the chest.  Peritoneal thickening in the right paracolic gutter but no definite new peritoneal nodules or mass.  There is a new sclerotic lesion involving the right anterior acetabulum measuring 2.1 cm, severe right hip osteoarthritis further evaluation with right hip MRI has been recommended to further evaluate.  · 12/30/2022  was 54 prior was 43  · 2/17/2023 patient had MRI of the pelvis which showed enhancing sclerotic lesion within the anterior acetabulum on the right like representing metastatic disease.   Severe generative changes of the right hip joint was noted.  Gluteal tendinopathy on the right with no evidence of focal tear or significant bursitis.  · 3/27/2023 add on for abdominal pain, nausea and vomiting, fatigue    History of present illness was reviewed and is unchanged from the previous visit.         Past Medical History:   Diagnosis Date   • Arthritis    • Cancer     ovarian   • Depression    • Gall stones    • Hypertension    • Irritable bowel syndrome    • Rheumatoid aortitis    • Thyroid disease        Past Surgical History:   Procedure Laterality Date   •  SECTION      x2   • CHOLECYSTECTOMY N/A 11/10/2021    Procedure: diagnostic laparoscopy and peritoneal biopsy;  Surgeon: Krunal Badillo MD;  Location: [facility] MAIN OR;  Service: General;  Laterality: N/A;   • COLONOSCOPY     • HIP SURGERY Left    • JOINT REPLACEMENT     • THYROIDECTOMY, PARTIAL  2017   • TOTAL ABDOMINAL HYSTERECTOMY N/A 2022    Procedure: EXPLORATORY LAPAROTOMY, TOTAL ABDOMINAL HYSTERECTOMY, BILATERAL SALPINGOOOPHORECTOMY, PELVIC AND AORTIC LYMPHADENECTOMY, OMENTECTOMY;  Surgeon: Maggie Castro DO;  Location: [facility] MAIN OR;  Service: Gynecology Oncology;  Laterality: N/A;   • VENOUS ACCESS DEVICE (PORT) INSERTION Left 2021    Procedure: INSERTION VENOUS ACCESS DEVICE;  Surgeon: Krunal Badillo MD;  Location: [facility] MAIN OR;  Service: General;  Laterality: Left;         Current Outpatient Medications:   •  amLODIPine (NORVASC) 10 MG tablet, Take 1 tablet by mouth Daily., Disp: 90 tablet, Rfl: 1  •  furosemide (Lasix) 20 MG tablet, Take 1 tablet by mouth Daily As Needed (swelling and weight gain of 3 lbs in 48 hours)., Disp: 40 tablet, Rfl: 1  •  gabapentin (NEURONTIN) 300 MG capsule, TAKE ONE (1) CAPSULE BY MOUTH EVERY 8 HOURS, Disp: 90 capsule, Rfl: 3  •  hydroCHLOROthiazide (HYDRODIURIL) 25 MG tablet, Take 1 tablet by mouth Daily., Disp: 90 tablet, Rfl: 1  •  HYDROcodone-acetaminophen (Norco) 5-325 MG  per tablet, Take 1 tablet by mouth Every 6 (Six) Hours As Needed for Moderate Pain., Disp: 60 tablet, Rfl: 0  •  hydrOXYzine (ATARAX) 10 MG tablet, Take 1 tablet by mouth Every 8 (Eight) Hours As Needed for Anxiety., Disp: 45 tablet, Rfl: 2  •  letrozole (FEMARA) 2.5 MG tablet, TAKE ONE (1) TABLET BY MOUTH DAILY, Disp: 60 tablet, Rfl: 3  •  letrozole (FEMARA) 2.5 MG tablet, Take 1 tablet by mouth Daily., Disp: , Rfl:   •  levoFLOXacin (Levaquin) 500 MG tablet, Take 1 tablet by mouth Daily., Disp: 10 tablet, Rfl: 0  •  levothyroxine (Synthroid) 100 MCG tablet, Take 1 tablet by mouth Daily., Disp: 30 tablet, Rfl: 1  •  lidocaine-prilocaine (EMLA) 2.5-2.5 % cream, APPLY TO THE AFFECTED AREA OF port prior TO access AS DIRECTED, Disp: 30 g, Rfl: 1  •  losartan (Cozaar) 25 MG tablet, Take 1 tablet by mouth Daily., Disp: 90 tablet, Rfl: 1  •  omeprazole (priLOSEC) 40 MG capsule, TAKE ONE (1) CAPSULE BY MOUTH EVERY DAY, Disp: 30 capsule, Rfl: 6  •  ondansetron (Zofran) 8 MG tablet, Take 1 tablet by mouth Every 8 (Eight) Hours As Needed for Nausea or Vomiting., Disp: 30 tablet, Rfl: 3  •  pantoprazole (PROTONIX) 40 MG EC tablet, TAKE ONE (1) TABLET BY MOUTH EVERY DAY, Disp: 30 tablet, Rfl: 6  •  potassium chloride (KLOR-CON) 10 MEQ CR tablet, Take 1 tablet by mouth Daily As Needed (if takes lasix)., Disp: 40 tablet, Rfl: 1  •  promethazine (PHENERGAN) 25 MG tablet, Take 1 tablet by mouth Every 6 (Six) Hours As Needed for Nausea or Vomiting., Disp: 90 tablet, Rfl: 1  •  sertraline (Zoloft) 25 MG tablet, Take 1 tablet by mouth Daily., Disp: 90 tablet, Rfl: 1    No Known Allergies    Family History   Problem Relation Age of Onset   • Dementia Mother    • Arthritis Mother    • Heart disease Father    • Hypertension Father    • Malig Hyperthermia Neg Hx        Cancer-related family history is not on file.    Social History     Tobacco Use   • Smoking status: Never   • Smokeless tobacco: Never   Vaping Use   • Vaping Use: Never  "used   Substance Use Topics   • Alcohol use: Yes     Alcohol/week: 1.0 standard drink     Types: 1 Glasses of wine per week     Comment: less than monthly   • Drug use: No     I have reviewed and confirmed the accuracy of the patient's history: Chief complaint, HPI, ROS and Subjective as entered by the MA/LPN/RN. Ingabaldomero Hernandez MD 05/31/23       SUBJECTIVE:      Complains of abdominal discomfort occasional back pain, and some nausea.  But she continues to have regular bowel movements          REVIEW OF SYSTEMS:    Review of Systems   Constitutional: Positive for fatigue. Negative for chills and fever.   HENT: Negative for ear pain, mouth sores, nosebleeds and sore throat.    Eyes: Negative for photophobia and visual disturbance.   Respiratory: Negative for wheezing and stridor.    Cardiovascular: Negative for chest pain and palpitations.   Gastrointestinal:Negative for diarrhea and vomiting.   Endocrine: Negative for cold intolerance and heat intolerance.   Genitourinary: Negative for dysuria and hematuria.   Musculoskeletal: Negative for joint swelling and neck stiffness.   Skin: Negative for color change and rash.   Neurological:  Negative for seizures and syncope.   Hematological: Negative for adenopathy.   Psychiatric/Behavioral: Negative for agitation, confusion and hallucinations.       OBJECTIVE:  97.8 °F, pulse 80 bpm, respirations 18, blood pressure 128/74, O2 saturation 98%  Height 65 inches, weight 174 pounds    Vitals:    05/31/23 1527   BP: 154/83   Pulse: 74   Resp: 20   Temp: 97.5 °F (36.4 °C)   TempSrc: Oral   SpO2: 94%   Weight: 81.2 kg (179 lb)   Height: 165.1 cm (65\")   PainSc:   7   PainLoc: Comment: feet, hip, lower back, stomach area     Body mass index is 29.79 kg/m².    ECOG    (1) Restricted in physically strenuous activity, ambulatory and able to do work of light nature    Physical Exam  Vitals and nursing note reviewed.   Constitutional:       General: She is not in acute " distress.     Appearance: She is not diaphoretic.   HENT:      Head: Normocephalic and atraumatic.      Mouth/Throat:      Mouth: Mucous membranes are moist  Eyes:      General: No scleral icterus.        Right eye: No discharge.         Left eye: No discharge.      Conjunctiva/sclera: Conjunctivae normal.   Neck:      Thyroid: No thyromegaly.   Cardiovascular:      Rate and Rhythm: Normal rate and regular rhythm.      Heart sounds: Normal heart sounds.     No friction rub. No gallop.   Pulmonary:      Effort: Pulmonary effort is normal. No respiratory distress.      Breath sounds: No stridor. No wheezing.   Abdominal:      General: Bowel sounds are normal.      Palpations: Abdomen is soft. There is no mass.      Tenderness: There is no abdominal tenderness. There is no guarding or rebound.      Comments:   Musculoskeletal:         General: No tenderness. Normal range of motion.      Cervical back: Normal range of motion and neck supple.   Lymphadenopathy:      Cervical: No cervical adenopathy.   Skin:     General: Skin is warm.      Findings: No erythema or rash.      Comments:   Neurological:      Mental Status: She is alert and oriented to person, place, and time.      Motor: No abnormal muscle tone.   Psychiatric:         Behavior: Behavior normal.     Abdominal wall appears hardened     I have reexamined the patient and the results are consistent with the previously documented exam. Inga Hernandez MD       RECENT LABS    WBC   Date Value Ref Range Status   05/31/2023 6.09 3.40 - 10.80 10*3/mm3 Final     RBC   Date Value Ref Range Status   05/31/2023 4.53 3.77 - 5.28 10*6/mm3 Final     Hemoglobin   Date Value Ref Range Status   05/31/2023 14.0 12.0 - 15.9 g/dL Final     Hematocrit   Date Value Ref Range Status   05/31/2023 44.1 34.0 - 46.6 % Final     MCV   Date Value Ref Range Status   05/31/2023 97.4 (H) 79.0 - 97.0 fL Final     MCH   Date Value Ref Range Status   05/31/2023 30.9 26.6 - 33.0 pg Final      MCHC   Date Value Ref Range Status   05/31/2023 31.7 31.5 - 35.7 g/dL Final     RDW   Date Value Ref Range Status   05/31/2023 14.0 12.3 - 15.4 % Final     RDW-SD   Date Value Ref Range Status   05/31/2023 48.8 37.0 - 54.0 fl Final     MPV   Date Value Ref Range Status   05/31/2023 9.3 6.0 - 12.0 fL Final     Platelets   Date Value Ref Range Status   05/31/2023 191 140 - 450 10*3/mm3 Final     Neutrophil %   Date Value Ref Range Status   05/31/2023 59.7 42.7 - 76.0 % Final     Lymphocyte %   Date Value Ref Range Status   05/31/2023 22.8 19.6 - 45.3 % Final     Monocyte %   Date Value Ref Range Status   05/31/2023 12.8 (H) 5.0 - 12.0 % Final     Eosinophil %   Date Value Ref Range Status   05/31/2023 4.4 0.3 - 6.2 % Final     Basophil %   Date Value Ref Range Status   05/31/2023 0.3 0.0 - 1.5 % Final     Immature Grans %   Date Value Ref Range Status   04/29/2022 0.4 0.0 - 0.5 % Final     Neutrophils, Absolute   Date Value Ref Range Status   05/31/2023 3.63 1.70 - 7.00 10*3/mm3 Final     Lymphocytes, Absolute   Date Value Ref Range Status   05/31/2023 1.39 0.70 - 3.10 10*3/mm3 Final     Monocytes, Absolute   Date Value Ref Range Status   05/31/2023 0.78 0.10 - 0.90 10*3/mm3 Final     Eosinophils, Absolute   Date Value Ref Range Status   05/31/2023 0.27 0.00 - 0.40 10*3/mm3 Final     Basophils, Absolute   Date Value Ref Range Status   05/31/2023 0.02 0.00 - 0.20 10*3/mm3 Final     Immature Grans, Absolute   Date Value Ref Range Status   04/29/2022 0.02 0.00 - 0.05 10*3/mm3 Final     nRBC   Date Value Ref Range Status   04/29/2022 0.0 0.0 - 0.2 /100 WBC Final       Lab Results   Component Value Date    GLUCOSE 118 (H) 05/22/2023    BUN 19 05/22/2023    CREATININE 1.27 (H) 05/22/2023    EGFRIFNONA 47 (L) 02/21/2022    EGFRIFAFRI 44 (L) 01/21/2020    BCR 15.0 05/22/2023    K 3.6 05/22/2023    CO2 23.0 05/22/2023    CALCIUM 8.9 05/22/2023    ALBUMIN 3.2 (L) 05/22/2023    LABIL2 1.2 (calc) 01/21/2020    AST 17  05/22/2023    ALT 6 05/22/2023         ASSESSMENT:      1. 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  Status post neoadjuvant chemotherapy with double platinum and Taxol with Avastin.  Status post radical hysterectomy followed by maintenance Avastin and letrozole.  Recent PET/CT scan completed May 2023 shows evidence of progression along with rising .  For that reason Avastin will be discontinued as well as letrozole and switch therapy to olaparib  2. Right hip metastatic lesion, status post SBRT with symptomatic relief  3. Recent pulmonary infection received Levaquin  4. 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  Patient already had some 25% dose reduction on her chemo.  She is currently on Neurontin 300 mg daily will increase to 300 mg twice a day.  Peripheral neuropathy continues to be an issue.  We will hold Taxol for now till this has improved  5. Comprehensive gene analysis with [employer] was negative for any significant mutation in all 77 genes analyzed  6. 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  No other actionable mutations identified     Plans:     · Begin olaparib 300 mg twice a day until disease progression.  Discussed the benefits the side effects with patient in detail  · Discontinue Avastin and letrozole due to progressive disease  · Zofran as needed for nausea  · Patient was given information on olaparib  · Schedule chemotherapy education with MTM  · Continue Neurontin to 300 mg every 8 hours for peripheral neuropathy.  We will refill today that this is helping her neuropathy  · May benefit from Xgeva.  Patient asked to follow-up with her dentist prior to initiating Xgeva  · Reviewed her path report from radical hysterectomy 4/20/2022  · Comprehensive gene analysis with [employer]  results reviewed with patient and copies of her results was also given to her for her own records keeping  · Continue Percocet 5 mg p.o. every 4-6 hours as needed for pain  · Continue vitamin b6 100mg po q 12   · Foundation 1 testing results reviewed  · All questions answered  · Follow-up 4 weeks        I spent 40 total minutes, face-to-face, caring for Lucy today. 90% of this time involved counseling and/or coordination of care as documented within this note.

## 2023-05-31 NOTE — PATIENT INSTRUCTIONS
"Olaparib        (oh LAP a rib)    Trade name: Lynparza™    Olaparib is the generic for the trade chemotherapy drug Lynparza™. In some cases, health care professionals may use the trade name Lynparza™ when referring to the generic drug name olaparib.    Drug type: Olaparib is a targeted therapy. It is poly (ADP-ribose) polymerase (PARP) inhibitor - (For more detail, see \"How this drug works,\" below.)    What Olaparib Is Used For:  Advanced ovarian cancer (with deleterious germline BRCA mutated (gBRCAm) as detected by an FDA-approved test) who have been treated with three or more prior lines of chemotherapy.  HER2-negative metastatic breast cancer (with deleterious or suspected gBRCAm), who have been treated with chemotherapy.  Note: If a drug has been approved for one use, physicians may elect to use this same drug for other problems if they believe it may be helpful).    How Olaparib Is Given:  Olaparib is a pill, taken by mouth, twice daily. It can be taken with or without food.  Take olaparib exactly as prescribed.  Swallow olaparib capsules whole. Do not crush, dissolve or open capsules.  Do not take olaparib capsules if they look damaged or show signs of leakage.  Do not change your dose or stop olaparib unless your health care provider tells you to.  If you miss a dose, take your next dose at your usual scheduled time. Do not take an extra dose to make up for a missed dose.  If you take too much olaparib, call your health care provider right away and go to the emergency room.  Avoid grapefruit juice and Chelsea oranges during treatment with olaparib. Grapefruit and Chelsea oranges may increase the level of olaparib in your blood.  Let your doctor know if you are starting any new medications as some common medications (i.e. ciprofloxacin, fluconazole, etc) interact with olaparib.  The amount of olaparib that you will receive depends on many factors, your general health or other health problems, and the type of " side effects that you may have.    Side Effects:  Important things to remember about the side effects of olaparib:    Most people do not experience all of the side effects listed.  Side effects are often predictable in terms of their onset and duration.  There are many options to help minimize or prevent side effects.  There is no relationship between the presence or severity of side effects and the effectiveness of the medication.  The following side effects are common (occurring in greater than 30%) for patients taking olaparib:    Decreased Hemoglobin  Nausea  Fatigue (including weakness)  Decreased white blood cell count  Abdominal pain  Vomiting  Upper respiratory tract infection  Anemia  Decreased neutrophils  Musculoskeletal pain  Diarrhea  Decreased platelet count  Increased serum creatinine  These side effects are less common side effects (occurring in about 10-29%) of patients receiving olaparib:    Heartburn  Decreased appetite  Myalgia  Headache  Skin rash  Back pain  Taste changes  Cough  Swelling  Dizziness  Constipation  Urinary tract infection  Shortness of breath  Not all side effects are listed above. Some that are rare (occurring in less than 10% of patients) are not listed here. However, you should always inform your health care provider if you experience any unusual symptoms.    When to contact your doctor or health care provider:  Contact your health care provider immediately, day or night, and go to the emergency room, if you should experience any of the following symptoms:    Fever of 100.4° F (38°C or higher, chills)  Signs of an allergic reaction, like rash; hives; itching; red, swollen, blistered, or peeling skin with or without fever; wheezing; tightness in the chest or throat; trouble breathing or talking; unusual hoarseness; or swelling of the mouth, face, lips, tongue, or throat.  Trouble breathing, shortness of breath, or a cough that is new or worse.  The following symptoms require  medical attention, but are not an emergency. Contact your health care provider within 24 hours of noticing any of the following:    Diarrhea (4-6 episodes in a 24-hour period).  Nausea (interferes with ability to eat and unrelieved with prescribed medication).  Vomiting (vomiting more than 4-5 times in a 24 hour period).  Unable to eat or drink for 24 hours or have signs of dehydration: tiredness, thirst, dry mouth, dark and decrease amount of urine, or dizziness.  Burning or pain with urination.  Decreased appetite.  Bleed or bruise more easily than normal, blood in your urine or stools.  Back pain, muscle or joint pain.  Dizziness.  Headache.  Stomach pain or heartburn.  Nose or throat irritation.  Feeling tired or weak.  Always inform your health care provider if you experience any unusual symptoms.    Precautions:  Before starting olaparib treatment, make sure you tell your doctor about any other medications you are taking (including prescription, over-the-counter, vitamins, herbal remedies, etc.).  Do not receive any kind of immunization or vaccination without your doctor's approval while taking olaparib.  Inform your health care professional if you are pregnant or may be pregnant prior to starting this treatment. Pregnancy category D (olaparib may be hazardous to the fetus. Women who are pregnant or become pregnant must be advised of the potential hazard to the fetus.)  For both men and women: Do not conceive a child (get pregnant) while taking olaparib. Barrier methods of contraception, such as condoms, are recommended during treatment and for at least 1 month following completion of therapy. Discuss with your doctor when you may safely become pregnant or conceive a child after therapy.  Do not breast feed while taking this medication.  Self-Care Tips:  High blood pressure may be a side effect of olaparib. You blood pressure should be well controlled before starting olaparib. Your healthcare provider will  check your blood pressure regularly during treatment.  Drink at least two to three quarts of fluid every 24 hours, unless you are instructed otherwise.  If you should experience nausea, take anti-nausea medications as prescribed by your doctor, and eat small frequent meals. Sucking on lozenges and chewing gum may also help.  Avoid sun exposure. Wear SPF 30 (or higher) sun block and protective clothing.  In general, drinking alcoholic beverages should be kept to a minimum or avoided completely. You should discuss this with your doctor.  Get plenty of rest.  Maintain good nutrition.  Olaparib can cause tiredness, weakness or blurred vision. If you have any of these symptoms, use caution when driving a car, using machinery, or anything that requires you to be alert.  If you experience symptoms or side effects, be sure to discuss them with your health care team. They can prescribe medications and/or offer other suggestions that are effective in managing such problems.  Monitoring and Testing:  You will be checked regularly by your health care professional while you are taking olaparib to monitor side effects and check your response to therapy.    How Olaparib Works:  Targeted therapy is the result of about 100 years of research dedicated to understanding the differences between cancer cells and normal cells. To date, cancer treatment has focused primarily on killing rapidly dividing cells because one feature of cancer cells is that they divide rapidly. Unfortunately, some of our normal cells divide rapidly too, causing multiple side effects.    Targeted therapy is about identifying other features of cancer cells. Scientists look for specific differences in the cancer cells and the normal cells. This information is used to create a targeted therapy to attack the cancer cells without damaging the normal cells, thus leading to fewer side effects. Each type of targeted therapy works a little bit differently but all  interfere with the ability of the cancer cell to grow, divide, repair and/or communicate with other cells.    There are different types of targeted therapies, defined in three broad categories. Some targeted therapies focus on the internal components and function of the cancer cell. The targeted therapies use small molecules that can get into the cell and disrupt the function of the cells, causing them to die. There are several types of targeted therapy that focus on the inner parts of the cells. Other targeted therapies target receptors that are on the outside of the cell. Therapies that target receptors are also known as monoclonal antibodies. Antiangiogenesis inhibitors target the blood vessels that supply oxygen to the cells, ultimately causing the cells to starve.    Olaparib is a targeted therapy. Olaparib is a poly (ADP-ribose) polymerase (PARP) enzyme inhibitor, including PARP1, PARP2, and PARP3. PARP enzymes are involved in DNA transcription, cell cycle regulation, and DNA repair. Olaparib is a potent oral PARP inhibitor which induces synthetic lethality in BRCA 1/2 deficient tumor cells through the formation of double-stranded DNA breaks which cannot be accurately repaired, which leads to disruption of cellular homeostasis and cell death.    Research continues to identify which cancers may be best treated with targeted therapies and to identify additional targets for more types of cancer.    Note: We strongly encourage you to talk with your health care professional about your specific medical condition and treatments. The information contained in this website is meant to be helpful and educational, but is not a substitute for medical advice.

## 2023-06-01 ENCOUNTER — SPECIALTY PHARMACY (OUTPATIENT)
Dept: PHARMACY | Facility: HOSPITAL | Age: 63
End: 2023-06-01

## 2023-06-01 NOTE — PROGRESS NOTES
Oral Chemotherapy - New Referral    Received a referral from Dr. Hernandez    Treatment Plan: Lynparza (olaparib)  Start date of treatment planned for: As soon as oral specialty medication is available.  Indication: Ovarian Cancer  Relevant past treatments: double platinum and Taxol with Avastin, hysterectomy, and maintenance Avastin plus letrozole  Is the therapy appropriate based on treatment guidelines and FDA labeling?: Yes  Therapeutic Goals: Continue treatment until progression or intolerable toxicity  Patient can self-administer oral medications: Yes    • Drug-Drug Interactions: The current medication list was reviewed and there are no relevant drug-drug interactions.  • Medication Allergies: The patient has NKDA  • Review of Labs/Dose Adjustments: The patient's most recent labs were reviewed and the medication has been dose adjusted based on renal function. Per package insert, if CrCl 31 to 50 mL/min: Reduce dose to 200 mg twice daily (normal dose 300 mg BID)    A prescription was released to (unsure at this time) specialty pharmacy for   Drug: Lynparza (olaparib)  Strength: 100 mg  Directions: Take 2 tablets by mouth twice a day  Quantity: 120  Refills: 5    Pharmacy education is not yet scheduled.    Arabella Donohue, Pharm.D.    6/1/2023  14:22 EDT

## 2023-06-02 NOTE — PROGRESS NOTES
Oral Chemotherapy - New Referral      Completed independent double check on medication order/RX.      Received a referral from Dr. Hernandez    Treatment Plan: Lynparza (olaparib)  Start date of treatment planned for: As soon as oral specialty medication is available.  • Indication: Ovarian Cancer-non BRCA  • Drug-Drug Interactions: The current medication list was reviewed and there are no relevant drug-drug interactions.  • Medication Allergies: The patient has NKDA  • Review of Labs/Dose Adjustments: The patient's most recent labs were reviewed and the medication has been dose adjusted based on renal function. Per package insert, if CrCl 31 to 50 mL/min: Reduce dose to 200 mg twice daily (normal dose 300 mg BID)    A prescription will be released to specialty pharmacy for   Drug: Lynparza (olaparib)  Strength: 100 mg  Directions: Take 2 tablets by mouth twice a day  Quantity: 120  Refills: 5    Medication was denied by insurance.  Patient has given office signed permission to appeal the denial on her behalf.  Appeal submitted 6/2/23 and requested high priority review.    Pharmacy education is not yet scheduled.  Completed independent double check on medication order/RX  Giovanna ZELAYA, PharmD

## 2023-06-05 ENCOUNTER — HOSPITAL ENCOUNTER (OUTPATIENT)
Dept: ONCOLOGY | Facility: HOSPITAL | Age: 63
Discharge: HOME | End: 2023-06-05
Payer: COMMERCIAL

## 2023-06-12 ENCOUNTER — SPECIALTY PHARMACY (OUTPATIENT)
Dept: PHARMACY | Facility: HOSPITAL | Age: 63
End: 2023-06-12
Payer: COMMERCIAL

## 2023-06-12 ENCOUNTER — LAB (OUTPATIENT)
Dept: LAB | Facility: HOSPITAL | Age: 63
End: 2023-06-12
Payer: COMMERCIAL

## 2023-06-12 DIAGNOSIS — C56.9 MALIGNANT NEOPLASM OF OVARY, UNSPECIFIED LATERALITY: ICD-10-CM

## 2023-06-12 DIAGNOSIS — C56.9 MALIGNANT NEOPLASM OF OVARY, UNSPECIFIED LATERALITY: Primary | ICD-10-CM

## 2023-06-12 LAB
ALBUMIN SERPL-MCNC: 3.7 G/DL (ref 3.5–5.2)
ALBUMIN/GLOB SERPL: 0.8 G/DL
ALP SERPL-CCNC: 93 U/L (ref 39–117)
ALT SERPL W P-5'-P-CCNC: 6 U/L (ref 1–33)
ANION GAP SERPL CALCULATED.3IONS-SCNC: 12 MMOL/L (ref 5–15)
AST SERPL-CCNC: 22 U/L (ref 1–32)
BASOPHILS # BLD AUTO: 0.03 10*3/MM3 (ref 0–0.2)
BASOPHILS NFR BLD AUTO: 0.5 % (ref 0–1.5)
BILIRUB SERPL-MCNC: 0.5 MG/DL (ref 0–1.2)
BUN SERPL-MCNC: 18 MG/DL (ref 8–23)
BUN/CREAT SERPL: 14.4 (ref 7–25)
CALCIUM SPEC-SCNC: 9.8 MG/DL (ref 8.6–10.5)
CANCER AG125 SERPL QL: 282 U/ML (ref 0–38.1)
CHLORIDE SERPL-SCNC: 102 MMOL/L (ref 98–107)
CO2 SERPL-SCNC: 26 MMOL/L (ref 22–29)
CREAT SERPL-MCNC: 1.25 MG/DL (ref 0.57–1)
DEPRECATED RDW RBC AUTO: 47.3 FL (ref 37–54)
EGFRCR SERPLBLD CKD-EPI 2021: 48.2 ML/MIN/1.73
EOSINOPHIL # BLD AUTO: 0.24 10*3/MM3 (ref 0–0.4)
EOSINOPHIL NFR BLD AUTO: 4.4 % (ref 0.3–6.2)
ERYTHROCYTE [DISTWIDTH] IN BLOOD BY AUTOMATED COUNT: 13.7 % (ref 12.3–15.4)
GLOBULIN UR ELPH-MCNC: 4.4 GM/DL
GLUCOSE SERPL-MCNC: 120 MG/DL (ref 65–99)
HCT VFR BLD AUTO: 42.4 % (ref 34–46.6)
HGB BLD-MCNC: 13.2 G/DL (ref 12–15.9)
LYMPHOCYTES # BLD AUTO: 1.14 10*3/MM3 (ref 0.7–3.1)
LYMPHOCYTES NFR BLD AUTO: 20.8 % (ref 19.6–45.3)
MCH RBC QN AUTO: 30.2 PG (ref 26.6–33)
MCHC RBC AUTO-ENTMCNC: 31.1 G/DL (ref 31.5–35.7)
MCV RBC AUTO: 97 FL (ref 79–97)
MONOCYTES # BLD AUTO: 0.73 10*3/MM3 (ref 0.1–0.9)
MONOCYTES NFR BLD AUTO: 13.3 % (ref 5–12)
NEUTROPHILS NFR BLD AUTO: 3.33 10*3/MM3 (ref 1.7–7)
NEUTROPHILS NFR BLD AUTO: 61 % (ref 42.7–76)
PLATELET # BLD AUTO: 189 10*3/MM3 (ref 140–450)
PMV BLD AUTO: 9.2 FL (ref 6–12)
POTASSIUM SERPL-SCNC: 3.4 MMOL/L (ref 3.5–5.2)
PROT SERPL-MCNC: 8.1 G/DL (ref 6–8.5)
RBC # BLD AUTO: 4.37 10*6/MM3 (ref 3.77–5.28)
SODIUM SERPL-SCNC: 140 MMOL/L (ref 136–145)
WBC NRBC COR # BLD: 5.47 10*3/MM3 (ref 3.4–10.8)

## 2023-06-12 PROCEDURE — 86304 IMMUNOASSAY TUMOR CA 125: CPT | Performed by: INTERNAL MEDICINE

## 2023-06-12 PROCEDURE — 85025 COMPLETE CBC W/AUTO DIFF WBC: CPT

## 2023-06-12 PROCEDURE — 80053 COMPREHEN METABOLIC PANEL: CPT | Performed by: INTERNAL MEDICINE

## 2023-06-12 PROCEDURE — 36415 COLL VENOUS BLD VENIPUNCTURE: CPT

## 2023-06-12 RX ORDER — SENNA PLUS 8.6 MG/1
1 TABLET ORAL DAILY
COMMUNITY

## 2023-06-12 RX ORDER — UBIDECARENONE 75 MG
50 CAPSULE ORAL DAILY
COMMUNITY

## 2023-06-12 RX ORDER — MELATONIN
1000 DAILY
COMMUNITY

## 2023-06-12 RX ORDER — ASPIRIN 81 MG/1
81 TABLET ORAL DAILY
COMMUNITY

## 2023-06-12 RX ORDER — ONDANSETRON HYDROCHLORIDE 8 MG/1
TABLET, FILM COATED ORAL
Qty: 60 TABLET | Refills: 5 | Status: SHIPPED | OUTPATIENT
Start: 2023-06-12

## 2023-06-12 NOTE — PROGRESS NOTES
Specialty Pharmacy Patient Management Program  Oncology Initial Assessment       Lucy Mahan is a 62 y.o. female with ovarian carcinoma seen by an Oncology provider and enrolled in the Oncology Patient Management program offered by [facility].  An initial outreach was conducted, including assessment of therapy appropriateness and specialty medication education for Lynparza (olaparib). The patient was introduced to services offered by [facility], including: regular assessments, refill coordination, curbside pick-up or mail order delivery options, prior authorization maintenance, and financial assistance programs as applicable. The patient was also provided with contact information for the pharmacy team.     Regimen: Lynparza (olaparib) 200mg PO BID    Start date of oral specialty medication: As soon as oral specialty medication is available.    Relevant Past Medical History, Comorbidities, and Vaccines  Relevant medical history and concomitant health conditions were discussed with the patient. The patient's chart has been reviewed for relevant past medical history and comorbid health conditions and updated as necessary.  Vaccines are coordinated by the patient's oncologist and primary care provider.  Past Medical History:   Diagnosis Date    Arthritis     Cancer     ovarian    Depression     Gall stones     Hypertension     Irritable bowel syndrome     Rheumatoid aortitis     Thyroid disease      Social History     Socioeconomic History    Marital status:    Tobacco Use    Smoking status: Never    Smokeless tobacco: Never   Vaping Use    Vaping Use: Never used   Substance and Sexual Activity    Alcohol use: Yes     Alcohol/week: 1.0 standard drink     Types: 1 Glasses of wine per week     Comment: less than monthly    Drug use: No    Sexual activity: Not Currently     Partners: Male       Allergies  Known allergies and reactions were discussed with the  patient. The patient's chart has been reviewed for allergy information and updated as necessary.   No Known Allergies     Current Medication List  This medication list has been reviewed with the patient and evaluated for any interactions or necessary modifications/recommendations, and updated to include all prescription medications, OTC medications, and supplements the patient is currently taking.  This list reflects what is contained in the patient's profile, which has also been marked as reviewed to communicate to other providers it is the most up to date version of the patient's current medication therapy.   Prior to Admission medications    Medication Sig Start Date End Date Taking? Authorizing Provider   amLODIPine (NORVASC) 10 MG tablet Take 1 tablet by mouth Daily. 12/22/22  Yes Argentina Avalos APRN   aspirin 81 MG EC tablet Take 1 tablet by mouth Daily.   Yes ProviderJosiane MD   Cholecalciferol 25 MCG (1000 UT) tablet Take 1 tablet by mouth Daily.   Yes ProviderJosiane MD   furosemide (Lasix) 20 MG tablet Take 1 tablet by mouth Daily As Needed (swelling and weight gain of 3 lbs in 48 hours). 3/15/23  Yes Argentina Avalos APRN   gabapentin (NEURONTIN) 300 MG capsule TAKE ONE (1) CAPSULE BY MOUTH EVERY 8 HOURS 5/16/23  Yes Inga Hernandez MD   hydroCHLOROthiazide (HYDRODIURIL) 25 MG tablet Take 1 tablet by mouth Daily. 3/15/23  Yes Argentina Avalos APRN   HYDROcodone-acetaminophen (Norco) 5-325 MG per tablet Take 1 tablet by mouth Every 6 (Six) Hours As Needed for Moderate Pain. 5/24/23  Yes Donna Engle APRN   hydrOXYzine (ATARAX) 10 MG tablet Take 1 tablet by mouth Every 8 (Eight) Hours As Needed for Anxiety. 11/7/22  Yes Argentina Avalos APRN   letrozole (FEMARA) 2.5 MG tablet TAKE ONE (1) TABLET BY MOUTH DAILY 11/1/22  Yes Maggie Castro DO   levothyroxine (Synthroid) 100 MCG tablet Take 1 tablet by mouth Daily. 5/12/23  Yes Argentina Avalos APRN   lidocaine-prilocaine (EMLA)  2.5-2.5 % cream APPLY TO THE AFFECTED AREA OF port prior TO access AS DIRECTED 3/20/23  Yes Inga Hernandez MD   omeprazole (priLOSEC) 40 MG capsule TAKE ONE (1) CAPSULE BY MOUTH EVERY DAY 4/10/23  Yes Inga Hernandez MD   potassium chloride (KLOR-CON) 10 MEQ CR tablet Take 1 tablet by mouth Daily As Needed (if takes lasix). 3/15/23  Yes Argentina Avalos APRN   senna 8.6 MG tablet Take 1 tablet by mouth Daily.   Yes Josiane Slater MD   sertraline (Zoloft) 25 MG tablet Take 1 tablet by mouth Daily. 1/30/23  Yes Argentina Avalos APRN   vitamin B-12 (CYANOCOBALAMIN) 100 MCG tablet Take 50 mcg by mouth Daily.   Yes Josiane Slater MD   losartan (Cozaar) 25 MG tablet Take 1 tablet by mouth Daily.  Patient not taking: Reported on 6/12/2023 1/30/23   Argentina Avalos APRN   Olaparib (LYNPARZA) 150 MG tablet chemo tablet Take 2 tablets by mouth 2 (Two) Times a Day.  Patient not taking: Reported on 6/12/2023    Josiane Slater MD   ondansetron (ZOFRAN) 8 MG tablet Take 1 tablet PO 30 minutes prior to cancer treatment daily and every 8 hours as needed for breakthrough nausea 6/12/23   Inga Hernandez MD   gabapentin (NEURONTIN) 300 MG capsule Take 1 capsule by mouth Every 8 (Eight) Hours. 12/22/22 5/16/23  Inga Hernandez MD   HYDROcodone-acetaminophen (Norco) 5-325 MG per tablet Take 1 tablet by mouth Every 6 (Six) Hours As Needed for Moderate Pain. 4/7/23 5/24/23  Donna Engle APRN   letrozole (FEMARA) 2.5 MG tablet Take 1 tablet by mouth Daily. 11/1/22 6/12/23  Josiane Slater MD   levoFLOXacin (Levaquin) 500 MG tablet Take 1 tablet by mouth Daily. 5/11/23 6/12/23  Donna Engle APRN   loratadine (Claritin) 10 MG tablet Take 1 tablet night before and 1 tablet morning of chemotherapy. 12/7/21 7/29/22  Inga Hernandez MD   ondansetron (Zofran) 8 MG tablet Take 1 tablet by mouth Every 8 (Eight) Hours As Needed for Nausea or Vomiting.  Patient not  taking: Reported on 6/12/2023 5/31/23 6/12/23  Inga Hernandez MD   pantoprazole (PROTONIX) 40 MG EC tablet TAKE ONE (1) TABLET BY MOUTH EVERY DAY 8/22/22 6/12/23  Inga Hernandez MD   promethazine (PHENERGAN) 25 MG tablet Take 1 tablet by mouth Every 6 (Six) Hours As Needed for Nausea or Vomiting. 1/18/22 6/12/23  Yolie White APRN       Drug Interactions  Reviewed concomitant medications, allergies, labs, comorbidities/medical history, quality of life, and immunization history.   Drug-drug interactions noted and discussed during education: no significant drug interactions noted. . Reminded the patient to let us know before making any changes or starting any new prescription or OTC medications so we can first assess drug interactions.  Drug-food interactions noted and discussed during education: Patient was instructed to avoid eating grapefruit and drinking grapefruit juice and eating Gray oranges (commonly found in orange marmalade)    Recommended Medications Assessment  Bone Health (such as calcium/vitamin D, bisphosphonate, RANKL inhibitor) - Not Indicated The patient  is currently taking vitamin D    Relevant Laboratory Values  Lab Results   Component Value Date    GLUCOSE 118 (H) 05/22/2023    CALCIUM 8.9 05/22/2023     05/22/2023    K 3.6 05/22/2023    CO2 23.0 05/22/2023     05/22/2023    BUN 19 05/22/2023    CREATININE 1.27 (H) 05/22/2023    EGFRIFAFRI 44 (L) 01/21/2020    EGFRIFNONA 47 (L) 02/21/2022    BCR 15.0 05/22/2023    ANIONGAP 13.0 05/22/2023     Lab Results   Component Value Date    WBC 5.47 06/12/2023    RBC 4.37 06/12/2023    HGB 13.2 06/12/2023    HCT 42.4 06/12/2023    MCV 97.0 06/12/2023    MCH 30.2 06/12/2023    MCHC 31.1 (L) 06/12/2023    RDW 13.7 06/12/2023    RDWSD 47.3 06/12/2023    MPV 9.2 06/12/2023     06/12/2023    NEUTRORELPCT 61.0 06/12/2023    LYMPHORELPCT 20.8 06/12/2023    MONORELPCT 13.3 (H) 06/12/2023    EOSRELPCT 4.4 06/12/2023     BASORELPCT 0.5 06/12/2023    AUTOIGPER 0.4 04/29/2022    NEUTROABS 3.33 06/12/2023    LYMPHSABS 1.14 06/12/2023    MONOSABS 0.73 06/12/2023    EOSABS 0.24 06/12/2023    BASOSABS 0.03 06/12/2023    AUTOIGNUM 0.02 04/29/2022    NRBC 0.0 04/29/2022       The above labs have been reviewed. Dose adjustments are needed for the oral specialty medication(s) based on renal function.  The dose will be changed to 200mg PO BID from the initial dose of 300mg PO BID and has already been discussed and changed with provider.    Initial Education Provided for Specialty Medication  The patient has been provided with the following education. All questions and concerns have been addressed prior to the patient receiving the medication, and the patient has verbalized understanding of the education and any materials provided.  Additional patient education shall be provided and documented upon request by the patient, provider or payer.      Provided patient with:   Education sheets about the medication, 24-hour clinic phone number and my contact information and instructions to call should additional questions arise. , personalized medication action plan    Medication Education Sheets Provided:   Oral Specialty Medication: Lynparza (olaparib)  Other Education Sheets Provided:   Adherence, Constipation, and Diarrhea    TOPICS COMMENTS   Storage and Handling of Oral Specialty Medication Store in the original container, in a dry location out of direct sunlight, and out of reach of children or pets. and Store at room temperature. Patient prefers use of medication box and recommended using a separate box for Lynparza or a separate spot in the box.  Discussed safe handling and what to do with any unused medication.   Administration of Oral Specialty Medication Take with or without food at the same time(s) each day., Do not crush or chew tablets., and take twice a day   Adherence to Oral Specialty Regimen and Handling Missed Doses Patient is  likely to have good treatment adherence; reinforced the importance of adherence. Reviewed how to address missed doses and to let us know of any missed doses.   Anemia: role of RBC, cause, s/s, ways to manage, role of transfusion Reviewed the role of RBC and the use of transfusions if hemoglobin decreases too much.  Patient to notify us if they experience shortness of breath, dizziness, or palpitations.  Also let patient know they could feel more tired than usual and to try to stay active, but rest if they need to.    Thrombocytopenia: role of platelet, cause, s/s, ways to prevent bleeding, things to avoid, when to seek help Reviewed the role of platelets in blood clotting and when to call clinic (bloody nose that bleeds for 5 mins despite pressure, a cut that won't stop bleeding despite pressure, gums that bleed excessively with brushing or flossing). Recommended using an electric razor, soft bristle toothbrush, and blowing your nose gently.    Neutropenia: role of WBC, cause, infection precautions, s/s of infection, when to call MD Reviewed the role of WBC, good infection prevention practices, and when to call the clinic (temperature 100.4F, sore throat, burning urination, etc)  COVID Vaccines: 2 doses plus 1 booster  Flu Vaccine: 2022 flu vaccine received   Nutrition and Appetite Changes:  importance of maintaining healthy diet & weight, ways to manage to improve intake, dietary consult, exercise regimen, electrolyte and/or blood glucose abnormalities Decreased Appetite: Discussed the risk of decreased appetite. Recommended eating smaller, more frequent meals. Instructed the patient to contact the clinic if losing weight or having difficulty eating enough to maintain energy level.   Diarrhea: causes, s/s of dehydration, ways to manage, dietary changes, when to call MD Chemotherapy : Discussed the risk of diarrhea. Instructed patient on use OTC loperamide with diarrhea onset, but emphasized the importance of  calling the clinic if 4-6 episodes in 24 hours not relieved by OTC loperamide.   Constipation: causes, ways to manage, dietary changes, when to call MD Provided supplementary handout with instructions for use of docusate and other OTC therapies to manage constipation.  Instructed to call us if medications aren't working.   Nausea/Vomiting: cause, use of antiemetics, dietary changes, when to call MD Emetic risk: Moderate  PRN home meds: Ondansetron  Pharmacy home meds sent to: [facility] location    Instructed the patient to take a dose of the PRN medication at the first onset of nausea and if it's not working to call us for additional medications.  Also provided non-drug measures to mitigate nausea.   Pain: causes, ways to manage Discussed abdominal pain and severe pain should be looked at immediately.  and Chemo: Discussed muscle and joint aches/pains with chemotherapy, and recommended the use of OTC pain relief with ibuprofen or acetaminophen if needed.   Skin/Nail Changes: cause, s/s, ways to manage Discussed potential for a rash or itchy skin and to wear sun protective clothing and sun screen during sun exposure and stay hydrated. Instructed patient to call if a rash develops that worsens or gets larger       Organ Toxicities: cause, s/s, need for diagnostic tests, labs, when to notify MD Discussed potential effects on organ systems, monitoring, diagnostic tests, labs, and when to notify their MD. Discussed the signs/symptoms of the following: lung changes and nephrotoxicity   Miscellaneous Financial Issues: Waiting on final appeal of medication and care coordinator is working on 14 day trial while waiting approval  Lab Draws: On or before day 1 of each cycle, no sooner than 3 days early.  Miscellaneous: Blood Clots: Explained the rare, but possible risk of DVT or PE.  Reviewed the signs and symptoms of VTE and stressed the urgency to call 911 immediately.   Infertility and Sexuality:   causes, fertility preservation options, sexuality changes, ways to manage, importance of birth control The patient is not of childbearing potential.   Home Care: how to manage bodily fluids Counseled on management of soiled linens and proper flush technique.  Discussed how to manage all the side effects at home and advised when to contact the MD office   Survivorship: distress, distress assessment, secondary malignancies, early/late effects, follow-up, social issues, social support Discussed the rare, but possible risk of secondary malignancies months to years after treatment, most commonly  myelodyspalstic syndrome, acute myeloid leukemia.     Adherence and Self-Administration  Barriers to Patient Adherence and/or Self-Administration: none  Methods for Supporting Patient Adherence and/or Self-Administration: pill box  Expected duration of therapy: Until disease progression or intolerable toxicity    Goals of Therapy  Patient Goals of Therapy:   Consistently take medications as prescribed  Patient will adhere to medication regimen  Patient will report any medication side effects to healthcare provider  Clinical Goals:    Goals        Specialty Pharmacy General Goal      Clinical goal/therapeutic target: disease control              Support patient understanding of medication regimen  Ensure patient knows the pharmacy contact information  Schedule regular follow-up to monitor the treatment serious adverse events  Schedule regular follow-up to confirm medication adherence  Schedule regular follow-up to monitor disease progression or stability    Quality of Life Assessment   The patient's current (pre-therapy) quality of life was discussed with the patient. The QOL segment of this outreach has been reviewed and updated.   Quality of Life Score: 8/10    Reassessment Plan & Follow-Up  Pharmacist to perform regular reassessments no more than (6) months from the previous assessment.  Welcome information and patient  satisfaction survey to be sent by retail team with patient's initial fill.  Care Coordinator to set up future refill outreaches, coordinate prescription delivery, and escalate clinical questions to pharmacist.     Additional Plans, Therapy Recommendations or Therapy Problems to Be Addressed: Care coordinator is working on medication access.       Attestation  I attest that the initiated specialty medication(s) are appropriate for the patient based on my assessment.  If the prescribed therapy is at any point deemed not appropriate based on the current or future assessments, a consultation will be initiated with the patient's specialty care provider to determine the best course of action. The revised plan of therapy will be documented along with any additional patient education provided.     Giovanna ZELAYA, PharmD      Date and Time: 6/12/2023  14:48 EDT

## 2023-06-13 ENCOUNTER — DOCUMENTATION (OUTPATIENT)
Dept: PHARMACY | Facility: HOSPITAL | Age: 63
End: 2023-06-13
Payer: COMMERCIAL

## 2023-06-13 ENCOUNTER — SPECIALTY PHARMACY (OUTPATIENT)
Dept: PHARMACY | Facility: HOSPITAL | Age: 63
End: 2023-06-13
Payer: COMMERCIAL

## 2023-06-13 NOTE — PROGRESS NOTES
Specialty Pharmacy Note     Lucy insurance denied prior auth for lynparza, and the appeal was denied as well. I signed Lucy up for a free 2-week trial of lynparza and found out she was denied today because she was approved for free drug thru [employer]. Free drug is approved until 6/12/24. Faxed [employer] lynparza prescription today to start free drug process.    Vicente Magallon - CARE COORDINATOR  6/13/2023  13:27 EDT    Talk to Faustino from [employer] to check on status of prescription, and I was told Lucy alarcon is ready for shipment and will automatic get shipped to patient home. I got it expedited because it usually takes up to 6-8 days to ship to a patient home. There is no direct phone number to pharmacy that ships the medication, all calls has to go thru [employer]. She will not be getting any phone calls from [employer], her lynparza will be automatically shipped out to her at least 2 weeks out from when her medication is due. Tracking # will be sent to her email when medication is shipped every month.  I called Lucy and she is aware of the situation. She should have her medication by the end of the week.    Vicente Magallon - CARE COORDINATOR  6/14/2023  15:42 EDT

## 2023-06-15 ENCOUNTER — OFFICE VISIT (OUTPATIENT)
Dept: FAMILY MEDICINE CLINIC | Facility: CLINIC | Age: 63
End: 2023-06-15
Payer: COMMERCIAL

## 2023-06-15 VITALS
WEIGHT: 178 LBS | DIASTOLIC BLOOD PRESSURE: 78 MMHG | BODY MASS INDEX: 29.66 KG/M2 | HEART RATE: 72 BPM | HEIGHT: 65 IN | SYSTOLIC BLOOD PRESSURE: 120 MMHG | OXYGEN SATURATION: 96 %

## 2023-06-15 DIAGNOSIS — C56.9 MALIGNANT NEOPLASM OF OVARY, UNSPECIFIED LATERALITY: Primary | ICD-10-CM

## 2023-06-15 DIAGNOSIS — N18.31 STAGE 3A CHRONIC KIDNEY DISEASE: ICD-10-CM

## 2023-06-15 DIAGNOSIS — I10 PRIMARY HYPERTENSION: ICD-10-CM

## 2023-06-15 DIAGNOSIS — F41.9 ANXIETY: ICD-10-CM

## 2023-06-15 DIAGNOSIS — E03.9 ACQUIRED HYPOTHYROIDISM: ICD-10-CM

## 2023-06-15 DIAGNOSIS — M79.89 SWELLING OF BOTH LOWER EXTREMITIES: ICD-10-CM

## 2023-06-15 DIAGNOSIS — F32.0 CURRENT MILD EPISODE OF MAJOR DEPRESSIVE DISORDER WITHOUT PRIOR EPISODE: ICD-10-CM

## 2023-06-15 DIAGNOSIS — E87.6 HYPOKALEMIA: ICD-10-CM

## 2023-06-15 RX ORDER — LEVOTHYROXINE SODIUM 112 UG/1
TABLET ORAL
Qty: 90 TABLET | Refills: 0 | OUTPATIENT
Start: 2023-06-15

## 2023-06-15 NOTE — PROGRESS NOTES
Chief Complaint  Chief Complaint   Patient presents with   • Follow-up     3 month follow up    • Leg Swelling     Pt is still having swelling in both legs, pt says swelling is daily. Pt has not been taking lasix with potassium as she has not had rapid weight gain. Pt says when she first got new meds, she took them about 3 times but did see a difference. Pt says oncologist advised pt to stop taking lasix since it caused decrease in kidney function            Subjective          Lucy Mahan presents to [facility] for   History of Present Illness    HTN, running higher in the 140's systolic at home, is taking amlodopine 10 mg, losartan 25 mg and HCTZ 25mg daily, she reports continued swelling of the lower extremities, She does not add salt, has been trying to eat more protein.  Denies chest pain, headache, shortness of air, palpitations.      Hypothyroidism, on levothyroxine, she has gained weight but also with swelling of lower extremities, denies symptoms of constipation, hot or cold intolerance, hair loss, abnormal heart rate and fatigue.      Ovarian cancer with bone metastasis, she is following with Dr. Hernandez, completed 5 radiation tx for 2.2 cm sclerotic lesion at the anterior right acetabulum. Recent PET/CT scan completed May 2023 shows evidence of progression along with rising . She stopped Avastin 2 wks ago and letrozole close to 2 weeks ago, starting Lynparza this weekend. She reports still having daily nausea    She was treated recently with Levaquin for respiratory infection      The following portions of the patient's history were reviewed and updated as appropriate: allergies, current medications, past family history, past medical history, past social history, past surgical history and problem list.    Past Medical History:   Diagnosis Date   • Arthritis    • Cancer    • Depression    • Gall stones    • Hypertension    • Irritable bowel syndrome    • Rheumatoid  aortitis    • Thyroid disease      Past Surgical History:   Procedure Laterality Date   •  SECTION      x2   • CHOLECYSTECTOMY N/A 11/10/2021    Procedure: diagnostic laparoscopy and peritoneal biopsy;  Surgeon: Krunal Badillo MD;  Location: [facility] MAIN OR;  Service: General;  Laterality: N/A;   • COLONOSCOPY     • HIP SURGERY Left    • JOINT REPLACEMENT     • THYROIDECTOMY, PARTIAL  2017   • TOTAL ABDOMINAL HYSTERECTOMY N/A 2022    Procedure: EXPLORATORY LAPAROTOMY, TOTAL ABDOMINAL HYSTERECTOMY, BILATERAL SALPINGOOOPHORECTOMY, PELVIC AND AORTIC LYMPHADENECTOMY, OMENTECTOMY;  Surgeon: Maggie Castro DO;  Location: [facility] MAIN OR;  Service: Gynecology Oncology;  Laterality: N/A;   • VENOUS ACCESS DEVICE (PORT) INSERTION Left 2021    Procedure: INSERTION VENOUS ACCESS DEVICE;  Surgeon: Krunal Badillo MD;  Location: [facility] MAIN OR;  Service: General;  Laterality: Left;     Family History   Problem Relation Age of Onset   • Dementia Mother    • Arthritis Mother    • Heart disease Father    • Hypertension Father    • Malig Hyperthermia Neg Hx      Social History     Tobacco Use   • Smoking status: Never   • Smokeless tobacco: Never   Substance Use Topics   • Alcohol use: Yes     Alcohol/week: 1.0 standard drink     Types: 1 Glasses of wine per week     Comment: less than monthly       Current Outpatient Medications:   •  amLODIPine (NORVASC) 10 MG tablet, Take 1 tablet by mouth Daily., Disp: 90 tablet, Rfl: 1  •  aspirin 81 MG EC tablet, Take 1 tablet by mouth Daily., Disp: , Rfl:   •  cholecalciferol (VITAMIN D3) 25 MCG (1000 UT) tablet, Take 1 tablet by mouth Daily., Disp: , Rfl:   •  gabapentin (NEURONTIN) 300 MG capsule, TAKE ONE (1) CAPSULE BY MOUTH EVERY 8 HOURS, Disp: 90 capsule, Rfl: 3  •  hydroCHLOROthiazide (HYDRODIURIL) 25 MG tablet, Take 1 tablet by mouth Daily., Disp: 90 tablet, Rfl: 1  •  HYDROcodone-acetaminophen (Norco) 5-325 MG per tablet, Take 1 tablet by mouth Every 6 (Six)  "Hours As Needed for Moderate Pain., Disp: 60 tablet, Rfl: 0  •  hydrOXYzine (ATARAX) 10 MG tablet, Take 1 tablet by mouth Every 8 (Eight) Hours As Needed for Anxiety., Disp: 45 tablet, Rfl: 2  •  letrozole (FEMARA) 2.5 MG tablet, TAKE ONE (1) TABLET BY MOUTH DAILY, Disp: 60 tablet, Rfl: 3  •  levothyroxine (Synthroid) 100 MCG tablet, Take 1 tablet by mouth Daily., Disp: 30 tablet, Rfl: 1  •  lidocaine-prilocaine (EMLA) 2.5-2.5 % cream, APPLY TO THE AFFECTED AREA OF port prior TO access AS DIRECTED, Disp: 30 g, Rfl: 1  •  losartan (Cozaar) 25 MG tablet, Take 1 tablet by mouth Daily., Disp: 90 tablet, Rfl: 1  •  Olaparib (LYNPARZA) 100 MG tablet chemo tablet, Take 2 tablets by mouth 2 (Two) Times a Day., Disp: 120 tablet, Rfl: 5  •  omeprazole (priLOSEC) 40 MG capsule, TAKE ONE (1) CAPSULE BY MOUTH EVERY DAY, Disp: 30 capsule, Rfl: 6  •  ondansetron (ZOFRAN) 8 MG tablet, Take 1 tablet PO 30 minutes prior to cancer treatment daily and every 8 hours as needed for breakthrough nausea, Disp: 60 tablet, Rfl: 5  •  potassium chloride (KLOR-CON) 10 MEQ CR tablet, Take 1 tablet by mouth Daily As Needed (if takes lasix)., Disp: 40 tablet, Rfl: 1  •  senna (SENOKOT) 8.6 MG tablet, Take 1 tablet by mouth Daily., Disp: , Rfl:   •  sertraline (Zoloft) 25 MG tablet, Take 1 tablet by mouth Daily., Disp: 90 tablet, Rfl: 1  •  vitamin B-12 (CYANOCOBALAMIN) 100 MCG tablet, Take 50 mcg by mouth Daily., Disp: , Rfl:     Objective   Vital Signs:   /78 (BP Location: Right arm, Patient Position: Sitting, Cuff Size: Adult)   Pulse 72   Ht 165.1 cm (65\")   Wt 80.7 kg (178 lb)   SpO2 96%   BMI 29.62 kg/m²           Physical Exam  Constitutional:       General: She is not in acute distress.     Appearance: Normal appearance. She is well-developed. She is not ill-appearing or diaphoretic.   HENT:      Head: Normocephalic.   Eyes:      Conjunctiva/sclera: Conjunctivae normal.      Pupils: Pupils are equal, round, and reactive to light. "   Neck:      Thyroid: No thyromegaly.      Vascular: No JVD.   Cardiovascular:      Rate and Rhythm: Normal rate and regular rhythm.      Heart sounds: Normal heart sounds. No murmur heard.  Pulmonary:      Effort: Pulmonary effort is normal. No respiratory distress.      Breath sounds: Normal breath sounds. No wheezing or rhonchi.   Abdominal:      General: Bowel sounds are normal. There is no distension.      Palpations: Abdomen is soft.      Tenderness: There is no abdominal tenderness.   Musculoskeletal:         General: Swelling (+1 pitting BLE) present. No tenderness. Normal range of motion.      Cervical back: Normal range of motion and neck supple. No tenderness.   Lymphadenopathy:      Cervical: No cervical adenopathy.   Skin:     General: Skin is warm and dry.      Coloration: Skin is not jaundiced.      Findings: No bruising, erythema or rash.   Neurological:      General: No focal deficit present.      Mental Status: She is alert and oriented to person, place, and time. Mental status is at baseline.      Sensory: No sensory deficit.   Psychiatric:         Mood and Affect: Mood normal.         Behavior: Behavior normal.         Thought Content: Thought content normal.         Judgment: Judgment normal.          Result Review :     Lab on 06/12/2023   Component Date Value Ref Range Status   • Glucose 06/12/2023 120 (H)  65 - 99 mg/dL Final   • BUN 06/12/2023 18  8 - 23 mg/dL Final   • Creatinine 06/12/2023 1.25 (H)  0.57 - 1.00 mg/dL Final   • Sodium 06/12/2023 140  136 - 145 mmol/L Final   • Potassium 06/12/2023 3.4 (L)  3.5 - 5.2 mmol/L Final   • Chloride 06/12/2023 102  98 - 107 mmol/L Final   • CO2 06/12/2023 26.0  22.0 - 29.0 mmol/L Final   • Calcium 06/12/2023 9.8  8.6 - 10.5 mg/dL Final   • Total Protein 06/12/2023 8.1  6.0 - 8.5 g/dL Final   • Albumin 06/12/2023 3.7  3.5 - 5.2 g/dL Final   • ALT (SGPT) 06/12/2023 6  1 - 33 U/L Final   • AST (SGOT) 06/12/2023 22  1 - 32 U/L Final   • Alkaline  Phosphatase 06/12/2023 93  39 - 117 U/L Final   • Total Bilirubin 06/12/2023 0.5  0.0 - 1.2 mg/dL Final   • Globulin 06/12/2023 4.4  gm/dL Final   • A/G Ratio 06/12/2023 0.8  g/dL Final   • BUN/Creatinine Ratio 06/12/2023 14.4  7.0 - 25.0 Final   • Anion Gap 06/12/2023 12.0  5.0 - 15.0 mmol/L Final   • eGFR 06/12/2023 48.2 (L)  >60.0 mL/min/1.73 Final   •  06/12/2023 282.0 (H)  0.0 - 38.1 U/mL Final   • WBC 06/12/2023 5.47  3.40 - 10.80 10*3/mm3 Final   • RBC 06/12/2023 4.37  3.77 - 5.28 10*6/mm3 Final   • Hemoglobin 06/12/2023 13.2  12.0 - 15.9 g/dL Final   • Hematocrit 06/12/2023 42.4  34.0 - 46.6 % Final   • MCV 06/12/2023 97.0  79.0 - 97.0 fL Final   • MCH 06/12/2023 30.2  26.6 - 33.0 pg Final   • MCHC 06/12/2023 31.1 (L)  31.5 - 35.7 g/dL Final   • RDW 06/12/2023 13.7  12.3 - 15.4 % Final   • RDW-SD 06/12/2023 47.3  37.0 - 54.0 fl Final   • MPV 06/12/2023 9.2  6.0 - 12.0 fL Final   • Platelets 06/12/2023 189  140 - 450 10*3/mm3 Final   • Neutrophil % 06/12/2023 61.0  42.7 - 76.0 % Final   • Lymphocyte % 06/12/2023 20.8  19.6 - 45.3 % Final   • Monocyte % 06/12/2023 13.3 (H)  5.0 - 12.0 % Final   • Eosinophil % 06/12/2023 4.4  0.3 - 6.2 % Final   • Basophil % 06/12/2023 0.5  0.0 - 1.5 % Final   • Neutrophils, Absolute 06/12/2023 3.33  1.70 - 7.00 10*3/mm3 Final   • Lymphocytes, Absolute 06/12/2023 1.14  0.70 - 3.10 10*3/mm3 Final   • Monocytes, Absolute 06/12/2023 0.73  0.10 - 0.90 10*3/mm3 Final   • Eosinophils, Absolute 06/12/2023 0.24  0.00 - 0.40 10*3/mm3 Final   • Basophils, Absolute 06/12/2023 0.03  0.00 - 0.20 10*3/mm3 Final                              Assessment and Plan    Diagnoses and all orders for this visit:    1. Malignant neoplasm of ovary, unspecified laterality (Primary)  Comments:  with progression, start new treatment per oncology as directed    2. Acquired hypothyroidism    3. Swelling of both lower extremities  Comments:  Continue HCTZ only  Furosemide caused renal decline  rec  compression stockings 4 to 6 hours/day  Elevate legs as much as possible throughout the day  Stay activ    4. Primary hypertension  Comments:  BP is stable    5. Current mild episode of major depressive disorder without prior episode    6. Anxiety    7. Hypokalemia  Comments:  Start taking potassium 10meq daily    8. Stage 3a chronic kidney disease  Comments:  Continue to monitor closely, starting on Lynparza.   will hold furosemide, continue HCTZ  limit sodium in diet  cont water intake    overall doing well, changes as above, cont otherwise tx regimen  Med refills when needed      I spent 30 minutes caring for Lucy Mahan on this date of service. This time includes time spent by me in the following activities: preparing for the visit, reviewing tests, performing a medically appropriate examination and/or evaluation , counseling and educating the patient/family/caregiver, ordering medications, tests, or procedures and documenting information in the medical record        Follow Up     Return in about 3 months (around 9/15/2023) for Recheck, Annual physical.  Patient was given instructions and counseling regarding her condition or for health maintenance advice. Please see specific information pulled into the AVS if appropriate.        Part of this note may be an electronic transcription/translation of spoken language to printed text using the Dragon Dictation System

## 2023-06-16 ENCOUNTER — DOCUMENTATION (OUTPATIENT)
Dept: PHARMACY | Facility: HOSPITAL | Age: 63
End: 2023-06-16
Payer: COMMERCIAL

## 2023-06-16 NOTE — PROGRESS NOTES
Specialty Pharmacy Note     Received an email notice from Yolie from [employer] saying that Lucy will receive her lynparza by [employer] today. Tracking is [id number]. Called Lucy and notify her, she said she will start her lynparza tomorrow on Saturday 6/17/23.     Vicente Magallon - CARE COORDINATOR  6/16/2023  10:32 EDT

## 2023-06-19 RX ORDER — POTASSIUM CHLORIDE 20 MEQ/1
20 TABLET, EXTENDED RELEASE ORAL 2 TIMES DAILY
Qty: 1 TABLET | Refills: 0 | Status: SHIPPED | OUTPATIENT
Start: 2023-06-19 | End: 2023-06-21

## 2023-06-20 NOTE — TELEPHONE ENCOUNTER
Caller: [facility] - [place], [place] - [address], [address] - [phone] PH - [phone]    Relationship: Pharmacy    Best call back number: [phone]      What was the call regarding: PHARMACY NEEDS CONFIRMATION OF QUANTITY OF THE POTASSIUM CHLORIDE

## 2023-06-21 PROBLEM — N39.0 ACUTE UTI: Status: ACTIVE | Noted: 2023-01-01

## 2023-06-26 PROBLEM — R10.9 INTRACTABLE ABDOMINAL PAIN: Status: ACTIVE | Noted: 2023-01-01

## 2023-06-27 PROBLEM — J90 PLEURAL EFFUSION: Status: ACTIVE | Noted: 2023-01-01

## 2023-06-28 ENCOUNTER — INPATIENT HOSPITAL (OUTPATIENT)
Dept: URBAN - METROPOLITAN AREA HOSPITAL 84 | Facility: HOSPITAL | Age: 63
End: 2023-06-28
Payer: COMMERCIAL

## 2023-06-28 DIAGNOSIS — R11.2 NAUSEA WITH VOMITING, UNSPECIFIED: ICD-10-CM

## 2023-06-28 DIAGNOSIS — R18.0 MALIGNANT ASCITES: ICD-10-CM

## 2023-06-28 DIAGNOSIS — R10.84 GENERALIZED ABDOMINAL PAIN: ICD-10-CM

## 2023-06-28 PROCEDURE — 99221 1ST HOSP IP/OBS SF/LOW 40: CPT | Performed by: NURSE PRACTITIONER

## 2023-06-29 ENCOUNTER — INPATIENT HOSPITAL (OUTPATIENT)
Dept: URBAN - METROPOLITAN AREA HOSPITAL 84 | Facility: HOSPITAL | Age: 63
End: 2023-06-29
Payer: COMMERCIAL

## 2023-06-29 DIAGNOSIS — K31.1 ADULT HYPERTROPHIC PYLORIC STENOSIS: ICD-10-CM

## 2023-06-29 DIAGNOSIS — K29.80 DUODENITIS WITHOUT BLEEDING: ICD-10-CM

## 2023-06-29 DIAGNOSIS — K29.70 GASTRITIS, UNSPECIFIED, WITHOUT BLEEDING: ICD-10-CM

## 2023-06-29 DIAGNOSIS — R11.10 VOMITING, UNSPECIFIED: ICD-10-CM

## 2023-06-29 DIAGNOSIS — K20.90 ESOPHAGITIS, UNSPECIFIED WITHOUT BLEEDING: ICD-10-CM

## 2023-06-29 PROCEDURE — 43239 EGD BIOPSY SINGLE/MULTIPLE: CPT | Performed by: INTERNAL MEDICINE

## 2023-06-30 ENCOUNTER — INPATIENT HOSPITAL (OUTPATIENT)
Dept: URBAN - METROPOLITAN AREA HOSPITAL 84 | Facility: HOSPITAL | Age: 63
End: 2023-06-30
Payer: COMMERCIAL

## 2023-06-30 DIAGNOSIS — R10.9 UNSPECIFIED ABDOMINAL PAIN: ICD-10-CM

## 2023-06-30 DIAGNOSIS — R18.0 MALIGNANT ASCITES: ICD-10-CM

## 2023-06-30 DIAGNOSIS — C79.60 SECONDARY MALIGNANT NEOPLASM OF UNSPECIFIED OVARY: ICD-10-CM

## 2023-06-30 DIAGNOSIS — R11.2 NAUSEA WITH VOMITING, UNSPECIFIED: ICD-10-CM

## 2023-06-30 PROCEDURE — 99233 SBSQ HOSP IP/OBS HIGH 50: CPT

## 2023-07-03 PROBLEM — A41.9 SEPTIC SHOCK: Status: ACTIVE | Noted: 2023-07-03

## 2023-07-03 PROBLEM — J96.01 ACUTE HYPOXEMIC RESPIRATORY FAILURE: Status: ACTIVE | Noted: 2023-07-03

## 2023-07-03 PROBLEM — N18.9 ACUTE ON CHRONIC RENAL FAILURE: Status: ACTIVE | Noted: 2023-07-03

## 2023-07-03 PROBLEM — R65.21 SEPTIC SHOCK: Status: ACTIVE | Noted: 2023-07-03

## 2023-07-03 PROBLEM — J69.0 ASPIRATION PNEUMONIA OF LEFT LOWER LOBE DUE TO VOMIT: Status: ACTIVE | Noted: 2023-07-03

## 2023-07-03 PROBLEM — N17.9 ACUTE ON CHRONIC RENAL FAILURE: Status: ACTIVE | Noted: 2023-07-03

## 2023-07-03 PROBLEM — J69.0 ASPIRATION PNEUMONIA OF RIGHT UPPER LOBE: Status: ACTIVE | Noted: 2023-07-03

## 2023-08-21 RX ORDER — SERTRALINE HYDROCHLORIDE 25 MG/1
TABLET, FILM COATED ORAL
Qty: 90 TABLET | Refills: 1 | OUTPATIENT
Start: 2023-08-21

## 2024-08-05 NOTE — PROGRESS NOTES
Chief Complaint   Patient presents with   • Gynecologic Exam   • Follow-up     3 mo   • Immunizations     flu vax       HPI     Patient here for annual/gynecologic exam, Pap smear, mammogram ordered but not completed yet.  Patient reports last menses over 10 years ago following ablation. Pt denies breast pain, performs monthly self exams, denies abdominal pain, dysuria, hematuria and vaginal discharge.     Hypothyroidism, stable on medication, denies symptoms of constipation, weight gain/loss, hot or cold intolerance, hair loss, abnormal heart rate and fatigue.     HTN, BP elevated, patient is taking lisinopril 10 mg on a daily basis as directed, denies chest pain, headache, shortness of air, palpitations and swelling of extremities.     Insomnia, This is a chronic problem that started more than 1 year ago, is intermittent and unchanged. The patient denies abdominal pain, chills, coughing, fever, nausea, neck pain, rash, sore throat, vomiting and weakness, pt reports daytime fatigue.  Patient reports insomnia symptoms are aggravated by stress and anxiety, she has an ill  anticipating another open heart surgery she reports daily overwhelm.       The following portions of the patient's history were reviewed and updated as appropriate: allergies, current medications, past family history, past medical history, past social history, past surgical history and problem list.    History reviewed. No pertinent past medical history.  Past Surgical History:   Procedure Laterality Date   •  SECTION      x2   • HIP SURGERY Left    • THYROIDECTOMY, PARTIAL  2017     Family History   Problem Relation Age of Onset   • Dementia Mother    • Heart disease Father    • Hypertension Father      Social History     Tobacco Use   • Smoking status: Never Smoker   • Smokeless tobacco: Never Used   Substance Use Topics   • Alcohol use: Yes         Current Outpatient Medications:   •  alendronate (FOSAMAX) 70 MG tablet, Take 1  "tablet by mouth Every 7 (Seven) Days., Disp: 12 tablet, Rfl: 0  •  Calcium Carbonate-Vitamin D 600-400 MG-UNIT chewable tablet, CALTRATE 600+D PLUS MINERALS CHEW, Disp: , Rfl:   •  folic acid (FOLVITE) 1 MG tablet, take 1 tablet by mouth once daily, Disp: 90 tablet, Rfl: 1  •  hydroxychloroquine (PLAQUENIL) 200 MG tablet, TAKE 1 TABLET BY MOUTH TWICE A DAY, Disp: 180 tablet, Rfl: 0  •  levothyroxine (SYNTHROID, LEVOTHROID) 100 MCG tablet, Take 1 tablet by mouth Daily., Disp: 90 tablet, Rfl: 1  •  lisinopril (PRINIVIL,ZESTRIL) 20 MG tablet, Take 1 tablet by mouth Daily., Disp: 90 tablet, Rfl: 1  •  methotrexate 2.5 MG tablet, TAKE 4 TABLETS BY MOUTH EVERY WEEK, Disp: 52 tablet, Rfl: 0  •  hydrOXYzine (ATARAX) 10 MG tablet, Take 1 tablet by mouth 2 (Two) Times a Day As Needed for Anxiety., Disp: 40 tablet, Rfl: 0      Review of Systems       Obtained as mentioned in HPI, otherwise negative.       Vitals:    11/02/20 0933   BP: (!) 186/101   BP Location: Left arm   Patient Position: Sitting   Cuff Size: Adult   Pulse: 65   Temp: 97.3 °F (36.3 °C)   TempSrc: Skin   SpO2: 99%   Weight: 106 kg (233 lb)   Height: 165.1 cm (65\")     Body mass index is 38.77 kg/m².    Physical Exam  Vitals signs and nursing note reviewed.   Constitutional:       General: She is not in acute distress.     Appearance: She is well-developed. She is not diaphoretic.   HENT:      Head: Normocephalic.   Eyes:      Pupils: Pupils are equal, round, and reactive to light.   Neck:      Musculoskeletal: Normal range of motion and neck supple.      Thyroid: No thyromegaly.      Vascular: No JVD.   Cardiovascular:      Rate and Rhythm: Normal rate and regular rhythm.      Heart sounds: Normal heart sounds. No murmur.   Pulmonary:      Effort: Pulmonary effort is normal. No respiratory distress.      Breath sounds: Normal breath sounds.   Abdominal:      General: Bowel sounds are normal. There is no distension.      Palpations: Abdomen is soft.      " Tenderness: There is no abdominal tenderness.   Genitourinary:     Labia:         Right: No rash or lesion.         Left: No rash or lesion.       Vagina: Normal. No vaginal discharge.      Adnexa:         Right: No mass or tenderness.          Left: No mass or tenderness.        Rectum: Normal.      Comments: Pap collected with some discomfort and very small vaginal canal, breast exam completed. Pt tolerated procedure.   Musculoskeletal: Normal range of motion.         General: No tenderness.   Skin:     General: Skin is warm and dry.   Neurological:      Mental Status: She is alert and oriented to person, place, and time.      Sensory: No sensory deficit.   Psychiatric:         Attention and Perception: Attention normal.         Mood and Affect: Mood is anxious and depressed. Affect is tearful.         Speech: Speech normal.         Behavior: Behavior normal.         Thought Content: Thought content normal.         Cognition and Memory: Cognition normal.         Judgment: Judgment normal.         No visits with results within 7 Day(s) from this visit.   Latest known visit with results is:   Lab on 09/17/2020   Component Date Value Ref Range Status   • WBC 09/17/2020 6.91  3.40 - 10.80 10*3/mm3 Final   • RBC 09/17/2020 4.56  3.77 - 5.28 10*6/mm3 Final   • Hemoglobin 09/17/2020 13.5  12.0 - 15.9 g/dL Final   • Hematocrit 09/17/2020 40.4  34.0 - 46.6 % Final   • MCV 09/17/2020 88.6  79.0 - 97.0 fL Final   • MCH 09/17/2020 29.6  26.6 - 33.0 pg Final   • MCHC 09/17/2020 33.4  31.5 - 35.7 g/dL Final   • RDW 09/17/2020 14.4  12.3 - 15.4 % Final   • RDW-SD 09/17/2020 46.4  37.0 - 54.0 fl Final   • MPV 09/17/2020 11.6  6.0 - 12.0 fL Final   • Platelets 09/17/2020 225  140 - 450 10*3/mm3 Final   • Glucose 09/17/2020 90  65 - 99 mg/dL Final   • BUN 09/17/2020 14  8 - 23 mg/dL Final   • Creatinine 09/17/2020 0.95  0.57 - 1.00 mg/dL Final   • Sodium 09/17/2020 137  136 - 145 mmol/L Final   • Potassium 09/17/2020 4.2  3.5 - 5.2  mmol/L Final   • Chloride 09/17/2020 101  98 - 107 mmol/L Final   • CO2 09/17/2020 27.0  22.0 - 29.0 mmol/L Final   • Calcium 09/17/2020 9.3  8.6 - 10.5 mg/dL Final   • Total Protein 09/17/2020 7.3  6.0 - 8.5 g/dL Final   • Albumin 09/17/2020 3.90  3.50 - 5.20 g/dL Final   • ALT (SGPT) 09/17/2020 14  1 - 33 U/L Final   • AST (SGOT) 09/17/2020 16  1 - 32 U/L Final   • Alkaline Phosphatase 09/17/2020 74  39 - 117 U/L Final   • Total Bilirubin 09/17/2020 0.6  0.0 - 1.2 mg/dL Final   • eGFR Non African Amer 09/17/2020 60* >60 mL/min/1.73 Final   • Globulin 09/17/2020 3.4  gm/dL Final   • A/G Ratio 09/17/2020 1.1  g/dL Final   • BUN/Creatinine Ratio 09/17/2020 14.7  7.0 - 25.0 Final   • Anion Gap 09/17/2020 9.0  5.0 - 15.0 mmol/L Final   • Total Cholesterol 09/17/2020 155  0 - 200 mg/dL Final   • Triglycerides 09/17/2020 110  0 - 150 mg/dL Final   • HDL Cholesterol 09/17/2020 60  40 - 60 mg/dL Final   • LDL Cholesterol  09/17/2020 73  0 - 100 mg/dL Final   • VLDL Cholesterol 09/17/2020 22  5 - 40 mg/dL Final   • LDL/HDL Ratio 09/17/2020 1.22   Final   • TSH 09/17/2020 2.510  0.270 - 4.200 uIU/mL Final       Diagnoses and all orders for this visit:    1. Encounter for gynecological examination with Papanicolaou smear of cervix (Primary)  -     IGP,Aptima HPV,Age Gdln    2. Acquired hypothyroidism    3. Essential hypertension  -     lisinopril (PRINIVIL,ZESTRIL) 20 MG tablet; Take 1 tablet by mouth Daily.  Dispense: 90 tablet; Refill: 1  -     Blood Pressure Device    4. Breast cancer screening by mammogram    5. Influenza vaccine administered    Other orders  -     Fluarix/Fluzone/Afluria Quad>6 Months  -     Discontinue: lisinopril (PRINIVIL,ZESTRIL) 10 MG tablet; Take 1 tablet by mouth Daily.  Dispense: 90 tablet; Refill: 1  -     levothyroxine (SYNTHROID, LEVOTHROID) 100 MCG tablet; Take 1 tablet by mouth Daily.  Dispense: 90 tablet; Refill: 1  -     hydrOXYzine (ATARAX) 10 MG tablet; Take 1 tablet by mouth 2 (Two)  Times a Day As Needed for Anxiety.  Dispense: 40 tablet; Refill: 0      1. Pap completed, pt with discomfort d/t very small vaginal canal, tolerated procedure with xs speculum, will notify patient results  -[facility]ogram [facility] for [facility], patient to take order today.   -[facility] completed at [facility], ordered per rheumatologist, continue Fosamax and will request results.  -Flu shot today  -Reviewed negative Cologuard  2.  Previous TSH was stable, continue/refill levothyroxine 100 mcg  3.  BP elevated, likely stress/anxiety induced, increase lisinopril to 20 mg, ordered BP cuff.  Patient to check outside of the office and notify if consistently greater than 150 systolic, adjust/titrate meds as needed  4.  Patient provided mammogram order, completed at [facility]  5.  Flu shot  6.  Trial hydroxyzine as needed for insomnia and anxiety, notify if effective for further refills.    We will plan to repeat htn panel labs again in 6mo.     Return in about 4 weeks (around 11/30/2020).  For BP check and reevaluation of anxiety/insomnia, then q6mo once stable     300

## (undated) DEVICE — TOWEL,OR,DSP,ST,BLUE,STD,4/PK,20PK/CS: Brand: MEDLINE

## (undated) DEVICE — 2, DISPOSABLE SUCTION/IRRIGATOR WITH DISPOSABLE TIP: Brand: STRYKEFLOW

## (undated) DEVICE — SOLUTION,WATER,IRRIGATION,1000ML,STERILE: Brand: MEDLINE

## (undated) DEVICE — DRAPE,UNDERBUTTOCKS,PCH,STERILE: Brand: MEDLINE

## (undated) DEVICE — PATIENT RETURN ELECTRODE, SINGLE-USE, CONTACT QUALITY MONITORING, ADULT, WITH 9FT CORD, FOR PATIENTS WEIGING OVER 33LBS. (15KG): Brand: MEGADYNE

## (undated) DEVICE — STPLR SKIN VISISTAT WD 35CT

## (undated) DEVICE — VESSEL LOOPS X-RAY DETECTABLE: Brand: DEROYAL

## (undated) DEVICE — GLV SURG SENSICARE POLYISPRN W/ALOE PF LF 6.5 GRN STRL

## (undated) DEVICE — UNDERGLV SURG BIOGEL INDICATOR LTX PF 7

## (undated) DEVICE — 3M™ STERI-STRIP™ REINFORCED ADHESIVE SKIN CLOSURES, R1547, 1/2 IN X 4 IN (12 MM X 100 MM), 6 STRIPS/ENVELOPE: Brand: 3M™ STERI-STRIP™

## (undated) DEVICE — CONTAINER,SPECIMEN,OR STERILE,4OZ: Brand: MEDLINE

## (undated) DEVICE — KT SURG TURNOVER 050

## (undated) DEVICE — DRSNG TELFA PAD NONADH STR 1S 3X4IN

## (undated) DEVICE — BLANKT WARM UPPR/BDY ARM/OUT 57X196CM

## (undated) DEVICE — GLV SURG BIOGEL LTX PF 7

## (undated) DEVICE — DRP SLUSH WARMR MACH CIR 44X44IN

## (undated) DEVICE — GENERAL LAPAROSCOPY CDS: Brand: MEDLINE INDUSTRIES, INC.

## (undated) DEVICE — ELECTRD BLD EZ CLN STD 6.5IN

## (undated) DEVICE — SUT VIC 0/0 UR6 27IN DYED J603H

## (undated) DEVICE — UNDYED BRAIDED (POLYGLACTIN 910), SYNTHETIC ABSORBABLE SUTURE: Brand: COATED VICRYL

## (undated) DEVICE — ADHS SKIN PREMIERPRO EXOFIN TOPICAL HI/VISC .5ML

## (undated) DEVICE — SUT VIC 0 TIES 18IN J912G

## (undated) DEVICE — SOL IRRIG NACL 1000ML

## (undated) DEVICE — DEV OPN LIGASURE CRV 180D 36MM 13.5CM  1P/U

## (undated) DEVICE — ENDOPATH 5MM CURVED SCISSORS WITH MONOPOLAR CAUTERY: Brand: ENDOPATH

## (undated) DEVICE — LEGGINGS, PAIR, CLEAR, STERILE: Brand: MEDLINE

## (undated) DEVICE — SOL IRR H2O BTL 1000ML STRL

## (undated) DEVICE — SUT VIC 3/0 SH 27IN J416H

## (undated) DEVICE — PASS SUT PRO BARIATRIC XL W/TROC SWABS

## (undated) DEVICE — SUT VIC 2/0 TIES 54IN J607H

## (undated) DEVICE — LAPAROSCOPIC GAS CONDITIONING DEVICE.: Brand: INSUFLOW

## (undated) DEVICE — SUT PDS 1 XLH LP 99IN Z881G

## (undated) DEVICE — SLV SCD CALF HEMOFORCE DVT THERP REPROC MD

## (undated) DEVICE — GLV SURG BIOGEL LTX PF 6 1/2

## (undated) DEVICE — GLV SURG BIOGEL LTX PF 6

## (undated) DEVICE — SUT PROLN 3/0 8832H

## (undated) DEVICE — PENCL EVAC ULTRAVAC SMOKE W/BLD

## (undated) DEVICE — TUBING, SUCTION, 1/4" X 20', STRAIGHT: Brand: MEDLINE INDUSTRIES, INC.

## (undated) DEVICE — APPL CHLORAPREP HI/LITE 26ML ORNG

## (undated) DEVICE — GLV SURG BIOGEL LTX PF 5 1/2

## (undated) DEVICE — ANTIBACTERIAL UNDYED BRAIDED (POLYGLACTIN 910), SYNTHETIC ABSORBABLE SUTURE: Brand: COATED VICRYL

## (undated) DEVICE — PK MINOR LAPAROTOMY 50

## (undated) DEVICE — PENCL ES MEGADINE EZ/CLEAN BUTN W/HOLSTR 10FT

## (undated) DEVICE — GLV SURG SENSICARE PI PF LF 7 GRN STRL

## (undated) DEVICE — C-ARM: Brand: UNBRANDED

## (undated) DEVICE — GOWN,SIRUS,NON REINFRCD,LARGE,SET IN SL: Brand: MEDLINE

## (undated) DEVICE — PK HYST ABD 40

## (undated) DEVICE — SYR LUERLOK 5CC